# Patient Record
Sex: FEMALE | Race: WHITE | Employment: UNEMPLOYED | URBAN - METROPOLITAN AREA
[De-identification: names, ages, dates, MRNs, and addresses within clinical notes are randomized per-mention and may not be internally consistent; named-entity substitution may affect disease eponyms.]

---

## 2017-08-15 ENCOUNTER — OFFICE VISIT (OUTPATIENT)
Dept: INTERNAL MEDICINE CLINIC | Age: 52
End: 2017-08-15

## 2017-08-15 VITALS
TEMPERATURE: 98.2 F | WEIGHT: 201 LBS | HEIGHT: 64 IN | OXYGEN SATURATION: 98 % | HEART RATE: 68 BPM | BODY MASS INDEX: 34.31 KG/M2 | RESPIRATION RATE: 16 BRPM | DIASTOLIC BLOOD PRESSURE: 88 MMHG | SYSTOLIC BLOOD PRESSURE: 146 MMHG

## 2017-08-15 DIAGNOSIS — E11.9 CONTROLLED TYPE 2 DIABETES MELLITUS WITHOUT COMPLICATION, WITH LONG-TERM CURRENT USE OF INSULIN (HCC): ICD-10-CM

## 2017-08-15 DIAGNOSIS — H43.11 VITREOUS HEMORRHAGE OF RIGHT EYE (HCC): Chronic | ICD-10-CM

## 2017-08-15 DIAGNOSIS — E78.2 MIXED HYPERLIPIDEMIA: Chronic | ICD-10-CM

## 2017-08-15 DIAGNOSIS — Z79.4 CONTROLLED TYPE 2 DIABETES MELLITUS WITHOUT COMPLICATION, WITH LONG-TERM CURRENT USE OF INSULIN (HCC): ICD-10-CM

## 2017-08-15 DIAGNOSIS — Z01.818 PRE-OP EVALUATION: Primary | ICD-10-CM

## 2017-08-15 LAB — HBA1C MFR BLD: 7.9 %

## 2017-08-15 PROCEDURE — 83036 HEMOGLOBIN GLYCOSYLATED A1C: CPT | Performed by: INTERNAL MEDICINE

## 2017-08-15 PROCEDURE — 99243 OFF/OP CNSLTJ NEW/EST LOW 30: CPT | Performed by: INTERNAL MEDICINE

## 2017-08-15 RX ORDER — LISINOPRIL 10 MG/1
TABLET ORAL
Qty: 30 TABLET | Refills: 5 | Status: SHIPPED | OUTPATIENT
Start: 2017-08-15 | End: 2018-11-08

## 2017-11-16 ENCOUNTER — OFFICE VISIT (OUTPATIENT)
Dept: INTERNAL MEDICINE CLINIC | Age: 52
End: 2017-11-16

## 2017-11-16 VITALS
DIASTOLIC BLOOD PRESSURE: 88 MMHG | SYSTOLIC BLOOD PRESSURE: 132 MMHG | WEIGHT: 209 LBS | HEIGHT: 64 IN | BODY MASS INDEX: 35.68 KG/M2 | RESPIRATION RATE: 16 BRPM

## 2017-11-16 DIAGNOSIS — E78.2 MIXED HYPERLIPIDEMIA: Chronic | ICD-10-CM

## 2017-11-16 DIAGNOSIS — E11.9 CONTROLLED TYPE 2 DIABETES MELLITUS WITHOUT COMPLICATION, WITH LONG-TERM CURRENT USE OF INSULIN (HCC): Primary | ICD-10-CM

## 2017-11-16 DIAGNOSIS — Z12.39 SCREENING FOR BREAST CANCER: ICD-10-CM

## 2017-11-16 DIAGNOSIS — Z12.11 SCREENING FOR COLON CANCER: ICD-10-CM

## 2017-11-16 DIAGNOSIS — E11.3499 SEVERE NONPROLIFERATIVE DIABETIC RETINOPATHY ASSOCIATED WITH TYPE 2 DIABETES MELLITUS, MACULAR EDEMA PRESENCE UNSPECIFIED, UNSPECIFIED LATERALITY (HCC): Chronic | ICD-10-CM

## 2017-11-16 DIAGNOSIS — Z79.4 CONTROLLED TYPE 2 DIABETES MELLITUS WITHOUT COMPLICATION, WITH LONG-TERM CURRENT USE OF INSULIN (HCC): Primary | ICD-10-CM

## 2017-11-16 LAB
CREATININE URINE: 126.1 MG/DL (ref 28–259)
HBA1C MFR BLD: 7.3 %
MICROALBUMIN UR-MCNC: 200.4 MG/DL
MICROALBUMIN/CREAT UR-RTO: 1589.2 MG/G (ref 0–30)

## 2017-11-16 PROCEDURE — 99214 OFFICE O/P EST MOD 30 MIN: CPT | Performed by: INTERNAL MEDICINE

## 2017-11-16 PROCEDURE — 83036 HEMOGLOBIN GLYCOSYLATED A1C: CPT | Performed by: INTERNAL MEDICINE

## 2017-11-16 ASSESSMENT — PATIENT HEALTH QUESTIONNAIRE - PHQ9
1. LITTLE INTEREST OR PLEASURE IN DOING THINGS: 0
SUM OF ALL RESPONSES TO PHQ9 QUESTIONS 1 & 2: 0
2. FEELING DOWN, DEPRESSED OR HOPELESS: 0
SUM OF ALL RESPONSES TO PHQ QUESTIONS 1-9: 0

## 2017-11-16 NOTE — PROGRESS NOTES
Subjective:      Patient ID: Kelli Artis is a 49 y.o. female. HPI  Established patient here for a visit to manage chronic medical conditions as detailed below. Diabetes mellitus type 2, controlled, without complications ([address]  This problem was reviewed in detail. It is under control and stable. Will check blood work. Blood sugars have been ok , will monitor closely and cover with sliding scale insulin coverage and continue current medications. BP Readings from Last 2 Encounters:   11/16/17 132/88   08/15/17 (!) 146/88     Hemoglobin A1C (%)   Date Value   08/15/2017 7.9     Microscopic Examination (no units)   Date Value   09/02/2014 Yes     MICROALBUMIN, RANDOM URINE (mg/dL)   Date Value   09/15/2014 154.00 (H)     LDL Calculated (mg/dL)   Date Value   07/27/2015 138 (H)              Tobacco use:  Patient  reports that she has never smoked. She has never used smokeless tobacco.    If Smoker - Cessation materials given? NA    Is Daily aspirin on medication list?:  Yes    Diabetic retinal exam done? Yes   If yes, document in Health Maintenance. Monofilament placed on counter? Yes    Shoes and socks removed? Yes    BP taken with correct size cuff? Yes   Repeated if > 130/80 Yes     Microalbumin performed if applicable? Yes      Mixed hyperlipidemia  This has been a chronic problem, takes meds regularly. Monitors diet and tries to follow a low fat diet. Not been very compliant w exercise. Lipids have been stable, The problem is controlled. Recent lipid tests were reviewed and are normal. Pertinent negatives include no chest pain, focal sensory loss, focal weakness, leg pain, myalgias or shortness of breath. Advised patient to continue the current instructions or medications. Diabetic retinopathy ([address]  This problem is stable, reviewed in detail, and advised patient to continue the current instructions or medications. Will continue to closely monitor the situation.       Review of Systems  ROS: No unusual headaches or allergy symptoms or blurred vision. No prolonged cough. No flushing or facial pain or chest pain,dizziness, dyspnea, palpitations, or chest pain on exertion. No syncope. No nausea or vommitting or diarrhea. No jaundice or abdominal pain, change in bowel habits, black or bloody stools. No dysuria or hematuria or frequency of urination. No myalgias or muscle pain. No numbness, weakness, or tingling. No falls, or loss of consciousness. No weight loss or back pain. No falls. No paresthesias. No joint swelling or redness. No joint pain. No recent weight loss. No focal weakness or sensory deficits or paresthesias, No confusion or altered sensorium. No hematemesis. No hearing loss. No siezures. All other systems were reviewed, and review was negative. Objective:   Physical Exam  /88 (Site: Left Arm, Position: Sitting, Cuff Size: Medium Adult)   Resp 16   Ht 5' 3.5\" (1.613 m)   Wt 209 lb (94.8 kg)   BMI 36.44 kg/m²    The physical exam reveals a patient who appears well, alert and oriented x 3, pleasant, cooperative. Vitals are as noted. Head is atraumatic and normocephalic. Eyes reveal normal conjunctiva, cornea normal, pupils are equal and rective to light. Nasal mucosa is normal. Throat is normal without exudates. Ears reveal normal tympanic membranes. Neck is supple and free of adenopathy, or masses. No thyromegaly. No jugular venous distension. Lungs are clear to auscultation, no rales or rhonchi noted. Heart sounds are regular , no murmurs, clicks, gallops or rubs. Abdomen is soft, no tenderness, masses or organomegaly. Bowel sounds are normally heard. Pelvis: normal. Extremities are normal. Peripheral pulses are normal. Screening neurological exam is normal without focal findings. Cranial nerves are intact, reflexes are symmetrical and muscle strength eaqual. Skin is normal without suspicious lesions noted.      Assessment:      Diabetes mellitus type 2, controlled, without complications ([address]  This problem was reviewed in detail. It is under control and stable. Will check blood work. Blood sugars have been ok , will monitor closely and cover with sliding scale insulin coverage and continue current medications. BP Readings from Last 2 Encounters:   11/16/17 132/88   08/15/17 (!) 146/88     Hemoglobin A1C (%)   Date Value   08/15/2017 7.9     Microscopic Examination (no units)   Date Value   09/02/2014 Yes     MICROALBUMIN, RANDOM URINE (mg/dL)   Date Value   09/15/2014 154.00 (H)     LDL Calculated (mg/dL)   Date Value   07/27/2015 138 (H)              Tobacco use:  Patient  reports that she has never smoked. She has never used smokeless tobacco.    If Smoker - Cessation materials given? NA    Is Daily aspirin on medication list?:  Yes    Diabetic retinal exam done? Yes   If yes, document in Health Maintenance. Monofilament placed on counter? Yes    Shoes and socks removed? Yes    BP taken with correct size cuff? Yes   Repeated if > 130/80 Yes     Microalbumin performed if applicable? Yes      Mixed hyperlipidemia  This has been a chronic problem, takes meds regularly. Monitors diet and tries to follow a low fat diet. Not been very compliant w exercise. Lipids have been stable, The problem is controlled. Recent lipid tests were reviewed and are normal. Pertinent negatives include no chest pain, focal sensory loss, focal weakness, leg pain, myalgias or shortness of breath. Advised patient to continue the current instructions or medications. Diabetic retinopathy ([address]  This problem is stable, reviewed in detail, and advised patient to continue the current instructions or medications. Will continue to closely monitor the situation. Plan: This problem was reviewed in detail. It is under control and stable. Will check blood work.

## 2017-11-16 NOTE — ASSESSMENT & PLAN NOTE
This problem was reviewed in detail. It is under control and stable. Will check blood work. Blood sugars have been ok , will monitor closely and cover with sliding scale insulin coverage and continue current medications. BP Readings from Last 2 Encounters:   11/16/17 132/88   08/15/17 (!) 146/88     Hemoglobin A1C (%)   Date Value   08/15/2017 7.9     Microscopic Examination (no units)   Date Value   09/02/2014 Yes     MICROALBUMIN, RANDOM URINE (mg/dL)   Date Value   09/15/2014 154.00 (H)     LDL Calculated (mg/dL)   Date Value   07/27/2015 138 (H)              Tobacco use:  Patient  reports that she has never smoked. She has never used smokeless tobacco.    If Smoker - Cessation materials given? NA    Is Daily aspirin on medication list?:  Yes    Diabetic retinal exam done? Yes   If yes, document in Health Maintenance. Monofilament placed on counter? Yes    Shoes and socks removed? Yes    BP taken with correct size cuff? Yes   Repeated if > 130/80 Yes     Microalbumin performed if applicable?   Yes

## 2018-01-04 ENCOUNTER — TELEPHONE (OUTPATIENT)
Dept: INTERNAL MEDICINE CLINIC | Age: 53
End: 2018-01-04

## 2018-11-08 ENCOUNTER — OFFICE VISIT (OUTPATIENT)
Dept: INTERNAL MEDICINE CLINIC | Age: 53
End: 2018-11-08
Payer: COMMERCIAL

## 2018-11-08 VITALS
HEART RATE: 90 BPM | DIASTOLIC BLOOD PRESSURE: 92 MMHG | BODY MASS INDEX: 35.74 KG/M2 | SYSTOLIC BLOOD PRESSURE: 162 MMHG | TEMPERATURE: 98 F | WEIGHT: 205 LBS | OXYGEN SATURATION: 96 %

## 2018-11-08 DIAGNOSIS — Z12.39 SCREENING FOR BREAST CANCER: ICD-10-CM

## 2018-11-08 DIAGNOSIS — E78.2 MIXED HYPERLIPIDEMIA: Chronic | ICD-10-CM

## 2018-11-08 DIAGNOSIS — E11.3499 SEVERE NONPROLIFERATIVE DIABETIC RETINOPATHY ASSOCIATED WITH TYPE 2 DIABETES MELLITUS, MACULAR EDEMA PRESENCE UNSPECIFIED, UNSPECIFIED LATERALITY (HCC): Chronic | ICD-10-CM

## 2018-11-08 DIAGNOSIS — Z12.11 SCREENING FOR COLON CANCER: ICD-10-CM

## 2018-11-08 DIAGNOSIS — I10 ESSENTIAL HYPERTENSION: ICD-10-CM

## 2018-11-08 DIAGNOSIS — E11.9 CONTROLLED TYPE 2 DIABETES MELLITUS WITHOUT COMPLICATION, WITH LONG-TERM CURRENT USE OF INSULIN (HCC): Primary | ICD-10-CM

## 2018-11-08 DIAGNOSIS — Z79.4 CONTROLLED TYPE 2 DIABETES MELLITUS WITHOUT COMPLICATION, WITH LONG-TERM CURRENT USE OF INSULIN (HCC): Primary | ICD-10-CM

## 2018-11-08 PROCEDURE — 83036 HEMOGLOBIN GLYCOSYLATED A1C: CPT | Performed by: INTERNAL MEDICINE

## 2018-11-08 PROCEDURE — 99214 OFFICE O/P EST MOD 30 MIN: CPT | Performed by: INTERNAL MEDICINE

## 2018-11-08 RX ORDER — AMLODIPINE BESYLATE AND BENAZEPRIL HYDROCHLORIDE 10; 20 MG/1; MG/1
1 CAPSULE ORAL DAILY
Qty: 30 CAPSULE | Refills: 3 | Status: SHIPPED | OUTPATIENT
Start: 2018-11-08 | End: 2018-12-17 | Stop reason: SDUPTHER

## 2018-11-08 ASSESSMENT — PATIENT HEALTH QUESTIONNAIRE - PHQ9
SUM OF ALL RESPONSES TO PHQ QUESTIONS 1-9: 0
2. FEELING DOWN, DEPRESSED OR HOPELESS: 0
SUM OF ALL RESPONSES TO PHQ QUESTIONS 1-9: 0
1. LITTLE INTEREST OR PLEASURE IN DOING THINGS: 0
SUM OF ALL RESPONSES TO PHQ9 QUESTIONS 1 & 2: 0

## 2018-11-09 LAB — HBA1C MFR BLD: 6.1 %

## 2018-12-05 ENCOUNTER — TELEPHONE (OUTPATIENT)
Dept: PRIMARY CARE CLINIC | Age: 53
End: 2018-12-05

## 2018-12-10 ENCOUNTER — HOSPITAL ENCOUNTER (OUTPATIENT)
Dept: MAMMOGRAPHY | Age: 53
Discharge: HOME | End: 2018-12-10
Payer: COMMERCIAL

## 2018-12-10 DIAGNOSIS — Z12.31 VISIT FOR SCREENING MAMMOGRAM: ICD-10-CM

## 2018-12-10 PROCEDURE — 77067 SCR MAMMO BI INCL CAD: CPT

## 2018-12-14 ENCOUNTER — TELEPHONE (OUTPATIENT)
Dept: INTERNAL MEDICINE CLINIC | Age: 53
End: 2018-12-14

## 2018-12-17 ENCOUNTER — OFFICE VISIT (OUTPATIENT)
Dept: INTERNAL MEDICINE CLINIC | Age: 53
End: 2018-12-17
Payer: COMMERCIAL

## 2018-12-17 VITALS
SYSTOLIC BLOOD PRESSURE: 150 MMHG | DIASTOLIC BLOOD PRESSURE: 82 MMHG | OXYGEN SATURATION: 100 % | WEIGHT: 205 LBS | BODY MASS INDEX: 35.74 KG/M2 | HEART RATE: 83 BPM

## 2018-12-17 DIAGNOSIS — Z12.11 SCREENING FOR COLON CANCER: ICD-10-CM

## 2018-12-17 DIAGNOSIS — E11.9 CONTROLLED TYPE 2 DIABETES MELLITUS WITHOUT COMPLICATION, WITH LONG-TERM CURRENT USE OF INSULIN (HCC): ICD-10-CM

## 2018-12-17 DIAGNOSIS — I10 ESSENTIAL HYPERTENSION: ICD-10-CM

## 2018-12-17 DIAGNOSIS — Z00.00 PHYSICAL EXAM, ANNUAL: Primary | ICD-10-CM

## 2018-12-17 DIAGNOSIS — E11.3499 SEVERE NONPROLIFERATIVE DIABETIC RETINOPATHY ASSOCIATED WITH TYPE 2 DIABETES MELLITUS, MACULAR EDEMA PRESENCE UNSPECIFIED, UNSPECIFIED LATERALITY (HCC): Chronic | ICD-10-CM

## 2018-12-17 DIAGNOSIS — Z79.4 CONTROLLED TYPE 2 DIABETES MELLITUS WITHOUT COMPLICATION, WITH LONG-TERM CURRENT USE OF INSULIN (HCC): ICD-10-CM

## 2018-12-17 DIAGNOSIS — E78.2 MIXED HYPERLIPIDEMIA: Chronic | ICD-10-CM

## 2018-12-17 LAB
A/G RATIO: 1 (ref 1.1–2.2)
ALBUMIN SERPL-MCNC: 3.5 G/DL (ref 3.4–5)
ALP BLD-CCNC: 61 U/L (ref 40–129)
ALT SERPL-CCNC: 8 U/L (ref 10–40)
ANION GAP SERPL CALCULATED.3IONS-SCNC: 16 MMOL/L (ref 3–16)
AST SERPL-CCNC: 9 U/L (ref 15–37)
BASOPHILS ABSOLUTE: 0.1 K/UL (ref 0–0.2)
BASOPHILS RELATIVE PERCENT: 0.8 %
BILIRUB SERPL-MCNC: <0.2 MG/DL (ref 0–1)
BUN BLDV-MCNC: 68 MG/DL (ref 7–20)
CALCIUM SERPL-MCNC: 9 MG/DL (ref 8.3–10.6)
CHLORIDE BLD-SCNC: 103 MMOL/L (ref 99–110)
CHOLESTEROL, TOTAL: 193 MG/DL (ref 0–199)
CO2: 18 MMOL/L (ref 21–32)
CREAT SERPL-MCNC: 5.4 MG/DL (ref 0.6–1.1)
CREATININE URINE: 60 MG/DL (ref 28–259)
EOSINOPHILS ABSOLUTE: 0.4 K/UL (ref 0–0.6)
EOSINOPHILS RELATIVE PERCENT: 4.6 %
GFR AFRICAN AMERICAN: 10
GFR NON-AFRICAN AMERICAN: 8
GLOBULIN: 3.4 G/DL
GLUCOSE BLD-MCNC: 86 MG/DL (ref 70–99)
HCT VFR BLD CALC: 25.9 % (ref 36–48)
HDLC SERPL-MCNC: 51 MG/DL (ref 40–60)
HEMOGLOBIN: 8.2 G/DL (ref 12–16)
LDL CHOLESTEROL CALCULATED: 121 MG/DL
LYMPHOCYTES ABSOLUTE: 1.9 K/UL (ref 1–5.1)
LYMPHOCYTES RELATIVE PERCENT: 23.3 %
MCH RBC QN AUTO: 25.1 PG (ref 26–34)
MCHC RBC AUTO-ENTMCNC: 31.8 G/DL (ref 31–36)
MCV RBC AUTO: 79.1 FL (ref 80–100)
MICROALBUMIN UR-MCNC: 132.5 MG/DL
MICROALBUMIN/CREAT UR-RTO: 2208.3 MG/G (ref 0–30)
MONOCYTES ABSOLUTE: 0.5 K/UL (ref 0–1.3)
MONOCYTES RELATIVE PERCENT: 6.1 %
NEUTROPHILS ABSOLUTE: 5.4 K/UL (ref 1.7–7.7)
NEUTROPHILS RELATIVE PERCENT: 65.2 %
PDW BLD-RTO: 18.2 % (ref 12.4–15.4)
PLATELET # BLD: 226 K/UL (ref 135–450)
PMV BLD AUTO: 10.6 FL (ref 5–10.5)
POTASSIUM SERPL-SCNC: 5.4 MMOL/L (ref 3.5–5.1)
RBC # BLD: 3.28 M/UL (ref 4–5.2)
SODIUM BLD-SCNC: 137 MMOL/L (ref 136–145)
TOTAL PROTEIN: 6.9 G/DL (ref 6.4–8.2)
TRIGL SERPL-MCNC: 104 MG/DL (ref 0–150)
TSH SERPL DL<=0.05 MIU/L-ACNC: 2.63 UIU/ML (ref 0.27–4.2)
VLDLC SERPL CALC-MCNC: 21 MG/DL
WBC # BLD: 8.2 K/UL (ref 4–11)

## 2018-12-17 PROCEDURE — 99396 PREV VISIT EST AGE 40-64: CPT | Performed by: INTERNAL MEDICINE

## 2018-12-17 PROCEDURE — 36415 COLL VENOUS BLD VENIPUNCTURE: CPT | Performed by: INTERNAL MEDICINE

## 2018-12-17 RX ORDER — AMLODIPINE BESYLATE AND BENAZEPRIL HYDROCHLORIDE 10; 20 MG/1; MG/1
1 CAPSULE ORAL DAILY
Qty: 30 CAPSULE | Refills: 5 | Status: SHIPPED | OUTPATIENT
Start: 2018-12-17 | End: 2018-12-18 | Stop reason: SINTOL

## 2018-12-17 NOTE — PROGRESS NOTES
hypertension  This is a chronic problem. The problem is well controlled. Patient monitors readings regularly. Pertinent negatives include no chest pain, focal sensory loss, focal weakness, leg pain, myalgias or shortness of breath. No headaches or chest pain. Takes medications regularly. Blood pressure has been stable, blood work was reviewed, and advised patient to continue the current instructions or medications. Past Medical History:   Diagnosis Date    Anemia 9/15/2014    [facility]     Diabetes mellitus type 1 ([facility]     Diabetic retinopathy [facility]     Diabetic retinopathy associated with type 2 diabetes mellitus, without macular edema     Hyperlipidemia 7/27/2015    Kidney stone     Mixed hyperlipidemia 7/27/2015    Orthostatic hypotension     Vitreous hemorrhage of right eye ([facility]) 8/15/2017     Current Outpatient Prescriptions   Medication Sig Dispense Refill    amLODIPine-benazepril (LOTREL) 10-20 MG per capsule Take 1 capsule by mouth daily 30 capsule 5    insulin glargine (LANTUS SOLOSTAR) 100 UNIT/ML injection pen INJECT 20 UNITS SUBCUTANEOUSLY NIGHTLY 15 pen 5    insulin lispro (HUMALOG KWIKPEN) 100 UNIT/ML pen INJECT UP  UNITS UNDER THE SKIN TWO TIMES A DAY 15 pen 5    Insulin Pen Needle (B-D ULTRAFINE III SHORT PEN) 31G X 8 MM MISC INJECT TWO TIMES A  each 1    Lancets MISC 1 each by Does not apply route 3 times daily       No current facility-administered medications for this visit. Allergies : Bactrim [sulfamethoxazole-trimethoprim]; Geocillin [carbenicillin];  Tramadol; and Ibuprofen  Past Surgical History:   Procedure Laterality Date    APPENDECTOMY      KIDNEY STONE SURGERY      TONSILLECTOMY       Family History   Problem Relation Age of Onset    Heart Disease Mother         heart attack 2007    High Blood Pressure Mother     Diabetes Father     Heart Disease Father     Emphysema Father     Cancer Maternal Grandmother         cervical focal sensory loss, focal weakness, leg pain, myalgias or shortness of breath. No headaches or chest pain. Takes medications regularly. Blood pressure has been stable, blood work was reviewed, and advised patient to continue the current instructions or medications. Plan :      Will get fasting blood Olga Hubbard MD  12/17/2018 9:59 AM

## 2018-12-18 ENCOUNTER — NURSE ONLY (OUTPATIENT)
Dept: INTERNAL MEDICINE CLINIC | Age: 53
End: 2018-12-18
Payer: COMMERCIAL

## 2018-12-18 DIAGNOSIS — R79.89 ELEVATED SERUM CREATININE: Primary | ICD-10-CM

## 2018-12-18 LAB
ANION GAP SERPL CALCULATED.3IONS-SCNC: 14 MMOL/L (ref 3–16)
BUN BLDV-MCNC: 66 MG/DL (ref 7–20)
CALCIUM SERPL-MCNC: 8.7 MG/DL (ref 8.3–10.6)
CHLORIDE BLD-SCNC: 101 MMOL/L (ref 99–110)
CO2: 20 MMOL/L (ref 21–32)
CREAT SERPL-MCNC: 5.3 MG/DL (ref 0.6–1.1)
GFR AFRICAN AMERICAN: 10
GFR NON-AFRICAN AMERICAN: 8
GLUCOSE BLD-MCNC: 90 MG/DL (ref 70–99)
POTASSIUM SERPL-SCNC: 5.4 MMOL/L (ref 3.5–5.1)
SODIUM BLD-SCNC: 135 MMOL/L (ref 136–145)

## 2018-12-18 PROCEDURE — 36415 COLL VENOUS BLD VENIPUNCTURE: CPT | Performed by: INTERNAL MEDICINE

## 2018-12-18 RX ORDER — AMLODIPINE BESYLATE 10 MG/1
10 TABLET ORAL DAILY
Qty: 30 TABLET | Refills: 3 | Status: SHIPPED | OUTPATIENT
Start: 2018-12-18 | End: 2019-01-03 | Stop reason: ALTCHOICE

## 2018-12-21 ENCOUNTER — NURSE ONLY (OUTPATIENT)
Dept: INTERNAL MEDICINE CLINIC | Age: 53
End: 2018-12-21
Payer: COMMERCIAL

## 2018-12-21 ENCOUNTER — TELEPHONE (OUTPATIENT)
Dept: INTERNAL MEDICINE CLINIC | Age: 53
End: 2018-12-21

## 2018-12-21 DIAGNOSIS — N28.9 ABNORMAL KIDNEY FUNCTION: Primary | ICD-10-CM

## 2018-12-21 DIAGNOSIS — N17.9 AKI (ACUTE KIDNEY INJURY) (HCC): Primary | ICD-10-CM

## 2018-12-21 LAB
ANION GAP SERPL CALCULATED.3IONS-SCNC: 14 MMOL/L (ref 3–16)
BUN BLDV-MCNC: 58 MG/DL (ref 7–20)
CALCIUM SERPL-MCNC: 8.7 MG/DL (ref 8.3–10.6)
CHLORIDE BLD-SCNC: 100 MMOL/L (ref 99–110)
CO2: 19 MMOL/L (ref 21–32)
CREAT SERPL-MCNC: 5.1 MG/DL (ref 0.6–1.1)
GFR AFRICAN AMERICAN: 11
GFR NON-AFRICAN AMERICAN: 9
GLUCOSE BLD-MCNC: 75 MG/DL (ref 70–99)
POTASSIUM SERPL-SCNC: 5.1 MMOL/L (ref 3.5–5.1)
SODIUM BLD-SCNC: 133 MMOL/L (ref 136–145)

## 2018-12-21 PROCEDURE — 36415 COLL VENOUS BLD VENIPUNCTURE: CPT | Performed by: INTERNAL MEDICINE

## 2018-12-27 ENCOUNTER — NURSE ONLY (OUTPATIENT)
Dept: INTERNAL MEDICINE CLINIC | Age: 53
End: 2018-12-27
Payer: COMMERCIAL

## 2018-12-27 DIAGNOSIS — I10 ESSENTIAL HYPERTENSION: Primary | ICD-10-CM

## 2018-12-27 LAB
ANION GAP SERPL CALCULATED.3IONS-SCNC: 13 MMOL/L (ref 3–16)
BUN BLDV-MCNC: 58 MG/DL (ref 7–20)
CALCIUM SERPL-MCNC: 8.8 MG/DL (ref 8.3–10.6)
CHLORIDE BLD-SCNC: 101 MMOL/L (ref 99–110)
CO2: 20 MMOL/L (ref 21–32)
CREAT SERPL-MCNC: 5.3 MG/DL (ref 0.6–1.1)
GFR AFRICAN AMERICAN: 10
GFR NON-AFRICAN AMERICAN: 8
GLUCOSE BLD-MCNC: 68 MG/DL (ref 70–99)
POTASSIUM SERPL-SCNC: 5.2 MMOL/L (ref 3.5–5.1)
SODIUM BLD-SCNC: 134 MMOL/L (ref 136–145)

## 2018-12-27 PROCEDURE — 36415 COLL VENOUS BLD VENIPUNCTURE: CPT | Performed by: INTERNAL MEDICINE

## 2018-12-28 ENCOUNTER — TELEPHONE (OUTPATIENT)
Dept: INTERNAL MEDICINE CLINIC | Age: 53
End: 2018-12-28

## 2018-12-28 NOTE — TELEPHONE ENCOUNTER
She need to see  A nephrologist and this was offered to her before but apparently she did not go  Call DR. Craig's office and see if they can see her ASAP -dx Renal Failure  If they say they can not see her soon,let me know

## 2019-01-08 ENCOUNTER — HOSPITAL ENCOUNTER (OUTPATIENT)
Dept: ULTRASOUND IMAGING | Age: 54
Discharge: HOME | End: 2019-01-08
Payer: COMMERCIAL

## 2019-01-08 DIAGNOSIS — N18.5 CKD (CHRONIC KIDNEY DISEASE) STAGE 5, GFR LESS THAN 15 ML/MIN (HCC): ICD-10-CM

## 2019-01-08 LAB
ALBUMIN SERPL-MCNC: 3.8 G/DL (ref 3.4–5)
ANION GAP SERPL CALCULATED.3IONS-SCNC: 14 MMOL/L (ref 3–16)
BACTERIA: ABNORMAL /HPF
BASOPHILS ABSOLUTE: 0 K/UL (ref 0–0.2)
BASOPHILS RELATIVE PERCENT: 0.6 %
BILIRUBIN URINE: NEGATIVE
BLOOD, URINE: ABNORMAL
BUN BLDV-MCNC: 47 MG/DL (ref 7–20)
C3 COMPLEMENT: 107.3 MG/DL (ref 90–180)
C4 COMPLEMENT: 18.9 MG/DL (ref 10–40)
CALCIUM SERPL-MCNC: 8.8 MG/DL (ref 8.3–10.6)
CHLORIDE BLD-SCNC: 96 MMOL/L (ref 99–110)
CLARITY: CLEAR
CO2: 21 MMOL/L (ref 21–32)
COLOR: YELLOW
CREAT SERPL-MCNC: 5.3 MG/DL (ref 0.6–1.1)
EOSINOPHILS ABSOLUTE: 0.1 K/UL (ref 0–0.6)
EOSINOPHILS RELATIVE PERCENT: 1.8 %
EPITHELIAL CELLS, UA: 0 /HPF (ref 0–5)
GFR AFRICAN AMERICAN: 10
GFR NON-AFRICAN AMERICAN: 8
GLUCOSE BLD-MCNC: 86 MG/DL (ref 70–99)
GLUCOSE URINE: NEGATIVE MG/DL
HAV IGM SER IA-ACNC: NORMAL
HCT VFR BLD CALC: 23 % (ref 36–48)
HEMOGLOBIN: 7.4 G/DL (ref 12–16)
HEPATITIS B CORE IGM ANTIBODY: NORMAL
HEPATITIS B SURFACE ANTIGEN INTERPRETATION: NORMAL
HEPATITIS C ANTIBODY INTERPRETATION: NORMAL
HYALINE CASTS: 0 /LPF (ref 0–8)
IRON SATURATION: 8 % (ref 15–50)
IRON: 33 UG/DL (ref 37–145)
KETONES, URINE: NEGATIVE MG/DL
LEUKOCYTE ESTERASE, URINE: NEGATIVE
LYMPHOCYTES ABSOLUTE: 1.6 K/UL (ref 1–5.1)
LYMPHOCYTES RELATIVE PERCENT: 23.4 %
MCH RBC QN AUTO: 25.8 PG (ref 26–34)
MCHC RBC AUTO-ENTMCNC: 32 G/DL (ref 31–36)
MCV RBC AUTO: 80.5 FL (ref 80–100)
MICROSCOPIC EXAMINATION: YES
MONOCYTES ABSOLUTE: 0.4 K/UL (ref 0–1.3)
MONOCYTES RELATIVE PERCENT: 6 %
NEUTROPHILS ABSOLUTE: 4.7 K/UL (ref 1.7–7.7)
NEUTROPHILS RELATIVE PERCENT: 68.2 %
NITRITE, URINE: NEGATIVE
PDW BLD-RTO: 18.1 % (ref 12.4–15.4)
PH UA: 6.5
PHOSPHORUS: 4 MG/DL (ref 2.5–4.9)
PLATELET # BLD: 212 K/UL (ref 135–450)
PMV BLD AUTO: 10.8 FL (ref 5–10.5)
POTASSIUM SERPL-SCNC: 4.5 MMOL/L (ref 3.5–5.1)
PROTEIN UA: >=300 MG/DL
RBC # BLD: 2.86 M/UL (ref 4–5.2)
RBC UA: 4 /HPF (ref 0–4)
SODIUM BLD-SCNC: 131 MMOL/L (ref 136–145)
SPECIFIC GRAVITY UA: <1.005
TOTAL IRON BINDING CAPACITY: 394 UG/DL (ref 260–445)
URINE TYPE: ABNORMAL
UROBILINOGEN, URINE: 0.2 E.U./DL
WBC # BLD: 6.9 K/UL (ref 4–11)
WBC UA: 27 /HPF (ref 0–5)

## 2019-01-08 PROCEDURE — 76770 US EXAM ABDO BACK WALL COMP: CPT

## 2019-01-09 LAB
ANA INTERPRETATION: ABNORMAL
ANA TITER: ABNORMAL
ANTI-NUCLEAR ANTIBODY (ANA): POSITIVE

## 2019-01-11 LAB
BETA GLOBULIN: 1 AU/ML (ref 0–19)
KAPPA, FREE LIGHT CHAINS, SERUM: 147.9 MG/L (ref 3.3–19.4)
KAPPA/LAMBDA RATIO: 1.27 (ref 0.26–1.65)
KAPPA/LAMBDA TEST COMMENT: ABNORMAL
LAMBDA, FREE LIGHT CHAINS, SERUM: 116.38 MG/L (ref 5.71–26.3)
MYELOPEROXIDASE AB: 0 AU/ML (ref 0–19)
SERINE PROTEASE 3 AB: 0 AU/ML (ref 0–19)

## 2019-01-14 ENCOUNTER — HOSPITAL ENCOUNTER (INPATIENT)
Age: 54
LOS: 3 days | Discharge: HOME | DRG: 683 | End: 2019-01-17
Attending: INTERNAL MEDICINE | Admitting: INTERNAL MEDICINE
Payer: COMMERCIAL

## 2019-01-14 PROBLEM — N17.9 ACUTE KIDNEY INJURY (HCC): Status: ACTIVE | Noted: 2019-01-14

## 2019-01-14 LAB
ALBUMIN SERPL-MCNC: 3.4 G/DL (ref 3.4–5)
ANION GAP SERPL CALCULATED.3IONS-SCNC: 13 MMOL/L (ref 3–16)
BASOPHILS ABSOLUTE: 0.1 K/UL (ref 0–0.2)
BASOPHILS RELATIVE PERCENT: 0.8 %
BUN BLDV-MCNC: 46 MG/DL (ref 7–20)
CALCIUM SERPL-MCNC: 8.9 MG/DL (ref 8.3–10.6)
CHLORIDE BLD-SCNC: 103 MMOL/L (ref 99–110)
CO2: 20 MMOL/L (ref 21–32)
CREAT SERPL-MCNC: 5.9 MG/DL (ref 0.6–1.1)
EOSINOPHILS ABSOLUTE: 0.3 K/UL (ref 0–0.6)
EOSINOPHILS RELATIVE PERCENT: 4 %
GFR AFRICAN AMERICAN: 9
GFR NON-AFRICAN AMERICAN: 8
GLUCOSE BLD-MCNC: 129 MG/DL (ref 70–99)
GLUCOSE BLD-MCNC: 151 MG/DL (ref 70–99)
HCT VFR BLD CALC: 22.9 % (ref 36–48)
HEMOGLOBIN: 7.2 G/DL (ref 12–16)
INR BLD: 1.02 (ref 0.86–1.14)
LYMPHOCYTES ABSOLUTE: 1.5 K/UL (ref 1–5.1)
LYMPHOCYTES RELATIVE PERCENT: 20.4 %
MCH RBC QN AUTO: 25.9 PG (ref 26–34)
MCHC RBC AUTO-ENTMCNC: 31.5 G/DL (ref 31–36)
MCV RBC AUTO: 82 FL (ref 80–100)
MONOCYTES ABSOLUTE: 0.5 K/UL (ref 0–1.3)
MONOCYTES RELATIVE PERCENT: 7 %
NEUTROPHILS ABSOLUTE: 5.1 K/UL (ref 1.7–7.7)
NEUTROPHILS RELATIVE PERCENT: 67.8 %
PDW BLD-RTO: 18.1 % (ref 12.4–15.4)
PERFORMED ON: ABNORMAL
PHOSPHORUS: 4.8 MG/DL (ref 2.5–4.9)
PLATELET # BLD: 245 K/UL (ref 135–450)
PMV BLD AUTO: 9.8 FL (ref 5–10.5)
POTASSIUM SERPL-SCNC: 4.2 MMOL/L (ref 3.5–5.1)
PROTHROMBIN TIME: 11.6 SEC (ref 9.8–13)
RBC # BLD: 2.79 M/UL (ref 4–5.2)
SODIUM BLD-SCNC: 136 MMOL/L (ref 136–145)
WBC # BLD: 7.5 K/UL (ref 4–11)

## 2019-01-14 PROCEDURE — 1200000000 HC SEMI PRIVATE

## 2019-01-14 PROCEDURE — 85025 COMPLETE CBC W/AUTO DIFF WBC: CPT

## 2019-01-14 PROCEDURE — 80069 RENAL FUNCTION PANEL: CPT

## 2019-01-14 PROCEDURE — 36415 COLL VENOUS BLD VENIPUNCTURE: CPT

## 2019-01-14 PROCEDURE — 6370000000 HC RX 637 (ALT 250 FOR IP): Performed by: INTERNAL MEDICINE

## 2019-01-14 PROCEDURE — 85610 PROTHROMBIN TIME: CPT

## 2019-01-14 RX ORDER — DOCUSATE SODIUM 100 MG/1
100 CAPSULE, LIQUID FILLED ORAL 2 TIMES DAILY
Status: DISCONTINUED | OUTPATIENT
Start: 2019-01-14 | End: 2019-01-17 | Stop reason: HOSPADM

## 2019-01-14 RX ORDER — SODIUM CHLORIDE 0.9 % (FLUSH) 0.9 %
10 SYRINGE (ML) INJECTION EVERY 12 HOURS SCHEDULED
Status: DISCONTINUED | OUTPATIENT
Start: 2019-01-14 | End: 2019-01-17 | Stop reason: HOSPADM

## 2019-01-14 RX ORDER — SODIUM CHLORIDE 0.9 % (FLUSH) 0.9 %
10 SYRINGE (ML) INJECTION PRN
Status: DISCONTINUED | OUTPATIENT
Start: 2019-01-14 | End: 2019-01-17 | Stop reason: HOSPADM

## 2019-01-14 RX ORDER — DEXTROSE MONOHYDRATE 50 MG/ML
100 INJECTION, SOLUTION INTRAVENOUS PRN
Status: DISCONTINUED | OUTPATIENT
Start: 2019-01-14 | End: 2019-01-17 | Stop reason: HOSPADM

## 2019-01-14 RX ORDER — ONDANSETRON 2 MG/ML
4 INJECTION INTRAMUSCULAR; INTRAVENOUS EVERY 6 HOURS PRN
Status: DISCONTINUED | OUTPATIENT
Start: 2019-01-14 | End: 2019-01-17 | Stop reason: HOSPADM

## 2019-01-14 RX ORDER — DEXTROSE MONOHYDRATE 25 G/50ML
12.5 INJECTION, SOLUTION INTRAVENOUS PRN
Status: DISCONTINUED | OUTPATIENT
Start: 2019-01-14 | End: 2019-01-17 | Stop reason: HOSPADM

## 2019-01-14 RX ORDER — HYDRALAZINE HYDROCHLORIDE 50 MG/1
50 TABLET, FILM COATED ORAL EVERY 8 HOURS SCHEDULED
Status: DISCONTINUED | OUTPATIENT
Start: 2019-01-14 | End: 2019-01-15

## 2019-01-14 RX ORDER — NICOTINE POLACRILEX 4 MG
15 LOZENGE BUCCAL PRN
Status: DISCONTINUED | OUTPATIENT
Start: 2019-01-14 | End: 2019-01-17 | Stop reason: HOSPADM

## 2019-01-14 RX ADMIN — HYDRALAZINE HYDROCHLORIDE 50 MG: 50 TABLET, FILM COATED ORAL at 21:30

## 2019-01-14 RX ADMIN — INSULIN GLARGINE 15 UNITS: 100 INJECTION, SOLUTION SUBCUTANEOUS at 21:30

## 2019-01-14 ASSESSMENT — PAIN SCALES - GENERAL
PAINLEVEL_OUTOF10: 0
PAINLEVEL_OUTOF10: 0

## 2019-01-15 ENCOUNTER — APPOINTMENT (OUTPATIENT)
Dept: CT IMAGING | Age: 54
DRG: 683 | End: 2019-01-15
Attending: INTERNAL MEDICINE
Payer: COMMERCIAL

## 2019-01-15 LAB
BACTERIA: ABNORMAL /HPF
BILIRUBIN URINE: NEGATIVE
BLOOD, URINE: ABNORMAL
CLARITY: CLEAR
COLOR: YELLOW
CREATININE URINE: 75.2 MG/DL (ref 28–259)
EPITHELIAL CELLS, UA: ABNORMAL /HPF
GLUCOSE BLD-MCNC: 135 MG/DL (ref 70–99)
GLUCOSE BLD-MCNC: 136 MG/DL (ref 70–99)
GLUCOSE BLD-MCNC: 84 MG/DL (ref 70–99)
GLUCOSE BLD-MCNC: 96 MG/DL (ref 70–99)
GLUCOSE URINE: 100 MG/DL
KETONES, URINE: NEGATIVE MG/DL
LEUKOCYTE ESTERASE, URINE: NEGATIVE
MICROSCOPIC EXAMINATION: YES
NITRITE, URINE: NEGATIVE
PERFORMED ON: ABNORMAL
PERFORMED ON: ABNORMAL
PERFORMED ON: NORMAL
PERFORMED ON: NORMAL
PH UA: 6
PROTEIN PROTEIN: 304 MG/DL
PROTEIN UA: 100 MG/DL
RBC UA: ABNORMAL /HPF (ref 0–2)
SPECIFIC GRAVITY UA: 1.02
URINE TYPE: ABNORMAL
UROBILINOGEN, URINE: 0.2 E.U./DL
WBC UA: ABNORMAL /HPF (ref 0–5)

## 2019-01-15 PROCEDURE — 88305 TISSUE EXAM BY PATHOLOGIST: CPT

## 2019-01-15 PROCEDURE — 6360000002 HC RX W HCPCS: Performed by: RADIOLOGY

## 2019-01-15 PROCEDURE — 6370000000 HC RX 637 (ALT 250 FOR IP): Performed by: INTERNAL MEDICINE

## 2019-01-15 PROCEDURE — 81001 URINALYSIS AUTO W/SCOPE: CPT

## 2019-01-15 PROCEDURE — 1200000000 HC SEMI PRIVATE

## 2019-01-15 PROCEDURE — 2580000003 HC RX 258: Performed by: INTERNAL MEDICINE

## 2019-01-15 PROCEDURE — 2709999900 CT BIOPSY RENAL

## 2019-01-15 PROCEDURE — 82570 ASSAY OF URINE CREATININE: CPT

## 2019-01-15 PROCEDURE — 2500000003 HC RX 250 WO HCPCS: Performed by: RADIOLOGY

## 2019-01-15 PROCEDURE — 6360000002 HC RX W HCPCS: Performed by: INTERNAL MEDICINE

## 2019-01-15 PROCEDURE — 84156 ASSAY OF PROTEIN URINE: CPT

## 2019-01-15 PROCEDURE — 0TB13ZX EXCISION OF LEFT KIDNEY, PERCUTANEOUS APPROACH, DIAGNOSTIC: ICD-10-PCS | Performed by: RADIOLOGY

## 2019-01-15 RX ORDER — UBIDECARENONE 75 MG
50 CAPSULE ORAL DAILY
COMMUNITY
End: 2019-08-04

## 2019-01-15 RX ORDER — LIDOCAINE HYDROCHLORIDE 20 MG/ML
INJECTION, SOLUTION EPIDURAL; INFILTRATION; INTRACAUDAL; PERINEURAL
Status: COMPLETED | OUTPATIENT
Start: 2019-01-15 | End: 2019-01-15

## 2019-01-15 RX ORDER — AMLODIPINE BESYLATE 10 MG/1
10 TABLET ORAL DAILY
COMMUNITY
End: 2019-06-04 | Stop reason: ALTCHOICE

## 2019-01-15 RX ORDER — AMLODIPINE BESYLATE 10 MG/1
10 TABLET ORAL DAILY
Status: DISCONTINUED | OUTPATIENT
Start: 2019-01-16 | End: 2019-01-17 | Stop reason: HOSPADM

## 2019-01-15 RX ORDER — FENTANYL CITRATE 50 UG/ML
INJECTION, SOLUTION INTRAMUSCULAR; INTRAVENOUS
Status: COMPLETED | OUTPATIENT
Start: 2019-01-15 | End: 2019-01-15

## 2019-01-15 RX ORDER — DOXYCYCLINE HYCLATE 50 MG/1
324 CAPSULE, GELATIN COATED ORAL
COMMUNITY
End: 2019-08-04

## 2019-01-15 RX ORDER — MIDAZOLAM HYDROCHLORIDE 1 MG/ML
INJECTION INTRAMUSCULAR; INTRAVENOUS
Status: COMPLETED | OUTPATIENT
Start: 2019-01-15 | End: 2019-01-15

## 2019-01-15 RX ORDER — ISOSORBIDE MONONITRATE 30 MG/1
30 TABLET, EXTENDED RELEASE ORAL DAILY
Status: DISCONTINUED | OUTPATIENT
Start: 2019-01-15 | End: 2019-01-16

## 2019-01-15 RX ORDER — ACETAMINOPHEN 325 MG/1
650 TABLET ORAL EVERY 6 HOURS PRN
Status: DISCONTINUED | OUTPATIENT
Start: 2019-01-15 | End: 2019-01-17 | Stop reason: HOSPADM

## 2019-01-15 RX ORDER — ACETAMINOPHEN 325 MG/1
650 TABLET ORAL EVERY 6 HOURS PRN
COMMUNITY
End: 2019-08-04

## 2019-01-15 RX ADMIN — IRON SUCROSE 200 MG: 20 INJECTION, SOLUTION INTRAVENOUS at 09:47

## 2019-01-15 RX ADMIN — MIDAZOLAM HYDROCHLORIDE 1 MG: 1 INJECTION INTRAMUSCULAR; INTRAVENOUS at 11:41

## 2019-01-15 RX ADMIN — HYDRALAZINE HYDROCHLORIDE 50 MG: 50 TABLET, FILM COATED ORAL at 05:52

## 2019-01-15 RX ADMIN — Medication 10 ML: at 21:06

## 2019-01-15 RX ADMIN — DARBEPOETIN ALFA 100 MCG: 100 INJECTION, SOLUTION INTRAVENOUS; SUBCUTANEOUS at 12:46

## 2019-01-15 RX ADMIN — HYDRALAZINE HYDROCHLORIDE 50 MG: 50 TABLET, FILM COATED ORAL at 15:57

## 2019-01-15 RX ADMIN — LIDOCAINE HYDROCHLORIDE 5 ML: 20 INJECTION, SOLUTION EPIDURAL; INFILTRATION; INTRACAUDAL; PERINEURAL at 11:41

## 2019-01-15 RX ADMIN — Medication 10 ML: at 08:18

## 2019-01-15 RX ADMIN — HYDRALAZINE HYDROCHLORIDE 50 MG: 50 TABLET, FILM COATED ORAL at 20:56

## 2019-01-15 RX ADMIN — FENTANYL CITRATE 25 MCG: 50 INJECTION INTRAMUSCULAR; INTRAVENOUS at 11:41

## 2019-01-15 RX ADMIN — ISOSORBIDE MONONITRATE 30 MG: 30 TABLET, EXTENDED RELEASE ORAL at 12:58

## 2019-01-15 RX ADMIN — INSULIN GLARGINE 15 UNITS: 100 INJECTION, SOLUTION SUBCUTANEOUS at 21:06

## 2019-01-15 ASSESSMENT — PAIN SCALES - GENERAL
PAINLEVEL_OUTOF10: 0

## 2019-01-16 LAB
GLUCOSE BLD-MCNC: 113 MG/DL (ref 70–99)
GLUCOSE BLD-MCNC: 120 MG/DL (ref 70–99)
GLUCOSE BLD-MCNC: 211 MG/DL (ref 70–99)
GLUCOSE BLD-MCNC: 92 MG/DL (ref 70–99)
IRON SATURATION: 64 % (ref 15–50)
IRON: 205 UG/DL (ref 37–145)
PERFORMED ON: ABNORMAL
PERFORMED ON: NORMAL
TOTAL IRON BINDING CAPACITY: 319 UG/DL (ref 260–445)

## 2019-01-16 PROCEDURE — 2580000003 HC RX 258: Performed by: INTERNAL MEDICINE

## 2019-01-16 PROCEDURE — 1200000000 HC SEMI PRIVATE

## 2019-01-16 PROCEDURE — 36415 COLL VENOUS BLD VENIPUNCTURE: CPT

## 2019-01-16 PROCEDURE — 83550 IRON BINDING TEST: CPT

## 2019-01-16 PROCEDURE — 6370000000 HC RX 637 (ALT 250 FOR IP): Performed by: INTERNAL MEDICINE

## 2019-01-16 PROCEDURE — 83540 ASSAY OF IRON: CPT

## 2019-01-16 RX ORDER — ISOSORBIDE MONONITRATE 30 MG/1
30 TABLET, EXTENDED RELEASE ORAL 2 TIMES DAILY
Status: DISCONTINUED | OUTPATIENT
Start: 2019-01-16 | End: 2019-01-17 | Stop reason: HOSPADM

## 2019-01-16 RX ORDER — ISOSORBIDE MONONITRATE 30 MG/1
30 TABLET, EXTENDED RELEASE ORAL ONCE
Status: COMPLETED | OUTPATIENT
Start: 2019-01-16 | End: 2019-01-16

## 2019-01-16 RX ADMIN — Medication 10 ML: at 20:37

## 2019-01-16 RX ADMIN — Medication 10 ML: at 09:13

## 2019-01-16 RX ADMIN — AMLODIPINE BESYLATE 10 MG: 10 TABLET ORAL at 09:13

## 2019-01-16 RX ADMIN — ACETAMINOPHEN 650 MG: 325 TABLET, FILM COATED ORAL at 04:00

## 2019-01-16 RX ADMIN — ISOSORBIDE MONONITRATE 30 MG: 30 TABLET, EXTENDED RELEASE ORAL at 20:37

## 2019-01-16 RX ADMIN — INSULIN GLARGINE 15 UNITS: 100 INJECTION, SOLUTION SUBCUTANEOUS at 20:32

## 2019-01-16 RX ADMIN — ISOSORBIDE MONONITRATE 30 MG: 30 TABLET, EXTENDED RELEASE ORAL at 13:10

## 2019-01-16 RX ADMIN — ISOSORBIDE MONONITRATE 30 MG: 30 TABLET, EXTENDED RELEASE ORAL at 09:13

## 2019-01-16 RX ADMIN — ACETAMINOPHEN 650 MG: 325 TABLET, FILM COATED ORAL at 19:43

## 2019-01-16 RX ADMIN — INSULIN LISPRO 2 UNITS: 100 INJECTION, SOLUTION INTRAVENOUS; SUBCUTANEOUS at 20:33

## 2019-01-16 ASSESSMENT — PAIN DESCRIPTION - PAIN TYPE
TYPE: ACUTE PAIN
TYPE: ACUTE PAIN

## 2019-01-16 ASSESSMENT — PAIN DESCRIPTION - PROGRESSION
CLINICAL_PROGRESSION: NOT CHANGED
CLINICAL_PROGRESSION: GRADUALLY IMPROVING

## 2019-01-16 ASSESSMENT — PAIN - FUNCTIONAL ASSESSMENT
PAIN_FUNCTIONAL_ASSESSMENT: ACTIVITIES ARE NOT PREVENTED
PAIN_FUNCTIONAL_ASSESSMENT: ACTIVITIES ARE NOT PREVENTED

## 2019-01-16 ASSESSMENT — PAIN SCALES - GENERAL
PAINLEVEL_OUTOF10: 8
PAINLEVEL_OUTOF10: 6
PAINLEVEL_OUTOF10: 0

## 2019-01-16 ASSESSMENT — PAIN DESCRIPTION - ONSET
ONSET: ON-GOING
ONSET: ON-GOING

## 2019-01-16 ASSESSMENT — PAIN DESCRIPTION - LOCATION
LOCATION: HEAD
LOCATION: HEAD

## 2019-01-16 ASSESSMENT — PAIN DESCRIPTION - FREQUENCY
FREQUENCY: INTERMITTENT
FREQUENCY: INTERMITTENT

## 2019-01-16 ASSESSMENT — PAIN DESCRIPTION - DESCRIPTORS
DESCRIPTORS: ACHING
DESCRIPTORS: ACHING

## 2019-01-16 ASSESSMENT — PAIN DESCRIPTION - ORIENTATION: ORIENTATION: ANTERIOR

## 2019-01-17 VITALS
WEIGHT: 207.3 LBS | BODY MASS INDEX: 36.73 KG/M2 | HEIGHT: 63 IN | TEMPERATURE: 97 F | OXYGEN SATURATION: 95 % | DIASTOLIC BLOOD PRESSURE: 85 MMHG | SYSTOLIC BLOOD PRESSURE: 160 MMHG | RESPIRATION RATE: 16 BRPM | HEART RATE: 94 BPM

## 2019-01-17 LAB
ABO/RH: NORMAL
ALBUMIN SERPL-MCNC: 3.3 G/DL (ref 3.4–5)
ANION GAP SERPL CALCULATED.3IONS-SCNC: 14 MMOL/L (ref 3–16)
ANTIBODY SCREEN: NORMAL
BASOPHILS ABSOLUTE: 0.1 K/UL (ref 0–0.2)
BASOPHILS RELATIVE PERCENT: 1 %
BLOOD BANK DISPENSE STATUS: NORMAL
BLOOD BANK PRODUCT CODE: NORMAL
BPU ID: NORMAL
BUN BLDV-MCNC: 54 MG/DL (ref 7–20)
CALCIUM SERPL-MCNC: 8.6 MG/DL (ref 8.3–10.6)
CHLORIDE BLD-SCNC: 104 MMOL/L (ref 99–110)
CO2: 21 MMOL/L (ref 21–32)
CREAT SERPL-MCNC: 5.8 MG/DL (ref 0.6–1.1)
DESCRIPTION BLOOD BANK: NORMAL
EOSINOPHILS ABSOLUTE: 0.3 K/UL (ref 0–0.6)
EOSINOPHILS RELATIVE PERCENT: 4 %
GFR AFRICAN AMERICAN: 9
GFR NON-AFRICAN AMERICAN: 8
GLUCOSE BLD-MCNC: 115 MG/DL (ref 70–99)
GLUCOSE BLD-MCNC: 160 MG/DL (ref 70–99)
GLUCOSE BLD-MCNC: 174 MG/DL (ref 70–99)
GLUCOSE BLD-MCNC: 88 MG/DL (ref 70–99)
HCT VFR BLD CALC: 20.5 % (ref 36–48)
HEMOGLOBIN: 6.6 G/DL (ref 12–16)
LYMPHOCYTES ABSOLUTE: 1.2 K/UL (ref 1–5.1)
LYMPHOCYTES RELATIVE PERCENT: 18.9 %
MCH RBC QN AUTO: 26.2 PG (ref 26–34)
MCHC RBC AUTO-ENTMCNC: 32.3 G/DL (ref 31–36)
MCV RBC AUTO: 81.2 FL (ref 80–100)
MONOCYTES ABSOLUTE: 0.5 K/UL (ref 0–1.3)
MONOCYTES RELATIVE PERCENT: 7.2 %
NEUTROPHILS ABSOLUTE: 4.5 K/UL (ref 1.7–7.7)
NEUTROPHILS RELATIVE PERCENT: 68.9 %
PDW BLD-RTO: 18.4 % (ref 12.4–15.4)
PERFORMED ON: ABNORMAL
PERFORMED ON: ABNORMAL
PERFORMED ON: NORMAL
PHOSPHORUS: 4.8 MG/DL (ref 2.5–4.9)
PLATELET # BLD: 219 K/UL (ref 135–450)
PMV BLD AUTO: 10.1 FL (ref 5–10.5)
POTASSIUM SERPL-SCNC: 4.3 MMOL/L (ref 3.5–5.1)
RBC # BLD: 2.53 M/UL (ref 4–5.2)
SODIUM BLD-SCNC: 139 MMOL/L (ref 136–145)
WBC # BLD: 6.5 K/UL (ref 4–11)

## 2019-01-17 PROCEDURE — 86923 COMPATIBILITY TEST ELECTRIC: CPT

## 2019-01-17 PROCEDURE — 36415 COLL VENOUS BLD VENIPUNCTURE: CPT

## 2019-01-17 PROCEDURE — 2580000003 HC RX 258: Performed by: INTERNAL MEDICINE

## 2019-01-17 PROCEDURE — 6370000000 HC RX 637 (ALT 250 FOR IP): Performed by: INTERNAL MEDICINE

## 2019-01-17 PROCEDURE — 85025 COMPLETE CBC W/AUTO DIFF WBC: CPT

## 2019-01-17 PROCEDURE — 86901 BLOOD TYPING SEROLOGIC RH(D): CPT

## 2019-01-17 PROCEDURE — 86850 RBC ANTIBODY SCREEN: CPT

## 2019-01-17 PROCEDURE — 80069 RENAL FUNCTION PANEL: CPT

## 2019-01-17 PROCEDURE — P9016 RBC LEUKOCYTES REDUCED: HCPCS

## 2019-01-17 PROCEDURE — 86900 BLOOD TYPING SEROLOGIC ABO: CPT

## 2019-01-17 PROCEDURE — 36430 TRANSFUSION BLD/BLD COMPNT: CPT

## 2019-01-17 RX ORDER — DOXAZOSIN 2 MG/1
2 TABLET ORAL NIGHTLY
Qty: 30 TABLET | Refills: 5 | Status: SHIPPED | OUTPATIENT
Start: 2019-01-17 | End: 2019-05-15 | Stop reason: ALTCHOICE

## 2019-01-17 RX ORDER — 0.9 % SODIUM CHLORIDE 0.9 %
250 INTRAVENOUS SOLUTION INTRAVENOUS ONCE
Status: DISCONTINUED | OUTPATIENT
Start: 2019-01-17 | End: 2019-01-17 | Stop reason: HOSPADM

## 2019-01-17 RX ADMIN — AMLODIPINE BESYLATE 10 MG: 10 TABLET ORAL at 08:35

## 2019-01-17 RX ADMIN — Medication 10 ML: at 08:36

## 2019-01-17 RX ADMIN — ACETAMINOPHEN 650 MG: 325 TABLET, FILM COATED ORAL at 09:33

## 2019-01-17 RX ADMIN — INSULIN LISPRO 2 UNITS: 100 INJECTION, SOLUTION INTRAVENOUS; SUBCUTANEOUS at 12:19

## 2019-01-17 RX ADMIN — SODIUM CHLORIDE 250 ML: 9 INJECTION, SOLUTION INTRAVENOUS at 18:11

## 2019-01-17 RX ADMIN — ACETAMINOPHEN 650 MG: 325 TABLET, FILM COATED ORAL at 03:06

## 2019-01-17 ASSESSMENT — PAIN DESCRIPTION - LOCATION
LOCATION: HEAD
LOCATION: HEAD

## 2019-01-17 ASSESSMENT — PAIN SCALES - GENERAL
PAINLEVEL_OUTOF10: 0
PAINLEVEL_OUTOF10: 6
PAINLEVEL_OUTOF10: 0
PAINLEVEL_OUTOF10: 6
PAINLEVEL_OUTOF10: 3

## 2019-01-17 ASSESSMENT — PAIN DESCRIPTION - PAIN TYPE
TYPE: ACUTE PAIN
TYPE: ACUTE PAIN

## 2019-01-17 ASSESSMENT — PAIN DESCRIPTION - PROGRESSION
CLINICAL_PROGRESSION: NOT CHANGED

## 2019-01-17 ASSESSMENT — PAIN DESCRIPTION - FREQUENCY
FREQUENCY: INTERMITTENT
FREQUENCY: INTERMITTENT

## 2019-01-17 ASSESSMENT — PAIN DESCRIPTION - DESCRIPTORS
DESCRIPTORS: ACHING
DESCRIPTORS: ACHING

## 2019-01-17 ASSESSMENT — PAIN DESCRIPTION - ONSET
ONSET: ON-GOING
ONSET: ON-GOING

## 2019-01-17 ASSESSMENT — PAIN DESCRIPTION - ORIENTATION
ORIENTATION: ANTERIOR
ORIENTATION: ANTERIOR

## 2019-01-21 ENCOUNTER — TELEPHONE (OUTPATIENT)
Dept: FAMILY MEDICINE CLINIC | Age: 54
End: 2019-01-21

## 2019-04-17 ENCOUNTER — CLINICAL DOCUMENTATION (OUTPATIENT)
Dept: INTERNAL MEDICINE CLINIC | Age: 54
End: 2019-04-17

## 2019-05-15 DIAGNOSIS — N18.5 CKD (CHRONIC KIDNEY DISEASE) STAGE 5, GFR LESS THAN 15 ML/MIN (HCC): Primary | ICD-10-CM

## 2019-05-15 RX ORDER — CARVEDILOL 12.5 MG/1
12.5 TABLET ORAL 2 TIMES DAILY
Qty: 60 TABLET | Refills: 3 | Status: SHIPPED | OUTPATIENT
Start: 2019-05-15 | End: 2019-07-16 | Stop reason: SDUPTHER

## 2019-05-15 RX ORDER — FUROSEMIDE 40 MG/1
40 TABLET ORAL DAILY
Qty: 60 TABLET | Refills: 3 | Status: SHIPPED
Start: 2019-05-15 | End: 2019-07-16 | Stop reason: ALTCHOICE

## 2019-08-04 ENCOUNTER — APPOINTMENT (OUTPATIENT)
Dept: GENERAL RADIOLOGY | Age: 54
DRG: 674 | End: 2019-08-04
Payer: COMMERCIAL

## 2019-08-04 ENCOUNTER — HOSPITAL ENCOUNTER (INPATIENT)
Age: 54
LOS: 9 days | Discharge: HOME | DRG: 674 | End: 2019-08-13
Attending: EMERGENCY MEDICINE | Admitting: HOSPITALIST
Payer: COMMERCIAL

## 2019-08-04 DIAGNOSIS — N17.9 ACUTE RENAL FAILURE SUPERIMPOSED ON CHRONIC KIDNEY DISEASE, UNSPECIFIED CKD STAGE, UNSPECIFIED ACUTE RENAL FAILURE TYPE (HCC): Primary | ICD-10-CM

## 2019-08-04 DIAGNOSIS — N18.9 ACUTE RENAL FAILURE SUPERIMPOSED ON CHRONIC KIDNEY DISEASE, UNSPECIFIED CKD STAGE, UNSPECIFIED ACUTE RENAL FAILURE TYPE (HCC): Primary | ICD-10-CM

## 2019-08-04 PROBLEM — N18.6 ESRD (END STAGE RENAL DISEASE) (HCC): Status: ACTIVE | Noted: 2019-08-04

## 2019-08-04 LAB
A/G RATIO: 1.1 (ref 1.1–2.2)
ALBUMIN SERPL-MCNC: 3.3 G/DL (ref 3.4–5)
ALP BLD-CCNC: 109 U/L (ref 40–129)
ALT SERPL-CCNC: 217 U/L (ref 10–40)
ANION GAP SERPL CALCULATED.3IONS-SCNC: 26 MMOL/L (ref 3–16)
AST SERPL-CCNC: 106 U/L (ref 15–37)
BASOPHILS ABSOLUTE: 0.1 K/UL (ref 0–0.2)
BASOPHILS RELATIVE PERCENT: 1.4 %
BILIRUB SERPL-MCNC: 0.8 MG/DL (ref 0–1)
BILIRUBIN URINE: NEGATIVE
BLOOD, URINE: ABNORMAL
BUN BLDV-MCNC: 100 MG/DL (ref 7–20)
CALCIUM SERPL-MCNC: 8.4 MG/DL (ref 8.3–10.6)
CHLORIDE BLD-SCNC: 95 MMOL/L (ref 99–110)
CLARITY: ABNORMAL
CO2: 13 MMOL/L (ref 21–32)
COLOR: YELLOW
COMMENT UA: ABNORMAL
CREAT SERPL-MCNC: 18.1 MG/DL (ref 0.6–1.1)
EOSINOPHILS ABSOLUTE: 0.1 K/UL (ref 0–0.6)
EOSINOPHILS RELATIVE PERCENT: 0.8 %
GFR AFRICAN AMERICAN: 2
GFR NON-AFRICAN AMERICAN: 2
GLOBULIN: 3 G/DL
GLUCOSE BLD-MCNC: 127 MG/DL (ref 70–99)
GLUCOSE BLD-MCNC: 172 MG/DL (ref 70–99)
GLUCOSE URINE: 250 MG/DL
HCT VFR BLD CALC: 23.2 % (ref 36–48)
HEMOGLOBIN: 7.3 G/DL (ref 12–16)
KETONES, URINE: NEGATIVE MG/DL
LEUKOCYTE ESTERASE, URINE: ABNORMAL
LYMPHOCYTES ABSOLUTE: 1.2 K/UL (ref 1–5.1)
LYMPHOCYTES RELATIVE PERCENT: 18.6 %
MCH RBC QN AUTO: 27.8 PG (ref 26–34)
MCHC RBC AUTO-ENTMCNC: 31.4 G/DL (ref 31–36)
MCV RBC AUTO: 88.6 FL (ref 80–100)
MICROSCOPIC EXAMINATION: YES
MONOCYTES ABSOLUTE: 0.6 K/UL (ref 0–1.3)
MONOCYTES RELATIVE PERCENT: 9.1 %
NEUTROPHILS ABSOLUTE: 4.5 K/UL (ref 1.7–7.7)
NEUTROPHILS RELATIVE PERCENT: 70.1 %
NITRITE, URINE: NEGATIVE
PDW BLD-RTO: 16.2 % (ref 12.4–15.4)
PERFORMED ON: ABNORMAL
PH UA: 6 (ref 5–8)
PLATELET # BLD: 195 K/UL (ref 135–450)
PMV BLD AUTO: 11.4 FL (ref 5–10.5)
POTASSIUM REFLEX MAGNESIUM: 4.5 MMOL/L (ref 3.5–5.1)
PROTEIN UA: >=300 MG/DL
RBC # BLD: 2.61 M/UL (ref 4–5.2)
RBC UA: ABNORMAL /HPF (ref 0–2)
SODIUM BLD-SCNC: 134 MMOL/L (ref 136–145)
SPECIFIC GRAVITY UA: 1.02 (ref 1–1.03)
TOTAL PROTEIN: 6.3 G/DL (ref 6.4–8.2)
URINE REFLEX TO CULTURE: YES
URINE TYPE: ABNORMAL
UROBILINOGEN, URINE: 1 E.U./DL
WBC # BLD: 6.4 K/UL (ref 4–11)
WBC UA: ABNORMAL /HPF (ref 0–5)
YEAST: PRESENT /HPF

## 2019-08-04 PROCEDURE — 99285 EMERGENCY DEPT VISIT HI MDM: CPT

## 2019-08-04 PROCEDURE — 2060000000 HC ICU INTERMEDIATE R&B

## 2019-08-04 PROCEDURE — 80053 COMPREHEN METABOLIC PANEL: CPT

## 2019-08-04 PROCEDURE — 71045 X-RAY EXAM CHEST 1 VIEW: CPT

## 2019-08-04 PROCEDURE — 85025 COMPLETE CBC W/AUTO DIFF WBC: CPT

## 2019-08-04 PROCEDURE — 96375 TX/PRO/DX INJ NEW DRUG ADDON: CPT

## 2019-08-04 PROCEDURE — 81001 URINALYSIS AUTO W/SCOPE: CPT

## 2019-08-04 PROCEDURE — 6360000002 HC RX W HCPCS: Performed by: NURSE PRACTITIONER

## 2019-08-04 PROCEDURE — 96368 THER/DIAG CONCURRENT INF: CPT

## 2019-08-04 PROCEDURE — 2580000003 HC RX 258: Performed by: NURSE PRACTITIONER

## 2019-08-04 PROCEDURE — 96365 THER/PROPH/DIAG IV INF INIT: CPT

## 2019-08-04 PROCEDURE — 6370000000 HC RX 637 (ALT 250 FOR IP): Performed by: HOSPITALIST

## 2019-08-04 PROCEDURE — 6360000002 HC RX W HCPCS: Performed by: HOSPITALIST

## 2019-08-04 PROCEDURE — 2500000003 HC RX 250 WO HCPCS: Performed by: NURSE PRACTITIONER

## 2019-08-04 PROCEDURE — 87086 URINE CULTURE/COLONY COUNT: CPT

## 2019-08-04 RX ORDER — HEPARIN SODIUM 5000 [USP'U]/ML
5000 INJECTION, SOLUTION INTRAVENOUS; SUBCUTANEOUS EVERY 8 HOURS SCHEDULED
Status: DISCONTINUED | OUTPATIENT
Start: 2019-08-04 | End: 2019-08-13 | Stop reason: HOSPADM

## 2019-08-04 RX ORDER — FUROSEMIDE 10 MG/ML
100 INJECTION INTRAMUSCULAR; INTRAVENOUS ONCE
Status: COMPLETED | OUTPATIENT
Start: 2019-08-04 | End: 2019-08-04

## 2019-08-04 RX ORDER — UBIDECARENONE 75 MG
50 CAPSULE ORAL DAILY
Status: DISCONTINUED | OUTPATIENT
Start: 2019-08-05 | End: 2019-08-13 | Stop reason: HOSPADM

## 2019-08-04 RX ORDER — DOXAZOSIN MESYLATE 4 MG/1
4 TABLET ORAL NIGHTLY
Status: DISCONTINUED | OUTPATIENT
Start: 2019-08-04 | End: 2019-08-09

## 2019-08-04 RX ORDER — DOXYCYCLINE HYCLATE 50 MG/1
324 CAPSULE, GELATIN COATED ORAL
Status: DISCONTINUED | OUTPATIENT
Start: 2019-08-05 | End: 2019-08-06

## 2019-08-04 RX ORDER — INSULIN GLARGINE 100 [IU]/ML
20 INJECTION, SOLUTION SUBCUTANEOUS NIGHTLY
Status: DISCONTINUED | OUTPATIENT
Start: 2019-08-04 | End: 2019-08-08

## 2019-08-04 RX ORDER — SODIUM CHLORIDE 0.9 % (FLUSH) 0.9 %
10 SYRINGE (ML) INJECTION EVERY 12 HOURS SCHEDULED
Status: DISCONTINUED | OUTPATIENT
Start: 2019-08-04 | End: 2019-08-13 | Stop reason: HOSPADM

## 2019-08-04 RX ORDER — DEXTROSE MONOHYDRATE 50 MG/ML
100 INJECTION, SOLUTION INTRAVENOUS PRN
Status: DISCONTINUED | OUTPATIENT
Start: 2019-08-04 | End: 2019-08-13 | Stop reason: HOSPADM

## 2019-08-04 RX ORDER — CIPROFLOXACIN 2 MG/ML
200 INJECTION, SOLUTION INTRAVENOUS EVERY 12 HOURS
Status: CANCELLED | OUTPATIENT
Start: 2019-08-04

## 2019-08-04 RX ORDER — NICOTINE POLACRILEX 4 MG
15 LOZENGE BUCCAL PRN
Status: DISCONTINUED | OUTPATIENT
Start: 2019-08-04 | End: 2019-08-13 | Stop reason: HOSPADM

## 2019-08-04 RX ORDER — SODIUM CHLORIDE 0.9 % (FLUSH) 0.9 %
10 SYRINGE (ML) INJECTION PRN
Status: DISCONTINUED | OUTPATIENT
Start: 2019-08-04 | End: 2019-08-13 | Stop reason: HOSPADM

## 2019-08-04 RX ORDER — ACETAMINOPHEN 325 MG/1
650 TABLET ORAL EVERY 4 HOURS PRN
Status: DISCONTINUED | OUTPATIENT
Start: 2019-08-04 | End: 2019-08-13 | Stop reason: HOSPADM

## 2019-08-04 RX ORDER — DEXTROSE MONOHYDRATE 25 G/50ML
12.5 INJECTION, SOLUTION INTRAVENOUS PRN
Status: DISCONTINUED | OUTPATIENT
Start: 2019-08-04 | End: 2019-08-13 | Stop reason: HOSPADM

## 2019-08-04 RX ORDER — CARVEDILOL 12.5 MG/1
12.5 TABLET ORAL 2 TIMES DAILY
Status: DISCONTINUED | OUTPATIENT
Start: 2019-08-04 | End: 2019-08-07

## 2019-08-04 RX ORDER — ONDANSETRON 2 MG/ML
4 INJECTION INTRAMUSCULAR; INTRAVENOUS EVERY 6 HOURS PRN
Status: DISCONTINUED | OUTPATIENT
Start: 2019-08-04 | End: 2019-08-13 | Stop reason: HOSPADM

## 2019-08-04 RX ADMIN — DOXAZOSIN 4 MG: 4 TABLET ORAL at 22:40

## 2019-08-04 RX ADMIN — FUROSEMIDE 100 MG: 10 INJECTION, SOLUTION INTRAMUSCULAR; INTRAVENOUS at 21:03

## 2019-08-04 RX ADMIN — Medication 50 MEQ: at 20:57

## 2019-08-04 RX ADMIN — CHLOROTHIAZIDE SODIUM 500 MG: 500 INJECTION, POWDER, LYOPHILIZED, FOR SOLUTION INTRAVENOUS at 20:45

## 2019-08-04 RX ADMIN — FUROSEMIDE 20 MG/HR: 10 INJECTION, SOLUTION INTRAMUSCULAR; INTRAVENOUS at 20:45

## 2019-08-04 RX ADMIN — INSULIN GLARGINE 20 UNITS: 100 INJECTION, SOLUTION SUBCUTANEOUS at 23:36

## 2019-08-04 RX ADMIN — HEPARIN SODIUM 5000 UNITS: 5000 INJECTION INTRAVENOUS; SUBCUTANEOUS at 22:40

## 2019-08-04 RX ADMIN — CEFTRIAXONE 1 G: 1 INJECTION, POWDER, FOR SOLUTION INTRAMUSCULAR; INTRAVENOUS at 21:05

## 2019-08-04 ASSESSMENT — PAIN DESCRIPTION - LOCATION
LOCATION: LEG
LOCATION: LEG

## 2019-08-04 ASSESSMENT — PAIN DESCRIPTION - ONSET
ONSET: GRADUAL
ONSET: AWAKENED FROM SLEEP

## 2019-08-04 ASSESSMENT — PAIN DESCRIPTION - PAIN TYPE
TYPE: ACUTE PAIN
TYPE: ACUTE PAIN

## 2019-08-04 ASSESSMENT — ENCOUNTER SYMPTOMS
ABDOMINAL DISTENTION: 0
VOMITING: 1
NAUSEA: 1
SHORTNESS OF BREATH: 1
ABDOMINAL PAIN: 0
BACK PAIN: 0
DIARRHEA: 1
COLOR CHANGE: 0
COUGH: 0

## 2019-08-04 ASSESSMENT — PAIN SCALES - GENERAL
PAINLEVEL_OUTOF10: 0
PAINLEVEL_OUTOF10: 1
PAINLEVEL_OUTOF10: 0
PAINLEVEL_OUTOF10: 4
PAINLEVEL_OUTOF10: 2
PAINLEVEL_OUTOF10: 0

## 2019-08-04 ASSESSMENT — PAIN DESCRIPTION - PROGRESSION
CLINICAL_PROGRESSION: GRADUALLY WORSENING
CLINICAL_PROGRESSION: NOT CHANGED
CLINICAL_PROGRESSION: RESOLVED

## 2019-08-04 ASSESSMENT — PAIN DESCRIPTION - FREQUENCY
FREQUENCY: CONTINUOUS
FREQUENCY: INTERMITTENT

## 2019-08-04 ASSESSMENT — PAIN DESCRIPTION - DESCRIPTORS
DESCRIPTORS: PRESSURE
DESCRIPTORS: DISCOMFORT

## 2019-08-04 ASSESSMENT — PAIN DESCRIPTION - ORIENTATION
ORIENTATION: LEFT;RIGHT
ORIENTATION: LEFT

## 2019-08-04 ASSESSMENT — PAIN - FUNCTIONAL ASSESSMENT
PAIN_FUNCTIONAL_ASSESSMENT: ACTIVITIES ARE NOT PREVENTED
PAIN_FUNCTIONAL_ASSESSMENT: PREVENTS OR INTERFERES SOME ACTIVE ACTIVITIES AND ADLS

## 2019-08-04 NOTE — ED NOTES
Pt presents to ED via walk-in from home per Dr. Gerry Chavarria. Pt complains of nausea, vomiting, ans bilateral leg swelling xfew weeks. Pt states kidney MD sent to ED for dialysis. Pt states she has not had dialysis yet. Pt denies any any access for dialysis. Pt states she had lab work drawn on Tuesday with a creatine level of 16.9 per pt. Pt VSS. Will continue to monitor.       [employer], RN  08/04/19 [id number]

## 2019-08-04 NOTE — ED PROVIDER NOTES
silhouette is enlarged. The remaining mediastinal contours are unremarkable. No pneumothorax, vascular congestion, consolidation, or pleural effusion is identified. No acute osseous abdomen     1. No acute process in the chest. 2. Enlarged cardiac silhouette. PROCEDURES   Unless otherwise noted below, none     Procedures    CRITICAL CARE TIME   CRITICAL CARE NOTE:  There was a high probability of clinically significant life-threatening deterioration of the patient's condition requiring my urgent intervention. Total critical care time was at least 35 minutes. This includes vital sign monitoring, pulse oximetry monitoring, telemetry monitoring, clinical response to the IV medications, reviewing the nursing notes, consultation time, dictation/documentation time, and interpretation of the labwork. This excludes any separately billable procedures performed.       CONSULTS:  IP CONSULT TO NEPHROLOGY  IP CONSULT TO HOSPITALIST  IP CONSULT TO NEPHROLOGY      EMERGENCY DEPARTMENT COURSE and DIFFERENTIAL DIAGNOSIS/MDM:   Vitals:    Vitals:    08/04/19 2014 08/04/19 2021 08/04/19 2023 08/04/19 2026   BP:       Pulse: 86 96 92 87   Resp: 19 20 18 16   Temp:       TempSrc:       SpO2:       Weight:       Height:           Patient was given thefollowing medications:  Medications   chlorothiazide (DIURIL) 500 mg in sodium chloride 0.9 % 50 mL IVPB (has no administration in time range)   furosemide (LASIX) injection 100 mg (has no administration in time range)   furosemide (LASIX) 100 mg in dextrose 5 % 100 mL infusion (has no administration in time range)   sodium bicarbonate 8.4 % injection 50 mEq (has no administration in time range)   cefTRIAXone (ROCEPHIN) 1 g IVPB in 50 mL D5W minibag (has no administration in time range)   glucose (GLUTOSE) 40 % oral gel 15 g (has no administration in time range)   dextrose 50 % IV solution (has no administration in time range)   glucagon (rDNA) injection 1 mg (has no

## 2019-08-05 ENCOUNTER — APPOINTMENT (OUTPATIENT)
Dept: INTERVENTIONAL RADIOLOGY/VASCULAR | Age: 54
DRG: 674 | End: 2019-08-05
Payer: COMMERCIAL

## 2019-08-05 LAB
A/G RATIO: 1 (ref 1.1–2.2)
ALBUMIN SERPL-MCNC: 2.9 G/DL (ref 3.4–5)
ALP BLD-CCNC: 91 U/L (ref 40–129)
ALT SERPL-CCNC: 167 U/L (ref 10–40)
ANION GAP SERPL CALCULATED.3IONS-SCNC: 22 MMOL/L (ref 3–16)
AST SERPL-CCNC: 47 U/L (ref 15–37)
BASOPHILS ABSOLUTE: 0.2 K/UL (ref 0–0.2)
BASOPHILS RELATIVE PERCENT: 3 %
BILIRUB SERPL-MCNC: 0.5 MG/DL (ref 0–1)
BUN BLDV-MCNC: 98 MG/DL (ref 7–20)
CALCIUM SERPL-MCNC: 7.9 MG/DL (ref 8.3–10.6)
CHLORIDE BLD-SCNC: 101 MMOL/L (ref 99–110)
CO2: 16 MMOL/L (ref 21–32)
CREAT SERPL-MCNC: 18.1 MG/DL (ref 0.6–1.1)
EOSINOPHILS ABSOLUTE: 0.2 K/UL (ref 0–0.6)
EOSINOPHILS RELATIVE PERCENT: 3.2 %
ESTIMATED AVERAGE GLUCOSE: 125.5 MG/DL
GFR AFRICAN AMERICAN: 2
GFR NON-AFRICAN AMERICAN: 2
GLOBULIN: 2.8 G/DL
GLUCOSE BLD-MCNC: 100 MG/DL (ref 70–99)
GLUCOSE BLD-MCNC: 102 MG/DL (ref 70–99)
GLUCOSE BLD-MCNC: 109 MG/DL (ref 70–99)
GLUCOSE BLD-MCNC: 86 MG/DL (ref 70–99)
GLUCOSE BLD-MCNC: 98 MG/DL (ref 70–99)
GLUCOSE BLD-MCNC: 98 MG/DL (ref 70–99)
HBA1C MFR BLD: 6 %
HBV SURFACE AB TITR SER: <3.5 MIU/ML
HCT VFR BLD CALC: 21.4 % (ref 36–48)
HEMOGLOBIN: 6.9 G/DL (ref 12–16)
HEPATITIS C ANTIBODY INTERPRETATION: NORMAL
INR BLD: 1.29 (ref 0.86–1.14)
LYMPHOCYTES ABSOLUTE: 0.9 K/UL (ref 1–5.1)
LYMPHOCYTES RELATIVE PERCENT: 17.8 %
MCH RBC QN AUTO: 28.3 PG (ref 26–34)
MCHC RBC AUTO-ENTMCNC: 32.1 G/DL (ref 31–36)
MCV RBC AUTO: 88.3 FL (ref 80–100)
MONOCYTES ABSOLUTE: 0.5 K/UL (ref 0–1.3)
MONOCYTES RELATIVE PERCENT: 8.9 %
NEUTROPHILS ABSOLUTE: 3.4 K/UL (ref 1.7–7.7)
NEUTROPHILS RELATIVE PERCENT: 67.1 %
PDW BLD-RTO: 16.4 % (ref 12.4–15.4)
PERFORMED ON: ABNORMAL
PERFORMED ON: ABNORMAL
PERFORMED ON: NORMAL
PLATELET # BLD: 148 K/UL (ref 135–450)
PMV BLD AUTO: 11 FL (ref 5–10.5)
POTASSIUM REFLEX MAGNESIUM: 3.9 MMOL/L (ref 3.5–5.1)
PROTHROMBIN TIME: 14.7 SEC (ref 9.8–13)
RBC # BLD: 2.43 M/UL (ref 4–5.2)
SODIUM BLD-SCNC: 139 MMOL/L (ref 136–145)
TOTAL PROTEIN: 5.7 G/DL (ref 6.4–8.2)
WBC # BLD: 5.1 K/UL (ref 4–11)

## 2019-08-05 PROCEDURE — 6370000000 HC RX 637 (ALT 250 FOR IP): Performed by: HOSPITALIST

## 2019-08-05 PROCEDURE — 6360000002 HC RX W HCPCS: Performed by: HOSPITALIST

## 2019-08-05 PROCEDURE — 6360000002 HC RX W HCPCS

## 2019-08-05 PROCEDURE — 05HM33Z INSERTION OF INFUSION DEVICE INTO RIGHT INTERNAL JUGULAR VEIN, PERCUTANEOUS APPROACH: ICD-10-PCS | Performed by: RADIOLOGY

## 2019-08-05 PROCEDURE — 80053 COMPREHEN METABOLIC PANEL: CPT

## 2019-08-05 PROCEDURE — 36415 COLL VENOUS BLD VENIPUNCTURE: CPT

## 2019-08-05 PROCEDURE — 99253 IP/OBS CNSLTJ NEW/EST LOW 45: CPT | Performed by: SURGERY

## 2019-08-05 PROCEDURE — 86706 HEP B SURFACE ANTIBODY: CPT

## 2019-08-05 PROCEDURE — 87341 HEP B SURFACE AG NEUTRLZJ IA: CPT

## 2019-08-05 PROCEDURE — 76937 US GUIDE VASCULAR ACCESS: CPT

## 2019-08-05 PROCEDURE — 94760 N-INVAS EAR/PLS OXIMETRY 1: CPT

## 2019-08-05 PROCEDURE — 85025 COMPLETE CBC W/AUTO DIFF WBC: CPT

## 2019-08-05 PROCEDURE — 86704 HEP B CORE ANTIBODY TOTAL: CPT

## 2019-08-05 PROCEDURE — 2060000000 HC ICU INTERMEDIATE R&B

## 2019-08-05 PROCEDURE — 2580000003 HC RX 258

## 2019-08-05 PROCEDURE — 2580000003 HC RX 258: Performed by: HOSPITALIST

## 2019-08-05 PROCEDURE — 77001 FLUOROGUIDE FOR VEIN DEVICE: CPT

## 2019-08-05 PROCEDURE — 2709999900 IR NON TUNNELED CATH WO PORT REPLACEMENT

## 2019-08-05 PROCEDURE — 5A1D90Z PERFORMANCE OF URINARY FILTRATION, CONTINUOUS, GREATER THAN 18 HOURS PER DAY: ICD-10-PCS | Performed by: INTERNAL MEDICINE

## 2019-08-05 PROCEDURE — 6360000002 HC RX W HCPCS: Performed by: INTERNAL MEDICINE

## 2019-08-05 PROCEDURE — 83036 HEMOGLOBIN GLYCOSYLATED A1C: CPT

## 2019-08-05 PROCEDURE — 02H633Z INSERTION OF INFUSION DEVICE INTO RIGHT ATRIUM, PERCUTANEOUS APPROACH: ICD-10-PCS | Performed by: INTERNAL MEDICINE

## 2019-08-05 PROCEDURE — 90935 HEMODIALYSIS ONE EVALUATION: CPT

## 2019-08-05 PROCEDURE — 86803 HEPATITIS C AB TEST: CPT

## 2019-08-05 PROCEDURE — 36556 INSERT NON-TUNNEL CV CATH: CPT

## 2019-08-05 PROCEDURE — B2141ZZ FLUOROSCOPY OF RIGHT HEART USING LOW OSMOLAR CONTRAST: ICD-10-PCS | Performed by: INTERNAL MEDICINE

## 2019-08-05 PROCEDURE — APPNB45 APP NON BILLABLE 31-45 MINUTES: Performed by: NURSE PRACTITIONER

## 2019-08-05 PROCEDURE — 2500000003 HC RX 250 WO HCPCS: Performed by: INTERNAL MEDICINE

## 2019-08-05 PROCEDURE — 85610 PROTHROMBIN TIME: CPT

## 2019-08-05 RX ORDER — HEPARIN SODIUM 1000 [USP'U]/ML
2600 INJECTION, SOLUTION INTRAVENOUS; SUBCUTANEOUS PRN
Status: DISCONTINUED | OUTPATIENT
Start: 2019-08-05 | End: 2019-08-13 | Stop reason: HOSPADM

## 2019-08-05 RX ADMIN — VITAMIN B12 0.1 MG ORAL TABLET 50 MCG: 0.1 TABLET ORAL at 08:30

## 2019-08-05 RX ADMIN — FUROSEMIDE 20 MG/HR: 10 INJECTION, SOLUTION INTRAMUSCULAR; INTRAVENOUS at 02:13

## 2019-08-05 RX ADMIN — CEFTRIAXONE 1 G: 1 INJECTION, POWDER, FOR SOLUTION INTRAMUSCULAR; INTRAVENOUS at 21:42

## 2019-08-05 RX ADMIN — HEPARIN SODIUM 5000 UNITS: 5000 INJECTION INTRAVENOUS; SUBCUTANEOUS at 05:41

## 2019-08-05 RX ADMIN — DOXAZOSIN 4 MG: 4 TABLET ORAL at 21:41

## 2019-08-05 RX ADMIN — FERROUS GLUCONATE 324 MG: 324 TABLET ORAL at 08:30

## 2019-08-05 RX ADMIN — HEPARIN SODIUM 5000 UNITS: 5000 INJECTION INTRAVENOUS; SUBCUTANEOUS at 21:42

## 2019-08-05 RX ADMIN — HEPARIN SODIUM 2600 UNITS: 1000 INJECTION INTRAVENOUS; SUBCUTANEOUS at 17:05

## 2019-08-05 RX ADMIN — CARVEDILOL 12.5 MG: 12.5 TABLET, FILM COATED ORAL at 21:41

## 2019-08-05 RX ADMIN — ONDANSETRON 4 MG: 2 INJECTION INTRAMUSCULAR; INTRAVENOUS at 15:00

## 2019-08-05 RX ADMIN — FUROSEMIDE 20 MG/HR: 10 INJECTION, SOLUTION INTRAMUSCULAR; INTRAVENOUS at 19:15

## 2019-08-05 RX ADMIN — FUROSEMIDE 20 MG/HR: 10 INJECTION, SOLUTION INTRAMUSCULAR; INTRAVENOUS at 13:03

## 2019-08-05 RX ADMIN — SODIUM BICARBONATE 50 MEQ: 84 INJECTION, SOLUTION INTRAVENOUS at 00:30

## 2019-08-05 RX ADMIN — DARBEPOETIN ALFA 60 MCG: 60 SOLUTION INTRAVENOUS; SUBCUTANEOUS at 16:55

## 2019-08-05 RX ADMIN — SODIUM CHLORIDE, PRESERVATIVE FREE 10 ML: 5 INJECTION INTRAVENOUS at 08:33

## 2019-08-05 RX ADMIN — CARVEDILOL 12.5 MG: 12.5 TABLET, FILM COATED ORAL at 08:30

## 2019-08-05 ASSESSMENT — PAIN SCALES - GENERAL
PAINLEVEL_OUTOF10: 0

## 2019-08-05 NOTE — H&P
Hospital Medicine History & Physical      PCP: Allison Talbert    Date of Admission: 8/4/2019    Date of Service: Pt seen/examined on 8/4/2019 and Admitted to Inpatient with expected LOS greater than two midnights due to medical therapy. Chief Complaint:  Abn labs      History Of Present Illness:       47 y.o. female with progressive renal disease since onset in November attributed initially to bactrim presents with increased creatinine from blood draw last Monday. Patient was advised hospital admission for which reason she presents today. She denies palpitations, lightheadedness, dizziness, sob at rest. She has had increasing swelling bilat lower extremities, does, increased abdominal girth. She has had daily vomiting for the past two weeks preceded by abdominal fullness, no pain. Denies black/bloody emesis, has had several bouts of diarrhea lately she attributes to watermelon ingested 2 d ago. She denies fevers, chills, (+) dry cough. Past Medical History:          Diagnosis Date    Anemia 9/15/2014    Diabetes mellitus ([address]     Diabetes mellitus type 1 ([address]     Diabetic retinopathy ([address]     Diabetic retinopathy associated with type 2 diabetes mellitus, without macular edema     Hyperlipidemia 7/27/2015    Kidney stone     Mixed hyperlipidemia 7/27/2015    Orthostatic hypotension     Vitreous hemorrhage of right eye ([address] 8/15/2017       Past Surgical History:          Procedure Laterality Date    APPENDECTOMY      KIDNEY STONE SURGERY      TONSILLECTOMY         Medications Prior to Admission:      Prior to Admission medications    Medication Sig Start Date End Date Taking?  Authorizing Provider   doxazosin (CARDURA) 4 MG tablet Take 1 tablet by mouth nightly 7/16/19  Yes Maria Elena Chand MD   insulin glargine (LANTUS SOLOSTAR) 100 UNIT/ML injection pen INJECT 20 UNITS SUBCUTANEOUSLY NIGHTLY 12/17/18  Yes Nikki Ying MD   insulin lispro (HUMALOG KWIKPEN) 100 UNIT/ML pen concerns please feel free to contact me at [phone].

## 2019-08-05 NOTE — CONSULTS
dextrose  Allergies  is allergic to bactrim [sulfamethoxazole-trimethoprim]; geocillin [carbenicillin]; lisinopril; tramadol; bumetanide; and ibuprofen. Family History  Reviewedfamily history includes Cancer in her maternal grandmother; Diabetes in her father and paternal grandmother; Emphysema in her father; Heart Disease in her father, mother, and paternal grandfather; High Blood Pressure in her mother; Dennice Mend in her maternal grandfather. Social History   reports that she has never smoked. She has never used smokeless tobacco. She reports that she does not drink alcohol or use drugs. EDUCATION  Patient educated about their illness/diagnosis, stated above, and all questions answered. We discussed the importance of nutrition, medications they are taking, and healthy lifestyle. Review of Systems:  General positive for shortness of breath with climbing steps  Denies any fever or chills  HEENT Denies any diplopia, tinnitus or vertigo  Resp positive for  dyspnea  Denies any shortness of breath, cough or wheezing  Cardiac Denies any chest pain, palpitations, claudication or edema  GI Denies any melena, hematochezia, hematemesis or pyrosis   Denies any frequency, urgency, hesitancy or incontinence  Heme Denies bruising or bleeding easily  Neuro Denies any focal motor or sensory deficits  OBJECTIVE:   VITALS:  height is 5' 3\" (1.6 m) and weight is 218 lb 7.6 oz (99.1 kg). Her oral temperature is 97.5 °F (36.4 °C). Her blood pressure is 176/90 (abnormal) and her pulse is 80. Her respiration is 16 and oxygen saturation is 99%. CONSTITUTIONAL: Alert and oriented times 3, no acute distress and cooperative to examination with proper mood and affect. SKIN: Skin color, texture, turgor normal. No rashes or lesions. LYMPH: no cervical nodes, no inguinal nodes  HEENT: Head is normocephalic, atraumatic. EOMI, PERRLA. NECK: jugular venous distention present 5 cm above sternal notch.   CHEST/LUNGS: chest symmetric with normal A/P diameter, normal respiratory rate and rhythm, lungs clear to auscultation without wheezes, rales or rhonchi. No accessory muscle use. Scars None   CARDIOVASCULAR: Heart sounds are normal.  Regular rate and rhythm without murmur, gallop or rub. Normal S1 and S2. . Carotid and femoral pulses 2+/4 and equal bilaterally. DP +2 PT 0 bilat, but marked pitting edema  ABDOMEN: obese and marked orange peel edema of panniculus, right lower quadrant, low transverse  scars, No and Laparoscopic scar(s) present. Normal bowel sounds. No bruits. . soft, nontender, nondistended, no masses or organomegaly. no evidence of hernia. Percussion: Normal without hepatosplenomegally. Tenderness: absent. RECTAL: deferred, not clinically indicated  NEUROLOGIC: There are no focalizing motor or sensory deficits. CN II-XII are grossly intact. Larraine Piles EXTREMITIES: no cyanosis, no clubbing and marked +3 or 4 pitting edema.   LABS:     Recent Labs     19  1840 19  0800   WBC 6.4 5.1   HGB 7.3* 6.9*   HCT 23.2* 21.4*    148   * 139   K 4.5 3.9   CL 95* 101   CO2 13* 16*   * 98*   CREATININE 18.1* 18.1*   CALCIUM 8.4 7.9*   INR  --  1.29*   * 47*   * 167*   BILITOT 0.8 0.5   NITRU Negative  --    COLORU YELLOW  --      Recent Labs     19  0800   ALKPHOS 91   *   AST 47*   BILITOT 0.5   LABALBU 2.9*     CBC:   Lab Results   Component Value Date    WBC 5.1 2019    RBC 2.43 2019    HGB 6.9 2019    HCT 21.4 2019    MCV 88.3 2019    MCH 28.3 2019    MCHC 32.1 2019    RDW 16.4 2019     2019    MPV 11.0 2019     BMP:    Lab Results   Component Value Date     2019    K 3.9 2019     2019    CO2 16 2019    BUN 98 2019    LABALBU 2.9 2019    CREATININE 18.1 2019    CALCIUM 7.9 2019    GFRAA 2 2019    LABGLOM 2 2019    GLUCOSE 100 2019     PT/INR: Lab Results   Component Value Date    PROTIME 14.7 08/05/2019    INR 1.29 08/05/2019     Urine Culture:  No components found for: RAE  RADIOLOGY:     CT scan abd/pelvis: See radiology report, renal biopsy January 2019    Thank you for the interesting evaluation. Further recommendations to follow.       Electronically signed by Arnav Gonzalez MD on 8/5/2019 at 10:04 AM

## 2019-08-05 NOTE — PROGRESS NOTES
4 Eyes Skin Assessment     The patient is being assess for  Admission    I agree that 2 RN's have performed a thorough Head to Toe Skin Assessment on the patient. ALL assessment sites listed below have been assessed. Areas assessed by both nurses:   [x]   Head, Face, and Ears   [x]   Shoulders, Back, and Chest  [x]   Arms, Elbows, and Hands   [x]   Coccyx, Sacrum, and IschIum  [x]   Legs, Feet, and Heels        Does the Patient have Skin Breakdown?   No         Enoch Prevention initiated:  NA   Wound Care Orders initiated:  NA      [facility] nurse consulted for Pressure Injury (Stage 3,4, Unstageable, DTI, NWPT, and Complex wounds), New and Established Ostomies:  NA      Nurse 1 eSignature: Electronically signed by Richie Ramirez RN on 8/4/19 at 11:14 PM    **SHARE this note so that the co-signing nurse is able to place an eSignature**    Nurse 2 eSignature: Electronically signed by Chris Daugherty RN on 8/5/19 at 9:13 AM

## 2019-08-05 NOTE — OP NOTE
Brief Postoperative Note    Janna Mitchell  YOB: 1965  [id number]    Pre-operative Diagnosis: renal failure. Need for dialysis    Post-operative Diagnosis: Same    Procedure: RIJ temporary HD catheter placement    Anesthesia: Local    Surgeons/Assistants: Dr. Yvan Mathew    Estimated Blood Loss: less than 5ml     Complications: None    Specimens: Was Not Obtained    Findings: RIJ temporary HD catheter with tip in the upper right atrium. Aspirated, flushed and central lumen locked with heparinized saline. HD lumens locked with heparin. Okay to use.     Electronically signed by Laura Wyatt MD on 8/5/2019 at 2:40 PM

## 2019-08-05 NOTE — CONSULTS
black lung    Diabetes Paternal Grandmother     Heart Disease Paternal Grandfather         reports that she has never smoked. She has never used smokeless tobacco. She reports that she does not drink alcohol or use drugs. Allergies:  Bactrim [sulfamethoxazole-trimethoprim]; Geocillin [carbenicillin];  Lisinopril; Tramadol; Bumetanide; and Ibuprofen    Current Medications:      furosemide (LASIX) 100 mg in dextrose 5 % 100 mL infusion Continuous   carvedilol (COREG) tablet 12.5 mg BID   doxazosin (CARDURA) tablet 4 mg Nightly   ferrous gluconate (FERGON) tablet 324 mg Daily with breakfast   insulin glargine (LANTUS) injection vial 20 Units Nightly   vitamin B-12 (CYANOCOBALAMIN) tablet 50 mcg Daily   sodium chloride flush 0.9 % injection 10 mL 2 times per day   sodium chloride flush 0.9 % injection 10 mL PRN   ondansetron (ZOFRAN) injection 4 mg Q6H PRN   heparin (porcine) injection 5,000 Units 3 times per day   acetaminophen (TYLENOL) tablet 650 mg Q4H PRN   glucose (GLUTOSE) 40 % oral gel 15 g PRN   dextrose 50 % IV solution PRN   glucagon (rDNA) injection 1 mg PRN   dextrose 5 % solution PRN   insulin lispro (HUMALOG) injection vial 0-6 Units TID WC   insulin lispro (HUMALOG) injection vial 0-3 Units Nightly   cefTRIAXone (ROCEPHIN) 1 g IVPB in 50 mL D5W minibag Q24H       Review of Systems:   14 point ROS obtained but were negative except mentioned in HPI      Physical exam:     Vitals:  BP (!) 173/90   Pulse 82   Temp 97.5 °F (36.4 °C)   Resp 18   Ht 5' 3\" (1.6 m)   Wt 218 lb 7.6 oz (99.1 kg)   SpO2 97%   BMI 38.70 kg/m²   Constitutional:  OAA X3 NAD  Skin: no rash, turgor wnl  Heent:  eomi, mmm  Neck: no bruits or jvd noted  Cardiovascular:  S1, S2 without m/r/g  Respiratory: CTA B without w/r/r  Abdomen:  +bs, soft, nt, nd  Ext:  B/L  3 + lower extremity edema  Psychiatric: mood and affect appropriate  Musculoskeletal:  Rom, muscular strength intact    Labs:  CBC:   Recent Labs     08/04/19  1840

## 2019-08-06 LAB
ABO/RH: NORMAL
ALBUMIN SERPL-MCNC: 2.6 G/DL (ref 3.4–5)
ANION GAP SERPL CALCULATED.3IONS-SCNC: 19 MMOL/L (ref 3–16)
ANTIBODY SCREEN: NORMAL
BLOOD BANK DISPENSE STATUS: NORMAL
BLOOD BANK DISPENSE STATUS: NORMAL
BLOOD BANK PRODUCT CODE: NORMAL
BLOOD BANK PRODUCT CODE: NORMAL
BPU ID: NORMAL
BPU ID: NORMAL
BUN BLDV-MCNC: 71 MG/DL (ref 7–20)
CALCIUM SERPL-MCNC: 7.3 MG/DL (ref 8.3–10.6)
CHLORIDE BLD-SCNC: 96 MMOL/L (ref 99–110)
CO2: 22 MMOL/L (ref 21–32)
CREAT SERPL-MCNC: 12.9 MG/DL (ref 0.6–1.1)
DESCRIPTION BLOOD BANK: NORMAL
DESCRIPTION BLOOD BANK: NORMAL
FERRITIN: 28.3 NG/ML (ref 15–150)
GFR AFRICAN AMERICAN: 4
GFR NON-AFRICAN AMERICAN: 3
GLUCOSE BLD-MCNC: 125 MG/DL (ref 70–99)
GLUCOSE BLD-MCNC: 136 MG/DL (ref 70–99)
GLUCOSE BLD-MCNC: 157 MG/DL (ref 70–99)
GLUCOSE BLD-MCNC: 171 MG/DL (ref 70–99)
HCT VFR BLD CALC: 20.1 % (ref 36–48)
HEMOGLOBIN: 6.4 G/DL (ref 12–16)
HEPATITIS B SURFACE ANTIGEN INTERPRETATION: NORMAL
IRON SATURATION: 7 % (ref 15–50)
IRON: 19 UG/DL (ref 37–145)
MCH RBC QN AUTO: 28 PG (ref 26–34)
MCHC RBC AUTO-ENTMCNC: 32 G/DL (ref 31–36)
MCV RBC AUTO: 87.5 FL (ref 80–100)
PDW BLD-RTO: 16.4 % (ref 12.4–15.4)
PERFORMED ON: ABNORMAL
PHOSPHORUS: 7.9 MG/DL (ref 2.5–4.9)
PLATELET # BLD: 149 K/UL (ref 135–450)
PMV BLD AUTO: 10.9 FL (ref 5–10.5)
POTASSIUM REFLEX MAGNESIUM: 3.9 MMOL/L (ref 3.5–5.1)
POTASSIUM SERPL-SCNC: 3.6 MMOL/L (ref 3.5–5.1)
RBC # BLD: 2.3 M/UL (ref 4–5.2)
SODIUM BLD-SCNC: 137 MMOL/L (ref 136–145)
TOTAL IRON BINDING CAPACITY: 268 UG/DL (ref 260–445)
URINE CULTURE, ROUTINE: NORMAL
WBC # BLD: 4.7 K/UL (ref 4–11)

## 2019-08-06 PROCEDURE — 86900 BLOOD TYPING SEROLOGIC ABO: CPT

## 2019-08-06 PROCEDURE — 86901 BLOOD TYPING SEROLOGIC RH(D): CPT

## 2019-08-06 PROCEDURE — 86850 RBC ANTIBODY SCREEN: CPT

## 2019-08-06 PROCEDURE — 6360000002 HC RX W HCPCS: Performed by: INTERNAL MEDICINE

## 2019-08-06 PROCEDURE — 87340 HEPATITIS B SURFACE AG IA: CPT

## 2019-08-06 PROCEDURE — 6370000000 HC RX 637 (ALT 250 FOR IP): Performed by: HOSPITALIST

## 2019-08-06 PROCEDURE — 90935 HEMODIALYSIS ONE EVALUATION: CPT

## 2019-08-06 PROCEDURE — 83540 ASSAY OF IRON: CPT

## 2019-08-06 PROCEDURE — 2060000000 HC ICU INTERMEDIATE R&B

## 2019-08-06 PROCEDURE — 6360000002 HC RX W HCPCS: Performed by: HOSPITALIST

## 2019-08-06 PROCEDURE — 85027 COMPLETE CBC AUTOMATED: CPT

## 2019-08-06 PROCEDURE — 86923 COMPATIBILITY TEST ELECTRIC: CPT

## 2019-08-06 PROCEDURE — 2580000003 HC RX 258: Performed by: INTERNAL MEDICINE

## 2019-08-06 PROCEDURE — 82728 ASSAY OF FERRITIN: CPT

## 2019-08-06 PROCEDURE — 2580000003 HC RX 258: Performed by: HOSPITALIST

## 2019-08-06 PROCEDURE — 36415 COLL VENOUS BLD VENIPUNCTURE: CPT

## 2019-08-06 PROCEDURE — 84132 ASSAY OF SERUM POTASSIUM: CPT

## 2019-08-06 PROCEDURE — 80069 RENAL FUNCTION PANEL: CPT

## 2019-08-06 PROCEDURE — 83550 IRON BINDING TEST: CPT

## 2019-08-06 PROCEDURE — P9016 RBC LEUKOCYTES REDUCED: HCPCS

## 2019-08-06 PROCEDURE — 36430 TRANSFUSION BLD/BLD COMPNT: CPT

## 2019-08-06 RX ORDER — 0.9 % SODIUM CHLORIDE 0.9 %
250 INTRAVENOUS SOLUTION INTRAVENOUS ONCE
Status: COMPLETED | OUTPATIENT
Start: 2019-08-06 | End: 2019-08-06

## 2019-08-06 RX ADMIN — SODIUM CHLORIDE, PRESERVATIVE FREE 10 ML: 5 INJECTION INTRAVENOUS at 14:26

## 2019-08-06 RX ADMIN — HEPARIN SODIUM 2600 UNITS: 1000 INJECTION INTRAVENOUS; SUBCUTANEOUS at 10:35

## 2019-08-06 RX ADMIN — INSULIN GLARGINE 20 UNITS: 100 INJECTION, SOLUTION SUBCUTANEOUS at 21:59

## 2019-08-06 RX ADMIN — VITAMIN B12 0.1 MG ORAL TABLET 50 MCG: 0.1 TABLET ORAL at 11:10

## 2019-08-06 RX ADMIN — FUROSEMIDE 20 MG/HR: 10 INJECTION, SOLUTION INTRAMUSCULAR; INTRAVENOUS at 00:28

## 2019-08-06 RX ADMIN — CARVEDILOL 12.5 MG: 12.5 TABLET, FILM COATED ORAL at 11:10

## 2019-08-06 RX ADMIN — HEPARIN SODIUM 5000 UNITS: 5000 INJECTION INTRAVENOUS; SUBCUTANEOUS at 06:08

## 2019-08-06 RX ADMIN — CEFTRIAXONE 1 G: 1 INJECTION, POWDER, FOR SOLUTION INTRAMUSCULAR; INTRAVENOUS at 22:10

## 2019-08-06 RX ADMIN — SODIUM CHLORIDE, PRESERVATIVE FREE 10 ML: 5 INJECTION INTRAVENOUS at 22:09

## 2019-08-06 RX ADMIN — SODIUM CHLORIDE 250 ML: 9 INJECTION, SOLUTION INTRAVENOUS at 10:18

## 2019-08-06 RX ADMIN — IRON SUCROSE 100 MG: 20 INJECTION, SOLUTION INTRAVENOUS at 14:25

## 2019-08-06 RX ADMIN — DOXAZOSIN 4 MG: 4 TABLET ORAL at 21:59

## 2019-08-06 RX ADMIN — CARVEDILOL 12.5 MG: 12.5 TABLET, FILM COATED ORAL at 22:07

## 2019-08-06 RX ADMIN — INSULIN LISPRO 1 UNITS: 100 INJECTION, SOLUTION INTRAVENOUS; SUBCUTANEOUS at 16:58

## 2019-08-06 RX ADMIN — INSULIN LISPRO 1 UNITS: 100 INJECTION, SOLUTION INTRAVENOUS; SUBCUTANEOUS at 21:58

## 2019-08-06 ASSESSMENT — PAIN SCALES - GENERAL
PAINLEVEL_OUTOF10: 0

## 2019-08-06 ASSESSMENT — PAIN SCALES - WONG BAKER: WONGBAKER_NUMERICALRESPONSE: 0

## 2019-08-06 NOTE — FLOWSHEET NOTE
Treatment time: 3 hours  Net UF: 2000 ml     Pre weight: 97.5  kg (standing scale)  Post weight: 95.6 kg (standing scale)  EDW: TBD kg    Access used: [facility]  Access function: Well with  ml/min     Medications or blood products given: Heparin for HD cath dwells, 1 unit RBC     Regular outpatient schedule: TBD     Summary of response to treatment:   Tolerated and completed second HD tx, BP elevated prior and throughout tx, asymptomatic. 1 unit RBC given with no transfusion reaction, second unit to be give on the floor with primary RN. Dr. Pearl Lan ordered 2 units RBC. Report called to [facility].   Copy of dialysis treatment record placed in chart, to be scanned into EMR.         08/06/19 0433 08/06/19 0730 08/06/19 1045   Vital Signs   BP  --  (!) 153/86 (!) 179/89   Temp  --  97.5 °F (36.4 °C) 97.7 °F (36.5 °C)   Pulse  --  68 71   Weight 214 lb 15.2 oz (97.5 kg)  --   --    Weight Method Standing scale  --   --

## 2019-08-06 NOTE — PROGRESS NOTES
No accessory muscle use. No crackles. No wheezes. No rhonchi. No dullness on percussion. CV: Regular rate. Regular rhythm. No murmur or rub. 2 +  edema. GI: Non-tender. Non-distended. No hernia. Skin: Warm, dry, normal texture and turgor. No nodule on exposed extremities. Lymph: No cervical LAD. No supraclavicular LAD. M/S: No cyanosis. No clubbing. No joint deformity. Neuro: Moves all four extremities. CN 2-12 tested, no defect noted. Psych: Oriented x 3. No anxiety. Awake. Alert. Intact judgement and insight. Data    LABS  CBC:   Recent Labs     08/04/19 1840 08/05/19  0800 08/06/19  0600   WBC 6.4 5.1 4.7   HGB 7.3* 6.9* 6.4*   HCT 23.2* 21.4* 20.1*   MCV 88.6 88.3 87.5    148 149     BMP:   Recent Labs     08/04/19  1840 08/05/19  0800 08/06/19  0600   * 139 137   K 4.5 3.9 3.6   CL 95* 101 96*   CO2 13* 16* 22   PHOS  --   --  7.9*   * 98* 71*   CREATININE 18.1* 18.1* 12.9*     POC GLUCOSE:    Recent Labs     08/05/19  0724 08/05/19  1142 08/05/19  1319 08/05/19  1815 08/05/19  2105   POCGLU 109* 98 102* 86 98     LIVER PROFILE:   Recent Labs     08/04/19  1840 08/05/19  0800 08/06/19  0600   * 47*  --    * 167*  --    LABALBU 3.3* 2.9* 2.6*   BILITOT 0.8 0.5  --    ALKPHOS 109 91  --      PT/INR:   Recent Labs     08/05/19  0800   PROTIME 14.7*   INR 1.29*     APTT: No results for input(s): APTT in the last 72 hours. UA:  Recent Labs     08/04/19 1840   COLORU YELLOW   PHUR 6.0   WBCUA 10-20*   RBCUA 3-5*   YEAST Present*   CLARITYU CLOUDY*   SPECGRAV 1.022   LEUKOCYTESUR TRACE*   UROBILINOGEN 1.0   BILIRUBINUR Negative   BLOODU MODERATE*   GLUCOSEU 250*   KETUA Negative     Microbiology:  Wound Culture: No results for input(s): WNDABS, ORG in the last 72 hours. Invalid input(s):  LABGRAM  Nasal Culture: No results for input(s): ORG, MRSAPCR in the last 72 hours. Blood Culture: No results for input(s): BC, BLOODCULT2 in the last 72 hours.   Fungal Culture:   No results for input(s): FUNGSM in the last 72 hours. No results for input(s): FUNCXBLD in the last 72 hours. CSF Culture:  No results for input(s): COLORCSF, APPEARCSF, CFTUBE, CLOTCSF, WBCCSF, RBCCSF, NEUTCSF, NUMCELLSCSF, LYMPHSCSF, MONOCSF, GLUCCSF, VOLCSF in the last 72 hours. Respiratory Culture:  No results for input(s): Kristy Mocha in the last 72 hours. AFB:No results for input(s): AFBSMEAR in the last 72 hours. Urine Culture  Recent Labs     08/04/19  1840   LABURIN <50,000 CFU/ml mixed skin/urogenital erika. No further workup       RADIOLOGY:    X-ray non tunneled cath without port replacement   Final Result   Successful ultrasound and fluoroscopy guided non-tunneled right internal   jugular 16 cm hemodialysis catheter placement. XR CHEST PORTABLE   Final Result   1. No acute process in the chest.   2. Enlarged cardiac silhouette. IR FLUORO GUIDED CVA DEVICE PLMT/REPLACE/REMOVAL    (Results Pending)       CONSULTS:    IP CONSULT TO NEPHROLOGY  IP CONSULT TO HOSPITALIST  IP CONSULT TO VASCULAR SURGERY    ASSESSMENT AND PLAN:      Active Problems:    ESRD (end stage renal disease) ([facility]    Acute renal failure superimposed on chronic kidney disease ([facility] [facility]  Resolved Problems:    * No resolved hospital problems. *      ESRD with volume overload- getting dialysis   -nephrology consulted   - s/p  vasc cath today  - will need dialysis spot     DM2   - ct home meds   -SSI    HTN- uncontrolled  - monitor with dialysis    Anemia- for 2 PRBC transfusion   - monitor  - will need aranesp    UTI  - ruled out       DVT Prophylaxis: heparin  Diet: DIET CARB CONTROL;  Code Status: Full Code    Discharge plan - 1-2 days    The patient and / or the family were informed of the results of any tests, a time was given to answer questions, a plan was proposed and they agreed with plan. Discussed with consulting physicians, nursing and social work     The note was completed using EMR.  Every effort was made to ensure accuracy; however, inadvertent computerized transcription errors may be present.        Alexandria Cannon MD

## 2019-08-06 NOTE — PROGRESS NOTES
MD notified related to Herparin SQ dose due to pt H&H dropping and receiving 2 units of blood. Electronically signed by Avinash Rojas RN on 8/6/2019 at 1:36 PM    MD said to hold Heparin for today.  Electronically signed by Avinash Rojas RN on 8/6/2019 at 2:24 PM

## 2019-08-07 LAB
ALBUMIN SERPL-MCNC: 2.8 G/DL (ref 3.4–5)
ANION GAP SERPL CALCULATED.3IONS-SCNC: 17 MMOL/L (ref 3–16)
BUN BLDV-MCNC: 45 MG/DL (ref 7–20)
CALCIUM SERPL-MCNC: 7 MG/DL (ref 8.3–10.6)
CHLORIDE BLD-SCNC: 99 MMOL/L (ref 99–110)
CO2: 22 MMOL/L (ref 21–32)
CREAT SERPL-MCNC: 9.7 MG/DL (ref 0.6–1.1)
GFR AFRICAN AMERICAN: 5
GFR NON-AFRICAN AMERICAN: 4
GLUCOSE BLD-MCNC: 108 MG/DL (ref 70–99)
GLUCOSE BLD-MCNC: 142 MG/DL (ref 70–99)
GLUCOSE BLD-MCNC: 154 MG/DL (ref 70–99)
GLUCOSE BLD-MCNC: 57 MG/DL (ref 70–99)
GLUCOSE BLD-MCNC: 65 MG/DL (ref 70–99)
GLUCOSE BLD-MCNC: 71 MG/DL (ref 70–99)
GLUCOSE BLD-MCNC: 82 MG/DL (ref 70–99)
GLUCOSE BLD-MCNC: 87 MG/DL (ref 70–99)
GLUCOSE BLD-MCNC: 94 MG/DL (ref 70–99)
HCT VFR BLD CALC: 27.9 % (ref 36–48)
HEMOGLOBIN: 9.1 G/DL (ref 12–16)
HEPATITIS B CORE TOTAL ANTIBODY: NEGATIVE
HEPATITIS B SURFACE ANTIGEN CONFIRMATION: NORMAL
MCH RBC QN AUTO: 29.1 PG (ref 26–34)
MCHC RBC AUTO-ENTMCNC: 32.8 G/DL (ref 31–36)
MCV RBC AUTO: 88.7 FL (ref 80–100)
PDW BLD-RTO: 15.4 % (ref 12.4–15.4)
PERFORMED ON: ABNORMAL
PERFORMED ON: NORMAL
PHOSPHORUS: 5.9 MG/DL (ref 2.5–4.9)
PLATELET # BLD: 135 K/UL (ref 135–450)
PMV BLD AUTO: 10.5 FL (ref 5–10.5)
POTASSIUM REFLEX MAGNESIUM: 3.7 MMOL/L (ref 3.5–5.1)
POTASSIUM REFLEX MAGNESIUM: 4 MMOL/L (ref 3.5–5.1)
POTASSIUM REFLEX MAGNESIUM: 4.1 MMOL/L (ref 3.5–5.1)
POTASSIUM SERPL-SCNC: 3.9 MMOL/L (ref 3.5–5.1)
RBC # BLD: 3.14 M/UL (ref 4–5.2)
SODIUM BLD-SCNC: 138 MMOL/L (ref 136–145)
WBC # BLD: 5.7 K/UL (ref 4–11)

## 2019-08-07 PROCEDURE — 6370000000 HC RX 637 (ALT 250 FOR IP): Performed by: INTERNAL MEDICINE

## 2019-08-07 PROCEDURE — 90935 HEMODIALYSIS ONE EVALUATION: CPT

## 2019-08-07 PROCEDURE — 84132 ASSAY OF SERUM POTASSIUM: CPT

## 2019-08-07 PROCEDURE — 6360000002 HC RX W HCPCS: Performed by: INTERNAL MEDICINE

## 2019-08-07 PROCEDURE — 36415 COLL VENOUS BLD VENIPUNCTURE: CPT

## 2019-08-07 PROCEDURE — 94760 N-INVAS EAR/PLS OXIMETRY 1: CPT

## 2019-08-07 PROCEDURE — 80069 RENAL FUNCTION PANEL: CPT

## 2019-08-07 PROCEDURE — 2060000000 HC ICU INTERMEDIATE R&B

## 2019-08-07 PROCEDURE — APPNB30 APP NON BILLABLE TIME 0-30 MINS: Performed by: NURSE PRACTITIONER

## 2019-08-07 PROCEDURE — 6370000000 HC RX 637 (ALT 250 FOR IP): Performed by: HOSPITALIST

## 2019-08-07 PROCEDURE — APPSS30 APP SPLIT SHARED TIME 16-30 MINUTES: Performed by: NURSE PRACTITIONER

## 2019-08-07 PROCEDURE — 2580000003 HC RX 258: Performed by: HOSPITALIST

## 2019-08-07 PROCEDURE — 85027 COMPLETE CBC AUTOMATED: CPT

## 2019-08-07 RX ORDER — AMLODIPINE BESYLATE 5 MG/1
5 TABLET ORAL DAILY
Status: DISCONTINUED | OUTPATIENT
Start: 2019-08-07 | End: 2019-08-08

## 2019-08-07 RX ADMIN — INSULIN LISPRO 1 UNITS: 100 INJECTION, SOLUTION INTRAVENOUS; SUBCUTANEOUS at 22:05

## 2019-08-07 RX ADMIN — IRON SUCROSE 100 MG: 20 INJECTION, SOLUTION INTRAVENOUS at 09:21

## 2019-08-07 RX ADMIN — METOPROLOL TARTRATE 25 MG: 25 TABLET ORAL at 22:07

## 2019-08-07 RX ADMIN — VITAMIN B12 0.1 MG ORAL TABLET 50 MCG: 0.1 TABLET ORAL at 09:19

## 2019-08-07 RX ADMIN — SODIUM CHLORIDE, PRESERVATIVE FREE 10 ML: 5 INJECTION INTRAVENOUS at 22:09

## 2019-08-07 RX ADMIN — CALCIUM ACETATE 667 MG: 667 CAPSULE ORAL at 16:46

## 2019-08-07 RX ADMIN — SODIUM CHLORIDE, PRESERVATIVE FREE 10 ML: 5 INJECTION INTRAVENOUS at 09:21

## 2019-08-07 ASSESSMENT — PAIN SCALES - GENERAL
PAINLEVEL_OUTOF10: 0

## 2019-08-07 NOTE — PROGRESS NOTES
[facility]                                                                                                                       Office : [phone]     Fax :[phone]       Nephrology Note          History of Present iIlness:    Leilani Joyner is a 47 y.o. female with h/o progressive worsening of renal disease. Has DM 2, Diabetic retinopathy , Anemia of chronic ds. Has been feeling very weak. Denies any SOB at this time but has significant anasarca   No fever , no chills    Interval hx     3rd session today   Hb better now . S/p blood transfusion   Edema 3 +           Past Medical History:   Diagnosis Date    Anemia 9/15/2014    Diabetes mellitus ([address]     Diabetes mellitus type 1 ([address]     Diabetic retinopathy ([address]     Diabetic retinopathy associated with type 2 diabetes mellitus, without macular edema     Hyperlipidemia 7/27/2015    Kidney stone     Mixed hyperlipidemia 7/27/2015    Orthostatic hypotension     Vitreous hemorrhage of right eye ([address] 8/15/2017       Past Surgical History:   Procedure Laterality Date    APPENDECTOMY      KIDNEY STONE SURGERY      TONSILLECTOMY         Family History   Problem Relation Age of Onset    Heart Disease Mother         heart attack 2007    High Blood Pressure Mother     Diabetes Father     Heart Disease Father     Emphysema Father     Cancer Maternal Grandmother         cervical cancer    Lung Cancer Maternal Grandfather         black lung    Diabetes Paternal Grandmother     Heart Disease Paternal Grandfather         reports that she has never smoked. She has never used smokeless tobacco. She reports that she does not drink alcohol or use drugs. Allergies:  Bactrim [sulfamethoxazole-trimethoprim]; Geocillin [carbenicillin];  Lisinopril;

## 2019-08-07 NOTE — PROGRESS NOTES
rate. Regular rhythm. No murmur or rub. 2 +  edema. GI: Non-tender. Non-distended. No hernia. Skin: Warm, dry, normal texture and turgor. No nodule on exposed extremities. Lymph: No cervical LAD. No supraclavicular LAD. M/S: No cyanosis. No clubbing. No joint deformity. Neuro: Moves all four extremities. CN 2-12 tested, no defect noted. Psych: Oriented x 3. No anxiety. Awake. Alert. Intact judgement and insight. Data    LABS  CBC:   Recent Labs     08/05/19 0800 08/06/19 0600 08/07/19  0602   WBC 5.1 4.7 5.7   HGB 6.9* 6.4* 9.1*   HCT 21.4* 20.1* 27.9*   MCV 88.3 87.5 88.7    149 135     BMP:   Recent Labs     08/05/19  0800 08/06/19  0600 08/07/19  0148 08/07/19  0602 08/07/19  0737    137  --   --  138  --    K 3.9 3.6   < > 4.1 3.9 3.7    96*  --   --  99  --    CO2 16* 22  --   --  22  --    PHOS  --  7.9*  --   --  5.9*  --    BUN 98* 71*  --   --  45*  --    CREATININE 18.1* 12.9*  --   --  9.7*  --     < > = values in this interval not displayed. POC GLUCOSE:    Recent Labs     08/06/19  2055 08/07/19  0236 08/07/19  0723 08/07/19  0748 08/07/19  0829   POCGLU 157* 94 57* 65* 82     LIVER PROFILE:   Recent Labs     08/04/19  1840 08/05/19  0800 08/06/19  0600 08/07/19  0602   * 47*  --   --    * 167*  --   --    LABALBU 3.3* 2.9* 2.6* 2.8*   BILITOT 0.8 0.5  --   --    ALKPHOS 109 91  --   --      PT/INR:   Recent Labs     08/05/19  0800   PROTIME 14.7*   INR 1.29*     APTT: No results for input(s): APTT in the last 72 hours. UA:  Recent Labs     08/04/19  1840   COLORU YELLOW   PHUR 6.0   WBCUA 10-20*   RBCUA 3-5*   YEAST Present*   CLARITYU CLOUDY*   SPECGRAV 1.022   LEUKOCYTESUR TRACE*   UROBILINOGEN 1.0   BILIRUBINUR Negative   BLOODU MODERATE*   GLUCOSEU 250*   KETUA Negative     Microbiology:  Wound Culture: No results for input(s): WNDABS, ORG in the last 72 hours.     Invalid input(s):  LABGRAM  Nasal Culture: No results for input(s): ORG,

## 2019-08-07 NOTE — FLOWSHEET NOTE
08/07/19 1209 08/07/19 1643   Vital Signs   BP (!) 180/89 (!) 192/107   Temp 97.9 °F (36.6 °C) 98.8 °F (37.1 °C)   Pulse 73 69   Resp 16 16   Weight 214 lb 4.6 oz (97.2 kg) 207 lb 0.2 oz (93.9 kg)   Weight Method Standing scale Standing scale   Percent Weight Change -0.1 -3.39     tx tolerated well, removed 3L net, flow sheets printed and put in pt chart for EMR

## 2019-08-08 LAB
ALBUMIN SERPL-MCNC: 2.7 G/DL (ref 3.4–5)
ANION GAP SERPL CALCULATED.3IONS-SCNC: 12 MMOL/L (ref 3–16)
BUN BLDV-MCNC: 23 MG/DL (ref 7–20)
CALCIUM SERPL-MCNC: 6.9 MG/DL (ref 8.3–10.6)
CHLORIDE BLD-SCNC: 98 MMOL/L (ref 99–110)
CO2: 27 MMOL/L (ref 21–32)
CREAT SERPL-MCNC: 6.3 MG/DL (ref 0.6–1.1)
GFR AFRICAN AMERICAN: 8
GFR NON-AFRICAN AMERICAN: 7
GLUCOSE BLD-MCNC: 126 MG/DL (ref 70–99)
GLUCOSE BLD-MCNC: 134 MG/DL (ref 70–99)
GLUCOSE BLD-MCNC: 166 MG/DL (ref 70–99)
GLUCOSE BLD-MCNC: 180 MG/DL (ref 70–99)
GLUCOSE BLD-MCNC: 181 MG/DL (ref 70–99)
GLUCOSE BLD-MCNC: 187 MG/DL (ref 70–99)
HCT VFR BLD CALC: 27.8 % (ref 36–48)
HEMOGLOBIN: 9 G/DL (ref 12–16)
MCH RBC QN AUTO: 29.1 PG (ref 26–34)
MCHC RBC AUTO-ENTMCNC: 32.4 G/DL (ref 31–36)
MCV RBC AUTO: 89.8 FL (ref 80–100)
PDW BLD-RTO: 16.1 % (ref 12.4–15.4)
PERFORMED ON: ABNORMAL
PHOSPHORUS: 3.6 MG/DL (ref 2.5–4.9)
PLATELET # BLD: 120 K/UL (ref 135–450)
PMV BLD AUTO: 10.9 FL (ref 5–10.5)
POTASSIUM REFLEX MAGNESIUM: 4 MMOL/L (ref 3.5–5.1)
POTASSIUM REFLEX MAGNESIUM: 4.3 MMOL/L (ref 3.5–5.1)
POTASSIUM REFLEX MAGNESIUM: 4.4 MMOL/L (ref 3.5–5.1)
POTASSIUM SERPL-SCNC: 4.1 MMOL/L (ref 3.5–5.1)
RBC # BLD: 3.09 M/UL (ref 4–5.2)
SODIUM BLD-SCNC: 137 MMOL/L (ref 136–145)
WBC # BLD: 5.3 K/UL (ref 4–11)

## 2019-08-08 PROCEDURE — 84132 ASSAY OF SERUM POTASSIUM: CPT

## 2019-08-08 PROCEDURE — 6370000000 HC RX 637 (ALT 250 FOR IP): Performed by: INTERNAL MEDICINE

## 2019-08-08 PROCEDURE — 2060000000 HC ICU INTERMEDIATE R&B

## 2019-08-08 PROCEDURE — 80069 RENAL FUNCTION PANEL: CPT

## 2019-08-08 PROCEDURE — 85027 COMPLETE CBC AUTOMATED: CPT

## 2019-08-08 PROCEDURE — 99231 SBSQ HOSP IP/OBS SF/LOW 25: CPT | Performed by: SURGERY

## 2019-08-08 PROCEDURE — 6360000002 HC RX W HCPCS: Performed by: INTERNAL MEDICINE

## 2019-08-08 PROCEDURE — 2580000003 HC RX 258: Performed by: HOSPITALIST

## 2019-08-08 PROCEDURE — 6360000002 HC RX W HCPCS: Performed by: HOSPITALIST

## 2019-08-08 PROCEDURE — 6370000000 HC RX 637 (ALT 250 FOR IP): Performed by: HOSPITALIST

## 2019-08-08 PROCEDURE — 90935 HEMODIALYSIS ONE EVALUATION: CPT

## 2019-08-08 PROCEDURE — 36415 COLL VENOUS BLD VENIPUNCTURE: CPT

## 2019-08-08 PROCEDURE — 94760 N-INVAS EAR/PLS OXIMETRY 1: CPT

## 2019-08-08 RX ORDER — HYDRALAZINE HYDROCHLORIDE 25 MG/1
25 TABLET, FILM COATED ORAL EVERY 8 HOURS SCHEDULED
Status: DISCONTINUED | OUTPATIENT
Start: 2019-08-08 | End: 2019-08-09

## 2019-08-08 RX ORDER — HYDRALAZINE HYDROCHLORIDE 20 MG/ML
10 INJECTION INTRAMUSCULAR; INTRAVENOUS EVERY 6 HOURS PRN
Status: DISCONTINUED | OUTPATIENT
Start: 2019-08-08 | End: 2019-08-13 | Stop reason: HOSPADM

## 2019-08-08 RX ORDER — INSULIN GLARGINE 100 [IU]/ML
10 INJECTION, SOLUTION SUBCUTANEOUS NIGHTLY
Status: DISCONTINUED | OUTPATIENT
Start: 2019-08-08 | End: 2019-08-11

## 2019-08-08 RX ADMIN — INSULIN GLARGINE 10 UNITS: 100 INJECTION, SOLUTION SUBCUTANEOUS at 21:37

## 2019-08-08 RX ADMIN — SODIUM CHLORIDE, PRESERVATIVE FREE 10 ML: 5 INJECTION INTRAVENOUS at 21:47

## 2019-08-08 RX ADMIN — INSULIN LISPRO 1 UNITS: 100 INJECTION, SOLUTION INTRAVENOUS; SUBCUTANEOUS at 08:36

## 2019-08-08 RX ADMIN — METOPROLOL TARTRATE 25 MG: 25 TABLET ORAL at 08:35

## 2019-08-08 RX ADMIN — INSULIN LISPRO 1 UNITS: 100 INJECTION, SOLUTION INTRAVENOUS; SUBCUTANEOUS at 12:21

## 2019-08-08 RX ADMIN — HYDRALAZINE HYDROCHLORIDE 25 MG: 25 TABLET, FILM COATED ORAL at 21:37

## 2019-08-08 RX ADMIN — CALCIUM ACETATE 667 MG: 667 CAPSULE ORAL at 18:24

## 2019-08-08 RX ADMIN — HEPARIN SODIUM 5000 UNITS: 5000 INJECTION INTRAVENOUS; SUBCUTANEOUS at 21:37

## 2019-08-08 RX ADMIN — IRON SUCROSE 100 MG: 20 INJECTION, SOLUTION INTRAVENOUS at 09:46

## 2019-08-08 RX ADMIN — VITAMIN B12 0.1 MG ORAL TABLET 50 MCG: 0.1 TABLET ORAL at 08:35

## 2019-08-08 RX ADMIN — HYDRALAZINE HYDROCHLORIDE 10 MG: 20 INJECTION INTRAMUSCULAR; INTRAVENOUS at 18:29

## 2019-08-08 RX ADMIN — SODIUM CHLORIDE, PRESERVATIVE FREE 10 ML: 5 INJECTION INTRAVENOUS at 09:31

## 2019-08-08 RX ADMIN — METOPROLOL TARTRATE 25 MG: 25 TABLET ORAL at 21:37

## 2019-08-08 RX ADMIN — HEPARIN SODIUM 5000 UNITS: 5000 INJECTION INTRAVENOUS; SUBCUTANEOUS at 13:48

## 2019-08-08 RX ADMIN — CALCIUM ACETATE 667 MG: 667 CAPSULE ORAL at 08:36

## 2019-08-08 RX ADMIN — CALCIUM ACETATE 667 MG: 667 CAPSULE ORAL at 12:21

## 2019-08-08 ASSESSMENT — PAIN SCALES - GENERAL
PAINLEVEL_OUTOF10: 0

## 2019-08-08 ASSESSMENT — PAIN SCALES - WONG BAKER: WONGBAKER_NUMERICALRESPONSE: 0

## 2019-08-08 NOTE — PROGRESS NOTES
phoslo.                Thank you for allowing us to participate in care of [facility]         Electronically signed by: Ruthann Gonzalez MD, 8/8/2019, 5:30 PM      [facility] of [address] [place] [address]  Office : [phone]  Fax :[phone]

## 2019-08-08 NOTE — PROGRESS NOTES
Progress Note  Admit Date: 2019      PCP: Santana Rey     CC: F/U for leg swelling    SUBJECTIVE / Interval History:   Pt feels better  Leg swelling + improved          Allergies  Bactrim [sulfamethoxazole-trimethoprim]; Geocillin [carbenicillin]; Lisinopril; Tramadol; Bumetanide; and Ibuprofen    Medications    Scheduled Meds:   insulin glargine  10 Units Subcutaneous Nightly    calcium acetate  667 mg Oral TID WC    amLODIPine  5 mg Oral Daily    metoprolol tartrate  25 mg Oral BID    iron sucrose  100 mg Intravenous Q24H    doxazosin  4 mg Oral Nightly    vitamin B-12  50 mcg Oral Daily    sodium chloride flush  10 mL Intravenous 2 times per day    heparin (porcine)  5,000 Units Subcutaneous 3 times per day    insulin lispro  0-6 Units Subcutaneous TID WC    insulin lispro  0-3 Units Subcutaneous Nightly     Continuous Infusions:   dextrose         PRN Meds:  heparin (porcine), sodium chloride flush, ondansetron, acetaminophen, glucose, dextrose, glucagon (rDNA), dextrose    Vitals    TEMPERATURE:  Current - Temp: 98.1 °F (36.7 °C); Max - Temp  Av.2 °F (36.8 °C)  Min: 97.5 °F (36.4 °C)  Max: 98.8 °F (37.1 °C)  RESPIRATIONS RANGE: Resp  Avg: 15.7  Min: 14  Max: 16  PULSE RANGE: Pulse  Av  Min: 69  Max: 86  BLOOD PRESSURE RANGE:  Systolic (47UES), MSJ:331 , Min:156 , NYX:629   ; Diastolic (10RJH), PFU:20, Min:73, Max:107    PULSE OXIMETRY RANGE: SpO2  Av %  Min: 94 %  Max: 97 %  24HR INTAKE/OUTPUT:      Intake/Output Summary (Last 24 hours) at 2019 1005  Last data filed at 2019 0914  Gross per 24 hour   Intake 1040 ml   Output 4320 ml   Net -3280 ml       Exam:    Gen: No distress. Eyes: PERRL. No sclera icterus. No conjunctival injection. ENT: No discharge. Pharynx clear. External appearance of ears and nose normal.  Neck: Trachea midline. No obvious mass. Resp: No accessory muscle use. No crackles. No wheezes. No rhonchi. No dullness on percussion.   CV:

## 2019-08-08 NOTE — PROGRESS NOTES
Nutrition Assessment (Low Risk)    Type and Reason for Visit: Initial(los)    Nutrition Recommendations:   Encouraged pt to contact Dietitian should any questions arise regarding diet     Nutrition Assessment:  Patient assessed for nutritional risk. Deemed to be at low risk at this time. Will continue to monitor for changes in status.       Malnutrition Assessment:  · Malnutrition Status: No malnutrition    Nutrition Risk Level   Risk Level: Low    Nutrition Diagnosis:   · Problem: Altered nutrition-related lab values  · Etiology: Renal dysfunction, Endocrine dysfunction    Signs and symptoms: Lab values    Nutrition Intervention:  Food and/or Delivery: Continue current diet(potassium wnl & phosphorus managed well with binder)  Nutrition Education/Counseling/Coordination of Care:  Continued Inpatient Monitoring, Education declined      Electronically signed by Demarco Hastings RD, LD on 8/8/19 at 2:33 PM    Contact Number: [phone]

## 2019-08-08 NOTE — FLOWSHEET NOTE
08/08/19 1443 08/08/19 1743   Treatment   Time On 1443  --    Time Off  --  1743   Treatment Goal 3500  --    Observations & Evaluations   Level of Consciousness 0 0   Oriented X 3 3   Vital Signs   BP (!) 203/110 (!) 202/122   Temp 98.2 °F (36.8 °C) 98 °F (36.7 °C)   Weight 211 lb 10.3 oz (96 kg) 204 lb 5.9 oz (92.7 kg)   Weight Method Standing scale Standing scale   Percent Weight Change 4.92 -3.44   Pain Assessment   Pain Assessment 0-10 0-10   Pain Level 0 0   Technical Checks   RO Machine Log Sheet Completed Yes  --    Machine Alarm Self Test Completed  --    Machine Autotest Completed  --    Air Foam Detector Tested  --    Extracorporeal Circuit Tested for Integrity Yes  --    Treatment Initiation   Dialyze Hours 3  --    Treatment  Initiation Universal Precautions maintained;Lines secured to patient; Connections secured;Prime given;Venous Parameters set; Arterial Parameters set; Air foam detector engaged; Hemosafe Device; Saline line double clamped; Hemo-Safe Applied;Dialyzer;F160  --    During Hemodialysis Assessment   Blood Flow Rate (ml/min) 350 ml/min  --    Ultrafiltration Rate (ml/hr) 1220 ml/hr  --    Arterial Pressure (mmHg) -90 mmHg  --    Venous Pressure (mmHg) 90  --    TMP 30  --    DFR 0  --    Access Visible Yes  --    Dialysis Bath   K+ (Potassium)   (UF only today)  --    Post-Hemodialysis Assessment   Post-Treatment Procedures  --  Blood returned;Catheter capped, clamped and heparinized x 2 ports   Machine Disinfection Process  --  Acid/Vinegar Clean;Bleach; Exterior Machine Disinfection   Rinseback Volume (ml)  --  400 ml   Total Liters Processed (l/min)  --  0 l/min   Dialyzer Clearance  --  Lightly streaked   Duration of Treatment (minutes)  --  180 minutes   Hemodialysis Intake (ml)  --  400 ml   Hemodialysis Output (ml)  --  3500 ml   NET Removed (ml)  --  3100 ml   Tolerated Treatment  --  Good   Vascath Right Neck   Placement Date/Time: 08/05/19 1432   Pre-existing: No  Timeout:

## 2019-08-08 NOTE — CARE COORDINATION
Vomiting       MEDICATIONS     insulin glargine  10 Units Subcutaneous Nightly    calcium acetate  667 mg Oral TID WC    amLODIPine  5 mg Oral Daily    metoprolol tartrate  25 mg Oral BID    iron sucrose  100 mg Intravenous Q24H    doxazosin  4 mg Oral Nightly    vitamin B-12  50 mcg Oral Daily    sodium chloride flush  10 mL Intravenous 2 times per day    heparin (porcine)  5,000 Units Subcutaneous 3 times per day    insulin lispro  0-6 Units Subcutaneous TID WC    insulin lispro  0-3 Units Subcutaneous Nightly       Objective        Patient Active Problem List   Diagnosis Code    Diabetic retinopathy ([facility] [address] E11.319    Mixed hyperlipidemia E78.2    Diabetes mellitus type 2, controlled, without complications ([facility] [facility] K28.9    Vitreous hemorrhage of right eye ([facility] H43.11    Essential hypertension I10    Acute kidney injury ([facility] [facility] N17.9    ESRD (end stage renal disease) ([facility]) N18.6    Acute renal failure superimposed on chronic kidney disease (HCC) N17.9, N18.9        BP (!) 165/98   Pulse 86   Temp 98.1 °F (36.7 °C) (Oral)   Resp 16   Ht 5' 3\" (1.6 m)   Wt 201 lb 11.5 oz (91.5 kg)   SpO2 97%   BMI 35.73 kg/m²     HgBA1c:    Lab Results   Component Value Date    LABA1C 6.0 08/05/2019       Recent Labs     08/07/19  2035 08/08/19  0200 08/08/19  0734 08/08/19  1212   POCGLU 154* 134* 166* 187*       BUN/Creatinine:    Lab Results   Component Value Date    BUN 23 08/08/2019    CREATININE 6.3 08/08/2019       Assessment        Diabetes Management and Education    Does the patient have a Primary Care Physician? Yes, Zulma Bravo       Does the patient require new medication instruction? TBD - possible insulin adjustments. Tami omits Lantus if BG is less than 150. Reviewed current plan for lantus 10 units.       Level of patient/caregiver understanding able to:       [x] Verbalized Understanding   [] Demonstrated Understanding       [] Teach Back       [] Needs Reinforcement     [x]  Other: Willing to try lower dose of lantus. Does the patient/caregiver monitor Blood Glucoses? Yes. Prefers to keep  - 150. Does the patient/caregiver understand S/S of Hypoglycemia? Yes  Reviewed symptoms, prevention and treatment. Level of patient/caregiver understanding able to:       [x] Verbalized Understanding   [] Demonstrated Understanding       [] Teach Back       [] Needs Reinforcement     []  Other:                    Plan        Denies needs for insulin or glucose monitoring at home. Ongoing diabetes education and support.        Teaching Time Diabetes Education:  10 minutes     Electronically signed by Song Willingham on 8/8/2019 at 12:33 PM

## 2019-08-09 ENCOUNTER — ANESTHESIA EVENT (OUTPATIENT)
Dept: OPERATING ROOM | Age: 54
DRG: 674 | End: 2019-08-09
Payer: COMMERCIAL

## 2019-08-09 LAB
ALBUMIN SERPL-MCNC: 3.2 G/DL (ref 3.4–5)
ANION GAP SERPL CALCULATED.3IONS-SCNC: 16 MMOL/L (ref 3–16)
BUN BLDV-MCNC: 33 MG/DL (ref 7–20)
CALCIUM SERPL-MCNC: 7.5 MG/DL (ref 8.3–10.6)
CHLORIDE BLD-SCNC: 98 MMOL/L (ref 99–110)
CO2: 22 MMOL/L (ref 21–32)
CREAT SERPL-MCNC: 7.4 MG/DL (ref 0.6–1.1)
GFR AFRICAN AMERICAN: 7
GFR NON-AFRICAN AMERICAN: 6
GLUCOSE BLD-MCNC: 116 MG/DL (ref 70–99)
GLUCOSE BLD-MCNC: 120 MG/DL (ref 70–99)
GLUCOSE BLD-MCNC: 142 MG/DL (ref 70–99)
GLUCOSE BLD-MCNC: 146 MG/DL (ref 70–99)
GLUCOSE BLD-MCNC: 191 MG/DL (ref 70–99)
HCT VFR BLD CALC: 30.8 % (ref 36–48)
HEMOGLOBIN: 9.8 G/DL (ref 12–16)
LACTIC ACID, SEPSIS: 0.9 MMOL/L (ref 0.4–1.9)
LACTIC ACID, SEPSIS: 1 MMOL/L (ref 0.4–1.9)
MCH RBC QN AUTO: 28.7 PG (ref 26–34)
MCHC RBC AUTO-ENTMCNC: 31.8 G/DL (ref 31–36)
MCV RBC AUTO: 90.3 FL (ref 80–100)
OCCULT BLOOD SCREENING: NORMAL
PDW BLD-RTO: 15.6 % (ref 12.4–15.4)
PERFORMED ON: ABNORMAL
PHOSPHORUS: 4 MG/DL (ref 2.5–4.9)
PLATELET # BLD: 122 K/UL (ref 135–450)
PMV BLD AUTO: 10.9 FL (ref 5–10.5)
POTASSIUM REFLEX MAGNESIUM: 4.3 MMOL/L (ref 3.5–5.1)
POTASSIUM REFLEX MAGNESIUM: 4.3 MMOL/L (ref 3.5–5.1)
POTASSIUM REFLEX MAGNESIUM: 4.4 MMOL/L (ref 3.5–5.1)
POTASSIUM SERPL-SCNC: 4.3 MMOL/L (ref 3.5–5.1)
RBC # BLD: 3.41 M/UL (ref 4–5.2)
SODIUM BLD-SCNC: 136 MMOL/L (ref 136–145)
WBC # BLD: 6.1 K/UL (ref 4–11)

## 2019-08-09 PROCEDURE — 6370000000 HC RX 637 (ALT 250 FOR IP): Performed by: INTERNAL MEDICINE

## 2019-08-09 PROCEDURE — 80069 RENAL FUNCTION PANEL: CPT

## 2019-08-09 PROCEDURE — 6360000002 HC RX W HCPCS: Performed by: HOSPITALIST

## 2019-08-09 PROCEDURE — 94760 N-INVAS EAR/PLS OXIMETRY 1: CPT

## 2019-08-09 PROCEDURE — 2580000003 HC RX 258: Performed by: HOSPITALIST

## 2019-08-09 PROCEDURE — 6370000000 HC RX 637 (ALT 250 FOR IP): Performed by: HOSPITALIST

## 2019-08-09 PROCEDURE — 2060000000 HC ICU INTERMEDIATE R&B

## 2019-08-09 PROCEDURE — 36415 COLL VENOUS BLD VENIPUNCTURE: CPT

## 2019-08-09 PROCEDURE — 99232 SBSQ HOSP IP/OBS MODERATE 35: CPT | Performed by: SURGERY

## 2019-08-09 PROCEDURE — 83605 ASSAY OF LACTIC ACID: CPT

## 2019-08-09 PROCEDURE — 2580000003 HC RX 258: Performed by: SURGERY

## 2019-08-09 PROCEDURE — G0328 FECAL BLOOD SCRN IMMUNOASSAY: HCPCS

## 2019-08-09 PROCEDURE — 6360000002 HC RX W HCPCS: Performed by: INTERNAL MEDICINE

## 2019-08-09 PROCEDURE — 84132 ASSAY OF SERUM POTASSIUM: CPT

## 2019-08-09 PROCEDURE — 85027 COMPLETE CBC AUTOMATED: CPT

## 2019-08-09 PROCEDURE — 87040 BLOOD CULTURE FOR BACTERIA: CPT

## 2019-08-09 RX ORDER — SODIUM CHLORIDE 0.9 % (FLUSH) 0.9 %
10 SYRINGE (ML) INJECTION EVERY 12 HOURS SCHEDULED
Status: DISCONTINUED | OUTPATIENT
Start: 2019-08-09 | End: 2019-08-13 | Stop reason: HOSPADM

## 2019-08-09 RX ORDER — SODIUM CHLORIDE 0.9 % (FLUSH) 0.9 %
10 SYRINGE (ML) INJECTION PRN
Status: DISCONTINUED | OUTPATIENT
Start: 2019-08-09 | End: 2019-08-13 | Stop reason: HOSPADM

## 2019-08-09 RX ORDER — HYDRALAZINE HYDROCHLORIDE 50 MG/1
50 TABLET, FILM COATED ORAL EVERY 8 HOURS SCHEDULED
Status: DISCONTINUED | OUTPATIENT
Start: 2019-08-09 | End: 2019-08-13 | Stop reason: HOSPADM

## 2019-08-09 RX ADMIN — INSULIN LISPRO 1 UNITS: 100 INJECTION, SOLUTION INTRAVENOUS; SUBCUTANEOUS at 20:47

## 2019-08-09 RX ADMIN — HEPARIN SODIUM 5000 UNITS: 5000 INJECTION INTRAVENOUS; SUBCUTANEOUS at 06:39

## 2019-08-09 RX ADMIN — SODIUM CHLORIDE, PRESERVATIVE FREE 10 ML: 5 INJECTION INTRAVENOUS at 07:39

## 2019-08-09 RX ADMIN — METOPROLOL TARTRATE 25 MG: 25 TABLET ORAL at 07:38

## 2019-08-09 RX ADMIN — HYDRALAZINE HYDROCHLORIDE 50 MG: 50 TABLET, FILM COATED ORAL at 20:44

## 2019-08-09 RX ADMIN — HEPARIN SODIUM 5000 UNITS: 5000 INJECTION INTRAVENOUS; SUBCUTANEOUS at 20:44

## 2019-08-09 RX ADMIN — CALCIUM ACETATE 667 MG: 667 CAPSULE ORAL at 18:51

## 2019-08-09 RX ADMIN — CALCIUM ACETATE 667 MG: 667 CAPSULE ORAL at 07:39

## 2019-08-09 RX ADMIN — HYDRALAZINE HYDROCHLORIDE 10 MG: 20 INJECTION INTRAMUSCULAR; INTRAVENOUS at 00:29

## 2019-08-09 RX ADMIN — METOPROLOL TARTRATE 25 MG: 25 TABLET ORAL at 20:44

## 2019-08-09 RX ADMIN — SODIUM CHLORIDE, PRESERVATIVE FREE 10 ML: 5 INJECTION INTRAVENOUS at 23:14

## 2019-08-09 RX ADMIN — SODIUM CHLORIDE, PRESERVATIVE FREE 10 ML: 5 INJECTION INTRAVENOUS at 23:19

## 2019-08-09 RX ADMIN — VITAMIN B12 0.1 MG ORAL TABLET 50 MCG: 0.1 TABLET ORAL at 07:38

## 2019-08-09 RX ADMIN — INSULIN LISPRO 1 UNITS: 100 INJECTION, SOLUTION INTRAVENOUS; SUBCUTANEOUS at 18:52

## 2019-08-09 RX ADMIN — CALCIUM ACETATE 667 MG: 667 CAPSULE ORAL at 11:46

## 2019-08-09 RX ADMIN — INSULIN GLARGINE 10 UNITS: 100 INJECTION, SOLUTION SUBCUTANEOUS at 22:07

## 2019-08-09 RX ADMIN — INSULIN LISPRO 1 UNITS: 100 INJECTION, SOLUTION INTRAVENOUS; SUBCUTANEOUS at 12:41

## 2019-08-09 RX ADMIN — IRON SUCROSE 100 MG: 20 INJECTION, SOLUTION INTRAVENOUS at 10:13

## 2019-08-09 RX ADMIN — HYDRALAZINE HYDROCHLORIDE 25 MG: 25 TABLET, FILM COATED ORAL at 06:39

## 2019-08-09 RX ADMIN — HYDRALAZINE HYDROCHLORIDE 50 MG: 50 TABLET, FILM COATED ORAL at 14:09

## 2019-08-09 ASSESSMENT — PAIN SCALES - GENERAL
PAINLEVEL_OUTOF10: 0

## 2019-08-09 ASSESSMENT — PAIN SCALES - WONG BAKER
WONGBAKER_NUMERICALRESPONSE: 0

## 2019-08-09 NOTE — PROGRESS NOTES
dullness on percussion. CV: Regular rate. Regular rhythm. No murmur or rub. 2 +  edema. GI: Non-tender. Non-distended. No hernia. Skin: Warm, dry, normal texture and turgor. No nodule on exposed extremities. Lymph: No cervical LAD. No supraclavicular LAD. M/S: No cyanosis. No clubbing. No joint deformity. Neuro: Moves all four extremities. CN 2-12 tested, no defect noted. Psych: Oriented x 3. No anxiety. Awake. Alert. Intact judgement and insight. Data    LABS  CBC:   Recent Labs     08/07/19 0602 08/08/19  0625 08/09/19 0818   WBC 5.7 5.3 6.1   HGB 9.1* 9.0* 9.8*   HCT 27.9* 27.8* 30.8*   MCV 88.7 89.8 90.3    120* 122*     BMP:   Recent Labs     08/07/19  0602 08/08/19  0625 08/08/19  2353 08/09/19 0818     --  137  --   --  136   K 3.9   < > 4.1  4.3   < > 4.3 4.3  4.3   CL 99  --  98*  --   --  98*   CO2 22  --  27  --   --  22   PHOS 5.9*  --  3.6  --   --  4.0   BUN 45*  --  23*  --   --  33*   CREATININE 9.7*  --  6.3*  --   --  7.4*    < > = values in this interval not displayed. POC GLUCOSE:    Recent Labs     08/08/19  0734 08/08/19  1212 08/08/19  1815 08/08/19  2130 08/09/19  0737   POCGLU 166* 187* 126* 180* 120*     LIVER PROFILE:   Recent Labs     08/07/19  0602 08/08/19  0625 08/09/19 0818   LABALBU 2.8* 2.7* 3.2*     PT/INR:   No results for input(s): PROTIME, INR in the last 72 hours. APTT: No results for input(s): APTT in the last 72 hours. UA:  No results for input(s): NITRITE, COLORU, PHUR, LABCAST, WBCUA, RBCUA, MUCUS, TRICHOMONAS, YEAST, BACTERIA, CLARITYU, SPECGRAV, LEUKOCYTESUR, UROBILINOGEN, BILIRUBINUR, BLOODU, GLUCOSEU, KETUA, AMORPHOUS in the last 72 hours. Microbiology:  Wound Culture: No results for input(s): WNDABS, ORG in the last 72 hours. Invalid input(s):  LABGRAM  Nasal Culture: No results for input(s): ORG, MRSAPCR in the last 72 hours. Blood Culture: No results for input(s): BC, BLOODCULT2 in the last 72 hours.   Fungal Culture: No results for input(s): FUNGSM in the last 72 hours. No results for input(s): FUNCXBLD in the last 72 hours. CSF Culture:  No results for input(s): COLORCSF, APPEARCSF, CFTUBE, CLOTCSF, WBCCSF, RBCCSF, NEUTCSF, NUMCELLSCSF, LYMPHSCSF, MONOCSF, GLUCCSF, VOLCSF in the last 72 hours. Respiratory Culture:  No results for input(s): Henrine Sprawls in the last 72 hours. AFB:No results for input(s): AFBSMEAR in the last 72 hours. Urine Culture  No results for input(s): LABURIN in the last 72 hours. RADIOLOGY:    X-ray non tunneled cath without port replacement   Final Result   Successful ultrasound and fluoroscopy guided non-tunneled right internal   jugular 16 cm hemodialysis catheter placement. XR CHEST PORTABLE   Final Result   1. No acute process in the chest.   2. Enlarged cardiac silhouette. CONSULTS:    IP CONSULT TO NEPHROLOGY  IP CONSULT TO HOSPITALIST  IP CONSULT TO VASCULAR SURGERY    ASSESSMENT AND PLAN:      Active Problems:    ESRD (end stage renal disease) ([facility]    Acute renal failure superimposed on chronic kidney disease ([facility]  Resolved Problems:    * No resolved hospital problems. *      ESRD with volume overload- dialysis tomorrow. no tunneled line. To switch directly to PD  -nephrology consulted   - s/p  vasc cath   -  PD  Cath om monday    DM2   - ct home meds   -SSI    HTN- uncontrolled, cannot tolerate coreg, will stop . Add BB. Possibly can add diuretic   - monitor with dialysis    Anemia- for 2 PRBC transfusion .  - monitor  - will need aranesp    UTI  - ruled out       DVT Prophylaxis: heparin  Diet: DIET CARB CONTROL; Diet NPO, After Midnight Exceptions are: Sips with Meds  Code Status: Full Code    Discharge plan - possibly tomorrow    The patient and / or the family were informed of the results of any tests, a time was given to answer questions, a plan was proposed and they agreed with plan.     Discussed with consulting physicians, nursing and social work     The note was completed using EMR. Every effort was made to ensure accuracy; however, inadvertent computerized transcription errors may be present.        Kee Hart MD

## 2019-08-09 NOTE — PROGRESS NOTES
Lisinopril;  Other; Tramadol; Bumetanide; and Ibuprofen    Current Medications:      insulin glargine (LANTUS) injection vial 10 Units Nightly   hydrALAZINE (APRESOLINE) tablet 25 mg 3 times per day   hydrALAZINE (APRESOLINE) injection 10 mg Q6H PRN   calcium acetate (PHOSLO) capsule 667 mg TID WC   metoprolol tartrate (LOPRESSOR) tablet 25 mg BID   iron sucrose (VENOFER) injection 100 mg Q24H   heparin (porcine) injection 2,600 Units PRN   doxazosin (CARDURA) tablet 4 mg Nightly   vitamin B-12 (CYANOCOBALAMIN) tablet 50 mcg Daily   sodium chloride flush 0.9 % injection 10 mL 2 times per day   sodium chloride flush 0.9 % injection 10 mL PRN   ondansetron (ZOFRAN) injection 4 mg Q6H PRN   heparin (porcine) injection 5,000 Units 3 times per day   acetaminophen (TYLENOL) tablet 650 mg Q4H PRN   glucose (GLUTOSE) 40 % oral gel 15 g PRN   dextrose 50 % IV solution PRN   glucagon (rDNA) injection 1 mg PRN   dextrose 5 % solution PRN   insulin lispro (HUMALOG) injection vial 0-6 Units TID WC   insulin lispro (HUMALOG) injection vial 0-3 Units Nightly           Physical exam:     Vitals:  BP (!) 148/92   Pulse 85   Temp 98.1 °F (36.7 °C) (Oral)   Resp 14   Ht 5' 3\" (1.6 m)   Wt 205 lb 7.5 oz (93.2 kg)   SpO2 95%   BMI 36.40 kg/m²   Constitutional:  OAA X3 NAD  Skin: no rash, turgor wnl  Heent:  eomi, mmm  Neck: no bruits or jvd noted  Cardiovascular:  S1, S2 without m/r/g  Respiratory: CTA B without w/r/r  Abdomen:  +bs, soft, nt, nd  Ext:  B/L  3 + lower extremity edema  Psychiatric: mood and affect appropriate  Musculoskeletal:  Rom, muscular strength intact    Labs:  CBC:   Recent Labs     08/07/19  0602 08/08/19  0625 08/09/19  0818   WBC 5.7 5.3 6.1   HGB 9.1* 9.0* 9.8*    120* 122*     BMP:    Recent Labs     08/07/19  0602  08/08/19  0625  08/08/19  2353 08/09/19  0818     --  137  --   --  136   K 3.9   < > 4.1  4.3   < > 4.3 4.3  4.3   CL 99  --  98*  --   --  98*   CO2 22  --  27  --   --  22

## 2019-08-10 LAB
ALBUMIN SERPL-MCNC: 3.1 G/DL (ref 3.4–5)
ANION GAP SERPL CALCULATED.3IONS-SCNC: 14 MMOL/L (ref 3–16)
BUN BLDV-MCNC: 48 MG/DL (ref 7–20)
CALCIUM SERPL-MCNC: 7.7 MG/DL (ref 8.3–10.6)
CHLORIDE BLD-SCNC: 96 MMOL/L (ref 99–110)
CO2: 24 MMOL/L (ref 21–32)
CREAT SERPL-MCNC: 8.3 MG/DL (ref 0.6–1.1)
GFR AFRICAN AMERICAN: 6
GFR NON-AFRICAN AMERICAN: 5
GLUCOSE BLD-MCNC: 114 MG/DL (ref 70–99)
GLUCOSE BLD-MCNC: 116 MG/DL (ref 70–99)
GLUCOSE BLD-MCNC: 183 MG/DL (ref 70–99)
HCT VFR BLD CALC: 28.5 % (ref 36–48)
HEMOGLOBIN: 9.2 G/DL (ref 12–16)
MCH RBC QN AUTO: 29.4 PG (ref 26–34)
MCHC RBC AUTO-ENTMCNC: 32.4 G/DL (ref 31–36)
MCV RBC AUTO: 90.8 FL (ref 80–100)
PDW BLD-RTO: 16.5 % (ref 12.4–15.4)
PERFORMED ON: ABNORMAL
PERFORMED ON: ABNORMAL
PHOSPHORUS: 4.5 MG/DL (ref 2.5–4.9)
PLATELET # BLD: 126 K/UL (ref 135–450)
PMV BLD AUTO: 10.9 FL (ref 5–10.5)
POTASSIUM REFLEX MAGNESIUM: 3.9 MMOL/L (ref 3.5–5.1)
POTASSIUM REFLEX MAGNESIUM: 4.3 MMOL/L (ref 3.5–5.1)
POTASSIUM SERPL-SCNC: 4.3 MMOL/L (ref 3.5–5.1)
RBC # BLD: 3.14 M/UL (ref 4–5.2)
SODIUM BLD-SCNC: 134 MMOL/L (ref 136–145)
WBC # BLD: 5.6 K/UL (ref 4–11)

## 2019-08-10 PROCEDURE — 6360000002 HC RX W HCPCS: Performed by: HOSPITALIST

## 2019-08-10 PROCEDURE — 80069 RENAL FUNCTION PANEL: CPT

## 2019-08-10 PROCEDURE — 6370000000 HC RX 637 (ALT 250 FOR IP): Performed by: HOSPITALIST

## 2019-08-10 PROCEDURE — 6360000002 HC RX W HCPCS: Performed by: INTERNAL MEDICINE

## 2019-08-10 PROCEDURE — 6370000000 HC RX 637 (ALT 250 FOR IP): Performed by: INTERNAL MEDICINE

## 2019-08-10 PROCEDURE — 2060000000 HC ICU INTERMEDIATE R&B

## 2019-08-10 PROCEDURE — 36415 COLL VENOUS BLD VENIPUNCTURE: CPT

## 2019-08-10 PROCEDURE — 2580000003 HC RX 258: Performed by: SURGERY

## 2019-08-10 PROCEDURE — 84132 ASSAY OF SERUM POTASSIUM: CPT

## 2019-08-10 PROCEDURE — 85027 COMPLETE CBC AUTOMATED: CPT

## 2019-08-10 PROCEDURE — 94760 N-INVAS EAR/PLS OXIMETRY 1: CPT

## 2019-08-10 PROCEDURE — 90935 HEMODIALYSIS ONE EVALUATION: CPT

## 2019-08-10 RX ADMIN — CALCIUM ACETATE 667 MG: 667 CAPSULE ORAL at 18:36

## 2019-08-10 RX ADMIN — INSULIN LISPRO 1 UNITS: 100 INJECTION, SOLUTION INTRAVENOUS; SUBCUTANEOUS at 20:40

## 2019-08-10 RX ADMIN — HEPARIN SODIUM 5000 UNITS: 5000 INJECTION INTRAVENOUS; SUBCUTANEOUS at 21:45

## 2019-08-10 RX ADMIN — IRON SUCROSE 100 MG: 20 INJECTION, SOLUTION INTRAVENOUS at 14:18

## 2019-08-10 RX ADMIN — HYDRALAZINE HYDROCHLORIDE 50 MG: 50 TABLET, FILM COATED ORAL at 05:12

## 2019-08-10 RX ADMIN — HYDRALAZINE HYDROCHLORIDE 50 MG: 50 TABLET, FILM COATED ORAL at 21:45

## 2019-08-10 RX ADMIN — INSULIN GLARGINE 10 UNITS: 100 INJECTION, SOLUTION SUBCUTANEOUS at 20:40

## 2019-08-10 RX ADMIN — HEPARIN SODIUM 5000 UNITS: 5000 INJECTION INTRAVENOUS; SUBCUTANEOUS at 05:10

## 2019-08-10 RX ADMIN — HYDRALAZINE HYDROCHLORIDE 10 MG: 20 INJECTION INTRAMUSCULAR; INTRAVENOUS at 11:00

## 2019-08-10 RX ADMIN — METOPROLOL TARTRATE 25 MG: 25 TABLET ORAL at 20:36

## 2019-08-10 RX ADMIN — VITAMIN B12 0.1 MG ORAL TABLET 50 MCG: 0.1 TABLET ORAL at 14:18

## 2019-08-10 RX ADMIN — METOPROLOL TARTRATE 25 MG: 25 TABLET ORAL at 09:30

## 2019-08-10 RX ADMIN — INSULIN LISPRO 1 UNITS: 100 INJECTION, SOLUTION INTRAVENOUS; SUBCUTANEOUS at 18:36

## 2019-08-10 RX ADMIN — HYDRALAZINE HYDROCHLORIDE 10 MG: 20 INJECTION INTRAMUSCULAR; INTRAVENOUS at 23:36

## 2019-08-10 RX ADMIN — HYDRALAZINE HYDROCHLORIDE 50 MG: 50 TABLET, FILM COATED ORAL at 14:18

## 2019-08-10 RX ADMIN — HEPARIN SODIUM 5000 UNITS: 5000 INJECTION INTRAVENOUS; SUBCUTANEOUS at 14:18

## 2019-08-10 RX ADMIN — SODIUM CHLORIDE, PRESERVATIVE FREE 10 ML: 5 INJECTION INTRAVENOUS at 20:44

## 2019-08-10 ASSESSMENT — PAIN SCALES - GENERAL
PAINLEVEL_OUTOF10: 0

## 2019-08-10 ASSESSMENT — PAIN SCALES - WONG BAKER
WONGBAKER_NUMERICALRESPONSE: 0
WONGBAKER_NUMERICALRESPONSE: 0

## 2019-08-10 NOTE — PLAN OF CARE
Problem:  Activity:  Goal: Fatigue will decrease  Description  Fatigue will decrease  Outcome: Ongoing  Goal: Risk for activity intolerance will decrease  Description  Risk for activity intolerance will decrease  Outcome: Ongoing     Problem: Coping:  Goal: Ability to cope will improve  Description  Ability to cope will improve  Outcome: Ongoing     Problem: Fluid Volume:  Goal: Will show no signs or symptoms of fluid imbalance  Description  Will show no signs or symptoms of fluid imbalance  Outcome: Ongoing     Problem: Health Behavior:  Goal: Ability to manage health-related needs will improve  Description  Ability to manage health-related needs will improve  Outcome: Ongoing  Goal: Identification of resources available to assist in meeting health care needs will improve  Description  Identification of resources available to assist in meeting health care needs will improve  Outcome: Ongoing     Problem: Nutritional:  Goal: Ability to identify appropriate dietary choices will improve  Description  Ability to identify appropriate dietary choices will improve  Outcome: Ongoing     Problem: Physical Regulation:  Goal: Ability to maintain clinical measurements within normal limits will improve  Description  Ability to maintain clinical measurements within normal limits will improve  Outcome: Ongoing  Goal: Complications related to the disease process, condition or treatment will be avoided or minimized  Description  Complications related to the disease process, condition or treatment will be avoided or minimized  Outcome: Ongoing     Problem: Sensory:  Goal: General experience of comfort will improve  Description  General experience of comfort will improve  Outcome: Ongoing     Problem: Skin Integrity:  Goal: Status of oral mucous membranes will improve  Description  Status of oral mucous membranes will improve  Outcome: Ongoing  Goal: Skin integrity will be maintained  Description  Skin integrity will be
Problem: Pain:  Description  Pain management should include both nonpharmacologic and pharmacologic interventions. Goal: Pain level will decrease  Description  Pain level will decrease  8/5/2019 0908 by Rosalyn Sandifer, RN  Outcome: Ongoing  No complaints of pain      Problem: Activity:  Goal: Fatigue will decrease  Description  Fatigue will decrease  8/5/2019 0908 by Rosalyn Sandifer, RN  Outcome: Ongoing  Goal: Risk for activity intolerance will decrease  Description  Risk for activity intolerance will decrease  8/5/2019 0908 by Rosalyn Sandifer, RN  Outcome: Ongoing  No impairment      Problem: Fluid Volume:  Goal: Will show no signs or symptoms of fluid imbalance  Description  Will show no signs or symptoms of fluid imbalance  8/5/2019 0908 by Rosalyn Sandifer, RN  Outcome: Ongoing  Lasix drip. Dialysis line to be placed today     Problem: Nutritional:  Goal: Ability to identify appropriate dietary choices will improve  Description  Ability to identify appropriate dietary choices will improve  8/5/2019 0908 by Rosalyn Sandifer, RN  Outcome: Ongoing  Fair appetite, Is and Os monitored.      Problem: Physical Regulation:  Goal: Ability to maintain clinical measurements within normal limits will improve  Description  Ability to maintain clinical measurements within normal limits will improve  8/5/2019 0908 by Rosalyn Sandifer, RN  Outcome: Ongoing  No physical impairment   Goal: Complications related to the disease process, condition or treatment will be avoided or minimized  Description  Complications related to the disease process, condition or treatment will be avoided or minimized  8/5/2019 0908 by Rosalyn Sandifer, RN  Outcome: Ongoing  Dialysis line to be placed today      Problem: Sensory:  Goal: General experience of comfort will improve  Description  General experience of comfort will improve  8/5/2019 0908 by Rosalyn Sandifer, RN  Outcome: Ongoing  2+ pitting edema, pt denies sensory impairment      Problem: Skin
will improve  Outcome: Ongoing     Problem: Skin Integrity:  Goal: Status of oral mucous membranes will improve  Description  Status of oral mucous membranes will improve  Outcome: Ongoing  Goal: Skin integrity will be maintained  Description  Skin integrity will be maintained  Outcome: Ongoing

## 2019-08-10 NOTE — FLOWSHEET NOTE
Treatment time: 4 hours   Net UF: 3 Liters     Pre weight: 95.1 kg standing scale   Post weight: 92.1 kg standing scale   EDW: tbd     Access used: R IJ   Access function: good. Dressing changed    Medications or blood products given: Heparin dwells     Regular outpatient schedule: tbd     Summary of response to treatment: 3 Liters removed with 4 hour Dialysis. Pt tolerated tx well. BP meds given by primary nurse. Post VSS  Report called to primary nurse    Copy of dialysis treatment record placed in chart, to be scanned into EMR.     08/10/19 0750 08/10/19 1150   Vital Signs   BP (!) 191/115 (!) 181/91   Temp 98 °F (36.7 °C) 98 °F (36.7 °C)   Pulse 80 78   Weight 209 lb 10.5 oz (95.1 kg) 203 lb 0.7 oz (92.1 kg)   Weight Method Actual;Standing scale Actual;Standing scale   Treatment Initiation   Dialyze Hours 4  --    Treatment  Initiation Universal Precautions maintained;Lines secured to patient; Connections secured;Prime given;Venous Parameters set; Arterial Parameters set; Air foam detector engaged; Hemosafe Device; Dialysate;Saline line double clamped; Hemo-Safe Applied;Dialyzer;F160  --    Post-Hemodialysis Assessment   Post-Treatment Procedures  --  Blood returned;Catheter capped, clamped and heparinized x 2 ports   Hemodialysis Intake (ml)  --  400 ml   Hemodialysis Output (ml)  --  3400 ml   NET Removed (ml)  --  3000 ml   Tolerated Treatment  --  Good

## 2019-08-10 NOTE — PROGRESS NOTES
Alex Berumen MD                                     [facility]                                                                                                                       Office : [phone]     Fax :[phone]       Nephrology Note          History of Present iIlness:    Tres Madrigal is a 47 y.o. female with h/o progressive worsening of renal disease. Has DM 2, Diabetic retinopathy , Anemia of chronic ds. Has been feeling very weak. Denies any SOB at this time but has significant anasarca   No fever , no chills    Interval hx       significant edema   Hb better now . S/p blood transfusion   Edema 3 +     Seen on HD today   Tolerating well.          Past Medical History:   Diagnosis Date    Anemia 9/15/2014    Diabetes mellitus ([address]     Diabetes mellitus type 1 ([address]     Diabetic retinopathy ([address]     Diabetic retinopathy associated with type 2 diabetes mellitus, without macular edema     Hyperlipidemia 7/27/2015    Kidney stone     Mixed hyperlipidemia 7/27/2015    Orthostatic hypotension     Vitreous hemorrhage of right eye ([address] 8/15/2017       Past Surgical History:   Procedure Laterality Date    APPENDECTOMY      KIDNEY STONE SURGERY      TONSILLECTOMY         Family History   Problem Relation Age of Onset    Heart Disease Mother         heart attack 2007    High Blood Pressure Mother     Diabetes Father     Heart Disease Father     Emphysema Father     Cancer Maternal Grandmother         cervical cancer    Lung Cancer Maternal Grandfather         black lung    Diabetes Paternal Grandmother     Heart Disease Paternal Grandfather          Current Medications:      hydrALAZINE (APRESOLINE) tablet 50 mg 3 times per day   sodium chloride flush 0.9 % injection 10 mL 2 times per day   sodium chloride flush 0.9 % injection 10

## 2019-08-11 LAB
ALBUMIN SERPL-MCNC: 3 G/DL (ref 3.4–5)
ANION GAP SERPL CALCULATED.3IONS-SCNC: 15 MMOL/L (ref 3–16)
BUN BLDV-MCNC: 27 MG/DL (ref 7–20)
CALCIUM SERPL-MCNC: 8 MG/DL (ref 8.3–10.6)
CHLORIDE BLD-SCNC: 99 MMOL/L (ref 99–110)
CO2: 24 MMOL/L (ref 21–32)
CREAT SERPL-MCNC: 6.1 MG/DL (ref 0.6–1.1)
GFR AFRICAN AMERICAN: 9
GFR NON-AFRICAN AMERICAN: 7
GLUCOSE BLD-MCNC: 104 MG/DL (ref 70–99)
GLUCOSE BLD-MCNC: 121 MG/DL (ref 70–99)
GLUCOSE BLD-MCNC: 135 MG/DL (ref 70–99)
GLUCOSE BLD-MCNC: 153 MG/DL (ref 70–99)
GLUCOSE BLD-MCNC: 176 MG/DL (ref 70–99)
GLUCOSE BLD-MCNC: 224 MG/DL (ref 70–99)
HCG(URINE) PREGNANCY TEST: NEGATIVE
HCT VFR BLD CALC: 28.8 % (ref 36–48)
HEMOGLOBIN: 9.2 G/DL (ref 12–16)
MCH RBC QN AUTO: 29.4 PG (ref 26–34)
MCHC RBC AUTO-ENTMCNC: 32.1 G/DL (ref 31–36)
MCV RBC AUTO: 91.5 FL (ref 80–100)
PDW BLD-RTO: 16.7 % (ref 12.4–15.4)
PERFORMED ON: ABNORMAL
PHOSPHORUS: 3.9 MG/DL (ref 2.5–4.9)
PLATELET # BLD: 105 K/UL (ref 135–450)
PMV BLD AUTO: 11.2 FL (ref 5–10.5)
POTASSIUM REFLEX MAGNESIUM: 4.2 MMOL/L (ref 3.5–5.1)
POTASSIUM SERPL-SCNC: 4.3 MMOL/L (ref 3.5–5.1)
RBC # BLD: 3.15 M/UL (ref 4–5.2)
SODIUM BLD-SCNC: 138 MMOL/L (ref 136–145)
WBC # BLD: 5 K/UL (ref 4–11)

## 2019-08-11 PROCEDURE — 6370000000 HC RX 637 (ALT 250 FOR IP): Performed by: INTERNAL MEDICINE

## 2019-08-11 PROCEDURE — 6370000000 HC RX 637 (ALT 250 FOR IP): Performed by: HOSPITALIST

## 2019-08-11 PROCEDURE — 85027 COMPLETE CBC AUTOMATED: CPT

## 2019-08-11 PROCEDURE — 84132 ASSAY OF SERUM POTASSIUM: CPT

## 2019-08-11 PROCEDURE — 6360000002 HC RX W HCPCS: Performed by: HOSPITALIST

## 2019-08-11 PROCEDURE — 2580000003 HC RX 258: Performed by: SURGERY

## 2019-08-11 PROCEDURE — 6360000002 HC RX W HCPCS: Performed by: INTERNAL MEDICINE

## 2019-08-11 PROCEDURE — 94760 N-INVAS EAR/PLS OXIMETRY 1: CPT

## 2019-08-11 PROCEDURE — 36415 COLL VENOUS BLD VENIPUNCTURE: CPT

## 2019-08-11 PROCEDURE — 80069 RENAL FUNCTION PANEL: CPT

## 2019-08-11 PROCEDURE — 2060000000 HC ICU INTERMEDIATE R&B

## 2019-08-11 PROCEDURE — 84703 CHORIONIC GONADOTROPIN ASSAY: CPT

## 2019-08-11 RX ORDER — INSULIN GLARGINE 100 [IU]/ML
8 INJECTION, SOLUTION SUBCUTANEOUS NIGHTLY
Status: DISCONTINUED | OUTPATIENT
Start: 2019-08-11 | End: 2019-08-13 | Stop reason: HOSPADM

## 2019-08-11 RX ADMIN — METOPROLOL TARTRATE 25 MG: 25 TABLET ORAL at 20:04

## 2019-08-11 RX ADMIN — HEPARIN SODIUM 5000 UNITS: 5000 INJECTION INTRAVENOUS; SUBCUTANEOUS at 21:45

## 2019-08-11 RX ADMIN — HEPARIN SODIUM 5000 UNITS: 5000 INJECTION INTRAVENOUS; SUBCUTANEOUS at 05:21

## 2019-08-11 RX ADMIN — HYDRALAZINE HYDROCHLORIDE 50 MG: 50 TABLET, FILM COATED ORAL at 12:39

## 2019-08-11 RX ADMIN — INSULIN LISPRO 1 UNITS: 100 INJECTION, SOLUTION INTRAVENOUS; SUBCUTANEOUS at 12:38

## 2019-08-11 RX ADMIN — CALCIUM ACETATE 667 MG: 667 CAPSULE ORAL at 12:39

## 2019-08-11 RX ADMIN — METOPROLOL TARTRATE 25 MG: 25 TABLET ORAL at 12:38

## 2019-08-11 RX ADMIN — ACETAMINOPHEN 650 MG: 325 TABLET ORAL at 05:26

## 2019-08-11 RX ADMIN — SODIUM CHLORIDE, PRESERVATIVE FREE 10 ML: 5 INJECTION INTRAVENOUS at 21:46

## 2019-08-11 RX ADMIN — IRON SUCROSE 100 MG: 20 INJECTION, SOLUTION INTRAVENOUS at 12:38

## 2019-08-11 RX ADMIN — HYDRALAZINE HYDROCHLORIDE 50 MG: 50 TABLET, FILM COATED ORAL at 21:45

## 2019-08-11 RX ADMIN — VITAMIN B12 0.1 MG ORAL TABLET 50 MCG: 0.1 TABLET ORAL at 12:38

## 2019-08-11 RX ADMIN — HYDRALAZINE HYDROCHLORIDE 50 MG: 50 TABLET, FILM COATED ORAL at 05:21

## 2019-08-11 RX ADMIN — CALCIUM ACETATE 667 MG: 667 CAPSULE ORAL at 18:50

## 2019-08-11 RX ADMIN — INSULIN LISPRO 1 UNITS: 100 INJECTION, SOLUTION INTRAVENOUS; SUBCUTANEOUS at 21:45

## 2019-08-11 RX ADMIN — HEPARIN SODIUM 5000 UNITS: 5000 INJECTION INTRAVENOUS; SUBCUTANEOUS at 12:39

## 2019-08-11 RX ADMIN — INSULIN GLARGINE 8 UNITS: 100 INJECTION, SOLUTION SUBCUTANEOUS at 21:52

## 2019-08-11 ASSESSMENT — PAIN DESCRIPTION - ORIENTATION: ORIENTATION: OTHER (COMMENT)

## 2019-08-11 ASSESSMENT — PAIN SCALES - GENERAL
PAINLEVEL_OUTOF10: 0
PAINLEVEL_OUTOF10: 0
PAINLEVEL_OUTOF10: 5
PAINLEVEL_OUTOF10: 7

## 2019-08-11 ASSESSMENT — PAIN DESCRIPTION - FREQUENCY: FREQUENCY: INTERMITTENT

## 2019-08-11 ASSESSMENT — PAIN DESCRIPTION - LOCATION: LOCATION: HEAD

## 2019-08-11 ASSESSMENT — PAIN DESCRIPTION - DESCRIPTORS: DESCRIPTORS: DULL

## 2019-08-11 ASSESSMENT — PAIN DESCRIPTION - PAIN TYPE: TYPE: ACUTE PAIN

## 2019-08-11 ASSESSMENT — PAIN DESCRIPTION - PROGRESSION: CLINICAL_PROGRESSION: GRADUALLY IMPROVING

## 2019-08-11 NOTE — PROGRESS NOTES
Geocillin [carbenicillin]; Lisinopril;  Other; Tramadol; Bumetanide; and Ibuprofen    Current Medications:      insulin glargine (LANTUS) injection vial 8 Units Nightly   hydrALAZINE (APRESOLINE) tablet 50 mg 3 times per day   sodium chloride flush 0.9 % injection 10 mL 2 times per day   sodium chloride flush 0.9 % injection 10 mL PRN   hydrALAZINE (APRESOLINE) injection 10 mg Q6H PRN   calcium acetate (PHOSLO) capsule 667 mg TID WC   metoprolol tartrate (LOPRESSOR) tablet 25 mg BID   iron sucrose (VENOFER) injection 100 mg Q24H   heparin (porcine) injection 2,600 Units PRN   vitamin B-12 (CYANOCOBALAMIN) tablet 50 mcg Daily   sodium chloride flush 0.9 % injection 10 mL 2 times per day   sodium chloride flush 0.9 % injection 10 mL PRN   ondansetron (ZOFRAN) injection 4 mg Q6H PRN   heparin (porcine) injection 5,000 Units 3 times per day   acetaminophen (TYLENOL) tablet 650 mg Q4H PRN   glucose (GLUTOSE) 40 % oral gel 15 g PRN   dextrose 50 % IV solution PRN   glucagon (rDNA) injection 1 mg PRN   dextrose 5 % solution PRN   insulin lispro (HUMALOG) injection vial 0-6 Units TID WC   insulin lispro (HUMALOG) injection vial 0-3 Units Nightly           Physical exam:     Vitals:  BP (!) 166/91   Pulse 79   Temp 98.4 °F (36.9 °C) (Oral)   Resp 18   Ht 5' 3\" (1.6 m)   Wt 206 lb 9.1 oz (93.7 kg)   SpO2 98%   BMI 36.59 kg/m²   Constitutional:  OAA X3 NAD  Skin: no rash, turgor wnl  Heent:  eomi, mmm  Neck: no bruits or jvd noted  Cardiovascular:  S1, S2 without m/r/g  Respiratory: CTA B without w/r/r  Abdomen:  +bs, soft, nt, nd  Ext:  B/L  3 + lower extremity edema  Psychiatric: mood and affect appropriate  Musculoskeletal:  Rom, muscular strength intact    Labs:  CBC:   Recent Labs     08/09/19  0818 08/10/19  0543 08/11/19  0640   WBC 6.1 5.6 5.0   HGB 9.8* 9.2* 9.2*   * 126* 105*     BMP:    Recent Labs     08/09/19  0818  08/10/19  0544 08/10/19  1531 08/11/19  0055 08/11/19  0640     --  134*  --

## 2019-08-11 NOTE — PROGRESS NOTES
Progress Note  Admit Date: 2019      PCP: Lurdes Mathis     CC: F/U for leg swelling    SUBJECTIVE / Interval History:   Pt feels better. Bp still high . Leg swelling + improved       neg 8  L      Allergies  Bactrim [sulfamethoxazole-trimethoprim]; Geocillin [carbenicillin]; Lisinopril; Other; Tramadol; Bumetanide; and Ibuprofen    Medications    Scheduled Meds:   hydrALAZINE  50 mg Oral 3 times per day    sodium chloride flush  10 mL Intravenous 2 times per day    insulin glargine  10 Units Subcutaneous Nightly    calcium acetate  667 mg Oral TID WC    metoprolol tartrate  25 mg Oral BID    iron sucrose  100 mg Intravenous Q24H    vitamin B-12  50 mcg Oral Daily    sodium chloride flush  10 mL Intravenous 2 times per day    heparin (porcine)  5,000 Units Subcutaneous 3 times per day    insulin lispro  0-6 Units Subcutaneous TID WC    insulin lispro  0-3 Units Subcutaneous Nightly     Continuous Infusions:   dextrose         PRN Meds:  sodium chloride flush, hydrALAZINE, heparin (porcine), sodium chloride flush, ondansetron, acetaminophen, glucose, dextrose, glucagon (rDNA), dextrose    Vitals    TEMPERATURE:  Current - Temp: 98.4 °F (36.9 °C); Max - Temp  Av °F (36.7 °C)  Min: 97.3 °F (36.3 °C)  Max: 98.4 °F (36.9 °C)  RESPIRATIONS RANGE: Resp  Av.7  Min: 16  Max: 18  PULSE RANGE: Pulse  Av.4  Min: 66  Max: 91  BLOOD PRESSURE RANGE:  Systolic (03MSH), NEHA:027 , Min:166 , QZL:463   ; Diastolic (08MPG), TFU:60, Min:76, Max:110    PULSE OXIMETRY RANGE: SpO2  Av %  Min: 93 %  Max: 97 %  24HR INTAKE/OUTPUT:      Intake/Output Summary (Last 24 hours) at 2019 0803  Last data filed at 2019 0600  Gross per 24 hour   Intake 1120 ml   Output 3600 ml   Net -2480 ml       Exam:    Gen: No distress. Eyes: PERRL. No sclera icterus. No conjunctival injection. ENT: No discharge. Pharynx clear. External appearance of ears and nose normal.  Neck: Trachea midline.  No obvious

## 2019-08-12 ENCOUNTER — ANESTHESIA (OUTPATIENT)
Dept: OPERATING ROOM | Age: 54
DRG: 674 | End: 2019-08-12
Payer: COMMERCIAL

## 2019-08-12 VITALS
DIASTOLIC BLOOD PRESSURE: 55 MMHG | TEMPERATURE: 96.8 F | RESPIRATION RATE: 9 BRPM | OXYGEN SATURATION: 100 % | SYSTOLIC BLOOD PRESSURE: 89 MMHG

## 2019-08-12 LAB
ALBUMIN SERPL-MCNC: 3.3 G/DL (ref 3.4–5)
ANION GAP SERPL CALCULATED.3IONS-SCNC: 14 MMOL/L (ref 3–16)
BUN BLDV-MCNC: 33 MG/DL (ref 7–20)
CALCIUM SERPL-MCNC: 8.4 MG/DL (ref 8.3–10.6)
CHLORIDE BLD-SCNC: 98 MMOL/L (ref 99–110)
CO2: 26 MMOL/L (ref 21–32)
CREAT SERPL-MCNC: 6.8 MG/DL (ref 0.6–1.1)
GFR AFRICAN AMERICAN: 8
GFR NON-AFRICAN AMERICAN: 6
GLUCOSE BLD-MCNC: 114 MG/DL (ref 70–99)
GLUCOSE BLD-MCNC: 191 MG/DL (ref 70–99)
GLUCOSE BLD-MCNC: 299 MG/DL (ref 70–99)
GLUCOSE BLD-MCNC: 87 MG/DL (ref 70–99)
GLUCOSE BLD-MCNC: 88 MG/DL (ref 70–99)
HCT VFR BLD CALC: 30 % (ref 36–48)
HEMOGLOBIN: 9.5 G/DL (ref 12–16)
MCH RBC QN AUTO: 29 PG (ref 26–34)
MCHC RBC AUTO-ENTMCNC: 31.7 G/DL (ref 31–36)
MCV RBC AUTO: 91.5 FL (ref 80–100)
PDW BLD-RTO: 17.4 % (ref 12.4–15.4)
PERFORMED ON: ABNORMAL
PERFORMED ON: NORMAL
PHOSPHORUS: 4.3 MG/DL (ref 2.5–4.9)
PLATELET # BLD: 117 K/UL (ref 135–450)
PMV BLD AUTO: 10.5 FL (ref 5–10.5)
POTASSIUM SERPL-SCNC: 4.4 MMOL/L (ref 3.5–5.1)
RBC # BLD: 3.28 M/UL (ref 4–5.2)
SODIUM BLD-SCNC: 138 MMOL/L (ref 136–145)
WBC # BLD: 5.5 K/UL (ref 4–11)

## 2019-08-12 PROCEDURE — 2580000003 HC RX 258: Performed by: NURSE ANESTHETIST, CERTIFIED REGISTERED

## 2019-08-12 PROCEDURE — C1750 CATH, HEMODIALYSIS,LONG-TERM: HCPCS | Performed by: SURGERY

## 2019-08-12 PROCEDURE — 6370000000 HC RX 637 (ALT 250 FOR IP): Performed by: SURGERY

## 2019-08-12 PROCEDURE — 2580000003 HC RX 258: Performed by: SURGERY

## 2019-08-12 PROCEDURE — 6360000002 HC RX W HCPCS: Performed by: SURGERY

## 2019-08-12 PROCEDURE — 6370000000 HC RX 637 (ALT 250 FOR IP): Performed by: INTERNAL MEDICINE

## 2019-08-12 PROCEDURE — 90935 HEMODIALYSIS ONE EVALUATION: CPT

## 2019-08-12 PROCEDURE — 3600000014 HC SURGERY LEVEL 4 ADDTL 15MIN: Performed by: SURGERY

## 2019-08-12 PROCEDURE — 49324 LAP INSERT TUNNEL IP CATH: CPT | Performed by: SURGERY

## 2019-08-12 PROCEDURE — 3700000000 HC ANESTHESIA ATTENDED CARE: Performed by: SURGERY

## 2019-08-12 PROCEDURE — 80069 RENAL FUNCTION PANEL: CPT

## 2019-08-12 PROCEDURE — 3600000004 HC SURGERY LEVEL 4 BASE: Performed by: SURGERY

## 2019-08-12 PROCEDURE — 85027 COMPLETE CBC AUTOMATED: CPT

## 2019-08-12 PROCEDURE — 2500000003 HC RX 250 WO HCPCS: Performed by: NURSE ANESTHETIST, CERTIFIED REGISTERED

## 2019-08-12 PROCEDURE — 6360000002 HC RX W HCPCS: Performed by: NURSE ANESTHETIST, CERTIFIED REGISTERED

## 2019-08-12 PROCEDURE — 7100000000 HC PACU RECOVERY - FIRST 15 MIN: Performed by: SURGERY

## 2019-08-12 PROCEDURE — 7100000001 HC PACU RECOVERY - ADDTL 15 MIN: Performed by: SURGERY

## 2019-08-12 PROCEDURE — 36415 COLL VENOUS BLD VENIPUNCTURE: CPT

## 2019-08-12 PROCEDURE — 2709999900 HC NON-CHARGEABLE SUPPLY: Performed by: SURGERY

## 2019-08-12 PROCEDURE — 94760 N-INVAS EAR/PLS OXIMETRY 1: CPT

## 2019-08-12 PROCEDURE — 0WHG43Z INSERTION OF INFUSION DEVICE INTO PERITONEAL CAVITY, PERCUTANEOUS ENDOSCOPIC APPROACH: ICD-10-PCS | Performed by: SURGERY

## 2019-08-12 PROCEDURE — 2500000003 HC RX 250 WO HCPCS: Performed by: SURGERY

## 2019-08-12 PROCEDURE — 3700000001 HC ADD 15 MINUTES (ANESTHESIA): Performed by: SURGERY

## 2019-08-12 PROCEDURE — 1200000000 HC SEMI PRIVATE

## 2019-08-12 RX ORDER — MIDAZOLAM HYDROCHLORIDE 1 MG/ML
INJECTION INTRAMUSCULAR; INTRAVENOUS PRN
Status: DISCONTINUED | OUTPATIENT
Start: 2019-08-12 | End: 2019-08-12 | Stop reason: SDUPTHER

## 2019-08-12 RX ORDER — FENTANYL CITRATE 50 UG/ML
25 INJECTION, SOLUTION INTRAMUSCULAR; INTRAVENOUS EVERY 5 MIN PRN
Status: DISCONTINUED | OUTPATIENT
Start: 2019-08-12 | End: 2019-08-12

## 2019-08-12 RX ORDER — LIDOCAINE HYDROCHLORIDE 20 MG/ML
INJECTION, SOLUTION EPIDURAL; INFILTRATION; INTRACAUDAL; PERINEURAL PRN
Status: DISCONTINUED | OUTPATIENT
Start: 2019-08-12 | End: 2019-08-12 | Stop reason: SDUPTHER

## 2019-08-12 RX ORDER — SODIUM CHLORIDE 9 MG/ML
INJECTION, SOLUTION INTRAVENOUS CONTINUOUS PRN
Status: DISCONTINUED | OUTPATIENT
Start: 2019-08-12 | End: 2019-08-12 | Stop reason: SDUPTHER

## 2019-08-12 RX ORDER — MAGNESIUM HYDROXIDE 1200 MG/15ML
LIQUID ORAL CONTINUOUS PRN
Status: COMPLETED | OUTPATIENT
Start: 2019-08-12 | End: 2019-08-12

## 2019-08-12 RX ORDER — DEXAMETHASONE SODIUM PHOSPHATE 4 MG/ML
INJECTION, SOLUTION INTRA-ARTICULAR; INTRALESIONAL; INTRAMUSCULAR; INTRAVENOUS; SOFT TISSUE PRN
Status: DISCONTINUED | OUTPATIENT
Start: 2019-08-12 | End: 2019-08-12 | Stop reason: SDUPTHER

## 2019-08-12 RX ORDER — ONDANSETRON 2 MG/ML
4 INJECTION INTRAMUSCULAR; INTRAVENOUS
Status: DISCONTINUED | OUTPATIENT
Start: 2019-08-12 | End: 2019-08-12

## 2019-08-12 RX ORDER — SODIUM CHLORIDE 0.9 % (FLUSH) 0.9 %
10 SYRINGE (ML) INJECTION EVERY 12 HOURS SCHEDULED
Status: DISCONTINUED | OUTPATIENT
Start: 2019-08-12 | End: 2019-08-12

## 2019-08-12 RX ORDER — FENTANYL CITRATE 50 UG/ML
INJECTION, SOLUTION INTRAMUSCULAR; INTRAVENOUS PRN
Status: DISCONTINUED | OUTPATIENT
Start: 2019-08-12 | End: 2019-08-12 | Stop reason: SDUPTHER

## 2019-08-12 RX ORDER — BUPIVACAINE HYDROCHLORIDE 5 MG/ML
INJECTION, SOLUTION EPIDURAL; INTRACAUDAL
Status: COMPLETED | OUTPATIENT
Start: 2019-08-12 | End: 2019-08-12

## 2019-08-12 RX ORDER — SODIUM CHLORIDE 9 MG/ML
INJECTION, SOLUTION INTRAVENOUS CONTINUOUS
Status: DISCONTINUED | OUTPATIENT
Start: 2019-08-12 | End: 2019-08-12

## 2019-08-12 RX ORDER — PROPOFOL 10 MG/ML
INJECTION, EMULSION INTRAVENOUS PRN
Status: DISCONTINUED | OUTPATIENT
Start: 2019-08-12 | End: 2019-08-12 | Stop reason: SDUPTHER

## 2019-08-12 RX ORDER — ONDANSETRON 2 MG/ML
INJECTION INTRAMUSCULAR; INTRAVENOUS PRN
Status: DISCONTINUED | OUTPATIENT
Start: 2019-08-12 | End: 2019-08-12 | Stop reason: SDUPTHER

## 2019-08-12 RX ORDER — SODIUM CHLORIDE 0.9 % (FLUSH) 0.9 %
10 SYRINGE (ML) INJECTION PRN
Status: DISCONTINUED | OUTPATIENT
Start: 2019-08-12 | End: 2019-08-12

## 2019-08-12 RX ORDER — ROCURONIUM BROMIDE 10 MG/ML
INJECTION, SOLUTION INTRAVENOUS PRN
Status: DISCONTINUED | OUTPATIENT
Start: 2019-08-12 | End: 2019-08-12 | Stop reason: SDUPTHER

## 2019-08-12 RX ORDER — TORSEMIDE 100 MG/1
100 TABLET ORAL 2 TIMES DAILY
Status: DISCONTINUED | OUTPATIENT
Start: 2019-08-12 | End: 2019-08-13 | Stop reason: HOSPADM

## 2019-08-12 RX ORDER — CLINDAMYCIN PHOSPHATE 900 MG/50ML
900 INJECTION INTRAVENOUS ONCE
Status: COMPLETED | OUTPATIENT
Start: 2019-08-12 | End: 2019-08-12

## 2019-08-12 RX ORDER — OXYCODONE HYDROCHLORIDE AND ACETAMINOPHEN 5; 325 MG/1; MG/1
2 TABLET ORAL EVERY 4 HOURS PRN
Status: DISCONTINUED | OUTPATIENT
Start: 2019-08-12 | End: 2019-08-13 | Stop reason: HOSPADM

## 2019-08-12 RX ADMIN — TORSEMIDE 100 MG: 100 TABLET ORAL at 15:04

## 2019-08-12 RX ADMIN — INSULIN LISPRO 2 UNITS: 100 INJECTION, SOLUTION INTRAVENOUS; SUBCUTANEOUS at 21:18

## 2019-08-12 RX ADMIN — INSULIN GLARGINE 8 UNITS: 100 INJECTION, SOLUTION SUBCUTANEOUS at 21:18

## 2019-08-12 RX ADMIN — LIDOCAINE HYDROCHLORIDE 100 MG: 20 INJECTION, SOLUTION EPIDURAL; INFILTRATION; INTRACAUDAL; PERINEURAL at 07:37

## 2019-08-12 RX ADMIN — DEXAMETHASONE SODIUM PHOSPHATE 10 MG: 4 INJECTION, SOLUTION INTRAMUSCULAR; INTRAVENOUS at 07:45

## 2019-08-12 RX ADMIN — CALCIUM ACETATE 667 MG: 667 CAPSULE ORAL at 15:04

## 2019-08-12 RX ADMIN — PROPOFOL 150 MG: 10 INJECTION, EMULSION INTRAVENOUS at 07:38

## 2019-08-12 RX ADMIN — FENTANYL CITRATE 50 MCG: 50 INJECTION INTRAMUSCULAR; INTRAVENOUS at 08:16

## 2019-08-12 RX ADMIN — SODIUM CHLORIDE, PRESERVATIVE FREE 10 ML: 5 INJECTION INTRAVENOUS at 21:18

## 2019-08-12 RX ADMIN — SODIUM CHLORIDE: 9 INJECTION, SOLUTION INTRAVENOUS at 07:25

## 2019-08-12 RX ADMIN — FENTANYL CITRATE 50 MCG: 50 INJECTION INTRAMUSCULAR; INTRAVENOUS at 07:38

## 2019-08-12 RX ADMIN — MIDAZOLAM 1 MG: 1 INJECTION INTRAMUSCULAR; INTRAVENOUS at 07:25

## 2019-08-12 RX ADMIN — HYDRALAZINE HYDROCHLORIDE 50 MG: 50 TABLET, FILM COATED ORAL at 14:50

## 2019-08-12 RX ADMIN — CLINDAMYCIN PHOSPHATE 900 MG: 18 INJECTION, SOLUTION INTRAMUSCULAR; INTRAVENOUS at 07:28

## 2019-08-12 RX ADMIN — MIDAZOLAM 1 MG: 1 INJECTION INTRAMUSCULAR; INTRAVENOUS at 07:33

## 2019-08-12 RX ADMIN — SUGAMMADEX 200 MG: 100 INJECTION, SOLUTION INTRAVENOUS at 08:13

## 2019-08-12 RX ADMIN — HYDRALAZINE HYDROCHLORIDE 50 MG: 50 TABLET, FILM COATED ORAL at 21:17

## 2019-08-12 RX ADMIN — ACETAMINOPHEN 650 MG: 325 TABLET ORAL at 14:34

## 2019-08-12 RX ADMIN — SODIUM CHLORIDE, PRESERVATIVE FREE 10 ML: 5 INJECTION INTRAVENOUS at 21:19

## 2019-08-12 RX ADMIN — CALCIUM ACETATE 667 MG: 667 CAPSULE ORAL at 18:37

## 2019-08-12 RX ADMIN — METOPROLOL TARTRATE 25 MG: 25 TABLET ORAL at 21:17

## 2019-08-12 RX ADMIN — HYDRALAZINE HYDROCHLORIDE 50 MG: 50 TABLET, FILM COATED ORAL at 04:44

## 2019-08-12 RX ADMIN — HEPARIN SODIUM 5000 UNITS: 5000 INJECTION INTRAVENOUS; SUBCUTANEOUS at 21:17

## 2019-08-12 RX ADMIN — ROCURONIUM BROMIDE 40 MG: 10 INJECTION INTRAVENOUS at 07:39

## 2019-08-12 RX ADMIN — IRON SUCROSE 100 MG: 20 INJECTION, SOLUTION INTRAVENOUS at 21:17

## 2019-08-12 RX ADMIN — ONDANSETRON 4 MG: 2 INJECTION INTRAMUSCULAR; INTRAVENOUS at 08:07

## 2019-08-12 ASSESSMENT — PULMONARY FUNCTION TESTS
PIF_VALUE: 0
PIF_VALUE: 27
PIF_VALUE: 26
PIF_VALUE: 2
PIF_VALUE: 25
PIF_VALUE: 3
PIF_VALUE: 24
PIF_VALUE: 16
PIF_VALUE: 26
PIF_VALUE: 35
PIF_VALUE: 2
PIF_VALUE: 3
PIF_VALUE: 11
PIF_VALUE: 26
PIF_VALUE: 2
PIF_VALUE: 10
PIF_VALUE: 25
PIF_VALUE: 24
PIF_VALUE: 35
PIF_VALUE: 3
PIF_VALUE: 36
PIF_VALUE: 36
PIF_VALUE: 1
PIF_VALUE: 31
PIF_VALUE: 24
PIF_VALUE: 35
PIF_VALUE: 35
PIF_VALUE: 27
PIF_VALUE: 25
PIF_VALUE: 36
PIF_VALUE: 29
PIF_VALUE: 19
PIF_VALUE: 9
PIF_VALUE: 0
PIF_VALUE: 31
PIF_VALUE: 29
PIF_VALUE: 0
PIF_VALUE: 25
PIF_VALUE: 26
PIF_VALUE: 0
PIF_VALUE: 35
PIF_VALUE: 3
PIF_VALUE: 9
PIF_VALUE: 37
PIF_VALUE: 0
PIF_VALUE: 11
PIF_VALUE: 35
PIF_VALUE: 31
PIF_VALUE: 36
PIF_VALUE: 3
PIF_VALUE: 1
PIF_VALUE: 25
PIF_VALUE: 35
PIF_VALUE: 35
PIF_VALUE: 23
PIF_VALUE: 25
PIF_VALUE: 34
PIF_VALUE: 2
PIF_VALUE: 26
PIF_VALUE: 31
PIF_VALUE: 34
PIF_VALUE: 37
PIF_VALUE: 27
PIF_VALUE: 26
PIF_VALUE: 4
PIF_VALUE: 0
PIF_VALUE: 23
PIF_VALUE: 25

## 2019-08-12 ASSESSMENT — PAIN SCALES - GENERAL
PAINLEVEL_OUTOF10: 0

## 2019-08-12 ASSESSMENT — PAIN - FUNCTIONAL ASSESSMENT: PAIN_FUNCTIONAL_ASSESSMENT: 0-10

## 2019-08-12 ASSESSMENT — ENCOUNTER SYMPTOMS: SHORTNESS OF BREATH: 0

## 2019-08-12 NOTE — PROGRESS NOTES
Pt drowsy, but easy to arouse. VSS. Abdomen soft and round. Dry dressing and surgical glue to abdomen clean dry and intact. Pt states no pain. Pt states ready to go to dialysis.

## 2019-08-12 NOTE — PROGRESS NOTES
Hospitalist Progress Note      PCP: Zulma rBavo    Date of Admission: 8/4/2019     Subjective: getting tunneled line placement    Medications:  Reviewed    Infusion Medications    dextrose       Scheduled Medications    torsemide  100 mg Oral BID    insulin glargine  8 Units Subcutaneous Nightly    hydrALAZINE  50 mg Oral 3 times per day    sodium chloride flush  10 mL Intravenous 2 times per day    calcium acetate  667 mg Oral TID WC    metoprolol tartrate  25 mg Oral BID    iron sucrose  100 mg Intravenous Q24H    vitamin B-12  50 mcg Oral Daily    sodium chloride flush  10 mL Intravenous 2 times per day    heparin (porcine)  5,000 Units Subcutaneous 3 times per day    insulin lispro  0-6 Units Subcutaneous TID WC    insulin lispro  0-3 Units Subcutaneous Nightly     PRN Meds: oxyCODONE-acetaminophen, sodium chloride flush, hydrALAZINE, heparin (porcine), sodium chloride flush, ondansetron, acetaminophen, glucose, dextrose, glucagon (rDNA), dextrose      Intake/Output Summary (Last 24 hours) at 8/12/2019 1758  Last data filed at 8/12/2019 1345  Gross per 24 hour   Intake 1260 ml   Output 3685 ml   Net -2425 ml       Physical Exam Performed:    BP (!) 158/74   Pulse 96   Temp 98.1 °F (36.7 °C) (Oral)   Resp 16   Ht 5' 3\" (1.6 m)   Wt 205 lb 0.4 oz (93 kg)   SpO2 95%   BMI 36.32 kg/m²     Gen: No distress. Eyes: PERRL. No sclera icterus. No conjunctival injection. ENT: No discharge. Pharynx clear. External appearance of ears and nose normal.  Neck: Trachea midline. No obvious mass. Resp: No accessory muscle use. No crackles. No wheezes. No rhonchi. No dullness on percussion. CV: Regular rate. Regular rhythm. No murmur or rub. 2 +  edema. GI: Non-tender. Non-distended. No hernia. Skin: Warm, dry, normal texture and turgor. No nodule on exposed extremities. Lymph: No cervical LAD. No supraclavicular LAD. M/S: No cyanosis. No clubbing. No joint deformity. Neuro:  Moves improvement and was switched to a Vas-Cath and underwent dialysis. Nephrology managing the plan. Patient is to have a PD catheter on Monday. Unclear if patient is just going to start dialysis with PD or if her Vas-Cath is going to switch to a tunneled cath and she will go for home hemodialysis.   Nephrology will finalize the plan on Monday.     ESRD with volume overload- dialysis per nephrology   -nephrology consulted   - s/p  tunneled cath today  - await renal recs for d/c     DM2 - blood sugar running low, cut back lantus  - ct home meds   -SSI     HTN- uncontrolled, patient refuses quite a few blood pressure medicines as she states she cannot tolerate it.  -Resident on beta-blocker and hydralazine  - monitor with dialysis     Anemia- for 2 PRBC transfusion .  - monitor     UTI  - ruled out         DVT Prophylaxis: heparin  Diet: DIET RENAL;  Code Status: Full Code    PT/OT Eval Status: ordered    Dispo - cont care, d/c when ok with nephrology    Marcelo Walton MD

## 2019-08-12 NOTE — PROGRESS NOTES
hemodialysis catheter placement. XR CHEST PORTABLE   Final Result   1. No acute process in the chest.   2. Enlarged cardiac silhouette. I/O last 3 completed shifts: In: 360 [P.O.:360]  Out: 275 [Urine:275]          Assessment/Plan :      1. ESRD 2/2 DM 2   New start HD   She wants PD in near future   Hd today  Torsemide 100 mg po bid   Still makes urine     2. HTN. 2/2 volume overload   Should improve with UF  Started on hydralazine     3. Anemia. Has  iron deficiency    stool for occult blood negative   Transfused  2 units of PRBC  Started aranesp   Iv venofer    4. Metabolic acidosis 2/2 ESRD. Will correct with HD     5. Electrolytes. Monitor     6. MBD. High phos . On phoslo now     Planned for PD catheter on Monday     Will get hemodialysis today for 4 hours. Plan is to remove the non tunneled  catheter after that. Will get outpatient peritoneal dialysis. Arrangements already made.             Thank you for allowing us to participate in care of [address] [place] [address]         Electronically signed by: Daniel Doe MD, 8/12/2019, 8:39 AM      [facility] [id number]  Office : [phone]  Fax :[phone]

## 2019-08-13 VITALS
WEIGHT: 209.44 LBS | BODY MASS INDEX: 37.11 KG/M2 | SYSTOLIC BLOOD PRESSURE: 151 MMHG | OXYGEN SATURATION: 96 % | DIASTOLIC BLOOD PRESSURE: 83 MMHG | HEART RATE: 93 BPM | TEMPERATURE: 98.5 F | RESPIRATION RATE: 17 BRPM | HEIGHT: 63 IN

## 2019-08-13 LAB
ALBUMIN SERPL-MCNC: 3.2 G/DL (ref 3.4–5)
ANION GAP SERPL CALCULATED.3IONS-SCNC: 14 MMOL/L (ref 3–16)
BUN BLDV-MCNC: 32 MG/DL (ref 7–20)
CALCIUM SERPL-MCNC: 8.3 MG/DL (ref 8.3–10.6)
CHLORIDE BLD-SCNC: 96 MMOL/L (ref 99–110)
CO2: 25 MMOL/L (ref 21–32)
CREAT SERPL-MCNC: 4.8 MG/DL (ref 0.6–1.1)
GFR AFRICAN AMERICAN: 11
GFR NON-AFRICAN AMERICAN: 10
GLUCOSE BLD-MCNC: 200 MG/DL (ref 70–99)
GLUCOSE BLD-MCNC: 207 MG/DL (ref 70–99)
HCT VFR BLD CALC: 28.4 % (ref 36–48)
HEMOGLOBIN: 9.1 G/DL (ref 12–16)
MCH RBC QN AUTO: 29.6 PG (ref 26–34)
MCHC RBC AUTO-ENTMCNC: 32.1 G/DL (ref 31–36)
MCV RBC AUTO: 92.1 FL (ref 80–100)
PDW BLD-RTO: 17.8 % (ref 12.4–15.4)
PERFORMED ON: ABNORMAL
PHOSPHORUS: 3.4 MG/DL (ref 2.5–4.9)
PLATELET # BLD: 118 K/UL (ref 135–450)
PMV BLD AUTO: 11.5 FL (ref 5–10.5)
POTASSIUM SERPL-SCNC: 5 MMOL/L (ref 3.5–5.1)
RBC # BLD: 3.09 M/UL (ref 4–5.2)
SODIUM BLD-SCNC: 135 MMOL/L (ref 136–145)
WBC # BLD: 6.4 K/UL (ref 4–11)

## 2019-08-13 PROCEDURE — 94760 N-INVAS EAR/PLS OXIMETRY 1: CPT

## 2019-08-13 PROCEDURE — 6370000000 HC RX 637 (ALT 250 FOR IP): Performed by: SURGERY

## 2019-08-13 PROCEDURE — 80069 RENAL FUNCTION PANEL: CPT

## 2019-08-13 PROCEDURE — 85027 COMPLETE CBC AUTOMATED: CPT

## 2019-08-13 PROCEDURE — 36415 COLL VENOUS BLD VENIPUNCTURE: CPT

## 2019-08-13 PROCEDURE — 6360000002 HC RX W HCPCS: Performed by: SURGERY

## 2019-08-13 PROCEDURE — 6370000000 HC RX 637 (ALT 250 FOR IP): Performed by: INTERNAL MEDICINE

## 2019-08-13 PROCEDURE — 99024 POSTOP FOLLOW-UP VISIT: CPT | Performed by: SURGERY

## 2019-08-13 RX ORDER — TORSEMIDE 100 MG/1
100 TABLET ORAL 2 TIMES DAILY
Qty: 60 TABLET | Refills: 0 | Status: ON HOLD | OUTPATIENT
Start: 2019-08-13 | End: 2019-08-22 | Stop reason: CLARIF

## 2019-08-13 RX ORDER — LANOLIN ALCOHOL/MO/W.PET/CERES
325 CREAM (GRAM) TOPICAL 2 TIMES DAILY
Qty: 90 TABLET | Refills: 3 | Status: SHIPPED | OUTPATIENT
Start: 2019-08-13 | End: 2019-08-22 | Stop reason: ALTCHOICE

## 2019-08-13 RX ORDER — HYDRALAZINE HYDROCHLORIDE 50 MG/1
50 TABLET, FILM COATED ORAL EVERY 8 HOURS SCHEDULED
Qty: 90 TABLET | Refills: 3 | Status: SHIPPED | OUTPATIENT
Start: 2019-08-13 | End: 2019-08-22 | Stop reason: ALTCHOICE

## 2019-08-13 RX ADMIN — TORSEMIDE 100 MG: 100 TABLET ORAL at 08:21

## 2019-08-13 RX ADMIN — INSULIN LISPRO 2 UNITS: 100 INJECTION, SOLUTION INTRAVENOUS; SUBCUTANEOUS at 09:45

## 2019-08-13 RX ADMIN — HEPARIN SODIUM 5000 UNITS: 5000 INJECTION INTRAVENOUS; SUBCUTANEOUS at 05:56

## 2019-08-13 RX ADMIN — HYDRALAZINE HYDROCHLORIDE 50 MG: 50 TABLET, FILM COATED ORAL at 05:56

## 2019-08-13 RX ADMIN — VITAMIN B12 0.1 MG ORAL TABLET 50 MCG: 0.1 TABLET ORAL at 08:21

## 2019-08-13 RX ADMIN — CALCIUM ACETATE 667 MG: 667 CAPSULE ORAL at 08:21

## 2019-08-13 RX ADMIN — METOPROLOL TARTRATE 25 MG: 25 TABLET ORAL at 08:21

## 2019-08-13 ASSESSMENT — PAIN SCALES - GENERAL: PAINLEVEL_OUTOF10: 0

## 2019-08-13 NOTE — PROGRESS NOTES
Colette Elise MD                                     [facility]                                                                                                                       Office : [phone]     Fax :[phone]       Nephrology Note          History of Present iIlness:    Janna Mitchell is a 47 y.o. female with h/o progressive worsening of renal disease. Has DM 2, Diabetic retinopathy , Anemia of chronic ds. Has been feeling very weak. Denies any SOB at this time but has significant anasarca   No fever , no chills    Interval hx       Decreased edema  Hb better now .  S/p blood transfusion   S/p PD catheter  Feeling better        Past Medical History:   Diagnosis Date    Anemia 9/15/2014    Diabetes mellitus ([address]     Diabetes mellitus type 1 ([address]     Diabetic retinopathy ([address]     Diabetic retinopathy associated with type 2 diabetes mellitus, without macular edema     Hyperlipidemia 7/27/2015    Kidney stone     Mixed hyperlipidemia 7/27/2015    Orthostatic hypotension     Vitreous hemorrhage of right eye ([address] 8/15/2017       Past Surgical History:   Procedure Laterality Date    APPENDECTOMY      KIDNEY STONE SURGERY      LAPAROSCOPY INSERTION PERITONEAL CATHETER N/A 8/12/2019    LAPAROSCOPIC PERITONEAL DIALYSIS CATHETER PLACEMENT WITH AXEL TROCHAR performed by Maryuri Rogers MD at [address]         Family History   Problem Relation Age of Onset    Heart Disease Mother         heart attack 2007    High Blood Pressure Mother     Diabetes Father     Heart Disease Father     Emphysema Father     Cancer Maternal Grandmother         cervical cancer    Lung Cancer Maternal Grandfather         black lung    Diabetes Paternal Grandmother     Heart Disease Paternal Grandfather        Current Medications:

## 2019-08-13 NOTE — PROGRESS NOTES
MCV 92.1 08/13/2019    MCH 29.6 08/13/2019    MCHC 32.1 08/13/2019    RDW 17.8 08/13/2019     08/13/2019    MPV 11.5 08/13/2019     BMP:    Lab Results   Component Value Date     08/13/2019    K 5.0 08/13/2019    K 4.2 08/11/2019    CL 96 08/13/2019    CO2 25 08/13/2019    BUN 32 08/13/2019    LABALBU 3.2 08/13/2019    CREATININE 4.8 08/13/2019    CALCIUM 8.3 08/13/2019    GFRAA 11 08/13/2019    LABGLOM 10 08/13/2019    GLUCOSE 200 08/13/2019         Maylin Reading  Electronically signed 8/13/2019 at 7:41 AM

## 2019-08-14 LAB
BLOOD CULTURE, ROUTINE: NORMAL
CULTURE, BLOOD 2: NORMAL

## 2019-08-14 NOTE — DISCHARGE SUMMARY
Hospital Medicine Discharge Summary    Patient ID: Toni Latham      Patient's PCP: Enrique Grimaldo    Admit Date: 8/4/2019     Discharge Date: 8/13/2019      Admitting Physician: Roberto Holcomb MD     Discharge Physician: Wendie Finn MD     Discharge Diagnoses: Active Hospital Problems    Diagnosis    Acute renal failure superimposed on chronic kidney disease ([facility] [N17.9, N18.9]    ESRD (end stage renal disease) ([facility] [N18.6]       The patient was seen and examined on day of discharge and this discharge summary is in conjunction with any daily progress note from day of discharge. Hospital Course:   Patient is a 61-year-old female with a history of JAE secondary to Bactrim resulting in chronic kidney disease who presented with worsening creatinine after a routine blood draw.  She was found to be volume overloaded too.  Patient was admitted with ESRD and started on a Lasix drip with not much improvement and was switched to a Vas-Cath and underwent dialysis.  Nephrology managing the plan.  Patient is to have a PD catheter on Monday. Linnea Stevens if patient is just going to start dialysis with PD or if her Vas-Cath is going to switch to a tunneled cath. .  Ne=     ESRD with volume overload- dialysis per nephrology   -nephrology consulted   - s/p  tunneled cath placed  - ok to d/c per renal on PO torsemide and outpt f/u for dialysis    DM2 - fairly controlled     HTN- uncontrolled, meds adjusted per nephrology     Anemia- s/p 2 PRBC transfusion, stable     UTI  - ruled out         Physical Exam Performed:     BP (!) 151/83   Pulse 93   Temp 98.5 °F (36.9 °C) (Oral)   Resp 17   Ht 5' 3\" (1.6 m)   Wt 209 lb 7 oz (95 kg)   SpO2 96%   BMI 37.10 kg/m²     Gen: No distress. Eyes: PERRL. No sclera icterus. No conjunctival injection. ENT: No discharge. Pharynx clear. External appearance of ears and nose normal.  Neck: Trachea midline. No obvious mass.    Resp: No accessory muscle use.  No

## 2019-08-22 ENCOUNTER — HOSPITAL ENCOUNTER (INPATIENT)
Age: 54
LOS: 4 days | Discharge: HOME | DRG: 919 | End: 2019-08-26
Attending: INTERNAL MEDICINE | Admitting: INTERNAL MEDICINE
Payer: COMMERCIAL

## 2019-08-22 PROBLEM — E87.70 VOLUME OVERLOAD: Status: ACTIVE | Noted: 2019-08-22

## 2019-08-22 LAB
ALBUMIN SERPL-MCNC: 3.4 G/DL (ref 3.4–5)
ANION GAP SERPL CALCULATED.3IONS-SCNC: 18 MMOL/L (ref 3–16)
BASOPHILS ABSOLUTE: 0 K/UL (ref 0–0.2)
BASOPHILS RELATIVE PERCENT: 1 %
BUN BLDV-MCNC: 86 MG/DL (ref 7–20)
CALCIUM SERPL-MCNC: 8.6 MG/DL (ref 8.3–10.6)
CHLORIDE BLD-SCNC: 97 MMOL/L (ref 99–110)
CO2: 20 MMOL/L (ref 21–32)
CREAT SERPL-MCNC: 13 MG/DL (ref 0.6–1.1)
EOSINOPHILS ABSOLUTE: 0.2 K/UL (ref 0–0.6)
EOSINOPHILS RELATIVE PERCENT: 5 %
GFR AFRICAN AMERICAN: 4
GFR NON-AFRICAN AMERICAN: 3
GLUCOSE BLD-MCNC: 102 MG/DL (ref 70–99)
GLUCOSE BLD-MCNC: 125 MG/DL (ref 70–99)
HCT VFR BLD CALC: 30.3 % (ref 36–48)
HEMOGLOBIN: 9.8 G/DL (ref 12–16)
LYMPHOCYTES ABSOLUTE: 0.8 K/UL (ref 1–5.1)
LYMPHOCYTES RELATIVE PERCENT: 18.1 %
MCH RBC QN AUTO: 29.7 PG (ref 26–34)
MCHC RBC AUTO-ENTMCNC: 32.4 G/DL (ref 31–36)
MCV RBC AUTO: 91.7 FL (ref 80–100)
MONOCYTES ABSOLUTE: 0.3 K/UL (ref 0–1.3)
MONOCYTES RELATIVE PERCENT: 7.1 %
NEUTROPHILS ABSOLUTE: 3.2 K/UL (ref 1.7–7.7)
NEUTROPHILS RELATIVE PERCENT: 68.8 %
PDW BLD-RTO: 18.3 % (ref 12.4–15.4)
PERFORMED ON: ABNORMAL
PHOSPHORUS: 8.2 MG/DL (ref 2.5–4.9)
PLATELET # BLD: 178 K/UL (ref 135–450)
PMV BLD AUTO: 11.2 FL (ref 5–10.5)
POTASSIUM SERPL-SCNC: 5.5 MMOL/L (ref 3.5–5.1)
RBC # BLD: 3.3 M/UL (ref 4–5.2)
SODIUM BLD-SCNC: 135 MMOL/L (ref 136–145)
WBC # BLD: 4.6 K/UL (ref 4–11)

## 2019-08-22 PROCEDURE — 85025 COMPLETE CBC W/AUTO DIFF WBC: CPT

## 2019-08-22 PROCEDURE — 36415 COLL VENOUS BLD VENIPUNCTURE: CPT

## 2019-08-22 PROCEDURE — 2500000003 HC RX 250 WO HCPCS: Performed by: INTERNAL MEDICINE

## 2019-08-22 PROCEDURE — 6360000002 HC RX W HCPCS: Performed by: INTERNAL MEDICINE

## 2019-08-22 PROCEDURE — 6370000000 HC RX 637 (ALT 250 FOR IP): Performed by: INTERNAL MEDICINE

## 2019-08-22 PROCEDURE — 80069 RENAL FUNCTION PANEL: CPT

## 2019-08-22 PROCEDURE — 2580000003 HC RX 258: Performed by: INTERNAL MEDICINE

## 2019-08-22 PROCEDURE — 1200000000 HC SEMI PRIVATE

## 2019-08-22 RX ORDER — LABETALOL 20 MG/4 ML (5 MG/ML) INTRAVENOUS SYRINGE
10 EVERY 4 HOURS PRN
Status: DISCONTINUED | OUTPATIENT
Start: 2019-08-22 | End: 2019-08-26 | Stop reason: HOSPADM

## 2019-08-22 RX ORDER — SEVELAMER CARBONATE 800 MG/1
1600 TABLET, FILM COATED ORAL
Status: DISCONTINUED | OUTPATIENT
Start: 2019-08-22 | End: 2019-08-26 | Stop reason: HOSPADM

## 2019-08-22 RX ORDER — LISINOPRIL 20 MG/1
20 TABLET ORAL NIGHTLY
Status: DISCONTINUED | OUTPATIENT
Start: 2019-08-22 | End: 2019-08-24

## 2019-08-22 RX ORDER — DEXTROSE MONOHYDRATE 50 MG/ML
100 INJECTION, SOLUTION INTRAVENOUS PRN
Status: DISCONTINUED | OUTPATIENT
Start: 2019-08-22 | End: 2019-08-26 | Stop reason: HOSPADM

## 2019-08-22 RX ORDER — LOSARTAN POTASSIUM 100 MG/1
100 TABLET ORAL DAILY
Status: DISCONTINUED | OUTPATIENT
Start: 2019-08-22 | End: 2019-08-22

## 2019-08-22 RX ORDER — DEXTROSE MONOHYDRATE 25 G/50ML
12.5 INJECTION, SOLUTION INTRAVENOUS PRN
Status: DISCONTINUED | OUTPATIENT
Start: 2019-08-22 | End: 2019-08-26 | Stop reason: HOSPADM

## 2019-08-22 RX ORDER — NICOTINE POLACRILEX 4 MG
15 LOZENGE BUCCAL PRN
Status: DISCONTINUED | OUTPATIENT
Start: 2019-08-22 | End: 2019-08-26 | Stop reason: HOSPADM

## 2019-08-22 RX ORDER — DOXAZOSIN MESYLATE 4 MG/1
4 TABLET ORAL NIGHTLY
Status: DISCONTINUED | OUTPATIENT
Start: 2019-08-22 | End: 2019-08-26 | Stop reason: HOSPADM

## 2019-08-22 RX ORDER — FUROSEMIDE 10 MG/ML
80 INJECTION INTRAMUSCULAR; INTRAVENOUS 4 TIMES DAILY
Status: DISCONTINUED | OUTPATIENT
Start: 2019-08-22 | End: 2019-08-26 | Stop reason: HOSPADM

## 2019-08-22 RX ORDER — HYDROXYZINE HYDROCHLORIDE 10 MG/1
10 TABLET, FILM COATED ORAL 3 TIMES DAILY PRN
Status: DISCONTINUED | OUTPATIENT
Start: 2019-08-22 | End: 2019-08-26 | Stop reason: HOSPADM

## 2019-08-22 RX ORDER — LABETALOL 100 MG/1
200 TABLET, FILM COATED ORAL EVERY 12 HOURS SCHEDULED
Status: DISCONTINUED | OUTPATIENT
Start: 2019-08-22 | End: 2019-08-26 | Stop reason: HOSPADM

## 2019-08-22 RX ORDER — HEPARIN SODIUM 5000 [USP'U]/ML
5000 INJECTION, SOLUTION INTRAVENOUS; SUBCUTANEOUS 2 TIMES DAILY
Status: DISCONTINUED | OUTPATIENT
Start: 2019-08-22 | End: 2019-08-26 | Stop reason: HOSPADM

## 2019-08-22 RX ADMIN — LABETALOL HYDROCHLORIDE 200 MG: 100 TABLET, FILM COATED ORAL at 20:51

## 2019-08-22 RX ADMIN — LABETALOL 20 MG/4 ML (5 MG/ML) INTRAVENOUS SYRINGE 10 MG: at 22:48

## 2019-08-22 RX ADMIN — CHLOROTHIAZIDE SODIUM 500 MG: 500 INJECTION, POWDER, LYOPHILIZED, FOR SOLUTION INTRAVENOUS at 18:58

## 2019-08-22 RX ADMIN — SEVELAMER CARBONATE 1600 MG: 800 TABLET, FILM COATED ORAL at 18:58

## 2019-08-22 RX ADMIN — FUROSEMIDE 80 MG: 10 INJECTION, SOLUTION INTRAMUSCULAR; INTRAVENOUS at 18:57

## 2019-08-22 RX ADMIN — HEPARIN SODIUM 5000 UNITS: 5000 INJECTION INTRAVENOUS; SUBCUTANEOUS at 20:51

## 2019-08-22 RX ADMIN — DOXAZOSIN 4 MG: 4 TABLET ORAL at 20:51

## 2019-08-22 RX ADMIN — LISINOPRIL 20 MG: 20 TABLET ORAL at 20:51

## 2019-08-22 ASSESSMENT — PAIN DESCRIPTION - LOCATION: LOCATION: HEAD

## 2019-08-22 ASSESSMENT — PAIN SCALES - GENERAL: PAINLEVEL_OUTOF10: 0

## 2019-08-22 ASSESSMENT — PAIN DESCRIPTION - PAIN TYPE: TYPE: ACUTE PAIN

## 2019-08-23 ENCOUNTER — APPOINTMENT (OUTPATIENT)
Dept: GENERAL RADIOLOGY | Age: 54
DRG: 919 | End: 2019-08-23
Attending: INTERNAL MEDICINE
Payer: COMMERCIAL

## 2019-08-23 LAB
ALBUMIN SERPL-MCNC: 2.7 G/DL (ref 3.4–5)
ANION GAP SERPL CALCULATED.3IONS-SCNC: 18 MMOL/L (ref 3–16)
BASOPHILS ABSOLUTE: 0.1 K/UL (ref 0–0.2)
BASOPHILS RELATIVE PERCENT: 1.5 %
BUN BLDV-MCNC: 89 MG/DL (ref 7–20)
CALCIUM SERPL-MCNC: 8.2 MG/DL (ref 8.3–10.6)
CHLORIDE BLD-SCNC: 98 MMOL/L (ref 99–110)
CO2: 20 MMOL/L (ref 21–32)
CREAT SERPL-MCNC: 13.7 MG/DL (ref 0.6–1.1)
EOSINOPHILS ABSOLUTE: 0.2 K/UL (ref 0–0.6)
EOSINOPHILS RELATIVE PERCENT: 5.1 %
GFR AFRICAN AMERICAN: 3
GFR NON-AFRICAN AMERICAN: 3
GLUCOSE BLD-MCNC: 102 MG/DL (ref 70–99)
GLUCOSE BLD-MCNC: 119 MG/DL (ref 70–99)
GLUCOSE BLD-MCNC: 139 MG/DL (ref 70–99)
GLUCOSE BLD-MCNC: 78 MG/DL (ref 70–99)
HCT VFR BLD CALC: 26.8 % (ref 36–48)
HEMOGLOBIN: 8.4 G/DL (ref 12–16)
INR BLD: 1.02 (ref 0.86–1.14)
LYMPHOCYTES ABSOLUTE: 1.1 K/UL (ref 1–5.1)
LYMPHOCYTES RELATIVE PERCENT: 26 %
MCH RBC QN AUTO: 29.8 PG (ref 26–34)
MCHC RBC AUTO-ENTMCNC: 31.4 G/DL (ref 31–36)
MCV RBC AUTO: 94.7 FL (ref 80–100)
MONOCYTES ABSOLUTE: 0.3 K/UL (ref 0–1.3)
MONOCYTES RELATIVE PERCENT: 7.2 %
NEUTROPHILS ABSOLUTE: 2.4 K/UL (ref 1.7–7.7)
NEUTROPHILS RELATIVE PERCENT: 60.2 %
PDW BLD-RTO: 18.3 % (ref 12.4–15.4)
PERFORMED ON: ABNORMAL
PERFORMED ON: ABNORMAL
PERFORMED ON: NORMAL
PHOSPHORUS: 8.8 MG/DL (ref 2.5–4.9)
PLATELET # BLD: 154 K/UL (ref 135–450)
PMV BLD AUTO: 11.8 FL (ref 5–10.5)
POTASSIUM SERPL-SCNC: 5.2 MMOL/L (ref 3.5–5.1)
PROTHROMBIN TIME: 11.6 SEC (ref 9.8–13)
RBC # BLD: 2.83 M/UL (ref 4–5.2)
SODIUM BLD-SCNC: 136 MMOL/L (ref 136–145)
WBC # BLD: 4.1 K/UL (ref 4–11)

## 2019-08-23 PROCEDURE — 1200000000 HC SEMI PRIVATE

## 2019-08-23 PROCEDURE — 5A1D70Z PERFORMANCE OF URINARY FILTRATION, INTERMITTENT, LESS THAN 6 HOURS PER DAY: ICD-10-PCS | Performed by: INTERNAL MEDICINE

## 2019-08-23 PROCEDURE — 90935 HEMODIALYSIS ONE EVALUATION: CPT

## 2019-08-23 PROCEDURE — 6360000002 HC RX W HCPCS: Performed by: STUDENT IN AN ORGANIZED HEALTH CARE EDUCATION/TRAINING PROGRAM

## 2019-08-23 PROCEDURE — 36415 COLL VENOUS BLD VENIPUNCTURE: CPT

## 2019-08-23 PROCEDURE — 02HV33Z INSERTION OF INFUSION DEVICE INTO SUPERIOR VENA CAVA, PERCUTANEOUS APPROACH: ICD-10-PCS | Performed by: SURGERY

## 2019-08-23 PROCEDURE — 6360000002 HC RX W HCPCS: Performed by: INTERNAL MEDICINE

## 2019-08-23 PROCEDURE — 36556 INSERT NON-TUNNEL CV CATH: CPT | Performed by: SURGERY

## 2019-08-23 PROCEDURE — 85610 PROTHROMBIN TIME: CPT

## 2019-08-23 PROCEDURE — 76937 US GUIDE VASCULAR ACCESS: CPT | Performed by: SURGERY

## 2019-08-23 PROCEDURE — 80069 RENAL FUNCTION PANEL: CPT

## 2019-08-23 PROCEDURE — 71045 X-RAY EXAM CHEST 1 VIEW: CPT

## 2019-08-23 PROCEDURE — 6370000000 HC RX 637 (ALT 250 FOR IP): Performed by: INTERNAL MEDICINE

## 2019-08-23 PROCEDURE — 85025 COMPLETE CBC W/AUTO DIFF WBC: CPT

## 2019-08-23 RX ORDER — HEPARIN SODIUM 1000 [USP'U]/ML
1000 INJECTION, SOLUTION INTRAVENOUS; SUBCUTANEOUS ONCE
Status: COMPLETED | OUTPATIENT
Start: 2019-08-23 | End: 2019-08-23

## 2019-08-23 RX ADMIN — LISINOPRIL 20 MG: 20 TABLET ORAL at 22:32

## 2019-08-23 RX ADMIN — HEPARIN SODIUM 1000 UNITS: 1000 INJECTION, SOLUTION INTRAVENOUS; SUBCUTANEOUS at 19:55

## 2019-08-23 RX ADMIN — LABETALOL HYDROCHLORIDE 200 MG: 100 TABLET, FILM COATED ORAL at 22:32

## 2019-08-23 RX ADMIN — HYDROXYZINE HYDROCHLORIDE 10 MG: 10 TABLET, FILM COATED ORAL at 02:53

## 2019-08-23 RX ADMIN — DOXAZOSIN 4 MG: 4 TABLET ORAL at 22:32

## 2019-08-23 RX ADMIN — HEPARIN SODIUM 5000 UNITS: 5000 INJECTION INTRAVENOUS; SUBCUTANEOUS at 22:31

## 2019-08-23 RX ADMIN — FUROSEMIDE 80 MG: 10 INJECTION, SOLUTION INTRAMUSCULAR; INTRAVENOUS at 08:06

## 2019-08-23 RX ADMIN — HYDROXYZINE HYDROCHLORIDE 10 MG: 10 TABLET, FILM COATED ORAL at 22:32

## 2019-08-23 RX ADMIN — FUROSEMIDE 80 MG: 10 INJECTION, SOLUTION INTRAMUSCULAR; INTRAVENOUS at 15:21

## 2019-08-23 RX ADMIN — LABETALOL HYDROCHLORIDE 200 MG: 100 TABLET, FILM COATED ORAL at 08:05

## 2019-08-23 RX ADMIN — FUROSEMIDE 80 MG: 10 INJECTION, SOLUTION INTRAMUSCULAR; INTRAVENOUS at 22:31

## 2019-08-23 ASSESSMENT — PAIN SCALES - GENERAL
PAINLEVEL_OUTOF10: 3
PAINLEVEL_OUTOF10: 0
PAINLEVEL_OUTOF10: 0

## 2019-08-23 ASSESSMENT — PAIN DESCRIPTION - PAIN TYPE: TYPE: ACUTE PAIN

## 2019-08-23 ASSESSMENT — PAIN SCALES - WONG BAKER: WONGBAKER_NUMERICALRESPONSE: 0

## 2019-08-23 NOTE — PROCEDURES
Raul Brady is a 47 y.o. female patient. No diagnosis found. Past Medical History:   Diagnosis Date    Anemia 9/15/2014    Diabetes mellitus ([address]     Diabetes mellitus type 1 ([address]     Diabetic retinopathy ([facility]     Diabetic retinopathy associated with type 2 diabetes mellitus, without macular edema     Hyperlipidemia 7/27/2015    Kidney stone     Mixed hyperlipidemia 7/27/2015    Orthostatic hypotension     Vitreous hemorrhage of right eye ([address] 8/15/2017     Blood pressure (!) 190/96, pulse 75, temperature 96.7 °F (35.9 °C), temperature source Oral, resp. rate 16, height 5' 3\" (1.6 m), weight 218 lb 14.7 oz (99.3 kg), SpO2 96 %, not currently breastfeeding. Central Line  Date/Time: 8/23/2019 2:40 PM  Performed by: Shayy Lanza DO  Authorized by: Clifton Calzada MD   Consent: Verbal consent obtained. Written consent obtained. Risks and benefits: risks, benefits and alternatives were discussed  Consent given by: patient  Patient understanding: patient states understanding of the procedure being performed  Patient consent: the patient's understanding of the procedure matches consent given  Procedure consent: procedure consent matches procedure scheduled  Relevant documents: relevant documents present and verified  Test results: test results available and properly labeled  Site marked: the operative site was marked  Imaging studies: imaging studies available  Required items: required blood products, implants, devices, and special equipment available  Patient identity confirmed: verbally with patient, arm band, provided demographic data and hospital-assigned identification number  Time out: Immediately prior to procedure a \"time out\" was called to verify the correct patient, procedure, equipment, support staff and site/side marked as required.   Indications: vascular access  Anesthesia: local infiltration    Anesthesia:  Local Anesthetic: lidocaine 1% without epinephrine  Anesthetic total: 5 mL    Sedation:  Patient sedated: no    Preparation: skin prepped with ChloraPrep  Skin prep agent dried: skin prep agent completely dried prior to procedure  Sterile barriers: all five maximum sterile barriers used - cap, mask, sterile gown, sterile gloves, and large sterile sheet  Hand hygiene: hand hygiene performed prior to central venous catheter insertion  Location details: right internal jugular  Patient position: Trendelenburg  Catheter type: triple lumen  Catheter size: 12 Fr  Pre-procedure: landmarks identified  Ultrasound guidance: yes  Sterile ultrasound techniques: sterile gel and sterile probe covers were used  Number of attempts: 1  Successful placement: yes  Post-procedure: line sutured and dressing applied  Assessment: blood return through all ports,  free fluid flow and placement verified by x-ray  Patient tolerance: Patient tolerated the procedure well with no immediate complications  Comments: Awaiting STAT CXR results for placement verification. Ultrasonographic evidence of right internal jugular cannulation shown above.

## 2019-08-23 NOTE — H&P
Nephrology Consult Note                                                                                                                                                                                                                                                                                                                                                               Office : [phone]     Fax :[phone]              Patient's Name: Alfred Starr  5:20 PM  8/23/2019    Requesting Physician:  Rola Valentin      Chief Complaint:  Anasarca and Pd cath leak     History of Present Ilness:    Alfred Starr is a 47 y.o. female with ESRD with decent Residual renal function   Came with inc edema and PD cath leak   Leak is around the cath   Has Anasarca   Pt is admitted for fluid removal   Has Anemia - s/p PRBC   Pt was not taking her BP meds as she thought that was giving her itching  Not taking binders with meals       Past Medical History:   Diagnosis Date    Anemia 9/15/2014    Diabetes mellitus ([address]     Diabetes mellitus type 1 ([address]     Diabetic retinopathy ([address]     Diabetic retinopathy associated with type 2 diabetes mellitus, without macular edema     Hyperlipidemia 7/27/2015    Kidney stone     Mixed hyperlipidemia 7/27/2015    Orthostatic hypotension     Vitreous hemorrhage of right eye ([address] 8/15/2017       Past Surgical History:   Procedure Laterality Date    APPENDECTOMY      KIDNEY STONE SURGERY      LAPAROSCOPY INSERTION PERITONEAL CATHETER N/A 8/12/2019    LAPAROSCOPIC PERITONEAL DIALYSIS CATHETER PLACEMENT WITH AXEL TROCHAR performed by Karina Blank MD at [facility]         Family History   Problem Relation Age of Onset    Heart Disease Mother         heart attack 2007    High Blood Pressure Mother     Diabetes Father     Heart Disease Father     Emphysema Father     Cancer Maternal Grandmother         cervical cancer    Lung Cancer Maternal Grandfather black lung    Diabetes Paternal Grandmother     Heart Disease Paternal Grandfather         reports that she has never smoked. She has never used smokeless tobacco. She reports that she does not drink alcohol or use drugs. Allergies:  Bactrim [sulfamethoxazole-trimethoprim]; Geocillin [carbenicillin]; Lisinopril;  Other; Tramadol; Bumetanide; and Ibuprofen    Current Medications:      heparin (porcine) injection 1,000 Units Once   furosemide (LASIX) injection 80 mg 4x Daily   doxazosin (CARDURA) tablet 4 mg Nightly   glucose (GLUTOSE) 40 % oral gel 15 g PRN   dextrose 50 % IV solution PRN   glucagon (rDNA) injection 1 mg PRN   dextrose 5 % solution PRN   insulin lispro (HUMALOG) injection pen 5 Units TID WC   insulin lispro (HUMALOG) injection pen 0-6 Units TID WC   insulin lispro (HUMALOG) injection pen 0-3 Units Nightly   hydrOXYzine (ATARAX) tablet 10 mg TID PRN   heparin (porcine) injection 5,000 Units BID   sevelamer (RENVELA) tablet 1,600 mg TID WC   lisinopril (PRINIVIL;ZESTRIL) tablet 20 mg Nightly   labetalol (NORMODYNE;TRANDATE) injection syringe 10 mg Q4H PRN   labetalol (NORMODYNE) tablet 200 mg 2 times per day       Review of Systems:   14 point ROS obtained but were negative except mentioned in HPI      Physical exam:     Vitals:  BP (!) 191/110   Pulse 73   Temp 98.7 °F (37.1 °C)   Resp 16   Ht 5' 3\" (1.6 m)   Wt 214 lb 15.2 oz (97.5 kg)   SpO2 96%   BMI 38.08 kg/m²   Constitutional:  OAA X3 NAD  Skin: no rash, turgor wnl  Heent:  eomi, mmm  Neck: no bruits or jvd noted  Cardiovascular:  S1, S2 without m/r/g  Respiratory: CTA B without w/r/r  Abdomen:  +bs, soft, nt, nd  Ext: +++ lower extremity edema  Psychiatric: mood and affect appropriate  Musculoskeletal:  Rom, muscular strength intact    Data:   Labs:  CBC:   Recent Labs     08/22/19  1853 08/23/19  0442   WBC 4.6 4.1   HGB 9.8* 8.4*    154     BMP:  Recent Labs     08/22/19  1853 08/23/19  0442   * 136   K 5.5*

## 2019-08-23 NOTE — CARE COORDINATION
Case Management Assessment           Initial Evaluation                Date / Time of Evaluation: 8/23/2019 5:56 PM                 Assessment Completed by: Freddy Fuller    Patient Name: Abebe Goff     YOB: 1965  Diagnosis: Volume overload [E87.70]     Date / Time: 8/22/2019  5:19 PM    Patient Admission Status: Inpatient    If patient is discharged prior to next notation, then this note serves as note for discharge by case management. Current PCP: Cristiane FORMAN Patient: No    Chart Reviewed: Yes  Patient/ Family Interviewed: Yes    Initial assessment completed at bedside with: Patient     Hospitalization in the last 30 days: No    Emergency Contacts:  Extended Emergency Contact Information  Primary Emergency Contact: Mervat Farooq  Address: mother-in- law   [address] Phone: [phone]  Relation: Other  Secondary Emergency Contact: Baldomero Azevedo  Address: [address]  Home Phone: [phone]  Mobile Phone: [phone]  Relation: Spouse    Advance Directives:   Code Status: Prior    [address]: No    Financial:  Payor: Elen Proper / Plan: Carol Long PPO / Product Type: *No Product type* /     Pre-cert required for SNF: Yes    Pharmacy:    [facility]ace[facility] [address] [place] [address] [phone]  [address] [place] [address]  Phone: [phone] Fax: [phone]      Potential assistance Purchasing Medications: Potential Assistance Purchasing Medications: No  Does Patient want to participate in local refill/ meds to beds program?: No    Meds To Beds General Rules:  1. Can ONLY be done Monday- Friday between 8:30am-5pm  2. Prescription(s) must be in pharmacy by 3pm to be filled same day  3. Copy of patient's insurance/ prescription drug card and patient face sheet must be sent along with the prescription(s)  4. Cost of Rx cannot be added to hospital bill. If financial assistance is needed, please contact unit  or ;  or  CANNOT provide pharmacy voucher for patients co-pays  5. Patients can then  the prescription on their way out of the hospital at discharge, or pharmacy can deliver to the bedside if staff is available. (payment due at time of pick-up or delivery - cash, check, or card accepted)     Able to afford home medications/ co-pay costs: Yes    ADLS:  Support Systems: Spouse/Significant Other, Family Members    PT AM-PAC:     OT AM-PAC:       Housing:  Home Environment: Home with spouse   Steps: yes     Plans to RETURN to current housing: Yes  Barrier(s) to RETURNING to current housin [facility]:  Currently ACTIVE with  [address]: No  Home Care Agency: Not Applicable  \  Durable Medical Equipment:  DME Provider: None   Equipment: No DME at home     Home Oxygen and Respiratory Equipment:  Has HOME OXYGEN prior to admission: No    Dialysis:  Active with HD/PD prior to admission: yes  Nephrologist: Dr. Yue Doyle:  Not Applicable    DISCHARGE PLAN:  Disposition: Home    Transportation PLAN for discharge: family     Factors facilitating achievement of predicted outcomes: Family support, Motivated, Cooperative, Pleasant, Sense of humor and Good insight into deficits    Barriers to discharge: HD, medical clearance     Additional Case Management Notes: SW met with patient at bedside. Patient is active with Dr. Buelah Bence. Patient had fluid overload here for lasix. Plan for return home on  per patient. Spouse to transport home. No other needs noted. SW provided contact information to patient/family and placed information on white board in room should needs arise. No other needs noted at this time.        Tami and her family were provided with choice of provider; she and her family are in agreement with the discharge plan.     Care Transition patient: María Fuller, MSW  [facility]  Case Management Department  Ph: [phone]   Fax: [phone]

## 2019-08-24 LAB
ALBUMIN SERPL-MCNC: 3 G/DL (ref 3.4–5)
ANION GAP SERPL CALCULATED.3IONS-SCNC: 15 MMOL/L (ref 3–16)
BASOPHILS ABSOLUTE: 0.1 K/UL (ref 0–0.2)
BASOPHILS RELATIVE PERCENT: 2.1 %
BUN BLDV-MCNC: 49 MG/DL (ref 7–20)
CALCIUM SERPL-MCNC: 8 MG/DL (ref 8.3–10.6)
CHLORIDE BLD-SCNC: 97 MMOL/L (ref 99–110)
CO2: 25 MMOL/L (ref 21–32)
CREAT SERPL-MCNC: 9.1 MG/DL (ref 0.6–1.1)
EOSINOPHILS ABSOLUTE: 0.2 K/UL (ref 0–0.6)
EOSINOPHILS RELATIVE PERCENT: 5.3 %
GFR AFRICAN AMERICAN: 5
GFR NON-AFRICAN AMERICAN: 5
GLUCOSE BLD-MCNC: 122 MG/DL (ref 70–99)
GLUCOSE BLD-MCNC: 128 MG/DL (ref 70–99)
GLUCOSE BLD-MCNC: 133 MG/DL (ref 70–99)
GLUCOSE BLD-MCNC: 167 MG/DL (ref 70–99)
HCT VFR BLD CALC: 28.4 % (ref 36–48)
HEMOGLOBIN: 9.3 G/DL (ref 12–16)
LYMPHOCYTES ABSOLUTE: 0.8 K/UL (ref 1–5.1)
LYMPHOCYTES RELATIVE PERCENT: 20.6 %
MCH RBC QN AUTO: 29.7 PG (ref 26–34)
MCHC RBC AUTO-ENTMCNC: 32.7 G/DL (ref 31–36)
MCV RBC AUTO: 91 FL (ref 80–100)
MONOCYTES ABSOLUTE: 0.3 K/UL (ref 0–1.3)
MONOCYTES RELATIVE PERCENT: 7.2 %
NEUTROPHILS ABSOLUTE: 2.4 K/UL (ref 1.7–7.7)
NEUTROPHILS RELATIVE PERCENT: 64.8 %
PDW BLD-RTO: 17.9 % (ref 12.4–15.4)
PERFORMED ON: ABNORMAL
PHOSPHORUS: 6.7 MG/DL (ref 2.5–4.9)
PLATELET # BLD: 172 K/UL (ref 135–450)
PMV BLD AUTO: 11.2 FL (ref 5–10.5)
POTASSIUM SERPL-SCNC: 4.7 MMOL/L (ref 3.5–5.1)
RBC # BLD: 3.12 M/UL (ref 4–5.2)
SODIUM BLD-SCNC: 137 MMOL/L (ref 136–145)
WBC # BLD: 3.7 K/UL (ref 4–11)

## 2019-08-24 PROCEDURE — 90935 HEMODIALYSIS ONE EVALUATION: CPT

## 2019-08-24 PROCEDURE — 85025 COMPLETE CBC W/AUTO DIFF WBC: CPT

## 2019-08-24 PROCEDURE — 6370000000 HC RX 637 (ALT 250 FOR IP): Performed by: INTERNAL MEDICINE

## 2019-08-24 PROCEDURE — 6360000002 HC RX W HCPCS: Performed by: INTERNAL MEDICINE

## 2019-08-24 PROCEDURE — 1200000000 HC SEMI PRIVATE

## 2019-08-24 PROCEDURE — 80069 RENAL FUNCTION PANEL: CPT

## 2019-08-24 RX ORDER — LISINOPRIL 40 MG/1
40 TABLET ORAL NIGHTLY
Status: DISCONTINUED | OUTPATIENT
Start: 2019-08-24 | End: 2019-08-26 | Stop reason: HOSPADM

## 2019-08-24 RX ADMIN — HEPARIN SODIUM 5000 UNITS: 5000 INJECTION INTRAVENOUS; SUBCUTANEOUS at 21:02

## 2019-08-24 RX ADMIN — INSULIN LISPRO 2 UNITS: 100 INJECTION, SOLUTION INTRAVENOUS; SUBCUTANEOUS at 18:45

## 2019-08-24 RX ADMIN — LABETALOL HYDROCHLORIDE 200 MG: 100 TABLET, FILM COATED ORAL at 12:55

## 2019-08-24 RX ADMIN — LISINOPRIL 40 MG: 40 TABLET ORAL at 21:02

## 2019-08-24 RX ADMIN — FUROSEMIDE 80 MG: 10 INJECTION, SOLUTION INTRAMUSCULAR; INTRAVENOUS at 12:55

## 2019-08-24 RX ADMIN — FUROSEMIDE 80 MG: 10 INJECTION, SOLUTION INTRAMUSCULAR; INTRAVENOUS at 18:48

## 2019-08-24 RX ADMIN — SEVELAMER CARBONATE 1600 MG: 800 TABLET, FILM COATED ORAL at 18:53

## 2019-08-24 RX ADMIN — SEVELAMER CARBONATE 1600 MG: 800 TABLET, FILM COATED ORAL at 12:55

## 2019-08-24 RX ADMIN — LABETALOL HYDROCHLORIDE 200 MG: 100 TABLET, FILM COATED ORAL at 21:02

## 2019-08-24 RX ADMIN — FUROSEMIDE 80 MG: 10 INJECTION, SOLUTION INTRAMUSCULAR; INTRAVENOUS at 21:03

## 2019-08-24 RX ADMIN — DOXAZOSIN 4 MG: 4 TABLET ORAL at 21:02

## 2019-08-24 ASSESSMENT — PAIN SCALES - GENERAL
PAINLEVEL_OUTOF10: 0

## 2019-08-24 ASSESSMENT — PAIN DESCRIPTION - PAIN TYPE: TYPE: ACUTE PAIN

## 2019-08-24 NOTE — FLOWSHEET NOTE
08/23/19 1552 08/23/19 1955   Treatment   Time On 1552  --    Time Off  --  1955   Treatment Goal 5Liters Crcl  5 liters   Vital Signs   BP (!) 191/110 (!) 175/101   Pulse 73 75   Weight 214 lb 15.2 oz (97.5 kg) 203 lb 7.8 oz (92.3 kg)   Weight Method Standing scale Standing scale   Treatment Initiation   Dialyze Hours 4  --    Dialysis Bath   K+ (Potassium) 2  --    Ca+ (Calcium) 2.5  --    Na+ (Sodium) 138  --    HCO3 (Bicarb) 32  --    Post-Hemodialysis Assessment   Total Liters Processed (l/min)  --  56.2 l/min   Duration of Treatment (minutes)  --  240 minutes   NET Removed (ml)  --  5000 ml   Tolerated Treatment  --  Good   Patient Response to Treatment  --  no issues noted. Treatment time: 4 hrs    Net UF: 5 liters     Pre weight: 97.5 kg (standing scale)  Post weight: 92.3 kg (standing scale)  EDW: unknown     Access used: RIJ Temp Cath  Access function: No issues noted.      Medications or blood products given: Heparin dwells     Regular outpatient schedule: PD pt, not started, ESRD     Summary of response to treatment: Pt tolerated tx with fluid removal. No issues noted.      Copy of dialysis treatment record placed in chart, to be scanned into EMR.

## 2019-08-24 NOTE — FLOWSHEET NOTE
Treatment time: 3.75hrs  Net UF: 4500 ml     Pre weight: 92.1 kg   Post weight:87.8 kg  EDW: TBD     Access used: [facility]    Access function:positional with  ml/min     Medications or blood products given: none     Regular outpatient schedule: ACUTE     Summary of response to treatment: tx ended 15min early due to system clotting, Dr. Yumiko Grimm notified, VSS throughout tx, heparin dwelled       Copy of dialysis treatment record placed in chart, to be scanned into EMR.       08/24/19 0755 08/24/19 1145   Vital Signs   BP (!) 182/98 (!) 171/89   Temp 98.1 °F (36.7 °C) 98.2 °F (36.8 °C)   Pulse 80 78   Resp 16 16   Weight 203 lb 0.7 oz (92.1 kg) 193 lb 9 oz (87.8 kg)   Weight Method Actual;Standing scale Actual;Standing scale

## 2019-08-25 LAB
ALBUMIN SERPL-MCNC: 3 G/DL (ref 3.4–5)
ANION GAP SERPL CALCULATED.3IONS-SCNC: 11 MMOL/L (ref 3–16)
BASOPHILS ABSOLUTE: 0 K/UL (ref 0–0.2)
BASOPHILS RELATIVE PERCENT: 0.9 %
BUN BLDV-MCNC: 34 MG/DL (ref 7–20)
CALCIUM SERPL-MCNC: 8 MG/DL (ref 8.3–10.6)
CHLORIDE BLD-SCNC: 98 MMOL/L (ref 99–110)
CO2: 26 MMOL/L (ref 21–32)
CREAT SERPL-MCNC: 6.9 MG/DL (ref 0.6–1.1)
EOSINOPHILS ABSOLUTE: 0.2 K/UL (ref 0–0.6)
EOSINOPHILS RELATIVE PERCENT: 4.5 %
GFR AFRICAN AMERICAN: 8
GFR NON-AFRICAN AMERICAN: 6
GLUCOSE BLD-MCNC: 113 MG/DL (ref 70–99)
GLUCOSE BLD-MCNC: 131 MG/DL (ref 70–99)
GLUCOSE BLD-MCNC: 138 MG/DL (ref 70–99)
GLUCOSE BLD-MCNC: 147 MG/DL (ref 70–99)
HCT VFR BLD CALC: 27.6 % (ref 36–48)
HEMOGLOBIN: 8.9 G/DL (ref 12–16)
LYMPHOCYTES ABSOLUTE: 0.9 K/UL (ref 1–5.1)
LYMPHOCYTES RELATIVE PERCENT: 22.6 %
MCH RBC QN AUTO: 29.7 PG (ref 26–34)
MCHC RBC AUTO-ENTMCNC: 32.2 G/DL (ref 31–36)
MCV RBC AUTO: 92.2 FL (ref 80–100)
MONOCYTES ABSOLUTE: 0.3 K/UL (ref 0–1.3)
MONOCYTES RELATIVE PERCENT: 8.7 %
NEUTROPHILS ABSOLUTE: 2.5 K/UL (ref 1.7–7.7)
NEUTROPHILS RELATIVE PERCENT: 63.3 %
PDW BLD-RTO: 17.7 % (ref 12.4–15.4)
PERFORMED ON: ABNORMAL
PHOSPHORUS: 5.3 MG/DL (ref 2.5–4.9)
PLATELET # BLD: 175 K/UL (ref 135–450)
PMV BLD AUTO: 11.3 FL (ref 5–10.5)
POTASSIUM SERPL-SCNC: 4.3 MMOL/L (ref 3.5–5.1)
RBC # BLD: 2.99 M/UL (ref 4–5.2)
SODIUM BLD-SCNC: 135 MMOL/L (ref 136–145)
WBC # BLD: 3.9 K/UL (ref 4–11)

## 2019-08-25 PROCEDURE — 1200000000 HC SEMI PRIVATE

## 2019-08-25 PROCEDURE — 6370000000 HC RX 637 (ALT 250 FOR IP): Performed by: INTERNAL MEDICINE

## 2019-08-25 PROCEDURE — 85025 COMPLETE CBC W/AUTO DIFF WBC: CPT

## 2019-08-25 PROCEDURE — 80069 RENAL FUNCTION PANEL: CPT

## 2019-08-25 PROCEDURE — 6360000002 HC RX W HCPCS: Performed by: INTERNAL MEDICINE

## 2019-08-25 PROCEDURE — 90935 HEMODIALYSIS ONE EVALUATION: CPT

## 2019-08-25 RX ADMIN — FUROSEMIDE 80 MG: 10 INJECTION, SOLUTION INTRAMUSCULAR; INTRAVENOUS at 12:40

## 2019-08-25 RX ADMIN — SEVELAMER CARBONATE 1600 MG: 800 TABLET, FILM COATED ORAL at 12:40

## 2019-08-25 RX ADMIN — LISINOPRIL 40 MG: 40 TABLET ORAL at 21:40

## 2019-08-25 RX ADMIN — HYDROXYZINE HYDROCHLORIDE 10 MG: 10 TABLET, FILM COATED ORAL at 04:33

## 2019-08-25 RX ADMIN — HEPARIN SODIUM 5000 UNITS: 5000 INJECTION INTRAVENOUS; SUBCUTANEOUS at 21:40

## 2019-08-25 RX ADMIN — SEVELAMER CARBONATE 1600 MG: 800 TABLET, FILM COATED ORAL at 17:37

## 2019-08-25 RX ADMIN — LABETALOL HYDROCHLORIDE 200 MG: 100 TABLET, FILM COATED ORAL at 21:40

## 2019-08-25 RX ADMIN — FUROSEMIDE 80 MG: 10 INJECTION, SOLUTION INTRAMUSCULAR; INTRAVENOUS at 17:37

## 2019-08-25 RX ADMIN — FUROSEMIDE 80 MG: 10 INJECTION, SOLUTION INTRAMUSCULAR; INTRAVENOUS at 21:40

## 2019-08-25 RX ADMIN — DOXAZOSIN 4 MG: 4 TABLET ORAL at 21:40

## 2019-08-25 ASSESSMENT — PAIN SCALES - GENERAL
PAINLEVEL_OUTOF10: 0

## 2019-08-25 NOTE — PLAN OF CARE
Problem: Respiratory:  Goal: Respiratory status will improve  Description  Respiratory status will improve  Outcome: Met This Shift   Pts RR within normal limits. Lungs are clear. Pt is absence of SOB. Will continue to monitor. Problem: Falls - Risk of:  Goal: Will remain free from falls  Description  Will remain free from falls  Outcome: Ongoing   Pt has been absent of falls. Fall precautions in place. Bed is low and locked. 2/4 side rails in place. Nonskid socks on. PT is up ab rowena. Call light and bedside table within reach. Will continue to monitor. Problem: Fluid Volume:  Description  [TRUNCATED] Hyponatremia indicates a relatively greater amount of water to sodium in plasma. In hypovolemia, a deficit of both total body water and sodium exists but relatively less water deficit. Euvolemia represents near normal total body sodium co .. Canda Nay [TRUNCATED] Hyponatremia indicates a relatively greater amount of water to sodium in plasma. In hypovolemia, a deficit of both total body water and sodium exists but relatively less water deficit. Euvolemia represents near normal total body sodium co ... Goal: Hemodynamic stability will improve  Description  Hemodynamic stability will improve  Outcome: Ongoing   Pt continues IV lasixs. Pt has +2 BLE edema and +2 edema through abdomen. Pt has dialysis x3 a week. BUN, NA and K within normal limits. Cr elevated. Will continue to monitor.

## 2019-08-26 VITALS
RESPIRATION RATE: 16 BRPM | TEMPERATURE: 97.9 F | HEART RATE: 77 BPM | SYSTOLIC BLOOD PRESSURE: 168 MMHG | DIASTOLIC BLOOD PRESSURE: 100 MMHG | OXYGEN SATURATION: 97 % | HEIGHT: 63 IN | BODY MASS INDEX: 30.74 KG/M2 | WEIGHT: 173.5 LBS

## 2019-08-26 LAB
ALBUMIN SERPL-MCNC: 3.3 G/DL (ref 3.4–5)
ANION GAP SERPL CALCULATED.3IONS-SCNC: 11 MMOL/L (ref 3–16)
BASOPHILS ABSOLUTE: 0.1 K/UL (ref 0–0.2)
BASOPHILS RELATIVE PERCENT: 2.1 %
BUN BLDV-MCNC: 20 MG/DL (ref 7–20)
CALCIUM SERPL-MCNC: 8.2 MG/DL (ref 8.3–10.6)
CHLORIDE BLD-SCNC: 102 MMOL/L (ref 99–110)
CO2: 26 MMOL/L (ref 21–32)
CREAT SERPL-MCNC: 5.5 MG/DL (ref 0.6–1.1)
EOSINOPHILS ABSOLUTE: 0.3 K/UL (ref 0–0.6)
EOSINOPHILS RELATIVE PERCENT: 6.8 %
GFR AFRICAN AMERICAN: 10
GFR NON-AFRICAN AMERICAN: 8
GLUCOSE BLD-MCNC: 128 MG/DL (ref 70–99)
HCT VFR BLD CALC: 29.2 % (ref 36–48)
HEMOGLOBIN: 9.3 G/DL (ref 12–16)
LYMPHOCYTES ABSOLUTE: 1.1 K/UL (ref 1–5.1)
LYMPHOCYTES RELATIVE PERCENT: 26.4 %
MCH RBC QN AUTO: 29.6 PG (ref 26–34)
MCHC RBC AUTO-ENTMCNC: 32 G/DL (ref 31–36)
MCV RBC AUTO: 92.4 FL (ref 80–100)
MONOCYTES ABSOLUTE: 0.4 K/UL (ref 0–1.3)
MONOCYTES RELATIVE PERCENT: 11.2 %
NEUTROPHILS ABSOLUTE: 2.1 K/UL (ref 1.7–7.7)
NEUTROPHILS RELATIVE PERCENT: 53.5 %
PDW BLD-RTO: 17.7 % (ref 12.4–15.4)
PHOSPHORUS: 4.5 MG/DL (ref 2.5–4.9)
PLATELET # BLD: 182 K/UL (ref 135–450)
PMV BLD AUTO: 11.4 FL (ref 5–10.5)
POTASSIUM SERPL-SCNC: 4.4 MMOL/L (ref 3.5–5.1)
RBC # BLD: 3.16 M/UL (ref 4–5.2)
SODIUM BLD-SCNC: 139 MMOL/L (ref 136–145)
WBC # BLD: 4 K/UL (ref 4–11)

## 2019-08-26 PROCEDURE — 80069 RENAL FUNCTION PANEL: CPT

## 2019-08-26 PROCEDURE — 6360000002 HC RX W HCPCS: Performed by: INTERNAL MEDICINE

## 2019-08-26 PROCEDURE — 90935 HEMODIALYSIS ONE EVALUATION: CPT

## 2019-08-26 PROCEDURE — 85025 COMPLETE CBC W/AUTO DIFF WBC: CPT

## 2019-08-26 PROCEDURE — 2500000003 HC RX 250 WO HCPCS: Performed by: INTERNAL MEDICINE

## 2019-08-26 PROCEDURE — 6370000000 HC RX 637 (ALT 250 FOR IP): Performed by: INTERNAL MEDICINE

## 2019-08-26 RX ORDER — HYDROXYZINE HYDROCHLORIDE 10 MG/1
10 TABLET, FILM COATED ORAL 3 TIMES DAILY PRN
Qty: 60 TABLET | Refills: 3 | Status: SHIPPED | OUTPATIENT
Start: 2019-08-26 | End: 2019-09-05

## 2019-08-26 RX ORDER — SEVELAMER CARBONATE 800 MG/1
1600 TABLET, FILM COATED ORAL
Qty: 90 TABLET | Refills: 3 | Status: SHIPPED | OUTPATIENT
Start: 2019-08-26 | End: 2019-12-14 | Stop reason: SDUPTHER

## 2019-08-26 RX ORDER — DOXAZOSIN MESYLATE 4 MG/1
4 TABLET ORAL NIGHTLY
Qty: 30 TABLET | Refills: 3 | Status: SHIPPED | OUTPATIENT
Start: 2019-08-26 | End: 2019-12-30

## 2019-08-26 RX ORDER — TORSEMIDE 100 MG/1
200 TABLET ORAL DAILY
Qty: 60 TABLET | Refills: 3 | Status: SHIPPED | OUTPATIENT
Start: 2019-08-26 | End: 2020-11-25 | Stop reason: SDUPTHER

## 2019-08-26 RX ORDER — LABETALOL 200 MG/1
200 TABLET, FILM COATED ORAL EVERY 12 HOURS SCHEDULED
Qty: 60 TABLET | Refills: 3 | Status: SHIPPED | OUTPATIENT
Start: 2019-08-26 | End: 2021-03-12 | Stop reason: SDUPTHER

## 2019-08-26 RX ORDER — HEPARIN SODIUM 1000 [USP'U]/ML
2200 INJECTION, SOLUTION INTRAVENOUS; SUBCUTANEOUS PRN
Status: DISCONTINUED | OUTPATIENT
Start: 2019-08-26 | End: 2019-08-26 | Stop reason: HOSPADM

## 2019-08-26 RX ORDER — LISINOPRIL 40 MG/1
40 TABLET ORAL NIGHTLY
Qty: 30 TABLET | Refills: 3 | Status: ON HOLD | OUTPATIENT
Start: 2019-08-26 | End: 2020-05-26

## 2019-08-26 RX ADMIN — LABETALOL HYDROCHLORIDE 200 MG: 100 TABLET, FILM COATED ORAL at 12:25

## 2019-08-26 RX ADMIN — HYDROXYZINE HYDROCHLORIDE 10 MG: 10 TABLET, FILM COATED ORAL at 09:34

## 2019-08-26 RX ADMIN — DARBEPOETIN ALFA 100 MCG: 100 INJECTION, SOLUTION INTRAVENOUS; SUBCUTANEOUS at 12:33

## 2019-08-26 RX ADMIN — FUROSEMIDE 80 MG: 10 INJECTION, SOLUTION INTRAMUSCULAR; INTRAVENOUS at 12:25

## 2019-08-26 RX ADMIN — LABETALOL 20 MG/4 ML (5 MG/ML) INTRAVENOUS SYRINGE 10 MG: at 00:43

## 2019-08-26 ASSESSMENT — PAIN SCALES - GENERAL
PAINLEVEL_OUTOF10: 0

## 2019-08-26 NOTE — PROGRESS NOTES
List of Home Medications the patient is currently taking is complete. Source of medications in list is interview with the patient. Meds removed:    Metoprolol 25 mg BID  Torsemide 100 mg BID  Zaroxolyn 5 mg QD  Calcium acetate 667 mg TID with meals    Pt states that she was told to stop taking all of her home medications and that the doctor would optimize them while hospitalized.     Kemar Rangel RN
Patient a direct admit from Dr. Marcell Adorno office. No report was given. Pt HTN upon arrival.  Night shift nurse BP was passed along to Night shift.
Patient left floor for dialysis.
Patient seen in dialysis today. She is tolerating dialysis well. Her edema is much better now she will get a dose of Aranesp dialysis today. Blood pressures are better controlled. We will start her PD training Wednesday or Thursday. Her blood pressure medicines has been adjusted. She will be discharged home after dialysis today.   Dietary changes has been discussed with her in length
Pt is alert and oriented x4. VSS with exception to BP. BPs have been elevated. Pt is up ab rowena. Pt calls out appropriately for assistance. Bed is low and locked. Call light within reach. Will continue to monitor.
mg Nightly   furosemide (LASIX) injection 80 mg 4x Daily   doxazosin (CARDURA) tablet 4 mg Nightly   glucose (GLUTOSE) 40 % oral gel 15 g PRN   dextrose 50 % IV solution PRN   glucagon (rDNA) injection 1 mg PRN   dextrose 5 % solution PRN   insulin lispro (HUMALOG) injection pen 5 Units TID WC   insulin lispro (HUMALOG) injection pen 0-6 Units TID WC   insulin lispro (HUMALOG) injection pen 0-3 Units Nightly   hydrOXYzine (ATARAX) tablet 10 mg TID PRN   heparin (porcine) injection 5,000 Units BID   sevelamer (RENVELA) tablet 1,600 mg TID WC   labetalol (NORMODYNE;TRANDATE) injection syringe 10 mg Q4H PRN   labetalol (NORMODYNE) tablet 200 mg 2 times per day       Review of Systems:   14 point ROS obtained but were negative except mentioned in HPI      Physical exam:     Vitals:  BP (!) 133/55   Pulse 79   Temp 98.1 °F (36.7 °C)   Resp 16   Ht 5' 3\" (1.6 m)   Wt 182 lb 8.7 oz (82.8 kg)   SpO2 93%   BMI 32.34 kg/m²   Constitutional:  OAA X3 NAD  Skin: no rash, turgor wnl  Heent:  eomi, mmm  Neck: no bruits or jvd noted  Cardiovascular:  S1, S2 without m/r/g  Respiratory: CTA B without w/r/r  Abdomen:  +bs, soft, nt, nd  Ext: +llower extremity edema  Psychiatric: mood and affect appropriate  Musculoskeletal:  Rom, muscular strength intact    Data:   Labs:  CBC:   Recent Labs     08/23/19 0442 08/24/19  0400 08/25/19  0552   WBC 4.1 3.7* 3.9*   HGB 8.4* 9.3* 8.9*    172 175     BMP:    Recent Labs     08/23/19 0442 08/24/19  0400 08/25/19  0552    137 135*   K 5.2* 4.7 4.3   CL 98* 97* 98*   CO2 20* 25 26   BUN 89* 49* 34*   CREATININE 13.7* 9.1* 6.9*   GLUCOSE 139* 133* 147*     Ca/Mg/Phos:   Recent Labs     08/23/19 0442 08/24/19  0400 08/25/19  0552   CALCIUM 8.2* 8.0* 8.0*   PHOS 8.8* 6.7* 5.3*     Hepatic: No results for input(s): AST, ALT, ALB, BILITOT, ALKPHOS in the last 72 hours. Troponin: No results for input(s): TROPONINI in the last 72 hours.   BNP: No results for input(s): BNP in the

## 2019-08-26 NOTE — PLAN OF CARE
Problem: Respiratory:  Goal: Respiratory status will improve  Description  Respiratory status will improve  Outcome: Met This Shift   Pts lungs clear. No SOB noted. Pt is tolerating activity. No coughing indicated. Will continue to monitor. Problem: Falls - Risk of:  Goal: Will remain free from falls  Description  Will remain free from falls  Outcome: Ongoing   Pt has been absent of falls. Fall precautions in place. Bed is low and locked. 2/4 side rails in place. Nonskid socks on. Pt is up ab rowena. Call light and bedside table within reach. Will continue to monitor. Problem: Fluid Volume:  Description  [TRUNCATED] Hyponatremia indicates a relatively greater amount of water to sodium in plasma. In hypovolemia, a deficit of both total body water and sodium exists but relatively less water deficit. Euvolemia represents near normal total body sodium co .. Tristan Verma [TRUNCATED] Hyponatremia indicates a relatively greater amount of water to sodium in plasma. In hypovolemia, a deficit of both total body water and sodium exists but relatively less water deficit. Euvolemia represents near normal total body sodium co ... Goal: Hemodynamic stability will improve  Description  Hemodynamic stability will improve  Outcome: Ongoing   Pt is continuing to take IV lasix and daily dialysis treatment. Edema noted through BLE. Will continue to monitor.

## 2019-08-27 NOTE — DISCHARGE SUMMARY
glargine (LANTUS SOLOSTAR) 100 UNIT/ML injection pen Inject 8 Units into the skin nightly If blood sugar >150Historical Med      insulin lispro (HUMALOG KWIKPEN) 100 UNIT/ML pen Inject into the skin 3 times daily (before meals) Sliding scaleHistorical Med             Time Spent on discharge is more than 45 minutes in the examination, evaluation, counseling and review of medications and discharge plan. Signed:    Riley Melendez MD   8/26/2019      Thank you Rola Valentin for the opportunity to be involved in this patient's care. If you have any questions or concerns please feel free to contact me at [phone].

## 2019-12-30 RX ORDER — DOXAZOSIN MESYLATE 4 MG/1
TABLET ORAL
Qty: 30 TABLET | Refills: 2 | Status: ON HOLD | OUTPATIENT
Start: 2019-12-30 | End: 2020-05-26

## 2020-05-24 ENCOUNTER — HOSPITAL ENCOUNTER (INPATIENT)
Age: 55
LOS: 1 days | Discharge: HOME | DRG: 867 | End: 2020-05-26
Attending: EMERGENCY MEDICINE | Admitting: INTERNAL MEDICINE
Payer: COMMERCIAL

## 2020-05-24 LAB
BASOPHILS ABSOLUTE: 0 K/UL (ref 0–0.2)
BASOPHILS RELATIVE PERCENT: 0.3 %
EOSINOPHILS ABSOLUTE: 0.1 K/UL (ref 0–0.6)
EOSINOPHILS RELATIVE PERCENT: 2.1 %
HCG QUALITATIVE: NEGATIVE
HCT VFR BLD CALC: 32.4 % (ref 36–48)
HEMOGLOBIN: 11.1 G/DL (ref 12–16)
LYMPHOCYTES ABSOLUTE: 1 K/UL (ref 1–5.1)
LYMPHOCYTES RELATIVE PERCENT: 17.6 %
MCH RBC QN AUTO: 34.3 PG (ref 26–34)
MCHC RBC AUTO-ENTMCNC: 34.3 G/DL (ref 31–36)
MCV RBC AUTO: 100 FL (ref 80–100)
MONOCYTES ABSOLUTE: 0.4 K/UL (ref 0–1.3)
MONOCYTES RELATIVE PERCENT: 7 %
NEUTROPHILS ABSOLUTE: 4.2 K/UL (ref 1.7–7.7)
NEUTROPHILS RELATIVE PERCENT: 73 %
PDW BLD-RTO: 16.2 % (ref 12.4–15.4)
PLATELET # BLD: 179 K/UL (ref 135–450)
PMV BLD AUTO: 10 FL (ref 5–10.5)
RBC # BLD: 3.24 M/UL (ref 4–5.2)
WBC # BLD: 5.8 K/UL (ref 4–11)

## 2020-05-24 PROCEDURE — 84703 CHORIONIC GONADOTROPIN ASSAY: CPT

## 2020-05-24 PROCEDURE — 83605 ASSAY OF LACTIC ACID: CPT

## 2020-05-24 PROCEDURE — 99284 EMERGENCY DEPT VISIT MOD MDM: CPT

## 2020-05-24 PROCEDURE — 87040 BLOOD CULTURE FOR BACTERIA: CPT

## 2020-05-24 PROCEDURE — 80053 COMPREHEN METABOLIC PANEL: CPT

## 2020-05-24 PROCEDURE — 85025 COMPLETE CBC W/AUTO DIFF WBC: CPT

## 2020-05-24 PROCEDURE — 89051 BODY FLUID CELL COUNT: CPT

## 2020-05-24 PROCEDURE — 83690 ASSAY OF LIPASE: CPT

## 2020-05-24 ASSESSMENT — PAIN DESCRIPTION - LOCATION: LOCATION: ABDOMEN

## 2020-05-24 ASSESSMENT — ENCOUNTER SYMPTOMS
SHORTNESS OF BREATH: 0
DIARRHEA: 1
NAUSEA: 1
RESPIRATORY NEGATIVE: 1
CONSTIPATION: 0
COUGH: 0
VOMITING: 1
EYES NEGATIVE: 1
ABDOMINAL PAIN: 1

## 2020-05-24 ASSESSMENT — PAIN DESCRIPTION - PAIN TYPE: TYPE: ACUTE PAIN

## 2020-05-24 ASSESSMENT — PAIN DESCRIPTION - ORIENTATION: ORIENTATION: OTHER (COMMENT)

## 2020-05-24 ASSESSMENT — PAIN DESCRIPTION - DESCRIPTORS: DESCRIPTORS: ACHING

## 2020-05-24 ASSESSMENT — PAIN SCALES - GENERAL: PAINLEVEL_OUTOF10: 4

## 2020-05-24 ASSESSMENT — PAIN DESCRIPTION - PROGRESSION: CLINICAL_PROGRESSION: NOT CHANGED

## 2020-05-24 ASSESSMENT — PAIN - FUNCTIONAL ASSESSMENT: PAIN_FUNCTIONAL_ASSESSMENT: PREVENTS OR INTERFERES SOME ACTIVE ACTIVITIES AND ADLS

## 2020-05-24 ASSESSMENT — PAIN DESCRIPTION - FREQUENCY: FREQUENCY: CONTINUOUS

## 2020-05-25 PROBLEM — R19.7 DIARRHEA: Status: ACTIVE | Noted: 2020-05-25

## 2020-05-25 PROBLEM — E66.01 MORBID OBESITY DUE TO EXCESS CALORIES (HCC): Status: ACTIVE | Noted: 2020-05-25

## 2020-05-25 PROBLEM — R10.84 GENERALIZED ABDOMINAL PAIN: Status: ACTIVE | Noted: 2020-05-25

## 2020-05-25 PROBLEM — K65.9 PERITONITIS (HCC): Status: ACTIVE | Noted: 2020-05-25

## 2020-05-25 PROBLEM — R11.2 NAUSEA AND VOMITING: Status: ACTIVE | Noted: 2020-05-25

## 2020-05-25 PROBLEM — E87.1 HYPONATREMIA: Status: ACTIVE | Noted: 2020-05-25

## 2020-05-25 PROBLEM — Z99.2 PERITONEAL DIALYSIS STATUS (HCC): Status: ACTIVE | Noted: 2020-05-25

## 2020-05-25 LAB
A/G RATIO: 0.8 (ref 1.1–2.2)
ALBUMIN SERPL-MCNC: 3.1 G/DL (ref 3.4–5)
ALP BLD-CCNC: 70 U/L (ref 40–129)
ALT SERPL-CCNC: 9 U/L (ref 10–40)
ANION GAP SERPL CALCULATED.3IONS-SCNC: 15 MMOL/L (ref 3–16)
ANION GAP SERPL CALCULATED.3IONS-SCNC: 15 MMOL/L (ref 3–16)
APPEARANCE FLUID: NORMAL
AST SERPL-CCNC: 10 U/L (ref 15–37)
BASO FLUID: 2 %
BASOPHILS ABSOLUTE: 0 K/UL (ref 0–0.2)
BASOPHILS RELATIVE PERCENT: 0.4 %
BILIRUB SERPL-MCNC: 0.5 MG/DL (ref 0–1)
BILIRUBIN URINE: NEGATIVE
BLOOD, URINE: ABNORMAL
BUN BLDV-MCNC: 54 MG/DL (ref 7–20)
BUN BLDV-MCNC: 55 MG/DL (ref 7–20)
CALCIUM SERPL-MCNC: 8.4 MG/DL (ref 8.3–10.6)
CALCIUM SERPL-MCNC: 8.5 MG/DL (ref 8.3–10.6)
CELL COUNT FLUID TYPE: NORMAL
CHLORIDE BLD-SCNC: 81 MMOL/L (ref 99–110)
CHLORIDE BLD-SCNC: 81 MMOL/L (ref 99–110)
CLARITY: CLEAR
CLOT EVALUATION: NORMAL
CO2: 25 MMOL/L (ref 21–32)
CO2: 27 MMOL/L (ref 21–32)
COLOR FLUID: NORMAL
COLOR: YELLOW
CREAT SERPL-MCNC: 13 MG/DL (ref 0.6–1.1)
CREAT SERPL-MCNC: 13.5 MG/DL (ref 0.6–1.1)
EOSINOPHIL FLUID: 6 %
EOSINOPHILS ABSOLUTE: 0.2 K/UL (ref 0–0.6)
EOSINOPHILS RELATIVE PERCENT: 3.2 %
EPITHELIAL CELLS, UA: 2 /HPF (ref 0–5)
FLUID PATH CONSULT: YES
GFR AFRICAN AMERICAN: 3
GFR AFRICAN AMERICAN: 4
GFR NON-AFRICAN AMERICAN: 3
GFR NON-AFRICAN AMERICAN: 3
GLOBULIN: 3.7 G/DL
GLUCOSE BLD-MCNC: 146 MG/DL (ref 70–99)
GLUCOSE BLD-MCNC: 160 MG/DL (ref 70–99)
GLUCOSE BLD-MCNC: 164 MG/DL (ref 70–99)
GLUCOSE BLD-MCNC: 184 MG/DL (ref 70–99)
GLUCOSE BLD-MCNC: 189 MG/DL (ref 70–99)
GLUCOSE BLD-MCNC: 190 MG/DL (ref 70–99)
GLUCOSE URINE: 100 MG/DL
HCT VFR BLD CALC: 29.8 % (ref 36–48)
HEMOGLOBIN: 10.2 G/DL (ref 12–16)
HYALINE CASTS: 0 /LPF (ref 0–8)
KETONES, URINE: NEGATIVE MG/DL
LACTIC ACID: 1.1 MMOL/L (ref 0.4–2)
LEUKOCYTE ESTERASE, URINE: NEGATIVE
LIPASE: 73 U/L (ref 13–60)
LYMPHOCYTES ABSOLUTE: 1.4 K/UL (ref 1–5.1)
LYMPHOCYTES RELATIVE PERCENT: 28.1 %
LYMPHOCYTES, BODY FLUID: 6 %
MACROPHAGE FLUID: 25 %
MCH RBC QN AUTO: 34.2 PG (ref 26–34)
MCHC RBC AUTO-ENTMCNC: 34.1 G/DL (ref 31–36)
MCV RBC AUTO: 100.3 FL (ref 80–100)
MESOTHELIAL FLUID: 2 %
MICROSCOPIC EXAMINATION: YES
MONOCYTES ABSOLUTE: 0.4 K/UL (ref 0–1.3)
MONOCYTES RELATIVE PERCENT: 7.4 %
NEUTROPHIL, FLUID: 59 %
NEUTROPHILS ABSOLUTE: 3.1 K/UL (ref 1.7–7.7)
NEUTROPHILS RELATIVE PERCENT: 60.9 %
NITRITE, URINE: NEGATIVE
NUCLEATED CELLS FLUID: 6550 /CUMM
NUMBER OF CELLS COUNTED FLUID: 100
PDW BLD-RTO: 16.8 % (ref 12.4–15.4)
PERFORMED ON: ABNORMAL
PH UA: 7.5 (ref 5–8)
PLATELET # BLD: 167 K/UL (ref 135–450)
PMV BLD AUTO: 9.8 FL (ref 5–10.5)
POTASSIUM REFLEX MAGNESIUM: 3.7 MMOL/L (ref 3.5–5.1)
POTASSIUM REFLEX MAGNESIUM: 3.8 MMOL/L (ref 3.5–5.1)
PROTEIN UA: 30 MG/DL
RBC # BLD: 2.97 M/UL (ref 4–5.2)
RBC FLUID: 200 /CUMM
RBC UA: 1 /HPF (ref 0–4)
SODIUM BLD-SCNC: 121 MMOL/L (ref 136–145)
SODIUM BLD-SCNC: 123 MMOL/L (ref 136–145)
SPECIFIC GRAVITY UA: 1.01 (ref 1–1.03)
TOTAL PROTEIN: 6.8 G/DL (ref 6.4–8.2)
URINE TYPE: ABNORMAL
UROBILINOGEN, URINE: 0.2 E.U./DL
WBC # BLD: 5.1 K/UL (ref 4–11)
WBC UA: 20 /HPF (ref 0–5)

## 2020-05-25 PROCEDURE — 6360000002 HC RX W HCPCS: Performed by: INTERNAL MEDICINE

## 2020-05-25 PROCEDURE — 81001 URINALYSIS AUTO W/SCOPE: CPT

## 2020-05-25 PROCEDURE — 85025 COMPLETE CBC W/AUTO DIFF WBC: CPT

## 2020-05-25 PROCEDURE — 96365 THER/PROPH/DIAG IV INF INIT: CPT

## 2020-05-25 PROCEDURE — 87040 BLOOD CULTURE FOR BACTERIA: CPT

## 2020-05-25 PROCEDURE — 80048 BASIC METABOLIC PNL TOTAL CA: CPT

## 2020-05-25 PROCEDURE — 87070 CULTURE OTHR SPECIMN AEROBIC: CPT

## 2020-05-25 PROCEDURE — 6370000000 HC RX 637 (ALT 250 FOR IP): Performed by: INTERNAL MEDICINE

## 2020-05-25 PROCEDURE — 1200000000 HC SEMI PRIVATE

## 2020-05-25 PROCEDURE — 2580000003 HC RX 258: Performed by: INTERNAL MEDICINE

## 2020-05-25 PROCEDURE — 96366 THER/PROPH/DIAG IV INF ADDON: CPT

## 2020-05-25 PROCEDURE — 6360000002 HC RX W HCPCS: Performed by: EMERGENCY MEDICINE

## 2020-05-25 PROCEDURE — 2580000003 HC RX 258: Performed by: HOSPITALIST

## 2020-05-25 PROCEDURE — 96367 TX/PROPH/DG ADDL SEQ IV INF: CPT

## 2020-05-25 PROCEDURE — 2580000003 HC RX 258: Performed by: EMERGENCY MEDICINE

## 2020-05-25 PROCEDURE — 87205 SMEAR GRAM STAIN: CPT

## 2020-05-25 RX ORDER — INSULIN GLARGINE 100 [IU]/ML
8 INJECTION, SOLUTION SUBCUTANEOUS NIGHTLY
Status: DISCONTINUED | OUTPATIENT
Start: 2020-05-25 | End: 2020-05-26 | Stop reason: HOSPADM

## 2020-05-25 RX ORDER — POLYETHYLENE GLYCOL 3350 17 G/17G
17 POWDER, FOR SOLUTION ORAL DAILY PRN
Status: DISCONTINUED | OUTPATIENT
Start: 2020-05-25 | End: 2020-05-26 | Stop reason: HOSPADM

## 2020-05-25 RX ORDER — MORPHINE SULFATE 2 MG/ML
2 INJECTION, SOLUTION INTRAMUSCULAR; INTRAVENOUS
Status: DISCONTINUED | OUTPATIENT
Start: 2020-05-25 | End: 2020-05-26 | Stop reason: HOSPADM

## 2020-05-25 RX ORDER — SODIUM CHLORIDE 0.9 % (FLUSH) 0.9 %
10 SYRINGE (ML) INJECTION EVERY 12 HOURS SCHEDULED
Status: DISCONTINUED | OUTPATIENT
Start: 2020-05-25 | End: 2020-05-26 | Stop reason: HOSPADM

## 2020-05-25 RX ORDER — SODIUM CHLORIDE, SODIUM LACTATE, CALCIUM CHLORIDE, MAGNESIUM CHLORIDE AND DEXTROSE 2.5; 538; 448; 18.3; 5.08 G/100ML; MG/100ML; MG/100ML; MG/100ML; MG/100ML
6000 INJECTION, SOLUTION INTRAPERITONEAL NIGHTLY
Status: DISCONTINUED | OUTPATIENT
Start: 2020-05-25 | End: 2020-05-26 | Stop reason: HOSPADM

## 2020-05-25 RX ORDER — ACETAMINOPHEN 650 MG/1
650 SUPPOSITORY RECTAL EVERY 4 HOURS PRN
Status: DISCONTINUED | OUTPATIENT
Start: 2020-05-25 | End: 2020-05-26 | Stop reason: HOSPADM

## 2020-05-25 RX ORDER — DEXTROSE MONOHYDRATE 25 G/50ML
12.5 INJECTION, SOLUTION INTRAVENOUS PRN
Status: DISCONTINUED | OUTPATIENT
Start: 2020-05-25 | End: 2020-05-26 | Stop reason: HOSPADM

## 2020-05-25 RX ORDER — NICOTINE POLACRILEX 4 MG
15 LOZENGE BUCCAL PRN
Status: DISCONTINUED | OUTPATIENT
Start: 2020-05-25 | End: 2020-05-26 | Stop reason: HOSPADM

## 2020-05-25 RX ORDER — TORSEMIDE 100 MG/1
200 TABLET ORAL DAILY
Status: DISCONTINUED | OUTPATIENT
Start: 2020-05-25 | End: 2020-05-26 | Stop reason: HOSPADM

## 2020-05-25 RX ORDER — DOXAZOSIN MESYLATE 4 MG/1
4 TABLET ORAL NIGHTLY
Status: DISCONTINUED | OUTPATIENT
Start: 2020-05-25 | End: 2020-05-26 | Stop reason: HOSPADM

## 2020-05-25 RX ORDER — SEVELAMER CARBONATE 800 MG/1
1600 TABLET, FILM COATED ORAL
Status: DISCONTINUED | OUTPATIENT
Start: 2020-05-25 | End: 2020-05-26 | Stop reason: HOSPADM

## 2020-05-25 RX ORDER — ONDANSETRON 2 MG/ML
4 INJECTION INTRAMUSCULAR; INTRAVENOUS EVERY 4 HOURS PRN
Status: DISCONTINUED | OUTPATIENT
Start: 2020-05-25 | End: 2020-05-26 | Stop reason: HOSPADM

## 2020-05-25 RX ORDER — LABETALOL 200 MG/1
200 TABLET, FILM COATED ORAL EVERY 12 HOURS SCHEDULED
Status: DISCONTINUED | OUTPATIENT
Start: 2020-05-25 | End: 2020-05-26 | Stop reason: HOSPADM

## 2020-05-25 RX ORDER — DEXTROSE MONOHYDRATE 50 MG/ML
100 INJECTION, SOLUTION INTRAVENOUS PRN
Status: DISCONTINUED | OUTPATIENT
Start: 2020-05-25 | End: 2020-05-26 | Stop reason: HOSPADM

## 2020-05-25 RX ORDER — LISINOPRIL 40 MG/1
40 TABLET ORAL NIGHTLY
Status: DISCONTINUED | OUTPATIENT
Start: 2020-05-25 | End: 2020-05-26 | Stop reason: HOSPADM

## 2020-05-25 RX ORDER — SODIUM CHLORIDE 0.9 % (FLUSH) 0.9 %
10 SYRINGE (ML) INJECTION PRN
Status: DISCONTINUED | OUTPATIENT
Start: 2020-05-25 | End: 2020-05-26 | Stop reason: HOSPADM

## 2020-05-25 RX ORDER — ACETAMINOPHEN 325 MG/1
650 TABLET ORAL EVERY 4 HOURS PRN
Status: DISCONTINUED | OUTPATIENT
Start: 2020-05-25 | End: 2020-05-26 | Stop reason: HOSPADM

## 2020-05-25 RX ORDER — HEPARIN SODIUM 5000 [USP'U]/ML
5000 INJECTION, SOLUTION INTRAVENOUS; SUBCUTANEOUS EVERY 8 HOURS SCHEDULED
Status: DISCONTINUED | OUTPATIENT
Start: 2020-05-25 | End: 2020-05-26 | Stop reason: HOSPADM

## 2020-05-25 RX ADMIN — SODIUM CHLORIDE, SODIUM LACTATE, CALCIUM CHLORIDE, MAGNESIUM CHLORIDE AND DEXTROSE 6000 ML: 2.5; 538; 448; 18.3; 5.08 INJECTION, SOLUTION INTRAPERITONEAL at 21:54

## 2020-05-25 RX ADMIN — INSULIN GLARGINE 8 UNITS: 100 INJECTION, SOLUTION SUBCUTANEOUS at 22:01

## 2020-05-25 RX ADMIN — LISINOPRIL 40 MG: 40 TABLET ORAL at 21:54

## 2020-05-25 RX ADMIN — LABETALOL HYDROCHLORIDE 200 MG: 200 TABLET, FILM COATED ORAL at 08:23

## 2020-05-25 RX ADMIN — INSULIN LISPRO 2 UNITS: 100 INJECTION, SOLUTION INTRAVENOUS; SUBCUTANEOUS at 13:06

## 2020-05-25 RX ADMIN — SEVELAMER CARBONATE 1600 MG: 800 TABLET, FILM COATED ORAL at 17:51

## 2020-05-25 RX ADMIN — SODIUM CHLORIDE, PRESERVATIVE FREE 10 ML: 5 INJECTION INTRAVENOUS at 21:54

## 2020-05-25 RX ADMIN — TORSEMIDE 200 MG: 100 TABLET ORAL at 21:54

## 2020-05-25 RX ADMIN — SODIUM CHLORIDE, PRESERVATIVE FREE 10 ML: 5 INJECTION INTRAVENOUS at 08:24

## 2020-05-25 RX ADMIN — HEPARIN SODIUM 5000 UNITS: 5000 INJECTION INTRAVENOUS; SUBCUTANEOUS at 22:02

## 2020-05-25 RX ADMIN — DOXAZOSIN 4 MG: 4 TABLET ORAL at 21:54

## 2020-05-25 RX ADMIN — SEVELAMER CARBONATE 1600 MG: 800 TABLET, FILM COATED ORAL at 12:56

## 2020-05-25 RX ADMIN — CEFEPIME HYDROCHLORIDE 500 MG: 1 INJECTION, POWDER, FOR SOLUTION INTRAMUSCULAR; INTRAVENOUS at 00:07

## 2020-05-25 RX ADMIN — INSULIN LISPRO 1 UNITS: 100 INJECTION, SOLUTION INTRAVENOUS; SUBCUTANEOUS at 22:02

## 2020-05-25 RX ADMIN — HEPARIN SODIUM 5000 UNITS: 5000 INJECTION INTRAVENOUS; SUBCUTANEOUS at 13:15

## 2020-05-25 RX ADMIN — LABETALOL HYDROCHLORIDE 200 MG: 200 TABLET, FILM COATED ORAL at 21:54

## 2020-05-25 RX ADMIN — CEFEPIME HYDROCHLORIDE 1 G: 1 INJECTION, POWDER, FOR SOLUTION INTRAMUSCULAR; INTRAVENOUS at 12:56

## 2020-05-25 RX ADMIN — Medication 1250 MG: at 00:38

## 2020-05-25 RX ADMIN — SEVELAMER CARBONATE 1600 MG: 800 TABLET, FILM COATED ORAL at 08:23

## 2020-05-25 RX ADMIN — HEPARIN SODIUM 5000 UNITS: 5000 INJECTION INTRAVENOUS; SUBCUTANEOUS at 05:34

## 2020-05-25 RX ADMIN — INSULIN LISPRO 2 UNITS: 100 INJECTION, SOLUTION INTRAVENOUS; SUBCUTANEOUS at 17:51

## 2020-05-25 ASSESSMENT — PAIN SCALES - GENERAL
PAINLEVEL_OUTOF10: 0

## 2020-05-25 NOTE — PLAN OF CARE
Problem: Falls - Risk of:  Goal: Will remain free from falls  Description: Will remain free from falls  5/25/2020 0943 by Bari Mistry RN  Outcome: Ongoing   Fall risk assessment completed per unit protocol. Patient's bed is in the lowest position, call light is within reach and the patient's room is free of clutter. The patient has been instructed to call for assistance before getting out of bed or the chair. Problem: Pain:  Goal: Pain level will decrease  Description: Pain level will decrease  5/25/2020 0943 by Bari Mistry RN  Outcome: Ongoing   Pain/discomfort being managed with PRN analgesics per MD orders. Pt able to express presence and absence of pain and rate pain appropriately using numerical scale.

## 2020-05-25 NOTE — FLOWSHEET NOTE
4 Eyes Skin Assessment     The patient is being assess for  Admission    I agree that 2 RN's have performed a thorough Head to Toe Skin Assessment on the patient. ALL assessment sites listed below have been assessed. Areas assessed by both nurses:   [x]   Head, Face, and Ears   [x]   Shoulders, Back, and Chest  [x]   Arms, Elbows, and Hands   [x]   Coccyx, Sacrum, and IschIum  [x]   Legs, Feet, and Heels        Does the Patient have Skin Breakdown?   No         Enoch Prevention initiated:  No   Wound Care Orders initiated:  No      [facility] nurse consulted for Pressure Injury (Stage 3,4, Unstageable, DTI, NWPT, and Complex wounds), New and Established Ostomies:  No      Nurse 1 eSignature: Electronically signed by Fiordaliza Rios RN on 5/25/20 at 7:16 AM EDT    **SHARE this note so that the co-signing nurse is able to place an eSignature**    Nurse 2 eSignature: Electronically signed by Philipp Macedo RN on 5/25/20 at 7:49 AM EDT

## 2020-05-25 NOTE — ED NOTES
Report called to Evin Mckenna. Handoff of pts pain score and plan of care.       Jossy Zafar RN  05/25/20 [id number]

## 2020-05-25 NOTE — PROGRESS NOTES
Manual drain completed at 13:05. 2900 ml of cloudy yellow fluid drained. Patient tolerated it well. No complaints of pain, patient resting quietly in bed with eyes closed.

## 2020-05-25 NOTE — ED NOTES
RN and MD Molina Montemayor at bedside updating pt and family on plan of care.       Fei Verma RN  05/25/20 [id number]

## 2020-05-25 NOTE — ED PROVIDER NOTES
[id number]        Pt Name: Vincnezo Cotton  MRN: [id number]  [place] 1965  Date of evaluation: 5/24/2020  Provider: Denia Parker MD  PCP: Edward Dumont       Chief Complaint   Patient presents with    Abdominal Pain     was out of town and noticed + cloudy peritonial fluid. + pain, nausea/vomiting last night. called PMD.  referred to ED for eval.         HISTORY OFPRESENT ILLNESS   (Location/Symptom, Timing/Onset, Context/Setting, Quality, Duration, Modifying Factors,Severity)  Note limiting factors. Vincenzo Cotton is a 49 y.o. female with history of insulin-dependent diabetes, end-stage renal disease on peritoneal dialysis, presenting from home due to diffuse abdominal pain with no focal areas of pain, associated with nausea and vomiting. Pain 10 out of 10 last night, somewhat relieved currently, down to a 4 out of 10. Not relieved by trying Pepto-Bismol at home. She has had episodes of peritonitis in the past and states that this feels similar. She reports cloudy peritoneal dialysate noted for the last 1 to 2 days. No urinary symptoms. Some associated diarrhea, several episodes, nonbloody. Pain not clearly associated with bowel movements or relieved by bowel movements. Blood glucose 150s at home earlier today. Nursing Notes were all reviewed and agreed with or any disagreements were addressed  in the HPI. REVIEW OF SYSTEMS    (2-9 systems for level 4, 10 or more for level 5)     Review of Systems   Constitutional: Negative for activity change, appetite change, chills and fever. HENT: Negative. Eyes: Negative. Respiratory: Negative. Negative for cough and shortness of breath. Cardiovascular: Negative. Gastrointestinal: Positive for abdominal pain, diarrhea, nausea and vomiting. Negative for constipation. Genitourinary: Negative. Negative for dysuria and flank pain. Musculoskeletal: Negative. Skin: Negative. Neurological: Negative. Negative for weakness, light-headedness and headaches. Hematological: Negative. Psychiatric/Behavioral: Negative. PAST MEDICAL HISTORY     Past Medical History:   Diagnosis Date    Anemia 9/15/2014    [facility]     Diabetes mellitus type 1 ([facility]     Diabetic retinopathy ([facility]     Diabetic retinopathy associated with type 2 diabetes mellitus, without macular edema     Hyperlipidemia 7/27/2015    Kidney stone     Mixed hyperlipidemia 7/27/2015    Orthostatic hypotension     Vitreous hemorrhage of right eye ([facility] [facility] 8/15/2017         SURGICAL HISTORY     Past Surgical History:   Procedure Laterality Date    APPENDECTOMY      KIDNEY STONE SURGERY      LAPAROSCOPY INSERTION PERITONEAL CATHETER N/A 8/12/2019    LAPAROSCOPIC PERITONEAL DIALYSIS CATHETER PLACEMENT WITH AXEL TROCHAR performed by Juan M Gutierrez MD at [address] [address]       Current Discharge Medication List      CONTINUE these medications which have NOT CHANGED    Details   doxazosin (CARDURA) 4 MG tablet TAKE ONE TABLET BY MOUTH ONCE NIGHTLY  Qty: 30 tablet, Refills: 2      sevelamer (RENVELA) 800 MG tablet TAKE TWO TABLETS BY MOUTH THREE TIMES A DAY WITH MEALS  Qty: 90 tablet, Refills: 2      lisinopril (PRINIVIL;ZESTRIL) 40 MG tablet Take 1 tablet by mouth nightly  Qty: 30 tablet, Refills: 3      labetalol (NORMODYNE) 200 MG tablet Take 1 tablet by mouth every 12 hours  Qty: 60 tablet, Refills: 3      torsemide (DEMADEX) 100 MG tablet Take 2 tablets by mouth daily  Qty: 60 tablet, Refills: 3      insulin glargine (LANTUS SOLOSTAR) 100 UNIT/ML injection pen Inject 8 Units into the skin nightly If blood sugar >150      insulin lispro (HUMALOG KWIKPEN) 100 UNIT/ML pen Inject into the skin 3 times daily (before meals) Sliding scale             ALLERGIES     Bactrim [sulfamethoxazole-trimethoprim]; Geocillin [carbenicillin];  Lisinopril; Resp SpO2 Height Weight   (!) 157/92 98.5 °F (36.9 °C) Oral 88 16 97 % 5' 3\" (1.6 m) 206 lb 12.7 oz (93.8 kg)      height is 5' 3\" (1.6 m) and weight is 205 lb 7.5 oz (93.2 kg). Her oral temperature is 97.7 °F (36.5 °C). Her blood pressure is 164/63 (abnormal) and her pulse is 83. Her respiration is 16 and oxygen saturation is 95%. Physical Exam  Constitutional: Appears well-developed and well-nourished. No distress. HENT:   Head: Normocephalic and atraumatic. Eyes: Conjunctivae and EOM are normal.   Neck: Neck supple. No JVD present. Cardiovascular: Normal rate and intact distal pulses. Pulmonary/Chest: Effort normal. No respiratory distress. Abdominal: Nontender abdomen. Soft. Exhibits no distension. Dialysis catheter in left lower quadrant of abdomen. No surrounding erythema or drainage. Neurological: Alert. Answering questions appropriately. Ambulatory without ataxia. Musculoskeletal: No lower extremity edema. Skin: Skin is warm and dry. Psychiatric: Normal mood and affect. Behavior is normal.   Nursing note and vitals reviewed.       DIAGNOSTIC RESULTS   LABS:    Results for orders placed or performed during the hospital encounter of 05/24/20   Culture, Body Fluid   Result Value Ref Range    Gram Stain Result No organisms seen  4+ WBC's (Polymorphonuclear)      CBC Auto Differential   Result Value Ref Range    WBC 5.8 4.0 - 11.0 K/uL    RBC 3.24 (L) 4.00 - 5.20 M/uL    Hemoglobin 11.1 (L) 12.0 - 16.0 g/dL    Hematocrit 32.4 (L) 36.0 - 48.0 %    .0 80.0 - 100.0 fL    MCH 34.3 (H) 26.0 - 34.0 pg    MCHC 34.3 31.0 - 36.0 g/dL    RDW 16.2 (H) 12.4 - 15.4 %    Platelets 883 484 - 582 K/uL    MPV 10.0 5.0 - 10.5 fL    Neutrophils % 73.0 %    Lymphocytes % 17.6 %    Monocytes % 7.0 %    Eosinophils % 2.1 %    Basophils % 0.3 %    Neutrophils Absolute 4.2 1.7 - 7.7 K/uL    Lymphocytes Absolute 1.0 1.0 - 5.1 K/uL    Monocytes Absolute 0.4 0.0 - 1.3 K/uL    Eosinophils Absolute 0.1 0.0 - Value Ref Range    WBC 5.1 4.0 - 11.0 K/uL    RBC 2.97 (L) 4.00 - 5.20 M/uL    Hemoglobin 10.2 (L) 12.0 - 16.0 g/dL    Hematocrit 29.8 (L) 36.0 - 48.0 %    .3 (H) 80.0 - 100.0 fL    MCH 34.2 (H) 26.0 - 34.0 pg    MCHC 34.1 31.0 - 36.0 g/dL    RDW 16.8 (H) 12.4 - 15.4 %    Platelets 669 230 - 208 K/uL    MPV 9.8 5.0 - 10.5 fL    Neutrophils % 60.9 %    Lymphocytes % 28.1 %    Monocytes % 7.4 %    Eosinophils % 3.2 %    Basophils % 0.4 %    Neutrophils Absolute 3.1 1.7 - 7.7 K/uL    Lymphocytes Absolute 1.4 1.0 - 5.1 K/uL    Monocytes Absolute 0.4 0.0 - 1.3 K/uL    Eosinophils Absolute 0.2 0.0 - 0.6 K/uL    Basophils Absolute 0.0 0.0 - 0.2 K/uL   POCT Glucose   Result Value Ref Range    POC Glucose 160 (H) 70 - 99 mg/dl    Performed on ACCU-CHEK        All other labs were within normal range or not returned as of this dictation. EKG: All EKG's are interpreted by the Emergency Department Physician who either signs or co-signs this chart in the absence of a cardiologist.      RADIOLOGY:   plain film images such as CT, Ultrasound and MRI are read by the radiologist. Plain radiographic images are visualized and preliminarily interpreted by the ED Provider with the below findings:    No results found.           PROCEDURES   Unless otherwise noted below, none     Procedures    CRITICAL CARE TIME   N/A    CONSULTS:  IP CONSULT TO NEPHROLOGY  PHARMACY TO DOSE VANCOMYCIN  IP CONSULT TO HOSPITALIST  PHARMACY TO DOSE VANCOMYCIN  IP CONSULT TO NEPHROLOGY    EMERGENCY DEPARTMENT COURSE and DIFFERENTIAL DIAGNOSIS/MDM:   Vitals:    Vitals:    05/25/20 0330 05/25/20 0434 05/25/20 0443 05/25/20 0739   BP: 131/63 (!) 152/73  (!) 164/63   Pulse: 78 78  83   Resp: 16 20  16   Temp: 98 °F (36.7 °C) 97.8 °F (36.6 °C)  97.7 °F (36.5 °C)   TempSrc: Oral Oral  Oral   SpO2: 98%   95%   Weight:  207 lb 3.7 oz (94 kg) 205 lb 7.5 oz (93.2 kg)    Height:  5' 3\" (1.6 m)         Patient was given the following medications:  Medications torsemide (DEMADEX) tablet 200 mg (has no administration in time range)   sevelamer (RENVELA) tablet 1,600 mg (has no administration in time range)   lisinopril (PRINIVIL;ZESTRIL) tablet 40 mg (has no administration in time range)   labetalol (NORMODYNE) tablet 200 mg (has no administration in time range)   insulin glargine (LANTUS) injection vial 8 Units (has no administration in time range)   doxazosin (CARDURA) tablet 4 mg (has no administration in time range)   sodium chloride flush 0.9 % injection 10 mL (has no administration in time range)   sodium chloride flush 0.9 % injection 10 mL (has no administration in time range)   acetaminophen (TYLENOL) tablet 650 mg (has no administration in time range)     Or   acetaminophen (TYLENOL) suppository 650 mg (has no administration in time range)   polyethylene glycol (GLYCOLAX) packet 17 g (has no administration in time range)   ondansetron (ZOFRAN) injection 4 mg (has no administration in time range)   heparin (porcine) injection 5,000 Units (5,000 Units Subcutaneous Given 5/25/20 0503)   insulin lispro (HUMALOG) injection vial 0-12 Units (has no administration in time range)   insulin lispro (HUMALOG) injection vial 0-6 Units (has no administration in time range)   glucose (GLUTOSE) 40 % oral gel 15 g (has no administration in time range)   dextrose 50 % IV solution (has no administration in time range)   glucagon (rDNA) injection 1 mg (has no administration in time range)   dextrose 5 % solution (has no administration in time range)   cefepime (MAXIPIME) 1 g in sterile water 10 mL IV syringe (has no administration in time range)   vancomycin (VANCOCIN) intermittent dosing (placeholder) ( Other Canceled Entry 5/25/20 1194)   morphine (PF) injection 2 mg (has no administration in time range)   cefepime (MAXIPIME) 500 mg in dextrose 5 % 50 mL IVPB (0 mg Intravenous Stopped 5/25/20 0037)   vancomycin (VANCOCIN) 1250 mg in dextrose 5 % 250 mL IVPB (0 mg Intravenous

## 2020-05-26 VITALS
DIASTOLIC BLOOD PRESSURE: 77 MMHG | OXYGEN SATURATION: 95 % | TEMPERATURE: 97 F | WEIGHT: 202.38 LBS | RESPIRATION RATE: 18 BRPM | HEART RATE: 75 BPM | SYSTOLIC BLOOD PRESSURE: 148 MMHG | HEIGHT: 63 IN | BODY MASS INDEX: 35.86 KG/M2

## 2020-05-26 LAB
ANION GAP SERPL CALCULATED.3IONS-SCNC: 13 MMOL/L (ref 3–16)
BASOPHILS ABSOLUTE: 0 K/UL (ref 0–0.2)
BASOPHILS RELATIVE PERCENT: 0.4 %
BUN BLDV-MCNC: 60 MG/DL (ref 7–20)
CALCIUM SERPL-MCNC: 7.7 MG/DL (ref 8.3–10.6)
CHLORIDE BLD-SCNC: 85 MMOL/L (ref 99–110)
CO2: 26 MMOL/L (ref 21–32)
CREAT SERPL-MCNC: 12.9 MG/DL (ref 0.6–1.1)
EOSINOPHILS ABSOLUTE: 0.2 K/UL (ref 0–0.6)
EOSINOPHILS RELATIVE PERCENT: 3.3 %
GFR AFRICAN AMERICAN: 4
GFR NON-AFRICAN AMERICAN: 3
GLUCOSE BLD-MCNC: 176 MG/DL (ref 70–99)
GLUCOSE BLD-MCNC: 180 MG/DL (ref 70–99)
GLUCOSE BLD-MCNC: 213 MG/DL (ref 70–99)
GLUCOSE BLD-MCNC: 248 MG/DL (ref 70–99)
HCT VFR BLD CALC: 27.7 % (ref 36–48)
HEMOGLOBIN: 9.4 G/DL (ref 12–16)
LYMPHOCYTES ABSOLUTE: 1 K/UL (ref 1–5.1)
LYMPHOCYTES RELATIVE PERCENT: 18.1 %
MCH RBC QN AUTO: 34.3 PG (ref 26–34)
MCHC RBC AUTO-ENTMCNC: 34.2 G/DL (ref 31–36)
MCV RBC AUTO: 100.3 FL (ref 80–100)
MONOCYTES ABSOLUTE: 0.4 K/UL (ref 0–1.3)
MONOCYTES RELATIVE PERCENT: 7.5 %
NEUTROPHILS ABSOLUTE: 4.1 K/UL (ref 1.7–7.7)
NEUTROPHILS RELATIVE PERCENT: 70.7 %
PDW BLD-RTO: 15.9 % (ref 12.4–15.4)
PERFORMED ON: ABNORMAL
PLATELET # BLD: 172 K/UL (ref 135–450)
PMV BLD AUTO: 9.7 FL (ref 5–10.5)
POTASSIUM REFLEX MAGNESIUM: 4.1 MMOL/L (ref 3.5–5.1)
RBC # BLD: 2.76 M/UL (ref 4–5.2)
SODIUM BLD-SCNC: 124 MMOL/L (ref 136–145)
VANCOMYCIN RANDOM: 18.2 UG/ML
WBC # BLD: 5.8 K/UL (ref 4–11)

## 2020-05-26 PROCEDURE — 6360000002 HC RX W HCPCS: Performed by: HOSPITALIST

## 2020-05-26 PROCEDURE — 6360000002 HC RX W HCPCS: Performed by: INTERNAL MEDICINE

## 2020-05-26 PROCEDURE — 85025 COMPLETE CBC W/AUTO DIFF WBC: CPT

## 2020-05-26 PROCEDURE — 2580000003 HC RX 258: Performed by: HOSPITALIST

## 2020-05-26 PROCEDURE — 80202 ASSAY OF VANCOMYCIN: CPT

## 2020-05-26 PROCEDURE — 90945 DIALYSIS ONE EVALUATION: CPT

## 2020-05-26 PROCEDURE — 80048 BASIC METABOLIC PNL TOTAL CA: CPT

## 2020-05-26 PROCEDURE — 3E1M39Z IRRIGATION OF PERITONEAL CAVITY USING DIALYSATE, PERCUTANEOUS APPROACH: ICD-10-PCS | Performed by: HOSPITALIST

## 2020-05-26 PROCEDURE — 6370000000 HC RX 637 (ALT 250 FOR IP): Performed by: INTERNAL MEDICINE

## 2020-05-26 PROCEDURE — 36415 COLL VENOUS BLD VENIPUNCTURE: CPT

## 2020-05-26 PROCEDURE — 2580000003 HC RX 258: Performed by: INTERNAL MEDICINE

## 2020-05-26 RX ORDER — LOSARTAN POTASSIUM 100 MG/1
200 TABLET ORAL NIGHTLY
COMMUNITY
End: 2020-09-23 | Stop reason: SDUPTHER

## 2020-05-26 RX ORDER — POLYETHYLENE GLYCOL 3350 17 G/17G
17 POWDER, FOR SOLUTION ORAL DAILY PRN
Qty: 527 G | Refills: 1 | Status: SHIPPED | OUTPATIENT
Start: 2020-05-26 | End: 2020-06-25

## 2020-05-26 RX ORDER — CIPROFLOXACIN 500 MG/1
500 TABLET, FILM COATED ORAL DAILY
Qty: 21 TABLET | Refills: 0 | Status: SHIPPED | OUTPATIENT
Start: 2020-05-26 | End: 2020-06-16

## 2020-05-26 RX ORDER — METOLAZONE 2.5 MG/1
5 TABLET ORAL NIGHTLY
Status: ON HOLD | COMMUNITY
End: 2020-05-26 | Stop reason: HOSPADM

## 2020-05-26 RX ADMIN — DARBEPOETIN ALFA 100 MCG: 100 SOLUTION INTRAVENOUS; SUBCUTANEOUS at 15:15

## 2020-05-26 RX ADMIN — CEFEPIME HYDROCHLORIDE 1 G: 1 INJECTION, POWDER, FOR SOLUTION INTRAMUSCULAR; INTRAVENOUS at 12:49

## 2020-05-26 RX ADMIN — SODIUM CHLORIDE, PRESERVATIVE FREE 10 ML: 5 INJECTION INTRAVENOUS at 12:50

## 2020-05-26 RX ADMIN — HEPARIN SODIUM 5000 UNITS: 5000 INJECTION INTRAVENOUS; SUBCUTANEOUS at 07:24

## 2020-05-26 RX ADMIN — VANCOMYCIN HYDROCHLORIDE 1000 MG: 1 INJECTION, POWDER, LYOPHILIZED, FOR SOLUTION INTRAVENOUS at 12:59

## 2020-05-26 RX ADMIN — SEVELAMER CARBONATE 1600 MG: 800 TABLET, FILM COATED ORAL at 09:06

## 2020-05-26 RX ADMIN — SEVELAMER CARBONATE 1600 MG: 800 TABLET, FILM COATED ORAL at 12:45

## 2020-05-26 RX ADMIN — LABETALOL HYDROCHLORIDE 200 MG: 200 TABLET, FILM COATED ORAL at 09:06

## 2020-05-26 NOTE — PROGRESS NOTES
Office: [phone]       Fax: [phone]      Nephrology Progress Note        Patient's Name: Davidson Mason  Date of Visit: 5/26/2020    Reason for Consult:  ESRD management    Subjective:      Davidson Mason is a 49 y.o. female with PMHx of hypertension, diabetes mellitus, ESRD who was admitted on 5/24/2020 with complaints of cloudy PD fluid  Patient reported abd discomfort. Had nausea, diarrhea. No fever. Denies break in sterile techniques. history of PD peritonitis in past.  Does 2 exchanges with 2.5% day time and Icodextrin overnight    INTERVAL HISTORY    Feels better  Shortness of breath: No   No abdominal pain   No fever  Tolerated PD     Medications: Allergies:  Bactrim [sulfamethoxazole-trimethoprim]; Geocillin [carbenicillin]; Lisinopril;  Other; Tramadol; Bumetanide; and Ibuprofen  Scheduled Meds:   torsemide  200 mg Oral Daily    sevelamer  1,600 mg Oral TID WC    lisinopril  40 mg Oral Nightly    labetalol  200 mg Oral 2 times per day    insulin glargine  8 Units Subcutaneous Nightly    doxazosin  4 mg Oral Nightly    sodium chloride flush  10 mL Intravenous 2 times per day    heparin (porcine)  5,000 Units Subcutaneous 3 times per day    insulin lispro  0-12 Units Subcutaneous TID WC    insulin lispro  0-6 Units Subcutaneous Nightly    cefepime  1 g Intravenous Q24H    vancomycin (VANCOCIN) intermittent dosing (placeholder)   Other RX Placeholder    dianeal lo-queenie 2.5%  6,000 mL Intraperitoneal Nightly    And    dianeal lo-queenie 2.5%  6,000 mL Intraperitoneal Nightly     Continuous Infusions:   dextrose         Objective:       Vitals:  BP (!) 159/88   Pulse 78   Temp 97.8 °F (36.6 °C) (Oral)   Resp 16   Ht 5' 3\" (1.6 m)   Wt 205 lb 7.5 oz (93.2 kg)   LMP 05/12/2020   SpO2 97%   BMI 36.40 kg/m²   Constitutional:  awake, NAD  Skin: no rash, turgor wnl  HEENT:  MMM, No icterus  Neck: no bruits, No JVD  Cardiovascular:  S1, S2 reg  Respiratory: CTA, no crackles  Abdomen:  +BS, soft, NT, ND  Ext: no lower extremity edema  Psychiatric: mood and affect appropriate  Access: abd PD catheter in place,   CNS: alert, no agitation    Data:     Labs:  Hepatic:   Recent Labs     05/24/20  2329   AST 10*   ALT 9*   BILITOT 0.5   ALKPHOS 70     BNP: No results for input(s): BNP in the last 72 hours. ABGs: No results for input(s): PHART, PO2ART, CEN7FHE in the last 72 hours. IMAGING:  No orders to display       Assessment :       1. ESRD   Etiology: suspected DN  Access: Abd PD cath  Volume: Euvolemic    Last UF about 500 ml    2. Electrolytes/Acid base  Hyponatremia and No Dyskalemia    Recent Labs     05/26/20  0715   *   K 4.1   CO2 26       3. BMD  -Hyperphosphatemia, SHPT  -maintained on Renvela    Lab Results   Component Value Date    CALCIUM 7.7 (L) 05/26/2020    PHOS 4.5 08/26/2019        4. HTN  -Blood pressure high    BP Readings from Last 1 Encounters:   05/26/20 (!) 159/88       5. Anemia  -anemia of CKD  -assess for CHRIS    Recent Labs     05/26/20  0715   HGB 9.4*     6. PD peritonitis  -On empiric systemic antibiotics  -Gram stain negative. PD fluid culture  NGTD    PLAN :     -Vanco 1 mg today  -will arrange IP antibiotics. Pt to come to office on 5/28  -continue Torsemide  -maintenance PD   -MBD management  -Anemia management  -Dose meds to GFR<10    Spoke to Thalia Ruff MD     Thank you for allowing us to participate in care of [address] . We will continue to follow. Feel free to contact me with any questions.       Twyla Park  5/26/2020    [facility] [address]  Office : [phone]  Fax :[phone]

## 2020-05-26 NOTE — CARE COORDINATION
INITIAL CASE MANAGEMENT ASSESSMENT    Reviewed chart, met with patient to assess possible discharge needs. Explained Case Management role/services. Discharge planned for today , brief discussion with patient. Living Situation:  Lives with . ADLs:  Independent in all adl's. DME:  N/A     PT/OT Recs: Not ordered. Active Services:  N/A      Transportation:  Self per care or  will take to appointments. Medications:  No issues getting , taking or affording medication. PCP:  Dr. Caleb Starr       HD/PD: PD:  At home but she will need antibiotics with her PD and she will go to her nephrologist office on Thursday for instructions. PLAN/COMMENTS:  Return home with no anticipated needs. SW/CM provided contact information for patient or family to call with any questions. SW/CM will follow and assist as needed.     Electronically signed by Mona Rowan on 5/26/2020 at 3:33 PM

## 2020-05-27 NOTE — DISCHARGE SUMMARY
Hospital Medicine Discharge Summary      Patient ID: Tracie Camarillo [id number]     Patient's PCP: July Jefferson    Admit Date: 5/24/2020     Discharge Date: 5/26/2020      Admitting Physician: Daisha Cui MD    Discharge Physician: Karyle Gemma, MD     Discharge Diagnoses: Active Hospital Problems    Diagnosis Date Noted    Peritonitis ([address] [K65.9] 05/25/2020    Generalized abdominal pain [R10.84] 05/25/2020    Peritoneal dialysis status ([address] [Z99.2] 05/25/2020    Morbid obesity due to excess calories ([address] [E66.01] 05/25/2020    Nausea and vomiting [R11.2] 05/25/2020    Hyponatremia [E87.1] 05/25/2020    Diarrhea [R19.7] 05/25/2020    ESRD (end stage renal disease) ([address] [N18.6] 08/04/2019    Essential hypertension [I10] 11/08/2018    DMII (diabetes mellitus, type 2) ([address] [E11.9] 10/14/2015    Mixed hyperlipidemia [E78.2] 07/27/2015         The patient was seen and examined on the day of discharge and this discharge summary is in conjunction with any daily progress note from day of discharge.     HOSPITAL COURSE    Patient demographics:  The patient  Vincenzo Cotton is a 49 y.o. female      Significant past medical history:       Patient Active Problem List   Diagnosis    Diabetic retinopathy ([address]    Mixed hyperlipidemia    DMII (diabetes mellitus, type 2) ([address]    Vitreous hemorrhage of right eye ([address]    Essential hypertension    Acute kidney injury ([address]    ESRD (end stage renal disease) ([address]    Acute renal failure superimposed on chronic kidney disease ([address]    Volume overload    Peritonitis ([address]    Generalized abdominal pain    Peritoneal dialysis status ([address]    Morbid obesity due to excess calories ([address]    Nausea and vomiting    Hyponatremia    Diarrhea            Presenting symptoms:  generalized abdominal pain     Diagnostic workup:        CONSULTS DURING ADMISSION :   IP CONSULT TO NEPHROLOGY  PHARMACY TO DOSE VANCOMYCIN  IP CONSULT TO HOSPITALIST  PHARMACY TO DOSE VANCOMYCIN  IP CONSULT TO NEPHROLOGY        Patient was diagnosed with:  Peritonitis (HCC)   ESRD   Generalized abdominal pain  Non-Intractable vomiting with nausea  Diarrhea   Hyponatremia            Treatment while inpatient:  Patient presented with 1 day history of abdominal pain accompanied by nausea or vomiting. Patient was diagnosed with peritonitis due to peritoneal dialysis. Patient was started on IV antibiotics and patient will continue intraperitoneal antibiotics as outpatient. Nephrology followed the patient in the hospital and patient is to follow-up with them as an outpatient.                           Discharge Condition:  stable     Discharged to:  Home     Activity:   as tolerated: Follow Up: Follow-up with PCP in 1-2 weeks                  Labs: For convenience and continuity at follow-up the following most recent labs are provided:      CBC:   Lab Results   Component Value Date    WBC 5.8 05/26/2020    HGB 9.4 05/26/2020    HCT 27.7 05/26/2020     05/26/2020       RENAL:   Lab Results   Component Value Date     05/26/2020    K 4.1 05/26/2020    CL 85 05/26/2020    CO2 26 05/26/2020    BUN 60 05/26/2020    CREATININE 12.9 05/26/2020           Discharge Medications:     Radha Rueda   Home Medication Instructions [id number]    Printed on:05/27/20 0030   Medication Information                      ciprofloxacin (CIPRO) 500 MG tablet  Take 1 tablet by mouth daily for 21 days             insulin lispro (HUMALOG KWIKPEN) 100 UNIT/ML pen  Inject 2-4 Units into the skin 3 times daily (before meals) Sliding scale              labetalol (NORMODYNE) 200 MG tablet  Take 1 tablet by mouth every 12 hours             losartan (COZAAR) 100 MG tablet  Take 200 mg by mouth nightly             polyethylene glycol (GLYCOLAX) 17 g packet  Take 17 g by mouth daily as needed for Constipation             sevelamer (RENVELA) 800 MG tablet  TAKE TWO TABLETS BY MOUTH

## 2020-05-28 LAB
BODY FLUID CULTURE, STERILE: NORMAL
GRAM STAIN RESULT: NORMAL

## 2020-05-29 LAB
BLOOD CULTURE, ROUTINE: NORMAL
CULTURE, BLOOD 2: NORMAL

## 2021-01-01 ENCOUNTER — APPOINTMENT (OUTPATIENT)
Dept: GENERAL RADIOLOGY | Age: 56
DRG: 199 | End: 2021-01-01
Payer: MEDICAID

## 2021-01-01 ENCOUNTER — HOSPITAL ENCOUNTER (EMERGENCY)
Age: 56
Discharge: HOME | End: 2021-08-08
Attending: STUDENT IN AN ORGANIZED HEALTH CARE EDUCATION/TRAINING PROGRAM
Payer: MEDICAID

## 2021-01-01 ENCOUNTER — APPOINTMENT (OUTPATIENT)
Dept: CT IMAGING | Age: 56
DRG: 199 | End: 2021-01-01
Payer: MEDICAID

## 2021-01-01 ENCOUNTER — APPOINTMENT (OUTPATIENT)
Dept: CT IMAGING | Age: 56
DRG: 470 | End: 2021-01-01
Payer: MEDICAID

## 2021-01-01 ENCOUNTER — APPOINTMENT (OUTPATIENT)
Dept: GENERAL RADIOLOGY | Age: 56
DRG: 470 | End: 2021-01-01
Payer: MEDICAID

## 2021-01-01 ENCOUNTER — HOSPITAL ENCOUNTER (INPATIENT)
Age: 56
LOS: 2 days | Discharge: HOME | DRG: 199 | End: 2021-08-12
Attending: EMERGENCY MEDICINE | Admitting: INTERNAL MEDICINE
Payer: MEDICAID

## 2021-01-01 ENCOUNTER — APPOINTMENT (OUTPATIENT)
Dept: MRI IMAGING | Age: 56
DRG: 199 | End: 2021-01-01
Payer: MEDICAID

## 2021-01-01 ENCOUNTER — HOSPITAL ENCOUNTER (INPATIENT)
Age: 56
LOS: 2 days | Discharge: HOME | DRG: 470 | End: 2021-05-28
Attending: EMERGENCY MEDICINE | Admitting: HOSPITALIST
Payer: MEDICAID

## 2021-01-01 ENCOUNTER — HOSPITAL ENCOUNTER (INPATIENT)
Age: 56
LOS: 4 days | Discharge: HOME | DRG: 199 | End: 2021-08-06
Attending: EMERGENCY MEDICINE | Admitting: INTERNAL MEDICINE
Payer: MEDICAID

## 2021-01-01 VITALS
RESPIRATION RATE: 16 BRPM | HEIGHT: 63 IN | TEMPERATURE: 98.3 F | SYSTOLIC BLOOD PRESSURE: 173 MMHG | WEIGHT: 217.59 LBS | OXYGEN SATURATION: 94 % | HEART RATE: 80 BPM | DIASTOLIC BLOOD PRESSURE: 72 MMHG | BODY MASS INDEX: 38.55 KG/M2

## 2021-01-01 VITALS
WEIGHT: 215 LBS | SYSTOLIC BLOOD PRESSURE: 149 MMHG | DIASTOLIC BLOOD PRESSURE: 78 MMHG | HEART RATE: 83 BPM | RESPIRATION RATE: 15 BRPM | TEMPERATURE: 97.7 F | OXYGEN SATURATION: 99 % | BODY MASS INDEX: 38.09 KG/M2

## 2021-01-01 VITALS
TEMPERATURE: 98 F | SYSTOLIC BLOOD PRESSURE: 140 MMHG | HEIGHT: 63 IN | WEIGHT: 205.47 LBS | HEART RATE: 78 BPM | DIASTOLIC BLOOD PRESSURE: 70 MMHG | RESPIRATION RATE: 22 BRPM | OXYGEN SATURATION: 93 % | BODY MASS INDEX: 36.41 KG/M2

## 2021-01-01 VITALS
DIASTOLIC BLOOD PRESSURE: 78 MMHG | HEIGHT: 63 IN | HEART RATE: 79 BPM | BODY MASS INDEX: 37.77 KG/M2 | OXYGEN SATURATION: 95 % | TEMPERATURE: 97.9 F | RESPIRATION RATE: 15 BRPM | WEIGHT: 213.19 LBS | SYSTOLIC BLOOD PRESSURE: 161 MMHG

## 2021-01-01 DIAGNOSIS — E87.6 HYPOKALEMIA: ICD-10-CM

## 2021-01-01 DIAGNOSIS — E83.42 HYPOMAGNESEMIA: ICD-10-CM

## 2021-01-01 DIAGNOSIS — I10 HYPERTENSION, UNSPECIFIED TYPE: Primary | ICD-10-CM

## 2021-01-01 DIAGNOSIS — N18.6 ESRD ON PERITONEAL DIALYSIS (HCC): ICD-10-CM

## 2021-01-01 DIAGNOSIS — R42 DIZZINESS: Primary | ICD-10-CM

## 2021-01-01 DIAGNOSIS — I16.0 HYPERTENSIVE URGENCY: ICD-10-CM

## 2021-01-01 DIAGNOSIS — R77.8 ELEVATED TROPONIN: ICD-10-CM

## 2021-01-01 DIAGNOSIS — Z99.2 ESRD ON PERITONEAL DIALYSIS (HCC): ICD-10-CM

## 2021-01-01 DIAGNOSIS — I63.9 CEREBROVASCULAR ACCIDENT (CVA), UNSPECIFIED MECHANISM (HCC): Primary | ICD-10-CM

## 2021-01-01 DIAGNOSIS — I16.1 HYPERTENSIVE EMERGENCY: ICD-10-CM

## 2021-01-01 DIAGNOSIS — Z98.890 HISTORY OF PERITONEAL DIALYSIS: Primary | ICD-10-CM

## 2021-01-01 LAB
A/G RATIO: 0.8 (ref 1.1–2.2)
ALBUMIN SERPL-MCNC: 2.6 G/DL (ref 3.4–5)
ALBUMIN SERPL-MCNC: 2.7 G/DL (ref 3.4–5)
ALBUMIN SERPL-MCNC: 2.9 G/DL (ref 3.4–5)
ALBUMIN SERPL-MCNC: 3 G/DL (ref 3.4–5)
ALBUMIN SERPL-MCNC: 3.1 G/DL (ref 3.4–5)
ALBUMIN SERPL-MCNC: 3.2 G/DL (ref 3.4–5)
ALBUMIN SERPL-MCNC: 3.3 G/DL (ref 3.4–5)
ALDOSTERONE: 4.1 NG/DL
ALP BLD-CCNC: 102 U/L (ref 40–129)
ALP BLD-CCNC: 103 U/L (ref 40–129)
ALP BLD-CCNC: 109 U/L (ref 40–129)
ALP BLD-CCNC: 99 U/L (ref 40–129)
ALT SERPL-CCNC: 6 U/L (ref 10–40)
ALT SERPL-CCNC: 6 U/L (ref 10–40)
ALT SERPL-CCNC: 7 U/L (ref 10–40)
ALT SERPL-CCNC: <5 U/L (ref 10–40)
ANION GAP SERPL CALCULATED.3IONS-SCNC: 12 MMOL/L (ref 3–16)
ANION GAP SERPL CALCULATED.3IONS-SCNC: 13 MMOL/L (ref 3–16)
ANION GAP SERPL CALCULATED.3IONS-SCNC: 15 MMOL/L (ref 3–16)
ANION GAP SERPL CALCULATED.3IONS-SCNC: 16 MMOL/L (ref 3–16)
ANION GAP SERPL CALCULATED.3IONS-SCNC: 17 MMOL/L (ref 3–16)
ANION GAP SERPL CALCULATED.3IONS-SCNC: 18 MMOL/L (ref 3–16)
APPEARANCE FLUID: CLEAR
AST SERPL-CCNC: 10 U/L (ref 15–37)
AST SERPL-CCNC: 7 U/L (ref 15–37)
AST SERPL-CCNC: 7 U/L (ref 15–37)
AST SERPL-CCNC: <5 U/L (ref 15–37)
BACTERIA: ABNORMAL /HPF
BASE EXCESS VENOUS: 5.6 MMOL/L
BASOPHILS ABSOLUTE: 0 K/UL (ref 0–0.2)
BASOPHILS ABSOLUTE: 0.1 K/UL (ref 0–0.2)
BASOPHILS ABSOLUTE: 0.1 K/UL (ref 0–0.2)
BASOPHILS RELATIVE PERCENT: 0.6 %
BASOPHILS RELATIVE PERCENT: 0.7 %
BASOPHILS RELATIVE PERCENT: 0.7 %
BASOPHILS RELATIVE PERCENT: 1 %
BETA-HYDROXYBUTYRATE: 0.12 MMOL/L (ref 0–0.27)
BILIRUB SERPL-MCNC: 0.3 MG/DL (ref 0–1)
BILIRUB SERPL-MCNC: 0.3 MG/DL (ref 0–1)
BILIRUB SERPL-MCNC: 0.4 MG/DL (ref 0–1)
BILIRUB SERPL-MCNC: 0.5 MG/DL (ref 0–1)
BILIRUBIN URINE: NEGATIVE
BLOOD, URINE: ABNORMAL
BUN BLDV-MCNC: 26 MG/DL (ref 7–20)
BUN BLDV-MCNC: 26 MG/DL (ref 7–20)
BUN BLDV-MCNC: 31 MG/DL (ref 7–20)
BUN BLDV-MCNC: 37 MG/DL (ref 7–20)
BUN BLDV-MCNC: 38 MG/DL (ref 7–20)
BUN BLDV-MCNC: 38 MG/DL (ref 7–20)
BUN BLDV-MCNC: 39 MG/DL (ref 7–20)
BUN BLDV-MCNC: 40 MG/DL (ref 7–20)
CALCIUM SERPL-MCNC: 7.8 MG/DL (ref 8.3–10.6)
CALCIUM SERPL-MCNC: 8 MG/DL (ref 8.3–10.6)
CALCIUM SERPL-MCNC: 8.1 MG/DL (ref 8.3–10.6)
CALCIUM SERPL-MCNC: 8.3 MG/DL (ref 8.3–10.6)
CALCIUM SERPL-MCNC: 8.4 MG/DL (ref 8.3–10.6)
CALCIUM SERPL-MCNC: 8.4 MG/DL (ref 8.3–10.6)
CALCIUM SERPL-MCNC: 8.5 MG/DL (ref 8.3–10.6)
CALCIUM SERPL-MCNC: 8.6 MG/DL (ref 8.3–10.6)
CALCIUM SERPL-MCNC: 8.6 MG/DL (ref 8.3–10.6)
CALCIUM SERPL-MCNC: 9.2 MG/DL (ref 8.3–10.6)
CARBOXYHEMOGLOBIN: 1.6 %
CELL COUNT FLUID TYPE: NORMAL
CHLORIDE BLD-SCNC: 85 MMOL/L (ref 99–110)
CHLORIDE BLD-SCNC: 86 MMOL/L (ref 99–110)
CHLORIDE BLD-SCNC: 87 MMOL/L (ref 99–110)
CHLORIDE BLD-SCNC: 88 MMOL/L (ref 99–110)
CHLORIDE BLD-SCNC: 89 MMOL/L (ref 99–110)
CHLORIDE BLD-SCNC: 90 MMOL/L (ref 99–110)
CHLORIDE BLD-SCNC: 90 MMOL/L (ref 99–110)
CHLORIDE BLD-SCNC: 91 MMOL/L (ref 99–110)
CHOLESTEROL, TOTAL: 173 MG/DL (ref 0–199)
CLARITY: CLEAR
CLOT EVALUATION: NORMAL
CO2: 24 MMOL/L (ref 21–32)
CO2: 25 MMOL/L (ref 21–32)
CO2: 26 MMOL/L (ref 21–32)
CO2: 26 MMOL/L (ref 21–32)
CO2: 27 MMOL/L (ref 21–32)
CO2: 27 MMOL/L (ref 21–32)
CO2: 28 MMOL/L (ref 21–32)
COLOR FLUID: COLORLESS
COLOR: YELLOW
COMMENT UA: ABNORMAL
CREAT SERPL-MCNC: 10.4 MG/DL (ref 0.6–1.1)
CREAT SERPL-MCNC: 10.5 MG/DL (ref 0.6–1.1)
CREAT SERPL-MCNC: 10.9 MG/DL (ref 0.6–1.1)
CREAT SERPL-MCNC: 11.3 MG/DL (ref 0.6–1.1)
CREAT SERPL-MCNC: 11.6 MG/DL (ref 0.6–1.1)
CREAT SERPL-MCNC: 11.6 MG/DL (ref 0.6–1.1)
CREAT SERPL-MCNC: 11.7 MG/DL (ref 0.6–1.1)
CREAT SERPL-MCNC: 11.9 MG/DL (ref 0.6–1.1)
CREAT SERPL-MCNC: 12.1 MG/DL (ref 0.6–1.1)
CREAT SERPL-MCNC: 12.1 MG/DL (ref 0.6–1.1)
CREAT SERPL-MCNC: 12.2 MG/DL (ref 0.6–1.1)
CREAT SERPL-MCNC: 9.8 MG/DL (ref 0.6–1.1)
EKG ATRIAL RATE: 73 BPM
EKG ATRIAL RATE: 82 BPM
EKG ATRIAL RATE: 84 BPM
EKG ATRIAL RATE: 89 BPM
EKG DIAGNOSIS: NORMAL
EKG P AXIS: -23 DEGREES
EKG P AXIS: 18 DEGREES
EKG P AXIS: 32 DEGREES
EKG P AXIS: 70 DEGREES
EKG P-R INTERVAL: 144 MS
EKG P-R INTERVAL: 160 MS
EKG P-R INTERVAL: 164 MS
EKG P-R INTERVAL: 164 MS
EKG Q-T INTERVAL: 398 MS
EKG Q-T INTERVAL: 414 MS
EKG Q-T INTERVAL: 428 MS
EKG Q-T INTERVAL: 448 MS
EKG QRS DURATION: 78 MS
EKG QRS DURATION: 80 MS
EKG QRS DURATION: 86 MS
EKG QRS DURATION: 86 MS
EKG QTC CALCULATION (BAZETT): 483 MS
EKG QTC CALCULATION (BAZETT): 484 MS
EKG QTC CALCULATION (BAZETT): 493 MS
EKG QTC CALCULATION (BAZETT): 505 MS
EKG R AXIS: 13 DEGREES
EKG R AXIS: 27 DEGREES
EKG R AXIS: 35 DEGREES
EKG R AXIS: 40 DEGREES
EKG T AXIS: 34 DEGREES
EKG T AXIS: 71 DEGREES
EKG T AXIS: 76 DEGREES
EKG T AXIS: 96 DEGREES
EKG VENTRICULAR RATE: 73 BPM
EKG VENTRICULAR RATE: 82 BPM
EKG VENTRICULAR RATE: 84 BPM
EKG VENTRICULAR RATE: 89 BPM
EOSINOPHILS ABSOLUTE: 0.1 K/UL (ref 0–0.6)
EOSINOPHILS ABSOLUTE: 0.1 K/UL (ref 0–0.6)
EOSINOPHILS ABSOLUTE: 0.2 K/UL (ref 0–0.6)
EOSINOPHILS ABSOLUTE: 0.3 K/UL (ref 0–0.6)
EOSINOPHILS RELATIVE PERCENT: 1.7 %
EOSINOPHILS RELATIVE PERCENT: 2 %
EOSINOPHILS RELATIVE PERCENT: 2.8 %
EOSINOPHILS RELATIVE PERCENT: 3.3 %
EOSINOPHILS RELATIVE PERCENT: 4.2 %
EOSINOPHILS RELATIVE PERCENT: 4.4 %
EPITHELIAL CELLS, UA: 4 /HPF (ref 0–5)
ESTIMATED AVERAGE GLUCOSE: 168.6 MG/DL
ESTIMATED AVERAGE GLUCOSE: 197.3 MG/DL
FLUID DIFF COMMENT: NORMAL
GFR AFRICAN AMERICAN: 4
GFR AFRICAN AMERICAN: 5
GFR NON-AFRICAN AMERICAN: 3
GFR NON-AFRICAN AMERICAN: 4
GLOBULIN: 3.6 G/DL
GLOBULIN: 3.7 G/DL
GLOBULIN: 3.8 G/DL
GLOBULIN: 3.9 G/DL
GLUCOSE BLD-MCNC: 141 MG/DL (ref 70–99)
GLUCOSE BLD-MCNC: 149 MG/DL (ref 70–99)
GLUCOSE BLD-MCNC: 152 MG/DL (ref 70–99)
GLUCOSE BLD-MCNC: 160 MG/DL (ref 70–99)
GLUCOSE BLD-MCNC: 164 MG/DL (ref 70–99)
GLUCOSE BLD-MCNC: 166 MG/DL (ref 70–99)
GLUCOSE BLD-MCNC: 167 MG/DL (ref 70–99)
GLUCOSE BLD-MCNC: 169 MG/DL (ref 70–99)
GLUCOSE BLD-MCNC: 173 MG/DL (ref 70–99)
GLUCOSE BLD-MCNC: 174 MG/DL (ref 70–99)
GLUCOSE BLD-MCNC: 183 MG/DL (ref 70–99)
GLUCOSE BLD-MCNC: 188 MG/DL (ref 70–99)
GLUCOSE BLD-MCNC: 194 MG/DL (ref 70–99)
GLUCOSE BLD-MCNC: 194 MG/DL (ref 70–99)
GLUCOSE BLD-MCNC: 196 MG/DL (ref 70–99)
GLUCOSE BLD-MCNC: 196 MG/DL (ref 70–99)
GLUCOSE BLD-MCNC: 198 MG/DL (ref 70–99)
GLUCOSE BLD-MCNC: 198 MG/DL (ref 70–99)
GLUCOSE BLD-MCNC: 200 MG/DL (ref 70–99)
GLUCOSE BLD-MCNC: 207 MG/DL (ref 70–99)
GLUCOSE BLD-MCNC: 213 MG/DL (ref 70–99)
GLUCOSE BLD-MCNC: 215 MG/DL (ref 70–99)
GLUCOSE BLD-MCNC: 217 MG/DL (ref 70–99)
GLUCOSE BLD-MCNC: 221 MG/DL (ref 70–99)
GLUCOSE BLD-MCNC: 223 MG/DL (ref 70–99)
GLUCOSE BLD-MCNC: 225 MG/DL (ref 70–99)
GLUCOSE BLD-MCNC: 226 MG/DL (ref 70–99)
GLUCOSE BLD-MCNC: 228 MG/DL (ref 70–99)
GLUCOSE BLD-MCNC: 229 MG/DL (ref 70–99)
GLUCOSE BLD-MCNC: 240 MG/DL (ref 70–99)
GLUCOSE BLD-MCNC: 240 MG/DL (ref 70–99)
GLUCOSE BLD-MCNC: 241 MG/DL (ref 70–99)
GLUCOSE BLD-MCNC: 244 MG/DL (ref 70–99)
GLUCOSE BLD-MCNC: 247 MG/DL (ref 70–99)
GLUCOSE BLD-MCNC: 259 MG/DL (ref 70–99)
GLUCOSE BLD-MCNC: 260 MG/DL (ref 70–99)
GLUCOSE BLD-MCNC: 261 MG/DL (ref 70–99)
GLUCOSE BLD-MCNC: 273 MG/DL (ref 70–99)
GLUCOSE BLD-MCNC: 319 MG/DL (ref 70–99)
GLUCOSE BLD-MCNC: 322 MG/DL (ref 70–99)
GLUCOSE BLD-MCNC: 361 MG/DL (ref 70–99)
GLUCOSE URINE: 250 MG/DL
HBA1C MFR BLD: 7.5 %
HBA1C MFR BLD: 8.5 %
HCO3 VENOUS: 31 MMOL/L (ref 23–29)
HCT VFR BLD CALC: 29 % (ref 36–48)
HCT VFR BLD CALC: 30.2 % (ref 36–48)
HCT VFR BLD CALC: 31.1 % (ref 36–48)
HCT VFR BLD CALC: 32.4 % (ref 36–48)
HCT VFR BLD CALC: 33 % (ref 36–48)
HCT VFR BLD CALC: 35.5 % (ref 36–48)
HCT VFR BLD CALC: 36.4 % (ref 36–48)
HDLC SERPL-MCNC: 30 MG/DL (ref 40–60)
HEMOGLOBIN: 10.3 G/DL (ref 12–16)
HEMOGLOBIN: 10.7 G/DL (ref 12–16)
HEMOGLOBIN: 11 G/DL (ref 12–16)
HEMOGLOBIN: 11 G/DL (ref 12–16)
HEMOGLOBIN: 12 G/DL (ref 12–16)
HEMOGLOBIN: 12.6 G/DL (ref 12–16)
HEMOGLOBIN: 9.9 G/DL (ref 12–16)
HYALINE CASTS: 0 /LPF (ref 0–8)
INR BLD: 0.97 (ref 0.88–1.12)
KETONES, URINE: NEGATIVE MG/DL
LDL CHOLESTEROL CALCULATED: 117 MG/DL
LEUKOCYTE ESTERASE, URINE: NEGATIVE
LIPASE: 48 U/L (ref 13–60)
LYMPHOCYTES ABSOLUTE: 0.7 K/UL (ref 1–5.1)
LYMPHOCYTES ABSOLUTE: 0.9 K/UL (ref 1–5.1)
LYMPHOCYTES ABSOLUTE: 0.9 K/UL (ref 1–5.1)
LYMPHOCYTES ABSOLUTE: 1 K/UL (ref 1–5.1)
LYMPHOCYTES ABSOLUTE: 1 K/UL (ref 1–5.1)
LYMPHOCYTES ABSOLUTE: 1.1 K/UL (ref 1–5.1)
LYMPHOCYTES RELATIVE PERCENT: 10.8 %
LYMPHOCYTES RELATIVE PERCENT: 12.9 %
LYMPHOCYTES RELATIVE PERCENT: 13.6 %
LYMPHOCYTES RELATIVE PERCENT: 14.9 %
LYMPHOCYTES RELATIVE PERCENT: 19.1 %
LYMPHOCYTES RELATIVE PERCENT: 9.7 %
MAGNESIUM: 1.4 MG/DL (ref 1.8–2.4)
MAGNESIUM: 1.5 MG/DL (ref 1.8–2.4)
MAGNESIUM: 1.6 MG/DL (ref 1.8–2.4)
MAGNESIUM: 1.8 MG/DL (ref 1.8–2.4)
MCH RBC QN AUTO: 30.3 PG (ref 26–34)
MCH RBC QN AUTO: 30.7 PG (ref 26–34)
MCH RBC QN AUTO: 31.1 PG (ref 26–34)
MCH RBC QN AUTO: 32.3 PG (ref 26–34)
MCH RBC QN AUTO: 32.5 PG (ref 26–34)
MCHC RBC AUTO-ENTMCNC: 33.5 G/DL (ref 31–36)
MCHC RBC AUTO-ENTMCNC: 33.7 G/DL (ref 31–36)
MCHC RBC AUTO-ENTMCNC: 33.9 G/DL (ref 31–36)
MCHC RBC AUTO-ENTMCNC: 34.2 G/DL (ref 31–36)
MCHC RBC AUTO-ENTMCNC: 34.3 G/DL (ref 31–36)
MCHC RBC AUTO-ENTMCNC: 34.4 G/DL (ref 31–36)
MCHC RBC AUTO-ENTMCNC: 34.6 G/DL (ref 31–36)
MCV RBC AUTO: 88.9 FL (ref 80–100)
MCV RBC AUTO: 90.4 FL (ref 80–100)
MCV RBC AUTO: 90.9 FL (ref 80–100)
MCV RBC AUTO: 91.7 FL (ref 80–100)
MCV RBC AUTO: 92.3 FL (ref 80–100)
MCV RBC AUTO: 94.6 FL (ref 80–100)
MCV RBC AUTO: 94.7 FL (ref 80–100)
METHEMOGLOBIN VENOUS: 0.8 %
MICROSCOPIC EXAMINATION: YES
MONOCYTES ABSOLUTE: 0.4 K/UL (ref 0–1.3)
MONOCYTES RELATIVE PERCENT: 4.5 %
MONOCYTES RELATIVE PERCENT: 4.9 %
MONOCYTES RELATIVE PERCENT: 5.3 %
MONOCYTES RELATIVE PERCENT: 6.2 %
MONOCYTES RELATIVE PERCENT: 6.3 %
MONOCYTES RELATIVE PERCENT: 6.4 %
NEUTROPHILS ABSOLUTE: 4.2 K/UL (ref 1.7–7.7)
NEUTROPHILS ABSOLUTE: 5.1 K/UL (ref 1.7–7.7)
NEUTROPHILS ABSOLUTE: 5.2 K/UL (ref 1.7–7.7)
NEUTROPHILS ABSOLUTE: 5.9 K/UL (ref 1.7–7.7)
NEUTROPHILS ABSOLUTE: 5.9 K/UL (ref 1.7–7.7)
NEUTROPHILS ABSOLUTE: 6.6 K/UL (ref 1.7–7.7)
NEUTROPHILS RELATIVE PERCENT: 71.1 %
NEUTROPHILS RELATIVE PERCENT: 74 %
NEUTROPHILS RELATIVE PERCENT: 76.8 %
NEUTROPHILS RELATIVE PERCENT: 77.6 %
NEUTROPHILS RELATIVE PERCENT: 80.7 %
NEUTROPHILS RELATIVE PERCENT: 82.6 %
NITRITE, URINE: NEGATIVE
NUCLEATED CELLS FLUID: 2 /CUMM
O2 SAT, VEN: 47 %
O2 THERAPY: ABNORMAL
PCO2, VEN: 48.3 MMHG (ref 40–50)
PDW BLD-RTO: 14.9 % (ref 12.4–15.4)
PDW BLD-RTO: 15.1 % (ref 12.4–15.4)
PDW BLD-RTO: 15.1 % (ref 12.4–15.4)
PDW BLD-RTO: 15.2 % (ref 12.4–15.4)
PDW BLD-RTO: 15.6 % (ref 12.4–15.4)
PDW BLD-RTO: 15.7 % (ref 12.4–15.4)
PDW BLD-RTO: 15.9 % (ref 12.4–15.4)
PERFORMED ON: ABNORMAL
PH UA: 7 (ref 5–8)
PH VENOUS: 7.42 (ref 7.35–7.45)
PHOSPHORUS: 4.4 MG/DL (ref 2.5–4.9)
PHOSPHORUS: 4.5 MG/DL (ref 2.5–4.9)
PHOSPHORUS: 4.7 MG/DL (ref 2.5–4.9)
PHOSPHORUS: 5 MG/DL (ref 2.5–4.9)
PHOSPHORUS: 5.7 MG/DL (ref 2.5–4.9)
PLATELET # BLD: 144 K/UL (ref 135–450)
PLATELET # BLD: 152 K/UL (ref 135–450)
PLATELET # BLD: 158 K/UL (ref 135–450)
PLATELET # BLD: 161 K/UL (ref 135–450)
PLATELET # BLD: 168 K/UL (ref 135–450)
PLATELET # BLD: 175 K/UL (ref 135–450)
PLATELET # BLD: 178 K/UL (ref 135–450)
PMV BLD AUTO: 10.1 FL (ref 5–10.5)
PMV BLD AUTO: 10.5 FL (ref 5–10.5)
PMV BLD AUTO: 10.5 FL (ref 5–10.5)
PMV BLD AUTO: 10.6 FL (ref 5–10.5)
PMV BLD AUTO: 10.9 FL (ref 5–10.5)
PMV BLD AUTO: 11 FL (ref 5–10.5)
PMV BLD AUTO: 11.4 FL (ref 5–10.5)
PO2, VEN: <30 MMHG
POTASSIUM REFLEX MAGNESIUM: 2.8 MMOL/L (ref 3.5–5.1)
POTASSIUM REFLEX MAGNESIUM: 2.9 MMOL/L (ref 3.5–5.1)
POTASSIUM REFLEX MAGNESIUM: 3 MMOL/L (ref 3.5–5.1)
POTASSIUM REFLEX MAGNESIUM: 3.3 MMOL/L (ref 3.5–5.1)
POTASSIUM REFLEX MAGNESIUM: 3.7 MMOL/L (ref 3.5–5.1)
POTASSIUM REFLEX MAGNESIUM: 3.9 MMOL/L (ref 3.5–5.1)
POTASSIUM REFLEX MAGNESIUM: 3.9 MMOL/L (ref 3.5–5.1)
POTASSIUM SERPL-SCNC: 3.2 MMOL/L (ref 3.5–5.1)
POTASSIUM SERPL-SCNC: 3.3 MMOL/L (ref 3.5–5.1)
POTASSIUM SERPL-SCNC: 3.7 MMOL/L (ref 3.5–5.1)
POTASSIUM SERPL-SCNC: 3.7 MMOL/L (ref 3.5–5.1)
POTASSIUM SERPL-SCNC: 3.8 MMOL/L (ref 3.5–5.1)
PRO-BNP: ABNORMAL PG/ML (ref 0–124)
PRO-BNP: ABNORMAL PG/ML (ref 0–124)
PROTEIN UA: 100 MG/DL
PROTHROMBIN TIME: 11 SEC (ref 9.9–12.7)
RBC # BLD: 3.06 M/UL (ref 4–5.2)
RBC # BLD: 3.28 M/UL (ref 4–5.2)
RBC # BLD: 3.32 M/UL (ref 4–5.2)
RBC # BLD: 3.54 M/UL (ref 4–5.2)
RBC # BLD: 3.65 M/UL (ref 4–5.2)
RBC # BLD: 3.85 M/UL (ref 4–5.2)
RBC # BLD: 4.09 M/UL (ref 4–5.2)
RBC FLUID: 5 /CUMM
RBC UA: 4 /HPF (ref 0–4)
RENIN ACTIVITY: 5.9 NG/ML/HR
SODIUM BLD-SCNC: 126 MMOL/L (ref 136–145)
SODIUM BLD-SCNC: 127 MMOL/L (ref 136–145)
SODIUM BLD-SCNC: 128 MMOL/L (ref 136–145)
SODIUM BLD-SCNC: 129 MMOL/L (ref 136–145)
SODIUM BLD-SCNC: 130 MMOL/L (ref 136–145)
SODIUM BLD-SCNC: 130 MMOL/L (ref 136–145)
SODIUM BLD-SCNC: 131 MMOL/L (ref 136–145)
SPECIFIC GRAVITY UA: 1.02 (ref 1–1.03)
TCO2 CALC VENOUS: 32 MMOL/L
TOTAL PROTEIN: 6.6 G/DL (ref 6.4–8.2)
TOTAL PROTEIN: 6.8 G/DL (ref 6.4–8.2)
TOTAL PROTEIN: 7 G/DL (ref 6.4–8.2)
TOTAL PROTEIN: 7.2 G/DL (ref 6.4–8.2)
TRIGL SERPL-MCNC: 131 MG/DL (ref 0–150)
TROPONIN: 0.16 NG/ML
TROPONIN: 0.17 NG/ML
TROPONIN: 0.2 NG/ML
TROPONIN: 0.21 NG/ML
URINE TYPE: ABNORMAL
UROBILINOGEN, URINE: 0.2 E.U./DL
VLDLC SERPL CALC-MCNC: 26 MG/DL
WBC # BLD: 5.9 K/UL (ref 4–11)
WBC # BLD: 6.4 K/UL (ref 4–11)
WBC # BLD: 6.7 K/UL (ref 4–11)
WBC # BLD: 6.8 K/UL (ref 4–11)
WBC # BLD: 7.2 K/UL (ref 4–11)
WBC # BLD: 7.6 K/UL (ref 4–11)
WBC # BLD: 8.2 K/UL (ref 4–11)
WBC UA: 3 /HPF (ref 0–5)
YEAST: PRESENT /HPF

## 2021-01-01 PROCEDURE — 2060000000 HC ICU INTERMEDIATE R&B

## 2021-01-01 PROCEDURE — 83036 HEMOGLOBIN GLYCOSYLATED A1C: CPT

## 2021-01-01 PROCEDURE — 6370000000 HC RX 637 (ALT 250 FOR IP): Performed by: HOSPITALIST

## 2021-01-01 PROCEDURE — 99233 SBSQ HOSP IP/OBS HIGH 50: CPT | Performed by: INTERNAL MEDICINE

## 2021-01-01 PROCEDURE — 6360000002 HC RX W HCPCS: Performed by: INTERNAL MEDICINE

## 2021-01-01 PROCEDURE — 2580000003 HC RX 258: Performed by: HOSPITALIST

## 2021-01-01 PROCEDURE — 71045 X-RAY EXAM CHEST 1 VIEW: CPT

## 2021-01-01 PROCEDURE — 80053 COMPREHEN METABOLIC PANEL: CPT

## 2021-01-01 PROCEDURE — 6370000000 HC RX 637 (ALT 250 FOR IP): Performed by: INTERNAL MEDICINE

## 2021-01-01 PROCEDURE — 85027 COMPLETE CBC AUTOMATED: CPT

## 2021-01-01 PROCEDURE — 36415 COLL VENOUS BLD VENIPUNCTURE: CPT

## 2021-01-01 PROCEDURE — 6360000002 HC RX W HCPCS: Performed by: HOSPITALIST

## 2021-01-01 PROCEDURE — 93880 EXTRACRANIAL BILAT STUDY: CPT

## 2021-01-01 PROCEDURE — 2580000003 HC RX 258: Performed by: INTERNAL MEDICINE

## 2021-01-01 PROCEDURE — 84484 ASSAY OF TROPONIN QUANT: CPT

## 2021-01-01 PROCEDURE — 81001 URINALYSIS AUTO W/SCOPE: CPT

## 2021-01-01 PROCEDURE — 83735 ASSAY OF MAGNESIUM: CPT

## 2021-01-01 PROCEDURE — 90945 DIALYSIS ONE EVALUATION: CPT

## 2021-01-01 PROCEDURE — 93010 ELECTROCARDIOGRAM REPORT: CPT | Performed by: INTERNAL MEDICINE

## 2021-01-01 PROCEDURE — 70498 CT ANGIOGRAPHY NECK: CPT

## 2021-01-01 PROCEDURE — 83880 ASSAY OF NATRIURETIC PEPTIDE: CPT

## 2021-01-01 PROCEDURE — 99285 EMERGENCY DEPT VISIT HI MDM: CPT

## 2021-01-01 PROCEDURE — 90945 DIALYSIS ONE EVALUATION: CPT | Performed by: HOSPITALIST

## 2021-01-01 PROCEDURE — 93975 VASCULAR STUDY: CPT

## 2021-01-01 PROCEDURE — 96366 THER/PROPH/DIAG IV INF ADDON: CPT

## 2021-01-01 PROCEDURE — 85025 COMPLETE CBC W/AUTO DIFF WBC: CPT

## 2021-01-01 PROCEDURE — 6370000000 HC RX 637 (ALT 250 FOR IP): Performed by: EMERGENCY MEDICINE

## 2021-01-01 PROCEDURE — 97165 OT EVAL LOW COMPLEX 30 MIN: CPT

## 2021-01-01 PROCEDURE — 90945 DIALYSIS ONE EVALUATION: CPT | Performed by: INTERNAL MEDICINE

## 2021-01-01 PROCEDURE — 89050 BODY FLUID CELL COUNT: CPT

## 2021-01-01 PROCEDURE — 80069 RENAL FUNCTION PANEL: CPT

## 2021-01-01 PROCEDURE — 97116 GAIT TRAINING THERAPY: CPT | Performed by: PHYSICAL THERAPIST

## 2021-01-01 PROCEDURE — 93005 ELECTROCARDIOGRAM TRACING: CPT | Performed by: EMERGENCY MEDICINE

## 2021-01-01 PROCEDURE — 94760 N-INVAS EAR/PLS OXIMETRY 1: CPT

## 2021-01-01 PROCEDURE — 70450 CT HEAD/BRAIN W/O DYE: CPT

## 2021-01-01 PROCEDURE — 82803 BLOOD GASES ANY COMBINATION: CPT

## 2021-01-01 PROCEDURE — 80048 BASIC METABOLIC PNL TOTAL CA: CPT

## 2021-01-01 PROCEDURE — 3E1M39Z IRRIGATION OF PERITONEAL CAVITY USING DIALYSATE, PERCUTANEOUS APPROACH: ICD-10-PCS | Performed by: HOSPITALIST

## 2021-01-01 PROCEDURE — 97535 SELF CARE MNGMENT TRAINING: CPT

## 2021-01-01 PROCEDURE — 3E1M39Z IRRIGATION OF PERITONEAL CAVITY USING DIALYSATE, PERCUTANEOUS APPROACH: ICD-10-PCS | Performed by: INTERNAL MEDICINE

## 2021-01-01 PROCEDURE — 99233 SBSQ HOSP IP/OBS HIGH 50: CPT | Performed by: HOSPITALIST

## 2021-01-01 PROCEDURE — 94761 N-INVAS EAR/PLS OXIMETRY MLT: CPT

## 2021-01-01 PROCEDURE — 99223 1ST HOSP IP/OBS HIGH 75: CPT | Performed by: HOSPITALIST

## 2021-01-01 PROCEDURE — 96367 TX/PROPH/DG ADDL SEQ IV INF: CPT

## 2021-01-01 PROCEDURE — 82088 ASSAY OF ALDOSTERONE: CPT

## 2021-01-01 PROCEDURE — 6360000004 HC RX CONTRAST MEDICATION: Performed by: EMERGENCY MEDICINE

## 2021-01-01 PROCEDURE — 80061 LIPID PANEL: CPT

## 2021-01-01 PROCEDURE — 74176 CT ABD & PELVIS W/O CONTRAST: CPT

## 2021-01-01 PROCEDURE — 99284 EMERGENCY DEPT VISIT MOD MDM: CPT

## 2021-01-01 PROCEDURE — 2580000003 HC RX 258: Performed by: NURSE PRACTITIONER

## 2021-01-01 PROCEDURE — 96365 THER/PROPH/DIAG IV INF INIT: CPT

## 2021-01-01 PROCEDURE — 70551 MRI BRAIN STEM W/O DYE: CPT

## 2021-01-01 PROCEDURE — 6370000000 HC RX 637 (ALT 250 FOR IP): Performed by: NURSE PRACTITIONER

## 2021-01-01 PROCEDURE — 99223 1ST HOSP IP/OBS HIGH 75: CPT | Performed by: INTERNAL MEDICINE

## 2021-01-01 PROCEDURE — 93005 ELECTROCARDIOGRAM TRACING: CPT | Performed by: NURSE PRACTITIONER

## 2021-01-01 PROCEDURE — 2500000003 HC RX 250 WO HCPCS: Performed by: INTERNAL MEDICINE

## 2021-01-01 PROCEDURE — 83690 ASSAY OF LIPASE: CPT

## 2021-01-01 PROCEDURE — 85610 PROTHROMBIN TIME: CPT

## 2021-01-01 PROCEDURE — 70496 CT ANGIOGRAPHY HEAD: CPT

## 2021-01-01 PROCEDURE — 6360000002 HC RX W HCPCS: Performed by: EMERGENCY MEDICINE

## 2021-01-01 PROCEDURE — 2500000003 HC RX 250 WO HCPCS: Performed by: NURSE PRACTITIONER

## 2021-01-01 PROCEDURE — 2580000003 HC RX 258

## 2021-01-01 PROCEDURE — 1200000000 HC SEMI PRIVATE

## 2021-01-01 PROCEDURE — 97161 PT EVAL LOW COMPLEX 20 MIN: CPT | Performed by: PHYSICAL THERAPIST

## 2021-01-01 PROCEDURE — 6360000002 HC RX W HCPCS: Performed by: NURSE PRACTITIONER

## 2021-01-01 PROCEDURE — 71046 X-RAY EXAM CHEST 2 VIEWS: CPT

## 2021-01-01 PROCEDURE — 82010 KETONE BODYS QUAN: CPT

## 2021-01-01 PROCEDURE — 99283 EMERGENCY DEPT VISIT LOW MDM: CPT

## 2021-01-01 PROCEDURE — 84244 ASSAY OF RENIN: CPT

## 2021-01-01 RX ORDER — TORSEMIDE 100 MG/1
400 TABLET ORAL NIGHTLY
Status: DISCONTINUED | OUTPATIENT
Start: 2021-01-01 | End: 2021-01-01 | Stop reason: HOSPADM

## 2021-01-01 RX ORDER — HYDRALAZINE HYDROCHLORIDE 25 MG/1
25 TABLET, FILM COATED ORAL ONCE
Status: COMPLETED | OUTPATIENT
Start: 2021-01-01 | End: 2021-01-01

## 2021-01-01 RX ORDER — ACETAMINOPHEN 650 MG/1
650 SUPPOSITORY RECTAL EVERY 6 HOURS PRN
Status: DISCONTINUED | OUTPATIENT
Start: 2021-01-01 | End: 2021-01-01 | Stop reason: HOSPADM

## 2021-01-01 RX ORDER — PANTOPRAZOLE SODIUM 40 MG/1
40 TABLET, DELAYED RELEASE ORAL
Status: DISCONTINUED | OUTPATIENT
Start: 2021-01-01 | End: 2021-01-01 | Stop reason: HOSPADM

## 2021-01-01 RX ORDER — SODIUM CHLORIDE 0.9 % (FLUSH) 0.9 %
5-40 SYRINGE (ML) INJECTION EVERY 12 HOURS SCHEDULED
Status: DISCONTINUED | OUTPATIENT
Start: 2021-01-01 | End: 2021-01-01 | Stop reason: HOSPADM

## 2021-01-01 RX ORDER — POTASSIUM CHLORIDE 20 MEQ/1
40 TABLET, EXTENDED RELEASE ORAL ONCE
Status: COMPLETED | OUTPATIENT
Start: 2021-01-01 | End: 2021-01-01

## 2021-01-01 RX ORDER — DEXTROSE MONOHYDRATE 50 MG/ML
100 INJECTION, SOLUTION INTRAVENOUS PRN
Status: DISCONTINUED | OUTPATIENT
Start: 2021-01-01 | End: 2021-01-01 | Stop reason: HOSPADM

## 2021-01-01 RX ORDER — 0.9 % SODIUM CHLORIDE 0.9 %
500 INTRAVENOUS SOLUTION INTRAVENOUS ONCE
Status: COMPLETED | OUTPATIENT
Start: 2021-01-01 | End: 2021-01-01

## 2021-01-01 RX ORDER — LABETALOL 100 MG/1
100 TABLET, FILM COATED ORAL ONCE
Status: COMPLETED | OUTPATIENT
Start: 2021-01-01 | End: 2021-01-01

## 2021-01-01 RX ORDER — ASPIRIN 81 MG/1
324 TABLET, CHEWABLE ORAL ONCE
Status: COMPLETED | OUTPATIENT
Start: 2021-01-01 | End: 2021-01-01

## 2021-01-01 RX ORDER — SPIRONOLACTONE 100 MG/1
100 TABLET, FILM COATED ORAL DAILY
Status: DISCONTINUED | OUTPATIENT
Start: 2021-01-01 | End: 2021-01-01

## 2021-01-01 RX ORDER — HYDRALAZINE HYDROCHLORIDE 50 MG/1
50 TABLET, FILM COATED ORAL 3 TIMES DAILY
Status: DISCONTINUED | OUTPATIENT
Start: 2021-01-01 | End: 2021-01-01 | Stop reason: HOSPADM

## 2021-01-01 RX ORDER — LOSARTAN POTASSIUM 100 MG/1
100 TABLET ORAL NIGHTLY
Status: DISCONTINUED | OUTPATIENT
Start: 2021-01-01 | End: 2021-01-01 | Stop reason: HOSPADM

## 2021-01-01 RX ORDER — POLYETHYLENE GLYCOL 3350 17 G/17G
17 POWDER, FOR SOLUTION ORAL DAILY PRN
Qty: 527 G | Refills: 1 | Status: SHIPPED | OUTPATIENT
Start: 2021-01-01 | End: 2021-01-01

## 2021-01-01 RX ORDER — SODIUM CHLORIDE, SODIUM LACTATE, CALCIUM CHLORIDE, MAGNESIUM CHLORIDE AND DEXTROSE 4.25; 538; 448; 18.3; 5.08 G/100ML; MG/100ML; MG/100ML; MG/100ML; MG/100ML
6000 INJECTION, SOLUTION INTRAPERITONEAL NIGHTLY
Status: DISCONTINUED | OUTPATIENT
Start: 2021-01-01 | End: 2021-01-01 | Stop reason: SDUPTHER

## 2021-01-01 RX ORDER — ERGOCALCIFEROL (VITAMIN D2) 1250 MCG
50000 CAPSULE ORAL WEEKLY
COMMUNITY
End: 2022-01-01

## 2021-01-01 RX ORDER — ACETAMINOPHEN 325 MG/1
650 TABLET ORAL EVERY 6 HOURS PRN
Status: DISCONTINUED | OUTPATIENT
Start: 2021-01-01 | End: 2021-01-01 | Stop reason: HOSPADM

## 2021-01-01 RX ORDER — SODIUM CHLORIDE, SODIUM LACTATE, CALCIUM CHLORIDE, MAGNESIUM CHLORIDE AND DEXTROSE 2.5; 538; 448; 18.3; 5.08 G/100ML; MG/100ML; MG/100ML; MG/100ML; MG/100ML
6000 INJECTION, SOLUTION INTRAPERITONEAL NIGHTLY
Status: DISCONTINUED | OUTPATIENT
Start: 2021-01-01 | End: 2021-01-01 | Stop reason: HOSPADM

## 2021-01-01 RX ORDER — HEPARIN SODIUM 5000 [USP'U]/ML
5000 INJECTION, SOLUTION INTRAVENOUS; SUBCUTANEOUS EVERY 8 HOURS SCHEDULED
Status: DISCONTINUED | OUTPATIENT
Start: 2021-01-01 | End: 2021-01-01 | Stop reason: HOSPADM

## 2021-01-01 RX ORDER — INSULIN LISPRO 100 [IU]/ML
0-6 INJECTION, SOLUTION INTRAVENOUS; SUBCUTANEOUS
Status: DISCONTINUED | OUTPATIENT
Start: 2021-01-01 | End: 2021-01-01 | Stop reason: HOSPADM

## 2021-01-01 RX ORDER — NIFEDIPINE 60 MG/1
60 TABLET, FILM COATED, EXTENDED RELEASE ORAL DAILY
Qty: 30 TABLET | Refills: 0 | Status: SHIPPED | OUTPATIENT
Start: 2021-01-01 | End: 2021-01-01

## 2021-01-01 RX ORDER — LABETALOL 200 MG/1
200 TABLET, FILM COATED ORAL EVERY 12 HOURS SCHEDULED
Status: DISCONTINUED | OUTPATIENT
Start: 2021-01-01 | End: 2021-01-01 | Stop reason: HOSPADM

## 2021-01-01 RX ORDER — POLYETHYLENE GLYCOL 3350 17 G/17G
17 POWDER, FOR SOLUTION ORAL DAILY PRN
Status: DISCONTINUED | OUTPATIENT
Start: 2021-01-01 | End: 2021-01-01 | Stop reason: HOSPADM

## 2021-01-01 RX ORDER — NICOTINE POLACRILEX 4 MG
15 LOZENGE BUCCAL PRN
Status: DISCONTINUED | OUTPATIENT
Start: 2021-01-01 | End: 2021-01-01 | Stop reason: HOSPADM

## 2021-01-01 RX ORDER — POTASSIUM CHLORIDE 7.45 MG/ML
10 INJECTION INTRAVENOUS ONCE
Status: COMPLETED | OUTPATIENT
Start: 2021-01-01 | End: 2021-01-01

## 2021-01-01 RX ORDER — ONDANSETRON 4 MG/1
4 TABLET, ORALLY DISINTEGRATING ORAL EVERY 8 HOURS PRN
Status: DISCONTINUED | OUTPATIENT
Start: 2021-01-01 | End: 2021-01-01 | Stop reason: HOSPADM

## 2021-01-01 RX ORDER — PANTOPRAZOLE SODIUM 40 MG/1
40 TABLET, DELAYED RELEASE ORAL DAILY
Status: DISCONTINUED | OUTPATIENT
Start: 2021-01-01 | End: 2021-01-01 | Stop reason: HOSPADM

## 2021-01-01 RX ORDER — HYDRALAZINE HYDROCHLORIDE 100 MG/1
100 TABLET, FILM COATED ORAL 3 TIMES DAILY
Qty: 60 TABLET | Refills: 3 | Status: SHIPPED | OUTPATIENT
Start: 2021-01-01 | End: 2021-01-01

## 2021-01-01 RX ORDER — SPIRONOLACTONE 25 MG/1
50 TABLET ORAL 2 TIMES DAILY
Qty: 30 TABLET | Refills: 3 | Status: SHIPPED | OUTPATIENT
Start: 2021-01-01 | End: 2021-01-01

## 2021-01-01 RX ORDER — LABETALOL HYDROCHLORIDE 5 MG/ML
10 INJECTION, SOLUTION INTRAVENOUS ONCE
Status: COMPLETED | OUTPATIENT
Start: 2021-01-01 | End: 2021-01-01

## 2021-01-01 RX ORDER — ASPIRIN 300 MG/1
300 SUPPOSITORY RECTAL DAILY
Status: DISCONTINUED | OUTPATIENT
Start: 2021-01-01 | End: 2021-01-01 | Stop reason: HOSPADM

## 2021-01-01 RX ORDER — DEXTROSE MONOHYDRATE 25 G/50ML
12.5 INJECTION, SOLUTION INTRAVENOUS PRN
Status: DISCONTINUED | OUTPATIENT
Start: 2021-01-01 | End: 2021-01-01 | Stop reason: HOSPADM

## 2021-01-01 RX ORDER — AMLODIPINE BESYLATE 10 MG/1
10 TABLET ORAL DAILY
Qty: 30 TABLET | Refills: 3 | Status: SHIPPED | OUTPATIENT
Start: 2021-01-01 | End: 2022-01-01

## 2021-01-01 RX ORDER — SODIUM CHLORIDE, SODIUM LACTATE, CALCIUM CHLORIDE, MAGNESIUM CHLORIDE AND DEXTROSE 2.5; 538; 448; 18.3; 5.08 G/100ML; MG/100ML; MG/100ML; MG/100ML; MG/100ML
3000 INJECTION, SOLUTION INTRAPERITONEAL NIGHTLY
Status: DISCONTINUED | OUTPATIENT
Start: 2021-01-01 | End: 2021-01-01

## 2021-01-01 RX ORDER — LOSARTAN POTASSIUM 100 MG/1
200 TABLET ORAL NIGHTLY
Status: DISCONTINUED | OUTPATIENT
Start: 2021-01-01 | End: 2021-01-01 | Stop reason: HOSPADM

## 2021-01-01 RX ORDER — HYDRALAZINE HYDROCHLORIDE 100 MG/1
100 TABLET, FILM COATED ORAL 3 TIMES DAILY
Status: DISCONTINUED | OUTPATIENT
Start: 2021-01-01 | End: 2021-01-01

## 2021-01-01 RX ORDER — TORSEMIDE 100 MG/1
200 TABLET ORAL 2 TIMES DAILY
Status: DISCONTINUED | OUTPATIENT
Start: 2021-01-01 | End: 2021-01-01

## 2021-01-01 RX ORDER — HYDRALAZINE HYDROCHLORIDE 20 MG/ML
10 INJECTION INTRAMUSCULAR; INTRAVENOUS EVERY 6 HOURS PRN
Status: DISCONTINUED | OUTPATIENT
Start: 2021-01-01 | End: 2021-01-01 | Stop reason: HOSPADM

## 2021-01-01 RX ORDER — HYDRALAZINE HYDROCHLORIDE 20 MG/ML
5 INJECTION INTRAMUSCULAR; INTRAVENOUS EVERY 4 HOURS PRN
Status: DISCONTINUED | OUTPATIENT
Start: 2021-01-01 | End: 2021-01-01 | Stop reason: HOSPADM

## 2021-01-01 RX ORDER — SODIUM CHLORIDE, SODIUM LACTATE, CALCIUM CHLORIDE, MAGNESIUM CHLORIDE AND DEXTROSE 4.25; 538; 448; 18.3; 5.08 G/100ML; MG/100ML; MG/100ML; MG/100ML; MG/100ML
5000 INJECTION, SOLUTION INTRAPERITONEAL NIGHTLY
Status: DISCONTINUED | OUTPATIENT
Start: 2021-01-01 | End: 2021-01-01 | Stop reason: HOSPADM

## 2021-01-01 RX ORDER — SPIRONOLACTONE 50 MG/1
50 TABLET, FILM COATED ORAL 2 TIMES DAILY
Status: DISCONTINUED | OUTPATIENT
Start: 2021-01-01 | End: 2021-01-01

## 2021-01-01 RX ORDER — SPIRONOLACTONE 25 MG/1
50 TABLET ORAL DAILY
Status: ON HOLD | COMMUNITY
End: 2021-01-01 | Stop reason: SDUPTHER

## 2021-01-01 RX ORDER — SODIUM CHLORIDE 9 MG/ML
25 INJECTION, SOLUTION INTRAVENOUS PRN
Status: DISCONTINUED | OUTPATIENT
Start: 2021-01-01 | End: 2021-01-01 | Stop reason: HOSPADM

## 2021-01-01 RX ORDER — SODIUM CHLORIDE 0.9 % (FLUSH) 0.9 %
5-40 SYRINGE (ML) INJECTION PRN
Status: DISCONTINUED | OUTPATIENT
Start: 2021-01-01 | End: 2021-01-01 | Stop reason: HOSPADM

## 2021-01-01 RX ORDER — LABETALOL 100 MG/1
100 TABLET, FILM COATED ORAL EVERY 12 HOURS SCHEDULED
Status: DISCONTINUED | OUTPATIENT
Start: 2021-01-01 | End: 2021-01-01

## 2021-01-01 RX ORDER — LOSARTAN POTASSIUM 100 MG/1
100 TABLET ORAL NIGHTLY
COMMUNITY
End: 2022-01-01

## 2021-01-01 RX ORDER — SODIUM CHLORIDE, SODIUM LACTATE, CALCIUM CHLORIDE, MAGNESIUM CHLORIDE AND DEXTROSE 4.25; 538; 448; 18.3; 5.08 G/100ML; MG/100ML; MG/100ML; MG/100ML; MG/100ML
1000 INJECTION, SOLUTION INTRAPERITONEAL NIGHTLY
Status: DISCONTINUED | OUTPATIENT
Start: 2021-01-01 | End: 2021-01-01

## 2021-01-01 RX ORDER — MAGNESIUM SULFATE IN WATER 40 MG/ML
2000 INJECTION, SOLUTION INTRAVENOUS ONCE
Status: COMPLETED | OUTPATIENT
Start: 2021-01-01 | End: 2021-01-01

## 2021-01-01 RX ORDER — NIFEDIPINE 60 MG/1
60 TABLET, EXTENDED RELEASE ORAL DAILY
Status: DISCONTINUED | OUTPATIENT
Start: 2021-01-01 | End: 2021-01-01 | Stop reason: HOSPADM

## 2021-01-01 RX ORDER — SODIUM CHLORIDE 9 MG/ML
INJECTION, SOLUTION INTRAVENOUS CONTINUOUS
Status: ACTIVE | OUTPATIENT
Start: 2021-01-01 | End: 2021-01-01

## 2021-01-01 RX ORDER — TORSEMIDE 100 MG/1
400 TABLET ORAL NIGHTLY
Status: DISCONTINUED | OUTPATIENT
Start: 2021-01-01 | End: 2021-01-01

## 2021-01-01 RX ORDER — LANOLIN ALCOHOL/MO/W.PET/CERES
400 CREAM (GRAM) TOPICAL 2 TIMES DAILY WITH MEALS
Status: DISCONTINUED | OUTPATIENT
Start: 2021-01-01 | End: 2021-01-01

## 2021-01-01 RX ORDER — HYDRALAZINE HYDROCHLORIDE 25 MG/1
25 TABLET, FILM COATED ORAL EVERY 8 HOURS SCHEDULED
Status: DISCONTINUED | OUTPATIENT
Start: 2021-01-01 | End: 2021-01-01

## 2021-01-01 RX ORDER — SPIRONOLACTONE 25 MG/1
25 TABLET ORAL DAILY
Status: DISCONTINUED | OUTPATIENT
Start: 2021-01-01 | End: 2021-01-01 | Stop reason: HOSPADM

## 2021-01-01 RX ORDER — LANOLIN ALCOHOL/MO/W.PET/CERES
400 CREAM (GRAM) TOPICAL 3 TIMES DAILY
Status: DISCONTINUED | OUTPATIENT
Start: 2021-01-01 | End: 2021-01-01 | Stop reason: HOSPADM

## 2021-01-01 RX ORDER — SODIUM CHLORIDE, SODIUM LACTATE, CALCIUM CHLORIDE, MAGNESIUM CHLORIDE AND DEXTROSE 2.5; 538; 448; 18.3; 5.08 G/100ML; MG/100ML; MG/100ML; MG/100ML; MG/100ML
6000 INJECTION, SOLUTION INTRAPERITONEAL NIGHTLY
Status: DISCONTINUED | OUTPATIENT
Start: 2021-01-01 | End: 2021-01-01

## 2021-01-01 RX ORDER — POTASSIUM CHLORIDE 1.5 G/1.77G
20 POWDER, FOR SOLUTION ORAL ONCE
Status: DISCONTINUED | OUTPATIENT
Start: 2021-01-01 | End: 2021-01-01 | Stop reason: CLARIF

## 2021-01-01 RX ORDER — ONDANSETRON 2 MG/ML
4 INJECTION INTRAMUSCULAR; INTRAVENOUS EVERY 6 HOURS PRN
Status: DISCONTINUED | OUTPATIENT
Start: 2021-01-01 | End: 2021-01-01 | Stop reason: HOSPADM

## 2021-01-01 RX ORDER — AMLODIPINE BESYLATE 10 MG/1
10 TABLET ORAL DAILY
Status: DISCONTINUED | OUTPATIENT
Start: 2021-01-01 | End: 2021-01-01 | Stop reason: HOSPADM

## 2021-01-01 RX ORDER — LABETALOL 200 MG/1
200 TABLET, FILM COATED ORAL EVERY 12 HOURS SCHEDULED
Qty: 60 TABLET | Refills: 3 | Status: ON HOLD | OUTPATIENT
Start: 2021-01-01 | End: 2022-01-01 | Stop reason: ALTCHOICE

## 2021-01-01 RX ORDER — HYDRALAZINE HYDROCHLORIDE 50 MG/1
75 TABLET, FILM COATED ORAL 3 TIMES DAILY
Qty: 135 TABLET | Refills: 2 | Status: ON HOLD
Start: 2021-01-01 | End: 2021-01-01 | Stop reason: HOSPADM

## 2021-01-01 RX ORDER — SODIUM CHLORIDE, SODIUM LACTATE, CALCIUM CHLORIDE, MAGNESIUM CHLORIDE AND DEXTROSE 4.25; 538; 448; 18.3; 5.08 G/100ML; MG/100ML; MG/100ML; MG/100ML; MG/100ML
6000 INJECTION, SOLUTION INTRAPERITONEAL NIGHTLY
Status: DISCONTINUED | OUTPATIENT
Start: 2021-01-01 | End: 2021-01-01

## 2021-01-01 RX ORDER — HYDRALAZINE HYDROCHLORIDE 100 MG/1
50 TABLET, FILM COATED ORAL 3 TIMES DAILY
Qty: 90 TABLET | Refills: 3 | Status: ON HOLD | OUTPATIENT
Start: 2021-01-01 | End: 2022-01-01 | Stop reason: ALTCHOICE

## 2021-01-01 RX ORDER — GENTAMICIN SULFATE 1 MG/G
CREAM TOPICAL DAILY
Status: DISCONTINUED | OUTPATIENT
Start: 2021-01-01 | End: 2021-01-01 | Stop reason: HOSPADM

## 2021-01-01 RX ORDER — INSULIN LISPRO 100 [IU]/ML
0-3 INJECTION, SOLUTION INTRAVENOUS; SUBCUTANEOUS NIGHTLY
Status: DISCONTINUED | OUTPATIENT
Start: 2021-01-01 | End: 2021-01-01 | Stop reason: HOSPADM

## 2021-01-01 RX ORDER — SODIUM CHLORIDE, SODIUM LACTATE, CALCIUM CHLORIDE, MAGNESIUM CHLORIDE AND DEXTROSE 2.5; 538; 448; 18.3; 5.08 G/100ML; MG/100ML; MG/100ML; MG/100ML; MG/100ML
5000 INJECTION, SOLUTION INTRAPERITONEAL NIGHTLY
Status: DISCONTINUED | OUTPATIENT
Start: 2021-01-01 | End: 2021-01-01 | Stop reason: HOSPADM

## 2021-01-01 RX ORDER — INSULIN GLARGINE 100 [IU]/ML
10 INJECTION, SOLUTION SUBCUTANEOUS NIGHTLY
Status: DISCONTINUED | OUTPATIENT
Start: 2021-01-01 | End: 2021-01-01 | Stop reason: HOSPADM

## 2021-01-01 RX ORDER — SODIUM CHLORIDE, SODIUM LACTATE, CALCIUM CHLORIDE, MAGNESIUM CHLORIDE AND DEXTROSE 2.5; 538; 448; 18.3; 5.08 G/100ML; MG/100ML; MG/100ML; MG/100ML; MG/100ML
2000 INJECTION, SOLUTION INTRAPERITONEAL EVERY 4 HOURS
Status: DISCONTINUED | OUTPATIENT
Start: 2021-01-01 | End: 2021-01-01

## 2021-01-01 RX ORDER — HYDRALAZINE HYDROCHLORIDE 50 MG/1
50 TABLET, FILM COATED ORAL ONCE
Status: COMPLETED | OUTPATIENT
Start: 2021-01-01 | End: 2021-01-01

## 2021-01-01 RX ORDER — SODIUM CHLORIDE 9 MG/ML
25 INJECTION, SOLUTION INTRAVENOUS PRN
Status: DISCONTINUED | OUTPATIENT
Start: 2021-01-01 | End: 2021-01-01

## 2021-01-01 RX ORDER — TORSEMIDE 100 MG/1
400 TABLET ORAL DAILY
Status: DISCONTINUED | OUTPATIENT
Start: 2021-01-01 | End: 2021-01-01 | Stop reason: HOSPADM

## 2021-01-01 RX ORDER — ATORVASTATIN CALCIUM 80 MG/1
80 TABLET, FILM COATED ORAL NIGHTLY
Status: DISCONTINUED | OUTPATIENT
Start: 2021-01-01 | End: 2021-01-01 | Stop reason: HOSPADM

## 2021-01-01 RX ORDER — SPIRONOLACTONE 25 MG/1
50 TABLET ORAL 2 TIMES DAILY
Status: DISCONTINUED | OUTPATIENT
Start: 2021-01-01 | End: 2021-01-01 | Stop reason: HOSPADM

## 2021-01-01 RX ORDER — ASPIRIN 81 MG/1
81 TABLET ORAL DAILY
Status: DISCONTINUED | OUTPATIENT
Start: 2021-01-01 | End: 2021-01-01 | Stop reason: HOSPADM

## 2021-01-01 RX ORDER — ACETAMINOPHEN 325 MG/1
650 TABLET ORAL ONCE
Status: COMPLETED | OUTPATIENT
Start: 2021-01-01 | End: 2021-01-01

## 2021-01-01 RX ORDER — ASPIRIN 325 MG
325 TABLET ORAL ONCE
Status: COMPLETED | OUTPATIENT
Start: 2021-01-01 | End: 2021-01-01

## 2021-01-01 RX ORDER — PROMETHAZINE HYDROCHLORIDE 25 MG/1
12.5 TABLET ORAL EVERY 6 HOURS PRN
Status: DISCONTINUED | OUTPATIENT
Start: 2021-01-01 | End: 2021-01-01 | Stop reason: HOSPADM

## 2021-01-01 RX ORDER — HYDRALAZINE HYDROCHLORIDE 50 MG/1
50 TABLET, FILM COATED ORAL 3 TIMES DAILY
Status: DISCONTINUED | OUTPATIENT
Start: 2021-01-01 | End: 2021-01-01

## 2021-01-01 RX ADMIN — PANTOPRAZOLE SODIUM 40 MG: 40 TABLET, DELAYED RELEASE ORAL at 09:26

## 2021-01-01 RX ADMIN — PANTOPRAZOLE SODIUM 40 MG: 40 TABLET, DELAYED RELEASE ORAL at 06:24

## 2021-01-01 RX ADMIN — HYDRALAZINE HYDROCHLORIDE 50 MG: 50 TABLET, FILM COATED ORAL at 15:21

## 2021-01-01 RX ADMIN — INSULIN LISPRO 3 UNITS: 100 INJECTION, SOLUTION INTRAVENOUS; SUBCUTANEOUS at 08:46

## 2021-01-01 RX ADMIN — SODIUM CHLORIDE, SODIUM LACTATE, CALCIUM CHLORIDE, MAGNESIUM CHLORIDE AND DEXTROSE 6000 ML: 2.5; 538; 448; 18.3; 5.08 INJECTION, SOLUTION INTRAPERITONEAL at 21:57

## 2021-01-01 RX ADMIN — INSULIN LISPRO 3 UNITS: 100 INJECTION, SOLUTION INTRAVENOUS; SUBCUTANEOUS at 18:19

## 2021-01-01 RX ADMIN — ASPIRIN 81 MG: 81 TABLET, COATED ORAL at 08:07

## 2021-01-01 RX ADMIN — HEPARIN SODIUM 5000 UNITS: 5000 INJECTION INTRAVENOUS; SUBCUTANEOUS at 21:33

## 2021-01-01 RX ADMIN — LABETALOL HYDROCHLORIDE 200 MG: 200 TABLET, FILM COATED ORAL at 21:12

## 2021-01-01 RX ADMIN — HEPARIN SODIUM 5000 UNITS: 5000 INJECTION INTRAVENOUS; SUBCUTANEOUS at 14:00

## 2021-01-01 RX ADMIN — HEPARIN SODIUM 5000 UNITS: 5000 INJECTION INTRAVENOUS; SUBCUTANEOUS at 21:57

## 2021-01-01 RX ADMIN — LABETALOL HYDROCHLORIDE 200 MG: 200 TABLET, FILM COATED ORAL at 09:03

## 2021-01-01 RX ADMIN — TORSEMIDE 400 MG: 100 TABLET ORAL at 21:57

## 2021-01-01 RX ADMIN — PANTOPRAZOLE SODIUM 40 MG: 40 TABLET, DELAYED RELEASE ORAL at 08:33

## 2021-01-01 RX ADMIN — AMLODIPINE BESYLATE 10 MG: 10 TABLET ORAL at 09:03

## 2021-01-01 RX ADMIN — INSULIN LISPRO 2 UNITS: 100 INJECTION, SOLUTION INTRAVENOUS; SUBCUTANEOUS at 11:41

## 2021-01-01 RX ADMIN — EPOETIN ALFA-EPBX 10000 UNITS: 10000 INJECTION, SOLUTION INTRAVENOUS; SUBCUTANEOUS at 11:10

## 2021-01-01 RX ADMIN — HEPARIN SODIUM 5000 UNITS: 5000 INJECTION INTRAVENOUS; SUBCUTANEOUS at 15:12

## 2021-01-01 RX ADMIN — ACETAMINOPHEN 650 MG: 325 TABLET ORAL at 05:42

## 2021-01-01 RX ADMIN — INSULIN LISPRO 2 UNITS: 100 INJECTION, SOLUTION INTRAVENOUS; SUBCUTANEOUS at 12:58

## 2021-01-01 RX ADMIN — LOSARTAN POTASSIUM 100 MG: 100 TABLET, FILM COATED ORAL at 20:59

## 2021-01-01 RX ADMIN — HEPARIN SODIUM 5000 UNITS: 5000 INJECTION INTRAVENOUS; SUBCUTANEOUS at 21:17

## 2021-01-01 RX ADMIN — LABETALOL HYDROCHLORIDE 200 MG: 200 TABLET, FILM COATED ORAL at 09:54

## 2021-01-01 RX ADMIN — LOSARTAN POTASSIUM 200 MG: 100 TABLET, FILM COATED ORAL at 21:12

## 2021-01-01 RX ADMIN — PANTOPRAZOLE SODIUM 40 MG: 40 TABLET, DELAYED RELEASE ORAL at 08:07

## 2021-01-01 RX ADMIN — SODIUM CHLORIDE: 9 INJECTION, SOLUTION INTRAVENOUS at 12:55

## 2021-01-01 RX ADMIN — SODIUM CHLORIDE, SODIUM LACTATE, CALCIUM CHLORIDE, MAGNESIUM CHLORIDE AND DEXTROSE 6000 ML: 4.25; 538; 448; 18.3; 5.08 INJECTION, SOLUTION INTRAPERITONEAL at 01:54

## 2021-01-01 RX ADMIN — INSULIN LISPRO 1 UNITS: 100 INJECTION, SOLUTION INTRAVENOUS; SUBCUTANEOUS at 09:30

## 2021-01-01 RX ADMIN — TORSEMIDE 400 MG: 100 TABLET ORAL at 20:59

## 2021-01-01 RX ADMIN — INSULIN LISPRO 1 UNITS: 100 INJECTION, SOLUTION INTRAVENOUS; SUBCUTANEOUS at 21:34

## 2021-01-01 RX ADMIN — SODIUM CHLORIDE, PRESERVATIVE FREE 10 ML: 5 INJECTION INTRAVENOUS at 21:58

## 2021-01-01 RX ADMIN — LABETALOL HYDROCHLORIDE 100 MG: 100 TABLET, FILM COATED ORAL at 10:33

## 2021-01-01 RX ADMIN — MAGNESIUM SULFATE HEPTAHYDRATE 2000 MG: 40 INJECTION, SOLUTION INTRAVENOUS at 18:26

## 2021-01-01 RX ADMIN — ASPIRIN 325 MG ORAL TABLET 325 MG: 325 PILL ORAL at 12:52

## 2021-01-01 RX ADMIN — POTASSIUM CHLORIDE 40 MEQ: 1500 TABLET, EXTENDED RELEASE ORAL at 10:01

## 2021-01-01 RX ADMIN — LABETALOL HYDROCHLORIDE 200 MG: 200 TABLET, FILM COATED ORAL at 21:35

## 2021-01-01 RX ADMIN — TORSEMIDE 400 MG: 100 TABLET ORAL at 21:13

## 2021-01-01 RX ADMIN — SODIUM CHLORIDE, SODIUM LACTATE, CALCIUM CHLORIDE, MAGNESIUM CHLORIDE AND DEXTROSE 6000 ML: 2.5; 538; 448; 18.3; 5.08 INJECTION, SOLUTION INTRAPERITONEAL at 22:39

## 2021-01-01 RX ADMIN — HEPARIN SODIUM 5000 UNITS: 5000 INJECTION INTRAVENOUS; SUBCUTANEOUS at 21:12

## 2021-01-01 RX ADMIN — AMLODIPINE BESYLATE 10 MG: 10 TABLET ORAL at 13:54

## 2021-01-01 RX ADMIN — ACETAMINOPHEN 650 MG: 325 TABLET ORAL at 17:51

## 2021-01-01 RX ADMIN — HEPARIN SODIUM 5000 UNITS: 5000 INJECTION INTRAVENOUS; SUBCUTANEOUS at 13:40

## 2021-01-01 RX ADMIN — HYDRALAZINE HYDROCHLORIDE 75 MG: 50 TABLET, FILM COATED ORAL at 18:33

## 2021-01-01 RX ADMIN — INSULIN LISPRO 1 UNITS: 100 INJECTION, SOLUTION INTRAVENOUS; SUBCUTANEOUS at 21:25

## 2021-01-01 RX ADMIN — INSULIN LISPRO 6 UNITS: 100 INJECTION, SOLUTION INTRAVENOUS; SUBCUTANEOUS at 17:53

## 2021-01-01 RX ADMIN — LABETALOL HYDROCHLORIDE 200 MG: 200 TABLET, FILM COATED ORAL at 21:57

## 2021-01-01 RX ADMIN — INSULIN LISPRO 2 UNITS: 100 INJECTION, SOLUTION INTRAVENOUS; SUBCUTANEOUS at 09:04

## 2021-01-01 RX ADMIN — HEPARIN SODIUM 5000 UNITS: 5000 INJECTION INTRAVENOUS; SUBCUTANEOUS at 01:23

## 2021-01-01 RX ADMIN — ASPIRIN 324 MG: 81 TABLET, CHEWABLE ORAL at 15:17

## 2021-01-01 RX ADMIN — MUPIROCIN: 20 OINTMENT TOPICAL at 21:24

## 2021-01-01 RX ADMIN — HYDRALAZINE HYDROCHLORIDE 75 MG: 50 TABLET, FILM COATED ORAL at 21:56

## 2021-01-01 RX ADMIN — SODIUM CHLORIDE, SODIUM LACTATE, CALCIUM CHLORIDE, MAGNESIUM CHLORIDE AND DEXTROSE 5000 ML: 2.5; 538; 448; 18.3; 5.08 INJECTION, SOLUTION INTRAPERITONEAL at 21:57

## 2021-01-01 RX ADMIN — INSULIN LISPRO 2 UNITS: 100 INJECTION, SOLUTION INTRAVENOUS; SUBCUTANEOUS at 12:56

## 2021-01-01 RX ADMIN — HEPARIN SODIUM 5000 UNITS: 5000 INJECTION INTRAVENOUS; SUBCUTANEOUS at 06:22

## 2021-01-01 RX ADMIN — SPIRONOLACTONE 25 MG: 25 TABLET ORAL at 08:33

## 2021-01-01 RX ADMIN — HYDRALAZINE HYDROCHLORIDE 25 MG: 25 TABLET, FILM COATED ORAL at 11:56

## 2021-01-01 RX ADMIN — SODIUM CHLORIDE, PRESERVATIVE FREE 10 ML: 5 INJECTION INTRAVENOUS at 21:56

## 2021-01-01 RX ADMIN — INSULIN LISPRO 2 UNITS: 100 INJECTION, SOLUTION INTRAVENOUS; SUBCUTANEOUS at 21:23

## 2021-01-01 RX ADMIN — POTASSIUM BICARBONATE 20 MEQ: 782 TABLET, EFFERVESCENT ORAL at 08:07

## 2021-01-01 RX ADMIN — MAGNESIUM OXIDE TAB 400 MG (240 MG ELEMENTAL MG) 400 MG: 400 (240 MG) TAB at 14:00

## 2021-01-01 RX ADMIN — HEPARIN SODIUM 5000 UNITS: 5000 INJECTION INTRAVENOUS; SUBCUTANEOUS at 05:33

## 2021-01-01 RX ADMIN — TORSEMIDE 400 MG: 100 TABLET ORAL at 01:25

## 2021-01-01 RX ADMIN — Medication 10 ML: at 09:27

## 2021-01-01 RX ADMIN — TORSEMIDE 400 MG: 100 TABLET ORAL at 21:55

## 2021-01-01 RX ADMIN — LABETALOL HYDROCHLORIDE 10 MG: 5 INJECTION INTRAVENOUS at 11:41

## 2021-01-01 RX ADMIN — Medication 10 ML: at 08:34

## 2021-01-01 RX ADMIN — LOSARTAN POTASSIUM 100 MG: 100 TABLET, FILM COATED ORAL at 21:57

## 2021-01-01 RX ADMIN — HEPARIN SODIUM 5000 UNITS: 5000 INJECTION INTRAVENOUS; SUBCUTANEOUS at 05:42

## 2021-01-01 RX ADMIN — LOSARTAN POTASSIUM 100 MG: 100 TABLET, FILM COATED ORAL at 01:25

## 2021-01-01 RX ADMIN — Medication 400 MG: at 17:40

## 2021-01-01 RX ADMIN — HYDRALAZINE HYDROCHLORIDE 5 MG: 20 INJECTION INTRAMUSCULAR; INTRAVENOUS at 20:59

## 2021-01-01 RX ADMIN — SPIRONOLACTONE 25 MG: 25 TABLET ORAL at 08:07

## 2021-01-01 RX ADMIN — INSULIN LISPRO 1 UNITS: 100 INJECTION, SOLUTION INTRAVENOUS; SUBCUTANEOUS at 18:16

## 2021-01-01 RX ADMIN — SODIUM CHLORIDE, PRESERVATIVE FREE 10 ML: 5 INJECTION INTRAVENOUS at 01:24

## 2021-01-01 RX ADMIN — LOSARTAN POTASSIUM 100 MG: 100 TABLET, FILM COATED ORAL at 21:17

## 2021-01-01 RX ADMIN — POTASSIUM CHLORIDE 40 MEQ: 1500 TABLET, EXTENDED RELEASE ORAL at 12:43

## 2021-01-01 RX ADMIN — SODIUM CHLORIDE, PRESERVATIVE FREE 10 ML: 5 INJECTION INTRAVENOUS at 08:27

## 2021-01-01 RX ADMIN — HEPARIN SODIUM 5000 UNITS: 5000 INJECTION INTRAVENOUS; SUBCUTANEOUS at 14:51

## 2021-01-01 RX ADMIN — SPIRONOLACTONE 25 MG: 25 TABLET ORAL at 17:40

## 2021-01-01 RX ADMIN — INSULIN LISPRO 2 UNITS: 100 INJECTION, SOLUTION INTRAVENOUS; SUBCUTANEOUS at 12:42

## 2021-01-01 RX ADMIN — HEPARIN SODIUM 5000 UNITS: 5000 INJECTION INTRAVENOUS; SUBCUTANEOUS at 06:39

## 2021-01-01 RX ADMIN — INSULIN LISPRO 3 UNITS: 100 INJECTION, SOLUTION INTRAVENOUS; SUBCUTANEOUS at 08:36

## 2021-01-01 RX ADMIN — IOPAMIDOL 80 ML: 755 INJECTION, SOLUTION INTRAVENOUS at 12:59

## 2021-01-01 RX ADMIN — INSULIN LISPRO 1 UNITS: 100 INJECTION, SOLUTION INTRAVENOUS; SUBCUTANEOUS at 12:49

## 2021-01-01 RX ADMIN — Medication 400 MG: at 08:07

## 2021-01-01 RX ADMIN — SODIUM CHLORIDE, SODIUM LACTATE, CALCIUM CHLORIDE, MAGNESIUM CHLORIDE AND DEXTROSE 5000 ML: 4.25; 538; 448; 18.3; 5.08 INJECTION, SOLUTION INTRAPERITONEAL at 21:57

## 2021-01-01 RX ADMIN — HEPARIN SODIUM 5000 UNITS: 5000 INJECTION INTRAVENOUS; SUBCUTANEOUS at 06:24

## 2021-01-01 RX ADMIN — SPIRONOLACTONE 50 MG: 25 TABLET ORAL at 09:26

## 2021-01-01 RX ADMIN — Medication 10 ML: at 08:12

## 2021-01-01 RX ADMIN — MAGNESIUM SULFATE HEPTAHYDRATE 2000 MG: 40 INJECTION, SOLUTION INTRAVENOUS at 09:32

## 2021-01-01 RX ADMIN — PANTOPRAZOLE SODIUM 40 MG: 40 TABLET, DELAYED RELEASE ORAL at 06:39

## 2021-01-01 RX ADMIN — SPIRONOLACTONE 50 MG: 25 TABLET ORAL at 09:54

## 2021-01-01 RX ADMIN — SODIUM CHLORIDE 3 MG/HR: 9 INJECTION, SOLUTION INTRAVENOUS at 17:47

## 2021-01-01 RX ADMIN — LABETALOL HYDROCHLORIDE 200 MG: 200 TABLET, FILM COATED ORAL at 08:33

## 2021-01-01 RX ADMIN — HYDRALAZINE HYDROCHLORIDE 75 MG: 50 TABLET, FILM COATED ORAL at 21:14

## 2021-01-01 RX ADMIN — INSULIN LISPRO 2 UNITS: 100 INJECTION, SOLUTION INTRAVENOUS; SUBCUTANEOUS at 21:45

## 2021-01-01 RX ADMIN — PANTOPRAZOLE SODIUM 40 MG: 40 TABLET, DELAYED RELEASE ORAL at 05:41

## 2021-01-01 RX ADMIN — HYDRALAZINE HYDROCHLORIDE 75 MG: 50 TABLET, FILM COATED ORAL at 13:40

## 2021-01-01 RX ADMIN — INSULIN LISPRO 2 UNITS: 100 INJECTION, SOLUTION INTRAVENOUS; SUBCUTANEOUS at 17:18

## 2021-01-01 RX ADMIN — INSULIN LISPRO 2 UNITS: 100 INJECTION, SOLUTION INTRAVENOUS; SUBCUTANEOUS at 08:07

## 2021-01-01 RX ADMIN — SODIUM CHLORIDE, SODIUM LACTATE, CALCIUM CHLORIDE, MAGNESIUM CHLORIDE AND DEXTROSE 1000 ML: 4.25; 538; 448; 18.3; 5.08 INJECTION, SOLUTION INTRAPERITONEAL at 01:54

## 2021-01-01 RX ADMIN — HEPARIN SODIUM 5000 UNITS: 5000 INJECTION INTRAVENOUS; SUBCUTANEOUS at 15:21

## 2021-01-01 RX ADMIN — INSULIN LISPRO 5 UNITS: 100 INJECTION, SOLUTION INTRAVENOUS; SUBCUTANEOUS at 12:49

## 2021-01-01 RX ADMIN — LOSARTAN POTASSIUM 100 MG: 100 TABLET, FILM COATED ORAL at 21:14

## 2021-01-01 RX ADMIN — HEPARIN SODIUM 5000 UNITS: 5000 INJECTION INTRAVENOUS; SUBCUTANEOUS at 04:13

## 2021-01-01 RX ADMIN — HYDRALAZINE HYDROCHLORIDE 50 MG: 50 TABLET, FILM COATED ORAL at 21:57

## 2021-01-01 RX ADMIN — Medication 10 ML: at 21:56

## 2021-01-01 RX ADMIN — HEPARIN SODIUM 5000 UNITS: 5000 INJECTION INTRAVENOUS; SUBCUTANEOUS at 17:40

## 2021-01-01 RX ADMIN — SPIRONOLACTONE 50 MG: 25 TABLET ORAL at 18:16

## 2021-01-01 RX ADMIN — LOSARTAN POTASSIUM 100 MG: 100 TABLET, FILM COATED ORAL at 21:56

## 2021-01-01 RX ADMIN — TORSEMIDE 400 MG: 100 TABLET ORAL at 21:16

## 2021-01-01 RX ADMIN — Medication 10 ML: at 21:29

## 2021-01-01 RX ADMIN — SODIUM CHLORIDE, PRESERVATIVE FREE 10 ML: 5 INJECTION INTRAVENOUS at 08:46

## 2021-01-01 RX ADMIN — POTASSIUM CHLORIDE 10 MEQ: 7.46 INJECTION, SOLUTION INTRAVENOUS at 14:34

## 2021-01-01 RX ADMIN — LABETALOL HYDROCHLORIDE 200 MG: 200 TABLET, FILM COATED ORAL at 21:56

## 2021-01-01 RX ADMIN — SODIUM CHLORIDE, PRESERVATIVE FREE 10 ML: 5 INJECTION INTRAVENOUS at 21:03

## 2021-01-01 RX ADMIN — MUPIROCIN: 20 OINTMENT TOPICAL at 09:09

## 2021-01-01 RX ADMIN — HEPARIN SODIUM 5000 UNITS: 5000 INJECTION INTRAVENOUS; SUBCUTANEOUS at 09:26

## 2021-01-01 RX ADMIN — HYDRALAZINE HYDROCHLORIDE 50 MG: 50 TABLET, FILM COATED ORAL at 15:12

## 2021-01-01 RX ADMIN — SODIUM CHLORIDE, SODIUM LACTATE, CALCIUM CHLORIDE, MAGNESIUM CHLORIDE AND DEXTROSE 6000 ML: 2.5; 538; 448; 18.3; 5.08 INJECTION, SOLUTION INTRAPERITONEAL at 22:20

## 2021-01-01 RX ADMIN — INSULIN LISPRO 1 UNITS: 100 INJECTION, SOLUTION INTRAVENOUS; SUBCUTANEOUS at 21:11

## 2021-01-01 RX ADMIN — SPIRONOLACTONE 50 MG: 25 TABLET ORAL at 18:07

## 2021-01-01 RX ADMIN — INSULIN LISPRO 4 UNITS: 100 INJECTION, SOLUTION INTRAVENOUS; SUBCUTANEOUS at 09:27

## 2021-01-01 RX ADMIN — Medication 10 ML: at 09:05

## 2021-01-01 RX ADMIN — HYDRALAZINE HYDROCHLORIDE 50 MG: 50 TABLET, FILM COATED ORAL at 10:33

## 2021-01-01 RX ADMIN — TORSEMIDE 400 MG: 100 TABLET ORAL at 21:35

## 2021-01-01 RX ADMIN — INSULIN LISPRO 2 UNITS: 100 INJECTION, SOLUTION INTRAVENOUS; SUBCUTANEOUS at 18:37

## 2021-01-01 RX ADMIN — HEPARIN SODIUM 5000 UNITS: 5000 INJECTION INTRAVENOUS; SUBCUTANEOUS at 06:18

## 2021-01-01 RX ADMIN — SODIUM CHLORIDE, SODIUM LACTATE, CALCIUM CHLORIDE, MAGNESIUM CHLORIDE AND DEXTROSE 3000 ML: 2.5; 538; 448; 18.3; 5.08 INJECTION, SOLUTION INTRAPERITONEAL at 21:57

## 2021-01-01 RX ADMIN — MAGNESIUM OXIDE TAB 400 MG (240 MG ELEMENTAL MG) 400 MG: 400 (240 MG) TAB at 08:33

## 2021-01-01 RX ADMIN — AMLODIPINE BESYLATE 10 MG: 10 TABLET ORAL at 09:26

## 2021-01-01 RX ADMIN — HYDRALAZINE HYDROCHLORIDE 75 MG: 50 TABLET, FILM COATED ORAL at 09:53

## 2021-01-01 RX ADMIN — MAGNESIUM OXIDE TAB 400 MG (240 MG ELEMENTAL MG) 400 MG: 400 (240 MG) TAB at 21:12

## 2021-01-01 RX ADMIN — HYDRALAZINE HYDROCHLORIDE 10 MG: 20 INJECTION INTRAMUSCULAR; INTRAVENOUS at 04:15

## 2021-01-01 RX ADMIN — LABETALOL HYDROCHLORIDE 100 MG: 100 TABLET, FILM COATED ORAL at 08:26

## 2021-01-01 RX ADMIN — POTASSIUM CHLORIDE 40 MEQ: 20 TABLET, EXTENDED RELEASE ORAL at 09:26

## 2021-01-01 RX ADMIN — LABETALOL HYDROCHLORIDE 200 MG: 200 TABLET, FILM COATED ORAL at 08:46

## 2021-01-01 RX ADMIN — HEPARIN SODIUM 5000 UNITS: 5000 INJECTION INTRAVENOUS; SUBCUTANEOUS at 21:14

## 2021-01-01 RX ADMIN — LABETALOL HYDROCHLORIDE 200 MG: 200 TABLET, FILM COATED ORAL at 21:17

## 2021-01-01 RX ADMIN — HYDRALAZINE HYDROCHLORIDE 50 MG: 50 TABLET, FILM COATED ORAL at 08:46

## 2021-01-01 RX ADMIN — Medication 10 ML: at 21:34

## 2021-01-01 RX ADMIN — LABETALOL HYDROCHLORIDE 200 MG: 200 TABLET, FILM COATED ORAL at 08:07

## 2021-01-01 RX ADMIN — SODIUM CHLORIDE 500 ML: 9 INJECTION, SOLUTION INTRAVENOUS at 11:45

## 2021-01-01 RX ADMIN — LABETALOL HYDROCHLORIDE 200 MG: 200 TABLET, FILM COATED ORAL at 21:14

## 2021-01-01 RX ADMIN — INSULIN LISPRO 1 UNITS: 100 INJECTION, SOLUTION INTRAVENOUS; SUBCUTANEOUS at 18:08

## 2021-01-01 RX ADMIN — GENTAMICIN SULFATE: 1 CREAM TOPICAL at 08:33

## 2021-01-01 RX ADMIN — POTASSIUM CHLORIDE 40 MEQ: 20 TABLET, EXTENDED RELEASE ORAL at 11:57

## 2021-01-01 RX ADMIN — INSULIN LISPRO 4 UNITS: 100 INJECTION, SOLUTION INTRAVENOUS; SUBCUTANEOUS at 17:40

## 2021-01-01 RX ADMIN — SPIRONOLACTONE 50 MG: 25 TABLET ORAL at 08:26

## 2021-01-01 RX ADMIN — LOSARTAN POTASSIUM 200 MG: 100 TABLET, FILM COATED ORAL at 21:34

## 2021-01-01 RX ADMIN — HYDRALAZINE HYDROCHLORIDE 100 MG: 100 TABLET, FILM COATED ORAL at 09:26

## 2021-01-01 RX ADMIN — INSULIN LISPRO 4 UNITS: 100 INJECTION, SOLUTION INTRAVENOUS; SUBCUTANEOUS at 10:01

## 2021-01-01 RX ADMIN — PANTOPRAZOLE SODIUM 40 MG: 40 TABLET, DELAYED RELEASE ORAL at 06:22

## 2021-01-01 RX ADMIN — POTASSIUM BICARBONATE 20 MEQ: 782 TABLET, EFFERVESCENT ORAL at 14:43

## 2021-01-01 RX ADMIN — NIFEDIPINE 60 MG: 60 TABLET, EXTENDED RELEASE ORAL at 08:33

## 2021-01-01 RX ADMIN — GENTAMICIN SULFATE: 1 CREAM TOPICAL at 09:04

## 2021-01-01 RX ADMIN — Medication 10 ML: at 21:11

## 2021-01-01 RX ADMIN — LABETALOL HYDROCHLORIDE 200 MG: 200 TABLET, FILM COATED ORAL at 09:26

## 2021-01-01 RX ADMIN — GENTAMICIN SULFATE: 1 CREAM TOPICAL at 14:00

## 2021-01-01 RX ADMIN — PANTOPRAZOLE SODIUM 40 MG: 40 TABLET, DELAYED RELEASE ORAL at 06:18

## 2021-01-01 RX ADMIN — HEPARIN SODIUM 5000 UNITS: 5000 INJECTION INTRAVENOUS; SUBCUTANEOUS at 20:59

## 2021-01-01 RX ADMIN — MUPIROCIN: 20 OINTMENT TOPICAL at 09:28

## 2021-01-01 RX ADMIN — SODIUM CHLORIDE, PRESERVATIVE FREE 10 ML: 5 INJECTION INTRAVENOUS at 09:54

## 2021-01-01 RX ADMIN — LABETALOL HYDROCHLORIDE 100 MG: 100 TABLET, FILM COATED ORAL at 15:10

## 2021-01-01 RX ADMIN — INSULIN GLARGINE 10 UNITS: 100 INJECTION, SOLUTION SUBCUTANEOUS at 21:03

## 2021-01-01 RX ADMIN — INSULIN LISPRO 1 UNITS: 100 INJECTION, SOLUTION INTRAVENOUS; SUBCUTANEOUS at 12:44

## 2021-01-01 RX ADMIN — ASPIRIN 81 MG: 81 TABLET, COATED ORAL at 08:33

## 2021-01-01 RX ADMIN — GENTAMICIN SULFATE: 1 CREAM TOPICAL at 09:29

## 2021-01-01 RX ADMIN — SODIUM CHLORIDE, SODIUM LACTATE, CALCIUM CHLORIDE, MAGNESIUM CHLORIDE AND DEXTROSE 3000 ML: 2.5; 538; 448; 18.3; 5.08 INJECTION, SOLUTION INTRAPERITONEAL at 22:20

## 2021-01-01 RX ADMIN — INSULIN LISPRO 2 UNITS: 100 INJECTION, SOLUTION INTRAVENOUS; SUBCUTANEOUS at 08:33

## 2021-01-01 RX ADMIN — SODIUM CHLORIDE, SODIUM LACTATE, CALCIUM CHLORIDE, MAGNESIUM CHLORIDE AND DEXTROSE 6000 ML: 2.5; 538; 448; 18.3; 5.08 INJECTION, SOLUTION INTRAPERITONEAL at 21:11

## 2021-01-01 RX ADMIN — NIFEDIPINE 60 MG: 60 TABLET, EXTENDED RELEASE ORAL at 09:20

## 2021-01-01 RX ADMIN — HEPARIN SODIUM 5000 UNITS: 5000 INJECTION INTRAVENOUS; SUBCUTANEOUS at 21:58

## 2021-01-01 RX ADMIN — INSULIN LISPRO 2 UNITS: 100 INJECTION, SOLUTION INTRAVENOUS; SUBCUTANEOUS at 21:00

## 2021-01-01 RX ADMIN — HYDRALAZINE HYDROCHLORIDE 5 MG: 20 INJECTION INTRAMUSCULAR; INTRAVENOUS at 01:06

## 2021-01-01 RX ADMIN — SODIUM CHLORIDE, PRESERVATIVE FREE 10 ML: 5 INJECTION INTRAVENOUS at 09:38

## 2021-01-01 RX ADMIN — SPIRONOLACTONE 50 MG: 25 TABLET ORAL at 08:46

## 2021-01-01 RX ADMIN — HYDRALAZINE HYDROCHLORIDE 50 MG: 50 TABLET, FILM COATED ORAL at 09:03

## 2021-01-01 RX ADMIN — ASPIRIN 81 MG: 81 TABLET, COATED ORAL at 17:40

## 2021-01-01 RX ADMIN — HYDRALAZINE HYDROCHLORIDE 5 MG: 20 INJECTION INTRAMUSCULAR; INTRAVENOUS at 06:24

## 2021-01-01 ASSESSMENT — PAIN SCALES - GENERAL
PAINLEVEL_OUTOF10: 0
PAINLEVEL_OUTOF10: 1
PAINLEVEL_OUTOF10: 0
PAINLEVEL_OUTOF10: 5
PAINLEVEL_OUTOF10: 0
PAINLEVEL_OUTOF10: 0
PAINLEVEL_OUTOF10: 3
PAINLEVEL_OUTOF10: 0
PAINLEVEL_OUTOF10: 2
PAINLEVEL_OUTOF10: 0
PAINLEVEL_OUTOF10: 3

## 2021-01-01 ASSESSMENT — PAIN DESCRIPTION - PAIN TYPE
TYPE: ACUTE PAIN
TYPE: ACUTE PAIN

## 2021-01-01 ASSESSMENT — ENCOUNTER SYMPTOMS
ABDOMINAL PAIN: 0
SHORTNESS OF BREATH: 0
VOMITING: 0
BACK PAIN: 0
COLOR CHANGE: 0

## 2021-01-01 ASSESSMENT — PAIN DESCRIPTION - LOCATION
LOCATION: HEAD
LOCATION: HEAD

## 2021-01-01 ASSESSMENT — PAIN DESCRIPTION - FREQUENCY: FREQUENCY: CONTINUOUS

## 2021-01-01 ASSESSMENT — PAIN SCALES - WONG BAKER: WONGBAKER_NUMERICALRESPONSE: 2

## 2021-01-01 ASSESSMENT — PAIN DESCRIPTION - DESCRIPTORS: DESCRIPTORS: CONSTANT

## 2021-01-05 ENCOUNTER — HOSPITAL ENCOUNTER (OUTPATIENT)
Dept: ULTRASOUND IMAGING | Age: 56
Discharge: HOME | End: 2021-01-05
Payer: COMMERCIAL

## 2021-01-05 ENCOUNTER — HOSPITAL ENCOUNTER (OUTPATIENT)
Age: 56
End: 2021-01-05
Payer: COMMERCIAL

## 2021-01-05 DIAGNOSIS — K65.1 PERITONEAL ABSCESS (HCC): ICD-10-CM

## 2021-01-05 PROCEDURE — 76705 ECHO EXAM OF ABDOMEN: CPT

## 2021-05-26 PROBLEM — R42 DIZZINESS: Status: ACTIVE | Noted: 2021-01-01

## 2021-05-26 NOTE — H&P
Hospitalist  History and Physical    Patient:  Harinder Birmingham  MRN: [id number]  PCP: Tara Olea    CHIEF COMPLAINT:  Dizziness and emesis      HISTORY OF PRESENT ILLNESS:   The patient Harinder Birmingham is a 48 y. o.female With medical history significant for diabetes mellitus and end-stage renal disease patient is on peritoneal dialysis. Patient also has diabetic retinopathy. Hypertension hyperlipidemia. Patient presented to the emergency room with nausea vomiting and dizziness. Patient reports that it has been going on for 2-3 days. Patient first noticed the dizziness and then nausea vomiting. Patient denies abdominal pain no history of constipation or diarrhea. Past Medical History:        Diagnosis Date    Anemia 9/15/2014    Diabetes mellitus ([address]     Diabetes mellitus type 1 ([address]     Diabetic retinopathy ([address]     Diabetic retinopathy associated with type 2 diabetes mellitus, without macular edema     Hyperlipidemia 7/27/2015    Kidney stone     Mixed hyperlipidemia 7/27/2015    Orthostatic hypotension     Vitreous hemorrhage of right eye ([address] 8/15/2017       Past Surgical History:        Procedure Laterality Date    APPENDECTOMY      KIDNEY STONE SURGERY      LAPAROSCOPY INSERTION PERITONEAL CATHETER N/A 8/12/2019    LAPAROSCOPIC PERITONEAL DIALYSIS CATHETER PLACEMENT WITH AXEL TROCHAR performed by Mark Hallman MD at [address] [place] [address]         Medications Prior to Admission:    Prior to Admission medications    Medication Sig Start Date End Date Taking?  Authorizing Provider   spironolactone (ALDACTONE) 25 MG tablet Take 25 mg by mouth daily   Yes Historical Provider, MD   pantoprazole (PROTONIX) 40 MG tablet Take 1 tablet by mouth daily 3/16/21  Yes Regla Coleman MD   labetalol (NORMODYNE) 200 MG tablet Take 1 tablet by mouth every 12 hours 3/12/21  Yes Regla Coleman MD   torsemide (DEMADEX) 100 MG tablet Take 4 tablets by mouth nightly 11/27/20  Yes Chandrakant Kilgore MD [facility]   losartan (COZAAR) 100 MG tablet Take 2 tablets by mouth nightly  Patient taking differently: Take 100 mg by mouth daily  9/24/20  Yes Shay Ortiz MD   insulin lispro (HUMALOG KWIKPEN) 100 UNIT/ML pen Inject 2-4 Units into the skin 3 times daily (before meals) Sliding scale    Yes Historical Provider, MD       Allergies:  Bactrim [sulfamethoxazole-trimethoprim], Geocillin [carbenicillin], Lisinopril, Other, Tramadol, Bumetanide, and Ibuprofen      Social History:   TOBACCO:   reports that she has never smoked. She has never used smokeless tobacco.  ETOH:   reports no history of alcohol use. Family History:       Problem Relation Age of Onset    Heart Disease Mother         heart attack 2007    High Blood Pressure Mother     Diabetes Father     Heart Disease Father     Emphysema Father     Cancer Maternal Grandmother         cervical cancer    Lung Cancer Maternal Grandfather         black lung    Diabetes Paternal Grandmother     Heart Disease Paternal Grandfather            REVIEW OF SYSTEMS:     patients reported symptoms are in BOLD all other symptoms are negative. CONSTITUTIONAL:      fatigue, fever, chills or night sweats, recent weight gain, recent wt loss, insomnia,  General weakness, poor appetite, muscle aches and pains    HEAD: headache, dizziness    EYES:      blurriness,  double vision, dryness,  discharge, irritation,diplopia    EARS:      hearing loss, vertigo, ear discharge,  Earache. Ringing in the ears. NOSE:      Rhinorrhea, sneezing, epistaxis. Discharge, sinusitis,     MOUTH/THROAT:         sore throat, mouth ulcers, Hoarseness    RESPIRATORY:        Shortness of breath, wheezing,  cough, sputum, hemoptysis, obstructive sleep apnea,    CARDIOVASCULAR :      chest pain, palpitations, dyspnea on exercise, Lower extrimity edema (swelling),     GASTROINTESTINAL:       Dysphagia, Poor appetite,  Nausea, Vomiting, diarrhea, heartburn, abdominal pain.  Blood in the stools, hematemesis. Pain with swallowing, constipation, emesis    GENITOURINARY:       Urinary frequency, hesitancy,  urgency, Dysuria, hematuria,  Urinary Incontinence. Urinary Retention. GYNECOLOGICAL: vaginal bleeding , vaginal discharge, menopause    MUSCULOSKELETAL:       joint swelling or stiffness, joint pain, muscle pain, balance problems, low back pain. NEUROLOGICAL:      Gait problems. Tremor. Dizziness. Pain and paresthesias, weakness in extremities. Seizures, memory loss    PSYCHLOGICAL:        Anxiety, depression    SKIN :      Rashes ulcers, skin color changes, easy bruisability, lymphadenopathy      Physical Exam:      Vitals: BP (!) 180/89   Pulse 77   Temp 97.1 °F (36.2 °C) (Temporal)   Resp 15   Ht 5' 3\" (1.6 m)   Wt 220 lb 0.3 oz (99.8 kg) Comment: Currently 'full' for PT dialysis  SpO2 100%   BMI 38.97 kg/m²     Gen:          Alert and oriented x 3  Eyes: PERRL. No sclera icterus. No conjunctival injection. ENT: No discharge. Pharynx clear. External appearance of ears and nose normal.  Neck: Trachea midline. No obvious mass. Resp: No accessory muscle use. No crackles. No wheezes. No rhonchi. CV: Regular rate. Regular rhythm. No murmur or rub. No edema. GI: Non-tender. Non-distended. No hernia. Skin: Warm, dry, normal texture and turgor. Lymph: No cervical LAD. No supraclavicular LAD. M/S: / Ext. No cyanosis. No clubbing. No joint deformity. Neuro: Moves all four extremities. CN 2-12 tested, no deficits noted. Peripheral pulses and capillary refill is intact.       CBC:   Recent Labs     05/26/21  1310   WBC 7.2   HGB 10.7*        BMP:    Recent Labs     05/26/21  1310   *   K 2.9*   CL 85*   CO2 27   BUN 26*   CREATININE 10.5*   GLUCOSE 240*     Hepatic:   Recent Labs     05/26/21  1310   AST 10*   ALT <5*   BILITOT 0.3   ALKPHOS 103     Troponin:   Recent Labs     05/26/21  1310   TROPONINI 0.17*     BNP: No results for input(s): BNP in the last 72 hours.  INR: No results for input(s): INR in the last 72 hours. Lab Results   Component Value Date    LABA1C 6.0 08/05/2019           No results for input(s): CKTOTAL in the last 72 hours. -----------------------------------------------------------------    CT HEAD WO CONTRAST  No evidence of acute intracranial abnormality. XR CHEST (2 VW)  Apparent free air under the right diaphragm.  Recommend further evaluation   with a CT examination.  This potential critical result was called and   discussed by Dr. Perry Friday. MD Aquiles to Robertaon Sharif on 5/26/2021 at 13:45.       No acute findings of the chest otherwise. CT CHEST ABDOMEN PELVIS WO CONTRAST  No acute abnormality identified in the chest, abdomen, or pelvis.  Large   amount of peritoneal dialysate present.  Small amount of free intra-air seen   presumably introduced during exchange of peritoneal fluid.  No acute   abnormality of the GI tract suspicious for perforation.  Development of   severe renal atrophy since January 2019.  No other significant abnormality. EKG  Normal sinus rhythm  Cannot rule out Anterior infarct ,   age undeterminedAbnormal      Assessment / Plan     Nausea vomiting and dizziness  Etiology is not clear  Could be related to uncontrolled hypertension  Systolic blood pressure more than 200    Hypertension  Continue with home medication  Continue on labetalol and losartan  Patient is also on torsemide and spironolactone    End-stage renal disease  Consult to nephrology      DVT and GI prophylaxis      Prior      Porfirio Lennon MD M.D    This note was transcribed using [id number] [employer]. Please disregard any translational errors.

## 2021-05-26 NOTE — ED NOTES
Report called to [facility] W RN to assume care, all questions answered. Welch fall/pain discussed. Pt transported to room 5105 via stretcher with this RN. No signs of acute distress noted. NIH 0 at handoff.       Kathy Forrest RN  05/26/21 0104

## 2021-05-26 NOTE — CONSULTS
Consult received  Full note to follow    Elke Arcos MD  5/26/2021    [facility] [address]  Office : [phone]  Fax :[phone]

## 2021-05-26 NOTE — PROGRESS NOTES
4 Eyes Skin Assessment     The patient is being assess for  Admission    I agree that 2 RN's have performed a thorough Head to Toe Skin Assessment on the patient. ALL assessment sites listed below have been assessed. Areas assessed by both nurses:   []   Head, Face, and Ears   []   Shoulders, Back, and Chest  []   Arms, Elbows, and Hands   []   Coccyx, Sacrum, and IschIum  []   Legs, Feet, and Heels        Does the Patient have Skin Breakdown?   No         Enoch Prevention initiated:  No   Wound Care Orders initiated:  No      [facility] nurse consulted for Pressure Injury (Stage 3,4, Unstageable, DTI, NWPT, and Complex wounds), New and Established Ostomies:  No      Nurse 1 eSignature: Electronically signed by Olivier Lucero RN on 5/26/21 at 4:45 PM EDT    **SHARE this note so that the co-signing nurse is able to place an eSignature**    Nurse 2 eSignature: Electronically signed by Geovani Benítez RN on 5/26/21 at 4:47 PM EDT

## 2021-05-26 NOTE — ACP (ADVANCE CARE PLANNING)
Advance Care Planning     Advance Care Planning Activator (Inpatient)  Conversation Note      Date of ACP Conversation: 5/26/2021     Conversation Conducted with: Patient with Decision Making Capacity    ACP Activator: [address] [facility] Decision Maker:     Current Designated Health Care Decision Maker:     Primary Decision Maker: Isabel Meade - Spouse - [phone]    Secondary Decision Maker: Kayden Chaney - [phone]    Today we documented Decision Maker(s) consistent with Legal Next of Kin hierarchy. Care Preferences    Ventilation: \"If you were in your present state of health and suddenly became very ill and were unable to breathe on your own, what would your preference be about the use of a ventilator (breathing machine) if it were available to you? \"      Would the patient desire the use of ventilator (breathing machine)?: no    \"If your health worsens and it becomes clear that your chance of recovery is unlikely, what would your preference be about the use of a ventilator (breathing machine) if it were available to you? \"     Would the patient desire the use of ventilator (breathing machine)?: No      Resuscitation  \"CPR works best to restart the heart when there is a sudden event, like a heart attack, in someone who is otherwise healthy. Unfortunately, CPR does not typically restart the heart for people who have serious health conditions or who are very sick. \"    \"In the event your heart stopped as a result of an underlying serious health condition, would you want attempts to be made to restart your heart (answer \"yes\" for attempt to resuscitate) or would you prefer a natural death (answer \"no\" for do not attempt to resuscitate)? \" yes       [x] Yes   [] No   Educated Patient / Aure Correa regarding differences between Advance Directives and portable DNR orders.     Length of ACP Conversation in minutes:   10    Conversation Outcomes:  [x] ACP discussion completed  [] Existing advance directive reviewed with patient; no changes to patient's previously recorded wishes  [] New Advance Directive completed  [] Portable Do Not Rescitate prepared for Provider review and signature  [] POLST/POST/MOLST/MOST prepared for Provider review and signature      Follow-up plan:    [] Schedule follow-up conversation to continue planning  [] Referred individual to Provider for additional questions/concerns   [] Advised patient/agent/surrogate to review completed ACP document and update if needed with changes in condition, patient preferences or care setting    [x] This note routed to one or more involved healthcare providers

## 2021-05-26 NOTE — PROGRESS NOTES
NAME:  Gini Mckeon  YOB: 1965  MEDICAL RECORD NUMBER:  [id number]  TODAYS DATE:  5/26/2021    Discussed personal risk factors for Stroke /TIA with patient/family, and ways to reduce the risk for a recurrent stroke. Patient's personal risk factors which were identified are:     [] Alcohol Abuse: check with your physician before any alcohol consumption. [] Atrial fibrillation: may cause blood clots. [] Drug Abuse: Seek help, talk with your doctor  [] Clotting Disorder  [x] Diabetes  [x] Family history of stroke or heart disease  [x] High Blood Pressure/Hypertension: work with your physician. [x] High cholesterol: monitor cholesterol levels with your physician. [x] Overweight/Obesity: work with your physician for your ideal body weight. [x] Physical Inactivity: get regular exercise as directed by your physician. [x] Personal history of previous TIA or stroke  [x] Poor Diet; decrease salt (sodium) in your diet, follow diet directed by physician. [] Smoking: Cigarette/Cigar: stop smoking. Advised pt. that you can reduce your risk for stroke/TIA by modifying/controlling the risk factors that you have. Pt.advised to take the medications as prescribed, which will be detailed in the discharge instructions, and to not stop taking them without consulting their physician. In addition, pt. advised to maintain a healthy diet, exercise regularly and to not smoke. [facility]'s Stroke treatment and prevention, Managing your recovery  notebook  provided and/or reviewed  with patient/family. The notebook includes, but not limited to, sections addressing warning signs & symptoms of a stroke, which are: sudden numbness or weakness especially on one side of the body, sudden confusion, difficulty speaking or understanding, sudden changes in vision, sudden dizziness or loss of balance/ coordination, or sudden severe headache.   The need to call EMS (911) immediately if signs & symptoms occur is emphasized . The notebook also provides education on Stroke community resources and stroke advocacy. The need for follow-up after discharge was highlighted with patient/family with them being able to repeat understanding of the importance of this.       Electronically signed by Mindy Dakin, RN on 5/26/2021 at 6:01 PM

## 2021-05-26 NOTE — CARE COORDINATION
INITIAL CASE MANAGEMENT ASSESSMENT    Reviewed chart, met with patient to assess possible discharge needs. Explained Case Management role/services. Living Situation: Lives with he  in a single-family home. [address] the home. ADLs: Independent. DME: She has dialysis equipment at home. She self-administers the dialysis. She has a hook stand where he bag of solution will hang. PT/OT Recs: TBD. Active Services: Receives dialysis solution through [employer]. They deliver the dialysis solution to her home once a month. Provided [facility] list.     Transportation: Not driving at this time as her vision has gotten worse She has been unable to schedule an eye appointment. Her  will transport her home at discharge. Medications: [facility] in [place]. No trouble obtaining medications at his time. PCP: Alecia Montemayor      HD/PD: PD at home. She self-administers her dialysis. She gives the solution to herself three times a day. She has two four hour sessions during he day, and she does a ten hour session over night. PLAN/COMMENTS: Plans to return home.  will transport her home. Advance care planning completed. SW/CM provided contact information for patient or family to call with any questions. SW/CM will follow and assist as needed. The Plan for Transition of Care is related to the following treatment goals: to return home. The Patient was provided with a choice of provider and agrees   with the discharge plan. [x] Yes [] No    Freedom of choice list was provided with basic dialogue that supports the patient's individualized plan of care/goals, treatment preferences and shares the quality data associated with the providers.  [x] Yes [] No

## 2021-05-26 NOTE — ED NOTES
Patient presents to ED for complaints of dizziness x 2 days that worsens while standing. PT denies any visual changes and weakness. NIH 0. Pt also reports nausea and emesis today. No signs of acute distress noted at this time. Pt is hypertensive, MD is aware. Pt is alert and oriented x4, skin warm and dry, respirations even and easy. Call light within reach. Family at bedside.      Madalyn Francis RN  05/26/21 1678

## 2021-05-26 NOTE — ED PROVIDER NOTES
education level: Not on file   Occupational History    Not on file   Tobacco Use    Smoking status: Never Smoker    Smokeless tobacco: Never Used   Vaping Use    Vaping Use: Never used   Substance and Sexual Activity    Alcohol use: No    Drug use: No    Sexual activity: Yes     Partners: Male   Other Topics Concern    Not on file   Social History Narrative    Not on file     Social Determinants of Health     Financial Resource Strain:     Difficulty of Paying Living Expenses:    Food Insecurity:     Worried About Running Out of Food in the Last Year:     [address] in the Last Year:    Transportation Needs:     Lack of Transportation (Medical):      Lack of Transportation (Non-Medical):    Physical Activity:     Days of Exercise per Week:     Minutes of Exercise per Session:    Stress:     Feeling of Stress :    Social Connections:     Frequency of Communication with Friends and Family:     Frequency of Social Gatherings with Friends and Family:     Attends Worship Services:     Active Member of Clubs or Organizations:     Attends Club or Organization Meetings:     Marital Status:    Intimate Partner Violence:     Fear of Current or Ex-Partner:     Emotionally Abused:     Physically Abused:     Sexually Abused:      Current Facility-Administered Medications   Medication Dose Route Frequency Provider Last Rate Last Admin    potassium bicarb-citric acid (EFFER-K) effervescent tablet 20 mEq  20 mEq Oral Daily Mendel Ponto, MD   20 mEq at 05/26/21 1443    aspirin chewable tablet 324 mg  324 mg Oral Once Mendel Ponto, MD         Current Outpatient Medications   Medication Sig Dispense Refill    spironolactone (ALDACTONE) 25 MG tablet Take 25 mg by mouth daily      pantoprazole (PROTONIX) 40 MG tablet Take 1 tablet by mouth daily 90 tablet 1    labetalol (NORMODYNE) 200 MG tablet Take 1 tablet by mouth every 12 hours 60 tablet 3    torsemide (DEMADEX) 100 MG tablet Take 4 tablets by mouth nightly 360 tablet 5    losartan (COZAAR) 100 MG tablet Take 2 tablets by mouth nightly (Patient taking differently: Take 100 mg by mouth daily ) 30 tablet 3    insulin lispro (HUMALOG KWIKPEN) 100 UNIT/ML pen Inject 2-4 Units into the skin 3 times daily (before meals) Sliding scale        Allergies   Allergen Reactions    Bactrim [Sulfamethoxazole-Trimethoprim]     Geocillin [Carbenicillin]     Lisinopril Other (See Comments)     Pt thinks cr levels went up due to med.  Other      Artificial sweetners, nutrasweet    Tramadol     Bumetanide Nausea And Vomiting    Ibuprofen Nausea And Vomiting       [unfilled]    Nursing Notes Reviewed    Physical Exam:  Vitals:    05/26/21 1229   BP: (!) 193/95   Pulse: 75   Resp: 16   Temp: 97.1 °F (36.2 °C)   SpO2: 95%       GENERAL APPEARANCE: Awake and alert. Cooperative. No acute distress. HEAD: Normocephalic. Atraumatic. EYES: EOM's grossly intact. Sclera anicteric. ENT: Mucous membranes are moist. Tolerates saliva. No trismus. NECK: Supple. No meningismus. Trachea midline. HEART: RRR. Radial pulses 2+. LUNGS: Respirations unlabored. CTAB  ABDOMEN: Soft. Non-tender. No guarding or rebound. EXTREMITIES: No acute deformities. SKIN: Warm and dry. NEUROLOGICAL: No gross facial drooping. Moves all 4 extremities spontaneously. PSYCHIATRIC: Normal mood.     I have reviewed and interpreted all of the currently available lab results from this visit (if applicable):  Results for orders placed or performed during the hospital encounter of 05/26/21   CBC Auto Differential   Result Value Ref Range    WBC 7.2 4.0 - 11.0 K/uL    RBC 3.28 (L) 4.00 - 5.20 M/uL    Hemoglobin 10.7 (L) 12.0 - 16.0 g/dL    Hematocrit 31.1 (L) 36.0 - 48.0 %    MCV 94.7 80.0 - 100.0 fL    MCH 32.5 26.0 - 34.0 pg    MCHC 34.4 31.0 - 36.0 g/dL    RDW 15.2 12.4 - 15.4 %    Platelets 529 856 - 512 K/uL    MPV 10.1 5.0 - 10.5 fL    Neutrophils % 82.6 %    Lymphocytes % 9.7 % Monocytes % 5.3 %    Eosinophils % 1.7 %    Basophils % 0.7 %    Neutrophils Absolute 5.9 1.7 - 7.7 K/uL    Lymphocytes Absolute 0.7 (L) 1.0 - 5.1 K/uL    Monocytes Absolute 0.4 0.0 - 1.3 K/uL    Eosinophils Absolute 0.1 0.0 - 0.6 K/uL    Basophils Absolute 0.0 0.0 - 0.2 K/uL   Comprehensive Metabolic Panel w/ Reflex to MG   Result Value Ref Range    Sodium 127 (L) 136 - 145 mmol/L    Potassium reflex Magnesium 2.9 (LL) 3.5 - 5.1 mmol/L    Chloride 85 (L) 99 - 110 mmol/L    CO2 27 21 - 32 mmol/L    Anion Gap 15 3 - 16    Glucose 240 (H) 70 - 99 mg/dL    BUN 26 (H) 7 - 20 mg/dL    CREATININE 10.5 (HH) 0.6 - 1.1 mg/dL    GFR Non-African American 4 (A) >60    GFR  5 (A) >60    Calcium 8.3 8.3 - 10.6 mg/dL    Total Protein 6.8 6.4 - 8.2 g/dL    Albumin 3.1 (L) 3.4 - 5.0 g/dL    Albumin/Globulin Ratio 0.8 (L) 1.1 - 2.2    Total Bilirubin 0.3 0.0 - 1.0 mg/dL    Alkaline Phosphatase 103 40 - 129 U/L    ALT <5 (L) 10 - 40 U/L    AST 10 (L) 15 - 37 U/L    Globulin 3.7 g/dL   EKG 12 Lead   Result Value Ref Range    Ventricular Rate 73 BPM    Atrial Rate 73 BPM    P-R Interval 164 ms    QRS Duration 86 ms    Q-T Interval 448 ms    QTc Calculation (Bazett) 493 ms    P Axis -23 degrees    R Axis 13 degrees    T Axis 34 degrees    Diagnosis       Normal sinus rhythmCannot rule out Anterior infarct , age undeterminedAbnormal ECGWhen compared with ECG of 27-AUG-2014 17:32,No significant change was found        Radiographs (if obtained):  [] The following radiograph was interpreted by myself in the absence of a radiologist:  [x] Radiologist's Report Reviewed:     CT CHEST ABDOMEN PELVIS WO CONTRAST (Final result)  Result time 05/26/21 14:35:26  Final result by Monika Goodman MD (05/26/21 14:35:26)                Impression:    No acute abnormality identified in the chest, abdomen, or pelvis.  Large   amount of peritoneal dialysate present.  Small amount of free intra-air seen   presumably introduced during exchange of peritoneal fluid.  No acute   abnormality of the GI tract suspicious for perforation.  Development of   severe renal atrophy since January 2019.  No other significant abnormality. RECOMMENDATIONS:   Multiple pulmonary nodules. Most severe: 5 mm left solid pulmonary nodule. No   routine follow-up imaging is recommended. These guidelines do not apply to immunocompromised patients and patients with   cancer. Follow up in patients with significant comorbidities as clinically   warranted. For lung cancer screening, adhere to Lung-RADS guidelines. Reference: Radiology. 2017; 284(1):228-43. Narrative:    EXAMINATION:   CT OF THE CHEST, ABDOMEN, AND PELVIS WITHOUT CONTRAST 5/26/2021 1:47 pm     TECHNIQUE:   CT of the chest, abdomen and pelvis was performed without the administration   of intravenous contrast. Multiplanar reformatted images are provided for   review. Dose modulation, iterative reconstruction, and/or weight based   adjustment of the mA/kV was utilized to reduce the radiation dose to as low   as reasonably achievable. COMPARISON:   PA and lateral chest 05/26/2021     HISTORY:   ORDERING SYSTEM PROVIDED HISTORY: ? fee air   TECHNOLOGIST PROVIDED HISTORY:   Reason for exam:->? fee air   Additional Contrast?->None   Decision Support Exception - unselect if not a suspected or confirmed   emergency medical condition->Emergency Medical Condition (MA)   Is the patient pregnant?->No   Reason for Exam: free air,  surgeries: kidney stone and apendecitis   Acuity: Acute   Type of Exam: Initial     FINDINGS:     Chest:     Mediastinum: Slight nodularity of the thyroid appears present.  No mass or   adenopathy.  No pericardial effusion.  Normal size of the heart and thoracic   aorta.  Slight stranding of the fat in the anterior superior mediastinum   probably thymic remnants. Lungs/pleura: No acute airspace disease or abnormal pulmonary vascular   congestion.  No pleural effusion. Labs and imaging reviewed and results discussed with patient. .        Patient was given scripts for the following medications. I counseled patient how to take these medications. New Prescriptions    No medications on file         CRITICAL CARE TIME   Total Critical Care time was 0 minutes, excluding separately reportable procedures. There was a high probability of clinically significant/life threatening deterioration in the patient's condition which required my urgent intervention.       Clinical Impression:  Dizziness, hypokalemia, emesis  (Please note that portions of this note may have been completed with a voice recognition program. Efforts were made to edit the dictations but occasionally words are mis-transcribed.)    MD Jena James MD  05/26/21 51 Iliana Jalloh MD  06/25/21 6519

## 2021-05-27 NOTE — PROGRESS NOTES
Hospitalist Progress Note  5/27/2021 1:26 PM    PCP: Aroldo Carter    [id number]     Date of Admission: 5/26/2021                                                                                                                     HOSPITAL COURSE    Patient demographics:  The patient  Fito Carrillo is a 48 y.o. female     Significant past medical history:   Patient Active Problem List   Diagnosis    Diabetic retinopathy ([address]    Mixed hyperlipidemia    DMII (diabetes mellitus, type 2) ([address]    Vitreous hemorrhage of right eye ([address]    Essential hypertension    Acute kidney injury ([address]    ESRD (end stage renal disease) ([address]    Acute renal failure superimposed on chronic kidney disease (HCC)    Volume overload    Peritonitis ([address]    Generalized abdominal pain    Peritoneal dialysis status ([address]    Morbid obesity due to excess calories (HCC)    Nausea and vomiting    Hyponatremia    Diarrhea    Dizziness         Presenting symptoms:  Dizziness and emesis    Diagnostic workup:      CONSULTS DURING ADMISSION :   IP CONSULT TO NEUROLOGY  IP CONSULT TO NEPHROLOGY      Patient was diagnosed with:        Treatment while inpatient:                                                                                         ----------------------------------------------------------      SUBJECTIVE COMPLAINTS- follow up for Dizziness and emesis    Diet: DIET CARB CONTROL; Carb Control: 4 carb choices (60 gms)/meal      OBJECTIVE:   Patient Active Problem List   Diagnosis    Diabetic retinopathy ([address]    Mixed hyperlipidemia    DMII (diabetes mellitus, type 2) ([address]    Vitreous hemorrhage of right eye ([address]    Essential hypertension    Acute kidney injury ([address]    ESRD (end stage renal disease) ([address]    Acute renal failure superimposed on chronic kidney disease ([address]    Volume overload    Peritonitis ([address]    Generalized abdominal pain    Peritoneal dialysis status ([address]    Morbid obesity due to excess calories (HCC)    Nausea and vomiting    Hyponatremia    Diarrhea    Dizziness       Allergies  Bactrim [sulfamethoxazole-trimethoprim], Geocillin [carbenicillin], Lisinopril, Other, Tramadol, Bumetanide, and Ibuprofen    Medications    Scheduled Meds:   NIFEdipine  60 mg Oral Daily    magnesium oxide  400 mg Oral TID    potassium bicarb-citric acid  20 mEq Oral Daily    labetalol  200 mg Oral 2 times per day    losartan  200 mg Oral Nightly    pantoprazole  40 mg Oral Daily    spironolactone  25 mg Oral Daily    torsemide  400 mg Oral Nightly    sodium chloride flush  5-40 mL Intravenous 2 times per day    heparin (porcine)  5,000 Units Subcutaneous 3 times per day    aspirin  81 mg Oral Daily    Or    aspirin  300 mg Rectal Daily    atorvastatin  80 mg Oral Nightly    insulin lispro  0-12 Units Subcutaneous TID WC    insulin lispro  0-6 Units Subcutaneous Nightly    gentamicin   Topical Daily    dianeal lo-queenie 2.5%  6,000 mL Intraperitoneal Nightly     Continuous Infusions:   dextrose       PRN Meds:  sodium chloride flush, promethazine **OR** ondansetron, polyethylene glycol, glucose, dextrose, glucagon (rDNA), dextrose, hydrALAZINE    Vitals   Vitals /wt   Patient Vitals for the past 8 hrs:   BP Temp Temp src Pulse Resp SpO2 Weight   05/27/21 1114 (!) 156/79 97.7 °F (36.5 °C) Oral 72 18 96 % --   05/27/21 0759 (!) 191/95 98.2 °F (36.8 °C) Oral 78 18 94 % --   05/27/21 0618 -- -- -- -- -- -- 213 lb 13.5 oz (97 kg)        72HR INTAKE/OUTPUT:      Intake/Output Summary (Last 24 hours) at 5/27/2021 1326  Last data filed at 5/27/2021 1059  Gross per 24 hour   Intake 3560 ml   Output 3397 ml   Net 163 ml       Exam:    Gen:   Alert and oriented ×3   Eyes: PERRL. No sclera icterus. No conjunctival injection. ENT: No discharge. Pharynx clear. External appearance of ears and nose normal.  Neck: Trachea midline. No obvious mass. Resp: No accessory muscle use. No crackles. No wheezes.  No rhonchi. CV: Regular rate. Regular rhythm. No murmur or rub. No edema. GI: Non-tender. Non-distended. No hernia. Skin: Warm, dry, normal texture and turgor. Lymph: No cervical LAD. No supraclavicular LAD. M/S: / Ext. No cyanosis. No clubbing. No joint deformity. Neuro: CN 2-12 are intact,  no neurologic deficits noted. PT/INR: No results for input(s): PROTIME, INR in the last 72 hours. APTT: No results for input(s): APTT in the last 72 hours. CBC:   Recent Labs     05/26/21  1310 05/27/21  0419   WBC 7.2 6.4   HGB 10.7* 9.9*   HCT 31.1* 29.0*   MCV 94.7 94.6    168       BMP:   Recent Labs     05/26/21  1310 05/27/21  0419   * 130*   K 2.9* 2.8*   CL 85* 89*   CO2 27 24   BUN 26* 26*   CREATININE 10.5* 10.4*       LIVER PROFILE:   Recent Labs     05/26/21  1310   ALKPHOS 103   AST 10*   ALT <5*   BILITOT 0.3     No results for input(s): AMYLASE in the last 72 hours. Recent Labs     05/26/21  1310   LIPASE 48.0       UA:  Recent Labs     05/27/21  0650   WBCUA 3   RBCUA 4       TROPONIN:   Recent Labs     05/26/21  1310   TROPONINI 0.17*       Lab Results   Component Value Date/Time    URRFLXCULT Yes 08/04/2019 06:40 PM       No results for input(s): TSHREFLEX in the last 72 hours.     No components found for: [id number]  POC GLUCOSE:    Recent Labs     05/26/21  1732 05/26/21  2117 05/27/21  0724 05/27/21  1248   POCGLU 215* 194* 188* 196*     Recent Labs     05/27/21  0419   LABA1C 7.5      Lab Results   Component Value Date    LABA1C 7.5 05/27/2021         ASSESSMENT AND PLAN  Nausea vomiting and dizziness  Could be related to uncontrolled hypertension  Systolic blood pressure more than 200  Blood pressure control is improving  Patient denies dizziness or nausea vomiting  Nephrology is following     Hypertension  Continue with home medication  Continue on labetalol and losartan  Patient is also on torsemide and spironolactone     End-stage renal disease  Consult to nephrology        DVT and GI prophylaxis        Code Status: Full Code        Dispo -cc        The patient and / or the family were informed of the results of any tests, a time was given to answer questions, a plan was proposed and they agreed with plan. Freda Pitt MD    This note was transcribed using [id number] [employer]. Please disregard any translational errors.

## 2021-05-27 NOTE — CONSULTS
Discussed with RN, Neurology consult is no longer needed at this time. Please do not hesitate to call back if you feel like our services are needed and we will happily see the patient. Thanks,  Francois Tovar  Neurology.

## 2021-05-27 NOTE — PROGRESS NOTES
Physical Therapy    Facility/Department: [facility] PROGRESSIVE CARE  Initial Assessment/Discharge Note    NAME: Sahara Ramos  : 1965  MRN: [id number]    Date of Service: 2021    Discharge Recommendations:  Home with assist PRN   PT Equipment Recommendations  Equipment Needed: No  Tami Farooq scored a 23/24 on the AM-PAC short mobility form. At this time, no further PT is recommended upon discharge. Recommend patient returns to prior setting with prior services. Assessment   Assessment: Pt is a 49 yo female who was adm to hosp with dizziness and vomiting; pt at baseline is Ind without an AD. Pt currently is close to her baseline; pt will be safe to return home at discharge; no further acute PT needed; if dizziness deemed vertigo (BPPV) then pt may benefit from OP vestibular therapy  Prognosis: Good  Decision Making: Low Complexity  PT Education: PT Role  Patient Education: dicussed different possibilities for her dizziness; if vertigo (BPPV) then may benefit from OP vestibular therapy  Barriers to Learning: none  REQUIRES PT FOLLOW UP: No  Activity Tolerance  Activity Tolerance: Patient Tolerated treatment well  Activity Tolerance: pt reported feeling a little lightheaded but not significantly       Patient Diagnosis(es): There were no encounter diagnoses. has a past medical history of Anemia, Diabetes mellitus ([address] Diabetes mellitus type 1 ([address] Diabetic retinopathy ([address] Diabetic retinopathy associated with type 2 diabetes mellitus, without macular edema, Hyperlipidemia, Kidney stone, Mixed hyperlipidemia, Orthostatic hypotension, and Vitreous hemorrhage of right eye ([address] has a past surgical history that includes Appendectomy; Kidney stone surgery; Tonsillectomy; and LAPAROSCOPY INSERTION PERITONEAL CATHETER (N/A, 2019).     Restrictions  Position Activity Restriction  Other position/activity restrictions: intermittent lightheadedness, PD - pt reports she is hoping to be on kidney transplant list soon  Vision/Hearing  Vision: Within Functional Limits  Hearing: Within functional limits     Subjective  General  Chart Reviewed: Yes  Additional Pertinent Hx: The patient Justo Finn is a 48 y. o.female With medical history significant for diabetes mellitus and end-stage renal disease patient is on peritoneal dialysis. Patient also has diabetic retinopathy. Hypertension hyperlipidemia. Patient presented to the emergency room with nausea vomiting and dizziness. Patient reports that it has been going on for 2-3 days. Patient first noticed the dizziness and then nausea vomiting. Patient denies abdominal pain no history of constipation or diarrhea.   Response To Previous Treatment: Not applicable  Family / Caregiver Present: No  Follows Commands: Within Functional Limits  Subjective  Subjective: pt very pleasant and agreeable to working with therapy; denies any pain  Pain Screening  Patient Currently in Pain: Denies          Orientation  Orientation  Overall Orientation Status: Within Functional Limits  Social/Functional History  Social/Functional History  Lives With: Spouse  Type of Home: House  Home Layout: Two level, Laundry in basement (bedroom on main level but bathroom on 2nd level, rail on stairs)  Home Access: Stairs to enter without rails  Entrance Stairs - Number of Steps: 5  Bathroom Shower/Tub: Tub/Shower unit  Bathroom Toilet: Standard  Bathroom Equipment: Grab bars in shower  ADL Assistance: Independent  Homemaking Assistance: Independent (shares with her )  Ambulation Assistance: Independent  Transfer Assistance: Independent  Active : Yes  Mode of Transportation: Car  Additional Comments:  is layed off work right now  Cognition   Cognition  Overall Cognitive Status: WFL    Objective          AROM RLE (degrees)  RLE AROM: WFL  AROM LLE (degrees)  LLE AROM : WFL  Strength RLE  Strength RLE: WFL  Strength LLE  Strength LLE: WFL        Bed mobility  Supine to Sit: Independent  Sit to Supine: Independent  Transfers  Sit to Stand: Independent  Stand to sit:  Independent  Ambulation  Ambulation?: Yes  Ambulation 1  Surface: level tile  Device: No Device  Assistance: Independent  Quality of Gait: no LOB or deviatons noted  Distance: 54' in room with multiple turns     Balance  Sitting - Static: Good  Sitting - Dynamic: Good  Standing - Static: Good  Standing - Dynamic: Good        Plan   Safety Devices  Type of devices: Call light within reach, Left in chair, Nurse notified    G-Code       OutComes Score                                                  AM-PAC Score  AM-PAC Inpatient Mobility Raw Score : 23 (05/27/21 0913)  AM-PAC Inpatient T-Scale Score : 56.93 (05/27/21 0913)  Mobility Inpatient CMS 0-100% Score: 11.2 (05/27/21 0913)  Mobility Inpatient CMS G-Code Modifier : CI (05/27/21 0913)          Goals          Therapy Time   Individual Concurrent Group Co-treatment   Time In 0800         Time Out 0830         Minutes 30                 FLAVIA MCINTOSH, PT    Flavia Mcintosh PT, [id number]

## 2021-05-27 NOTE — PLAN OF CARE
Problem: HEMODYNAMIC STATUS  Goal: Patient has stable vital signs and fluid balance  Outcome: Ongoing     Problem: ACTIVITY INTOLERANCE/IMPAIRED MOBILITY  Goal: Mobility/activity is maintained at optimum level for patient  Outcome: Ongoing     Problem: Falls - Risk of:  Goal: Will remain free from falls  Description: Will remain free from falls  Outcome: Ongoing     Problem: Fluid Volume:  Goal: Will show no signs or symptoms of fluid imbalance  Description: Will show no signs or symptoms of fluid imbalance  Outcome: Ongoing

## 2021-05-27 NOTE — PROGRESS NOTES
Call received from lab with critical K of 2.8 this am, message to Dr Rehan Orellana to update.    Electronically signed by Deep Ambrose RN on 5/27/2021 at 8:51 AM

## 2021-05-27 NOTE — PROGRESS NOTES
Occupational Therapy   Occupational Therapy Initial Assessment and Discharge    Date: 2021   Patient Name: Rubens Cornelius  MRN: [id number]     : 1965    Date of Service: 2021    Discharge Recommendations: Rubens Cornelius scored a 24/24 on the AM-[facility] ADL Inpatient form. At this time, no further OT is recommended upon discharge due to pt Ind with mobility and ADLs. Recommend patient returns to prior setting with prior services. Home independently  OT Equipment Recommendations  Equipment Needed: No    Assessment   Assessment: Rubens Cornelius is a 48 y.o. female past medical history including but not limited to diabetes, end-stage renal disease on PD, anemia, hypertension, orthostatic hypotension presents for evaluation of 2-3 days of emesis accompanied by dizziness. No reported abdominal pain denies fever denies chills denies headache. PTA pt from home with  where pt was Ind with mobility and ADLs. Pt currently functioning at/close to baseline completing mobility and transfers Ind. Anticipate pt Ind with ADLs. Pt with no reports of light headedness or dizziness throughout session. Pt able to tolerate ~7 minutes in stance with no difficulty. Pt with no concerns returning home at this time. No ongoing OT needs. D/C from OT. Prognosis: Good  Decision Making: Low Complexity  Exam: see above  OT Education: Plan of Care;OT Role;Transfer Training;ADL Adaptive Strategies  No Skilled OT: Safe to return home  REQUIRES OT FOLLOW UP: No  Activity Tolerance  Activity Tolerance: Patient Tolerated treatment well  Safety Devices  Safety Devices in place: Yes  Type of devices: Left in chair;Call light within reach;Nurse notified           Patient Diagnosis(es): There were no encounter diagnoses.      has a past medical history of Anemia, Diabetes mellitus ([facility] [address] Diabetes mellitus type 1 ([facility] [address] Diabetic retinopathy ([facility] [address] Diabetic retinopathy associated with type 2 diabetes mellitus, without macular edema, Hyperlipidemia, Kidney stone, Mixed hyperlipidemia, Orthostatic hypotension, and Vitreous hemorrhage of right eye ([address] has a past surgical history that includes Appendectomy; Kidney stone surgery; Tonsillectomy; and LAPAROSCOPY INSERTION PERITONEAL CATHETER (N/A, 8/12/2019). Restrictions  Position Activity Restriction  Other position/activity restrictions: intermittent lightheadedness, PD - pt reports she is hoping to be on kidney transplant list soon    Subjective   General  Chart Reviewed: Yes  Patient assessed for rehabilitation services?: Yes  Additional Pertinent Hx: per MD note, Abel Elise is a 48 y.o. female past medical history including but not limited to diabetes, end-stage renal disease on PD, anemia, hypertension, orthostatic hypotension presents for evaluation of 2-3 days of emesis accompanied by dizziness. No reported abdominal pain denies fever denies chills denies headache  Family / Caregiver Present: No  Referring Practitioner: Hillary Ward MD  Subjective  Subjective: Pt agreeable to OT evaluation. Pt reports no dizziness or light headedness throughout session. General Comment  Comments: okay fro therapy per RN.     Social/Functional History  Social/Functional History  Lives With: Spouse  Type of Home: House  Home Layout: Two level, Laundry in basement (bedroom on main level but bathroom on 2nd level, rail on stairs)  Home Access: Stairs to enter without rails  Entrance Stairs - Number of Steps: 5  Bathroom Shower/Tub: Tub/Shower unit  Bathroom Toilet: Standard  Bathroom Equipment: Grab bars in shower  ADL Assistance: [facility] [address]: Independent (shares with her )  Ambulation Assistance: Independent  Transfer Assistance: Independent  Active : Yes  Mode of Transportation: Car  Additional Comments:  is layed off work right now       Objective   Vision: Within Functional Limits  Hearing: Within functional limits    Orientation  Overall Orientation Status: Within Functional Limits     Balance  Sitting Balance: Independent  Standing Balance: Independent  Standing Balance  Time: 7 min  Activity: grooming at sink  Functional Mobility  Functional - Mobility Device: No device  Activity: To/from bathroom; Other (household distances in room)  Assist Level: Independent  Functional Mobility Comments: no LOB  Wheelchair Bed Transfers  Wheelchair/Bed - Technique: Ambulating  Equipment Used: Other (chair)  Level of Asssistance: Independent  ADL  Grooming: Independent (in stance at sink to wash hands, face, and brush teeth)  Additional Comments: Anticipate pt Ind with ADLs including dressing, bathing, and toileting based on ROM, strength, balance, and endurance. Tone RUE  RUE Tone: Normotonic  Tone LUE  LUE Tone: Normotonic  Coordination  Movements Are Fluid And Coordinated: Yes     Bed mobility  Supine to Sit: Independent  Sit to Supine: Independent  Transfers  Sit to stand: Independent  Stand to sit:  Independent     Cognition  Overall Cognitive Status: WFL        Sensation  Overall Sensation Status:  (N/T)        LUE AROM (degrees)  LUE AROM : WFL  RUE AROM (degrees)  RUE AROM : WFL  LUE Strength  Gross LUE Strength: WFL  RUE Strength  Gross RUE Strength: WFL                   Plan   Plan  Times per week: D/C from OT      AM-PAC Score        AM-PAC Inpatient Daily Activity Raw Score: 24 (05/27/21 1002)  AM-PAC Inpatient ADL T-Scale Score : 57.54 (05/27/21 1002)  ADL Inpatient CMS 0-100% Score: 0 (05/27/21 1002)  ADL Inpatient CMS G-Code Modifier : [address] (05/27/21 1002)    Goals  Short term goals  Time Frame for Short term goals: no ongoing OT needs  Short term goal 1: complete functional mobility and ADLs Ind- goal met 5/27  Patient Goals   Patient goals : return home       Therapy Time   Individual Concurrent Group Co-treatment   Time In 0935         Time Out 0959         Minutes 24         Timed Code Treatment Minutes: 9 Minutes (15 minute eval)       Mendel Kettering Arron, OTR/L

## 2021-05-27 NOTE — PROGRESS NOTES
[facility] [place]  Personalized Stroke Treatment Plan  My Stroke Type:   [] Ischemic Stroke (Blockage of blood flow to the brain)     [x] TIA - Transient Ischemic Attack (mini-stroke)    Personal risk factors you can control include:    [] Alcohol Abuse: check with your physician before any alcohol consumption. [] Atrial fibrillation: may cause blood clots. [] Drug Abuse: Seek help, talk with your doctor  [] Clotting Disorder  [x] Diabetes  [] Family history of stroke or heart disease  [x] High Blood Pressure/Hypertension: work with your physician. [x] High cholesterol: monitor cholesterol levels with your physician. [x] Overweight/Obesity: work with your physician for your ideal body weight. [x] Physical Inactivity: get regular exercise as directed by your physician. [] Personal history of previous TIA or stroke  [x] Poor Diet; decrease salt (sodium) in your diet, follow diet directed by physician. [] Smoking: Cigarette/Cigar: stop smoking. Follow up with your physician is important after discharge. TAKE all medications as prescribed. Do not stop taking any medications   without talking to your physician. BE FAST is a simple way to remember the main symptoms of stroke. Recognizing these symptoms helps you know when to call for medical help. BE FAST stands for:                                                 B Balance problems                                                 E Eyes, vision lost        F  Face Drooping      A  Arm Weakness        S  Speech Difficulty      T  Time to Call 9-1-1  DO NOT DELAY THIS! Educated patient and/or family on personal risk factors for stroke/TIA. Included ways to reduce the risk for recurrent stroke. [facility] [employer]'s Stroke treatment and prevention, Managing your recovery  notebook  provided to patient. The notebook includes, but not limited to, sections addressing warning signs & symptoms of a stroke.  The need to call EMS (911) immediately if signs & symptoms occur is emphasized . Medication information will be reviewed at discharge. The notebook also provides education on Stroke community resources and stroke advocacy.     Electronically signed by Electronically signed by Luis Enrique Samuel RN on 5/27/2021 at 3:06 AM

## 2021-05-27 NOTE — CONSULTS
Maternal Grandmother         cervical cancer    Lung Cancer Maternal Grandfather         black lung    Diabetes Paternal Grandmother     Heart Disease Paternal Grandfather         reports that she has never smoked. She has never used smokeless tobacco. She reports that she does not drink alcohol and does not use drugs. Medications: Allergies:  Bactrim [sulfamethoxazole-trimethoprim], Geocillin [carbenicillin], Lisinopril, Other, Tramadol, Bumetanide, and Ibuprofen  Scheduled Meds:   potassium bicarb-citric acid  20 mEq Oral Daily    labetalol  200 mg Oral 2 times per day    losartan  200 mg Oral Nightly    pantoprazole  40 mg Oral Daily    spironolactone  25 mg Oral Daily    torsemide  400 mg Oral Nightly    sodium chloride flush  5-40 mL Intravenous 2 times per day    heparin (porcine)  5,000 Units Subcutaneous 3 times per day    aspirin  81 mg Oral Daily    Or    aspirin  300 mg Rectal Daily    atorvastatin  80 mg Oral Nightly    insulin lispro  0-12 Units Subcutaneous TID WC    insulin lispro  0-6 Units Subcutaneous Nightly    gentamicin   Topical Daily    magnesium oxide  400 mg Oral BID WC    dianeal lo-queenie (ULTRABAG) 2.5%  6,000 mL Intraperitoneal Nightly    dianeal lo-queenie 2.5%  6,000 mL Intraperitoneal Nightly     Continuous Infusions:   dextrose         Review of Systems:     All systems reviewed but were negative except as mentioned in HPI    Objective:       Vitals:  BP (!) 191/95   Pulse 78   Temp 98.2 °F (36.8 °C) (Oral)   Resp 18   Ht 5' 3\" (1.6 m)   Wt 213 lb 13.5 oz (97 kg)   SpO2 94%   BMI 37.88 kg/m²   Constitutional:  awake, NAD  Skin: no rash, turgor wnl  HEENT:  MMM, No icterus  Neck: no bruits, No JVD  Cardiovascular:  S1, S2 reg  Respiratory: CTA, no crackles  Abdomen:  +BS, soft, NT, ND  Ext: no lower extremity edema  Psychiatric: mood and affect appropriate  Access: abd PD catheter in place, no drainage, redness around exit site.  No tunnel tenderness  CNS: alert, no agitation    Data:     Labs:  Hepatic:   Recent Labs     05/26/21  1310   AST 10*   ALT <5*   BILITOT 0.3   ALKPHOS 103     BNP: No results for input(s): BNP in the last 72 hours. ABGs: No results for input(s): PHART, PO2ART, BGF9CDS in the last 72 hours. IMAGING:  CT CHEST ABDOMEN PELVIS WO CONTRAST   Final Result   No acute abnormality identified in the chest, abdomen, or pelvis. Large   amount of peritoneal dialysate present. Small amount of free intra-air seen   presumably introduced during exchange of peritoneal fluid. No acute   abnormality of the GI tract suspicious for perforation. Development of   severe renal atrophy since January 2019. No other significant abnormality. RECOMMENDATIONS:   Multiple pulmonary nodules. Most severe: 5 mm left solid pulmonary nodule. No   routine follow-up imaging is recommended. These guidelines do not apply to immunocompromised patients and patients with   cancer. Follow up in patients with significant comorbidities as clinically   warranted. For lung cancer screening, adhere to Lung-RADS guidelines. Reference: Radiology. 2017; 284(1):228-43. XR CHEST (2 VW)   Final Result   Apparent free air under the right diaphragm. Recommend further evaluation   with a CT examination. This potential critical result was called and   discussed by Dr. Janette Phelps. MD Aquiles to Mitch Rangel on 5/26/2021 at 13:45. No acute findings of the chest otherwise. CT HEAD WO CONTRAST   Preliminary Result   No evidence of acute intracranial abnormality. Vascular carotid duplex bilateral    (Results Pending)       Assessment :       1. ESRD   Etiology: suspected DN  Access: Abd PD cath  Volume: Euvolemic     Seen on PD    2. Electrolytes/Acid base  Hyponatremia, Hypokalemia and Hypomagnesemia    Recent Labs     05/26/21  1310   *   K 2.9*   CO2 27   MG 1.50*       3.  BMD  -Hyperphosphatemia, SHPT  -maintained on Renvela    Lab Results Component Value Date    CALCIUM 8.3 05/26/2021    PHOS 4.5 08/26/2019        4. HTN  -Blood pressure high    BP Readings from Last 1 Encounters:   05/27/21 (!) 191/95       5. Anemia  -anemia of CKD  -assess for CHRIS    Recent Labs     05/27/21  0419   HGB 9.9*         PLAN :     -replace K, Mg,   -maintenance PD tonight (do 3 exchanges with 2.5% Dianeal)  -Optimize BP meds: add nifedipine  -MBD management  -Anemia management  -Dose meds to GFR<10    Thank you for allowing us to participate in care of [address] . We will continue to follow. Feel free to contact me with any questions.       Johana Velazquez MD  5/27/2021    [facility] [address]  Office : [phone]  Fax :[phone]

## 2021-05-28 NOTE — PROGRESS NOTES
Office: [phone]       Fax: [phone]      Nephrology Progress Note        Patient's Name: Sami Hager  Date of Visit: 5/28/2021    Reason for Consult:  ESRD management      Subjective:      Sami Hager is a 48 y.o. female with PMHx of hypertension, diabetes mellitus, ESRD who was admitted on 5/26/2021 with complaints of dizziness  Had nausea, vomiting  Patient reported no abd discomfort. no, diarrhea. No fever. history of PD peritonitis in past.  Does 2 exchanges with 2.5% day time and Icodextrin overnight    INTERVAL HISTORY    Feels better  Shortness of breath: No   Seen on PD    No abdominal pain      Medications:        Allergies:  Bactrim [sulfamethoxazole-trimethoprim], Geocillin [carbenicillin], Lisinopril, Other, Tramadol, Bumetanide, and Ibuprofen  Scheduled Meds:   NIFEdipine  60 mg Oral Daily    magnesium oxide  400 mg Oral TID    potassium bicarb-citric acid  20 mEq Oral Daily    labetalol  200 mg Oral 2 times per day    losartan  200 mg Oral Nightly    pantoprazole  40 mg Oral Daily    spironolactone  25 mg Oral Daily    torsemide  400 mg Oral Nightly    sodium chloride flush  5-40 mL Intravenous 2 times per day    heparin (porcine)  5,000 Units Subcutaneous 3 times per day    aspirin  81 mg Oral Daily    Or    aspirin  300 mg Rectal Daily    atorvastatin  80 mg Oral Nightly    insulin lispro  0-12 Units Subcutaneous TID WC    insulin lispro  0-6 Units Subcutaneous Nightly    gentamicin   Topical Daily    dianeal lo-queenie 2.5%  6,000 mL Intraperitoneal Nightly     Continuous Infusions:   dextrose           Objective:       Vitals:  BP (!) 173/72   Pulse 80   Temp 98.3 °F (36.8 °C) (Oral)   Resp 16   Ht 5' 3\" (1.6 m)   Wt 217 lb 9.5 oz (98.7 kg)   SpO2 94%   BMI 38.55 kg/m²   Constitutional:  awake, NAD  HEENT:  MMM, No Euvolemic     Seen on PD    2. Electrolytes/Acid base  Hyponatremia and Hypomagnesemia    Recent Labs     05/27/21  0419 05/28/21  0912   * 126*   K 2.8* 3.7   CO2 24 27   MG 1.60*  --        3. BMD  -Hyperphosphatemia, SHPT  -maintained on Renvela    Lab Results   Component Value Date    CALCIUM 7.8 (L) 05/27/2021    PHOS 4.5 08/26/2019        4. HTN  -Blood pressure high    BP Readings from Last 1 Encounters:   05/28/21 (!) 173/72       5. Anemia  -anemia of CKD  -assess for CHRIS    Recent Labs     05/27/21  0419   HGB 9.9*         PLAN :     -replace K, Mg,   -maintenance PD tonight (do 3 exchanges with 2.5% Dianeal)  -Optimize BP meds: added nifedipine  -MBD management  -Anemia management  -Dose meds to GFR<10    Thank you for allowing us to participate in care of [address] . We will continue to follow. Feel free to contact me with any questions.       Bryce Pa MD  5/28/2021    [facility]  [address]  Office : [phone]  Fax :[phone]

## 2021-05-28 NOTE — ACP (ADVANCE CARE PLANNING)
Advanced Care Planning Note. Purpose of Encounter: Advanced care planning in light of ESRD on PD  Parties In Attendance: Patient  Decisional Capacity: Yes  Subjective: Patient denies CP, SOB, HA or abdominal pain  Objective: Cr 10.9  Goals of Care Determination: Patient wants full support (CPR, vent, surgery, HD, trach, PEG)  Plan:  Renal  consult  Code Status: Full code   Time spent on Advanced care Plannin minutes  Advanced Care Planning Documents: Completed advanced directives on chart,  is the POA.     Uyen Ingram MD  2021 10:41 AM

## 2021-05-28 NOTE — DISCHARGE SUMMARY
Hospital Medicine Discharge Summary    Patient: Trinity Pressley     Gender: female  : 1965   Age: 48 y.o. MRN: [id number]    Admitting Physician: Jos Dumas MD  Discharge Physician: Fredy Boudreaux MD     Code Status: Full Code     Admit Date: 2021   Discharge Date: 2021      Disposition:  Home    Discharge Diagnoses: Active Hospital Problems    Diagnosis Date Noted    Dizziness [R42] 2021    Peritoneal dialysis status ([facility] [Z99.2] 2020    ESRD (end stage renal disease) ([facility] [N18.6] 2019    Essential hypertension [I10] 2018    DMII (diabetes mellitus, type 2) ([facility] [E11.9] 10/14/2015       Follow-up appointments:  one week    Outpatient to do list: F/U with PCP and Renal    Condition at Discharge:  Stable    Hospital Course:   49 yo F with ESRD on PD, HTN, DM 2 came to ER with dizziness. Admitted as inpatient for further management. Followed by Renal and Neuro. Neuro reviewed case and said they had no further input. Renal continued PD. CT head and carotid US negative. Dizziness suspected to be secondary to uncontrolled HTN. BP meds adjusted per Renal.  Seen by PT/OT and has no needs. Dizziness is resolved. Counseled to monitor for hypoglycemia and consider arrhythmia if it recurs.   F/u with PCP and Renal.    Discharge Medications:   Discharge Medication List as of 2021 12:37 PM      START taking these medications    Details   NIFEdipine (ADALAT CC) 60 MG extended release tablet Take 1 tablet by mouth daily, Disp-30 tablet, R-0Normal           Discharge Medication List as of 2021 12:37 PM        Discharge Medication List as of 2021 12:37 PM      CONTINUE these medications which have NOT CHANGED    Details   insulin NPH (HUMULIN N;NOVOLIN N) 100 UNIT/ML injection vial Inject 5 Units into the skin every morningHistorical Med      spironolactone (ALDACTONE) 25 MG tablet Take 25 mg by mouth dailyHistorical Med      pantoprazole (PROTONIX) 40 MG tablet Take 1 tablet by mouth daily, Disp-90 tablet, R-1Normal      torsemide (DEMADEX) 100 MG tablet Take 4 tablets by mouth nightly, Disp-360 tablet, R-5Normal      losartan (COZAAR) 100 MG tablet Take 2 tablets by mouth nightly, Disp-30 tablet,R-3Normal      insulin lispro (HUMALOG KWIKPEN) 100 UNIT/ML pen Inject 2-4 Units into the skin 3 times daily (before meals) Sliding scale Historical Med           Discharge Medication List as of 5/28/2021 12:37 PM      STOP taking these medications       labetalol (NORMODYNE) 200 MG tablet Comments:   Reason for Stopping:         sevelamer (RENVELA) 800 MG tablet Comments:   Reason for Stopping:                 Discharge Exam:    BP (!) 173/72   Pulse 80   Temp 98.3 °F (36.8 °C) (Oral)   Resp 16   Ht 5' 3\" (1.6 m)   Wt 217 lb 9.5 oz (98.7 kg)   SpO2 94%   BMI 38.55 kg/m²   General appearance:  NAD  HEENT:   Normal cephalic, atraumatic, moist mucous membranes, no oropharyngeal erythema or exudate  Neck: Supple, trachea midline, no anterior cervical or SC LAD  Heart[de-identified] Normal s1/s2, RRR, no murmurs, gallops, or rubs. No leg edema  Lungs:  No use of accessory musclesNormal respiratory effort. Clear to auscultation, bilaterally without Rales/Wheezes/Rhonchi. Abdomen: Soft, non-tender, non-distended, obese, PD catheter, bowel sounds present, no masses  Musculoskeletal:  No clubbing, no cyanosis, no edema  Skin: No lesion or masses  Neurologic:  Neurovascularly intact without any focal sensory/motor deficits. Cranial nerves: II-XII intact, grossly non-focal.  Psychiatric:  A & O x3  Neuro: Grossly intact, moves all four extremities     Labs:  For convenience and continuity at follow-up the following most recent labs are provided:    Lab Results   Component Value Date    WBC 6.4 05/27/2021    HGB 9.9 05/27/2021    HCT 29.0 05/27/2021    MCV 94.6 05/27/2021     05/27/2021     05/28/2021    K 3.7 05/28/2021    K 2.8 05/27/2021    CL 87 05/28/2021 CO2 27 05/28/2021    BUN 31 05/28/2021    CREATININE 10.9 05/28/2021    CALCIUM 8.0 05/28/2021    PHOS 4.5 05/28/2021    ALKPHOS 103 05/26/2021    ALT <5 05/26/2021    AST 10 05/26/2021    BILITOT 0.3 05/26/2021    LABALBU 2.7 05/28/2021    LDLCALC 117 05/27/2021    TRIG 131 05/27/2021     Lab Results   Component Value Date    INR 1.02 08/23/2019    INR 1.29 (H) 08/05/2019    INR 1.02 01/14/2019       Radiology:  XR CHEST (2 VW)    Result Date: 5/26/2021  EXAMINATION: TWO XRAY VIEWS OF THE CHEST 5/26/2021 1:28 pm COMPARISON: Prior chest x-rays most recently August 23, 2019 HISTORY: ORDERING SYSTEM PROVIDED HISTORY: dizzy TECHNOLOGIST PROVIDED HISTORY: Reason for exam:->dizzy Reason for Exam: Dizziness; Emesis Acuity: Acute Type of Exam: Initial FINDINGS: There is a small amount of air under the right diaphragm not definitively within a bowel loop and suspicious for free intraperitoneal air. No evidence of pneumonia or other acute pulmonary process. No pneumothorax or pleural effusion. No acute findings of the cardiac mediastinal structures     Apparent free air under the right diaphragm. Recommend further evaluation with a CT examination. This potential critical result was called and discussed by Dr. Jneni Villalobos. MD Aquiles to [facility] on 5/26/2021 at 13:45. No acute findings of the chest otherwise. CT HEAD WO CONTRAST    Result Date: 5/28/2021  EXAMINATION: CT OF THE HEAD WITHOUT CONTRAST,  5/26/2021 1:23 pm TECHNIQUE: CT of the head was performed without the administration of intravenous contrast. Dose modulation, iterative reconstruction, and/or weight based adjustment of the mA/kV was utilized to reduce the radiation dose to as low as reasonably achievable. COMPARISON: 09/02/2014. HISTORY: ORDERING SYSTEM PROVIDED HISTORY:  Dizziness x 3 days TECHNOLOGIST PROVIDED HISTORY: Reason for exam:  Dizziness x3 days Has a \"code stroke\" or \"stroke alert\" been called?   No Decision Support Exception - unselect if not a suspected or confirmed emergency medical condition->Emergency Medical Condition (MA) Is the patient pregnant? No Reason for Exam:  Dizziness x3 days Acuity: Acute Type of Exam: Initial FINDINGS: BRAIN/VENTRICLES: The ventricular system is normal in appearance. No evidence of mass effect or midline shift. Very subtle foci of low attenuation identified within periventricular/subcortical white matter and centrosylvian regions. Finding nonspecific however likely on the basis of changes of mild ischemic leukoencephalopathy. No other area of abnormal attenuation of brain parenchyma is identified. No abnormal extra-axial fluid collections are identified. Benign-appearing dural base calcifications again identified in the frontal regions bilaterally. There is atherosclerotic calcification of distal internal carotid and vertebral arteries. ORBITS: The visualized portion of the orbits demonstrate no acute abnormality. SINUSES: The visualized paranasal sinuses and mastoid air cells demonstrate no acute abnormality. SOFT TISSUES/SKULL:  No acute abnormality of the visualized skull or soft tissues. No evidence of acute intracranial abnormality.      Vascular carotid duplex bilateral    Result Date: 5/27/2021  Carotid Duplex Study  Demographics   Patient Name       [facility]   Date of Study      05/27/2021         Gender              Female   Patient Number     [id number]         Date of Birth       1965   Visit Number       [id number]          Age                 48 year(s)   Accession Number   [id number]         Room Number         [address] [place] [address]   Corporate ID       [id number]            Daisha Kidd RVT   Ordering Physician Bushra Graham    Interpreting        [facility]                     Antonella Chavez MD           Physician           Forrest Johnson MD  Procedure Type of Study:   Cerebral:Carotid, VL CAROTID DUPLEX BILATERAL. Vascular Sonographer Report  Indications for Study:CVA. Impressions Right Impression The right internal carotid artery appears to have a 0-15% diameter reducing stenosis based on velocity criteria. The right vertebral artery demonstrates normal antegrade flow. Left Impression The left internal carotid artery appears to have a 0-15% diameter reducing stenosis based on velocity criteria. The left vertebral artery demonstrates normal antegrade flow. Conclusions   Summary   No hemodynamically significant carotid stenosis noted on either side. Signature   ------------------------------------------------------------------  Electronically signed by Thalia Finn MD (Interpreting  physician) on 05/27/2021 at 05:09 PM  ------------------------------------------------------------------  Blood Pressure:Right arm 156/74 mmHg. Left arm 147/69 mmHg. Patient Status:Routine. Study [place] [id number] - Vascular Lab. Technical Quality:Adequate visualization. Risk Factors   - The patient's risk factor(s) include: renal failure currently treated with     dialysis, diabetes mellitus and arterial hypertension. Velocities are measured in cm/s ; Diameters are measured in mm Carotid Right Measurements +---------+----+----+-----+----+ ! Location ! PSV ! EDV ! Angle! RI  ! +---------+----+----+-----+----+ ! Prox CCA !87. 4!14. 7!60   !0.83! +---------+----+----+-----+----+ ! Mid CCA  !73.3!12. 9!60   !0.82! +---------+----+----+-----+----+ ! Dist CCA !77. 4!14. 7!60   !0.81! +---------+----+----+-----+----+ ! Prox ICA !58. 5!18. 1! 60   !0.69! +---------+----+----+-----+----+ ! Mid ICA  !61.7!19. 3!60   !0.69! +---------+----+----+-----+----+ ! Dist ICA ! 64  !20. 4!60   !0.68! +---------+----+----+-----+----+ ! Prox ECA !82.1!    !60   !    ! +---------+----+----+-----+----+ ! Gae Goltz. 7! 10  !60   !0.72! +---------+----+----+-----+----+   - There is antegrade vertebral flow noted on the right side.    - Additional Measurements:ICAPSV/CCAPSV 0.87.ICAEDV/CCAEDV 1.39. Carotid Left Measurements +---------+----+----+-----+----+ ! Location ! PSV ! EDV ! Angle! RI  ! +---------+----+----+-----+----+ ! Prox CCA !87. 9!13. 5!60   !0.85! +---------+----+----+-----+----+ ! Mid CCA  !77.4!9.97!60   !0.87! +---------+----+----+-----+----+ ! Dist CCA !61  !12. 9!60   !0.79! +---------+----+----+-----+----+ ! Prox ICA !95.9!22. 8!60   !0.76! +---------+----+----+-----+----+ ! Mid ICA  !49. 9!18. 1! 60   !0.64! +---------+----+----+-----+----+ ! Dist ICA !56. 1!00. 9!60   !0.67! +---------+----+----+-----+----+ ! Prox ECA !420 !    !61   !    ! +---------+----+----+-----+----+ ! Vertebral!25. 2!9.05!60   !0.64! +---------+----+----+-----+----+   - There is antegrade vertebral flow noted on the left side. - Additional Measurements:ICAPSV/CCAPSV 1.24. ICAEDV/CCAEDV 1.69. CT CHEST ABDOMEN PELVIS WO CONTRAST    Result Date: 5/26/2021  EXAMINATION: CT OF THE CHEST, ABDOMEN, AND PELVIS WITHOUT CONTRAST 5/26/2021 1:47 pm TECHNIQUE: CT of the chest, abdomen and pelvis was performed without the administration of intravenous contrast. Multiplanar reformatted images are provided for review. Dose modulation, iterative reconstruction, and/or weight based adjustment of the mA/kV was utilized to reduce the radiation dose to as low as reasonably achievable. COMPARISON: PA and lateral chest 05/26/2021 HISTORY: ORDERING SYSTEM PROVIDED HISTORY: ? fee air TECHNOLOGIST PROVIDED HISTORY: Reason for exam:->? fee air Additional Contrast?->None Decision Support Exception - unselect if not a suspected or confirmed emergency medical condition->Emergency Medical Condition (MA) Is the patient pregnant?->No Reason for Exam: free air,  surgeries: kidney stone and apendecitis Acuity: Acute Type of Exam: Initial FINDINGS: Chest: Mediastinum: Slight nodularity of the thyroid appears present. No mass or adenopathy. No pericardial effusion. Normal size of the heart and thoracic aorta.   Slight stranding of the fat in during exchange of peritoneal fluid. No acute abnormality of the GI tract suspicious for perforation. Development of severe renal atrophy since January 2019. No other significant abnormality. RECOMMENDATIONS: Multiple pulmonary nodules. Most severe: 5 mm left solid pulmonary nodule. No routine follow-up imaging is recommended. These guidelines do not apply to immunocompromised patients and patients with cancer. Follow up in patients with significant comorbidities as clinically warranted. For lung cancer screening, adhere to Lung-RADS guidelines. Reference: Radiology. 2017; 284(1):228-43. The patient was seen and examined on day of discharge and this discharge summary is in conjunction with any daily progress note from day of discharge. Time Spent on discharge is 45 minutes  in the examination, evaluation, counseling and review of medications and discharge plan. Note that more than 30 minutes was spent in preparing discharge papers, discussing discharge with patient, medication review, etc.       Signed:    Yessi Bergeron MD   5/28/2021      Thank you Vasiliy Cuevas for the opportunity to be involved in this patient's care.  If you have any questions or concerns please feel free to contact me at [address] [place] [address] [facility]

## 2021-05-28 NOTE — PROGRESS NOTES
Discharge instructions reviewed with patient; verbalizes understanding of medications and follow up appointments. Denies any further questions/concerns. Transportation notified to wheel patient out of hospital for discharge to home. Spouse present to transport home.

## 2021-08-02 PROBLEM — I16.1 HYPERTENSIVE EMERGENCY: Status: ACTIVE | Noted: 2021-01-01

## 2021-08-02 NOTE — ED NOTES
Per hospitalist pt to remain in ED for 45-1hr with Cardene at 3mg/hr. If -160 at that time, Cardene to be stopped and then pt will go to PCU.       Remedios Jeter RN  08/02/21 [id number]

## 2021-08-02 NOTE — ED PROVIDER NOTES
Attending Supervisory Note/Shared Visit   I have personally performed a face to face diagnostic evaluation on this patient. I have reviewed the mid-levels findings and agree. History and Exam by me shows alert white female no acute distress. She presents after having an episode where she was speaking on the phone around 1 PM and she was having difficulty speaking. She describes difficulty finding her words. It lasted about 30 minutes and resolved. She has a history of hypertension. A couple months ago they had stopped her calcium channel blocker because it was causing edema. They increased her other antihypertensive meds. Her blood pressures been running high since then. HEENT: Pupils equal round reactive. Extraocular movements are intact. No facial asymmetry. Heart: Regular rate and rhythm. No murmurs or gallops noted. Lungs: Breath sounds equal bilaterally and clear. Neuro: Awake, alert, oriented. Symmetrical reactive pupils. Intact extraocular movements. No facial asymmetry. Symmetrical motor function. No drift. No limb ataxia. Speech is clear and understandable. No expressive aphasia. This patient significantly hypertensive with an episode that sounds like a TIA with an expressive aphasia. She is going to be started on nicardipine. Her work-up is been ordered and is in process. She understands that she will require admission for control of her blood pressure. Lab reviewed. H&H is stable. White blood count 6700. Sodium 131 with potassium of 3.3. BUN of 38 with a creatinine of 11.3. Bicarb of 25. Anion gap of 16.  Opponent of 0.21. BNP of greater than 70,000. Magnesium 1.4. Chest x-ray: No acute cardiopulmonary abnormalities. CT head: No acute intracranial abnormality. This patient presents with significantly elevated blood pressures with symptoms that sound like a TIA. Her blood pressure has been poorly controlled recently.   She was taken off calcium channel blocker couple months ago and her medications were adjusted and she said her blood pressure has been slowly trending higher. She has no neurologic findings on exam.  Her symptoms resolved completely at home earlier. This patient was started on nicardipine drip. It was titrated to a systolic blood pressure of 180. The hospitalist was consulted for admission. Test results, diagnosis, and treatment plan were discussed with the patient. She understands the treatment plan and need for admission and is agreeable.     (Please note that portions of this note were completed with a voice recognition program.  Efforts were made to edit the dictations but occasionally words are mis-transcribed.)    Cholo Emery MD  Attending Emergency Physician        Dmitri Barajas MD  08/02/21 1948

## 2021-08-02 NOTE — PROGRESS NOTES
Medication Reconciliation    List of medications patient is currently taking is complete. Source of information: 1. Conversation with patient at bedside                                      2. EPIC records      Allergies  Bactrim [sulfamethoxazole-trimethoprim], Geocillin [carbenicillin], Lisinopril, Other, Tramadol, Bumetanide, and Ibuprofen     Notes regarding home medications:   1. Patient received her morning home medications prior to arrival to the emergency department today. 2. Patient takes Losartan 100 mg and Torsemide 400 mg at bedtime (PD patient).   3. Patient takes Ergocalciferol 50,000 IU po Q7Days on Mondays - did NOT take today    Pasha De Santiago, [address] [place] [address], PharmD, BCPS  8/2/2021 5:59 PM

## 2021-08-02 NOTE — ED NOTES
Pt would like to wait to take Losartan at this time , d/t taking her nightly dose earlier today.      Merlin Posner, RN  08/02/21 1946

## 2021-08-02 NOTE — H&P
Hospital Medicine History & Physical      PCP: Octavio Fernandes    Date of Admission: 8/2/2021    Date of Service: Pt seen/examined on 08/02/21 and Admitted to Inpatient with expected LOS greater than two midnights due to medical therapy. Chief Complaint:  LH/Dizziness/elevated BP at home      History Of Present Illness:     48 y.o. female [email or web address]   - with pmhx of Type 2 DM, HLD, HTN, ESRD on PD  - presents to the ED noted to have around 1 PM with headache dizziness, difficulty with speech symptom loss of a half an hour, noted that her blood pressure at home was 235/125. She tells me that she was on nifedipine stopped on 7/13/2021 due to leg swelling. Her leg swelling has completely resolved since stopping nifedipine but now having high blood pressures. She is also on losartan 1 mg nightly and she did take this dose when she was having symptoms at 1 PM.  She is also on 50 mg spinal lactone at home and 40 mg of torsemide at night. She did not call her nephrologist as she is supposed to see him in the office on Friday. Patient will be admitted to hospital for management of hypertensive emergency. She was started on nicardipine drip, blood pressure systolic 005M to 194O at the time of my evaluation.     Past Medical History:          Diagnosis Date    Anemia 9/15/2014    Diabetes mellitus ([address]     Diabetes mellitus type 1 ([address]     Diabetic retinopathy ([address]     Diabetic retinopathy associated with type 2 diabetes mellitus, without macular edema     Hyperlipidemia 7/27/2015    Hypertension     Kidney stone     Mixed hyperlipidemia 7/27/2015    Orthostatic hypotension     Vitreous hemorrhage of right eye ([address] 8/15/2017       Past Surgical History:          Procedure Laterality Date    APPENDECTOMY      KIDNEY STONE SURGERY      LAPAROSCOPY INSERTION PERITONEAL CATHETER N/A 8/12/2019    LAPAROSCOPIC PERITONEAL DIALYSIS CATHETER PLACEMENT WITH AXEL TROCHAR performed by Saul Lee Rey Velarde MD at [facility]         Medications Prior to Admission:      Prior to Admission medications    Medication Sig Start Date End Date Taking? Authorizing Provider   losartan (COZAAR) 100 MG tablet Take 100 mg by mouth nightly   Yes Historical Provider, MD   ergocalciferol (ERGOCALCIFEROL) 1.25 MG (67998 UT) capsule Take 50,000 Units by mouth once a week   Yes Historical Provider, MD   spironolactone (ALDACTONE) 25 MG tablet Take 50 mg by mouth daily    Yes Historical Provider, MD   pantoprazole (PROTONIX) 40 MG tablet Take 1 tablet by mouth daily 3/16/21  Yes Dewight Severin, MD   torsemide (DEMADEX) 100 MG tablet Take 4 tablets by mouth nightly 11/27/20  Yes Dewight Severin, MD   insulin lispro (HUMALOG KWIKPEN) 100 UNIT/ML pen Inject 2-4 Units into the skin 3 times daily (before meals) Sliding scale    Yes Historical Provider, MD       Allergies:  Bactrim [sulfamethoxazole-trimethoprim], Doxazosin, Geocillin [carbenicillin], Lisinopril, Other, Tramadol, Bumetanide, and Ibuprofen    Social History:      The patient currently lives at home with family    TOBACCO:   reports that she has never smoked. She has never used smokeless tobacco.  ETOH:   reports no history of alcohol use. Family History:    reviewed        Problem Relation Age of Onset    Heart Disease Mother         heart attack 2007    High Blood Pressure Mother     Diabetes Father     Heart Disease Father     Emphysema Father     Cancer Maternal Grandmother         cervical cancer    Lung Cancer Maternal Grandfather         black lung    Diabetes Paternal Grandmother     Heart Disease Paternal Grandfather        REVIEW OF SYSTEMS:   Pertinent positives as noted in the HPI. All other systems reviewed and negative.     PHYSICAL EXAM PERFORMED:    BP (!) 168/101   Pulse 87   Temp 97.5 °F (36.4 °C) (Oral)   Resp 18   Ht 5' 3\" (1.6 m)   Wt 214 lb 4.6 oz (97.2 kg)   LMP 05/12/2020   SpO2 97%   BMI 37.96 kg/m² General appearance:  No apparent distress, appears stated age and cooperative. HEENT:  Normal cephalic, atraumatic without obvious deformity. Pupils equal, round, and reactive to light. Extra ocular muscles intact. Conjunctivae/corneas clear. Neck: Supple, with full range of motion. No jugular venous distention. Trachea midline. Respiratory:  Normal respiratory effort. Clear to auscultation, bilaterally without Rales/Wheezes/Rhonchi. Cardiovascular:  Regular rate and rhythm with normal S1/S2 without murmurs, rubs or gallops. Abdomen: Soft, non-tender, non-distended with normal bowel sounds. Musculoskeletal:  No clubbing, cyanosis or edema bilaterally. Full range of motion without deformity. Skin: Skin color, texture, turgor normal.  No rashes or lesions. Neurologic:  Neurovascularly intact without any focal sensory/motor deficits. Cranial nerves: II-XII intact, grossly non-focal.  Psychiatric:  Alert and oriented, thought content appropriate, normal insight  Capillary Refill: Brisk,< 3 seconds   Peripheral Pulses: +2 palpable, equal bilaterally       Labs:     Recent Labs     08/02/21  1624   WBC 6.7   HGB 12.6   HCT 36.4        Recent Labs     08/02/21  1624   *   K 3.3*   CL 90*   CO2 25   BUN 38*   CREATININE 11.3*   CALCIUM 8.6     Recent Labs     08/02/21  1624   AST 7*   ALT 6*   BILITOT 0.5   ALKPHOS 109     No results for input(s): INR in the last 72 hours.   Recent Labs     08/02/21  1624   TROPONINI 0.21*       Urinalysis:      Lab Results   Component Value Date    NITRU Negative 05/27/2021    WBCUA 3 05/27/2021    BACTERIA RARE 05/27/2021    RBCUA 4 05/27/2021    BLOODU TRACE 05/27/2021    SPECGRAV 1.016 05/27/2021    GLUCOSEU 250 05/27/2021       Radiology:     CXR: I have reviewed the CXR with the following interpretation: no cardiopulmonary abnormalities   EKG:  I have reviewed the EKG with the following interpretation: n/a    XR CHEST PORTABLE   Final Result   No acute cardiopulmonary disease. CT HEAD WO CONTRAST   Final Result   No acute intracranial abnormality. ASSESSMENT:    Active Hospital Problems    Diagnosis Date Noted    Hypertensive emergency [I16.1] 08/02/2021       Hypertensive emergency  Her symptoms have been resolved by the time of my evaluation  She is on very low-dose of nicardipine  I will stop the drip and give her a dose of 100 of losartan 50 of spinal lactone as well as 400 mg of torsemide  If blood pressures between 150 to 160 can admit to progressive care unit  Monitor on telemetry  Nephrology consulted    Transient ischemic attack likely to hypertensive emergency, symptoms are completely resolved  CT head without acute findings, will hold off any further work-up    ESRD on peritoneal dialysis, due to history of NSAID use and use of Bactrim  Nephrology consulted for management of peritoneal dialysis while inpatient  She still makes urine and is on torsemide which we will continue    COVID-19 vaccine status not vaccinated, counseled in detail about benefits of COVID-19 vaccine and underlying COVID-19 pandemic. We discussed about vaccine benefits that patient can still get the infection but usually infections mild to moderate and does not usually lead to intensive care unit admission/mechanical ventilation and severe disease    Type 2 diabetes  She is only on sliding scale at home which we will continue  Carb controlled diet, point-of-care glucose checks      DVT Prophylaxis: Heparin  Diet: ADULT DIET; Regular; 4 carb choices (60 gm/meal); Low Sodium (2 gm)  Code Status: Full Code    PT/OT Eval Status: n/a    Dispo - Admit as inpatient. I anticipate hospitalization spanning more than two midnights for investigation and treatment of the above medically necessary diagnoses. Jeanmarie Henderson MD    Thank you Rey Marinelli for the opportunity to be involved in this patient's care.  If you have any questions or concerns please feel free to contact me at [phone].

## 2021-08-02 NOTE — PLAN OF CARE
Admit to hospital  Give dose of Aldactone and Losartan  Wean off nicardipine gtt up to 9 PM and then admit to PCU  Discussed with RN

## 2021-08-02 NOTE — ED PROVIDER NOTES
[facility] Emergency Department    CHIEF COMPLAINT  Hypertension (s/p medication change )      SHARED SERVICE VISIT  I have seen and evaluated this patient with my supervising physician, Dr. Chase Glass. HISTORY OF PRESENT ILLNESS  Tami Steven is a 48 y.o. non-toxic/well-appearing minimally distressed female with a medical history including, but not limited to, anemia, diabetes mellitus, hypertension, mixed hyperlipidemia, ESRD, acute kidney injury, volume overload, peritonitis, generalized abdominal pain peritoneal dialysis, morbid obesity, nausea, vomiting, hyponatremia, and dizziness who presents to the ED complaining of progressively increasing blood pressure readings status post she was taken off of her nifedipine 60 mg daily for extreme peripheral edema on 7/13/2021. She presents today due to acute [email or web address] 1300 hrs. with \"aching\" 3/10 mid forehead ache, generalized weakness, incoherence, expressive aphasia, lightheadedness, and shortness of breath. Denies chest pain, nausea, vomiting, presyncope, diaphoresis, cough, hemoptysis, fever, chills, changes in smell or taste, urinary symptoms, visual changes, or other concerns. Patient is asymptomatic upon arrival here in the emergency department. No other complaints, modifying factors or associated symptoms. Nursing notes reviewed.    Past Medical History:   Diagnosis Date    Anemia 9/15/2014    Diabetes mellitus ([address]     Diabetes mellitus type 1 ([address]     Diabetic retinopathy ([address]     Diabetic retinopathy associated with type 2 diabetes mellitus, without macular edema     Hyperlipidemia 7/27/2015    Hypertension     Kidney stone     Mixed hyperlipidemia 7/27/2015    Orthostatic hypotension     Vitreous hemorrhage of right eye ([address] 8/15/2017     Past Surgical History:   Procedure Laterality Date    APPENDECTOMY      KIDNEY STONE SURGERY      LAPAROSCOPY INSERTION PERITONEAL CATHETER N/A 8/12/2019 LAPAROSCOPIC PERITONEAL DIALYSIS CATHETER PLACEMENT WITH AXEL GONZALEZ performed by Anthony Lopez MD at [facility]       Family History   Problem Relation Age of Onset    Heart Disease Mother         heart attack 2007    High Blood Pressure Mother     Diabetes Father     Heart Disease Father     Emphysema Father     Cancer Maternal Grandmother         cervical cancer    Lung Cancer Maternal Grandfather         black lung    Diabetes Paternal Grandmother     Heart Disease Paternal Grandfather      Social History     Socioeconomic History    Marital status:      Spouse name: Not on file    Number of children: Not on file    Years of education: Not on file    Highest education level: Not on file   Occupational History    Not on file   Tobacco Use    Smoking status: Never Smoker    Smokeless tobacco: Never Used   Vaping Use    Vaping Use: Never used   Substance and Sexual Activity    Alcohol use: No    Drug use: No    Sexual activity: Yes     Partners: Male   Other Topics Concern    Not on file   Social History Narrative    Not on file     Social Determinants of Health     Financial Resource Strain:     Difficulty of Paying Living Expenses:    Food Insecurity:     Worried About Running Out of Food in the Last Year:     [address] in the Last Year:    Transportation Needs:     Lack of Transportation (Medical):      Lack of Transportation (Non-Medical):    Physical Activity:     Days of Exercise per Week:     Minutes of Exercise per Session:    Stress:     Feeling of Stress :    Social Connections:     Frequency of Communication with Friends and Family:     Frequency of Social Gatherings with Friends and Family:     Attends Anabaptism Services:     Active Member of Clubs or Organizations:     Attends Club or Organization Meetings:     Marital Status:    Intimate Partner Violence:     Fear of Current or Ex-Partner:     Emotionally Abused:     Physically Abused:     Sexually Abused:      No current facility-administered medications for this encounter. Current Outpatient Medications   Medication Sig Dispense Refill    insulin NPH (HUMULIN N;NOVOLIN N) 100 UNIT/ML injection vial Inject 5 Units into the skin every morning      spironolactone (ALDACTONE) 25 MG tablet Take 50 mg by mouth daily       pantoprazole (PROTONIX) 40 MG tablet Take 1 tablet by mouth daily 90 tablet 1    torsemide (DEMADEX) 100 MG tablet Take 4 tablets by mouth nightly 360 tablet 5    losartan (COZAAR) 100 MG tablet Take 2 tablets by mouth nightly (Patient taking differently: Take 100 mg by mouth daily ) 30 tablet 3    insulin lispro (HUMALOG KWIKPEN) 100 UNIT/ML pen Inject 2-4 Units into the skin 3 times daily (before meals) Sliding scale        Allergies   Allergen Reactions    Bactrim [Sulfamethoxazole-Trimethoprim]     Geocillin [Carbenicillin]     Lisinopril Other (See Comments)     Pt thinks cr levels went up due to med.  Other      Artificial sweetners, nutrasweet    Tramadol     Bumetanide Nausea And Vomiting    Ibuprofen Nausea And Vomiting       REVIEW OF SYSTEMS  10 systems reviewed, pertinent positives per HPI otherwise noted to be negative    PHYSICAL EXAM  BP (!) 220/130   Pulse 87   Temp 98.1 °F (36.7 °C) (Oral)   Resp 18   Ht 5' 3\" (1.6 m)   Wt 213 lb 3 oz (96.7 kg)   LMP 05/12/2020   SpO2 94%   BMI 37.76 kg/m²   GENERAL APPEARANCE: Awake and alert. Cooperative. No apparent distress. Non-- toxic in appearance. HEAD: Normocephalic. Atraumatic. EYES: PERRL. EOM's grossly intact. ENT: Mucous membranes are moist.   NECK: Supple. HEART: RRR. No murmurs, rubs, or gallops. + S1-S2  LUNGS: Respirations unlabored. CTAB. Good air exchange. Speaking comfortably in full sentences. ABDOMEN: Soft. Non-distended. Non-tender. No guarding or rebound. + Bowel sounds x 4 quadrants. No masses. No organomegaly. EXTREMITIES: Trace peripheral edema.  Moves all extremities equally. All extremities neurovascularly intact. SKIN: Warm and dry. No acute rashes. NEUROLOGICAL: Alert and oriented. CN's 2-12 intact. No gross facial drooping. Strength 5/5, sensation intact. PSYCHIATRIC: Normal mood and affect. NIH Stroke Scale  NIH Stroke Scale Assessed: Yes  Interval: Baseline  Level of Consciousness (1a. ): Alert  LOC Questions (1b. ): Answers both correctly  LOC Commands (1c. ): Performs both tasks correctly  Best Gaze (2. ): Normal  Visual (3. ): No visual loss  Facial Palsy (4. ): Normal symmetrical movement  Motor Arm, Left (5a. ): No drift  Motor Arm, Right (5b. ): No drift  Motor Leg, Left (6a. ): No drift  Motor Leg, Right (6b. ): No drift  Limb Ataxia (7. ): Absent  Sensory (8. ): Normal  Best Language (9. ): No aphasia  Dysarthria (10. ): Normal  Extinction and Inattention (11): No abnormality  Total: 0     Negative test of skew  No difficulty performing finger-nose  No truncal instability  No limb ataxia  Patient ambulated here in the emergency department. RADIOLOGY  No results found. ED COURSE  Patient received Tylenol for pain, with good relief. Triage vitals stable but extremely [email or web address] mmHg. A cardiac workup was initiated including POCT glucose, CMP, troponin, magnesium, BNP, CBC, CXR, CT head.          Labs Ordered:  I have reviewed and interpreted all of the currently available lab results from this visit:  Results for orders placed or performed during the hospital encounter of 08/02/21   CBC Auto Differential   Result Value Ref Range    WBC 6.7 4.0 - 11.0 K/uL    RBC 4.09 4.00 - 5.20 M/uL    Hemoglobin 12.6 12.0 - 16.0 g/dL    Hematocrit 36.4 36.0 - 48.0 %    MCV 88.9 80.0 - 100.0 fL    MCH 30.7 26.0 - 34.0 pg    MCHC 34.6 31.0 - 36.0 g/dL    RDW 15.1 12.4 - 15.4 %    Platelets 420 797 - 565 K/uL    MPV 10.5 5.0 - 10.5 fL    Neutrophils % 77.6 %    Lymphocytes % 13.6 %    Monocytes % 6.2 %    Eosinophils % 2.0 %    Basophils % 0.6 % Neutrophils Absolute 5.2 1.7 - 7.7 K/uL    Lymphocytes Absolute 0.9 (L) 1.0 - 5.1 K/uL    Monocytes Absolute 0.4 0.0 - 1.3 K/uL    Eosinophils Absolute 0.1 0.0 - 0.6 K/uL    Basophils Absolute 0.0 0.0 - 0.2 K/uL   Comprehensive Metabolic Panel w/ Reflex to MG   Result Value Ref Range    Sodium 131 (L) 136 - 145 mmol/L    Potassium reflex Magnesium 3.3 (L) 3.5 - 5.1 mmol/L    Chloride 90 (L) 99 - 110 mmol/L    CO2 25 21 - 32 mmol/L    Anion Gap 16 3 - 16    Glucose 183 (H) 70 - 99 mg/dL    BUN 38 (H) 7 - 20 mg/dL    CREATININE 11.3 (HH) 0.6 - 1.1 mg/dL    GFR Non-African American 4 (A) >60    GFR  4 (A) >60    Calcium 8.6 8.3 - 10.6 mg/dL    Total Protein 7.2 6.4 - 8.2 g/dL    Albumin 3.3 (L) 3.4 - 5.0 g/dL    Albumin/Globulin Ratio 0.8 (L) 1.1 - 2.2    Total Bilirubin 0.5 0.0 - 1.0 mg/dL    Alkaline Phosphatase 109 40 - 129 U/L    ALT 6 (L) 10 - 40 U/L    AST 7 (L) 15 - 37 U/L    Globulin 3.9 g/dL   Troponin   Result Value Ref Range    Troponin 0.21 (H) <0.01 ng/mL   Brain Natriuretic Peptide   Result Value Ref Range    Pro-BNP >70,000 (H) 0 - 124 pg/mL   Magnesium   Result Value Ref Range    Magnesium 1.40 (L) 1.80 - 2.40 mg/dL           Imaging ordered:  CT HEAD WO CONTRAST    Result Date: 8/2/2021  EXAMINATION: CT OF THE HEAD WITHOUT CONTRAST  8/2/2021 4:01 pm TECHNIQUE: CT of the head was performed without the administration of intravenous contrast. Dose modulation, iterative reconstruction, and/or weight based adjustment of the mA/kV was utilized to reduce the radiation dose to as low as reasonably achievable. COMPARISON: 05/26/2021 HISTORY: ORDERING SYSTEM PROVIDED HISTORY: dizziness/HA/elevated BP TECHNOLOGIST PROVIDED HISTORY: If patient is on cardiac monitor and/or pulse ox, they may be taken off cardiac monitor and pulse ox, left on O2 if currently on. All monitors reattached when patient returns to room. Has a \"code stroke\" or \"stroke alert\" been called? ->No Reason for evaluation and treatment including, but not limited to IV administration of antihypertensive medication for hypertensive urgency. Patient is agreeable with the plan for admission. CRITICAL CARE TIME  0 Minutes of critical care time spent not including separately billable procedures. MDM  Patient presents to the emergency department with dizziness and shortness of breath in the presence of hypertension/hypertensive frequency. Alternate diagnoses are less likely based on history and physical. Considered acute coronary syndrome, pulmonary embolism, COPD/asthma, pneumonia, sepsis, pericardial tamponade, pneumothorax, CHF, thoracic aortic dissection, anxiety, benign positional vertigo, labyrinthitis/otitis, sepsis, dehydration/orthostasis, vasovagal reaction, stroke, TIA, intracranial bleed, migraine, anxiety, dysrhythmia, medication side effects, head trauma, among others but less likely based on history and physical.            Work-up reveals:  (Abnormal labs and imaging noted otherwise  normal)      POCT glucose: 240 mg/dL    CMP: There is expected electrolyte derangement as the patient is on PD with sodium 131, potassium 3.3, chloride 90, glucose 183, BUN 38, creatinine 11.3, GFR 4, albumin decreased at 3.3, albumin/globulin ratio 0.8, ALT 8, AST 7, otherwise unremarkable    Troponin: [email or web address]    Magnesium low at 1.4    BNP: >70,000    CBC: Negative for leukocytosis or anemia with emphasized absolute of 0.9, otherwise unremarkable    CXR: No acute cardiopulmonary disease. CT head without: No acute intracranial abnormality      Interventions: Patient was placed on cardiac monitoring and given Tylenol 650 p.o. and magnesium for repletion after lab results reveal magnesium of 1.4    BMP: >70,000-however the patient is on peritoneal dialysis    CXR: No acute cardiopulmonary disease.     CT head: No acute intracranial abnormality      Therefore:    My attending physician nor I feel that the patient is safe or appropriate for discharge to home as she is experiencing symptomatic hypertensive urgency and is requiring nicardipine drip to maintain her blood pressure. She will also receive repletion for her hypomagnesemia. Clinical Impression:    Hypertensive urgency  Elevated troponin  Hypomagnesemia  History of peritoneal dialysis        Disposition:    Admitted      Patient will be admitted to hospital for further evaluation and treatment. Hospitalist:Arthur       Discussed patients HPI, ED work-up, results, treatment, and response with my attending physician Dr. Warren Hallman and the Hospitalist -Dr. Xi Barnhart who agrees to admit the patient to the hospital.            Kanika Leblanc [facility]    This chart was created using Dragon dictation. Every effort was made by myself to ensure accuracy, however due to limitations of this technology errors may be present.       ABDIAZIZ Torres - JAROCHO  08/03/21 1192

## 2021-08-02 NOTE — ED NOTES
Provider informed of pts 1811 BP, pt to stay at 3mg/hr per provider at this time.      Sandi Otoole RN  08/02/21 [id number]

## 2021-08-03 NOTE — CONSULTS
Office: [phone]       Fax: [phone]      Nephrology Initial Consult Note        Patient's Name: Janett Mendez  Date of Visit: 8/3/2021    Reason for Consult:  ESRD management  Requesting Physician:  Anthony Acosta MD  PCP: Maddi Rivera    Chief Complaint:  High BP    History of Present Illness:      Janett Mendez is a 48 y.o. female with PMHx of hypertension, diabetes mellitus, ESRD who was admitted on 8/2/2021 with complaints of dizziness/high BP  blood pressure at home was 235/125. Few wks ago Nifedipine was discontinued due to edema. Her Losartan and Aldactone were increased. Pt took extra Losartan yesterday  history of PD peritonitis in past.  Does 2 exchanges with 2.5% day time and Icodextrin overnight    Dialysis initiation: sep 2019.  Was briefly on HD  Mode: CAPD  Etiology of ESRD: DN  Dialysis Location: [facility] dialysis  Schedule: daily  Primary Nephrologist: Dr. Dex Ware: +  Access: Abd PD cath       Past Medical History:   Diagnosis Date    Anemia 9/15/2014    Diabetes mellitus ([address]     Diabetes mellitus type 1 ([address]     Diabetic retinopathy ([address]     Diabetic retinopathy associated with type 2 diabetes mellitus, without macular edema     Hyperlipidemia 7/27/2015    Hypertension     Kidney stone     Mixed hyperlipidemia 7/27/2015    Orthostatic hypotension     Vitreous hemorrhage of right eye ([address] 8/15/2017       Past Surgical History:   Procedure Laterality Date    APPENDECTOMY      KIDNEY STONE SURGERY      LAPAROSCOPY INSERTION PERITONEAL CATHETER N/A 8/12/2019    LAPAROSCOPIC PERITONEAL DIALYSIS CATHETER PLACEMENT WITH AXEL TROCHAR performed by Christos Oden MD at [address] [place] [address]         Family History   Problem Relation Age of Onset    Heart Disease Mother         heart attack 2007    High Blood Pressure Mother     Diabetes Father     Heart Disease Father     Emphysema Father     Cancer Maternal Grandmother         cervical cancer    Lung Cancer Maternal Grandfather         black lung    Diabetes Paternal Grandmother     Heart Disease Paternal Grandfather         reports that she has never smoked. She has never used smokeless tobacco. She reports that she does not drink alcohol and does not use drugs. Medications: Allergies:  Bactrim [sulfamethoxazole-trimethoprim], Doxazosin, Geocillin [carbenicillin], Lisinopril, Other, Tramadol, Bumetanide, and Ibuprofen  Scheduled Meds:   dianeal lo-queenie 4.25%  1,000 mL Intraperitoneal Nightly    And    dianeal lo-queenie 4.25%  6,000 mL Intraperitoneal Nightly    magnesium sulfate  2,000 mg Intravenous Once    pantoprazole  40 mg Oral QAM AC    torsemide  400 mg Oral Nightly    sodium chloride flush  5-40 mL Intravenous 2 times per day    heparin (porcine)  5,000 Units Subcutaneous 3 times per day    spironolactone  50 mg Oral BID    losartan  100 mg Oral Nightly    insulin lispro  0-6 Units Subcutaneous TID WC    insulin lispro  0-3 Units Subcutaneous Nightly     Continuous Infusions:   niCARdipine Stopped (08/02/21 2028)    sodium chloride      dextrose         Review of Systems:     All systems reviewed but were negative except as mentioned in HPI    Objective:       Vitals:  BP (!) 198/92 Comment: manual  Pulse 87   Temp 97.6 °F (36.4 °C) (Oral)   Resp 15   Ht 5' 3\" (1.6 m)   Wt 214 lb 4.6 oz (97.2 kg)   LMP 05/12/2020   SpO2 95%   BMI 37.96 kg/m²   Constitutional:  awake, NAD  Skin: no rash, turgor wnl  HEENT:  MMM, No icterus  Neck: no bruits, No JVD  Cardiovascular:  S1, S2 reg  Respiratory: CTA, no crackles  Abdomen:  +BS, soft, NT, ND  Ext: no lower extremity edema  Psychiatric: mood and affect appropriate  Access: abd PD catheter in place, no drainage/redness around exit site.  No tunnel tenderness  CNS: alert, no agitation    Data:     Labs:  Hepatic:   Recent Labs     08/02/21  1624   AST 7*   ALT 6*   BILITOT 0.5   ALKPHOS 109     BNP: No results for input(s): BNP in the last 72 hours. ABGs: No results for input(s): PHART, PO2ART, YGN3FBN in the last 72 hours. IMAGING:  XR CHEST PORTABLE   Final Result   No acute cardiopulmonary disease. CT HEAD WO CONTRAST   Final Result   No acute intracranial abnormality. Assessment :       1. ESRD   Etiology: suspected DN  Access: Abd PD cath  Volume: Euvolemic     Seen on PD    2. Electrolytes/Acid base  Hyponatremia and Hypokalemia    Recent Labs     08/03/21  0543   *   K 3.0*   CO2 24   MG 1.80       3. BMD  -Hyperphosphatemia, SHPT  -maintained on Renvela    Lab Results   Component Value Date    CALCIUM 8.0 (L) 08/03/2021    PHOS 4.5 05/28/2021        4. HTN  -Blood pressure high   -Hypertensive urgency    BP Readings from Last 1 Encounters:   08/03/21 (!) 198/92       5. Anemia  -anemia of CKD  -assess for CHRIS    Recent Labs     08/03/21  0543   HGB 11.0*     6. DM    PLAN :     -replace K, Mg as needed  -maintenance PD tonight (do 3 exchanges with 2.5% Dianeal). Seen on PD  -Optimize BP meds: add Labetalol  -MBD management  -Anemia management  -Dose meds to GFR<10    Thank you for allowing us to participate in care of Danae [facility] [employer] . We will continue to follow. Feel free to contact me with any questions.       Hillary Bill MD  8/3/2021    [facility] [address]  Office : [phone]  Fax :[phone]

## 2021-08-03 NOTE — PROGRESS NOTES
Hospitalist Progress Note      PCP: Cassie Duffy    Date of Admission: 8/2/2021    Chief Complaint: LH/Dizziness/elevated BP at home    Hospital Course:   48 y.o. female Kayode Lea   - with pmhx of Type 2 DM, HLD, HTN, ESRD on PD  - presents to the ED noted to have around 1 PM with headache dizziness, difficulty with speech symptom loss of a half an hour, noted that her blood pressure at home was 235/125. She tells me that she was on nifedipine stopped on 7/13/2021 due to leg swelling. Her leg swelling has completely resolved since stopping nifedipine but now having high blood pressures.     Subjective:   Says doing better, no cp/sob/lh/dizziness    Medications:  Reviewed    Infusion Medications    niCARdipine Stopped (08/02/21 2028)    sodium chloride      dextrose       Scheduled Medications    labetalol  100 mg Oral 2 times per day    dianeal lo-queenie 2.5%  6,000 mL Intraperitoneal Nightly    hydrALAZINE  50 mg Oral Once    pantoprazole  40 mg Oral QAM AC    torsemide  400 mg Oral Nightly    sodium chloride flush  5-40 mL Intravenous 2 times per day    heparin (porcine)  5,000 Units Subcutaneous 3 times per day    spironolactone  50 mg Oral BID    losartan  100 mg Oral Nightly    insulin lispro  0-6 Units Subcutaneous TID WC    insulin lispro  0-3 Units Subcutaneous Nightly     PRN Meds: hydrALAZINE, sodium chloride flush, sodium chloride, ondansetron **OR** ondansetron, polyethylene glycol, acetaminophen **OR** acetaminophen, glucose, dextrose, glucagon (rDNA), dextrose      Intake/Output Summary (Last 24 hours) at 8/3/2021 1303  Last data filed at 8/3/2021 0154  Gross per 24 hour   Intake --   Output 3205 ml   Net -3205 ml       Physical Exam Performed:    BP (!) 199/99 Comment: RN manual check  Pulse 82   Temp 98.5 °F (36.9 °C) (Oral)   Resp 16   Ht 5' 3\" (1.6 m)   Wt 214 lb 4.6 oz (97.2 kg)   LMP 05/12/2020   SpO2 93%   BMI 37.96 kg/m²     General appearance: No apparent distress, appears stated age and cooperative. HEENT: Pupils equal, round, and reactive to light. Conjunctivae/corneas clear. Neck: Supple, with full range of motion. No jugular venous distention. Trachea midline. Respiratory:  Normal respiratory effort. Clear to auscultation, bilaterally without Rales/Wheezes/Rhonchi. Cardiovascular: Regular rate and rhythm with normal S1/S2 without murmurs, rubs or gallops. Abdomen: Soft, non-tender, non-distended with normal bowel sounds. Musculoskeletal: No clubbing, cyanosis or edema bilaterally. Full range of motion without deformity. Skin: Skin color, texture, turgor normal.  No rashes or lesions. Neurologic:  Neurovascularly intact without any focal sensory/motor deficits. Cranial nerves: II-XII intact, grossly non-focal.  Psychiatric: Alert and oriented, thought content appropriate, normal insight  Capillary Refill: Brisk,< 3 seconds   Peripheral Pulses: +2 palpable, equal bilaterally       Labs:   Recent Labs     08/02/21  1624 08/03/21  0543   WBC 6.7 5.9   HGB 12.6 11.0*   HCT 36.4 33.0*    144     Recent Labs     08/02/21  1624 08/03/21  0543   * 130*   K 3.3* 3.0*   CL 90* 91*   CO2 25 24   BUN 38* 40*   CREATININE 11.3* 11.7*   CALCIUM 8.6 8.0*     Recent Labs     08/02/21  1624   AST 7*   ALT 6*   BILITOT 0.5   ALKPHOS 109     No results for input(s): INR in the last 72 hours. Recent Labs     08/02/21  1624   TROPONINI 0.21*       Urinalysis:      Lab Results   Component Value Date    NITRU Negative 05/27/2021    WBCUA 3 05/27/2021    BACTERIA RARE 05/27/2021    RBCUA 4 05/27/2021    BLOODU TRACE 05/27/2021    SPECGRAV 1.016 05/27/2021    GLUCOSEU 250 05/27/2021       Radiology:  XR CHEST PORTABLE   Final Result   No acute cardiopulmonary disease. CT HEAD WO CONTRAST   Final Result   No acute intracranial abnormality.                  Assessment/Plan:    Active Hospital Problems    Diagnosis Date Noted    Hypertensive emergency [I16.1] 08/02/2021     Hypertensive emergency  Her symptoms have been resolved by the time of my evaluation  Off nicardipine gtt  Meds adjusted, starting on labetalol 100 bid, losartan, aldactone, 400 mg of torsemide  Monitor on telemetry  Nephrology consulted, discussed with Dr. Ira Bay     Transient ischemic attack likely to hypertensive emergency, symptoms are completely resolved. CT head without acute findings, will hold off any further work-up     ESRD on peritoneal dialysis, due to history of NSAID use and use of Bactrim  Nephrology consulted for management of peritoneal dialysis while inpatient  She still makes urine and is on torsemide which we will continue     COVID-19 vaccine status not vaccinated, counseled in detail about benefits of COVID-19 vaccine and underlying COVID-19 pandemic. We discussed about vaccine benefits that patient can still get the infection but usually infections mild to moderate and does not usually lead to intensive care unit admission/mechanical ventilation and severe disease     Type 2 diabetes  She is only on sliding scale at home which we will continue  Carb controlled diet, point-of-care glucose checks  Check A1c, she has been quite hyperglycemic, last A1c was in 05/2021       DVT Prophylaxis: Heparin  Diet: ADULT DIET;  Regular; 4 carb choices (60 gm/meal)  Code Status: Full Code    PT/OT Eval Status: n/a    Dispo - continue inpatient care    Felicita Lanes, MD

## 2021-08-03 NOTE — PROGRESS NOTES
4 Eyes Skin Assessment     NAME:  Tami Farooq  YOB: 1965  MEDICAL RECORD NUMBER:  [id number]    The patient is being assess for  Admission    I agree that 2 RN's have performed a thorough Head to Toe Skin Assessment on the patient. ALL assessment sites listed below have been assessed. Areas assessed by both nurses:    Head, Face, Ears, Shoulders, Back, Chest, Arms, Elbows, Hands, Sacrum. Buttock, Coccyx, Ischium and Legs. Feet and Heels        Does the Patient have a Wound?  No noted wound(s)       Enoch Prevention initiated:  No   Wound Care Orders initiated:  No    Pressure Injury (Stage 3,4, Unstageable, DTI, NWPT, and Complex wounds) if present place consult order under [de-identified] No    New and Established Ostomies if present place consult order under : NA      Nurse 1 eSignature: Electronically signed by Tegan Gupta RN on 8/3/21 at 1:56 AM EDT    **SHARE this note so that the co-signing nurse is able to place an eSignature**    Nurse 2 eSignature: Electronically signed by Hermann Salinas RN on 8/3/21 at 2:03 AM EDT

## 2021-08-03 NOTE — ED NOTES
Writer called report to [employer]. RN to call us back d/t peritoneal dialysis needed.       Suzan Sanford RN  08/02/21 0549

## 2021-08-03 NOTE — PROGRESS NOTES
Cleaned PD catheter site per protocol and pt refused dressing. She stated, [facility] doctor told her to leave it open so it can stay dry and I haven't had any infections. \"

## 2021-08-03 NOTE — PROGRESS NOTES
Pt transferred from ED 5271. Monitor applied, MW notified. Oriented pt to room, call light, hosp policy. Pt expressed understanding, denied questions.

## 2021-08-03 NOTE — CONSULTS
Consult received  Full note to follow    Andrew Andrew MD  8/3/2021    [facility] [address]  Office : [phone]  Fax :[phone]

## 2021-08-04 NOTE — PROGRESS NOTES
POCT BS checked at this time per pt request as she was concerned about receiving her scheduled 2 units of humalog insulin in addition to 10 units of lantus insulin earlier this evening. BS at this time is 322. Pt states she feels more tired/lethargic and is contributing this to her elevated BS. Pt requesting ss insulin be increased today for better BS coverage. Will notify day shift team of elevated BS and pt's request.     Pt's BP rechecked at this time at 179/95 . Unable to give another dose of PRN hydralazine until 0506. On call NP notified of ongoing elevated BP's. NP states no new orders at this time as BP should not be taken down too quickly. Will recheck BP again around 0500 and give PRN hydralazine if systolic >122.

## 2021-08-04 NOTE — PROGRESS NOTES
Hospitalist Progress Note      PCP: Dina Bill    Date of Admission: 8/2/2021    Chief Complaint: LH/Dizziness/elevated BP at home    Hospital Course:   48 y.o. female Atul Payer   - with pmhx of Type 2 DM, HLD, HTN, ESRD on PD  - presents to the ED noted to have around 1 PM with headache dizziness, difficulty with speech symptom loss of a half an hour, noted that her blood pressure at home was 235/125. She tells me that she was on nifedipine stopped on 7/13/2021 due to leg swelling. Her leg swelling has completely resolved since stopping nifedipine but now having high blood pressures.     Subjective:   Says doing ok, no LH/CP/SOB    Medications:  Reviewed    Infusion Medications    sodium chloride      dextrose       Scheduled Medications    hydrALAZINE  50 mg Oral TID    labetalol  200 mg Oral 2 times per day    insulin lispro  0.05 Units/kg Subcutaneous TID WC    torsemide  400 mg Oral Daily    dianeal lo-queenie 2.5%  6,000 mL Intraperitoneal Nightly    dianeal lo-queenie 2.5%  3,000 mL Intraperitoneal Nightly    insulin glargine  10 Units Subcutaneous Nightly    pantoprazole  40 mg Oral QAM AC    sodium chloride flush  5-40 mL Intravenous 2 times per day    heparin (porcine)  5,000 Units Subcutaneous 3 times per day    spironolactone  50 mg Oral BID    losartan  100 mg Oral Nightly    insulin lispro  0-6 Units Subcutaneous TID WC    insulin lispro  0-3 Units Subcutaneous Nightly     PRN Meds: hydrALAZINE, sodium chloride flush, sodium chloride, ondansetron **OR** ondansetron, polyethylene glycol, acetaminophen **OR** acetaminophen, glucose, dextrose, glucagon (rDNA), dextrose      Intake/Output Summary (Last 24 hours) at 8/4/2021 1910  Last data filed at 8/4/2021 1039  Gross per 24 hour   Intake 600 ml   Output 184 ml   Net 416 ml       Physical Exam Performed:    BP (!) 170/91   Pulse 87   Temp 98.3 °F (36.8 °C) (Oral)   Resp 18   Ht 5' 3\" (1.6 m)   Wt 211 lb 3.2 oz (95.8 kg) LMP 05/12/2020   SpO2 93%   BMI 37.41 kg/m²     General appearance: No apparent distress, appears stated age and cooperative. HEENT: Pupils equal, round, and reactive to light. Conjunctivae/corneas clear. Neck: Supple, with full range of motion. No jugular venous distention. Trachea midline. Respiratory:  Normal respiratory effort. Clear to auscultation, bilaterally without Rales/Wheezes/Rhonchi. Cardiovascular: Regular rate and rhythm with normal S1/S2 without murmurs, rubs or gallops. Abdomen: Soft, non-tender, non-distended with normal bowel sounds. Musculoskeletal: No clubbing, cyanosis or edema bilaterally. Full range of motion without deformity. Skin: Skin color, texture, turgor normal.  No rashes or lesions. Neurologic:  Neurovascularly intact without any focal sensory/motor deficits. Cranial nerves: II-XII intact, grossly non-focal.  Psychiatric: Alert and oriented, thought content appropriate, normal insight  Capillary Refill: Brisk,< 3 seconds   Peripheral Pulses: +2 palpable, equal bilaterally       Labs:   Recent Labs     08/02/21  1624 08/03/21  0543   WBC 6.7 5.9   HGB 12.6 11.0*   HCT 36.4 33.0*    144     Recent Labs     08/02/21  1624 08/03/21  0543 08/04/21  1021   * 130* 128*   K 3.3* 3.0* 3.2*   CL 90* 91* 89*   CO2 25 24 26   BUN 38* 40* 38*   CREATININE 11.3* 11.7* 9.8*   CALCIUM 8.6 8.0* 8.0*   PHOS  --   --  4.4     Recent Labs     08/02/21  1624   AST 7*   ALT 6*   BILITOT 0.5   ALKPHOS 109     No results for input(s): INR in the last 72 hours. Recent Labs     08/02/21  1624   TROPONINI 0.21*       Urinalysis:      Lab Results   Component Value Date    NITRU Negative 05/27/2021    WBCUA 3 05/27/2021    BACTERIA RARE 05/27/2021    RBCUA 4 05/27/2021    BLOODU TRACE 05/27/2021    SPECGRAV 1.016 05/27/2021    GLUCOSEU 250 05/27/2021       Radiology:  XR CHEST PORTABLE   Final Result   No acute cardiopulmonary disease.          CT HEAD WO CONTRAST   Final Result   No acute intracranial abnormality. VL Renal Arterial Duplex Complete    (Results Pending)           Assessment/Plan:    Active Hospital Problems    Diagnosis Date Noted    Hypertensive emergency [I16.1] 08/02/2021     Hypertensive emergency  Her symptoms have been resolved by the time of my evaluation  Off nicardipine gtt  Meds adjusted, discussed with Dr. Sanchez Hopes     Transient ischemic attack likely to hypertensive emergency, symptoms are completely resolved. CT head without acute findings, will hold off any further work-up     ESRD on peritoneal dialysis, due to history of NSAID use and use of Bactrim  Nephrology consulted for management of peritoneal dialysis while inpatient  She still makes urine and is on torsemide which we will continue     COVID-19 vaccine status not vaccinated, counseled in detail about benefits of COVID-19 vaccine and underlying COVID-19 pandemic. We discussed about vaccine benefits that patient can still get the infection but usually infections mild to moderate and does not usually lead to intensive care unit admission/mechanical ventilation and severe disease     Type 2 diabetes  She is only on sliding scale at home which we will continue  Carb controlled diet, point-of-care glucose checks  A1c worse @ 8.5  Insulin doses adjusted, she had popcorn yesterday that her grand daughter bought       DVT Prophylaxis: Heparin  Diet: ADULT DIET;  Regular; 4 carb choices (60 gm/meal)  Code Status: Full Code    PT/OT Eval Status: n/a    Dispo - continue inpatient care, needs better BG and BP control    Jeanmarie Henderson, [address]

## 2021-08-04 NOTE — PROGRESS NOTES
Patient returning call to schedule colon/egd. [phone].   Pt's BP elevated again this AM at 188/102 . Pt given PRN hydralazine. Will continue to monitor BP closely. DC instructions

## 2021-08-04 NOTE — PROGRESS NOTES
Pt with elevated BP this evening at 201/103 with a MAP of 136. PRN 5mg hydralazine IVP and pt's scheduled 100mg losartan PO given at this time. Will recheck BP and monitor closely.

## 2021-08-04 NOTE — CARE COORDINATION
INITIAL CASE MANAGEMENT ASSESSMENT    Reviewed chart, met with patient to assess possible discharge needs. Explained Case Management role/services. Living Situation: Confirmed address, lives with , 2 level house, few steps to enter    ADLs: Independent     DME: PD supplies provided by [employer]    PT/OT Recs: Not ordered, UAL in room     Active Services: PD supplies delivered by [facility]: Patient not actively driving, family transports PRN     Medications: Uses [employer] in [place] - no issues    PCP: Arabella Gonzalez MD      HD/PD: Complete PD in home, has all necessary supplies    PLAN/COMMENTS: Patient will return home at discharge. Denies needs. Family transport. CM provided contact information for patient or family to call with any questions. CM will follow and assist as needed.   Electronically signed by Pratik Streeter RN Case Management [phone] on 8/4/2021 at 4:40 PM

## 2021-08-04 NOTE — PROGRESS NOTES
Office: [phone]       Fax: [phone]      Nephrology Progress Note        Patient's Name: Lashell Zavala  Date of Visit: 8/4/2021    Reason for Consult:  ESRD management      Subjective:      Lashell Zavala is a 48 y.o. female with PMHx of hypertension, diabetes mellitus, ESRD who was admitted on 8/2/2021 with complaints of dizziness/high BP  blood pressure at home was 235/125. Few wks ago Nifedipine was discontinued due to edema. Her Losartan and Aldactone were increased. Pt took extra Losartan yesterday  history of PD peritonitis in past.  Does 2 exchanges with 2.5% day time and Icodextrin overnight    INTERVAL HISTORY    Feels better  Shortness of breath: No     No headache   BP high this AM but improving          Medications:        Allergies:  Bactrim [sulfamethoxazole-trimethoprim], Doxazosin, Geocillin [carbenicillin], Lisinopril, Other, Tramadol, Bumetanide, and Ibuprofen  Scheduled Meds:   labetalol  100 mg Oral 2 times per day    dianeal lo-queenie 2.5%  6,000 mL Intraperitoneal Nightly    dianeal lo-queenie 2.5%  3,000 mL Intraperitoneal Nightly    insulin glargine  10 Units Subcutaneous Nightly    pantoprazole  40 mg Oral QAM AC    torsemide  400 mg Oral Nightly    sodium chloride flush  5-40 mL Intravenous 2 times per day    heparin (porcine)  5,000 Units Subcutaneous 3 times per day    spironolactone  50 mg Oral BID    losartan  100 mg Oral Nightly    insulin lispro  0-6 Units Subcutaneous TID WC    insulin lispro  0-3 Units Subcutaneous Nightly     Continuous Infusions:   niCARdipine Stopped (08/02/21 2028)    sodium chloride      dextrose         Objective:       Vitals:  BP (!) 218/142   Pulse 85   Temp 97.8 °F (36.6 °C) (Oral)   Resp 18   Ht 5' 3\" (1.6 m)   Wt 211 lb 3.2 oz (95.8 kg)   LMP 05/12/2020   SpO2 98%   BMI 37.41 kg/m²   Constitutional:  awake, NAD  HEENT:  MMM, No icterus  Neck: no bruits, No JVD  Cardiovascular:  S1, S2 reg  Respiratory: CTA, no crackles  Abdomen:  +BS, soft, NT, ND  Ext: no lower extremity edema  Access: abd PD catheter in place, CNS: alert, no agitation    Data:     Labs:  Hepatic:   Recent Labs     08/02/21  1624   AST 7*   ALT 6*   BILITOT 0.5   ALKPHOS 109     BNP: No results for input(s): BNP in the last 72 hours. ABGs: No results for input(s): PHART, PO2ART, YJW2GWQ in the last 72 hours. IMAGING:  XR CHEST PORTABLE   Final Result   No acute cardiopulmonary disease. CT HEAD WO CONTRAST   Final Result   No acute intracranial abnormality. Assessment :       1. ESRD   Etiology: suspected DN  Access: Abd PD cath  Volume: Euvolemic     2. Electrolytes/Acid base  Hyponatremia and Hypokalemia    Recent Labs     08/03/21  0543   *   K 3.0*   CO2 24   MG 1.80       3. BMD  -Hyperphosphatemia, SHPT  -maintained on Renvela    Lab Results   Component Value Date    CALCIUM 8.0 (L) 08/03/2021    PHOS 4.5 05/28/2021        4. HTN  -Blood pressure high   -Hypertensive urgency    BP Readings from Last 1 Encounters:   08/04/21 (!) 218/142       5. Anemia  -anemia of CKD  -assess for CHRIS    Recent Labs     08/03/21  0543   HGB 11.0*     6. DM    PLAN :     - replace K, Mg as needed  - maintenance PD tonight (do 3 exchanges with 2.5% Dianeal). Seen on PD  - Optimize BP meds: inc Labetalol, add hydralazine  - renal doppler (given sudden high BP, hypokalemia)  - MBD management  - Anemia management  - Dose meds to GFR<10    Thank you for allowing us to participate in care of [employer] . We will continue to follow. Feel free to contact me with any questions.       Micah Yoo MD  8/4/2021    [facility]  [address]  Office : [phone]  Fax :[phone]

## 2021-08-05 NOTE — PLAN OF CARE
Problem: Falls - Risk of:  Goal: Will remain free from falls  Description: Will remain free from falls  Outcome: Ongoing     Problem: Pain:  Goal: Pain level will decrease  Description: Pain level will decrease  Outcome: Ongoing     Problem: Cardiac:  Goal: Ability to maintain vital signs within normal range will improve  Description: Ability to maintain vital signs within normal range will improve  Outcome: Ongoing     Problem: Fluid Volume:  Goal: Will show no signs or symptoms of fluid imbalance  Description: Will show no signs or symptoms of fluid imbalance  Outcome: Ongoing     Problem: Skin Integrity:  Goal: Status of oral mucous membranes will improve  Description: Status of oral mucous membranes will improve  Outcome: Ongoing

## 2021-08-05 NOTE — PROGRESS NOTES
Office: [phone]       Fax: [phone]      Nephrology Progress Note        Patient's Name: Cris Qiu  Date of Visit: 8/5/2021    Reason for Consult:  ESRD management      Subjective:      Cris Qiu is a 48 y.o. female with PMHx of hypertension, diabetes mellitus, ESRD who was admitted on 8/2/2021 with complaints of dizziness/high BP  blood pressure at home was 235/125. Few wks ago Nifedipine was discontinued due to edema. Her Losartan and Aldactone were increased. Pt took extra Losartan yesterday  history of PD peritonitis in past.  Does 2 exchanges with 2.5% day time and Icodextrin overnight    INTERVAL HISTORY    Feels better  Shortness of breath: No     No headache   BP high          Medications:        Allergies:  Bactrim [sulfamethoxazole-trimethoprim], Doxazosin, Geocillin [carbenicillin], Lisinopril, Other, Tramadol, Bumetanide, and Ibuprofen  Scheduled Meds:   hydrALAZINE  50 mg Oral TID    labetalol  200 mg Oral 2 times per day    insulin lispro  0.05 Units/kg Subcutaneous TID WC    torsemide  400 mg Oral Daily    dianeal lo-queenie 2.5%  6,000 mL Intraperitoneal Nightly    dianeal lo-queenie 2.5%  3,000 mL Intraperitoneal Nightly    insulin glargine  10 Units Subcutaneous Nightly    pantoprazole  40 mg Oral QAM AC    sodium chloride flush  5-40 mL Intravenous 2 times per day    heparin (porcine)  5,000 Units Subcutaneous 3 times per day    spironolactone  50 mg Oral BID    losartan  100 mg Oral Nightly    insulin lispro  0-6 Units Subcutaneous TID WC    insulin lispro  0-3 Units Subcutaneous Nightly     Continuous Infusions:   sodium chloride      dextrose         Objective:       Vitals:  BP (!) 159/84   Pulse 77   Temp 98.2 °F (36.8 °C) (Oral)   Resp 14   Ht 5' 3\" (1.6 m)   Wt 211 lb 3.2 oz (95.8 kg)   LMP 05/12/2020 SpO2 95%   BMI 37.41 kg/m²   Constitutional:  awake, NAD  HEENT:  MMM, No icterus  Neck: no bruits, No JVD  Cardiovascular:  S1, S2 reg  Respiratory: CTA, no crackles  Abdomen:  +BS, soft, NT, ND  Ext: no lower extremity edema  Access: abd PD catheter in place, CNS: alert, no agitation    Data:     Labs:  Hepatic:   Recent Labs     08/02/21  1624   AST 7*   ALT 6*   BILITOT 0.5   ALKPHOS 109     BNP: No results for input(s): BNP in the last 72 hours. ABGs: No results for input(s): PHART, PO2ART, HPY2TOB in the last 72 hours. IMAGING:  XR CHEST PORTABLE   Final Result   No acute cardiopulmonary disease. CT HEAD WO CONTRAST   Final Result   No acute intracranial abnormality. VL Renal Arterial Duplex Complete    (Results Pending)       Assessment :       1. ESRD   Etiology: suspected DN  Access: Abd PD cath  Volume: Euvolemic     2. Electrolytes/Acid base  Hyponatremia and Hypokalemia    Recent Labs     08/03/21  0543 08/04/21  1021 08/05/21  0511   *   < > 128*   K 3.0*   < > 3.7   CO2 24   < > 26   MG 1.80  --   --     < > = values in this interval not displayed. 3. BMD  -Hyperphosphatemia, SHPT  -maintained on Renvela    Lab Results   Component Value Date    CALCIUM 8.4 08/05/2021    PHOS 5.0 (H) 08/05/2021        4. HTN  -Blood pressure high   -Hypertensive urgency    BP Readings from Last 1 Encounters:   08/05/21 (!) 159/84       5. Anemia  -anemia of CKD  -assess for CHRIS    Recent Labs     08/03/21  0543   HGB 11.0*     6. DM    PLAN :     - replace K, Mg as needed  - maintenance PD tonight (do 4 exchanges with 2.5%, 4.25% Dianeal). Seen on PD  - Optimize BP meds:  Labetalol, added hydralazine, in doxazocin  - renal doppler: no LUCILLE  - MBD management  - Anemia management  - Dose meds to GFR<10    Thank you for allowing us to participate in care of Danae Hotelbar . We will continue to follow. Feel free to contact me with any questions.       Neil Chatterjee, MD  8/5/2021    [facility] [address]  Office : [phone]  Fax :[phone]

## 2021-08-05 NOTE — CARE COORDINATION
[facility]  Diabetes Education   Progress Note       NAME:  Eunice Zuñiga 574 RECORD NUMBER:  [id number]  AGE: 48 y.o. GENDER: female  : 1965  TODAY'S DATE:  2021    Subjective   Reason for Diabetic Education Evaluation and Assessment: insulin strategies     Tami prefers her blood sugars to be above 160 for fear of hypoglycemia. She agrees that her A1c is elevated. She is not certain what Q7N target her PCP has set. Tami uses her sliding scale post meal to prevent hypoglycemia.       Visit Type: evaluation      Zaynab Mauro is a 48 y.o. female referred by:     [] Physician  [x] Nursing  [] Chart Review   [] Other:     PAST MEDICAL HISTORY        Diagnosis Date    Anemia 9/15/2014    Diabetes mellitus ([address]     Diabetes mellitus type 1 ([address]     Diabetic retinopathy ([address]     Diabetic retinopathy associated with type 2 diabetes mellitus, without macular edema     Hyperlipidemia 2015    Hypertension     Kidney stone     Mixed hyperlipidemia 2015    Orthostatic hypotension     Vitreous hemorrhage of right eye ([address] 8/15/2017       PAST SURGICAL HISTORY    Past Surgical History:   Procedure Laterality Date    APPENDECTOMY      KIDNEY STONE SURGERY      LAPAROSCOPY INSERTION PERITONEAL CATHETER N/A 2019    LAPAROSCOPIC PERITONEAL DIALYSIS CATHETER PLACEMENT WITH AXEL TROCHAR performed by Abilio Fong MD at [facility]    Family History   Problem Relation Age of Onset    Heart Disease Mother         heart attack 2007    High Blood Pressure Mother     Diabetes Father     Heart Disease Father     Emphysema Father     Cancer Maternal Grandmother         cervical cancer    Lung Cancer Maternal Grandfather         black lung    Diabetes Paternal Grandmother     Heart Disease Paternal Grandfather        SOCIAL HISTORY    Social History     Tobacco Use    Smoking status: Never Smoker    Smokeless tobacco: Never Used   Vaping Use    Vaping Use: Never used   Substance Use Topics    Alcohol use: No    Drug use: No       ALLERGIES    Allergies   Allergen Reactions    Bactrim [Sulfamethoxazole-Trimethoprim]     Doxazosin Other (See Comments)     Other reaction(s): Other (See Comments)  Bleeding  Punch in chest      Geocillin [Carbenicillin]     Lisinopril Other (See Comments)     Pt thinks cr levels went up due to med.      Other      Artificial sweetners, nutrasweet    Tramadol     Bumetanide Nausea And Vomiting    Ibuprofen Nausea And Vomiting       MEDICATIONS     hydrALAZINE  75 mg Oral TID    dianeal lo-queenie 4.25%  5,000 mL Intraperitoneal Nightly    dianeal lo-queenie 2.5%  5,000 mL Intraperitoneal Nightly    labetalol  200 mg Oral 2 times per day    insulin lispro  0.05 Units/kg Subcutaneous TID WC    torsemide  400 mg Oral Daily    insulin glargine  10 Units Subcutaneous Nightly    pantoprazole  40 mg Oral QAM AC    sodium chloride flush  5-40 mL Intravenous 2 times per day    heparin (porcine)  5,000 Units Subcutaneous 3 times per day    spironolactone  50 mg Oral BID    losartan  100 mg Oral Nightly    insulin lispro  0-6 Units Subcutaneous TID WC    insulin lispro  0-3 Units Subcutaneous Nightly       Objective        Patient Active Problem List   Diagnosis Code    Diabetic retinopathy ([facility]) E11.319    Mixed hyperlipidemia E78.2    DMII (diabetes mellitus, type 2) ([facility]) E11.9    Vitreous hemorrhage of right eye ([facility]) H43.11    Essential hypertension I10    Acute kidney injury ([facility] N17.9    ESRD (end stage renal disease) ([facility]) N18.6    Acute renal failure superimposed on chronic kidney disease ([facility]) N17.9, N18.9    Volume overload E87.70    Peritonitis ([facility]) K65.9    Generalized abdominal pain R10.84    Peritoneal dialysis status ([facility] Z99.2    Morbid obesity due to excess calories ([facility]) E66.01    Nausea and vomiting R11.2    Hyponatremia E87.1    Diarrhea R19.7    Dizziness R42    Hypertensive emergency I16.1        BP (!) 175/81   Pulse 73   Temp 97.7 °F (36.5 °C) (Oral)   Resp 19   Ht 5' 3\" (1.6 m)   Wt 210 lb 8.6 oz (95.5 kg)   LMP 05/12/2020   SpO2 96%   BMI 37.30 kg/m²     HgBA1c:    Lab Results   Component Value Date    LABA1C 8.5 08/03/2021       Recent Labs     08/04/21  1203 08/04/21  1656 08/04/21  2121 08/05/21  0745   POCGLU 213* 149* 160* 225*       BUN/Creatinine:    Lab Results   Component Value Date    BUN 40 08/05/2021    CREATININE 11.6 08/05/2021       Assessment        Diabetes Management and Education    Does the patient have a Primary Care Physician? Yes, Schuyler Cushing       Does the patient require new medication instruction? Yes  Reviewed inpatient basal/bolus/correction insulin concepts. Discussed changes in PD concentration will impact insulin demands. Person responsible for administration of Insulin/Medication:        [x] Self     [] Caregiver       [] Spouse       [] Other Family Member   []  Other      Level of patient/caregiver understanding able to:       [] Verbalized Understanding   [] Demonstrated Understanding       [] Teach Back       [x] Needs Reinforcement     [x]  Other:  Plans on continuing with post prandial injections when home. Does the patient/caregiver monitor Blood Glucoses? Yes    Does the patient/caregiver follow a Meal Plan? Yes - Less meal intake than at home. Tends to follow small meals. Does the patient/caregiver understand S/S of Hypoglycemia? No: Reports haivng symptoms when less than 150. Reviewed symptoms, prevention and treatment. Level of patient/caregiver understanding able to:       [x] Verbalized Understanding   [] Demonstrated Understanding       [] Teach Back       [] Needs Reinforcement     [x]  Other:  Agrees to notify staff if she has symptoms. Plan        Ongoing diabetes education and blood glucose monitoring. Discharge Plan:  Discharge needs: no prescription needs identified  Placement for patient upon discharge: independent living        Teaching Time Diabetes Education:  20 minutes     Electronically signed by Odilon Rangel on 8/5/2021 at 12:49 PM

## 2021-08-06 PROBLEM — E87.6 HYPOKALEMIA: Status: ACTIVE | Noted: 2021-01-01

## 2021-08-06 NOTE — PROGRESS NOTES
Office: [phone]       Fax: [phone]      Nephrology Progress Note        Patient's Name: Cielo Esparza  Date of Visit: 8/6/2021    Reason for Consult:  ESRD management      Subjective:      Cielo Esparza is a 48 y.o. female with PMHx of hypertension, diabetes mellitus, ESRD who was admitted on 8/2/2021 with complaints of dizziness/high BP  blood pressure at home was 235/125. Few wks ago Nifedipine was discontinued due to edema. Her Losartan and Aldactone were increased. Pt took extra Losartan yesterday  history of PD peritonitis in past.  Does 2 exchanges with 2.5% day time and Icodextrin overnight    INTERVAL HISTORY    Feels better  Shortness of breath: No     No headache   BP improving         Medications:        Allergies:  Bactrim [sulfamethoxazole-trimethoprim], Doxazosin, Geocillin [carbenicillin], Lisinopril, Other, Tramadol, Bumetanide, and Ibuprofen  Scheduled Meds:   hydrALAZINE  75 mg Oral TID    dianeal lo-queenie 4.25%  5,000 mL Intraperitoneal Nightly    dianeal lo-queenie 2.5%  5,000 mL Intraperitoneal Nightly    labetalol  200 mg Oral 2 times per day    insulin lispro  0.05 Units/kg Subcutaneous TID WC    torsemide  400 mg Oral Daily    insulin glargine  10 Units Subcutaneous Nightly    pantoprazole  40 mg Oral QAM AC    sodium chloride flush  5-40 mL Intravenous 2 times per day    heparin (porcine)  5,000 Units Subcutaneous 3 times per day    spironolactone  50 mg Oral BID    losartan  100 mg Oral Nightly    insulin lispro  0-6 Units Subcutaneous TID WC    insulin lispro  0-3 Units Subcutaneous Nightly     Continuous Infusions:   sodium chloride      dextrose         Objective:       Vitals:  BP (!) 161/78   Pulse 79   Temp 97.9 °F (36.6 °C) (Oral)   Resp 15   Ht 5' 3\" (1.6 m)   Wt 213 lb 3 oz (96.7 kg) Comment: P.D PATIENT LMP 05/12/2020   SpO2 95%   BMI 37.76 kg/m²   Constitutional:  awake, NAD  HEENT:  MMM, No icterus  Neck: no bruits, No JVD  Cardiovascular:  S1, S2 reg  Respiratory: CTA, no crackles  Abdomen:  +BS, soft, NT, ND  Ext: no lower extremity edema  Access: abd PD catheter in place, CNS: alert, no agitation    Data:     Labs:  Hepatic:   No results for input(s): AST, ALT, ALB, BILITOT, ALKPHOS in the last 72 hours. BNP: No results for input(s): BNP in the last 72 hours. ABGs: No results for input(s): PHART, PO2ART, NCJ6YBO in the last 72 hours. IMAGING:  VL Renal Arterial Duplex Complete   Final Result      XR CHEST PORTABLE   Final Result   No acute cardiopulmonary disease. CT HEAD WO CONTRAST   Final Result   No acute intracranial abnormality. Assessment :       1. ESRD   Etiology: suspected DN  Access: Abd PD cath  Volume: Euvolemic     2. Electrolytes/Acid base  Hyponatremia and Hypokalemia    Recent Labs     08/06/21  0433   *   K 3.3*   CO2 28       3. BMD  -Hyperphosphatemia, SHPT  -maintained on Renvela    Lab Results   Component Value Date    CALCIUM 8.6 08/06/2021    PHOS 4.7 08/06/2021        4. HTN  -Blood pressure high   -Hypertensive urgency    BP Readings from Last 1 Encounters:   08/06/21 (!) 161/78       5. Anemia  -anemia of CKD  -assess for CHRIS    No results for input(s): HGB in the last 72 hours. 6. DM    PLAN :     - replace K, Mg as needed  - maintenance PD. Seen on PD  - Optimize BP meds:  Labetalol, added hydralazine, inc doxazocin  - renal doppler: no LUCILLE  - MBD management  - Anemia management  - Dose meds to GFR<10    Spoke to Dr. Colten Chawla    Thank you for allowing us to participate in care of EricStevie . We will continue to follow. Feel free to contact me with any questions.       Bob Casas MD  8/6/2021    [facility]  Office : [phone]  Fax :[phone]

## 2021-08-06 NOTE — CARE COORDINATION
[facility]  Hyperglycemia Event      NAME: Eunice Zuñiga 574 RECORD NUMBER:  [id number]  AGE: 48 y.o. GENDER: female  : 1965  EPISODE DATE:  2021     Data     Recent Labs     21  1203 21  1656 21  2121 21  0745 21  1222 21  1621   POCGLU 213* 149* 160* 225* 169* 166*       Medications  Scheduled Medications:   hydrALAZINE  75 mg Oral TID    dianeal lo-queenie 4.25%  5,000 mL Intraperitoneal Nightly    dianeal lo-queenie 2.5%  5,000 mL Intraperitoneal Nightly    labetalol  200 mg Oral 2 times per day    insulin lispro  0.05 Units/kg Subcutaneous TID WC    torsemide  400 mg Oral Daily    insulin glargine  10 Units Subcutaneous Nightly    pantoprazole  40 mg Oral QAM AC    sodium chloride flush  5-40 mL Intravenous 2 times per day    heparin (porcine)  5,000 Units Subcutaneous 3 times per day    spironolactone  50 mg Oral BID    losartan  100 mg Oral Nightly    insulin lispro  0-6 Units Subcutaneous TID WC    insulin lispro  0-3 Units Subcutaneous Nightly       Diet  Current diet/supplement order: ADULT DIET; Regular; 4 carb choices (60 gm/meal)     Recorded PO: PO Meals Eaten (%): 51 - 75% last meal in flowsheets      Action     Declined Lantus last evening.     Physician Notified of event: Yes   Phil Fuentes MD    Electronically signed by Natalya Pineda RN on 2021 at 8:42 AM

## 2021-08-06 NOTE — PROGRESS NOTES
Data- discharge order received, pt verbalized agreement to discharge, disposition to previous residence, no needs for HHC/DME. Action- discharge instructions prepared and given to patient, pt verbalized understanding. Medication information packet given r/t NEW and/or CHANGED prescriptions emphasizing name/purpose/side effects, pt verbalized understanding. Discharge instruction summary: Diet- carb & renal, Activity- as tolerated, Primary Care Physician as follows: Paolaterrell Lomas [phone] f/u appointment to be made with Dr. Farzana Russell, immunizations reviewed and up to date, prescription [facility]. Response- Pt belongings gathered, IV removed. Disposition is home (no HHC/DME needs), left ambulatory, escorted off unit by RN.

## 2021-08-06 NOTE — PLAN OF CARE
Problem: Falls - Risk of:  Goal: Will remain free from falls  Description: Will remain free from falls  Outcome: Ongoing  Patient is wearing nonskid socks. Patient is up ad rowena. Bed is locked, and in lowest position. Room is free of clutter. Call light is within reach and used appropriately. Fall risk assessed per protocol. Will continue to monitor. Problem: Pain:  Description: Pain management should include both nonpharmacologic and pharmacologic interventions. Goal: Pain level will decrease  Description: Pain level will decrease  Outcome: Ongoing  Pain being managed with PRN medications as ordered by MD as needed per pt      Problem: Cardiac:  Goal: Ability to maintain vital signs within normal range will improve  Description: Ability to maintain vital signs within normal range will improve  Outcome: Ongoing  Goal: Cardiovascular alteration will improve  Description: Cardiovascular alteration will improve  Outcome: Ongoing  Heart rate and rhythm continuously monitored. Vitals taken every four hours. Palpable pulses. Daily weights. Problem: Activity:  Goal: Fatigue will decrease  Description: Fatigue will decrease  Outcome: Ongoing  No complaints of feeling fatigue     Problem: Fluid Volume:  Goal: Will show no signs or symptoms of fluid imbalance  Description: Will show no signs or symptoms of fluid imbalance  Outcome: Ongoing  PD pt, Is and Os monitored.       Problem: Nutritional:  Goal: Ability to identify appropriate dietary choices will improve  Description: Ability to identify appropriate dietary choices will improve  Outcome: Ongoing   Good appetite     Problem: Sensory:  Goal: General experience of comfort will improve  Description: General experience of comfort will improve  Outcome: Ongoing  No complaints of discomfort      Problem: Skin Integrity:  Goal: Status of oral mucous membranes will improve  Description: Status of oral mucous membranes will improve  Outcome: Ongoing  Skin assessment completed every shift per protocol. Pt assessed for incontinence, appropriate barrier cream applied as needed. Pt encouraged to turn/rotate every 2 hours. Assistance provided if pt unable to do so themselves. Will continue to monitor.

## 2021-08-06 NOTE — PROGRESS NOTES
Hospitalist Progress Note      PCP: Rey Marinelli    Date of Admission: 8/2/2021    Chief Complaint: LH/Dizziness/elevated BP at home    Hospital Course:   48 y.o. female Angel Campos   - with pmhx of Type 2 DM, HLD, HTN, ESRD on PD  - presents to the ED noted to have around 1 PM with headache dizziness, difficulty with speech symptom loss of a half an hour, noted that her blood pressure at home was 235/125. She tells me that she was on nifedipine stopped on 7/13/2021 due to leg swelling. Her leg swelling has completely resolved since stopping nifedipine but now having high blood pressures.     Subjective:   Feels ok, no sob, cp  Says she didn't take Lantus last night as her sugars are high at night due to PD and was worried about hypoglycemia    Medications:  Reviewed    Infusion Medications    sodium chloride      dextrose       Scheduled Medications    hydrALAZINE  75 mg Oral TID    dianeal lo-queenie 4.25%  5,000 mL Intraperitoneal Nightly    dianeal lo-queenie 2.5%  5,000 mL Intraperitoneal Nightly    labetalol  200 mg Oral 2 times per day    insulin lispro  0.05 Units/kg Subcutaneous TID WC    torsemide  400 mg Oral Daily    insulin glargine  10 Units Subcutaneous Nightly    pantoprazole  40 mg Oral QAM AC    sodium chloride flush  5-40 mL Intravenous 2 times per day    heparin (porcine)  5,000 Units Subcutaneous 3 times per day    spironolactone  50 mg Oral BID    losartan  100 mg Oral Nightly    insulin lispro  0-6 Units Subcutaneous TID WC    insulin lispro  0-3 Units Subcutaneous Nightly     PRN Meds: hydrALAZINE, sodium chloride flush, sodium chloride, ondansetron **OR** ondansetron, polyethylene glycol, acetaminophen **OR** acetaminophen, glucose, dextrose, glucagon (rDNA), dextrose      Intake/Output Summary (Last 24 hours) at 8/5/2021 2246  Last data filed at 8/5/2021 2156  Gross per 24 hour   Intake 430 ml   Output 0 ml   Net 430 ml       Physical Exam Performed:    BP (!) 155/79 Pulse 79   Temp 98.2 °F (36.8 °C) (Oral)   Resp 18   Ht 5' 3\" (1.6 m)   Wt 210 lb 8.6 oz (95.5 kg)   LMP 05/12/2020   SpO2 94%   BMI 37.30 kg/m²     General appearance: No apparent distress, appears stated age and cooperative. HEENT: Pupils equal, round, and reactive to light. Conjunctivae/corneas clear. Neck: Supple, with full range of motion. No jugular venous distention. Trachea midline. Respiratory:  Normal respiratory effort. Clear to auscultation, bilaterally without Rales/Wheezes/Rhonchi. Cardiovascular: Regular rate and rhythm with normal S1/S2 without murmurs, rubs or gallops. Abdomen: Soft, non-tender, non-distended with normal bowel sounds. Musculoskeletal: No clubbing, cyanosis or edema bilaterally. Full range of motion without deformity. Skin: Skin color, texture, turgor normal.  No rashes or lesions. Neurologic:  Neurovascularly intact without any focal sensory/motor deficits. Cranial nerves: II-XII intact, grossly non-focal.  Psychiatric: Alert and oriented, thought content appropriate, normal insight  Capillary Refill: Brisk,< 3 seconds   Peripheral Pulses: +2 palpable, equal bilaterally       Labs:   Recent Labs     08/03/21  0543   WBC 5.9   HGB 11.0*   HCT 33.0*        Recent Labs     08/03/21  0543 08/04/21  1021 08/05/21  0511   * 128* 128*   K 3.0* 3.2* 3.7   CL 91* 89* 89*   CO2 24 26 26   BUN 40* 38* 40*   CREATININE 11.7* 9.8* 11.6*   CALCIUM 8.0* 8.0* 8.4   PHOS  --  4.4 5.0*     No results for input(s): AST, ALT, BILIDIR, BILITOT, ALKPHOS in the last 72 hours. No results for input(s): INR in the last 72 hours. No results for input(s): Jenifer Renee in the last 72 hours.     Urinalysis:      Lab Results   Component Value Date    NITRU Negative 05/27/2021    WBCUA 3 05/27/2021    BACTERIA RARE 05/27/2021    RBCUA 4 05/27/2021    BLOODU TRACE 05/27/2021    SPECGRAV 1.016 05/27/2021    GLUCOSEU 250 05/27/2021       Radiology:  VL Renal Arterial Duplex Complete   Final Result      XR CHEST PORTABLE   Final Result   No acute cardiopulmonary disease. CT HEAD WO CONTRAST   Final Result   No acute intracranial abnormality. Assessment/Plan:    Active Hospital Problems    Diagnosis Date Noted    Hypertensive emergency [I16.1] 08/02/2021     Hypertensive emergency  Her symptoms have been resolved by the time of my evaluation  Off nicardipine gtt  Meds adjusted, discussed with Dr. Christianson Forglew  BP much better now     Transient ischemic attack likely to hypertensive emergency, symptoms are completely resolved. CT head without acute findings, will hold off any further work-up     ESRD on peritoneal dialysis, due to history of NSAID use and use of Bactrim  Nephrology consulted for management of peritoneal dialysis while inpatient  She still makes urine and is on torsemide which we will continue     COVID-19 vaccine status not vaccinated, counseled in detail about benefits of COVID-19 vaccine and underlying COVID-19 pandemic. We discussed about vaccine benefits that patient can still get the infection but usually infections mild to moderate and does not usually lead to intensive care unit admission/mechanical ventilation and severe disease     Type 2 diabetes  She is only on sliding scale at home which we will continue  Carb controlled diet, point-of-care glucose checks  A1c worse @ 8.5  Insulin doses adjusted, she is hyperglycemic as refused insulin last night       DVT Prophylaxis: Heparin  Diet: ADULT DIET;  Regular; 4 carb choices (60 gm/meal)  Code Status: Full Code    PT/OT Eval Status: n/a    Dispo - continue inpatient care, likely [facility] [place] [address],[address]

## 2021-08-06 NOTE — DISCHARGE SUMMARY
Hospital Medicine Discharge Summary    Patient ID: Genette Susie      Patient's PCP: Nalini Tran    Admit Date: 8/2/2021     Discharge Date:   08/06/2021    Admitting Physician: Crow Moreno MD     Discharge Physician: Crow Moreno MD     Discharge Diagnoses: Active Hospital Problems    Diagnosis Date Noted    Hypokalemia [E87.6] 08/06/2021    Hypertensive emergency [I16.1] 08/02/2021   - \"Uncontrolled type 2 DM    The patient was seen and examined on day of discharge and this discharge summary is in conjunction with any daily progress note from day of discharge. Hospital Course:   48 y. o. female Tami Farooq   - with pmhx of Type 2 DM, HLD, HTN, ESRD on PD  - presents to the ED noted to have around 1 PM with headache dizziness, difficulty with speech symptom loss of a half an hour, noted that her blood pressure at home was 235/125. She tells me that she was on nifedipine stopped on 7/13/2021 due to leg swelling.  Her leg swelling has completely resolved since stopping nifedipine but now having high blood pressures.       Problems Addressed  Hypertensive emergency; Her symptoms have been resolved by the time of my evaluation  She was on nicardipine drip in the emergency room but quickly weaned off, started on oral medications, continue with significantly elevated blood pressure  Despite being on losartan spironolactone and underlying ESRD she continues to have hypokalemia, renal ultrasound was done with duplex but did not show evidence of renal artery stenosis. She will be discharged on hydralazine 75 mg 3 times daily, labetalol 200 mg twice daily, spironolactone 50 mg twice daily, losartan 100 mg nightly. He was asked to monitor blood pressure at home  She was asked to follow-up with her primary nephrologist Dr. Trevon Quan in 1 to 2 weeks to for further titration of blood pressure medications.   She is also on torsemide 40 mg nightly which she will continue     Transient ischemic attack likely to hypertensive emergency, symptoms are completely resolved. CT head without acute findings, will hold off any further work-up     ESRD on peritoneal dialysis, per patient due to history of NSAID use and use of Bactrim, likely also uncontrolled diabetes and uncontrolled blood pressure  Nephrology followed patient for management of peritoneal dialysis while inpatient. She still makes urine and is on torsemide which we will continue     COVID-19 vaccine status not vaccinated, counseled in detail about benefits of COVID-19 vaccine and underlying COVID-19 pandemic.  We discussed about vaccine benefits that patient can still get the infection but usually infections mild to moderate and does not usually lead to intensive care unit admission/mechanical ventilation and severe disease     Type 2 diabetes  She is only on sliding scale at home which we will continue  Carb controlled diet, point-of-care glucose checks  A1c worse @ 8.5  Patient is very concerned about lower blood glucose levels, she does not want to give herself insulin if blood glucose less than 160, she was started on long-acting insulin but she continued to refuse during hospitalization as was concerned about hypoglycemic episodes. I counseled her extensively I told her to start at least low-dose NPH, I have asked her to follow-up primary care physician for further diabetes management        Physical Exam Performed:     BP (!) 161/78   Pulse 79   Temp 97.9 °F (36.6 °C) (Oral)   Resp 15   Ht 5' 3\" (1.6 m)   Wt 213 lb 3 oz (96.7 kg) Comment: P.D PATIENT  LMP 05/12/2020   SpO2 95%   BMI 37.76 kg/m²     General appearance: No apparent distress, appears stated age and cooperative. HEENT: Pupils equal, round, and reactive to light. Conjunctivae/corneas clear. Neck: Supple, with full range of motion. No jugular venous distention. Trachea midline. Respiratory:  Normal respiratory effort.  Clear to auscultation, bilaterally without Rales/Wheezes/Rhonchi. Cardiovascular: Regular rate and rhythm with normal S1/S2 without murmurs, rubs or gallops. Abdomen: Soft, non-tender, non-distended with normal bowel sounds. Musculoskeletal: No clubbing, cyanosis or edema bilaterally. Full range of motion without deformity. Skin: Skin color, texture, turgor normal.  No rashes or lesions. Neurologic:  Neurovascularly intact without any focal sensory/motor deficits. Cranial nerves: II-XII intact, grossly non-focal.  Psychiatric: Alert and oriented, thought content appropriate, normal insight  Capillary Refill: Brisk,< 3 seconds   Peripheral Pulses: +2 palpable, equal bilaterally          Labs: For convenience and continuity at follow-up the following most recent labs are provided:      CBC:    Lab Results   Component Value Date    WBC 5.9 08/03/2021    HGB 11.0 08/03/2021    HCT 33.0 08/03/2021     08/03/2021       Renal:    Lab Results   Component Value Date     08/06/2021    K 3.3 08/06/2021    K 3.0 08/03/2021    CL 87 08/06/2021    CO2 28 08/06/2021    BUN 39 08/06/2021    CREATININE 11.6 08/06/2021    CALCIUM 8.6 08/06/2021    PHOS 4.7 08/06/2021         Significant Diagnostic Studies    Radiology:   VL Renal Arterial Duplex Complete   Final Result      XR CHEST PORTABLE   Final Result   No acute cardiopulmonary disease. CT HEAD WO CONTRAST   Final Result   No acute intracranial abnormality.                 Consults:     IP CONSULT TO HOSPITALIST  IP CONSULT TO NEPHROLOGY  IP CONSULT TO DIABETES EDUCATOR    Disposition: Home    Condition at Discharge: Stable    Discharge Instructions/Follow-up:    Follow-up with primary care physician, follow-up with nephrology  Carb controlled diet  Monitor blood pressure trend, discussed with nephrologist for further titration of blood pressure medications    Code Status:  Full Code    Activity: activity as tolerated    Diet: diabetic diet and renal diet      Discharge Medications:     Current Discharge Medication List           Details   hydrALAZINE (APRESOLINE) 50 MG tablet Take 1.5 tablets by mouth 3 times daily  Qty: 135 tablet, Refills: 2      labetalol (NORMODYNE) 200 MG tablet Take 1 tablet by mouth every 12 hours  Qty: 60 tablet, Refills: 3              Details   insulin NPH (HUMULIN N;NOVOLIN N) 100 UNIT/ML injection vial Inject 5 Units into the skin 2 times daily (before meals)  Qty: 10 vial, Refills: 2      spironolactone (ALDACTONE) 25 MG tablet Take 2 tablets by mouth 2 times daily  Qty: 30 tablet, Refills: 3              Details   losartan (COZAAR) 100 MG tablet Take 100 mg by mouth nightly      ergocalciferol (ERGOCALCIFEROL) 1.25 MG (73589 UT) capsule Take 50,000 Units by mouth once a week      pantoprazole (PROTONIX) 40 MG tablet Take 1 tablet by mouth daily  Qty: 90 tablet, Refills: 1      torsemide (DEMADEX) 100 MG tablet Take 4 tablets by mouth nightly  Qty: 360 tablet, Refills: 5      insulin lispro (HUMALOG KWIKPEN) 100 UNIT/ML pen Inject 2-4 Units into the skin 3 times daily (before meals) Sliding scale              Time Spent on discharge is more than 30 minutes in the examination, evaluation, counseling and review of medications and discharge plan. Signed:    Christ Solomon MD   8/6/2021      Thank you [place] [facility] for the opportunity to be involved in this patient's care. If you have any questions or concerns please feel free to contact me at [phone].

## 2021-08-09 NOTE — ED NOTES
Provider order placed for patient's discharge. Provider reviewed decision to discharge with the patient. Discharge paperwork and any prescriptions were reviewed with the patient. Patient verbalized understanding of discharge education and any prescriptions and has no further questions or further needs at this time. Patient left with all personal belongings and was stable upon departure. Patient thanked for choosing [facility] and informed to return should any need arise.        Farhan Barker RN  08/08/21 1074

## 2021-08-09 NOTE — ED NOTES
Pt able to ambulate 50' w/o difficulty. Edu ASCENCIO witness ambulation.       Jimenez Ochoa RN  08/08/21 0627

## 2021-08-09 NOTE — ED PROVIDER NOTES
[facility]      Pt Name: Dayana Reyes  MRN: [id number]  [place] 1965  Date of evaluation: 8/8/2021  Provider: SONU Maurice    This patient was seen and evaluated by the attending physician Dr. Edson Curry. CHIEF COMPLAINT       Chief Complaint   Patient presents with    Hypertension     [place] D/T HTN STATES HAD A MEDICATION CHANGE BUT TODAY HER BP /103 SHE IS DIZZY       CRITICAL CARE TIME   I performed a total Critical Care time of 15 minutes, excluding separately reportable procedures. There was a high probability of clinically significant/life threatening deterioration in the patient's condition which required my urgent intervention. Not limited to multiple reexaminations, discussions with attending physician and consultants. HISTORY OF PRESENT ILLNESS  (Location/Symptom, Timing/Onset, Context/Setting, Quality, Duration, Modifying Factors, Severity.)   Dayana Reyes is a 48 y.o. female who presents to the emergency department accompanied by her daughter. She states that earlier today she checked her blood pressure at home and it was 473 systolic and she felt lightheaded or dizzy like the room was spinning. She had similar symptoms in the past with hypertension including an admission in May for the same. She was admitted a couple days ago and discharged on Friday with hypertensive urgency and started on hydralazine. She is taken 1 dose today of that this morning. She is peritoneal dialysis with her last filling last night. She states that she feels better now and denies symptoms including chest pain shortness of breath headache or dizziness. Nursing Notes were reviewed and I agree. REVIEW OF SYSTEMS    (2-9 systems for level 4, 10 or more for level 5)     Review of Systems   Constitutional: Negative for fever. Respiratory: Negative for shortness of breath. Cardiovascular: Negative for chest pain. Gastrointestinal: Negative for abdominal pain and vomiting. Musculoskeletal: Negative for back pain, neck pain and neck stiffness. Skin: Negative for color change, rash and wound. Neurological: Positive for dizziness (resolved). Negative for weakness and headaches. Psychiatric/Behavioral: Negative for agitation, behavioral problems and confusion. Except as noted above the remainder of the review of systems was reviewed and negative.        PAST MEDICAL HISTORY         Diagnosis Date    Anemia 9/15/2014    Chronic kidney disease     Diabetes mellitus ([address]     Diabetes mellitus type 1 ([address]     Diabetic retinopathy ([address]     Diabetic retinopathy associated with type 2 diabetes mellitus, without macular edema     Hyperlipidemia 7/27/2015    Hypertension     Kidney stone     Mixed hyperlipidemia 7/27/2015    Orthostatic hypotension     Vitreous hemorrhage of right eye ([address] 8/15/2017       SURGICAL HISTORY           Procedure Laterality Date    APPENDECTOMY      KIDNEY STONE SURGERY      LAPAROSCOPY INSERTION PERITONEAL CATHETER N/A 8/12/2019    LAPAROSCOPIC PERITONEAL DIALYSIS CATHETER PLACEMENT WITH AXEL TROCHAR performed by Chele Jain MD at [address] [place] [address]       Discharge Medication List as of 8/8/2021  9:58 PM      CONTINUE these medications which have NOT CHANGED    Details   insulin NPH (HUMULIN N;NOVOLIN N) 100 UNIT/ML injection vial Inject 5 Units into the skin 2 times daily (before meals), Disp-10 vial, R-2Normal      spironolactone (ALDACTONE) 25 MG tablet Take 2 tablets by mouth 2 times daily, Disp-30 tablet, R-3Normal      hydrALAZINE (APRESOLINE) 50 MG tablet Take 1.5 tablets by mouth 3 times daily, Disp-135 tablet, R-2Normal      labetalol (NORMODYNE) 200 MG tablet Take 1 tablet by mouth every 12 hours, Disp-60 tablet, R-3Normal      losartan (COZAAR) 100 MG tablet Take 100 mg by mouth nightlyHistorical Med      ergocalciferol (ERGOCALCIFEROL) 1.25 MG (00613 UT) capsule Take 50,000 Units by mouth once a weekHistorical Med      pantoprazole (PROTONIX) 40 MG tablet Take 1 tablet by mouth daily, Disp-90 tablet, R-1Normal      torsemide (DEMADEX) 100 MG tablet Take 4 tablets by mouth nightly, Disp-360 tablet, R-5Normal      insulin lispro (HUMALOG KWIKPEN) 100 UNIT/ML pen Inject 2-4 Units into the skin 3 times daily (before meals) Sliding scale Historical Med             ALLERGIES     Bactrim [sulfamethoxazole-trimethoprim], Doxazosin, Geocillin [carbenicillin], Lisinopril, Other, Tramadol, Bumetanide, and Ibuprofen    FAMILY HISTORY           Problem Relation Age of Onset    Heart Disease Mother         heart attack 2007    High Blood Pressure Mother     Diabetes Father     Heart Disease Father     Emphysema Father     Cancer Maternal Grandmother         cervical cancer    Lung Cancer Maternal Grandfather         black lung    Diabetes Paternal Grandmother     Heart Disease Paternal Grandfather      Family Status   Relation Name Status    Mother  Alive    Father      MGM      MGF      [address]      PGF          SOCIAL HISTORY      reports that she has never smoked. She has never used smokeless tobacco. She reports that she does not drink alcohol and does not use drugs. PHYSICAL EXAM    (up to 7 for level 4, 8 or more for level 5)     ED Triage Vitals [21 1611]   BP Temp Temp src Pulse Resp SpO2 Height Weight   (!) 160/91 97.7 °F (36.5 °C) -- 85 19 95 % -- 215 lb (97.5 kg)       Physical Exam  Vitals and nursing note reviewed. Constitutional:       Appearance: Normal appearance. HENT:      Head: Normocephalic and atraumatic. Mouth/Throat:      Mouth: Mucous membranes are moist.   Eyes:      Pupils: Pupils are equal, round, and reactive to light. Cardiovascular:      Rate and Rhythm: Normal rate. Pulses: Normal pulses. Pulmonary:      Effort: Pulmonary effort is normal. No respiratory distress. Musculoskeletal:         General: No tenderness. Normal range of motion. Skin:     General: Skin is warm. Neurological:      General: No focal deficit present. Mental Status: She is alert and oriented to person, place, and time. Cranial Nerves: No cranial nerve deficit. Motor: No weakness.       Gait: Gait normal.   Psychiatric:         Mood and Affect: Mood normal.         Behavior: Behavior normal.         DIAGNOSTIC RESULTS     EKG: All EKG's are interpreted by SONU Zarate in the absence of a cardiologist.    EKG interpreted by myself - please refer to attending physician's note for complete EKG interpretation:    Rhythm: sinus rhythm     LABS:  Labs Reviewed   CBC WITH AUTO DIFFERENTIAL - Abnormal; Notable for the following components:       Result Value    RBC 3.54 (*)     Hemoglobin 11.0 (*)     Hematocrit 32.4 (*)     RDW 15.6 (*)     MPV 11.4 (*)     Lymphocytes Absolute 0.9 (*)     All other components within normal limits    Narrative:     Performed at:  [facility]  [address] [facility] [place] [address]   Phone [phone]   COMPREHENSIVE METABOLIC PANEL W/ REFLEX TO MG FOR LOW K - Abnormal; Notable for the following components:    Sodium 126 (*)     Chloride 86 (*)     Glucose 196 (*)     BUN 37 (*)     CREATININE 12.2 (*)     GFR Non- 3 (*)     GFR  4 (*)     Albumin 3.0 (*)     Albumin/Globulin Ratio 0.8 (*)     ALT 6 (*)     AST <5 (*)     All other components within normal limits    Narrative:     CALL  [place]  [facility] tel. [phone],  Previous panic on this admission - call not needed per SOP, 08/08/2021 17:07,  by [facility] ([facility])  Performed at:  [facility]  [address] [facility] [place] [address]   Phone [phone]   TROPONIN - Abnormal; Notable for the following components:    Troponin 0.16 (*) All other components within normal limits    Narrative:     Performed at:  [facility]  [address] [place], [place] [address]   Phone [phone]       All other labs were within normal range or not returned as of this dictation. EMERGENCY DEPARTMENT COURSE and DIFFERENTIAL DIAGNOSIS/MDM:   Vitals:    Vitals:    08/08/21 2031 08/08/21 2101 08/08/21 2116 08/08/21 2145   BP: (!) 153/70 (!) 155/75 (!) 162/76 (!) 149/78   Pulse: 80 81 82 83   Resp: 16 15 18 15   Temp:       SpO2: 95% 97% 96% 99%   Weight:         I discussed with Danae Dunn and/or family the exam results, diagnosis, care, prognosis, reasons to return and the importance of follow up. Patient and/or family is in full agreement with plan and all questions have been answered. Specific discharge instructions explained, including reasons to return to the emergency department. Danae Dunn is well appearing, non-toxic, and afebrile at the time of discharge. Patient had blood pressure reading at home that was markedly elevated and she felt lightheaded at that time. History of lightheadedness or dizziness with hypertension in the past and admitted for this in May. She was admitted last week and had added hydralazine to her blood pressure medications. She is peritoneal dialysis. Her blood pressure here was actually the 40s to 160s and she is asymptomatic ambulated without ataxia nonfocal neurologic exam.  Discharge instructions follow-up with primary care. I estimate there is LOW risk for PULMONARY EMBOLISM, ACUTE CORONARY SYNDROME, THORACIC AORTIC DISSECTION, STROKE, TRANSIENT ISCHEMIC ATTACK, HEMORRHAGE, OR CARDIAC ARRHYTHMIA, thus I consider the discharge disposition reasonable. CONSULTS:  None    PROCEDURES:  Procedures      FINAL IMPRESSION      1. Hypertension, unspecified type    2.  ESRD on peritoneal dialysis [facility])          DISPOSITION/PLAN   DISPOSITION Decision To Discharge 08/08/2021 09:57:07 PM      PATIENT REFERRED TO:  Piedad Johnston [id number] [address]  [phone]    Call in 1 day  For follow up      DISCHARGE MEDICATIONS:  Discharge Medication List as of 8/8/2021  9:58 PM          (Please note that portions of this note were completed with a voice recognition program.  Efforts were made to edit the dictations but occasionally words are mis-transcribed.)    SONU [place]  08/08/21 5601

## 2021-08-10 PROBLEM — I16.0 HYPERTENSIVE URGENCY: Status: ACTIVE | Noted: 2021-01-01

## 2021-08-10 NOTE — FLOWSHEET NOTE
CCPD order : Delflex 2.5% 2 liters x 5 exchanges over 10 hours. Apply Mupironcin cream on PD catheter site daily,   No dressing on, keep air dry  PD catheter site: catheter extrusion, cuff visibile,  Pt stated it has been like that for a while, her outpt PD nurse knows. MD Agudelo noticed. Ok to use it. Ordered no dressing on and apply cream daily. Orders verified. CCPD initiated without problem. Initial effluent clear. Supplies taken to pt's room. Report received. Initial drain PD effluent: clear no fibrin noticed.       08/10/21 1720   Vitals   BP (!) 189/102   Temp 97 °F (36.1 °C)   Temp Source Oral   Pulse 82   Resp 16   Weight 211 lb 6.7 oz (95.9 kg)   Cycler   Verification of Prescription CCPD   Total Volume Programmed 22930 mL   Therapy Time (Hours:Minutes) 10:00   Fill Volume 2000 mL   Last Fill Volume 0 mL   Dextrose Setting Same (Nonextraneal)   I Drain Alarm 0 mL   Number of Cycles 5   Bag Volume 5000 mL   Number of Bags Used 2   Dianeal Solution   (Delflex 2.5% 5 liters/bags)   I Drain (mL) 532 mL   Average Dwell Time (Hours:Minutes) 01:23

## 2021-08-10 NOTE — PROGRESS NOTES
Patient transferred to [address] and orient x4  Assist to bed  Bed low with wheels locked  Call light in reach  SR up x2  [place] to room  Review plan of care with patient

## 2021-08-10 NOTE — FLOWSHEET NOTE
08/10/21 1330   Vital Signs   Orthostatic B/P and Pulse?  Yes   Blood Pressure Lying 210/110   Pulse Lying 88 PER MINUTE   Blood Pressure Sitting 204/107   Pulse Sitting 88 PER MINUTE   Blood Pressure Standing 191/99   Pulse Standing 89 PER MINUTE

## 2021-08-10 NOTE — CONSULTS
Clinical Pharmacy Consult Note    48 y.o. female admitted with dizziness and NV. Stroke workup in process. Pharmacy has been asked by Dr. Ambar Parra to adjust all drips to normal saline as appropriate based on compatibility to avoid fluid shifts since D5 is osmotically active. The following intermittent IV drips/infusions have been adjusted to saline:  None at this time      Please be aware that patient may have D5W ordered as part of hypoglycemia orderset. Total IV fluid delivered to patient over last 24h: N/A    h will follow daily to ensure all new IVPBs + drips are in NS. Please call with questions.   Sommer Ferguson PharmD., BCPS   8/10/2021 11:41 AM  Wireless: [phone]

## 2021-08-10 NOTE — ED PROVIDER NOTES
[facility]          ATTENDING PHYSICIAN NOTE       Date of evaluation: 8/10/2021    Chief Complaint     Hypertension (Pt reports elevated blood pressure at home in 200's SBP with dizziness and n/v since Saturday. Pt states she thinks it maybe her new BP meds.), Dizziness, Nausea, and Emesis      History of Present Illness     [facility] - FIFI Rodriguez is a 48 y.o. female who presents to the emergency department today complaining of headache, nausea vomiting and dizziness. She states that this is been bothering her since the weekend she states it is to the point where she is so dizzy she cannot walk without holding onto the wall. Patient reports that when she tries to move or stand she gets very nauseous and throws up. She denies any chest pain or difficulty breathing. She reports that she has been compliant with her blood pressure medications though she did not take them today because she was worried they were perhaps causing her symptoms. Review of Systems     Review of Systems  All systems were reviewed with the patient/family and negative except as otherwise documented in the HPI. Past Medical, Surgical, Family, and Social History     She has a past medical history of Anemia, Chronic kidney disease, Diabetes mellitus ([address] Diabetes mellitus type 1 ([address] Diabetic retinopathy ([address] Diabetic retinopathy associated with type 2 diabetes mellitus, without macular edema, Hyperlipidemia, Hypertension, Kidney stone, Mixed hyperlipidemia, Orthostatic hypotension, and Vitreous hemorrhage of right eye ([address] She has a past surgical history that includes Appendectomy; Kidney stone surgery; Tonsillectomy; and LAPAROSCOPY INSERTION PERITONEAL CATHETER (N/A, 8/12/2019).   Her family history includes Cancer in her maternal grandmother; Diabetes in her father and paternal grandmother; Emphysema in her father; Heart Disease in her father, mother, and paternal grandfather; High Blood Pressure in her mother; Praveena Javier in her maternal grandfather. She reports that she has never smoked. She has never used smokeless tobacco. She reports that she does not drink alcohol and does not use drugs. Medications     Previous Medications    ERGOCALCIFEROL (ERGOCALCIFEROL) 1.25 MG (78835 UT) CAPSULE    Take 50,000 Units by mouth once a week    HYDRALAZINE (APRESOLINE) 50 MG TABLET    Take 1.5 tablets by mouth 3 times daily    INSULIN LISPRO (HUMALOG KWIKPEN) 100 UNIT/ML PEN    Inject 2-4 Units into the skin 3 times daily (before meals) Sliding scale     INSULIN NPH (HUMULIN N;NOVOLIN N) 100 UNIT/ML INJECTION VIAL    Inject 5 Units into the skin 2 times daily (before meals)    LABETALOL (NORMODYNE) 200 MG TABLET    Take 1 tablet by mouth every 12 hours    LOSARTAN (COZAAR) 100 MG TABLET    Take 100 mg by mouth nightly    PANTOPRAZOLE (PROTONIX) 40 MG TABLET    Take 1 tablet by mouth daily    TORSEMIDE (DEMADEX) 100 MG TABLET    Take 4 tablets by mouth nightly       Allergies     She is allergic to bactrim [sulfamethoxazole-trimethoprim], doxazosin, geocillin [carbenicillin], lisinopril, other, spironolactone, tramadol, bumetanide, and ibuprofen. Physical Exam     INITIAL VITALS: BP: (!) 185/105, Temp: 98.4 °F (36.9 °C), Pulse: 83, Resp: 18, SpO2: 98 %   Physical Exam  Constitutional: Well-developed, well-nourished appearing female sitting up in the hospital stretcher comfortable in no acute distress  Eyes:  Sclera anicteric. PERRLA, EOMI without appreciable nystagmus  HEENT:  Normocephalic, TMs unremarkable in appearance bilaterally, oropharynx moist.   Neck: normal range of motion, supple. Respiratory:  Even and non-labored, no distress   Cardiovascular:  Extremities warm, well perfused, no murmurs rubs or gallops equal pulses in all extremities  GI:  Soft, nondistended   Musculoskeletal:  No edema, no deformities.    Skin:  No rash, no lesions, no pallor   Neurologic: Alert and oriented x4, speech is clear and coherent without appreciable dysarthria or aphasia. Follows commands briskly with all extremities. Has some difficulties with past-pointing on finger-nose-finger testing bilaterally. Gait not able to be assessed due to the dizziness  Psychiatric:  Normal mood. Behavior appropriate. Diagnostic Results     EKG   Normal sinus rhythm, normal axis, normal intervals, no acute ischemic appearing changes      RADIOLOGY:  XR CHEST PORTABLE   Final Result      No acute radiographic abnormality of the chest.      CT HEAD WO CONTRAST   Final Result      No acute intracranial hemorrhage or mass effect. Mild nonspecific white matter disease, likely represents chronic small vessel ischemic change. Unchanged age indeterminate, favored remote lacunar infarcts in the right caudate head and right cerebellum.             CTA NECK W CONTRAST    (Results Pending)   CTA HEAD W CONTRAST    (Results Pending)       LABS:   Results for orders placed or performed during the hospital encounter of 08/10/21   CBC Auto Differential   Result Value Ref Range    WBC 7.6 4.0 - 11.0 K/uL    RBC 3.85 (L) 4.00 - 5.20 M/uL    Hemoglobin 12.0 12.0 - 16.0 g/dL    Hematocrit 35.5 (L) 36.0 - 48.0 %    MCV 92.3 80.0 - 100.0 fL    MCH 31.1 26.0 - 34.0 pg    MCHC 33.7 31.0 - 36.0 g/dL    RDW 15.9 (H) 12.4 - 15.4 %    Platelets 400 199 - 062 K/uL    MPV 10.6 (H) 5.0 - 10.5 fL    Neutrophils % 76.8 %    Lymphocytes % 12.9 %    Monocytes % 4.9 %    Eosinophils % 4.4 %    Basophils % 1.0 %    Neutrophils Absolute 5.9 1.7 - 7.7 K/uL    Lymphocytes Absolute 1.0 1.0 - 5.1 K/uL    Monocytes Absolute 0.4 0.0 - 1.3 K/uL    Eosinophils Absolute 0.3 0.0 - 0.6 K/uL    Basophils Absolute 0.1 0.0 - 0.2 K/uL   Comprehensive Metabolic Panel w/ Reflex to MG   Result Value Ref Range    Sodium 129 (L) 136 - 145 mmol/L    Potassium reflex Magnesium 3.9 3.5 - 5.1 mmol/L    Chloride 87 (L) 99 - 110 mmol/L    CO2 24 21 - 32 mmol/L    Anion Gap 18 (H) 3 - 16    Glucose 152 (H) 70 - 99 mg/dL    BUN 39 (H) 7 - 20 mg/dL    CREATININE 12.1 (HH) 0.6 - 1.1 mg/dL    GFR Non-African American 3 (A) >60    GFR  4 (A) >60    Calcium 9.2 8.3 - 10.6 mg/dL    Total Protein 7.0 6.4 - 8.2 g/dL    Albumin 3.2 (L) 3.4 - 5.0 g/dL    Albumin/Globulin Ratio 0.8 (L) 1.1 - 2.2    Total Bilirubin 0.4 0.0 - 1.0 mg/dL    Alkaline Phosphatase 102 40 - 129 U/L    ALT 7 (L) 10 - 40 U/L    AST 7 (L) 15 - 37 U/L    Globulin 3.8 g/dL   Troponin   Result Value Ref Range    Troponin 0.20 (H) <0.01 ng/mL   Protime-INR   Result Value Ref Range    Protime 11.0 9.9 - 12.7 sec    INR 0.97 0.88 - 1.12       RECENT VITALS:  BP: (!) 196/104,Temp: 98.4 °F (36.9 °C), Pulse: 81, Resp: 19, SpO2: 98 %     Procedures         ED Course     Nursing Notes, Past Medical Hx, Past Surgical Hx, Social Hx,Allergies, and Family Hx were reviewed.     patient was given the following medications:  Orders Placed This Encounter   Medications    aspirin tablet 325 mg    hydrALAZINE (APRESOLINE) tablet 75 mg    insulin NPH (HUMULIN N;NOVOLIN N) injection vial 5 Units    insulin lispro (HUMALOG) injection pen 2-4 Units    labetalol (NORMODYNE) tablet 200 mg    losartan (COZAAR) tablet 100 mg    pantoprazole (PROTONIX) tablet 40 mg    spironolactone (ALDACTONE) tablet 50 mg    torsemide (DEMADEX) tablet 400 mg    sodium chloride flush 0.9 % injection 5-40 mL    sodium chloride flush 0.9 % injection 5-40 mL    0.9 % sodium chloride infusion    heparin (porcine) injection 5,000 Units    OR Linked Order Group     ondansetron (ZOFRAN-ODT) disintegrating tablet 4 mg     ondansetron (ZOFRAN) injection 4 mg    polyethylene glycol (GLYCOLAX) packet 17 g    OR Linked Order Group     acetaminophen (TYLENOL) tablet 650 mg     acetaminophen (TYLENOL) suppository 650 mg    spironolactone (ALDACTONE) tablet 100 mg    amLODIPine (NORVASC) tablet 10 mg    gentamicin (GARAMYCIN) 0.1 % cream    iopamidol (ISOVUE-370) 76 % injection 80 mL       CONSULTS:  [facility] BASE FLUIDS  IP CONSULT TO HOSPITALIST  IP CONSULT TO [address] John[address] DURON / Briana Hartley / Francia Paul is a 48 y.o. female who has a past medical history of hypertension, end-stage renal disease on peritoneal dialysis who presents to the emergency room today with several days of dizziness and intractable nausea and vomiting. On arrival she is hypertensive with otherwise normal vital signs and afebrile. Neurologic exam with some evidence of difficulties with cerebellar testing otherwise nonfocal with no appreciable NIH stroke scale. Symptoms are somewhat concerning for posterior circulation stroke or other central cause of her vertiginous symptoms. Patient was given a full dose aspirin screening labs were sent CT head was obtained which shows no signs of acute intracranial hemorrhage. At this time patient will benefit from admission to the hospital for further work-up I discussed the patient's case with the hospitalist and she was admitted in stable condition. Clinical Impression     1. Cerebrovascular accident (CVA), unspecified mechanism ([address]        Disposition     PATIENT REFERRED TO:  No follow-up provider specified.     DISCHARGE MEDICATIONS:  New Prescriptions    No medications on file       DISPOSITION Admitted 08/10/2021 12:36:39 PM       Odalis Lam MD  08/10/21 0571

## 2021-08-10 NOTE — H&P
Hospital Medicine History & Physical      PCP: Gabriele Burnett    Date of Admission: 8/10/2021    Date of Service: Pt seen/examined on 08/10/21 and Admitted to Inpatient with expected LOS greater than two midnights due to medical therapy. Chief Complaint:  Nausea, vertigo, high blood pressures at home      History Of Present Illness:   48 y.o. female [address] [address]  - with pmhx of Type 2 DM, HLD, HTN, ESRD on PD  - Dizziness since Sunday, along with nausea, she is taking her medications but feel the spironolactone is causing there side effects. She says last night she took her medication but vomited them out. She has not taken her medications this am but with persistent symptoms of dizziness and vomiting presented to the ED for further evaluation    Recently she was amditted to [facility] from 08/02 - 08/06 where presented with headache dizziness, difficulty with speech symptom loss of a half an hour, noted that her blood pressure at home was 235/125 which was likely as she stopped her nifedipine due to leg swelling on 07/13. Medications were adjusted, Renal Duplex was done to rule out LUCILLE. She was discharged in stable condition.           Past Medical History:          Diagnosis Date    Anemia 9/15/2014    Chronic kidney disease     Diabetes mellitus ([address]     Diabetes mellitus type 1 ([address]     Diabetic retinopathy ([address]     Diabetic retinopathy associated with type 2 diabetes mellitus, without macular edema     Hyperlipidemia 7/27/2015    Hypertension     Kidney stone     Mixed hyperlipidemia 7/27/2015    Orthostatic hypotension     Vitreous hemorrhage of right eye ([address] 8/15/2017       Past Surgical History:          Procedure Laterality Date    APPENDECTOMY      KIDNEY STONE SURGERY      LAPAROSCOPY INSERTION PERITONEAL CATHETER N/A 8/12/2019    LAPAROSCOPIC PERITONEAL DIALYSIS CATHETER PLACEMENT WITH AXEL TROCHAR performed by Misti Bartlett MD at [facility] Medications Prior to Admission:      Prior to Admission medications    Medication Sig Start Date End Date Taking? Authorizing Provider   insulin NPH (HUMULIN N;NOVOLIN N) 100 UNIT/ML injection vial Inject 5 Units into the skin 2 times daily (before meals) 8/6/21   Bibiana Gr MD   hydrALAZINE (APRESOLINE) 50 MG tablet Take 1.5 tablets by mouth 3 times daily 8/6/21   Bibiana Gr MD   labetalol (NORMODYNE) 200 MG tablet Take 1 tablet by mouth every 12 hours 8/6/21   Bibiana Gr MD   losartan (COZAAR) 100 MG tablet Take 100 mg by mouth nightly    Historical Provider, MD   ergocalciferol (ERGOCALCIFEROL) 1.25 MG (40892 UT) capsule Take 50,000 Units by mouth once a week    Historical Provider, MD   pantoprazole (PROTONIX) 40 MG tablet Take 1 tablet by mouth daily 3/16/21   Peter Schwartz MD   torsemide (DEMADEX) 100 MG tablet Take 4 tablets by mouth nightly 11/27/20   Peter Schwartz MD   insulin lispro (HUMALOG KWIKPEN) 100 UNIT/ML pen Inject 2-4 Units into the skin 3 times daily (before meals) Sliding scale     Historical Provider, MD       Allergies:  Bactrim [sulfamethoxazole-trimethoprim], Doxazosin, Geocillin [carbenicillin], Lisinopril, Other, Spironolactone, Tramadol, Bumetanide, and Ibuprofen    Social History:      The patient currently lives at home    TOBACCO:   reports that she has never smoked. She has never used smokeless tobacco.  ETOH:   reports no history of alcohol use. Family History:      reviewed        Problem Relation Age of Onset    Heart Disease Mother         heart attack 2007    High Blood Pressure Mother     Diabetes Father     Heart Disease Father     Emphysema Father     Cancer Maternal Grandmother         cervical cancer    Lung Cancer Maternal Grandfather         black lung    Diabetes Paternal Grandmother     Heart Disease Paternal Grandfather        REVIEW OF SYSTEMS:   Pertinent positives as noted in the HPI.  All other systems reviewed and negative. PHYSICAL EXAM PERFORMED:    BP (!) 203/98   Pulse 81   Temp 98.4 °F (36.9 °C) (Oral)   Resp 18   Ht 5' 3\" (1.6 m)   Wt 204 lb (92.5 kg)   SpO2 98%   BMI 36.14 kg/m²     General appearance:  No apparent distress, appears stated age and cooperative. HEENT:  Normal cephalic, atraumatic without obvious deformity. Pupils equal, round, and reactive to light. Extra ocular muscles intact. Conjunctivae/corneas clear. Neck: Supple, with full range of motion. No jugular venous distention. Trachea midline. Respiratory:  Normal respiratory effort. Clear to auscultation, bilaterally without Rales/Wheezes/Rhonchi. Cardiovascular:  Regular rate and rhythm with normal S1/S2 without murmurs, rubs or gallops. Abdomen: Soft, non-tender, non-distended with normal bowel sounds. PD catheter present, site without signs of infection  Musculoskeletal:  No clubbing, cyanosis or edema bilaterally. Full range of motion without deformity. Skin: Skin color, texture, turgor normal.  No rashes or lesions. Neurologic:  Neurovascularly intact without any focal sensory/motor deficits.  Cranial nerves: II-XII intact, grossly non-focal.  Psychiatric:  Alert and oriented, thought content appropriate, normal insight  Capillary Refill: Brisk,< 3 seconds   Peripheral Pulses: +2 palpable, equal bilaterally       Labs:     Recent Labs     08/08/21  1621 08/10/21  1151   WBC 8.2 7.6   HGB 11.0* 12.0   HCT 32.4* 35.5*    178     Recent Labs     08/08/21  1621 08/10/21  1151   * 129*   K 3.9 3.9   CL 86* 87*   CO2 25 24   BUN 37* 39*   CREATININE 12.2* 12.1*   CALCIUM 8.5 9.2     Recent Labs     08/08/21  1621 08/10/21  1151   AST <5* 7*   ALT 6* 7*   BILITOT 0.3 0.4   ALKPHOS 99 102     Recent Labs     08/10/21  1151   INR 0.97     Recent Labs     08/08/21  1621 08/10/21  1151   TROPONINI 0.16* 0.20*       Urinalysis:      Lab Results   Component Value Date    NITRU Negative 05/27/2021    WBCUA 3 05/27/2021    BACTERIA RARE 05/27/2021    RBCUA 4 05/27/2021    BLOODU TRACE 05/27/2021    SPECGRAV 1.016 05/27/2021    GLUCOSEU 250 05/27/2021       Radiology:     CXR: I have reviewed the CXR with the following interpretation: n/a  EKG:  I have reviewed the EKG with the following interpretation: no acute ST or T wave changes to indicate ischemia or infarction    MRI BRAIN WO CONTRAST   Final Result      No acute infarct. Small left frontal meningioma again noted. Prominent periventricular white matter disease given the patient's age. CTA NECK W CONTRAST   Final Result      CTA Head: Moderate stenosis of the proximal right supraclinoid internal carotid artery secondary to atherosclerosis. No other arterial steno-occlusive disease or evidence of aneurysm. 1.4 cm extra-axial enhancing lesion along the left frontal convexity, nonspecific but likely meningioma. This can be confirmed by MRI without and with contrast.      CTA Neck:   No significant arterial steno-occlusive disease or evidence of dissection. CTA HEAD W CONTRAST   Final Result      CTA Head: Moderate stenosis of the proximal right supraclinoid internal carotid artery secondary to atherosclerosis. No other arterial steno-occlusive disease or evidence of aneurysm. 1.4 cm extra-axial enhancing lesion along the left frontal convexity, nonspecific but likely meningioma. This can be confirmed by MRI without and with contrast.      CTA Neck:   No significant arterial steno-occlusive disease or evidence of dissection. XR CHEST PORTABLE   Final Result      No acute radiographic abnormality of the chest.      CT HEAD WO CONTRAST   Final Result      No acute intracranial hemorrhage or mass effect. Mild nonspecific white matter disease, likely represents chronic small vessel ischemic change. Unchanged age indeterminate, favored remote lacunar infarcts in the right caudate head and right cerebellum. ASSESSMENT/PLAN:    Active Hospital Problems    Diagnosis Date Noted    Hypertensive urgency [I16.0] 08/10/2021     Hypertensive urgency  Vertigo/Nausea concern for possible posterior CVA  ESRD on Peritoneal dialysis   Type 2 DM insulin dependant  Elevated trop, chronic elevation, due to decrease renal clearance    Plan:  Check CTA Head and Neck with slurred speech last week and now with nausea and vomitting  MRI wo contrast ordered  Needs better BP control, start home meds  Nephrology consulted for evalaution for BP med rec and PD management  Basal insulin, SSI, hypoglycemia protocol  May need to start nicardipine if unable to control BP with oral medicaitons    DVT Prophylaxis: Heparin  Diet: Diet NPO  Code Status: Full Code    PT/OT Eval Status: n/a    Dispo - Admit as inpatient. I anticipate hospitalization spanning more than two midnights for investigation and treatment of the above medically necessary diagnoses. Emily Armstrong MD   Hospitalist    Thank you Eb [employer]akel[employer] for the opportunity to be involved in this patient's care. If you have any questions or concerns please feel free to contact me at [phone].

## 2021-08-10 NOTE — CONSULTS
Nephrology Consult Note                                                                                                                                                                                                                                                                                                                                                               Office : [phone]     Fax :[phone]              Patient's Name: Dylon Ponce  2:11 PM  8/10/2021    Reason for Consult:  Hypertensive Urgency  Requesting Physician:  Edson Riddle      Chief Complaint: Dizziness, nausea, emesis     History of Present Ilness:    Dylon Ponce is a 48 y.o. female with history of ESRD, diabetes mellitus, and hypertension who was admitted for complaints of dizziness and high blood pressure. Patient was recently admitted on 8/2/2021 for similar symptoms and was placed on a nicardipine drip and eventually had her blood pressures optimized and was discharged on 8/6/2021. Today she states that her SBP was in the 200's and had symptoms of dizziness, nausea and vomiting since Saturday. She states she cannot walk short distances without holding the wall to keep her balance and gets naseous and throws up. She states she has been compliant with her blood pressure medications since the adjustment on discharge. She states she did not take her blood pressure medications today as she feels they were causing her symptoms. She denies any chest pain, abdominal pain, fever, chills, shortness of breath, constipation, diarrhea, or changes in her urinary habits.     Past Medical History:   Diagnosis Date    Anemia 9/15/2014    Chronic kidney disease     Diabetes mellitus ([address]     Diabetes mellitus type 1 ([address]     Diabetic retinopathy ([address]     Diabetic retinopathy associated with type 2 diabetes mellitus, without macular edema     Hyperlipidemia 7/27/2015    Hypertension     Kidney stone     Mixed hyperlipidemia 7/27/2015    Orthostatic hypotension     Vitreous hemorrhage of right eye ([address] 8/15/2017       Past Surgical History:   Procedure Laterality Date    APPENDECTOMY      KIDNEY STONE SURGERY      LAPAROSCOPY INSERTION PERITONEAL CATHETER N/A 8/12/2019    LAPAROSCOPIC PERITONEAL DIALYSIS CATHETER PLACEMENT WITH AXEL TROCHAR performed by Misti Bartlett MD at [address] [place] [address]         Family History   Problem Relation Age of Onset    Heart Disease Mother         heart attack 2007    High Blood Pressure Mother     Diabetes Father     Heart Disease Father     Emphysema Father     Cancer Maternal Grandmother         cervical cancer    Lung Cancer Maternal Grandfather         black lung    Diabetes Paternal Grandmother     Heart Disease Paternal Grandfather         reports that she has never smoked. She has never used smokeless tobacco. She reports that she does not drink alcohol and does not use drugs.     Allergies:  Bactrim [sulfamethoxazole-trimethoprim], Doxazosin, Geocillin [carbenicillin], Lisinopril, Other, Spironolactone, Tramadol, Bumetanide, and Ibuprofen    Current Medications:    hydrALAZINE (APRESOLINE) tablet 75 mg, TID  insulin NPH (HUMULIN N;NOVOLIN N) injection vial 5 Units, BID AC  insulin lispro (HUMALOG) injection pen 2-4 Units, TID AC  labetalol (NORMODYNE) tablet 200 mg, 2 times per day  losartan (COZAAR) tablet 100 mg, Nightly  pantoprazole (PROTONIX) tablet 40 mg, Daily  spironolactone (ALDACTONE) tablet 50 mg, BID  torsemide (DEMADEX) tablet 400 mg, Nightly  sodium chloride flush 0.9 % injection 5-40 mL, 2 times per day  sodium chloride flush 0.9 % injection 5-40 mL, PRN  0.9 % sodium chloride infusion, PRN  heparin (porcine) injection 5,000 Units, 3 times per day  ondansetron (ZOFRAN-ODT) disintegrating tablet 4 mg, Q8H PRN   Or  ondansetron (ZOFRAN) injection 4 mg, Q6H PRN  polyethylene glycol (GLYCOLAX) packet 17 g, Daily PRN  acetaminophen (TYLENOL) rapid lowering to prevent MI or stroke  - Consider nicardipine drip if signs for end-organ damage    2. ESRD on PD  - Suspected due to DN; appears euvolemic, abdominal catheter for access  - Dose medications to GFR <10  - Maintenance PD  - Replace K and Mg as needed    3. Type II Diabetes Mellitus  - A1c 8.5  - LDSSI  - Lispro 5 units QAC  - Hypoglycemic protocols    4.  Anemia of CKD  - Stable, continue to monitor        Thank you for allowing us to participate in care of Claudia Street MD    Discussed with attending, Dr. Rica Mariee of   [facility] [address]  Office : [phone]  Fax :[phone]    Pt seen on PD   rosemary well   BP better   Meds adjusted     Rica Mariee MD

## 2021-08-11 NOTE — PLAN OF CARE
Problem: Cardiac Output - Decreased:  Goal: Hemodynamic stability will improve  Description: Hemodynamic stability will improve  8/11/2021 1421 by Yousif Russell RN  Note: Fluid bolus infusing per order due to positive orthostatic BP  continue VS every 4hr and prn, heart rhythm monitoring     Problem: Fluid Volume - Imbalance:  Goal: Absence of imbalanced fluid volume signs and symptoms  Description: Absence of imbalanced fluid volume signs and symptoms  8/11/2021 1421 by Yousif Russell RN  Note: Continue I/O, daily weight

## 2021-08-11 NOTE — PROGRESS NOTES
Lisinopril, Other, Spironolactone, Tramadol, Bumetanide, and Ibuprofen    Current Medications:    insulin NPH (HUMULIN N;NOVOLIN N) injection pen 5 Units, BID AC  labetalol (NORMODYNE) tablet 200 mg, 2 times per day  losartan (COZAAR) tablet 100 mg, Nightly  pantoprazole (PROTONIX) tablet 40 mg, QAM AC  torsemide (DEMADEX) tablet 400 mg, Nightly  sodium chloride flush 0.9 % injection 5-40 mL, 2 times per day  sodium chloride flush 0.9 % injection 5-40 mL, PRN  0.9 % sodium chloride infusion, PRN  heparin (porcine) injection 5,000 Units, 3 times per day  ondansetron (ZOFRAN-ODT) disintegrating tablet 4 mg, Q8H PRN   Or  ondansetron (ZOFRAN) injection 4 mg, Q6H PRN  polyethylene glycol (GLYCOLAX) packet 17 g, Daily PRN  acetaminophen (TYLENOL) tablet 650 mg, Q6H PRN   Or  acetaminophen (TYLENOL) suppository 650 mg, Q6H PRN  amLODIPine (NORVASC) tablet 10 mg, Daily  gentamicin (GARAMYCIN) 0.1 % cream, Daily  glucose (GLUTOSE) 40 % oral gel 15 g, PRN  dextrose 50 % IV solution, PRN  glucagon (rDNA) injection 1 mg, PRN  dextrose 5 % solution, PRN  insulin lispro (1 Unit Dial) 0-6 Units, TID WC  insulin lispro (1 Unit Dial) 0-3 Units, Nightly  mupirocin (BACTROBAN) 2 % ointment, Daily  hydrALAZINE (APRESOLINE) tablet 100 mg, TID        Review of Systems:   14 point ROS obtained but were negative except mentioned in HPI      Physical exam:     Vitals:  BP (!) 143/79   Pulse 78   Temp 97 °F (36.1 °C) (Oral)   Resp 18   Ht 5' 3\" (1.6 m)   Wt 204 lb 12.9 oz (92.9 kg)   SpO2 95%   BMI 36.28 kg/m²   Constitutional:  OAA X3 NAD  Skin: no rash, turgor wnl  Heent:  eomi, mmm  Neck: no bruits or jvd noted  Cardiovascular:  S1, S2 without m/r/g  Respiratory: CTA B without w/r/r  Abdomen:  +bs, soft, nt, nd  Ext: No lower extremity edema  Psychiatric: mood and affect appropriate  Musculoskeletal:  Rom, muscular strength intact    Data:   Labs:  CBC:   Recent Labs     08/08/21  1621 08/10/21  1151 08/11/21  0503   WBC 8.2 7.6 6. 8   HGB 11.0* 12.0 10.3*    178 161     BMP:    Recent Labs     08/08/21  1621 08/10/21  1151 08/11/21  0503   * 129* 126*   K 3.9 3.9 3.7   CL 86* 87* 88*   CO2 25 24 25   BUN 37* 39* 40*   CREATININE 12.2* 12.1* 12.1*   GLUCOSE 196* 152* 198*     Ca/Mg/Phos:   Recent Labs     08/08/21  1621 08/10/21  1151 08/11/21  0503   CALCIUM 8.5 9.2 8.1*     Hepatic:   Recent Labs     08/08/21  1621 08/10/21  1151   AST <5* 7*   ALT 6* 7*   BILITOT 0.3 0.4   ALKPHOS 99 102     Troponin:   Recent Labs     08/08/21  1621 08/10/21  1151   TROPONINI 0.16* 0.20*     BNP: No results for input(s): BNP in the last 72 hours. Lipids: No results for input(s): CHOL, TRIG, HDL, LDLCALC, LABVLDL in the last 72 hours. ABGs: No results for input(s): PHART, PO2ART, YVD4AJD in the last 72 hours. INR:   Recent Labs     08/10/21  1151   INR 0.97     UA:No results for input(s): Mickiel Broach, GLUCOSEU, BILIRUBINUR, Jearl Budds, BLOODU, PHUR, PROTEINU, UROBILINOGEN, NITRU, LEUKOCYTESUR, Erna Snooks in the last 72 hours. Urine Microscopic: No results for input(s): LABCAST, BACTERIA, COMU, HYALCAST, WBCUA, RBCUA, EPIU in the last 72 hours. Urine Culture: No results for input(s): LABURIN in the last 72 hours. Urine Chemistry: No results for input(s): Tiajuana Neas, PROTEINUR, NAUR in the last 72 hours. IMAGING:  MRI BRAIN WO CONTRAST   Final Result      No acute infarct. Small left frontal meningioma again noted. Prominent periventricular white matter disease given the patient's age. CTA NECK W CONTRAST   Final Result      CTA Head: Moderate stenosis of the proximal right supraclinoid internal carotid artery secondary to atherosclerosis. No other arterial steno-occlusive disease or evidence of aneurysm. 1.4 cm extra-axial enhancing lesion along the left frontal convexity, nonspecific but likely meningioma.  This can be confirmed by MRI without and with contrast.      CTA Neck:   No significant arterial steno-occlusive disease or evidence of dissection. CTA HEAD W CONTRAST   Final Result      CTA Head: Moderate stenosis of the proximal right supraclinoid internal carotid artery secondary to atherosclerosis. No other arterial steno-occlusive disease or evidence of aneurysm. 1.4 cm extra-axial enhancing lesion along the left frontal convexity, nonspecific but likely meningioma. This can be confirmed by MRI without and with contrast.      CTA Neck:   No significant arterial steno-occlusive disease or evidence of dissection. XR CHEST PORTABLE   Final Result      No acute radiographic abnormality of the chest.      CT HEAD WO CONTRAST   Final Result      No acute intracranial hemorrhage or mass effect. Mild nonspecific white matter disease, likely represents chronic small vessel ischemic change. Unchanged age indeterminate, favored remote lacunar infarcts in the right caudate head and right cerebellum. Assessment/Plan     1. Hypertensive Urgency  - BP better   - Meds ajusted     2. ESRD on PD  - Suspected due to DN; appears euvolemic, abdominal catheter for access  - Dose medications to GFR <10  - Maintenance PD  - Replace K and Mg as needed    3. Type II Diabetes Mellitus  - A1c 8.5  - LDSSI  - Lispro 5 units QAC  - Hypoglycemic protocols    4.  Anemia of CKD  - Stable, continue to monitor        Thank you for allowing us to participate in care of Leila Sanabria MD

## 2021-08-11 NOTE — FLOWSHEET NOTE
CCPD Order   Exchanges: 5   Exchange Volume: 2000 ml   Total Time: 10 hrs   Dextrose: 2.5%   Last Fill: 0 ml   Total Volume: 95379 ml     Orders verified. Supplies taken to pt's room. Report received. Cycler set up, primed and pre tested. [facility] Pt connected to cycler. CCPD initiated without problem. Initial effluent clear.       08/11/21 1640   Vitals   /67   Temp 97.4 °F (36.3 °C)   Pulse 81   Resp 18   Weight 206 lb 5.6 oz (93.6 kg)   Peritoneal Dialysis Catheter Continuous cycling Mid lower abdomen   Placement Date/Time: 05/26/21 2245   Inserted by: present on admission  Dialysis Type: Continuous cycling  Catheter Location: Mid lower abdomen   Status Accessed   Site Condition No Complications   Dressing Open to Air (None)   Cycler   Verification of Prescription CCPD   Total Volume Programmed 11404 mL   Therapy Time (Hours:Minutes) 10   Fill Volume 2000 mL   Last Fill Volume 0 mL   Dextrose Setting Same (Nonextraneal)   Number of Cycles 5   Bag Volume 5000 mL   Number of Bags Used 2   Dianeal Solution Other (Comment)  (Deflex 2.5% 5000ml x 2)   I Drain (mL) 5 mL

## 2021-08-11 NOTE — PROGRESS NOTES
Hospitalist Progress Note      PCP: Tres Figueroa    Date of Admission: 8/10/2021    Chief Complaint: Nausea, vertigo, high blood pressures at home    Hospital Course:  48 y.o. female Harry Rolls  - with pmhx of Type 2 DM, HLD, HTN, ESRD on PD  - Dizziness since Sunday, along with nausea, she is taking her medications but feel the spironolactone is causing there side effects. She says last night she took her medication but vomited them out. She has not taken her medications this am but with persistent symptoms of dizziness and vomiting presented to the ED for further evaluation     Recently she was amditted to [facility] from 08/02 - 08/06 where presented with headache dizziness, difficulty with speech symptom loss of a half an hour, noted that her blood pressure at home was 235/125 which was likely as she stopped her nifedipine due to leg swelling on 07/13. Medications were adjusted, Renal Duplex was done to rule out LUCILLE. She was discharged in stable condition.     Subjective: feels fine, no specific complains       Medications:  Reviewed    Infusion Medications    sodium chloride      dextrose       Scheduled Medications    hydrALAZINE  50 mg Oral TID    insulin NPH  5 Units Subcutaneous BID AC    labetalol  200 mg Oral 2 times per day    losartan  100 mg Oral Nightly    pantoprazole  40 mg Oral QAM AC    torsemide  400 mg Oral Nightly    sodium chloride flush  5-40 mL Intravenous 2 times per day    heparin (porcine)  5,000 Units Subcutaneous 3 times per day    amLODIPine  10 mg Oral Daily    gentamicin   Topical Daily    insulin lispro  0-6 Units Subcutaneous TID WC    insulin lispro  0-3 Units Subcutaneous Nightly    mupirocin   Topical Daily     PRN Meds: sodium chloride flush, sodium chloride, ondansetron **OR** ondansetron, polyethylene glycol, acetaminophen **OR** acetaminophen, glucose, dextrose, glucagon (rDNA), dextrose      Intake/Output Summary (Last 24 hours) at 8/11/2021 [address] [place] [address] filed at 8/11/2021 1043  Gross per 24 hour   Intake 360 ml   Output 1315 ml   Net -955 ml       Physical Exam Performed:    BP (!) 143/79   Pulse 73   Temp 97 °F (36.1 °C) (Oral)   Resp 18   Ht 5' 3\" (1.6 m)   Wt 204 lb 12.9 oz (92.9 kg)   SpO2 96%   BMI 36.28 kg/m²     General appearance:  No apparent distress, appears stated age and cooperative. HEENT:  Normal cephalic, atraumatic without obvious deformity. Pupils equal, round, and reactive to light. Extra ocular muscles intact. Conjunctivae/corneas clear. Neck: Supple, with full range of motion. No jugular venous distention. Trachea midline. Respiratory:  Normal respiratory effort. Clear to auscultation, bilaterally without Rales/Wheezes/Rhonchi. Cardiovascular:  Regular rate and rhythm with normal S1/S2 without murmurs, rubs or gallops. Abdomen: Soft, non-tender, non-distended with normal bowel sounds. PD catheter present, site without signs of infection  Musculoskeletal:  No clubbing, cyanosis or edema bilaterally. Full range of motion without deformity. Skin: Skin color, texture, turgor normal.  No rashes or lesions. Neurologic:  Neurovascularly intact without any focal sensory/motor deficits.  Cranial nerves: II-XII intact, grossly non-focal.  Psychiatric:  Alert and oriented, thought content appropriate, normal insight  Capillary Refill: Brisk,< 3 seconds   Peripheral Pulses: +2 palpable, equal bilaterally        Labs:   Recent Labs     08/08/21  1621 08/10/21  1151 08/11/21  0503   WBC 8.2 7.6 6.8   HGB 11.0* 12.0 10.3*   HCT 32.4* 35.5* 30.2*    178 161     Recent Labs     08/08/21  1621 08/10/21  1151 08/11/21  0503   * 129* 126*   K 3.9 3.9 3.7   CL 86* 87* 88*   CO2 25 24 25   BUN 37* 39* 40*   CREATININE 12.2* 12.1* 12.1*   CALCIUM 8.5 9.2 8.1*     Recent Labs     08/08/21  1621 08/10/21  1151   AST <5* 7*   ALT 6* 7*   BILITOT 0.3 0.4   ALKPHOS 99 102     Recent Labs     08/10/21  1151   INR 0.97     Recent Labs     08/08/21  1621 08/10/21  1151   TROPONINI 0.16* 0.20*       Urinalysis:      Lab Results   Component Value Date    NITRU Negative 05/27/2021    WBCUA 3 05/27/2021    BACTERIA RARE 05/27/2021    RBCUA 4 05/27/2021    BLOODU TRACE 05/27/2021    SPECGRAV 1.016 05/27/2021    GLUCOSEU 250 05/27/2021       Radiology:  MRI BRAIN WO CONTRAST   Final Result      No acute infarct. Small left frontal meningioma again noted. Prominent periventricular white matter disease given the patient's age. CTA NECK W CONTRAST   Final Result      CTA Head: Moderate stenosis of the proximal right supraclinoid internal carotid artery secondary to atherosclerosis. No other arterial steno-occlusive disease or evidence of aneurysm. 1.4 cm extra-axial enhancing lesion along the left frontal convexity, nonspecific but likely meningioma. This can be confirmed by MRI without and with contrast.      CTA Neck:   No significant arterial steno-occlusive disease or evidence of dissection. CTA HEAD W CONTRAST   Final Result      CTA Head: Moderate stenosis of the proximal right supraclinoid internal carotid artery secondary to atherosclerosis. No other arterial steno-occlusive disease or evidence of aneurysm. 1.4 cm extra-axial enhancing lesion along the left frontal convexity, nonspecific but likely meningioma. This can be confirmed by MRI without and with contrast.      CTA Neck:   No significant arterial steno-occlusive disease or evidence of dissection. XR CHEST PORTABLE   Final Result      No acute radiographic abnormality of the chest.      CT HEAD WO CONTRAST   Final Result      No acute intracranial hemorrhage or mass effect. Mild nonspecific white matter disease, likely represents chronic small vessel ischemic change. Unchanged age indeterminate, favored remote lacunar infarcts in the right caudate head and right cerebellum.                     Assessment/Plan:    JOSE DAVID/Yamini Abdalla 54[address] Problems    Diagnosis Date Noted    Hypertensive urgency [I16.0] 08/10/2021    Cerebrovascular accident (CVA) ([facility] [address] [I63.9]     Electrolyte imbalance [E87.8]      1. Hypertensive urgency  2. Vertigo/Nausea ruled out possible posterior CVA  3. ESRD on Peritoneal dialysis   4. Type 2 DM insulin dependant  5. Elevated trop, chronic elevation, due to decrease renal clearance     Plan:  Blood pressure medications improved, she denied any nausea vomiting when seen in the room. Plan for discharge  Got a call from the nurse that patient systolic blood pressure 369, feeling dizzy, orthostatics checked systolic blood pressure 79  Weight plan given 1 L IV fluids give 500 bolus and then 100/h times next 5 hours  Reduce hydralazine to 50 x3 times daily with hold parameters  Discussed with Dr. Kiley Lucero insulin, SSI, hypoglycemia protocol    DVT Prophylaxis: heparin  Diet: ADULT DIET;  Regular; 4 carb choices (60 gm/meal)  Code Status: Full Code    PT/OT Eval Status: n/a  Dispo -continue inpatient care, needs close monitoring on telemetry, likely start 1 to 2 days    Rosalee Cooney MD

## 2021-08-11 NOTE — FLOWSHEET NOTE
Treatment time: 11 Hours 10 minutes  Net UF: 783 ml  Dwell Time: 13 minutes Gained    Treatment completed without complications or complaints from patient. Effluent clear, yellow and lines taped to patient per protocol. Patient resting comfortably with VSS upon exiting room. Copy of dialysis treatment record placed in chart, to be scanned into EMR and report given to Jc Santoyo RN.     08/11/21 0715   Vitals   /74   Temp 97 °F (36.1 °C)   Pulse 78   Resp 16   Weight 204 lb 12.9 oz (92.9 kg)   Peritoneal Dialysis Catheter Continuous cycling Mid lower abdomen   Placement Date/Time: 05/26/21 2245   Inserted by: present on admission  Dialysis Type: Continuous cycling  Catheter Location: Mid lower abdomen   Status Clamped   Dressing Status Clean;Dry; Intact   Cycler   Ultrafiltration (UF) (mL) 783 mL   Lost Dwell Time (Hours:Minutes) -00:13

## 2021-08-11 NOTE — CARE COORDINATION
CM spoke with patient at bedside. She is independent pta, denies any CM needs at this time. Spouse to transport home.     Jia Narayanan, RN, BSN,   4th Floor Progressive Care Unit  [phone]

## 2021-08-11 NOTE — PROGRESS NOTES
Clinical Pharmacy Consult Note    48 y.o. female admitted with dizziness and NV. Stroke workup in process. Pharmacy has been asked by Dr. Rachel Schwab to adjust all drips to normal saline as appropriate based on compatibility to avoid fluid shifts since D5 is osmotically active. The following intermittent IV drips/infusions have been adjusted to saline:  None at this time      Please be aware that patient has D5W ordered as part of hypoglycemia orderset. Total IV fluid delivered to patient over last 24h: 500 mL    RPh will follow daily to ensure all new IVPBs + drips are in NS. Please call with questions.   Rajani Jimenez PharmD., BCPS   8/11/2021 1:20 PM  Wireless: [phone]

## 2021-08-12 NOTE — PROGRESS NOTES
Nephrology Consult Note                                                                                                                                                                                                                                                                                                                                                               Office : [phone]     Fax :[phone]              Patient's Name: Kayode Lea  9:30 AM  8/12/2021    BP better   Was low yesterday   Nausea better   No Diarrhea     rosemary PD well       Past Medical History:   Diagnosis Date    Anemia 9/15/2014    Chronic kidney disease     Diabetes mellitus ([address]     Diabetes mellitus type 1 ([address]     Diabetic retinopathy ([address]     Diabetic retinopathy associated with type 2 diabetes mellitus, without macular edema     Hyperlipidemia 7/27/2015    Hypertension     Kidney stone     Mixed hyperlipidemia 7/27/2015    Orthostatic hypotension     Vitreous hemorrhage of right eye ([address] 8/15/2017       Past Surgical History:   Procedure Laterality Date    APPENDECTOMY      KIDNEY STONE SURGERY      LAPAROSCOPY INSERTION PERITONEAL CATHETER N/A 8/12/2019    LAPAROSCOPIC PERITONEAL DIALYSIS CATHETER PLACEMENT WITH AXEL TROCHAR performed by Lianet Handy MD at [address]         Family History   Problem Relation Age of Onset    Heart Disease Mother         heart attack 2007    High Blood Pressure Mother     Diabetes Father     Heart Disease Father     Emphysema Father     Cancer Maternal Grandmother         cervical cancer    Lung Cancer Maternal Grandfather         black lung    Diabetes Paternal Grandmother     Heart Disease Paternal Grandfather         reports that she has never smoked. She has never used smokeless tobacco. She reports that she does not drink alcohol and does not use drugs.     Allergies:  Bactrim [sulfamethoxazole-trimethoprim], Doxazosin, Geocillin [carbenicillin], Lisinopril, Other, Spironolactone, Tramadol, Bumetanide, and Ibuprofen    Current Medications:    hydrALAZINE (APRESOLINE) tablet 50 mg, TID  insulin NPH (HUMULIN N;NOVOLIN N) injection pen 5 Units, BID AC  labetalol (NORMODYNE) tablet 200 mg, 2 times per day  losartan (COZAAR) tablet 100 mg, Nightly  pantoprazole (PROTONIX) tablet 40 mg, QAM AC  torsemide (DEMADEX) tablet 400 mg, Nightly  sodium chloride flush 0.9 % injection 5-40 mL, 2 times per day  sodium chloride flush 0.9 % injection 5-40 mL, PRN  0.9 % sodium chloride infusion, PRN  heparin (porcine) injection 5,000 Units, 3 times per day  ondansetron (ZOFRAN-ODT) disintegrating tablet 4 mg, Q8H PRN   Or  ondansetron (ZOFRAN) injection 4 mg, Q6H PRN  polyethylene glycol (GLYCOLAX) packet 17 g, Daily PRN  acetaminophen (TYLENOL) tablet 650 mg, Q6H PRN   Or  acetaminophen (TYLENOL) suppository 650 mg, Q6H PRN  amLODIPine (NORVASC) tablet 10 mg, Daily  gentamicin (GARAMYCIN) 0.1 % cream, Daily  glucose (GLUTOSE) 40 % oral gel 15 g, PRN  dextrose 50 % IV solution, PRN  glucagon (rDNA) injection 1 mg, PRN  dextrose 5 % solution, PRN  insulin lispro (1 Unit Dial) 0-6 Units, TID WC  insulin lispro (1 Unit Dial) 0-3 Units, Nightly  mupirocin (BACTROBAN) 2 % ointment, Daily        Review of Systems:   14 point ROS obtained but were negative except mentioned in HPI      Physical exam:     Vitals:  BP (!) 145/78   Pulse 78   Temp 97.4 °F (36.3 °C) (Oral)   Resp 20   Ht 5' 3\" (1.6 m)   Wt 205 lb 7.5 oz (93.2 kg)   SpO2 92%   BMI 36.40 kg/m²   Constitutional:  OAA X3 NAD  Skin: no rash, turgor wnl  Heent:  eomi, mmm  Neck: no bruits or jvd noted  Cardiovascular:  S1, S2 without m/r/g  Respiratory: CTA B without w/r/r  Abdomen:  +bs, soft, nt, nd  Ext: No lower extremity edema  Psychiatric: mood and affect appropriate  Musculoskeletal:  Rom, muscular strength intact    Data:   Labs:  CBC:   Recent Labs     08/10/21  1151 08/11/21  0503   WBC 7.6 6.8 HGB 12.0 10.3*    161     BMP:    Recent Labs     08/10/21  1151 08/11/21  0503   * 126*   K 3.9 3.7   CL 87* 88*   CO2 24 25   BUN 39* 40*   CREATININE 12.1* 12.1*   GLUCOSE 152* 198*     Ca/Mg/Phos:   Recent Labs     08/10/21  1151 08/11/21  0503   CALCIUM 9.2 8.1*     Hepatic:   Recent Labs     08/10/21  1151   AST 7*   ALT 7*   BILITOT 0.4   ALKPHOS 102     Troponin:   Recent Labs     08/10/21  1151   TROPONINI 0.20*     BNP: No results for input(s): BNP in the last 72 hours. Lipids: No results for input(s): CHOL, TRIG, HDL, LDLCALC, LABVLDL in the last 72 hours. ABGs: No results for input(s): PHART, PO2ART, VCD9KYX in the last 72 hours. INR:   Recent Labs     08/10/21  1151   INR 0.97     UA:No results for input(s): Magnolia Franky, GLUCOSEU, BILIRUBINUR, Sofia Shells, BLOODU, PHUR, PROTEINU, UROBILINOGEN, NITRU, LEUKOCYTESUR, [place] in the last 72 hours. Urine Microscopic: No results for input(s): LABCAST, BACTERIA, COMU, HYALCAST, WBCUA, RBCUA, EPIU in the last 72 hours. Urine Culture: No results for input(s): LABURIN in the last 72 hours. Urine Chemistry: No results for input(s): Gurpreet Hashimoto, PROTEINUR, NAUR in the last 72 hours. IMAGING:  MRI BRAIN WO CONTRAST   Final Result      No acute infarct. Small left frontal meningioma again noted. Prominent periventricular white matter disease given the patient's age. CTA NECK W CONTRAST   Final Result      CTA Head: Moderate stenosis of the proximal right supraclinoid internal carotid artery secondary to atherosclerosis. No other arterial steno-occlusive disease or evidence of aneurysm. 1.4 cm extra-axial enhancing lesion along the left frontal convexity, nonspecific but likely meningioma. This can be confirmed by MRI without and with contrast.      CTA Neck:   No significant arterial steno-occlusive disease or evidence of dissection.          CTA HEAD W CONTRAST   Final Result      CTA Head: Moderate stenosis of the proximal right supraclinoid internal carotid artery secondary to atherosclerosis. No other arterial steno-occlusive disease or evidence of aneurysm. 1.4 cm extra-axial enhancing lesion along the left frontal convexity, nonspecific but likely meningioma. This can be confirmed by MRI without and with contrast.      CTA Neck:   No significant arterial steno-occlusive disease or evidence of dissection. XR CHEST PORTABLE   Final Result      No acute radiographic abnormality of the chest.      CT HEAD WO CONTRAST   Final Result      No acute intracranial hemorrhage or mass effect. Mild nonspecific white matter disease, likely represents chronic small vessel ischemic change. Unchanged age indeterminate, favored remote lacunar infarcts in the right caudate head and right cerebellum. Assessment/Plan     1. Hypertensive Urgency  - BP better   - Meds ajusted     2. ESRD on PD  - Suspected due to DN; appears euvolemic, abdominal catheter for access  - Dose medications to GFR <10  - Maintenance PD  - Replace K and Mg as needed    3. Type II Diabetes Mellitus  - A1c 8.5  - LDSSI  - Lispro 5 units QAC  - Hypoglycemic protocols    4.  Anemia of CKD  - Stable, continue to monitor        Thank you for allowing us to participate in care of Jonnathan Dumas MD

## 2021-08-12 NOTE — FLOWSHEET NOTE
CCPD treatment completed and patient disconnected from cycler per protocol. Effluent: clear yellow. Fibrin (no) Specimen collected and sent to lab (no)  Total time: 10hrs 10min  Total UF:  1013 ml. Total Volume:  10,009 ml. Dwell time gained:  27min. Tolerance of procedure : Tolerated tx well. No PD related complaints or complications.         08/12/21 0825   Vitals   BP (!) 140/70   Temp 98 °F (36.7 °C)   Temp Source Oral   Pulse 78   Resp 18   Weight 205 lb 7.5 oz (93.2 kg)   Peritoneal Dialysis Catheter Continuous cycling Mid lower abdomen   Placement Date/Time: 05/26/21 224   Inserted by: present on admission  Dialysis Type: Continuous cycling  Catheter Location: Mid lower abdomen   Status Deaccessed   Site Condition No Complications   Dressing Status Other (Comment)  (KAMLA)   Dressing Open to Air (None)   Cycler   Ultrafiltration (UF) (mL) 1013 mL   Average Dwell Time (Hours:Minutes) 90   Lost Dwell Time (Hours:Minutes) 0

## 2021-08-12 NOTE — CARE COORDINATION
Case Management Assessment            Discharge Note                    Date / Time of Note: 8/12/2021 10:34 AM                  Discharge Note Completed by: Mary Jane Campoverde RN    Patient Name: Regulo Rodriguez   YOB: 1965  Diagnosis: Hypertensive urgency [I16.0]  Cerebrovascular accident (CVA), unspecified mechanism ([facility] [I63.9]   Date / Time: 8/10/2021 10:38 AM    Current PCP: Cristiane FORMAN patient: No    Hospitalization in the last 30 days: No    Advance Directives:  Code Status: Full Code  [address] DNR form completed and on chart: Not Indicated    Financial:  Payor: MEDICAID [place] / Plan: 40 [facility] DEPT OF JOB / Product Type: *No Product type* /      Pharmacy:    [facility] [address] Bernice Habermann [address] [address]  Phone: [phone] Fax: [address], [place] [address] [place] [address] [phone] Bernice Habermann [phone]  [address] [facility] [address]  Phone: [phone] Fax: [phone]      Assistance purchasing medications?: Potential Assistance Purchasing Medications: No  Assistance provided by Case Management: None at this time    Does patient want to participate in local refill/ meds to beds program?: No    Meds To Beds General Rules:  1. Can ONLY be done Monday- Friday between 8:30am-5pm  2. Prescription(s) must be in pharmacy by 3pm to be filled same day  3. Copy of patient's insurance/ prescription drug card and patient face sheet must be sent along with the prescription(s)  4. Cost of Rx cannot be added to hospital bill. If financial assistance is needed, please contact unit  or ;  or  CANNOT provide pharmacy voucher for patients co-pays  5.  Patients can then  the prescription on their way out of the hospital at discharge, or pharmacy can deliver to the bedside if staff is available. (payment due at time of pick-up or delivery - cash, check, or card accepted)     Able to afford home medications/ co-pay costs: Yes    ADLS:  Current PT AM-PAC Score:   /24  Current OT AM-PAC Score:   /24      DISCHARGE Disposition: Home- No Services Needed    LOC at discharge: Not Applicable  KEYUR Completed: Not Indicated    Notification completed in HENS/PAS?:  Not Applicable    IMM Completed:   Not Indicated    Transportation:  Transportation PLAN for discharge: family   Mode of Transport: [address]:  Home Care ordered at discharge: Not [address]: Not Applicable    Dialysis:  Dialysis patient: Yes    Dialysis Center:  Not Applicable, PD at home        Referrals made at [facility] for outpatient continued care:  Not Applicable    Additional CM Notes:   Patient will discharge home with spouse, independent pta. No CM needs at this time. Spouse to transport home. The Plan for Transition of Care is related to the following treatment goals of Hypertensive urgency [I16.0]  Cerebrovascular accident (CVA), unspecified mechanism ([facility] [I63.9]    The Patient and/or patient representative Tami and her family were provided with a choice of provider and agrees with the discharge plan Yes    Freedom of choice list was provided with basic dialogue that supports the patient's individualized plan of care/goals and shares the quality data associated with the providers.  Yes    Care Transitions patient: No    Torres Brown RN  [facility]  Case Management Department  Ph: [phone]  Fax: [phone]

## 2021-08-12 NOTE — PLAN OF CARE
Problem: Cardiac Output - Decreased:  Goal: Hemodynamic stability will improve  Description: Hemodynamic stability will improve  8/12/2021 0301 by Darcy Griffin RN  Outcome: Ongoing  Patients blood pressure has maintained below 940 systolic throughout shift, so no need for hydralazine. Problem: Fluid Volume - Imbalance:  Goal: Absence of imbalanced fluid volume signs and symptoms  Description: Absence of imbalanced fluid volume signs and symptoms  8/12/2021 0301 by Darcy Griffin RN  Outcome: Ongoing   Patient received bolus today because BP's had dropped. Her blood pressures have maintained stable overnight.

## 2021-08-12 NOTE — PROGRESS NOTES
Clinical Pharmacy Consult Note    48 y.o. female admitted with dizziness and NV. Stroke workup negative. Pharmacy has been asked by Dr. Feliciano Mills to adjust all drips to normal saline as appropriate based on compatibility to avoid fluid shifts since D5 is osmotically active. The following intermittent IV drips/infusions have been adjusted to saline:  None at this time      Please be aware that patient has D5W ordered as part of hypoglycemia orderset. Total IV fluid delivered to patient over last 24h: ~500 mL    As patient is not being treated for pituitary tumor resection or requiring hypertonic saline (defined as >0.9% NaCl), pharmacy will sign off of IV review consult, per protocol. Please call with questions.   Yoan Rendon PharmD., BCPS   8/12/2021 9:45 AM  Wireless: [phone]

## 2021-08-12 NOTE — DISCHARGE SUMMARY
Hospital Medicine Discharge Summary    Patient ID: Garrett Ayers      Patient's PCP: Piedad Candelario    Admit Date: 8/10/2021     Discharge Date: 8/12/2021     Admitting Physician: Krystin West MD     Discharge Physician: Krystin West MD     Discharge Diagnoses: Active Hospital Problems    Diagnosis Date Noted    Hypertensive urgency [I16.0] 08/10/2021    Cerebrovascular accident (CVA) ([facility] [I63.9]     Electrolyte imbalance [E87.8]        The patient was seen and examined on day of discharge and this discharge summary is in conjunction with any daily progress note from day of discharge. Hospital Course:     48 y. o. female Tami Farooq   - with pmhx of Type 2 DM, HLD, HTN, ESRD on PD  - Dizziness since Sunday, along with nausea, she is taking her medications but feel the spironolactone is causing there side effects. She says last night she took her medication but vomited them out. She has not taken her medications this am but with persistent symptoms of dizziness and vomiting presented to the ED for further evaluation     Recently she was amditted to [facility] from 08/02 - 08/06 where presented with headache dizziness, difficulty with speech symptom loss of a half an hour, noted that her blood pressure at home was 235/125 which was likely as she stopped her nifedipine due to leg swelling on 07/13. Medications were adjusted, Renal Duplex was done to rule out LUCILLE. She was discharged in stable condition.     Problems addressed     1. Hypertensive urgency  2. Vertigo/Nausea ruled out possible posterior CVA  3. ESRD on Peritoneal dialysis   4. Type 2 DM insulin dependant  5.  Elevated trop, chronic elevation, due to decrease renal clearance       BP medication adjusted and discharged in stable condition        Physical Exam Performed:     BP (!) 140/70   Pulse 78   Temp 98 °F (36.7 °C) (Oral)   Resp 22   Ht 5' 3\" (1.6 m)   Wt 205 lb 7.5 oz (93.2 kg)   SpO2 93%   BMI 36.40 kg/m² General appearance:  No apparent distress, appears stated age and cooperative. HEENT:  Normal cephalic, atraumatic without obvious deformity. Pupils equal, round, and reactive to light.  Extra ocular muscles intact. Conjunctivae/corneas clear. Neck: Supple, with full range of motion. No jugular venous distention. Trachea midline. Respiratory:  Normal respiratory effort. Clear to auscultation, bilaterally without Rales/Wheezes/Rhonchi. Cardiovascular:  Regular rate and rhythm with normal S1/S2 without murmurs, rubs or gallops. Abdomen: Soft, non-tender, non-distended with normal bowel sounds. PD catheter present, site without signs of infection  Musculoskeletal:  No clubbing, cyanosis or edema bilaterally.  Full range of motion without deformity. Skin: Skin color, texture, turgor normal.  No rashes or lesions. Neurologic:  Neurovascularly intact without any focal sensory/motor deficits. Cranial nerves: II-XII intact, grossly non-focal.  Psychiatric:  Alert and oriented, thought content appropriate, normal insight  Capillary Refill: Brisk,< 3 seconds   Peripheral Pulses: +2 palpable, equal bilaterally          Labs: For convenience and continuity at follow-up the following most recent labs are provided:      CBC:    Lab Results   Component Value Date    WBC 6.8 08/11/2021    HGB 10.3 08/11/2021    HCT 30.2 08/11/2021     08/11/2021       Renal:    Lab Results   Component Value Date     08/12/2021    K 3.8 08/12/2021    K 3.7 08/11/2021    CL 90 08/12/2021    CO2 25 08/12/2021    BUN 39 08/12/2021    CREATININE 11.9 08/12/2021    CALCIUM 8.4 08/12/2021    PHOS 5.7 08/12/2021         Significant Diagnostic Studies    Radiology:   MRI BRAIN WO CONTRAST   Final Result      No acute infarct. Small left frontal meningioma again noted. Prominent periventricular white matter disease given the patient's age. CTA NECK W CONTRAST   Final Result      CTA Head:    Moderate stenosis of the proximal right supraclinoid internal carotid artery secondary to atherosclerosis. No other arterial steno-occlusive disease or evidence of aneurysm. 1.4 cm extra-axial enhancing lesion along the left frontal convexity, nonspecific but likely meningioma. This can be confirmed by MRI without and with contrast.      CTA Neck:   No significant arterial steno-occlusive disease or evidence of dissection. CTA HEAD W CONTRAST   Final Result      CTA Head: Moderate stenosis of the proximal right supraclinoid internal carotid artery secondary to atherosclerosis. No other arterial steno-occlusive disease or evidence of aneurysm. 1.4 cm extra-axial enhancing lesion along the left frontal convexity, nonspecific but likely meningioma. This can be confirmed by MRI without and with contrast.      CTA Neck:   No significant arterial steno-occlusive disease or evidence of dissection. XR CHEST PORTABLE   Final Result      No acute radiographic abnormality of the chest.      CT HEAD WO CONTRAST   Final Result      No acute intracranial hemorrhage or mass effect. Mild nonspecific white matter disease, likely represents chronic small vessel ischemic change. Unchanged age indeterminate, favored remote lacunar infarcts in the right caudate head and right cerebellum. Consults:     IP CONSULT TO HOSPITALIST  IP CONSULT TO NEPHROLOGY    Disposition:  Home     Condition at Discharge: Stable    Discharge Instructions/Follow-up:    Monitor BP at home  Please call nephrology if problems with BP so medications can be adjusted    Code Status:  Full Code    Activity: activity as tolerated    Diet: Low salt diet, carb controlled diet      Discharge Medications:     Discharge Medication List as of 8/12/2021  1:38 PM           Details   amLODIPine (NORVASC) 10 MG tablet Take 1 tablet by mouth daily, Disp-30 tablet, R-3Normal      mupirocin (BACTROBAN) 2 % ointment Apply topically 3 times daily. , Disp-1 Tube, R-5, Normal      polyethylene glycol (GLYCOLAX) 17 g packet Take 17 g by mouth daily as needed for Constipation, Disp-527 g, R-1Normal              Details   hydrALAZINE (APRESOLINE) 100 MG tablet Take 0.5 tablets by mouth 3 times daily, Disp-90 tablet, R-3Normal              Details   labetalol (NORMODYNE) 200 MG tablet Take 1 tablet by mouth every 12 hours, Disp-60 tablet, R-3Normal      losartan (COZAAR) 100 MG tablet Take 100 mg by mouth nightlyHistorical Med      ergocalciferol (ERGOCALCIFEROL) 1.25 MG (28966 UT) capsule Take 50,000 Units by mouth once a weekHistorical Med      pantoprazole (PROTONIX) 40 MG tablet Take 1 tablet by mouth daily, Disp-90 tablet, R-1Normal      torsemide (DEMADEX) 100 MG tablet Take 4 tablets by mouth nightly, Disp-360 tablet, R-5Normal      insulin lispro (HUMALOG KWIKPEN) 100 UNIT/ML pen Inject 2-4 Units into the skin 3 times daily (before meals) Sliding scale Historical Med             Time Spent on discharge is more than 30 minutes in the examination, evaluation, counseling and review of medications and discharge plan. Signed:    Phil Fuentes MD   8/12/2021      Thank you Jared Cowan for the opportunity to be involved in this patient's care. If you have any questions or concerns please feel free to contact me at [phone].

## 2021-08-12 NOTE — PROGRESS NOTES
Pt discharged to home with transport provided by . IV and tele removed.  All belongings d/c with patient

## 2022-01-01 ENCOUNTER — CARE COORDINATION (OUTPATIENT)
Dept: CASE MANAGEMENT | Age: 57
End: 2022-01-01

## 2022-01-01 ENCOUNTER — HOSPITAL ENCOUNTER (INPATIENT)
Age: 57
LOS: 1 days | Discharge: HOME | DRG: 177 | End: 2022-01-08
Attending: EMERGENCY MEDICINE | Admitting: INTERNAL MEDICINE
Payer: COMMERCIAL

## 2022-01-01 ENCOUNTER — APPOINTMENT (OUTPATIENT)
Dept: GENERAL RADIOLOGY | Age: 57
DRG: 177 | End: 2022-01-01
Payer: COMMERCIAL

## 2022-01-01 ENCOUNTER — TELEPHONE (OUTPATIENT)
Dept: SURGERY | Age: 57
End: 2022-01-01

## 2022-01-01 ENCOUNTER — APPOINTMENT (OUTPATIENT)
Dept: GENERAL RADIOLOGY | Age: 57
DRG: 813 | End: 2022-01-01
Payer: COMMERCIAL

## 2022-01-01 ENCOUNTER — HOSPITAL ENCOUNTER (INPATIENT)
Age: 57
LOS: 13 days | Discharge: HOME | DRG: 853 | End: 2022-04-04
Attending: STUDENT IN AN ORGANIZED HEALTH CARE EDUCATION/TRAINING PROGRAM | Admitting: FAMILY MEDICINE
Payer: COMMERCIAL

## 2022-01-01 ENCOUNTER — TELEPHONE (OUTPATIENT)
Dept: VASCULAR SURGERY | Age: 57
End: 2022-01-01

## 2022-01-01 ENCOUNTER — ANESTHESIA EVENT (OUTPATIENT)
Dept: OPERATING ROOM | Age: 57
DRG: 856 | End: 2022-01-01
Payer: COMMERCIAL

## 2022-01-01 ENCOUNTER — APPOINTMENT (OUTPATIENT)
Dept: CT IMAGING | Age: 57
DRG: 853 | End: 2022-01-01
Payer: COMMERCIAL

## 2022-01-01 ENCOUNTER — ANESTHESIA (OUTPATIENT)
Dept: OPERATING ROOM | Age: 57
DRG: 853 | End: 2022-01-01
Payer: COMMERCIAL

## 2022-01-01 ENCOUNTER — APPOINTMENT (OUTPATIENT)
Dept: CT IMAGING | Age: 57
DRG: 813 | End: 2022-01-01
Payer: COMMERCIAL

## 2022-01-01 ENCOUNTER — ANESTHESIA EVENT (OUTPATIENT)
Dept: OPERATING ROOM | Age: 57
DRG: 853 | End: 2022-01-01
Payer: COMMERCIAL

## 2022-01-01 ENCOUNTER — HOSPITAL ENCOUNTER (INPATIENT)
Age: 57
LOS: 6 days | DRG: 813 | End: 2022-04-14
Attending: STUDENT IN AN ORGANIZED HEALTH CARE EDUCATION/TRAINING PROGRAM | Admitting: INTERNAL MEDICINE
Payer: COMMERCIAL

## 2022-01-01 ENCOUNTER — ANESTHESIA (OUTPATIENT)
Dept: OPERATING ROOM | Age: 57
DRG: 856 | End: 2022-01-01
Payer: COMMERCIAL

## 2022-01-01 ENCOUNTER — HOSPITAL ENCOUNTER (INPATIENT)
Age: 57
LOS: 14 days | Discharge: HOME | DRG: 856 | End: 2022-03-01
Attending: EMERGENCY MEDICINE | Admitting: STUDENT IN AN ORGANIZED HEALTH CARE EDUCATION/TRAINING PROGRAM
Payer: COMMERCIAL

## 2022-01-01 ENCOUNTER — APPOINTMENT (OUTPATIENT)
Dept: CT IMAGING | Age: 57
DRG: 856 | End: 2022-01-01
Payer: COMMERCIAL

## 2022-01-01 VITALS
HEIGHT: 63 IN | HEART RATE: 83 BPM | SYSTOLIC BLOOD PRESSURE: 133 MMHG | OXYGEN SATURATION: 98 % | BODY MASS INDEX: 33.4 KG/M2 | DIASTOLIC BLOOD PRESSURE: 74 MMHG | RESPIRATION RATE: 20 BRPM | WEIGHT: 188.49 LBS | TEMPERATURE: 97.3 F

## 2022-01-01 VITALS
TEMPERATURE: 97.5 F | HEIGHT: 63 IN | WEIGHT: 170.42 LBS | BODY MASS INDEX: 30.2 KG/M2 | SYSTOLIC BLOOD PRESSURE: 127 MMHG | OXYGEN SATURATION: 99 % | DIASTOLIC BLOOD PRESSURE: 77 MMHG | RESPIRATION RATE: 24 BRPM | HEART RATE: 90 BPM

## 2022-01-01 VITALS
SYSTOLIC BLOOD PRESSURE: 91 MMHG | OXYGEN SATURATION: 100 % | DIASTOLIC BLOOD PRESSURE: 50 MMHG | RESPIRATION RATE: 22 BRPM

## 2022-01-01 VITALS
HEIGHT: 63 IN | BODY MASS INDEX: 30.62 KG/M2 | RESPIRATION RATE: 6 BRPM | TEMPERATURE: 99.6 F | SYSTOLIC BLOOD PRESSURE: 63 MMHG | DIASTOLIC BLOOD PRESSURE: 32 MMHG | OXYGEN SATURATION: 100 % | WEIGHT: 172.84 LBS

## 2022-01-01 VITALS
SYSTOLIC BLOOD PRESSURE: 119 MMHG | RESPIRATION RATE: 1 BRPM | TEMPERATURE: 97.9 F | DIASTOLIC BLOOD PRESSURE: 68 MMHG | OXYGEN SATURATION: 98 %

## 2022-01-01 VITALS
WEIGHT: 199.3 LBS | RESPIRATION RATE: 18 BRPM | DIASTOLIC BLOOD PRESSURE: 85 MMHG | TEMPERATURE: 98 F | HEART RATE: 98 BPM | HEIGHT: 63 IN | BODY MASS INDEX: 35.31 KG/M2 | SYSTOLIC BLOOD PRESSURE: 149 MMHG | OXYGEN SATURATION: 98 %

## 2022-01-01 DIAGNOSIS — N18.6 ESRD ON PERITONEAL DIALYSIS (HCC): ICD-10-CM

## 2022-01-01 DIAGNOSIS — K76.9 HEPATIC LESION: ICD-10-CM

## 2022-01-01 DIAGNOSIS — N18.6 END STAGE RENAL DISEASE (HCC): ICD-10-CM

## 2022-01-01 DIAGNOSIS — R11.2 NAUSEA AND VOMITING, INTRACTABILITY OF VOMITING NOT SPECIFIED, UNSPECIFIED VOMITING TYPE: ICD-10-CM

## 2022-01-01 DIAGNOSIS — R10.84 GENERALIZED ABDOMINAL PAIN: Primary | ICD-10-CM

## 2022-01-01 DIAGNOSIS — R11.2 NON-INTRACTABLE VOMITING WITH NAUSEA, UNSPECIFIED VOMITING TYPE: ICD-10-CM

## 2022-01-01 DIAGNOSIS — Z99.2 ESRD ON PERITONEAL DIALYSIS (HCC): ICD-10-CM

## 2022-01-01 DIAGNOSIS — R91.8 LUNG NODULES: ICD-10-CM

## 2022-01-01 DIAGNOSIS — K43.9 VENTRAL HERNIA WITHOUT OBSTRUCTION OR GANGRENE: ICD-10-CM

## 2022-01-01 DIAGNOSIS — E87.6 HYPOKALEMIA: ICD-10-CM

## 2022-01-01 DIAGNOSIS — U07.1 ACUTE HYPOXEMIC RESPIRATORY FAILURE DUE TO COVID-19 (HCC): Primary | ICD-10-CM

## 2022-01-01 DIAGNOSIS — A41.9 SEPTIC SHOCK (HCC): Primary | ICD-10-CM

## 2022-01-01 DIAGNOSIS — R79.1 ELEVATED INR: Primary | ICD-10-CM

## 2022-01-01 DIAGNOSIS — R65.21 SEPTIC SHOCK (HCC): Primary | ICD-10-CM

## 2022-01-01 DIAGNOSIS — R09.02 HYPOXIA: ICD-10-CM

## 2022-01-01 DIAGNOSIS — K52.9 COLITIS: ICD-10-CM

## 2022-01-01 DIAGNOSIS — R58 ABDOMINAL HEMORRHAGE: ICD-10-CM

## 2022-01-01 DIAGNOSIS — J96.01 ACUTE HYPOXEMIC RESPIRATORY FAILURE DUE TO COVID-19 (HCC): Primary | ICD-10-CM

## 2022-01-01 DIAGNOSIS — K65.9 PERITONITIS (HCC): ICD-10-CM

## 2022-01-01 DIAGNOSIS — R91.1 PULMONARY NODULE: ICD-10-CM

## 2022-01-01 LAB
A/G RATIO: 0.6 (ref 1.1–2.2)
A/G RATIO: 0.7 (ref 1.1–2.2)
A/G RATIO: 0.8 (ref 1.1–2.2)
A/G RATIO: 0.8 (ref 1.1–2.2)
A/G RATIO: 0.9 (ref 1.1–2.2)
A/G RATIO: 1 (ref 1.1–2.2)
A/G RATIO: 1.1 (ref 1.1–2.2)
A/G RATIO: 1.2 (ref 1.1–2.2)
A/G RATIO: 1.3 (ref 1.1–2.2)
A/G RATIO: 1.4 (ref 1.1–2.2)
ABO/RH: NORMAL
ABO/RH: NORMAL
ALBUMIN SERPL-MCNC: 1.7 G/DL (ref 3.4–5)
ALBUMIN SERPL-MCNC: 1.9 G/DL (ref 3.4–5)
ALBUMIN SERPL-MCNC: 2.1 G/DL (ref 3.4–5)
ALBUMIN SERPL-MCNC: 2.2 G/DL (ref 3.4–5)
ALBUMIN SERPL-MCNC: 2.3 G/DL (ref 3.4–5)
ALBUMIN SERPL-MCNC: 2.4 G/DL (ref 3.4–5)
ALBUMIN SERPL-MCNC: 2.5 G/DL (ref 3.4–5)
ALBUMIN SERPL-MCNC: 2.8 G/DL (ref 3.4–5)
ALP BLD-CCNC: 105 U/L (ref 40–129)
ALP BLD-CCNC: 117 U/L (ref 40–129)
ALP BLD-CCNC: 166 U/L (ref 40–129)
ALP BLD-CCNC: 174 U/L (ref 40–129)
ALP BLD-CCNC: 176 U/L (ref 40–129)
ALP BLD-CCNC: 177 U/L (ref 40–129)
ALP BLD-CCNC: 178 U/L (ref 40–129)
ALP BLD-CCNC: 183 U/L (ref 40–129)
ALP BLD-CCNC: 184 U/L (ref 40–129)
ALP BLD-CCNC: 186 U/L (ref 40–129)
ALP BLD-CCNC: 189 U/L (ref 40–129)
ALP BLD-CCNC: 192 U/L (ref 40–129)
ALP BLD-CCNC: 193 U/L (ref 40–129)
ALP BLD-CCNC: 197 U/L (ref 40–129)
ALP BLD-CCNC: 202 U/L (ref 40–129)
ALP BLD-CCNC: 225 U/L (ref 40–129)
ALT SERPL-CCNC: 10 U/L (ref 10–40)
ALT SERPL-CCNC: 10 U/L (ref 10–40)
ALT SERPL-CCNC: 11 U/L (ref 10–40)
ALT SERPL-CCNC: 12 U/L (ref 10–40)
ALT SERPL-CCNC: 12 U/L (ref 10–40)
ALT SERPL-CCNC: 128 U/L (ref 10–40)
ALT SERPL-CCNC: 14 U/L (ref 10–40)
ALT SERPL-CCNC: 16 U/L (ref 10–40)
ALT SERPL-CCNC: 6 U/L (ref 10–40)
ALT SERPL-CCNC: 6 U/L (ref 10–40)
ALT SERPL-CCNC: 7 U/L (ref 10–40)
ALT SERPL-CCNC: 8 U/L (ref 10–40)
ALT SERPL-CCNC: 8 U/L (ref 10–40)
ANAEROBIC CULTURE: NORMAL
ANION GAP SERPL CALCULATED.3IONS-SCNC: 10 MMOL/L (ref 3–16)
ANION GAP SERPL CALCULATED.3IONS-SCNC: 11 MMOL/L (ref 3–16)
ANION GAP SERPL CALCULATED.3IONS-SCNC: 12 MMOL/L (ref 3–16)
ANION GAP SERPL CALCULATED.3IONS-SCNC: 13 MMOL/L (ref 3–16)
ANION GAP SERPL CALCULATED.3IONS-SCNC: 14 MMOL/L (ref 3–16)
ANION GAP SERPL CALCULATED.3IONS-SCNC: 14 MMOL/L (ref 3–16)
ANION GAP SERPL CALCULATED.3IONS-SCNC: 15 MMOL/L (ref 3–16)
ANION GAP SERPL CALCULATED.3IONS-SCNC: 16 MMOL/L (ref 3–16)
ANION GAP SERPL CALCULATED.3IONS-SCNC: 17 MMOL/L (ref 3–16)
ANION GAP SERPL CALCULATED.3IONS-SCNC: 18 MMOL/L (ref 3–16)
ANION GAP SERPL CALCULATED.3IONS-SCNC: 19 MMOL/L (ref 3–16)
ANION GAP SERPL CALCULATED.3IONS-SCNC: 20 MMOL/L (ref 3–16)
ANION GAP SERPL CALCULATED.3IONS-SCNC: 20 MMOL/L (ref 3–16)
ANION GAP SERPL CALCULATED.3IONS-SCNC: 21 MMOL/L (ref 3–16)
ANION GAP SERPL CALCULATED.3IONS-SCNC: 27 MMOL/L (ref 3–16)
ANION GAP SERPL CALCULATED.3IONS-SCNC: 9 MMOL/L (ref 3–16)
ANISOCYTOSIS: ABNORMAL
ANTIBODY SCREEN: NORMAL
ANTIBODY SCREEN: NORMAL
APPEARANCE FLUID: CLEAR
APPEARANCE FLUID: NORMAL
APTT: 107.2 SEC (ref 26.2–38.6)
APTT: 144.3 SEC (ref 26.2–38.6)
APTT: 157 SEC (ref 26.2–38.6)
APTT: 39.2 SEC (ref 26.2–38.6)
APTT: 42.5 SEC (ref 26.2–38.6)
APTT: 44.9 SEC (ref 26.2–38.6)
APTT: 49.2 SEC (ref 26.2–38.6)
APTT: 51.2 SEC (ref 26.2–38.6)
APTT: 59.1 SEC (ref 26.2–38.6)
APTT: 60.5 SEC (ref 26.2–38.6)
APTT: 73.8 SEC (ref 26.2–38.6)
APTT: 78 SEC (ref 26.2–38.6)
APTT: >248 SEC (ref 26.2–38.6)
AST SERPL-CCNC: 12 U/L (ref 15–37)
AST SERPL-CCNC: 12 U/L (ref 15–37)
AST SERPL-CCNC: 14 U/L (ref 15–37)
AST SERPL-CCNC: 14 U/L (ref 15–37)
AST SERPL-CCNC: 18 U/L (ref 15–37)
AST SERPL-CCNC: 20 U/L (ref 15–37)
AST SERPL-CCNC: 20 U/L (ref 15–37)
AST SERPL-CCNC: 21 U/L (ref 15–37)
AST SERPL-CCNC: 21 U/L (ref 15–37)
AST SERPL-CCNC: 23 U/L (ref 15–37)
AST SERPL-CCNC: 24 U/L (ref 15–37)
AST SERPL-CCNC: 30 U/L (ref 15–37)
AST SERPL-CCNC: 31 U/L (ref 15–37)
AST SERPL-CCNC: 34 U/L (ref 15–37)
AST SERPL-CCNC: 341 U/L (ref 15–37)
AST SERPL-CCNC: 35 U/L (ref 15–37)
ATYPICAL LYMPHOCYTE RELATIVE PERCENT: 1 % (ref 0–6)
ATYPICAL LYMPHOCYTE RELATIVE PERCENT: 3 % (ref 0–6)
BANDED NEUTROPHILS RELATIVE PERCENT: 1 % (ref 0–7)
BANDED NEUTROPHILS RELATIVE PERCENT: 1 % (ref 0–7)
BANDED NEUTROPHILS RELATIVE PERCENT: 2 % (ref 0–7)
BANDED NEUTROPHILS RELATIVE PERCENT: 3 % (ref 0–7)
BANDED NEUTROPHILS RELATIVE PERCENT: 3 % (ref 0–7)
BANDED NEUTROPHILS RELATIVE PERCENT: 7 % (ref 0–7)
BANDED NEUTROPHILS RELATIVE PERCENT: 8 % (ref 0–7)
BASE EXCESS ARTERIAL: -14 (ref -3–3)
BASE EXCESS ARTERIAL: -6.2 MMOL/L (ref -3–3)
BASE EXCESS VENOUS: 2.7 MMOL/L (ref -2–3)
BASO FLUID: 1 %
BASO FLUID: 3 %
BASOPHILS ABSOLUTE: 0 K/UL (ref 0–0.2)
BASOPHILS ABSOLUTE: 0.1 K/UL (ref 0–0.2)
BASOPHILS ABSOLUTE: 0.2 K/UL (ref 0–0.2)
BASOPHILS RELATIVE PERCENT: 0 %
BASOPHILS RELATIVE PERCENT: 0.1 %
BASOPHILS RELATIVE PERCENT: 0.2 %
BASOPHILS RELATIVE PERCENT: 0.3 %
BASOPHILS RELATIVE PERCENT: 0.4 %
BASOPHILS RELATIVE PERCENT: 0.5 %
BASOPHILS RELATIVE PERCENT: 0.5 %
BASOPHILS RELATIVE PERCENT: 0.6 %
BASOPHILS RELATIVE PERCENT: 0.7 %
BASOPHILS RELATIVE PERCENT: 0.9 %
BASOPHILS RELATIVE PERCENT: 0.9 %
BASOPHILS RELATIVE PERCENT: 1 %
BASOPHILS RELATIVE PERCENT: 1.3 %
BASOPHILS RELATIVE PERCENT: 1.4 %
BASOPHILS RELATIVE PERCENT: 2 %
BILIRUB SERPL-MCNC: 0.3 MG/DL (ref 0–1)
BILIRUB SERPL-MCNC: 0.4 MG/DL (ref 0–1)
BILIRUB SERPL-MCNC: 0.5 MG/DL (ref 0–1)
BILIRUB SERPL-MCNC: 0.6 MG/DL (ref 0–1)
BILIRUB SERPL-MCNC: 1.1 MG/DL (ref 0–1)
BILIRUB SERPL-MCNC: <0.2 MG/DL (ref 0–1)
BLOOD BANK DISPENSE STATUS: NORMAL
BLOOD BANK DISPENSE STATUS: NORMAL
BLOOD BANK PRODUCT CODE: NORMAL
BLOOD BANK PRODUCT CODE: NORMAL
BLOOD CULTURE, ROUTINE: NORMAL
BLOOD CULTURE, ROUTINE: NORMAL
BODY FLUID CULTURE, STERILE: ABNORMAL
BODY FLUID CULTURE, STERILE: NORMAL
BPU ID: NORMAL
BPU ID: NORMAL
BUN BLDV-MCNC: 10 MG/DL (ref 7–20)
BUN BLDV-MCNC: 10 MG/DL (ref 7–20)
BUN BLDV-MCNC: 14 MG/DL (ref 7–20)
BUN BLDV-MCNC: 15 MG/DL (ref 7–20)
BUN BLDV-MCNC: 15 MG/DL (ref 7–20)
BUN BLDV-MCNC: 16 MG/DL (ref 7–20)
BUN BLDV-MCNC: 17 MG/DL (ref 7–20)
BUN BLDV-MCNC: 18 MG/DL (ref 7–20)
BUN BLDV-MCNC: 19 MG/DL (ref 7–20)
BUN BLDV-MCNC: 24 MG/DL (ref 7–20)
BUN BLDV-MCNC: 26 MG/DL (ref 7–20)
BUN BLDV-MCNC: 26 MG/DL (ref 7–20)
BUN BLDV-MCNC: 28 MG/DL (ref 7–20)
BUN BLDV-MCNC: 30 MG/DL (ref 7–20)
BUN BLDV-MCNC: 31 MG/DL (ref 7–20)
BUN BLDV-MCNC: 32 MG/DL (ref 7–20)
BUN BLDV-MCNC: 33 MG/DL (ref 7–20)
BUN BLDV-MCNC: 33 MG/DL (ref 7–20)
BUN BLDV-MCNC: 34 MG/DL (ref 7–20)
BUN BLDV-MCNC: 35 MG/DL (ref 7–20)
BUN BLDV-MCNC: 36 MG/DL (ref 7–20)
BUN BLDV-MCNC: 36 MG/DL (ref 7–20)
BUN BLDV-MCNC: 37 MG/DL (ref 7–20)
BUN BLDV-MCNC: 38 MG/DL (ref 7–20)
BUN BLDV-MCNC: 39 MG/DL (ref 7–20)
BUN BLDV-MCNC: 39 MG/DL (ref 7–20)
BUN BLDV-MCNC: 41 MG/DL (ref 7–20)
BUN BLDV-MCNC: 42 MG/DL (ref 7–20)
BUN BLDV-MCNC: 45 MG/DL (ref 7–20)
BUN BLDV-MCNC: 49 MG/DL (ref 7–20)
C DIFF TOXIN/ANTIGEN: NORMAL
C. DIFFICILE TOXIN MOLECULAR: ABNORMAL
CALCIUM IONIZED: 1.06 MMOL/L (ref 1.12–1.32)
CALCIUM SERPL-MCNC: 6 MG/DL (ref 8.3–10.6)
CALCIUM SERPL-MCNC: 6.1 MG/DL (ref 8.3–10.6)
CALCIUM SERPL-MCNC: 6.2 MG/DL (ref 8.3–10.6)
CALCIUM SERPL-MCNC: 6.4 MG/DL (ref 8.3–10.6)
CALCIUM SERPL-MCNC: 6.4 MG/DL (ref 8.3–10.6)
CALCIUM SERPL-MCNC: 6.5 MG/DL (ref 8.3–10.6)
CALCIUM SERPL-MCNC: 6.6 MG/DL (ref 8.3–10.6)
CALCIUM SERPL-MCNC: 6.6 MG/DL (ref 8.3–10.6)
CALCIUM SERPL-MCNC: 6.8 MG/DL (ref 8.3–10.6)
CALCIUM SERPL-MCNC: 6.9 MG/DL (ref 8.3–10.6)
CALCIUM SERPL-MCNC: 7 MG/DL (ref 8.3–10.6)
CALCIUM SERPL-MCNC: 7.1 MG/DL (ref 8.3–10.6)
CALCIUM SERPL-MCNC: 7.2 MG/DL (ref 8.3–10.6)
CALCIUM SERPL-MCNC: 7.2 MG/DL (ref 8.3–10.6)
CALCIUM SERPL-MCNC: 7.3 MG/DL (ref 8.3–10.6)
CALCIUM SERPL-MCNC: 7.4 MG/DL (ref 8.3–10.6)
CALCIUM SERPL-MCNC: 7.5 MG/DL (ref 8.3–10.6)
CALCIUM SERPL-MCNC: 7.6 MG/DL (ref 8.3–10.6)
CALCIUM SERPL-MCNC: 7.7 MG/DL (ref 8.3–10.6)
CALCIUM SERPL-MCNC: 7.8 MG/DL (ref 8.3–10.6)
CALCIUM SERPL-MCNC: 7.9 MG/DL (ref 8.3–10.6)
CARBOXYHEMOGLOBIN ARTERIAL: 1.2 % (ref 0–1.5)
CARBOXYHEMOGLOBIN: 0.9 % (ref 0–1.5)
CELL COUNT FLUID TYPE: NORMAL
CHLORIDE BLD-SCNC: 100 MMOL/L (ref 99–110)
CHLORIDE BLD-SCNC: 100 MMOL/L (ref 99–110)
CHLORIDE BLD-SCNC: 101 MMOL/L (ref 99–110)
CHLORIDE BLD-SCNC: 101 MMOL/L (ref 99–110)
CHLORIDE BLD-SCNC: 103 MMOL/L (ref 99–110)
CHLORIDE BLD-SCNC: 87 MMOL/L (ref 99–110)
CHLORIDE BLD-SCNC: 87 MMOL/L (ref 99–110)
CHLORIDE BLD-SCNC: 88 MMOL/L (ref 99–110)
CHLORIDE BLD-SCNC: 89 MMOL/L (ref 99–110)
CHLORIDE BLD-SCNC: 90 MMOL/L (ref 99–110)
CHLORIDE BLD-SCNC: 91 MMOL/L (ref 99–110)
CHLORIDE BLD-SCNC: 92 MMOL/L (ref 99–110)
CHLORIDE BLD-SCNC: 94 MMOL/L (ref 99–110)
CHLORIDE BLD-SCNC: 95 MMOL/L (ref 99–110)
CHLORIDE BLD-SCNC: 96 MMOL/L (ref 99–110)
CHLORIDE BLD-SCNC: 97 MMOL/L (ref 99–110)
CHLORIDE BLD-SCNC: 98 MMOL/L (ref 99–110)
CHLORIDE BLD-SCNC: 99 MMOL/L (ref 99–110)
CLOT EVALUATION: NORMAL
CO2: 18 MMOL/L (ref 21–32)
CO2: 19 MMOL/L (ref 21–32)
CO2: 19 MMOL/L (ref 21–32)
CO2: 20 MMOL/L (ref 21–32)
CO2: 21 MMOL/L (ref 21–32)
CO2: 22 MMOL/L (ref 21–32)
CO2: 23 MMOL/L (ref 21–32)
CO2: 24 MMOL/L (ref 21–32)
CO2: 25 MMOL/L (ref 21–32)
CO2: 26 MMOL/L (ref 21–32)
CO2: 27 MMOL/L (ref 21–32)
CO2: 28 MMOL/L (ref 21–32)
CO2: 29 MMOL/L (ref 21–32)
COLOR FLUID: COLORLESS
COLOR FLUID: NORMAL
COLOR FLUID: YELLOW
CREAT SERPL-MCNC: 10 MG/DL (ref 0.6–1.1)
CREAT SERPL-MCNC: 10.1 MG/DL (ref 0.6–1.1)
CREAT SERPL-MCNC: 10.2 MG/DL (ref 0.6–1.1)
CREAT SERPL-MCNC: 10.7 MG/DL (ref 0.6–1.1)
CREAT SERPL-MCNC: 10.8 MG/DL (ref 0.6–1.1)
CREAT SERPL-MCNC: 10.9 MG/DL (ref 0.6–1.1)
CREAT SERPL-MCNC: 11.3 MG/DL (ref 0.6–1.1)
CREAT SERPL-MCNC: 13.1 MG/DL (ref 0.6–1.1)
CREAT SERPL-MCNC: 13.3 MG/DL (ref 0.6–1.1)
CREAT SERPL-MCNC: 13.5 MG/DL (ref 0.6–1.1)
CREAT SERPL-MCNC: 14.2 MG/DL (ref 0.6–1.1)
CREAT SERPL-MCNC: 14.3 MG/DL (ref 0.6–1.1)
CREAT SERPL-MCNC: 3.6 MG/DL (ref 0.6–1.1)
CREAT SERPL-MCNC: 3.7 MG/DL (ref 0.6–1.1)
CREAT SERPL-MCNC: 3.8 MG/DL (ref 0.6–1.1)
CREAT SERPL-MCNC: 4.2 MG/DL (ref 0.6–1.1)
CREAT SERPL-MCNC: 4.5 MG/DL (ref 0.6–1.1)
CREAT SERPL-MCNC: 4.6 MG/DL (ref 0.6–1.1)
CREAT SERPL-MCNC: 5.1 MG/DL (ref 0.6–1.1)
CREAT SERPL-MCNC: 5.2 MG/DL (ref 0.6–1.1)
CREAT SERPL-MCNC: 5.3 MG/DL (ref 0.6–1.1)
CREAT SERPL-MCNC: 5.3 MG/DL (ref 0.6–1.1)
CREAT SERPL-MCNC: 5.4 MG/DL (ref 0.6–1.1)
CREAT SERPL-MCNC: 5.5 MG/DL (ref 0.6–1.1)
CREAT SERPL-MCNC: 5.7 MG/DL (ref 0.6–1.1)
CREAT SERPL-MCNC: 5.8 MG/DL (ref 0.6–1.1)
CREAT SERPL-MCNC: 5.9 MG/DL (ref 0.6–1.1)
CREAT SERPL-MCNC: 5.9 MG/DL (ref 0.6–1.1)
CREAT SERPL-MCNC: 6.5 MG/DL (ref 0.6–1.1)
CREAT SERPL-MCNC: 6.6 MG/DL (ref 0.6–1.1)
CREAT SERPL-MCNC: 6.6 MG/DL (ref 0.6–1.1)
CREAT SERPL-MCNC: 9 MG/DL (ref 0.6–1.1)
CREAT SERPL-MCNC: 9.2 MG/DL (ref 0.6–1.1)
CREAT SERPL-MCNC: 9.3 MG/DL (ref 0.6–1.1)
CREAT SERPL-MCNC: 9.4 MG/DL (ref 0.6–1.1)
CREAT SERPL-MCNC: 9.5 MG/DL (ref 0.6–1.1)
CREAT SERPL-MCNC: 9.6 MG/DL (ref 0.6–1.1)
CREAT SERPL-MCNC: 9.8 MG/DL (ref 0.6–1.1)
CULTURE CATHETER TIP: ABNORMAL
CULTURE, BLOOD 2: NORMAL
CULTURE, BLOOD 2: NORMAL
DESCRIPTION BLOOD BANK: NORMAL
DESCRIPTION BLOOD BANK: NORMAL
EKG ATRIAL RATE: 101 BPM
EKG ATRIAL RATE: 159 BPM
EKG ATRIAL RATE: 78 BPM
EKG DIAGNOSIS: NORMAL
EKG P AXIS: 67 DEGREES
EKG P-R INTERVAL: 130 MS
EKG P-R INTERVAL: 136 MS
EKG P-R INTERVAL: 96 MS
EKG Q-T INTERVAL: 314 MS
EKG Q-T INTERVAL: 392 MS
EKG Q-T INTERVAL: 492 MS
EKG QRS DURATION: 74 MS
EKG QRS DURATION: 76 MS
EKG QRS DURATION: 86 MS
EKG QTC CALCULATION (BAZETT): 508 MS
EKG QTC CALCULATION (BAZETT): 510 MS
EKG QTC CALCULATION (BAZETT): 511 MS
EKG R AXIS: 42 DEGREES
EKG R AXIS: 60 DEGREES
EKG R AXIS: 66 DEGREES
EKG T AXIS: 105 DEGREES
EKG T AXIS: 227 DEGREES
EKG T AXIS: 56 DEGREES
EKG VENTRICULAR RATE: 101 BPM
EKG VENTRICULAR RATE: 159 BPM
EKG VENTRICULAR RATE: 65 BPM
EOSINOPHIL FLUID: 1 %
EOSINOPHIL FLUID: 12 %
EOSINOPHIL FLUID: 2 %
EOSINOPHIL FLUID: 3 %
EOSINOPHIL FLUID: 4 %
EOSINOPHILS ABSOLUTE: 0 K/UL (ref 0–0.6)
EOSINOPHILS ABSOLUTE: 0.1 K/UL (ref 0–0.6)
EOSINOPHILS ABSOLUTE: 0.2 K/UL (ref 0–0.6)
EOSINOPHILS ABSOLUTE: 0.3 K/UL (ref 0–0.6)
EOSINOPHILS ABSOLUTE: 0.5 K/UL (ref 0–0.6)
EOSINOPHILS ABSOLUTE: 0.5 K/UL (ref 0–0.6)
EOSINOPHILS RELATIVE PERCENT: 0 %
EOSINOPHILS RELATIVE PERCENT: 0.1 %
EOSINOPHILS RELATIVE PERCENT: 0.8 %
EOSINOPHILS RELATIVE PERCENT: 1 %
EOSINOPHILS RELATIVE PERCENT: 1.1 %
EOSINOPHILS RELATIVE PERCENT: 1.5 %
EOSINOPHILS RELATIVE PERCENT: 1.6 %
EOSINOPHILS RELATIVE PERCENT: 1.6 %
EOSINOPHILS RELATIVE PERCENT: 2 %
EOSINOPHILS RELATIVE PERCENT: 2.1 %
EOSINOPHILS RELATIVE PERCENT: 2.1 %
EOSINOPHILS RELATIVE PERCENT: 2.3 %
EOSINOPHILS RELATIVE PERCENT: 2.4 %
EOSINOPHILS RELATIVE PERCENT: 2.5 %
EOSINOPHILS RELATIVE PERCENT: 2.6 %
EOSINOPHILS RELATIVE PERCENT: 2.7 %
EOSINOPHILS RELATIVE PERCENT: 3 %
EOSINOPHILS RELATIVE PERCENT: 3.2 %
EOSINOPHILS RELATIVE PERCENT: 3.9 %
EOSINOPHILS RELATIVE PERCENT: 3.9 %
EOSINOPHILS RELATIVE PERCENT: 5 %
EOSINOPHILS RELATIVE PERCENT: 5 %
EOSINOPHILS RELATIVE PERCENT: 6 %
ESTIMATED AVERAGE GLUCOSE: 111.2 MG/DL
ESTIMATED AVERAGE GLUCOSE: 185.8 MG/DL
FLUID DIFF COMMENT: NORMAL
FLUID TYPE: NORMAL
GFR AFRICAN AMERICAN: 10
GFR AFRICAN AMERICAN: 11
GFR AFRICAN AMERICAN: 12
GFR AFRICAN AMERICAN: 12
GFR AFRICAN AMERICAN: 13
GFR AFRICAN AMERICAN: 15
GFR AFRICAN AMERICAN: 15
GFR AFRICAN AMERICAN: 16
GFR AFRICAN AMERICAN: 3
GFR AFRICAN AMERICAN: 4
GFR AFRICAN AMERICAN: 5
GFR AFRICAN AMERICAN: 6
GFR AFRICAN AMERICAN: 8
GFR AFRICAN AMERICAN: 9
GFR NON-AFRICAN AMERICAN: 10
GFR NON-AFRICAN AMERICAN: 10
GFR NON-AFRICAN AMERICAN: 11
GFR NON-AFRICAN AMERICAN: 12
GFR NON-AFRICAN AMERICAN: 13
GFR NON-AFRICAN AMERICAN: 13
GFR NON-AFRICAN AMERICAN: 3
GFR NON-AFRICAN AMERICAN: 4
GFR NON-AFRICAN AMERICAN: 5
GFR NON-AFRICAN AMERICAN: 7
GFR NON-AFRICAN AMERICAN: 8
GFR NON-AFRICAN AMERICAN: 9
GFR NON-AFRICAN AMERICAN: 9
GI BACTERIAL PATHOGENS BY PCR: NORMAL
GLUCOSE BLD-MCNC: 102 MG/DL (ref 70–99)
GLUCOSE BLD-MCNC: 104 MG/DL (ref 70–99)
GLUCOSE BLD-MCNC: 105 MG/DL (ref 70–99)
GLUCOSE BLD-MCNC: 109 MG/DL (ref 70–99)
GLUCOSE BLD-MCNC: 109 MG/DL (ref 70–99)
GLUCOSE BLD-MCNC: 110 MG/DL (ref 70–99)
GLUCOSE BLD-MCNC: 110 MG/DL (ref 70–99)
GLUCOSE BLD-MCNC: 111 MG/DL (ref 70–99)
GLUCOSE BLD-MCNC: 112 MG/DL (ref 70–99)
GLUCOSE BLD-MCNC: 113 MG/DL (ref 70–99)
GLUCOSE BLD-MCNC: 115 MG/DL (ref 70–99)
GLUCOSE BLD-MCNC: 117 MG/DL (ref 70–99)
GLUCOSE BLD-MCNC: 117 MG/DL (ref 70–99)
GLUCOSE BLD-MCNC: 119 MG/DL (ref 70–99)
GLUCOSE BLD-MCNC: 120 MG/DL (ref 70–99)
GLUCOSE BLD-MCNC: 121 MG/DL (ref 70–99)
GLUCOSE BLD-MCNC: 122 MG/DL (ref 70–99)
GLUCOSE BLD-MCNC: 122 MG/DL (ref 70–99)
GLUCOSE BLD-MCNC: 123 MG/DL (ref 70–99)
GLUCOSE BLD-MCNC: 124 MG/DL (ref 70–99)
GLUCOSE BLD-MCNC: 124 MG/DL (ref 70–99)
GLUCOSE BLD-MCNC: 125 MG/DL (ref 70–99)
GLUCOSE BLD-MCNC: 125 MG/DL (ref 70–99)
GLUCOSE BLD-MCNC: 126 MG/DL (ref 70–99)
GLUCOSE BLD-MCNC: 127 MG/DL (ref 70–99)
GLUCOSE BLD-MCNC: 128 MG/DL (ref 70–99)
GLUCOSE BLD-MCNC: 128 MG/DL (ref 70–99)
GLUCOSE BLD-MCNC: 130 MG/DL (ref 70–99)
GLUCOSE BLD-MCNC: 130 MG/DL (ref 70–99)
GLUCOSE BLD-MCNC: 134 MG/DL (ref 70–99)
GLUCOSE BLD-MCNC: 135 MG/DL (ref 70–99)
GLUCOSE BLD-MCNC: 136 MG/DL (ref 70–99)
GLUCOSE BLD-MCNC: 136 MG/DL (ref 70–99)
GLUCOSE BLD-MCNC: 137 MG/DL (ref 70–99)
GLUCOSE BLD-MCNC: 138 MG/DL (ref 70–99)
GLUCOSE BLD-MCNC: 139 MG/DL (ref 70–99)
GLUCOSE BLD-MCNC: 140 MG/DL (ref 70–99)
GLUCOSE BLD-MCNC: 141 MG/DL (ref 70–99)
GLUCOSE BLD-MCNC: 141 MG/DL (ref 70–99)
GLUCOSE BLD-MCNC: 142 MG/DL (ref 70–99)
GLUCOSE BLD-MCNC: 145 MG/DL (ref 70–99)
GLUCOSE BLD-MCNC: 145 MG/DL (ref 70–99)
GLUCOSE BLD-MCNC: 146 MG/DL (ref 70–99)
GLUCOSE BLD-MCNC: 146 MG/DL (ref 70–99)
GLUCOSE BLD-MCNC: 148 MG/DL (ref 70–99)
GLUCOSE BLD-MCNC: 149 MG/DL (ref 70–99)
GLUCOSE BLD-MCNC: 150 MG/DL (ref 70–99)
GLUCOSE BLD-MCNC: 150 MG/DL (ref 70–99)
GLUCOSE BLD-MCNC: 151 MG/DL (ref 70–99)
GLUCOSE BLD-MCNC: 152 MG/DL (ref 70–99)
GLUCOSE BLD-MCNC: 153 MG/DL (ref 70–99)
GLUCOSE BLD-MCNC: 153 MG/DL (ref 70–99)
GLUCOSE BLD-MCNC: 154 MG/DL (ref 70–99)
GLUCOSE BLD-MCNC: 155 MG/DL (ref 70–99)
GLUCOSE BLD-MCNC: 156 MG/DL (ref 70–99)
GLUCOSE BLD-MCNC: 157 MG/DL (ref 70–99)
GLUCOSE BLD-MCNC: 158 MG/DL (ref 70–99)
GLUCOSE BLD-MCNC: 160 MG/DL (ref 70–99)
GLUCOSE BLD-MCNC: 162 MG/DL (ref 70–99)
GLUCOSE BLD-MCNC: 164 MG/DL (ref 70–99)
GLUCOSE BLD-MCNC: 165 MG/DL (ref 70–99)
GLUCOSE BLD-MCNC: 167 MG/DL (ref 70–99)
GLUCOSE BLD-MCNC: 167 MG/DL (ref 70–99)
GLUCOSE BLD-MCNC: 170 MG/DL (ref 70–99)
GLUCOSE BLD-MCNC: 170 MG/DL (ref 70–99)
GLUCOSE BLD-MCNC: 172 MG/DL (ref 70–99)
GLUCOSE BLD-MCNC: 173 MG/DL (ref 70–99)
GLUCOSE BLD-MCNC: 177 MG/DL (ref 70–99)
GLUCOSE BLD-MCNC: 180 MG/DL (ref 70–99)
GLUCOSE BLD-MCNC: 180 MG/DL (ref 70–99)
GLUCOSE BLD-MCNC: 181 MG/DL (ref 70–99)
GLUCOSE BLD-MCNC: 185 MG/DL (ref 70–99)
GLUCOSE BLD-MCNC: 185 MG/DL (ref 70–99)
GLUCOSE BLD-MCNC: 189 MG/DL (ref 70–99)
GLUCOSE BLD-MCNC: 190 MG/DL (ref 70–99)
GLUCOSE BLD-MCNC: 190 MG/DL (ref 70–99)
GLUCOSE BLD-MCNC: 192 MG/DL (ref 70–99)
GLUCOSE BLD-MCNC: 194 MG/DL (ref 70–99)
GLUCOSE BLD-MCNC: 194 MG/DL (ref 70–99)
GLUCOSE BLD-MCNC: 196 MG/DL (ref 70–99)
GLUCOSE BLD-MCNC: 197 MG/DL (ref 70–99)
GLUCOSE BLD-MCNC: 199 MG/DL (ref 70–99)
GLUCOSE BLD-MCNC: 200 MG/DL (ref 70–99)
GLUCOSE BLD-MCNC: 201 MG/DL (ref 70–99)
GLUCOSE BLD-MCNC: 202 MG/DL (ref 70–99)
GLUCOSE BLD-MCNC: 206 MG/DL (ref 70–99)
GLUCOSE BLD-MCNC: 209 MG/DL (ref 70–99)
GLUCOSE BLD-MCNC: 209 MG/DL (ref 70–99)
GLUCOSE BLD-MCNC: 210 MG/DL (ref 70–99)
GLUCOSE BLD-MCNC: 211 MG/DL (ref 70–99)
GLUCOSE BLD-MCNC: 211 MG/DL (ref 70–99)
GLUCOSE BLD-MCNC: 216 MG/DL (ref 70–99)
GLUCOSE BLD-MCNC: 217 MG/DL (ref 70–99)
GLUCOSE BLD-MCNC: 218 MG/DL (ref 70–99)
GLUCOSE BLD-MCNC: 219 MG/DL (ref 70–99)
GLUCOSE BLD-MCNC: 220 MG/DL (ref 70–99)
GLUCOSE BLD-MCNC: 220 MG/DL (ref 70–99)
GLUCOSE BLD-MCNC: 224 MG/DL (ref 70–99)
GLUCOSE BLD-MCNC: 236 MG/DL (ref 70–99)
GLUCOSE BLD-MCNC: 238 MG/DL (ref 70–99)
GLUCOSE BLD-MCNC: 239 MG/DL (ref 70–99)
GLUCOSE BLD-MCNC: 239 MG/DL (ref 70–99)
GLUCOSE BLD-MCNC: 241 MG/DL (ref 70–99)
GLUCOSE BLD-MCNC: 246 MG/DL (ref 70–99)
GLUCOSE BLD-MCNC: 248 MG/DL (ref 70–99)
GLUCOSE BLD-MCNC: 248 MG/DL (ref 70–99)
GLUCOSE BLD-MCNC: 249 MG/DL (ref 70–99)
GLUCOSE BLD-MCNC: 251 MG/DL (ref 70–99)
GLUCOSE BLD-MCNC: 260 MG/DL (ref 70–99)
GLUCOSE BLD-MCNC: 266 MG/DL (ref 70–99)
GLUCOSE BLD-MCNC: 268 MG/DL (ref 70–99)
GLUCOSE BLD-MCNC: 268 MG/DL (ref 70–99)
GLUCOSE BLD-MCNC: 269 MG/DL (ref 70–99)
GLUCOSE BLD-MCNC: 270 MG/DL (ref 70–99)
GLUCOSE BLD-MCNC: 274 MG/DL (ref 70–99)
GLUCOSE BLD-MCNC: 277 MG/DL (ref 70–99)
GLUCOSE BLD-MCNC: 281 MG/DL (ref 70–99)
GLUCOSE BLD-MCNC: 283 MG/DL (ref 70–99)
GLUCOSE BLD-MCNC: 284 MG/DL (ref 70–99)
GLUCOSE BLD-MCNC: 285 MG/DL (ref 70–99)
GLUCOSE BLD-MCNC: 286 MG/DL (ref 70–99)
GLUCOSE BLD-MCNC: 293 MG/DL (ref 70–99)
GLUCOSE BLD-MCNC: 295 MG/DL (ref 70–99)
GLUCOSE BLD-MCNC: 300 MG/DL (ref 70–99)
GLUCOSE BLD-MCNC: 301 MG/DL (ref 70–99)
GLUCOSE BLD-MCNC: 307 MG/DL (ref 70–99)
GLUCOSE BLD-MCNC: 309 MG/DL (ref 70–99)
GLUCOSE BLD-MCNC: 311 MG/DL (ref 70–99)
GLUCOSE BLD-MCNC: 313 MG/DL (ref 70–99)
GLUCOSE BLD-MCNC: 333 MG/DL (ref 70–99)
GLUCOSE BLD-MCNC: 334 MG/DL (ref 70–99)
GLUCOSE BLD-MCNC: 341 MG/DL (ref 70–99)
GLUCOSE BLD-MCNC: 345 MG/DL (ref 70–99)
GLUCOSE BLD-MCNC: 351 MG/DL (ref 70–99)
GLUCOSE BLD-MCNC: 352 MG/DL (ref 70–99)
GLUCOSE BLD-MCNC: 358 MG/DL (ref 70–99)
GLUCOSE BLD-MCNC: 359 MG/DL (ref 70–99)
GLUCOSE BLD-MCNC: 359 MG/DL (ref 70–99)
GLUCOSE BLD-MCNC: 378 MG/DL (ref 70–99)
GLUCOSE BLD-MCNC: 379 MG/DL (ref 70–99)
GLUCOSE BLD-MCNC: 380 MG/DL (ref 70–99)
GLUCOSE BLD-MCNC: 424 MG/DL (ref 70–99)
GLUCOSE BLD-MCNC: 465 MG/DL (ref 70–99)
GLUCOSE BLD-MCNC: 487 MG/DL (ref 70–99)
GLUCOSE BLD-MCNC: 519 MG/DL (ref 70–99)
GLUCOSE BLD-MCNC: 564 MG/DL (ref 70–99)
GLUCOSE BLD-MCNC: 84 MG/DL (ref 70–99)
GLUCOSE BLD-MCNC: 93 MG/DL (ref 70–99)
GLUCOSE BLD-MCNC: 96 MG/DL (ref 70–99)
GONADOTROPIN, CHORIONIC (HCG) QUANT: <5 MIU/ML
GRAM STAIN RESULT: ABNORMAL
GRAM STAIN RESULT: NORMAL
HBA1C MFR BLD: 5.5 %
HBA1C MFR BLD: 8.1 %
HBV SURFACE AB TITR SER: 30.08 MIU/ML
HCG QUALITATIVE: NEGATIVE
HCG QUALITATIVE: NEGATIVE
HCO3 ARTERIAL: 17.5 MMOL/L (ref 21–29)
HCO3 ARTERIAL: 23 MMOL/L (ref 21–29)
HCO3 VENOUS: 27.1 MMOL/L (ref 24–28)
HCT VFR BLD CALC: 21 % (ref 36–48)
HCT VFR BLD CALC: 21.8 % (ref 36–48)
HCT VFR BLD CALC: 23.6 % (ref 36–48)
HCT VFR BLD CALC: 24.6 % (ref 36–48)
HCT VFR BLD CALC: 24.7 % (ref 36–48)
HCT VFR BLD CALC: 25.4 % (ref 36–48)
HCT VFR BLD CALC: 25.5 % (ref 36–48)
HCT VFR BLD CALC: 25.5 % (ref 36–48)
HCT VFR BLD CALC: 26 % (ref 36–48)
HCT VFR BLD CALC: 26.5 % (ref 36–48)
HCT VFR BLD CALC: 26.5 % (ref 36–48)
HCT VFR BLD CALC: 26.9 % (ref 36–48)
HCT VFR BLD CALC: 27 % (ref 36–48)
HCT VFR BLD CALC: 27.6 % (ref 36–48)
HCT VFR BLD CALC: 27.9 % (ref 36–48)
HCT VFR BLD CALC: 28.3 % (ref 36–48)
HCT VFR BLD CALC: 28.4 % (ref 36–48)
HCT VFR BLD CALC: 28.5 % (ref 36–48)
HCT VFR BLD CALC: 28.9 % (ref 36–48)
HCT VFR BLD CALC: 29.4 % (ref 36–48)
HCT VFR BLD CALC: 30 % (ref 36–48)
HCT VFR BLD CALC: 30 % (ref 36–48)
HCT VFR BLD CALC: 30.3 % (ref 36–48)
HCT VFR BLD CALC: 30.4 % (ref 36–48)
HCT VFR BLD CALC: 30.9 % (ref 36–48)
HCT VFR BLD CALC: 31.2 % (ref 36–48)
HCT VFR BLD CALC: 31.3 % (ref 36–48)
HCT VFR BLD CALC: 31.7 % (ref 36–48)
HCT VFR BLD CALC: 31.9 % (ref 36–48)
HCT VFR BLD CALC: 32.3 % (ref 36–48)
HCT VFR BLD CALC: 33 % (ref 36–48)
HCT VFR BLD CALC: 33.5 % (ref 36–48)
HCT VFR BLD CALC: 34.6 % (ref 36–48)
HCT VFR BLD CALC: 36 % (ref 36–48)
HCT VFR BLD CALC: 38.3 % (ref 36–48)
HCT VFR BLD CALC: 38.4 % (ref 36–48)
HEMATOLOGY PATH CONSULT: NO
HEMATOLOGY PATH CONSULT: NORMAL
HEMATOLOGY PATH CONSULT: YES
HEMOGLOBIN, ART, EXTENDED: 6.7 G/DL (ref 12–16)
HEMOGLOBIN, VEN, REDUCED: 26.7 %
HEMOGLOBIN: 10.1 G/DL (ref 12–16)
HEMOGLOBIN: 10.1 G/DL (ref 12–16)
HEMOGLOBIN: 10.2 G/DL (ref 12–16)
HEMOGLOBIN: 10.2 G/DL (ref 12–16)
HEMOGLOBIN: 10.3 G/DL (ref 12–16)
HEMOGLOBIN: 10.3 G/DL (ref 12–16)
HEMOGLOBIN: 10.6 G/DL (ref 12–16)
HEMOGLOBIN: 10.6 G/DL (ref 12–16)
HEMOGLOBIN: 11 G/DL (ref 12–16)
HEMOGLOBIN: 11.1 G/DL (ref 12–16)
HEMOGLOBIN: 11.8 G/DL (ref 12–16)
HEMOGLOBIN: 12.2 G/DL (ref 12–16)
HEMOGLOBIN: 12.2 G/DL (ref 12–16)
HEMOGLOBIN: 12.5 G/DL (ref 12–16)
HEMOGLOBIN: 6.6 G/DL (ref 12–16)
HEMOGLOBIN: 7 G/DL (ref 12–16)
HEMOGLOBIN: 7.1 GM/DL (ref 12–16)
HEMOGLOBIN: 7.7 G/DL (ref 12–16)
HEMOGLOBIN: 8.1 G/DL (ref 12–16)
HEMOGLOBIN: 8.4 G/DL (ref 12–16)
HEMOGLOBIN: 8.4 G/DL (ref 12–16)
HEMOGLOBIN: 8.5 G/DL (ref 12–16)
HEMOGLOBIN: 8.6 G/DL (ref 12–16)
HEMOGLOBIN: 8.7 G/DL (ref 12–16)
HEMOGLOBIN: 8.9 G/DL (ref 12–16)
HEMOGLOBIN: 8.9 G/DL (ref 12–16)
HEMOGLOBIN: 9.1 G/DL (ref 12–16)
HEMOGLOBIN: 9.1 G/DL (ref 12–16)
HEMOGLOBIN: 9.2 G/DL (ref 12–16)
HEMOGLOBIN: 9.3 G/DL (ref 12–16)
HEMOGLOBIN: 9.5 G/DL (ref 12–16)
HEMOGLOBIN: 9.6 G/DL (ref 12–16)
HEMOGLOBIN: 9.7 G/DL (ref 12–16)
HEMOGLOBIN: 9.9 G/DL (ref 12–16)
HEPATITIS B SURFACE ANTIGEN INTERPRETATION: NORMAL
HOWELL-JOLLY BODIES: ABNORMAL
HYPOCHROMIA: ABNORMAL
HYPOCHROMIA: ABNORMAL
INR BLD: 1.03 (ref 0.88–1.12)
INR BLD: 1.03 (ref 0.88–1.12)
INR BLD: 1.05 (ref 0.88–1.12)
INR BLD: 1.06 (ref 0.88–1.12)
INR BLD: 1.09 (ref 0.88–1.12)
INR BLD: 1.24 (ref 0.88–1.12)
INR BLD: 1.33 (ref 0.88–1.12)
INR BLD: 1.34 (ref 0.88–1.12)
INR BLD: 1.35 (ref 0.88–1.12)
INR BLD: 1.55 (ref 0.88–1.12)
INR BLD: 2.3 (ref 0.88–1.12)
INR BLD: 2.34 (ref 0.88–1.12)
INR BLD: 2.5 (ref 0.88–1.12)
INR BLD: 2.52 (ref 0.88–1.12)
INR BLD: 2.68 (ref 0.88–1.12)
INR BLD: 2.94 (ref 0.88–1.12)
INR BLD: 3.23 (ref 0.88–1.12)
INR BLD: 3.79 (ref 0.88–1.12)
INR BLD: 5.98 (ref 0.88–1.12)
INR BLD: 6.39 (ref 0.88–1.12)
LACTATE: 15.1 MMOL/L (ref 0.4–2)
LACTIC ACID, SEPSIS: 1.8 MMOL/L (ref 0.4–1.9)
LACTIC ACID, SEPSIS: 2.2 MMOL/L (ref 0.4–1.9)
LACTIC ACID: 2 MMOL/L (ref 0.4–2)
LACTIC ACID: 20.4 MMOL/L (ref 0.4–2)
LACTIC ACID: 4.1 MMOL/L (ref 0.4–2)
LIPASE: 16 U/L (ref 13–60)
LIPASE: 21 U/L (ref 13–60)
LIPASE: 55 U/L (ref 13–60)
LV EF: 60 %
LVEF MODALITY: NORMAL
LYMPHOCYTES ABSOLUTE: 0.5 K/UL (ref 1–5.1)
LYMPHOCYTES ABSOLUTE: 0.6 K/UL (ref 1–5.1)
LYMPHOCYTES ABSOLUTE: 0.7 K/UL (ref 1–5.1)
LYMPHOCYTES ABSOLUTE: 0.8 K/UL (ref 1–5.1)
LYMPHOCYTES ABSOLUTE: 0.9 K/UL (ref 1–5.1)
LYMPHOCYTES ABSOLUTE: 1 K/UL (ref 1–5.1)
LYMPHOCYTES ABSOLUTE: 1.1 K/UL (ref 1–5.1)
LYMPHOCYTES ABSOLUTE: 1.1 K/UL (ref 1–5.1)
LYMPHOCYTES ABSOLUTE: 1.2 K/UL (ref 1–5.1)
LYMPHOCYTES ABSOLUTE: 1.3 K/UL (ref 1–5.1)
LYMPHOCYTES ABSOLUTE: 1.6 K/UL (ref 1–5.1)
LYMPHOCYTES ABSOLUTE: 10.4 K/UL (ref 1–5.1)
LYMPHOCYTES RELATIVE PERCENT: 10.3 %
LYMPHOCYTES RELATIVE PERCENT: 10.7 %
LYMPHOCYTES RELATIVE PERCENT: 10.8 %
LYMPHOCYTES RELATIVE PERCENT: 11.1 %
LYMPHOCYTES RELATIVE PERCENT: 11.3 %
LYMPHOCYTES RELATIVE PERCENT: 11.4 %
LYMPHOCYTES RELATIVE PERCENT: 12 %
LYMPHOCYTES RELATIVE PERCENT: 12.4 %
LYMPHOCYTES RELATIVE PERCENT: 12.6 %
LYMPHOCYTES RELATIVE PERCENT: 12.6 %
LYMPHOCYTES RELATIVE PERCENT: 13.6 %
LYMPHOCYTES RELATIVE PERCENT: 14 %
LYMPHOCYTES RELATIVE PERCENT: 14.7 %
LYMPHOCYTES RELATIVE PERCENT: 15.2 %
LYMPHOCYTES RELATIVE PERCENT: 15.3 %
LYMPHOCYTES RELATIVE PERCENT: 16 %
LYMPHOCYTES RELATIVE PERCENT: 16.5 %
LYMPHOCYTES RELATIVE PERCENT: 17 %
LYMPHOCYTES RELATIVE PERCENT: 17.4 %
LYMPHOCYTES RELATIVE PERCENT: 18 %
LYMPHOCYTES RELATIVE PERCENT: 20.4 %
LYMPHOCYTES RELATIVE PERCENT: 3.7 %
LYMPHOCYTES RELATIVE PERCENT: 49 %
LYMPHOCYTES RELATIVE PERCENT: 5.4 %
LYMPHOCYTES RELATIVE PERCENT: 7 %
LYMPHOCYTES RELATIVE PERCENT: 7.1 %
LYMPHOCYTES RELATIVE PERCENT: 7.1 %
LYMPHOCYTES RELATIVE PERCENT: 8 %
LYMPHOCYTES RELATIVE PERCENT: 8.5 %
LYMPHOCYTES RELATIVE PERCENT: 8.9 %
LYMPHOCYTES RELATIVE PERCENT: 9.9 %
LYMPHOCYTES, BODY FLUID: 16 %
LYMPHOCYTES, BODY FLUID: 25 %
LYMPHOCYTES, BODY FLUID: 32 %
LYMPHOCYTES, BODY FLUID: 4 %
LYMPHOCYTES, BODY FLUID: 42 %
LYMPHOCYTES, BODY FLUID: 48 %
LYMPHOCYTES, BODY FLUID: 5 %
LYMPHOCYTES, BODY FLUID: 55 %
LYMPHOCYTES, BODY FLUID: 61 %
LYMPHOCYTES, BODY FLUID: 7 %
LYMPHOCYTES, BODY FLUID: 71 %
LYMPHOCYTES, BODY FLUID: 8 %
MACROCYTES: ABNORMAL
MACROPHAGE FLUID: 0 %
MACROPHAGE FLUID: 2 %
MACROPHAGE FLUID: 22 %
MACROPHAGE FLUID: 3 %
MACROPHAGE FLUID: 4 %
MACROPHAGE FLUID: 5 %
MACROPHAGE FLUID: 7 %
MACROPHAGE FLUID: 8 %
MACROPHAGE FLUID: 9 %
MAGNESIUM: 1.1 MG/DL (ref 1.8–2.4)
MAGNESIUM: 1.1 MG/DL (ref 1.8–2.4)
MAGNESIUM: 1.2 MG/DL (ref 1.8–2.4)
MAGNESIUM: 1.3 MG/DL (ref 1.8–2.4)
MAGNESIUM: 1.5 MG/DL (ref 1.8–2.4)
MAGNESIUM: 1.5 MG/DL (ref 1.8–2.4)
MAGNESIUM: 1.6 MG/DL (ref 1.8–2.4)
MAGNESIUM: 1.6 MG/DL (ref 1.8–2.4)
MAGNESIUM: 1.7 MG/DL (ref 1.8–2.4)
MAGNESIUM: 1.7 MG/DL (ref 1.8–2.4)
MAGNESIUM: 1.8 MG/DL (ref 1.8–2.4)
MAGNESIUM: 1.9 MG/DL (ref 1.8–2.4)
MAGNESIUM: 1.9 MG/DL (ref 1.8–2.4)
MAGNESIUM: 2 MG/DL (ref 1.8–2.4)
MCH RBC QN AUTO: 30.4 PG (ref 26–34)
MCH RBC QN AUTO: 30.5 PG (ref 26–34)
MCH RBC QN AUTO: 30.6 PG (ref 26–34)
MCH RBC QN AUTO: 30.7 PG (ref 26–34)
MCH RBC QN AUTO: 30.7 PG (ref 26–34)
MCH RBC QN AUTO: 30.8 PG (ref 26–34)
MCH RBC QN AUTO: 30.9 PG (ref 26–34)
MCH RBC QN AUTO: 31 PG (ref 26–34)
MCH RBC QN AUTO: 31 PG (ref 26–34)
MCH RBC QN AUTO: 31.1 PG (ref 26–34)
MCH RBC QN AUTO: 31.2 PG (ref 26–34)
MCH RBC QN AUTO: 31.3 PG (ref 26–34)
MCH RBC QN AUTO: 31.4 PG (ref 26–34)
MCH RBC QN AUTO: 31.5 PG (ref 26–34)
MCH RBC QN AUTO: 31.6 PG (ref 26–34)
MCH RBC QN AUTO: 31.7 PG (ref 26–34)
MCH RBC QN AUTO: 31.8 PG (ref 26–34)
MCH RBC QN AUTO: 31.9 PG (ref 26–34)
MCHC RBC AUTO-ENTMCNC: 30.3 G/DL (ref 31–36)
MCHC RBC AUTO-ENTMCNC: 31.6 G/DL (ref 31–36)
MCHC RBC AUTO-ENTMCNC: 31.9 G/DL (ref 31–36)
MCHC RBC AUTO-ENTMCNC: 32.4 G/DL (ref 31–36)
MCHC RBC AUTO-ENTMCNC: 32.4 G/DL (ref 31–36)
MCHC RBC AUTO-ENTMCNC: 32.6 G/DL (ref 31–36)
MCHC RBC AUTO-ENTMCNC: 32.7 G/DL (ref 31–36)
MCHC RBC AUTO-ENTMCNC: 32.8 G/DL (ref 31–36)
MCHC RBC AUTO-ENTMCNC: 32.9 G/DL (ref 31–36)
MCHC RBC AUTO-ENTMCNC: 33 G/DL (ref 31–36)
MCHC RBC AUTO-ENTMCNC: 33.1 G/DL (ref 31–36)
MCHC RBC AUTO-ENTMCNC: 33.2 G/DL (ref 31–36)
MCHC RBC AUTO-ENTMCNC: 33.4 G/DL (ref 31–36)
MCHC RBC AUTO-ENTMCNC: 33.5 G/DL (ref 31–36)
MCHC RBC AUTO-ENTMCNC: 33.5 G/DL (ref 31–36)
MCHC RBC AUTO-ENTMCNC: 33.6 G/DL (ref 31–36)
MCHC RBC AUTO-ENTMCNC: 33.6 G/DL (ref 31–36)
MCHC RBC AUTO-ENTMCNC: 33.7 G/DL (ref 31–36)
MCHC RBC AUTO-ENTMCNC: 33.7 G/DL (ref 31–36)
MCHC RBC AUTO-ENTMCNC: 33.9 G/DL (ref 31–36)
MCHC RBC AUTO-ENTMCNC: 34 G/DL (ref 31–36)
MCV RBC AUTO: 100.7 FL (ref 80–100)
MCV RBC AUTO: 90.3 FL (ref 80–100)
MCV RBC AUTO: 90.7 FL (ref 80–100)
MCV RBC AUTO: 91.8 FL (ref 80–100)
MCV RBC AUTO: 91.9 FL (ref 80–100)
MCV RBC AUTO: 92 FL (ref 80–100)
MCV RBC AUTO: 92.2 FL (ref 80–100)
MCV RBC AUTO: 92.4 FL (ref 80–100)
MCV RBC AUTO: 92.5 FL (ref 80–100)
MCV RBC AUTO: 92.7 FL (ref 80–100)
MCV RBC AUTO: 92.8 FL (ref 80–100)
MCV RBC AUTO: 92.8 FL (ref 80–100)
MCV RBC AUTO: 92.9 FL (ref 80–100)
MCV RBC AUTO: 93.1 FL (ref 80–100)
MCV RBC AUTO: 93.3 FL (ref 80–100)
MCV RBC AUTO: 93.8 FL (ref 80–100)
MCV RBC AUTO: 94 FL (ref 80–100)
MCV RBC AUTO: 94.1 FL (ref 80–100)
MCV RBC AUTO: 94.1 FL (ref 80–100)
MCV RBC AUTO: 94.2 FL (ref 80–100)
MCV RBC AUTO: 94.3 FL (ref 80–100)
MCV RBC AUTO: 94.6 FL (ref 80–100)
MCV RBC AUTO: 94.6 FL (ref 80–100)
MCV RBC AUTO: 95 FL (ref 80–100)
MCV RBC AUTO: 95.2 FL (ref 80–100)
MCV RBC AUTO: 95.3 FL (ref 80–100)
MCV RBC AUTO: 95.3 FL (ref 80–100)
MCV RBC AUTO: 95.8 FL (ref 80–100)
MCV RBC AUTO: 95.8 FL (ref 80–100)
MCV RBC AUTO: 96.3 FL (ref 80–100)
MCV RBC AUTO: 96.4 FL (ref 80–100)
MCV RBC AUTO: 96.6 FL (ref 80–100)
MCV RBC AUTO: 96.7 FL (ref 80–100)
MCV RBC AUTO: 97.1 FL (ref 80–100)
MESOTHELIAL FLUID: 1 %
MESOTHELIAL FLUID: 16 %
MESOTHELIAL FLUID: 3 %
MESOTHELIAL FLUID: 5 %
MESOTHELIAL FLUID: 5 %
METAMYELOCYTES RELATIVE PERCENT: 1 %
METAMYELOCYTES RELATIVE PERCENT: 2 %
METAMYELOCYTES RELATIVE PERCENT: 3 %
METHEMOGLOBIN ARTERIAL: 0.5 %
METHEMOGLOBIN VENOUS: 0.3 % (ref 0–1.5)
MICROCYTES: ABNORMAL
MONOCYTE, FLUID: 1 %
MONOCYTE, FLUID: 15 %
MONOCYTE, FLUID: 18 %
MONOCYTE, FLUID: 2 %
MONOCYTE, FLUID: 25 %
MONOCYTE, FLUID: 38 %
MONOCYTE, FLUID: 4 %
MONOCYTE, FLUID: 5 %
MONOCYTES ABSOLUTE: 0.2 K/UL (ref 0–1.3)
MONOCYTES ABSOLUTE: 0.2 K/UL (ref 0–1.3)
MONOCYTES ABSOLUTE: 0.3 K/UL (ref 0–1.3)
MONOCYTES ABSOLUTE: 0.4 K/UL (ref 0–1.3)
MONOCYTES ABSOLUTE: 0.5 K/UL (ref 0–1.3)
MONOCYTES ABSOLUTE: 0.6 K/UL (ref 0–1.3)
MONOCYTES ABSOLUTE: 0.7 K/UL (ref 0–1.3)
MONOCYTES ABSOLUTE: 1 K/UL (ref 0–1.3)
MONOCYTES RELATIVE PERCENT: 15 %
MONOCYTES RELATIVE PERCENT: 2 %
MONOCYTES RELATIVE PERCENT: 3 %
MONOCYTES RELATIVE PERCENT: 3 %
MONOCYTES RELATIVE PERCENT: 3.6 %
MONOCYTES RELATIVE PERCENT: 4 %
MONOCYTES RELATIVE PERCENT: 4 %
MONOCYTES RELATIVE PERCENT: 4.3 %
MONOCYTES RELATIVE PERCENT: 4.8 %
MONOCYTES RELATIVE PERCENT: 5 %
MONOCYTES RELATIVE PERCENT: 5.1 %
MONOCYTES RELATIVE PERCENT: 5.2 %
MONOCYTES RELATIVE PERCENT: 5.2 %
MONOCYTES RELATIVE PERCENT: 5.7 %
MONOCYTES RELATIVE PERCENT: 6 %
MONOCYTES RELATIVE PERCENT: 6.3 %
MONOCYTES RELATIVE PERCENT: 6.3 %
MONOCYTES RELATIVE PERCENT: 6.5 %
MONOCYTES RELATIVE PERCENT: 6.6 %
MONOCYTES RELATIVE PERCENT: 6.9 %
MONOCYTES RELATIVE PERCENT: 7.3 %
MONOCYTES RELATIVE PERCENT: 7.3 %
MONOCYTES RELATIVE PERCENT: 7.4 %
MONOCYTES RELATIVE PERCENT: 7.6 %
MONOCYTES RELATIVE PERCENT: 7.7 %
MONOCYTES RELATIVE PERCENT: 7.9 %
MONOCYTES RELATIVE PERCENT: 9.2 %
MONOCYTES RELATIVE PERCENT: 9.2 %
MYELOCYTE PERCENT: 1 %
MYELOCYTE PERCENT: 1 %
MYELOCYTE PERCENT: 2 %
MYELOCYTE PERCENT: 3 %
MYELOCYTE PERCENT: 5 %
NEUTROPHIL, FLUID: 15 %
NEUTROPHIL, FLUID: 22 %
NEUTROPHIL, FLUID: 27 %
NEUTROPHIL, FLUID: 33 %
NEUTROPHIL, FLUID: 46 %
NEUTROPHIL, FLUID: 48 %
NEUTROPHIL, FLUID: 54 %
NEUTROPHIL, FLUID: 6 %
NEUTROPHIL, FLUID: 73 %
NEUTROPHIL, FLUID: 76 %
NEUTROPHIL, FLUID: 85 %
NEUTROPHIL, FLUID: 86 %
NEUTROPHIL, FLUID: 89 %
NEUTROPHILS ABSOLUTE: 10.4 K/UL (ref 1.7–7.7)
NEUTROPHILS ABSOLUTE: 11.4 K/UL (ref 1.7–7.7)
NEUTROPHILS ABSOLUTE: 11.8 K/UL (ref 1.7–7.7)
NEUTROPHILS ABSOLUTE: 12.4 K/UL (ref 1.7–7.7)
NEUTROPHILS ABSOLUTE: 12.6 K/UL (ref 1.7–7.7)
NEUTROPHILS ABSOLUTE: 2.8 K/UL (ref 1.7–7.7)
NEUTROPHILS ABSOLUTE: 3.9 K/UL (ref 1.7–7.7)
NEUTROPHILS ABSOLUTE: 3.9 K/UL (ref 1.7–7.7)
NEUTROPHILS ABSOLUTE: 4.2 K/UL (ref 1.7–7.7)
NEUTROPHILS ABSOLUTE: 4.3 K/UL (ref 1.7–7.7)
NEUTROPHILS ABSOLUTE: 4.4 K/UL (ref 1.7–7.7)
NEUTROPHILS ABSOLUTE: 4.5 K/UL (ref 1.7–7.7)
NEUTROPHILS ABSOLUTE: 4.6 K/UL (ref 1.7–7.7)
NEUTROPHILS ABSOLUTE: 4.8 K/UL (ref 1.7–7.7)
NEUTROPHILS ABSOLUTE: 4.9 K/UL (ref 1.7–7.7)
NEUTROPHILS ABSOLUTE: 5 K/UL (ref 1.7–7.7)
NEUTROPHILS ABSOLUTE: 5.3 K/UL (ref 1.7–7.7)
NEUTROPHILS ABSOLUTE: 5.4 K/UL (ref 1.7–7.7)
NEUTROPHILS ABSOLUTE: 5.4 K/UL (ref 1.7–7.7)
NEUTROPHILS ABSOLUTE: 5.5 K/UL (ref 1.7–7.7)
NEUTROPHILS ABSOLUTE: 5.6 K/UL (ref 1.7–7.7)
NEUTROPHILS ABSOLUTE: 5.8 K/UL (ref 1.7–7.7)
NEUTROPHILS ABSOLUTE: 5.9 K/UL (ref 1.7–7.7)
NEUTROPHILS ABSOLUTE: 6.2 K/UL (ref 1.7–7.7)
NEUTROPHILS ABSOLUTE: 6.5 K/UL (ref 1.7–7.7)
NEUTROPHILS ABSOLUTE: 6.6 K/UL (ref 1.7–7.7)
NEUTROPHILS ABSOLUTE: 6.7 K/UL (ref 1.7–7.7)
NEUTROPHILS ABSOLUTE: 7.1 K/UL (ref 1.7–7.7)
NEUTROPHILS ABSOLUTE: 7.7 K/UL (ref 1.7–7.7)
NEUTROPHILS ABSOLUTE: 7.8 K/UL (ref 1.7–7.7)
NEUTROPHILS ABSOLUTE: 7.9 K/UL (ref 1.7–7.7)
NEUTROPHILS ABSOLUTE: 8.3 K/UL (ref 1.7–7.7)
NEUTROPHILS ABSOLUTE: 9.7 K/UL (ref 1.7–7.7)
NEUTROPHILS RELATIVE PERCENT: 40 %
NEUTROPHILS RELATIVE PERCENT: 58 %
NEUTROPHILS RELATIVE PERCENT: 67.4 %
NEUTROPHILS RELATIVE PERCENT: 68 %
NEUTROPHILS RELATIVE PERCENT: 68 %
NEUTROPHILS RELATIVE PERCENT: 68.5 %
NEUTROPHILS RELATIVE PERCENT: 72 %
NEUTROPHILS RELATIVE PERCENT: 74.2 %
NEUTROPHILS RELATIVE PERCENT: 74.2 %
NEUTROPHILS RELATIVE PERCENT: 75 %
NEUTROPHILS RELATIVE PERCENT: 75.1 %
NEUTROPHILS RELATIVE PERCENT: 75.1 %
NEUTROPHILS RELATIVE PERCENT: 75.5 %
NEUTROPHILS RELATIVE PERCENT: 76 %
NEUTROPHILS RELATIVE PERCENT: 76 %
NEUTROPHILS RELATIVE PERCENT: 76.6 %
NEUTROPHILS RELATIVE PERCENT: 76.7 %
NEUTROPHILS RELATIVE PERCENT: 77.8 %
NEUTROPHILS RELATIVE PERCENT: 78.9 %
NEUTROPHILS RELATIVE PERCENT: 79 %
NEUTROPHILS RELATIVE PERCENT: 80 %
NEUTROPHILS RELATIVE PERCENT: 80.3 %
NEUTROPHILS RELATIVE PERCENT: 80.7 %
NEUTROPHILS RELATIVE PERCENT: 81 %
NEUTROPHILS RELATIVE PERCENT: 81.6 %
NEUTROPHILS RELATIVE PERCENT: 82.8 %
NEUTROPHILS RELATIVE PERCENT: 83.8 %
NEUTROPHILS RELATIVE PERCENT: 86.6 %
NEUTROPHILS RELATIVE PERCENT: 86.6 %
NEUTROPHILS RELATIVE PERCENT: 87.5 %
NEUTROPHILS RELATIVE PERCENT: 89.2 %
NEUTROPHILS RELATIVE PERCENT: 90.8 %
NEUTROPHILS RELATIVE PERCENT: 90.9 %
NUCLEATED CELLS FLUID: 124 /CUMM
NUCLEATED CELLS FLUID: 139 /CUMM
NUCLEATED CELLS FLUID: 153 /CUMM
NUCLEATED CELLS FLUID: 1765 /CUMM
NUCLEATED CELLS FLUID: 18 /CUMM
NUCLEATED CELLS FLUID: 2161 /CUMM
NUCLEATED CELLS FLUID: 32 /CUMM
NUCLEATED CELLS FLUID: 40 /CUMM
NUCLEATED CELLS FLUID: 485 /CUMM
NUCLEATED CELLS FLUID: 694 /CUMM
NUCLEATED CELLS FLUID: 7 /CUMM
NUCLEATED CELLS FLUID: 97 /CUMM
NUCLEATED CELLS FLUID: >50 /CUMM
NUCLEATED CELLS FLUID: NORMAL /CUMM
NUCLEATED RED BLOOD CELLS: 6 /100 WBC
NUMBER OF CELLS COUNTED FLUID: 100
NUMBER OF CELLS COUNTED FLUID: 200
O2 SAT, ARTERIAL: 99 % (ref 93–100)
O2 SAT, ARTERIAL: >100 %
O2 SAT, VEN: 73 %
O2 THERAPY: ABNORMAL
ORGANISM: ABNORMAL
OVALOCYTES: ABNORMAL
PAPPENHEIMER BODIES: ABNORMAL
PCO2 ARTERIAL: 68.8 MM HG (ref 35–45)
PCO2 ARTERIAL: 73.1 MMHG (ref 35–45)
PCO2, VEN: 40 MMHG (ref 41–51)
PDW BLD-RTO: 14.8 % (ref 12.4–15.4)
PDW BLD-RTO: 15.2 % (ref 12.4–15.4)
PDW BLD-RTO: 15.2 % (ref 12.4–15.4)
PDW BLD-RTO: 15.3 % (ref 12.4–15.4)
PDW BLD-RTO: 15.6 % (ref 12.4–15.4)
PDW BLD-RTO: 15.8 % (ref 12.4–15.4)
PDW BLD-RTO: 15.9 % (ref 12.4–15.4)
PDW BLD-RTO: 15.9 % (ref 12.4–15.4)
PDW BLD-RTO: 16.1 % (ref 12.4–15.4)
PDW BLD-RTO: 16.2 % (ref 12.4–15.4)
PDW BLD-RTO: 16.3 % (ref 12.4–15.4)
PDW BLD-RTO: 16.4 % (ref 12.4–15.4)
PDW BLD-RTO: 16.4 % (ref 12.4–15.4)
PDW BLD-RTO: 16.5 % (ref 12.4–15.4)
PDW BLD-RTO: 16.6 % (ref 12.4–15.4)
PDW BLD-RTO: 16.6 % (ref 12.4–15.4)
PDW BLD-RTO: 16.7 % (ref 12.4–15.4)
PDW BLD-RTO: 16.8 % (ref 12.4–15.4)
PDW BLD-RTO: 16.9 % (ref 12.4–15.4)
PDW BLD-RTO: 17 % (ref 12.4–15.4)
PDW BLD-RTO: 17.1 % (ref 12.4–15.4)
PDW BLD-RTO: 17.2 % (ref 12.4–15.4)
PDW BLD-RTO: 17.3 % (ref 12.4–15.4)
PDW BLD-RTO: 17.3 % (ref 12.4–15.4)
PDW BLD-RTO: 17.4 % (ref 12.4–15.4)
PDW BLD-RTO: 18.6 % (ref 12.4–15.4)
PERFORMED ON: ABNORMAL
PERFORMED ON: NORMAL
PERFORMED ON: NORMAL
PH ARTERIAL: 7.01 (ref 7.35–7.45)
PH ARTERIAL: 7.11 (ref 7.35–7.45)
PH VENOUS: 7.44 (ref 7.35–7.45)
PHOSPHORUS: 4.5 MG/DL (ref 2.5–4.9)
PHOSPHORUS: 6 MG/DL (ref 2.5–4.9)
PHOSPHORUS: 6.4 MG/DL (ref 2.5–4.9)
PLATELET # BLD: 103 K/UL (ref 135–450)
PLATELET # BLD: 104 K/UL (ref 135–450)
PLATELET # BLD: 110 K/UL (ref 135–450)
PLATELET # BLD: 112 K/UL (ref 135–450)
PLATELET # BLD: 119 K/UL (ref 135–450)
PLATELET # BLD: 121 K/UL (ref 135–450)
PLATELET # BLD: 121 K/UL (ref 135–450)
PLATELET # BLD: 128 K/UL (ref 135–450)
PLATELET # BLD: 135 K/UL (ref 135–450)
PLATELET # BLD: 139 K/UL (ref 135–450)
PLATELET # BLD: 139 K/UL (ref 135–450)
PLATELET # BLD: 140 K/UL (ref 135–450)
PLATELET # BLD: 141 K/UL (ref 135–450)
PLATELET # BLD: 142 K/UL (ref 135–450)
PLATELET # BLD: 143 K/UL (ref 135–450)
PLATELET # BLD: 144 K/UL (ref 135–450)
PLATELET # BLD: 145 K/UL (ref 135–450)
PLATELET # BLD: 147 K/UL (ref 135–450)
PLATELET # BLD: 149 K/UL (ref 135–450)
PLATELET # BLD: 150 K/UL (ref 135–450)
PLATELET # BLD: 156 K/UL (ref 135–450)
PLATELET # BLD: 163 K/UL (ref 135–450)
PLATELET # BLD: 168 K/UL (ref 135–450)
PLATELET # BLD: 187 K/UL (ref 135–450)
PLATELET # BLD: 196 K/UL (ref 135–450)
PLATELET # BLD: 223 K/UL (ref 135–450)
PLATELET # BLD: 87 K/UL (ref 135–450)
PLATELET # BLD: 88 K/UL (ref 135–450)
PLATELET # BLD: 88 K/UL (ref 135–450)
PLATELET # BLD: 97 K/UL (ref 135–450)
PLATELET # BLD: 98 K/UL (ref 135–450)
PLATELET SLIDE REVIEW: ABNORMAL
PLATELET SLIDE REVIEW: ADEQUATE
PMV BLD AUTO: 10 FL (ref 5–10.5)
PMV BLD AUTO: 10.1 FL (ref 5–10.5)
PMV BLD AUTO: 10.2 FL (ref 5–10.5)
PMV BLD AUTO: 10.3 FL (ref 5–10.5)
PMV BLD AUTO: 10.4 FL (ref 5–10.5)
PMV BLD AUTO: 10.4 FL (ref 5–10.5)
PMV BLD AUTO: 10.5 FL (ref 5–10.5)
PMV BLD AUTO: 10.7 FL (ref 5–10.5)
PMV BLD AUTO: 10.8 FL (ref 5–10.5)
PMV BLD AUTO: 11.3 FL (ref 5–10.5)
PMV BLD AUTO: 12 FL (ref 5–10.5)
PMV BLD AUTO: 12.1 FL (ref 5–10.5)
PMV BLD AUTO: 9.2 FL (ref 5–10.5)
PMV BLD AUTO: 9.3 FL (ref 5–10.5)
PMV BLD AUTO: 9.5 FL (ref 5–10.5)
PMV BLD AUTO: 9.6 FL (ref 5–10.5)
PMV BLD AUTO: 9.6 FL (ref 5–10.5)
PMV BLD AUTO: 9.7 FL (ref 5–10.5)
PMV BLD AUTO: 9.8 FL (ref 5–10.5)
PMV BLD AUTO: 9.9 FL (ref 5–10.5)
PO2 ARTERIAL: 207 MMHG (ref 75–108)
PO2 ARTERIAL: 238.1 MM HG (ref 75–108)
PO2, VEN: 40.2 MMHG (ref 25–40)
POC HEMATOCRIT: 21 % (ref 36–48)
POC POTASSIUM: 7.4 MMOL/L (ref 3.5–5.1)
POC SAMPLE TYPE: ABNORMAL
POC SODIUM: 140 MMOL/L (ref 136–145)
POIKILOCYTES: ABNORMAL
POLYCHROMASIA: ABNORMAL
POTASSIUM REFLEX MAGNESIUM: 2.6 MMOL/L (ref 3.5–5.1)
POTASSIUM REFLEX MAGNESIUM: 2.8 MMOL/L (ref 3.5–5.1)
POTASSIUM REFLEX MAGNESIUM: 3 MMOL/L (ref 3.5–5.1)
POTASSIUM REFLEX MAGNESIUM: 3.7 MMOL/L (ref 3.5–5.1)
POTASSIUM REFLEX MAGNESIUM: 3.8 MMOL/L (ref 3.5–5.1)
POTASSIUM REFLEX MAGNESIUM: 3.9 MMOL/L (ref 3.5–5.1)
POTASSIUM REFLEX MAGNESIUM: 4 MMOL/L (ref 3.5–5.1)
POTASSIUM REFLEX MAGNESIUM: 4.1 MMOL/L (ref 3.5–5.1)
POTASSIUM REFLEX MAGNESIUM: 4.2 MMOL/L (ref 3.5–5.1)
POTASSIUM REFLEX MAGNESIUM: 4.2 MMOL/L (ref 3.5–5.1)
POTASSIUM REFLEX MAGNESIUM: 4.3 MMOL/L (ref 3.5–5.1)
POTASSIUM REFLEX MAGNESIUM: 4.4 MMOL/L (ref 3.5–5.1)
POTASSIUM REFLEX MAGNESIUM: 4.5 MMOL/L (ref 3.5–5.1)
POTASSIUM REFLEX MAGNESIUM: 4.5 MMOL/L (ref 3.5–5.1)
POTASSIUM REFLEX MAGNESIUM: 4.6 MMOL/L (ref 3.5–5.1)
POTASSIUM REFLEX MAGNESIUM: 4.6 MMOL/L (ref 3.5–5.1)
POTASSIUM REFLEX MAGNESIUM: 4.7 MMOL/L (ref 3.5–5.1)
POTASSIUM REFLEX MAGNESIUM: 5.3 MMOL/L (ref 3.5–5.1)
POTASSIUM REFLEX MAGNESIUM: 6.3 MMOL/L (ref 3.5–5.1)
POTASSIUM SERPL-SCNC: 2.5 MMOL/L (ref 3.5–5.1)
POTASSIUM SERPL-SCNC: 2.5 MMOL/L (ref 3.5–5.1)
POTASSIUM SERPL-SCNC: 2.7 MMOL/L (ref 3.5–5.1)
POTASSIUM SERPL-SCNC: 2.7 MMOL/L (ref 3.5–5.1)
POTASSIUM SERPL-SCNC: 2.8 MMOL/L (ref 3.5–5.1)
POTASSIUM SERPL-SCNC: 2.9 MMOL/L (ref 3.5–5.1)
POTASSIUM SERPL-SCNC: 2.9 MMOL/L (ref 3.5–5.1)
POTASSIUM SERPL-SCNC: 3 MMOL/L (ref 3.5–5.1)
POTASSIUM SERPL-SCNC: 3.1 MMOL/L (ref 3.5–5.1)
POTASSIUM SERPL-SCNC: 3.1 MMOL/L (ref 3.5–5.1)
POTASSIUM SERPL-SCNC: 3.2 MMOL/L (ref 3.5–5.1)
POTASSIUM SERPL-SCNC: 3.5 MMOL/L (ref 3.5–5.1)
POTASSIUM SERPL-SCNC: 3.6 MMOL/L (ref 3.5–5.1)
POTASSIUM SERPL-SCNC: 3.9 MMOL/L (ref 3.5–5.1)
POTASSIUM SERPL-SCNC: 4.2 MMOL/L (ref 3.5–5.1)
PROCALCITONIN: 0.27 NG/ML (ref 0–0.15)
PROCALCITONIN: 0.6 NG/ML (ref 0–0.15)
PROCALCITONIN: 1.11 NG/ML (ref 0–0.15)
PROCALCITONIN: 1.28 NG/ML (ref 0–0.15)
PROCALCITONIN: 1.36 NG/ML (ref 0–0.15)
PROMYELOCYTES PERCENT: 3 %
PROTEIN FLUID: 2.4 G/DL
PROTHROMBIN TIME: 11.7 SEC (ref 9.9–12.7)
PROTHROMBIN TIME: 11.7 SEC (ref 9.9–12.7)
PROTHROMBIN TIME: 11.9 SEC (ref 9.9–12.7)
PROTHROMBIN TIME: 12 SEC (ref 9.9–12.7)
PROTHROMBIN TIME: 12.4 SEC (ref 9.9–12.7)
PROTHROMBIN TIME: 14.1 SEC (ref 9.9–12.7)
PROTHROMBIN TIME: 15.2 SEC (ref 9.9–12.7)
PROTHROMBIN TIME: 15.3 SEC (ref 9.9–12.7)
PROTHROMBIN TIME: 15.4 SEC (ref 9.9–12.7)
PROTHROMBIN TIME: 17.8 SEC (ref 9.9–12.7)
PROTHROMBIN TIME: 26.9 SEC (ref 9.9–12.7)
PROTHROMBIN TIME: 27.4 SEC (ref 9.9–12.7)
PROTHROMBIN TIME: 29.3 SEC (ref 9.9–12.7)
PROTHROMBIN TIME: 29.6 SEC (ref 9.9–12.7)
PROTHROMBIN TIME: 31.5 SEC (ref 9.9–12.7)
PROTHROMBIN TIME: 34.7 SEC (ref 9.9–12.7)
PROTHROMBIN TIME: 38.3 SEC (ref 9.9–12.7)
PROTHROMBIN TIME: 45.3 SEC (ref 9.9–12.7)
PROTHROMBIN TIME: 72.8 SEC (ref 9.9–12.7)
PROTHROMBIN TIME: 78 SEC (ref 9.9–12.7)
RBC # BLD: 2.16 M/UL (ref 4–5.2)
RBC # BLD: 2.22 M/UL (ref 4–5.2)
RBC # BLD: 2.48 M/UL (ref 4–5.2)
RBC # BLD: 2.66 M/UL (ref 4–5.2)
RBC # BLD: 2.66 M/UL (ref 4–5.2)
RBC # BLD: 2.68 M/UL (ref 4–5.2)
RBC # BLD: 2.72 M/UL (ref 4–5.2)
RBC # BLD: 2.75 M/UL (ref 4–5.2)
RBC # BLD: 2.78 M/UL (ref 4–5.2)
RBC # BLD: 2.85 M/UL (ref 4–5.2)
RBC # BLD: 2.91 M/UL (ref 4–5.2)
RBC # BLD: 2.92 M/UL (ref 4–5.2)
RBC # BLD: 2.99 M/UL (ref 4–5.2)
RBC # BLD: 3 M/UL (ref 4–5.2)
RBC # BLD: 3 M/UL (ref 4–5.2)
RBC # BLD: 3.03 M/UL (ref 4–5.2)
RBC # BLD: 3.04 M/UL (ref 4–5.2)
RBC # BLD: 3.09 M/UL (ref 4–5.2)
RBC # BLD: 3.13 M/UL (ref 4–5.2)
RBC # BLD: 3.14 M/UL (ref 4–5.2)
RBC # BLD: 3.24 M/UL (ref 4–5.2)
RBC # BLD: 3.26 M/UL (ref 4–5.2)
RBC # BLD: 3.28 M/UL (ref 4–5.2)
RBC # BLD: 3.32 M/UL (ref 4–5.2)
RBC # BLD: 3.33 M/UL (ref 4–5.2)
RBC # BLD: 3.34 M/UL (ref 4–5.2)
RBC # BLD: 3.39 M/UL (ref 4–5.2)
RBC # BLD: 3.46 M/UL (ref 4–5.2)
RBC # BLD: 3.47 M/UL (ref 4–5.2)
RBC # BLD: 3.48 M/UL (ref 4–5.2)
RBC # BLD: 3.82 M/UL (ref 4–5.2)
RBC # BLD: 3.98 M/UL (ref 4–5.2)
RBC # BLD: 3.99 M/UL (ref 4–5.2)
RBC # BLD: 4.02 M/UL (ref 4–5.2)
RBC FLUID: 1000 /CUMM
RBC FLUID: 17 /CUMM
RBC FLUID: 5000 /CUMM
RBC FLUID: <2000 /CUMM
REASON FOR REJECTION: NORMAL
REJECTED TEST: NORMAL
SARS-COV-2, NAAT: NOT DETECTED
SARS-COV-2: DETECTED
SLIDE REVIEW: ABNORMAL
SMUDGE CELLS: PRESENT
SODIUM BLD-SCNC: 127 MMOL/L (ref 136–145)
SODIUM BLD-SCNC: 127 MMOL/L (ref 136–145)
SODIUM BLD-SCNC: 128 MMOL/L (ref 136–145)
SODIUM BLD-SCNC: 129 MMOL/L (ref 136–145)
SODIUM BLD-SCNC: 130 MMOL/L (ref 136–145)
SODIUM BLD-SCNC: 131 MMOL/L (ref 136–145)
SODIUM BLD-SCNC: 132 MMOL/L (ref 136–145)
SODIUM BLD-SCNC: 133 MMOL/L (ref 136–145)
SODIUM BLD-SCNC: 134 MMOL/L (ref 136–145)
SODIUM BLD-SCNC: 135 MMOL/L (ref 136–145)
SODIUM BLD-SCNC: 135 MMOL/L (ref 136–145)
SODIUM BLD-SCNC: 136 MMOL/L (ref 136–145)
SODIUM BLD-SCNC: 137 MMOL/L (ref 136–145)
SODIUM BLD-SCNC: 138 MMOL/L (ref 136–145)
SODIUM BLD-SCNC: 150 MMOL/L (ref 136–145)
STOMATOCYTES: ABNORMAL
STOMATOCYTES: ABNORMAL
TCO2 ARTERIAL: 20 MMOL/L
TCO2 ARTERIAL: 25.3 MMOL/L
TCO2 CALC VENOUS: 28 MMOL/L
TOTAL PROTEIN: 3.9 G/DL (ref 6.4–8.2)
TOTAL PROTEIN: 4.1 G/DL (ref 6.4–8.2)
TOTAL PROTEIN: 4.1 G/DL (ref 6.4–8.2)
TOTAL PROTEIN: 4.3 G/DL (ref 6.4–8.2)
TOTAL PROTEIN: 4.4 G/DL (ref 6.4–8.2)
TOTAL PROTEIN: 4.5 G/DL (ref 6.4–8.2)
TOTAL PROTEIN: 4.6 G/DL (ref 6.4–8.2)
TOTAL PROTEIN: 4.6 G/DL (ref 6.4–8.2)
TOTAL PROTEIN: 4.8 G/DL (ref 6.4–8.2)
TOTAL PROTEIN: 4.8 G/DL (ref 6.4–8.2)
TOTAL PROTEIN: 5.4 G/DL (ref 6.4–8.2)
TOTAL PROTEIN: 5.6 G/DL (ref 6.4–8.2)
TOTAL PROTEIN: 6.3 G/DL (ref 6.4–8.2)
TOXIC GRANULATION: PRESENT
TOXIC GRANULATION: PRESENT
VANCOMYCIN RANDOM: 17.2 UG/ML
VARIANT LYMPH FLUID: 18 %
VARIANT LYMPH FLUID: 2 %
VOLUME: 100 ML
VOLUME: 100 ML
VOLUME: 200 ML
VOLUME: 200 ML
VOLUME: 50 ML
WBC # BLD: 10.8 K/UL (ref 4–11)
WBC # BLD: 12.5 K/UL (ref 4–11)
WBC # BLD: 13.7 K/UL (ref 4–11)
WBC # BLD: 14.4 K/UL (ref 4–11)
WBC # BLD: 14.4 K/UL (ref 4–11)
WBC # BLD: 21.2 K/UL (ref 4–11)
WBC # BLD: 4.5 K/UL (ref 4–11)
WBC # BLD: 4.9 K/UL (ref 4–11)
WBC # BLD: 5.2 K/UL (ref 4–11)
WBC # BLD: 5.5 K/UL (ref 4–11)
WBC # BLD: 5.6 K/UL (ref 4–11)
WBC # BLD: 5.6 K/UL (ref 4–11)
WBC # BLD: 5.8 K/UL (ref 4–11)
WBC # BLD: 5.9 K/UL (ref 4–11)
WBC # BLD: 6 K/UL (ref 4–11)
WBC # BLD: 6.3 K/UL (ref 4–11)
WBC # BLD: 6.4 K/UL (ref 4–11)
WBC # BLD: 6.6 K/UL (ref 4–11)
WBC # BLD: 6.9 K/UL (ref 4–11)
WBC # BLD: 7 K/UL (ref 4–11)
WBC # BLD: 7.2 K/UL (ref 4–11)
WBC # BLD: 7.3 K/UL (ref 4–11)
WBC # BLD: 7.3 K/UL (ref 4–11)
WBC # BLD: 7.4 K/UL (ref 4–11)
WBC # BLD: 7.6 K/UL (ref 4–11)
WBC # BLD: 8 K/UL (ref 4–11)
WBC # BLD: 8.4 K/UL (ref 4–11)
WBC # BLD: 8.7 K/UL (ref 4–11)
WBC # BLD: 8.8 K/UL (ref 4–11)
WBC # BLD: 8.8 K/UL (ref 4–11)
WBC # BLD: 9.3 K/UL (ref 4–11)
WBC # BLD: 9.3 K/UL (ref 4–11)
WBC # BLD: 9.4 K/UL (ref 4–11)
WBC # BLD: 9.6 K/UL (ref 4–11)

## 2022-01-01 PROCEDURE — 2500000003 HC RX 250 WO HCPCS: Performed by: SURGERY

## 2022-01-01 PROCEDURE — A4216 STERILE WATER/SALINE, 10 ML: HCPCS | Performed by: SURGERY

## 2022-01-01 PROCEDURE — 88341 IMHCHEM/IMCYTCHM EA ADD ANTB: CPT

## 2022-01-01 PROCEDURE — 85025 COMPLETE CBC W/AUTO DIFF WBC: CPT

## 2022-01-01 PROCEDURE — 80048 BASIC METABOLIC PNL TOTAL CA: CPT

## 2022-01-01 PROCEDURE — 6360000002 HC RX W HCPCS: Performed by: PHYSICIAN ASSISTANT

## 2022-01-01 PROCEDURE — 94761 N-INVAS EAR/PLS OXIMETRY MLT: CPT

## 2022-01-01 PROCEDURE — 85610 PROTHROMBIN TIME: CPT

## 2022-01-01 PROCEDURE — 2580000003 HC RX 258: Performed by: STUDENT IN AN ORGANIZED HEALTH CARE EDUCATION/TRAINING PROGRAM

## 2022-01-01 PROCEDURE — 2500000003 HC RX 250 WO HCPCS: Performed by: NURSE ANESTHETIST, CERTIFIED REGISTERED

## 2022-01-01 PROCEDURE — C9113 INJ PANTOPRAZOLE SODIUM, VIA: HCPCS | Performed by: PHYSICIAN ASSISTANT

## 2022-01-01 PROCEDURE — 99024 POSTOP FOLLOW-UP VISIT: CPT | Performed by: SURGERY

## 2022-01-01 PROCEDURE — 93005 ELECTROCARDIOGRAM TRACING: CPT

## 2022-01-01 PROCEDURE — 6370000000 HC RX 637 (ALT 250 FOR IP): Performed by: INTERNAL MEDICINE

## 2022-01-01 PROCEDURE — 99233 SBSQ HOSP IP/OBS HIGH 50: CPT | Performed by: HOSPITALIST

## 2022-01-01 PROCEDURE — 80053 COMPREHEN METABOLIC PANEL: CPT

## 2022-01-01 PROCEDURE — 6360000002 HC RX W HCPCS: Performed by: INTERNAL MEDICINE

## 2022-01-01 PROCEDURE — 7100000000 HC PACU RECOVERY - FIRST 15 MIN: Performed by: SURGERY

## 2022-01-01 PROCEDURE — 3700000001 HC ADD 15 MINUTES (ANESTHESIA): Performed by: SURGERY

## 2022-01-01 PROCEDURE — APPNB45 APP NON BILLABLE 31-45 MINUTES: Performed by: PHYSICIAN ASSISTANT

## 2022-01-01 PROCEDURE — 6370000000 HC RX 637 (ALT 250 FOR IP): Performed by: HOSPITALIST

## 2022-01-01 PROCEDURE — 9990000010 HC NO CHARGE VISIT

## 2022-01-01 PROCEDURE — 83690 ASSAY OF LIPASE: CPT

## 2022-01-01 PROCEDURE — 90945 DIALYSIS ONE EVALUATION: CPT | Performed by: HOSPITALIST

## 2022-01-01 PROCEDURE — 2709999900 HC NON-CHARGEABLE SUPPLY: Performed by: SURGERY

## 2022-01-01 PROCEDURE — 89050 BODY FLUID CELL COUNT: CPT

## 2022-01-01 PROCEDURE — 2580000003 HC RX 258: Performed by: SURGERY

## 2022-01-01 PROCEDURE — 87070 CULTURE OTHR SPECIMN AEROBIC: CPT

## 2022-01-01 PROCEDURE — 83735 ASSAY OF MAGNESIUM: CPT

## 2022-01-01 PROCEDURE — 36415 COLL VENOUS BLD VENIPUNCTURE: CPT

## 2022-01-01 PROCEDURE — 74177 CT ABD & PELVIS W/CONTRAST: CPT

## 2022-01-01 PROCEDURE — 2060000000 HC ICU INTERMEDIATE R&B

## 2022-01-01 PROCEDURE — 88302 TISSUE EXAM BY PATHOLOGIST: CPT

## 2022-01-01 PROCEDURE — 6360000002 HC RX W HCPCS: Performed by: FAMILY MEDICINE

## 2022-01-01 PROCEDURE — 94760 N-INVAS EAR/PLS OXIMETRY 1: CPT

## 2022-01-01 PROCEDURE — 89051 BODY FLUID CELL COUNT: CPT

## 2022-01-01 PROCEDURE — 0D1L0Z4 BYPASS TRANSVERSE COLON TO CUTANEOUS, OPEN APPROACH: ICD-10-PCS | Performed by: SURGERY

## 2022-01-01 PROCEDURE — APPSS30 APP SPLIT SHARED TIME 16-30 MINUTES: Performed by: NURSE PRACTITIONER

## 2022-01-01 PROCEDURE — 6370000000 HC RX 637 (ALT 250 FOR IP): Performed by: STUDENT IN AN ORGANIZED HEALTH CARE EDUCATION/TRAINING PROGRAM

## 2022-01-01 PROCEDURE — 2580000003 HC RX 258: Performed by: HOSPITALIST

## 2022-01-01 PROCEDURE — 6360000002 HC RX W HCPCS: Performed by: STUDENT IN AN ORGANIZED HEALTH CARE EDUCATION/TRAINING PROGRAM

## 2022-01-01 PROCEDURE — 88342 IMHCHEM/IMCYTCHM 1ST ANTB: CPT

## 2022-01-01 PROCEDURE — 96365 THER/PROPH/DIAG IV INF INIT: CPT

## 2022-01-01 PROCEDURE — 6370000000 HC RX 637 (ALT 250 FOR IP): Performed by: SURGERY

## 2022-01-01 PROCEDURE — 90935 HEMODIALYSIS ONE EVALUATION: CPT

## 2022-01-01 PROCEDURE — 80202 ASSAY OF VANCOMYCIN: CPT

## 2022-01-01 PROCEDURE — 6370000000 HC RX 637 (ALT 250 FOR IP): Performed by: FAMILY MEDICINE

## 2022-01-01 PROCEDURE — XW033N5 INTRODUCTION OF MEROPENEM-VABORBACTAM ANTI-INFECTIVE INTO PERIPHERAL VEIN, PERCUTANEOUS APPROACH, NEW TECHNOLOGY GROUP 5: ICD-10-PCS | Performed by: SURGERY

## 2022-01-01 PROCEDURE — 87205 SMEAR GRAM STAIN: CPT

## 2022-01-01 PROCEDURE — C9113 INJ PANTOPRAZOLE SODIUM, VIA: HCPCS | Performed by: HOSPITALIST

## 2022-01-01 PROCEDURE — C9113 INJ PANTOPRAZOLE SODIUM, VIA: HCPCS | Performed by: SURGERY

## 2022-01-01 PROCEDURE — 6360000002 HC RX W HCPCS: Performed by: SURGERY

## 2022-01-01 PROCEDURE — 85014 HEMATOCRIT: CPT

## 2022-01-01 PROCEDURE — 99024 POSTOP FOLLOW-UP VISIT: CPT | Performed by: PHYSICIAN ASSISTANT

## 2022-01-01 PROCEDURE — 88305 TISSUE EXAM BY PATHOLOGIST: CPT

## 2022-01-01 PROCEDURE — APPNB45 APP NON BILLABLE 31-45 MINUTES: Performed by: NURSE PRACTITIONER

## 2022-01-01 PROCEDURE — 74176 CT ABD & PELVIS W/O CONTRAST: CPT

## 2022-01-01 PROCEDURE — APPNB30 APP NON BILLABLE TIME 0-30 MINS: Performed by: NURSE PRACTITIONER

## 2022-01-01 PROCEDURE — 3E0333Z INTRODUCTION OF ANTI-INFLAMMATORY INTO PERIPHERAL VEIN, PERCUTANEOUS APPROACH: ICD-10-PCS | Performed by: INTERNAL MEDICINE

## 2022-01-01 PROCEDURE — 6360000002 HC RX W HCPCS: Performed by: HOSPITALIST

## 2022-01-01 PROCEDURE — 2580000003 HC RX 258: Performed by: INTERNAL MEDICINE

## 2022-01-01 PROCEDURE — 90945 DIALYSIS ONE EVALUATION: CPT

## 2022-01-01 PROCEDURE — 84145 PROCALCITONIN (PCT): CPT

## 2022-01-01 PROCEDURE — 97162 PT EVAL MOD COMPLEX 30 MIN: CPT

## 2022-01-01 PROCEDURE — 93010 ELECTROCARDIOGRAM REPORT: CPT | Performed by: INTERNAL MEDICINE

## 2022-01-01 PROCEDURE — 83605 ASSAY OF LACTIC ACID: CPT

## 2022-01-01 PROCEDURE — 7100000010 HC PHASE II RECOVERY - FIRST 15 MIN: Performed by: INTERNAL MEDICINE

## 2022-01-01 PROCEDURE — 80069 RENAL FUNCTION PANEL: CPT

## 2022-01-01 PROCEDURE — 3600000014 HC SURGERY LEVEL 4 ADDTL 15MIN: Performed by: SURGERY

## 2022-01-01 PROCEDURE — 6360000002 HC RX W HCPCS: Performed by: NURSE PRACTITIONER

## 2022-01-01 PROCEDURE — 3600000004 HC SURGERY LEVEL 4 BASE: Performed by: SURGERY

## 2022-01-01 PROCEDURE — 90935 HEMODIALYSIS ONE EVALUATION: CPT | Performed by: HOSPITALIST

## 2022-01-01 PROCEDURE — 82947 ASSAY GLUCOSE BLOOD QUANT: CPT

## 2022-01-01 PROCEDURE — 87077 CULTURE AEROBIC IDENTIFY: CPT

## 2022-01-01 PROCEDURE — 97530 THERAPEUTIC ACTIVITIES: CPT

## 2022-01-01 PROCEDURE — 86850 RBC ANTIBODY SCREEN: CPT

## 2022-01-01 PROCEDURE — 5A1D70Z PERFORMANCE OF URINARY FILTRATION, INTERMITTENT, LESS THAN 6 HOURS PER DAY: ICD-10-PCS | Performed by: HOSPITALIST

## 2022-01-01 PROCEDURE — 7100000001 HC PACU RECOVERY - ADDTL 15 MIN: Performed by: SURGERY

## 2022-01-01 PROCEDURE — 2700000000 HC OXYGEN THERAPY PER DAY

## 2022-01-01 PROCEDURE — 1200000000 HC SEMI PRIVATE

## 2022-01-01 PROCEDURE — 99024 POSTOP FOLLOW-UP VISIT: CPT | Performed by: NURSE PRACTITIONER

## 2022-01-01 PROCEDURE — 6360000002 HC RX W HCPCS: Performed by: EMERGENCY MEDICINE

## 2022-01-01 PROCEDURE — 85018 HEMOGLOBIN: CPT

## 2022-01-01 PROCEDURE — 97116 GAIT TRAINING THERAPY: CPT | Performed by: PHYSICAL THERAPIST

## 2022-01-01 PROCEDURE — 86900 BLOOD TYPING SEROLOGIC ABO: CPT

## 2022-01-01 PROCEDURE — 2500000003 HC RX 250 WO HCPCS: Performed by: FAMILY MEDICINE

## 2022-01-01 PROCEDURE — 99233 SBSQ HOSP IP/OBS HIGH 50: CPT | Performed by: INTERNAL MEDICINE

## 2022-01-01 PROCEDURE — 99232 SBSQ HOSP IP/OBS MODERATE 35: CPT | Performed by: HOSPITALIST

## 2022-01-01 PROCEDURE — 86923 COMPATIBILITY TEST ELECTRIC: CPT

## 2022-01-01 PROCEDURE — 93005 ELECTROCARDIOGRAM TRACING: CPT | Performed by: EMERGENCY MEDICINE

## 2022-01-01 PROCEDURE — 2580000003 HC RX 258: Performed by: FAMILY MEDICINE

## 2022-01-01 PROCEDURE — 99232 SBSQ HOSP IP/OBS MODERATE 35: CPT | Performed by: INTERNAL MEDICINE

## 2022-01-01 PROCEDURE — 2720000010 HC SURG SUPPLY STERILE: Performed by: SURGERY

## 2022-01-01 PROCEDURE — 2500000003 HC RX 250 WO HCPCS: Performed by: STUDENT IN AN ORGANIZED HEALTH CARE EDUCATION/TRAINING PROGRAM

## 2022-01-01 PROCEDURE — 85730 THROMBOPLASTIN TIME PARTIAL: CPT

## 2022-01-01 PROCEDURE — 99223 1ST HOSP IP/OBS HIGH 75: CPT | Performed by: HOSPITALIST

## 2022-01-01 PROCEDURE — APPSS45 APP SPLIT SHARED TIME 31-45 MINUTES: Performed by: NURSE PRACTITIONER

## 2022-01-01 PROCEDURE — 6370000000 HC RX 637 (ALT 250 FOR IP): Performed by: PHYSICIAN ASSISTANT

## 2022-01-01 PROCEDURE — 6370000000 HC RX 637 (ALT 250 FOR IP): Performed by: NURSE PRACTITIONER

## 2022-01-01 PROCEDURE — 3700000000 HC ANESTHESIA ATTENDED CARE: Performed by: SURGERY

## 2022-01-01 PROCEDURE — 2580000003 HC RX 258: Performed by: NURSE PRACTITIONER

## 2022-01-01 PROCEDURE — P9047 ALBUMIN (HUMAN), 25%, 50ML: HCPCS | Performed by: HOSPITALIST

## 2022-01-01 PROCEDURE — 96375 TX/PRO/DX INJ NEW DRUG ADDON: CPT

## 2022-01-01 PROCEDURE — A4217 STERILE WATER/SALINE, 500 ML: HCPCS | Performed by: SURGERY

## 2022-01-01 PROCEDURE — 49422 REMOVE TUNNELED IP CATH: CPT | Performed by: SURGERY

## 2022-01-01 PROCEDURE — 86901 BLOOD TYPING SEROLOGIC RH(D): CPT

## 2022-01-01 PROCEDURE — 87340 HEPATITIS B SURFACE AG IA: CPT

## 2022-01-01 PROCEDURE — 5A1935Z RESPIRATORY VENTILATION, LESS THAN 24 CONSECUTIVE HOURS: ICD-10-PCS | Performed by: INTERNAL MEDICINE

## 2022-01-01 PROCEDURE — 96361 HYDRATE IV INFUSION ADD-ON: CPT

## 2022-01-01 PROCEDURE — 93005 ELECTROCARDIOGRAM TRACING: CPT | Performed by: HOSPITALIST

## 2022-01-01 PROCEDURE — 97166 OT EVAL MOD COMPLEX 45 MIN: CPT

## 2022-01-01 PROCEDURE — 2500000003 HC RX 250 WO HCPCS

## 2022-01-01 PROCEDURE — 0WHG43Z INSERTION OF INFUSION DEVICE INTO PERITONEAL CAVITY, PERCUTANEOUS ENDOSCOPIC APPROACH: ICD-10-PCS | Performed by: SURGERY

## 2022-01-01 PROCEDURE — 49324 LAP INSERT TUNNEL IP CATH: CPT | Performed by: SURGERY

## 2022-01-01 PROCEDURE — 6360000002 HC RX W HCPCS: Performed by: NURSE ANESTHETIST, CERTIFIED REGISTERED

## 2022-01-01 PROCEDURE — C9113 INJ PANTOPRAZOLE SODIUM, VIA: HCPCS | Performed by: INTERNAL MEDICINE

## 2022-01-01 PROCEDURE — 90945 DIALYSIS ONE EVALUATION: CPT | Performed by: INTERNAL MEDICINE

## 2022-01-01 PROCEDURE — 2580000003 HC RX 258: Performed by: PHYSICIAN ASSISTANT

## 2022-01-01 PROCEDURE — 0DDN8ZX EXTRACTION OF SIGMOID COLON, VIA NATURAL OR ARTIFICIAL OPENING ENDOSCOPIC, DIAGNOSTIC: ICD-10-PCS | Performed by: SURGERY

## 2022-01-01 PROCEDURE — 87493 C DIFF AMPLIFIED PROBE: CPT

## 2022-01-01 PROCEDURE — 2500000003 HC RX 250 WO HCPCS: Performed by: INTERNAL MEDICINE

## 2022-01-01 PROCEDURE — P9047 ALBUMIN (HUMAN), 25%, 50ML: HCPCS | Performed by: SURGERY

## 2022-01-01 PROCEDURE — 83036 HEMOGLOBIN GLYCOSYLATED A1C: CPT

## 2022-01-01 PROCEDURE — 84703 CHORIONIC GONADOTROPIN ASSAY: CPT

## 2022-01-01 PROCEDURE — 6360000004 HC RX CONTRAST MEDICATION

## 2022-01-01 PROCEDURE — 02HV33Z INSERTION OF INFUSION DEVICE INTO SUPERIOR VENA CAVA, PERCUTANEOUS APPROACH: ICD-10-PCS | Performed by: HOSPITALIST

## 2022-01-01 PROCEDURE — 99284 EMERGENCY DEPT VISIT MOD MDM: CPT

## 2022-01-01 PROCEDURE — 90935 HEMODIALYSIS ONE EVALUATION: CPT | Performed by: INTERNAL MEDICINE

## 2022-01-01 PROCEDURE — 6360000002 HC RX W HCPCS

## 2022-01-01 PROCEDURE — 2580000003 HC RX 258

## 2022-01-01 PROCEDURE — 86706 HEP B SURFACE ANTIBODY: CPT

## 2022-01-01 PROCEDURE — 85027 COMPLETE CBC AUTOMATED: CPT

## 2022-01-01 PROCEDURE — 99223 1ST HOSP IP/OBS HIGH 75: CPT | Performed by: INTERNAL MEDICINE

## 2022-01-01 PROCEDURE — 96366 THER/PROPH/DIAG IV INF ADDON: CPT

## 2022-01-01 PROCEDURE — 9990000010 HC NO CHARGE VISIT: Performed by: PHYSICAL THERAPIST

## 2022-01-01 PROCEDURE — 87186 SC STD MICRODIL/AGAR DIL: CPT

## 2022-01-01 PROCEDURE — 87040 BLOOD CULTURE FOR BACTERIA: CPT

## 2022-01-01 PROCEDURE — 87075 CULTR BACTERIA EXCEPT BLOOD: CPT

## 2022-01-01 PROCEDURE — 44320 COLOSTOMY: CPT | Performed by: SURGERY

## 2022-01-01 PROCEDURE — 3609008300 HC SIGMOIDOSCOPY W/BIOPSY SINGLE/MULTIPLE: Performed by: INTERNAL MEDICINE

## 2022-01-01 PROCEDURE — 7100000011 HC PHASE II RECOVERY - ADDTL 15 MIN: Performed by: INTERNAL MEDICINE

## 2022-01-01 PROCEDURE — 93306 TTE W/DOPPLER COMPLETE: CPT

## 2022-01-01 PROCEDURE — 84157 ASSAY OF PROTEIN OTHER: CPT

## 2022-01-01 PROCEDURE — 84100 ASSAY OF PHOSPHORUS: CPT

## 2022-01-01 PROCEDURE — P9016 RBC LEUKOCYTES REDUCED: HCPCS

## 2022-01-01 PROCEDURE — 3E1M39Z IRRIGATION OF PERITONEAL CAVITY USING DIALYSATE, PERCUTANEOUS APPROACH: ICD-10-PCS | Performed by: HOSPITALIST

## 2022-01-01 PROCEDURE — 84132 ASSAY OF SERUM POTASSIUM: CPT

## 2022-01-01 PROCEDURE — 96376 TX/PRO/DX INJ SAME DRUG ADON: CPT

## 2022-01-01 PROCEDURE — 84702 CHORIONIC GONADOTROPIN TEST: CPT

## 2022-01-01 PROCEDURE — A4216 STERILE WATER/SALINE, 10 ML: HCPCS | Performed by: INTERNAL MEDICINE

## 2022-01-01 PROCEDURE — 0WQF4ZZ REPAIR ABDOMINAL WALL, PERCUTANEOUS ENDOSCOPIC APPROACH: ICD-10-PCS | Performed by: SURGERY

## 2022-01-01 PROCEDURE — APPSS45 APP SPLIT SHARED TIME 31-45 MINUTES: Performed by: PHYSICIAN ASSISTANT

## 2022-01-01 PROCEDURE — 0BH17EZ INSERTION OF ENDOTRACHEAL AIRWAY INTO TRACHEA, VIA NATURAL OR ARTIFICIAL OPENING: ICD-10-PCS | Performed by: INTERNAL MEDICINE

## 2022-01-01 PROCEDURE — 82803 BLOOD GASES ANY COMBINATION: CPT

## 2022-01-01 PROCEDURE — 97110 THERAPEUTIC EXERCISES: CPT

## 2022-01-01 PROCEDURE — P9045 ALBUMIN (HUMAN), 5%, 250 ML: HCPCS | Performed by: NURSE ANESTHETIST, CERTIFIED REGISTERED

## 2022-01-01 PROCEDURE — 96367 TX/PROPH/DG ADDL SEQ IV INF: CPT

## 2022-01-01 PROCEDURE — 97162 PT EVAL MOD COMPLEX 30 MIN: CPT | Performed by: PHYSICAL THERAPIST

## 2022-01-01 PROCEDURE — 6360000004 HC RX CONTRAST MEDICATION: Performed by: FAMILY MEDICINE

## 2022-01-01 PROCEDURE — 0DJD8ZZ INSPECTION OF LOWER INTESTINAL TRACT, VIA NATURAL OR ARTIFICIAL OPENING ENDOSCOPIC: ICD-10-PCS | Performed by: INTERNAL MEDICINE

## 2022-01-01 PROCEDURE — 88309 TISSUE EXAM BY PATHOLOGIST: CPT

## 2022-01-01 PROCEDURE — 87015 SPECIMEN INFECT AGNT CONCNTJ: CPT

## 2022-01-01 PROCEDURE — 99222 1ST HOSP IP/OBS MODERATE 55: CPT | Performed by: SURGERY

## 2022-01-01 PROCEDURE — 97116 GAIT TRAINING THERAPY: CPT

## 2022-01-01 PROCEDURE — 99285 EMERGENCY DEPT VISIT HI MDM: CPT

## 2022-01-01 PROCEDURE — 3609009100 HC SIGMOIDOSCOPY SUBMUCOSAL INJECTION: Performed by: INTERNAL MEDICINE

## 2022-01-01 PROCEDURE — 97530 THERAPEUTIC ACTIVITIES: CPT | Performed by: PHYSICAL THERAPIST

## 2022-01-01 PROCEDURE — 84295 ASSAY OF SERUM SODIUM: CPT

## 2022-01-01 PROCEDURE — 2580000003 HC RX 258: Performed by: NURSE ANESTHETIST, CERTIFIED REGISTERED

## 2022-01-01 PROCEDURE — 6360000002 HC RX W HCPCS: Performed by: ANESTHESIOLOGY

## 2022-01-01 PROCEDURE — U0005 INFEC AGEN DETEC AMPLI PROBE: HCPCS

## 2022-01-01 PROCEDURE — 82330 ASSAY OF CALCIUM: CPT

## 2022-01-01 PROCEDURE — 87324 CLOSTRIDIUM AG IA: CPT

## 2022-01-01 PROCEDURE — 93970 EXTREMITY STUDY: CPT

## 2022-01-01 PROCEDURE — 6360000004 HC RX CONTRAST MEDICATION: Performed by: SURGERY

## 2022-01-01 PROCEDURE — 36430 TRANSFUSION BLD/BLD COMPNT: CPT

## 2022-01-01 PROCEDURE — 87449 NOS EACH ORGANISM AG IA: CPT

## 2022-01-01 PROCEDURE — U0003 INFECTIOUS AGENT DETECTION BY NUCLEIC ACID (DNA OR RNA); SEVERE ACUTE RESPIRATORY SYNDROME CORONAVIRUS 2 (SARS-COV-2) (CORONAVIRUS DISEASE [COVID-19]), AMPLIFIED PROBE TECHNIQUE, MAKING USE OF HIGH THROUGHPUT TECHNOLOGIES AS DESCRIBED BY CMS-2020-01-R: HCPCS

## 2022-01-01 PROCEDURE — 2580000003 HC RX 258: Performed by: EMERGENCY MEDICINE

## 2022-01-01 PROCEDURE — 94002 VENT MGMT INPAT INIT DAY: CPT

## 2022-01-01 PROCEDURE — 2709999900 HC NON-CHARGEABLE SUPPLY: Performed by: INTERNAL MEDICINE

## 2022-01-01 PROCEDURE — 71045 X-RAY EXAM CHEST 1 VIEW: CPT

## 2022-01-01 PROCEDURE — 99152 MOD SED SAME PHYS/QHP 5/>YRS: CPT | Performed by: INTERNAL MEDICINE

## 2022-01-01 PROCEDURE — C1750 CATH, HEMODIALYSIS,LONG-TERM: HCPCS | Performed by: SURGERY

## 2022-01-01 PROCEDURE — 0WPG43Z REMOVAL OF INFUSION DEVICE FROM PERITONEAL CAVITY, PERCUTANEOUS ENDOSCOPIC APPROACH: ICD-10-PCS | Performed by: SURGERY

## 2022-01-01 PROCEDURE — 71260 CT THORAX DX C+: CPT

## 2022-01-01 PROCEDURE — 2500000003 HC RX 250 WO HCPCS: Performed by: NURSE PRACTITIONER

## 2022-01-01 PROCEDURE — 87635 SARS-COV-2 COVID-19 AMP PRB: CPT

## 2022-01-01 PROCEDURE — 49561 PR REPAIR INCISIONAL HERNIA,STRANG: CPT | Performed by: SURGERY

## 2022-01-01 PROCEDURE — 6370000000 HC RX 637 (ALT 250 FOR IP): Performed by: EMERGENCY MEDICINE

## 2022-01-01 PROCEDURE — 87505 NFCT AGENT DETECTION GI: CPT

## 2022-01-01 PROCEDURE — 2500000003 HC RX 250 WO HCPCS: Performed by: HOSPITALIST

## 2022-01-01 RX ORDER — SODIUM CHLORIDE AND POTASSIUM CHLORIDE .9; .15 G/100ML; G/100ML
1000 SOLUTION INTRAVENOUS CONTINUOUS
Status: DISPENSED | OUTPATIENT
Start: 2022-01-01 | End: 2022-01-01

## 2022-01-01 RX ORDER — DIPHENHYDRAMINE HYDROCHLORIDE 50 MG/ML
25 INJECTION INTRAMUSCULAR; INTRAVENOUS ONCE
Status: COMPLETED | OUTPATIENT
Start: 2022-01-01 | End: 2022-01-01

## 2022-01-01 RX ORDER — POTASSIUM CHLORIDE 1.5 G/1.77G
40 POWDER, FOR SOLUTION ORAL 2 TIMES DAILY
Status: DISCONTINUED | OUTPATIENT
Start: 2022-01-01 | End: 2022-01-01 | Stop reason: HOSPADM

## 2022-01-01 RX ORDER — MAGNESIUM SULFATE IN WATER 40 MG/ML
2000 INJECTION, SOLUTION INTRAVENOUS ONCE
Status: COMPLETED | OUTPATIENT
Start: 2022-01-01 | End: 2022-01-01

## 2022-01-01 RX ORDER — WARFARIN SODIUM 3 MG/1
3 TABLET ORAL
Status: COMPLETED | OUTPATIENT
Start: 2022-01-01 | End: 2022-01-01

## 2022-01-01 RX ORDER — CALCITRIOL 0.25 UG/1
0.5 CAPSULE, LIQUID FILLED ORAL DAILY
Status: DISCONTINUED | OUTPATIENT
Start: 2022-01-01 | End: 2022-01-01 | Stop reason: HOSPADM

## 2022-01-01 RX ORDER — PREGABALIN 50 MG/1
50 CAPSULE ORAL 2 TIMES DAILY
Status: ON HOLD | COMMUNITY
End: 2022-01-01

## 2022-01-01 RX ORDER — FENTANYL CITRATE 50 UG/ML
25 INJECTION, SOLUTION INTRAMUSCULAR; INTRAVENOUS ONCE
Status: COMPLETED | OUTPATIENT
Start: 2022-01-01 | End: 2022-01-01

## 2022-01-01 RX ORDER — POTASSIUM CHLORIDE 7.45 MG/ML
10 INJECTION INTRAVENOUS
Status: DISCONTINUED | OUTPATIENT
Start: 2022-01-01 | End: 2022-01-01

## 2022-01-01 RX ORDER — LOSARTAN POTASSIUM 100 MG/1
100 TABLET ORAL NIGHTLY
Qty: 30 TABLET | Refills: 3 | Status: SHIPPED | OUTPATIENT
Start: 2022-01-01

## 2022-01-01 RX ORDER — POTASSIUM CHLORIDE 7.45 MG/ML
10 INJECTION INTRAVENOUS
Status: COMPLETED | OUTPATIENT
Start: 2022-01-01 | End: 2022-01-01

## 2022-01-01 RX ORDER — PANTOPRAZOLE SODIUM 40 MG/10ML
40 INJECTION, POWDER, LYOPHILIZED, FOR SOLUTION INTRAVENOUS DAILY
Status: DISCONTINUED | OUTPATIENT
Start: 2022-01-01 | End: 2022-01-01

## 2022-01-01 RX ORDER — PROMETHAZINE HYDROCHLORIDE 25 MG/1
12.5 TABLET ORAL EVERY 6 HOURS PRN
Status: DISCONTINUED | OUTPATIENT
Start: 2022-01-01 | End: 2022-01-01 | Stop reason: HOSPADM

## 2022-01-01 RX ORDER — SODIUM CHLORIDE, SODIUM LACTATE, CALCIUM CHLORIDE, MAGNESIUM CHLORIDE AND DEXTROSE 4.25; 538; 448; 18.3; 5.08 G/100ML; MG/100ML; MG/100ML; MG/100ML; MG/100ML
3000 INJECTION, SOLUTION INTRAPERITONEAL NIGHTLY
Status: DISCONTINUED | OUTPATIENT
Start: 2022-01-01 | End: 2022-01-01 | Stop reason: HOSPADM

## 2022-01-01 RX ORDER — TORSEMIDE 100 MG/1
200 TABLET ORAL 2 TIMES DAILY
Status: DISCONTINUED | OUTPATIENT
Start: 2022-01-01 | End: 2022-01-01 | Stop reason: HOSPADM

## 2022-01-01 RX ORDER — NICOTINE POLACRILEX 4 MG
15 LOZENGE BUCCAL PRN
Status: DISCONTINUED | OUTPATIENT
Start: 2022-01-01 | End: 2022-01-01 | Stop reason: HOSPADM

## 2022-01-01 RX ORDER — ERGOCALCIFEROL 1.25 MG/1
50000 CAPSULE ORAL WEEKLY
Status: DISCONTINUED | OUTPATIENT
Start: 2022-01-01 | End: 2022-01-01 | Stop reason: HOSPADM

## 2022-01-01 RX ORDER — SODIUM CHLORIDE, SODIUM LACTATE, CALCIUM CHLORIDE, MAGNESIUM CHLORIDE AND DEXTROSE 2.5; 538; 448; 18.3; 5.08 G/100ML; MG/100ML; MG/100ML; MG/100ML; MG/100ML
6000 INJECTION, SOLUTION INTRAPERITONEAL NIGHTLY
Status: DISCONTINUED | OUTPATIENT
Start: 2022-01-01 | End: 2022-01-01

## 2022-01-01 RX ORDER — 0.9 % SODIUM CHLORIDE 0.9 %
500 INTRAVENOUS SOLUTION INTRAVENOUS ONCE
Status: COMPLETED | OUTPATIENT
Start: 2022-01-01 | End: 2022-01-01

## 2022-01-01 RX ORDER — POTASSIUM CHLORIDE 7.45 MG/ML
10 INJECTION INTRAVENOUS ONCE
Status: COMPLETED | OUTPATIENT
Start: 2022-01-01 | End: 2022-01-01

## 2022-01-01 RX ORDER — WARFARIN SODIUM 2 MG/1
2 TABLET ORAL
Status: COMPLETED | OUTPATIENT
Start: 2022-01-01 | End: 2022-01-01

## 2022-01-01 RX ORDER — LIDOCAINE HYDROCHLORIDE 20 MG/ML
INJECTION, SOLUTION EPIDURAL; INFILTRATION; INTRACAUDAL; PERINEURAL PRN
Status: DISCONTINUED | OUTPATIENT
Start: 2022-01-01 | End: 2022-01-01 | Stop reason: SDUPTHER

## 2022-01-01 RX ORDER — SODIUM CHLORIDE, SODIUM LACTATE, CALCIUM CHLORIDE, MAGNESIUM CHLORIDE AND DEXTROSE 4.25; 538; 448; 18.3; 5.08 G/100ML; MG/100ML; MG/100ML; MG/100ML; MG/100ML
3000 INJECTION, SOLUTION INTRAPERITONEAL NIGHTLY
Status: DISCONTINUED | OUTPATIENT
Start: 2022-01-01 | End: 2022-01-01 | Stop reason: RX

## 2022-01-01 RX ORDER — DEXAMETHASONE SODIUM PHOSPHATE 4 MG/ML
INJECTION, SOLUTION INTRA-ARTICULAR; INTRALESIONAL; INTRAMUSCULAR; INTRAVENOUS; SOFT TISSUE PRN
Status: DISCONTINUED | OUTPATIENT
Start: 2022-01-01 | End: 2022-01-01 | Stop reason: SDUPTHER

## 2022-01-01 RX ORDER — CIPROFLOXACIN 2 MG/ML
200 INJECTION, SOLUTION INTRAVENOUS EVERY 24 HOURS
Status: DISCONTINUED | OUTPATIENT
Start: 2022-01-01 | End: 2022-01-01

## 2022-01-01 RX ORDER — MEPERIDINE HYDROCHLORIDE 25 MG/ML
12.5 INJECTION INTRAMUSCULAR; INTRAVENOUS; SUBCUTANEOUS EVERY 5 MIN PRN
Status: DISCONTINUED | OUTPATIENT
Start: 2022-01-01 | End: 2022-01-01 | Stop reason: HOSPADM

## 2022-01-01 RX ORDER — DIPHENHYDRAMINE HYDROCHLORIDE 50 MG/ML
12.5 INJECTION INTRAMUSCULAR; INTRAVENOUS ONCE
Status: COMPLETED | OUTPATIENT
Start: 2022-01-01 | End: 2022-01-01

## 2022-01-01 RX ORDER — EPINEPHRINE 1 MG/ML
INJECTION, SOLUTION, CONCENTRATE INTRAVENOUS
Status: COMPLETED | OUTPATIENT
Start: 2022-01-01 | End: 2022-01-01

## 2022-01-01 RX ORDER — ACETAMINOPHEN 650 MG/1
650 SUPPOSITORY RECTAL EVERY 6 HOURS PRN
Status: DISCONTINUED | OUTPATIENT
Start: 2022-01-01 | End: 2022-01-01 | Stop reason: HOSPADM

## 2022-01-01 RX ORDER — ONDANSETRON 2 MG/ML
4 INJECTION INTRAMUSCULAR; INTRAVENOUS
Status: DISCONTINUED | OUTPATIENT
Start: 2022-01-01 | End: 2022-01-01 | Stop reason: HOSPADM

## 2022-01-01 RX ORDER — POTASSIUM CHLORIDE 20 MEQ/1
40 TABLET, EXTENDED RELEASE ORAL ONCE
Status: COMPLETED | OUTPATIENT
Start: 2022-01-01 | End: 2022-01-01

## 2022-01-01 RX ORDER — LABETALOL 200 MG/1
200 TABLET, FILM COATED ORAL EVERY 12 HOURS SCHEDULED
Status: DISCONTINUED | OUTPATIENT
Start: 2022-01-01 | End: 2022-01-01 | Stop reason: CLARIF

## 2022-01-01 RX ORDER — POLYETHYLENE GLYCOL 3350 17 G/17G
17 POWDER, FOR SOLUTION ORAL DAILY PRN
Status: DISCONTINUED | OUTPATIENT
Start: 2022-01-01 | End: 2022-01-01 | Stop reason: HOSPADM

## 2022-01-01 RX ORDER — SODIUM CHLORIDE, SODIUM LACTATE, POTASSIUM CHLORIDE, AND CALCIUM CHLORIDE .6; .31; .03; .02 G/100ML; G/100ML; G/100ML; G/100ML
500 INJECTION, SOLUTION INTRAVENOUS ONCE
Status: DISCONTINUED | OUTPATIENT
Start: 2022-01-01 | End: 2022-01-01 | Stop reason: HOSPADM

## 2022-01-01 RX ORDER — MORPHINE SULFATE 4 MG/ML
4 INJECTION, SOLUTION INTRAMUSCULAR; INTRAVENOUS
Status: DISCONTINUED | OUTPATIENT
Start: 2022-01-01 | End: 2022-01-01 | Stop reason: HOSPADM

## 2022-01-01 RX ORDER — SODIUM CHLORIDE 0.9 % (FLUSH) 0.9 %
5-40 SYRINGE (ML) INJECTION PRN
Status: DISCONTINUED | OUTPATIENT
Start: 2022-01-01 | End: 2022-01-01 | Stop reason: HOSPADM

## 2022-01-01 RX ORDER — OXYCODONE HYDROCHLORIDE 10 MG/1
10 TABLET ORAL PRN
Status: DISCONTINUED | OUTPATIENT
Start: 2022-01-01 | End: 2022-01-01 | Stop reason: HOSPADM

## 2022-01-01 RX ORDER — MIDODRINE HYDROCHLORIDE 5 MG/1
5 TABLET ORAL 2 TIMES DAILY WITH MEALS
Status: DISCONTINUED | OUTPATIENT
Start: 2022-01-01 | End: 2022-01-01

## 2022-01-01 RX ORDER — SODIUM CHLORIDE, SODIUM LACTATE, CALCIUM CHLORIDE, MAGNESIUM CHLORIDE AND DEXTROSE 2.5; 538; 448; 18.3; 5.08 G/100ML; MG/100ML; MG/100ML; MG/100ML; MG/100ML
8000 INJECTION, SOLUTION INTRAPERITONEAL NIGHTLY
Status: DISCONTINUED | OUTPATIENT
Start: 2022-01-01 | End: 2022-01-01 | Stop reason: CLARIF

## 2022-01-01 RX ORDER — HYDRALAZINE HYDROCHLORIDE 25 MG/1
25 TABLET, FILM COATED ORAL EVERY 8 HOURS SCHEDULED
Status: DISCONTINUED | OUTPATIENT
Start: 2022-01-01 | End: 2022-01-01

## 2022-01-01 RX ORDER — PANTOPRAZOLE SODIUM 40 MG/1
40 TABLET, DELAYED RELEASE ORAL
Status: DISCONTINUED | OUTPATIENT
Start: 2022-01-01 | End: 2022-01-01

## 2022-01-01 RX ORDER — 0.9 % SODIUM CHLORIDE 0.9 %
250 INTRAVENOUS SOLUTION INTRAVENOUS ONCE
Status: DISCONTINUED | OUTPATIENT
Start: 2022-01-01 | End: 2022-01-01

## 2022-01-01 RX ORDER — HEPARIN SODIUM 1000 [USP'U]/ML
3400 INJECTION, SOLUTION INTRAVENOUS; SUBCUTANEOUS ONCE
Status: COMPLETED | OUTPATIENT
Start: 2022-01-01 | End: 2022-01-01

## 2022-01-01 RX ORDER — HEPARIN SODIUM 1000 [USP'U]/ML
6800 INJECTION, SOLUTION INTRAVENOUS; SUBCUTANEOUS ONCE
Status: COMPLETED | OUTPATIENT
Start: 2022-01-01 | End: 2022-01-01

## 2022-01-01 RX ORDER — SODIUM CHLORIDE 9 MG/ML
25 INJECTION, SOLUTION INTRAVENOUS PRN
Status: DISCONTINUED | OUTPATIENT
Start: 2022-01-01 | End: 2022-01-01 | Stop reason: HOSPADM

## 2022-01-01 RX ORDER — MAGNESIUM HYDROXIDE 1200 MG/15ML
LIQUID ORAL CONTINUOUS PRN
Status: COMPLETED | OUTPATIENT
Start: 2022-01-01 | End: 2022-01-01

## 2022-01-01 RX ORDER — SODIUM CHLORIDE, SODIUM LACTATE, CALCIUM CHLORIDE, MAGNESIUM CHLORIDE AND DEXTROSE 4.25; 538; 448; 18.3; 5.08 G/100ML; MG/100ML; MG/100ML; MG/100ML; MG/100ML
6000 INJECTION, SOLUTION INTRAPERITONEAL NIGHTLY
Status: DISCONTINUED | OUTPATIENT
Start: 2022-01-01 | End: 2022-01-01 | Stop reason: SDUPTHER

## 2022-01-01 RX ORDER — SODIUM CHLORIDE, SODIUM LACTATE, CALCIUM CHLORIDE, MAGNESIUM CHLORIDE AND DEXTROSE 4.25; 538; 448; 18.3; 5.08 G/100ML; MG/100ML; MG/100ML; MG/100ML; MG/100ML
12000 INJECTION, SOLUTION INTRAPERITONEAL NIGHTLY
Status: DISCONTINUED | OUTPATIENT
Start: 2022-01-01 | End: 2022-01-01

## 2022-01-01 RX ORDER — DEXTROSE MONOHYDRATE 50 MG/ML
100 INJECTION, SOLUTION INTRAVENOUS PRN
Status: DISCONTINUED | OUTPATIENT
Start: 2022-01-01 | End: 2022-01-01 | Stop reason: HOSPADM

## 2022-01-01 RX ORDER — CALCIUM GLUCONATE 20 MG/ML
1000 INJECTION, SOLUTION INTRAVENOUS ONCE
Status: DISCONTINUED | OUTPATIENT
Start: 2022-01-01 | End: 2022-01-01 | Stop reason: HOSPADM

## 2022-01-01 RX ORDER — SODIUM CHLORIDE 9 MG/ML
10 INJECTION INTRAVENOUS DAILY
Status: DISCONTINUED | OUTPATIENT
Start: 2022-01-01 | End: 2022-01-01

## 2022-01-01 RX ORDER — LOSARTAN POTASSIUM 100 MG/1
100 TABLET ORAL NIGHTLY
Status: DISCONTINUED | OUTPATIENT
Start: 2022-01-01 | End: 2022-01-01 | Stop reason: HOSPADM

## 2022-01-01 RX ORDER — PANTOPRAZOLE SODIUM 20 MG/1
20 TABLET, DELAYED RELEASE ORAL ONCE
Status: COMPLETED | OUTPATIENT
Start: 2022-01-01 | End: 2022-01-01

## 2022-01-01 RX ORDER — MAGNESIUM SULFATE 1 G/100ML
1000 INJECTION INTRAVENOUS PRN
Status: DISCONTINUED | OUTPATIENT
Start: 2022-01-01 | End: 2022-01-01

## 2022-01-01 RX ORDER — HEPARIN SODIUM 1000 [USP'U]/ML
3000 INJECTION, SOLUTION INTRAVENOUS; SUBCUTANEOUS PRN
Status: DISCONTINUED | OUTPATIENT
Start: 2022-01-01 | End: 2022-01-01 | Stop reason: HOSPADM

## 2022-01-01 RX ORDER — PROPOFOL 10 MG/ML
INJECTION, EMULSION INTRAVENOUS PRN
Status: DISCONTINUED | OUTPATIENT
Start: 2022-01-01 | End: 2022-01-01 | Stop reason: SDUPTHER

## 2022-01-01 RX ORDER — TORSEMIDE 100 MG/1
100 TABLET ORAL DAILY
Status: DISCONTINUED | OUTPATIENT
Start: 2022-01-01 | End: 2022-01-01

## 2022-01-01 RX ORDER — SCOLOPAMINE TRANSDERMAL SYSTEM 1 MG/1
1 PATCH, EXTENDED RELEASE TRANSDERMAL
Status: DISCONTINUED | OUTPATIENT
Start: 2022-01-01 | End: 2022-01-01 | Stop reason: HOSPADM

## 2022-01-01 RX ORDER — MIDODRINE HYDROCHLORIDE 10 MG/1
10 TABLET ORAL PRN
Status: DISCONTINUED | OUTPATIENT
Start: 2022-01-01 | End: 2022-01-01 | Stop reason: HOSPADM

## 2022-01-01 RX ORDER — NIFEDIPINE 30 MG/1
30 TABLET, EXTENDED RELEASE ORAL DAILY
Status: DISCONTINUED | OUTPATIENT
Start: 2022-01-01 | End: 2022-01-01

## 2022-01-01 RX ORDER — POTASSIUM CHLORIDE 7.45 MG/ML
10 INJECTION INTRAVENOUS PRN
Status: DISCONTINUED | OUTPATIENT
Start: 2022-01-01 | End: 2022-01-01

## 2022-01-01 RX ORDER — CIPROFLOXACIN 250 MG/1
250 TABLET, FILM COATED ORAL
Status: DISCONTINUED | OUTPATIENT
Start: 2022-01-01 | End: 2022-01-01

## 2022-01-01 RX ORDER — ROCURONIUM BROMIDE 10 MG/ML
INJECTION, SOLUTION INTRAVENOUS PRN
Status: DISCONTINUED | OUTPATIENT
Start: 2022-01-01 | End: 2022-01-01

## 2022-01-01 RX ORDER — BUPIVACAINE HYDROCHLORIDE 5 MG/ML
INJECTION, SOLUTION EPIDURAL; INTRACAUDAL
Status: COMPLETED | OUTPATIENT
Start: 2022-01-01 | End: 2022-01-01

## 2022-01-01 RX ORDER — INSULIN GLARGINE 100 [IU]/ML
5 INJECTION, SOLUTION SUBCUTANEOUS 2 TIMES DAILY
Status: DISCONTINUED | OUTPATIENT
Start: 2022-01-01 | End: 2022-01-01 | Stop reason: HOSPADM

## 2022-01-01 RX ORDER — VITAMIN B COMPLEX
2000 TABLET ORAL DAILY
Status: DISCONTINUED | OUTPATIENT
Start: 2022-01-01 | End: 2022-01-01

## 2022-01-01 RX ORDER — INSULIN GLARGINE 100 [IU]/ML
5 INJECTION, SOLUTION SUBCUTANEOUS EVERY MORNING
Status: DISCONTINUED | OUTPATIENT
Start: 2022-01-01 | End: 2022-01-01

## 2022-01-01 RX ORDER — ONDANSETRON 2 MG/ML
4 INJECTION INTRAMUSCULAR; INTRAVENOUS EVERY 6 HOURS PRN
Status: DISCONTINUED | OUTPATIENT
Start: 2022-01-01 | End: 2022-01-01 | Stop reason: HOSPADM

## 2022-01-01 RX ORDER — METHYLPREDNISOLONE SODIUM SUCCINATE 40 MG/ML
40 INJECTION, POWDER, LYOPHILIZED, FOR SOLUTION INTRAMUSCULAR; INTRAVENOUS ONCE
Status: COMPLETED | OUTPATIENT
Start: 2022-01-01 | End: 2022-01-01

## 2022-01-01 RX ORDER — SODIUM CHLORIDE, SODIUM LACTATE, POTASSIUM CHLORIDE, CALCIUM CHLORIDE 600; 310; 30; 20 MG/100ML; MG/100ML; MG/100ML; MG/100ML
INJECTION, SOLUTION INTRAVENOUS CONTINUOUS
Status: DISCONTINUED | OUTPATIENT
Start: 2022-01-01 | End: 2022-01-01

## 2022-01-01 RX ORDER — SODIUM CHLORIDE 9 MG/ML
INJECTION, SOLUTION INTRAVENOUS CONTINUOUS
Status: DISCONTINUED | OUTPATIENT
Start: 2022-01-01 | End: 2022-01-01 | Stop reason: HOSPADM

## 2022-01-01 RX ORDER — PANTOPRAZOLE SODIUM 40 MG/1
40 TABLET, DELAYED RELEASE ORAL
Qty: 30 TABLET | Refills: 3 | Status: SHIPPED | OUTPATIENT
Start: 2022-01-01

## 2022-01-01 RX ORDER — SODIUM CHLORIDE 9 MG/ML
INJECTION, SOLUTION INTRAVENOUS CONTINUOUS PRN
Status: DISCONTINUED | OUTPATIENT
Start: 2022-01-01 | End: 2022-01-01 | Stop reason: SDUPTHER

## 2022-01-01 RX ORDER — FENTANYL CITRATE 50 UG/ML
INJECTION, SOLUTION INTRAMUSCULAR; INTRAVENOUS PRN
Status: DISCONTINUED | OUTPATIENT
Start: 2022-01-01 | End: 2022-01-01 | Stop reason: SDUPTHER

## 2022-01-01 RX ORDER — CALCIUM ACETATE 667 MG/1
2 CAPSULE ORAL
Status: DISCONTINUED | OUTPATIENT
Start: 2022-01-01 | End: 2022-01-01

## 2022-01-01 RX ORDER — ONDANSETRON 2 MG/ML
INJECTION INTRAMUSCULAR; INTRAVENOUS PRN
Status: DISCONTINUED | OUTPATIENT
Start: 2022-01-01 | End: 2022-01-01 | Stop reason: SDUPTHER

## 2022-01-01 RX ORDER — ALBUMIN, HUMAN INJ 5% 5 %
SOLUTION INTRAVENOUS PRN
Status: DISCONTINUED | OUTPATIENT
Start: 2022-01-01 | End: 2022-01-01 | Stop reason: SDUPTHER

## 2022-01-01 RX ORDER — HEPARIN SODIUM AND DEXTROSE 10000; 5 [USP'U]/100ML; G/100ML
0-3000 INJECTION INTRAVENOUS CONTINUOUS
Status: DISCONTINUED | OUTPATIENT
Start: 2022-01-01 | End: 2022-01-01

## 2022-01-01 RX ORDER — HEPARIN SODIUM 1000 [USP'U]/ML
3200 INJECTION, SOLUTION INTRAVENOUS; SUBCUTANEOUS PRN
Status: DISCONTINUED | OUTPATIENT
Start: 2022-01-01 | End: 2022-01-01 | Stop reason: HOSPADM

## 2022-01-01 RX ORDER — ONDANSETRON 4 MG/1
4 TABLET, ORALLY DISINTEGRATING ORAL EVERY 8 HOURS PRN
Status: DISCONTINUED | OUTPATIENT
Start: 2022-01-01 | End: 2022-01-01 | Stop reason: HOSPADM

## 2022-01-01 RX ORDER — DEXTROSE MONOHYDRATE 25 G/50ML
12.5 INJECTION, SOLUTION INTRAVENOUS PRN
Status: DISCONTINUED | OUTPATIENT
Start: 2022-01-01 | End: 2022-01-01 | Stop reason: HOSPADM

## 2022-01-01 RX ORDER — MIDODRINE HYDROCHLORIDE 10 MG/1
10 TABLET ORAL
Status: DISCONTINUED | OUTPATIENT
Start: 2022-01-01 | End: 2022-01-01 | Stop reason: HOSPADM

## 2022-01-01 RX ORDER — SODIUM CHLORIDE 0.9 % (FLUSH) 0.9 %
10 SYRINGE (ML) INJECTION PRN
Status: DISCONTINUED | OUTPATIENT
Start: 2022-01-01 | End: 2022-01-01 | Stop reason: HOSPADM

## 2022-01-01 RX ORDER — AMLODIPINE BESYLATE 10 MG/1
10 TABLET ORAL DAILY
Status: DISCONTINUED | OUTPATIENT
Start: 2022-01-01 | End: 2022-01-01 | Stop reason: HOSPADM

## 2022-01-01 RX ORDER — ALBUMIN (HUMAN) 12.5 G/50ML
25 SOLUTION INTRAVENOUS PRN
Status: DISCONTINUED | OUTPATIENT
Start: 2022-01-01 | End: 2022-01-01 | Stop reason: HOSPADM

## 2022-01-01 RX ORDER — SODIUM CHLORIDE, SODIUM LACTATE, CALCIUM CHLORIDE, MAGNESIUM CHLORIDE AND DEXTROSE 2.5; 538; 448; 18.3; 5.08 G/100ML; MG/100ML; MG/100ML; MG/100ML; MG/100ML
2000 INJECTION, SOLUTION INTRAPERITONEAL NIGHTLY
Status: DISCONTINUED | OUTPATIENT
Start: 2022-01-01 | End: 2022-01-01

## 2022-01-01 RX ORDER — SODIUM CHLORIDE 9 MG/ML
INJECTION, SOLUTION INTRAVENOUS PRN
Status: DISCONTINUED | OUTPATIENT
Start: 2022-01-01 | End: 2022-01-01 | Stop reason: HOSPADM

## 2022-01-01 RX ORDER — HEPARIN SODIUM 5000 [USP'U]/ML
5000 INJECTION, SOLUTION INTRAVENOUS; SUBCUTANEOUS EVERY 8 HOURS SCHEDULED
Status: DISCONTINUED | OUTPATIENT
Start: 2022-01-01 | End: 2022-01-01 | Stop reason: HOSPADM

## 2022-01-01 RX ORDER — OXYCODONE HYDROCHLORIDE 5 MG/1
5 TABLET ORAL EVERY 4 HOURS PRN
Status: DISCONTINUED | OUTPATIENT
Start: 2022-01-01 | End: 2022-01-01 | Stop reason: HOSPADM

## 2022-01-01 RX ORDER — WARFARIN SODIUM 5 MG/1
5 TABLET ORAL
Status: COMPLETED | OUTPATIENT
Start: 2022-01-01 | End: 2022-01-01

## 2022-01-01 RX ORDER — LOSARTAN POTASSIUM 50 MG/1
50 TABLET ORAL NIGHTLY
Status: DISCONTINUED | OUTPATIENT
Start: 2022-01-01 | End: 2022-01-01

## 2022-01-01 RX ORDER — PHENYLEPHRINE HCL IN 0.9% NACL 1 MG/10 ML
SYRINGE (ML) INTRAVENOUS PRN
Status: DISCONTINUED | OUTPATIENT
Start: 2022-01-01 | End: 2022-01-01 | Stop reason: SDUPTHER

## 2022-01-01 RX ORDER — POTASSIUM CHLORIDE 7.45 MG/ML
10 INJECTION INTRAVENOUS
Status: DISPENSED | OUTPATIENT
Start: 2022-01-01 | End: 2022-01-01

## 2022-01-01 RX ORDER — EPINEPHRINE 0.1 MG/ML
SYRINGE (ML) INJECTION
Status: COMPLETED | OUTPATIENT
Start: 2022-01-01 | End: 2022-01-01

## 2022-01-01 RX ORDER — SODIUM CHLORIDE 9 MG/ML
10 INJECTION INTRAVENOUS 2 TIMES DAILY
Status: DISCONTINUED | OUTPATIENT
Start: 2022-01-01 | End: 2022-01-01 | Stop reason: HOSPADM

## 2022-01-01 RX ORDER — ACETAMINOPHEN 325 MG/1
650 TABLET ORAL EVERY 6 HOURS PRN
Status: DISCONTINUED | OUTPATIENT
Start: 2022-01-01 | End: 2022-01-01 | Stop reason: HOSPADM

## 2022-01-01 RX ORDER — OXYCODONE HYDROCHLORIDE 5 MG/1
5 TABLET ORAL PRN
Status: DISCONTINUED | OUTPATIENT
Start: 2022-01-01 | End: 2022-01-01 | Stop reason: HOSPADM

## 2022-01-01 RX ORDER — INSULIN LISPRO 100 [IU]/ML
0-12 INJECTION, SOLUTION INTRAVENOUS; SUBCUTANEOUS
Status: DISCONTINUED | OUTPATIENT
Start: 2022-01-01 | End: 2022-01-01 | Stop reason: HOSPADM

## 2022-01-01 RX ORDER — SODIUM CHLORIDE, SODIUM LACTATE, POTASSIUM CHLORIDE, AND CALCIUM CHLORIDE .6; .31; .03; .02 G/100ML; G/100ML; G/100ML; G/100ML
1000 INJECTION, SOLUTION INTRAVENOUS ONCE
Status: COMPLETED | OUTPATIENT
Start: 2022-01-01 | End: 2022-01-01

## 2022-01-01 RX ORDER — CALCIUM ACETATE 667 MG/1
1 CAPSULE ORAL
Status: DISCONTINUED | OUTPATIENT
Start: 2022-01-01 | End: 2022-01-01 | Stop reason: HOSPADM

## 2022-01-01 RX ORDER — MORPHINE SULFATE 2 MG/ML
4 INJECTION, SOLUTION INTRAMUSCULAR; INTRAVENOUS ONCE
Status: COMPLETED | OUTPATIENT
Start: 2022-01-01 | End: 2022-01-01

## 2022-01-01 RX ORDER — VANCOMYCIN HYDROCHLORIDE 250 MG/1
250 CAPSULE ORAL 4 TIMES DAILY
Status: DISPENSED | OUTPATIENT
Start: 2022-01-01 | End: 2022-01-01

## 2022-01-01 RX ORDER — SIMETHICONE 80 MG
80 TABLET,CHEWABLE ORAL ONCE
Status: COMPLETED | OUTPATIENT
Start: 2022-01-01 | End: 2022-01-01

## 2022-01-01 RX ORDER — OXYCODONE HYDROCHLORIDE AND ACETAMINOPHEN 5; 325 MG/1; MG/1
2 TABLET ORAL EVERY 4 HOURS PRN
Status: DISCONTINUED | OUTPATIENT
Start: 2022-01-01 | End: 2022-01-01 | Stop reason: HOSPADM

## 2022-01-01 RX ORDER — PANTOPRAZOLE SODIUM 40 MG/1
40 TABLET, DELAYED RELEASE ORAL DAILY
Status: DISCONTINUED | OUTPATIENT
Start: 2022-01-01 | End: 2022-01-01

## 2022-01-01 RX ORDER — METOPROLOL TARTRATE 5 MG/5ML
5 INJECTION INTRAVENOUS ONCE
Status: COMPLETED | OUTPATIENT
Start: 2022-01-01 | End: 2022-01-01

## 2022-01-01 RX ORDER — ONDANSETRON 2 MG/ML
4 INJECTION INTRAMUSCULAR; INTRAVENOUS ONCE
Status: COMPLETED | OUTPATIENT
Start: 2022-01-01 | End: 2022-01-01

## 2022-01-01 RX ORDER — INSULIN LISPRO 100 [IU]/ML
0-6 INJECTION, SOLUTION INTRAVENOUS; SUBCUTANEOUS NIGHTLY
Status: DISCONTINUED | OUTPATIENT
Start: 2022-01-01 | End: 2022-01-01 | Stop reason: HOSPADM

## 2022-01-01 RX ORDER — HYDRALAZINE HYDROCHLORIDE 50 MG/1
50 TABLET, FILM COATED ORAL 3 TIMES DAILY
Status: DISCONTINUED | OUTPATIENT
Start: 2022-01-01 | End: 2022-01-01 | Stop reason: CLARIF

## 2022-01-01 RX ORDER — WARFARIN SODIUM 5 MG/1
5 TABLET ORAL ONCE
Status: COMPLETED | OUTPATIENT
Start: 2022-01-01 | End: 2022-01-01

## 2022-01-01 RX ORDER — SODIUM CHLORIDE 9 MG/ML
50 INJECTION, SOLUTION INTRAVENOUS ONCE
Status: COMPLETED | OUTPATIENT
Start: 2022-01-01 | End: 2022-01-01

## 2022-01-01 RX ORDER — FENTANYL CITRATE 50 UG/ML
INJECTION, SOLUTION INTRAMUSCULAR; INTRAVENOUS PRN
Status: DISCONTINUED | OUTPATIENT
Start: 2022-01-01 | End: 2022-01-01 | Stop reason: ALTCHOICE

## 2022-01-01 RX ORDER — FENTANYL CITRATE 50 UG/ML
50 INJECTION, SOLUTION INTRAMUSCULAR; INTRAVENOUS ONCE
Status: COMPLETED | OUTPATIENT
Start: 2022-01-01 | End: 2022-01-01

## 2022-01-01 RX ORDER — PANTOPRAZOLE SODIUM 40 MG/10ML
40 INJECTION, POWDER, LYOPHILIZED, FOR SOLUTION INTRAVENOUS 2 TIMES DAILY
Status: DISCONTINUED | OUTPATIENT
Start: 2022-01-01 | End: 2022-01-01 | Stop reason: HOSPADM

## 2022-01-01 RX ORDER — LOSARTAN POTASSIUM 100 MG/1
100 TABLET ORAL NIGHTLY
Status: DISCONTINUED | OUTPATIENT
Start: 2022-01-01 | End: 2022-01-01

## 2022-01-01 RX ORDER — SODIUM CHLORIDE 0.9 % (FLUSH) 0.9 %
5-40 SYRINGE (ML) INJECTION EVERY 12 HOURS SCHEDULED
Status: DISCONTINUED | OUTPATIENT
Start: 2022-01-01 | End: 2022-01-01 | Stop reason: HOSPADM

## 2022-01-01 RX ORDER — SODIUM CHLORIDE, SODIUM LACTATE, CALCIUM CHLORIDE, MAGNESIUM CHLORIDE AND DEXTROSE 2.5; 538; 448; 18.3; 5.08 G/100ML; MG/100ML; MG/100ML; MG/100ML; MG/100ML
500 INJECTION, SOLUTION INTRAPERITONEAL ONCE
Status: COMPLETED | OUTPATIENT
Start: 2022-01-01 | End: 2022-01-01

## 2022-01-01 RX ORDER — SODIUM CHLORIDE, SODIUM LACTATE, CALCIUM CHLORIDE, MAGNESIUM CHLORIDE AND DEXTROSE 4.25; 538; 448; 18.3; 5.08 G/100ML; MG/100ML; MG/100ML; MG/100ML; MG/100ML
6000 INJECTION, SOLUTION INTRAPERITONEAL NIGHTLY
Status: DISCONTINUED | OUTPATIENT
Start: 2022-01-01 | End: 2022-01-01 | Stop reason: RX

## 2022-01-01 RX ORDER — MORPHINE SULFATE 2 MG/ML
2 INJECTION, SOLUTION INTRAMUSCULAR; INTRAVENOUS
Status: DISCONTINUED | OUTPATIENT
Start: 2022-01-01 | End: 2022-01-01 | Stop reason: HOSPADM

## 2022-01-01 RX ORDER — POTASSIUM CHLORIDE 750 MG/1
40 TABLET, FILM COATED, EXTENDED RELEASE ORAL ONCE
Status: COMPLETED | OUTPATIENT
Start: 2022-01-01 | End: 2022-01-01

## 2022-01-01 RX ORDER — FENTANYL CITRATE 50 UG/ML
50 INJECTION, SOLUTION INTRAMUSCULAR; INTRAVENOUS EVERY 5 MIN PRN
Status: DISCONTINUED | OUTPATIENT
Start: 2022-01-01 | End: 2022-01-01 | Stop reason: HOSPADM

## 2022-01-01 RX ORDER — DIPHENHYDRAMINE HCL 25 MG
25 TABLET ORAL EVERY 6 HOURS PRN
Status: DISCONTINUED | OUTPATIENT
Start: 2022-01-01 | End: 2022-01-01 | Stop reason: HOSPADM

## 2022-01-01 RX ORDER — WARFARIN SODIUM 2 MG/1
2 TABLET ORAL DAILY
Qty: 60 TABLET | Refills: 3 | Status: SHIPPED | OUTPATIENT
Start: 2022-01-01

## 2022-01-01 RX ORDER — POTASSIUM CHLORIDE 20 MEQ/1
40 TABLET, EXTENDED RELEASE ORAL ONCE
Status: DISCONTINUED | OUTPATIENT
Start: 2022-01-01 | End: 2022-01-01

## 2022-01-01 RX ORDER — SODIUM CHLORIDE 0.9 % (FLUSH) 0.9 %
10 SYRINGE (ML) INJECTION EVERY 12 HOURS SCHEDULED
Status: DISCONTINUED | OUTPATIENT
Start: 2022-01-01 | End: 2022-01-01 | Stop reason: HOSPADM

## 2022-01-01 RX ORDER — PANTOPRAZOLE SODIUM 40 MG/1
40 TABLET, DELAYED RELEASE ORAL
Status: DISCONTINUED | OUTPATIENT
Start: 2022-01-01 | End: 2022-01-01 | Stop reason: HOSPADM

## 2022-01-01 RX ORDER — DEXAMETHASONE 6 MG/1
6 TABLET ORAL DAILY
Qty: 7 TABLET | Refills: 0 | Status: SHIPPED | OUTPATIENT
Start: 2022-01-01 | End: 2022-01-01

## 2022-01-01 RX ORDER — HEPARIN SODIUM 5000 [USP'U]/ML
5000 INJECTION, SOLUTION INTRAVENOUS; SUBCUTANEOUS EVERY 8 HOURS SCHEDULED
Status: DISCONTINUED | OUTPATIENT
Start: 2022-01-01 | End: 2022-01-01

## 2022-01-01 RX ORDER — 0.9 % SODIUM CHLORIDE 0.9 %
250 INTRAVENOUS SOLUTION INTRAVENOUS ONCE
Status: COMPLETED | OUTPATIENT
Start: 2022-01-01 | End: 2022-01-01

## 2022-01-01 RX ORDER — MIDAZOLAM HYDROCHLORIDE 1 MG/ML
INJECTION INTRAMUSCULAR; INTRAVENOUS PRN
Status: DISCONTINUED | OUTPATIENT
Start: 2022-01-01 | End: 2022-01-01 | Stop reason: ALTCHOICE

## 2022-01-01 RX ORDER — CIPROFLOXACIN 500 MG/1
500 TABLET, FILM COATED ORAL DAILY
Qty: 14 TABLET | Refills: 0 | Status: SHIPPED | OUTPATIENT
Start: 2022-01-01 | End: 2022-01-01

## 2022-01-01 RX ORDER — LOSARTAN POTASSIUM 50 MG/1
50 TABLET ORAL NIGHTLY
Status: DISCONTINUED | OUTPATIENT
Start: 2022-01-01 | End: 2022-01-01 | Stop reason: HOSPADM

## 2022-01-01 RX ORDER — SODIUM CHLORIDE, SODIUM LACTATE, CALCIUM CHLORIDE, MAGNESIUM CHLORIDE AND DEXTROSE 4.25; 538; 448; 18.3; 5.08 G/100ML; MG/100ML; MG/100ML; MG/100ML; MG/100ML
6000 INJECTION, SOLUTION INTRAPERITONEAL NIGHTLY
Status: DISCONTINUED | OUTPATIENT
Start: 2022-01-01 | End: 2022-01-01

## 2022-01-01 RX ORDER — ROCURONIUM BROMIDE 10 MG/ML
INJECTION, SOLUTION INTRAVENOUS PRN
Status: DISCONTINUED | OUTPATIENT
Start: 2022-01-01 | End: 2022-01-01 | Stop reason: SDUPTHER

## 2022-01-01 RX ORDER — ATROPINE SULFATE 1 MG/ML
INJECTION, SOLUTION INTRAMUSCULAR; INTRAVENOUS; SUBCUTANEOUS
Status: COMPLETED | OUTPATIENT
Start: 2022-01-01 | End: 2022-01-01

## 2022-01-01 RX ORDER — LOPERAMIDE HYDROCHLORIDE 2 MG/1
2 CAPSULE ORAL 4 TIMES DAILY PRN
Status: DISCONTINUED | OUTPATIENT
Start: 2022-01-01 | End: 2022-01-01

## 2022-01-01 RX ORDER — AMLODIPINE BESYLATE 10 MG/1
10 TABLET ORAL DAILY
Status: DISCONTINUED | OUTPATIENT
Start: 2022-01-01 | End: 2022-01-01

## 2022-01-01 RX ORDER — MIDODRINE HYDROCHLORIDE 10 MG/1
10 TABLET ORAL
Qty: 12 TABLET | Refills: 0 | Status: SHIPPED | OUTPATIENT
Start: 2022-01-01 | End: 2022-05-04

## 2022-01-01 RX ORDER — MIDAZOLAM HYDROCHLORIDE 1 MG/ML
INJECTION INTRAMUSCULAR; INTRAVENOUS PRN
Status: DISCONTINUED | OUTPATIENT
Start: 2022-01-01 | End: 2022-01-01 | Stop reason: SDUPTHER

## 2022-01-01 RX ORDER — HEPARIN SODIUM 10000 [USP'U]/100ML
0-3000 INJECTION, SOLUTION INTRAVENOUS CONTINUOUS
Status: DISCONTINUED | OUTPATIENT
Start: 2022-01-01 | End: 2022-01-01

## 2022-01-01 RX ORDER — METHOCARBAMOL 500 MG/1
500 TABLET, FILM COATED ORAL 3 TIMES DAILY
Status: ON HOLD | COMMUNITY
End: 2022-01-01

## 2022-01-01 RX ORDER — MAGNESIUM SULFATE 1 G/100ML
1000 INJECTION INTRAVENOUS ONCE
Status: COMPLETED | OUTPATIENT
Start: 2022-01-01 | End: 2022-01-01

## 2022-01-01 RX ORDER — CALCIUM ACETATE 667 MG/1
2 CAPSULE ORAL
Status: DISCONTINUED | OUTPATIENT
Start: 2022-01-01 | End: 2022-01-01 | Stop reason: HOSPADM

## 2022-01-01 RX ORDER — LOSARTAN POTASSIUM 50 MG/1
50 TABLET ORAL DAILY
Status: DISCONTINUED | OUTPATIENT
Start: 2022-01-01 | End: 2022-01-01

## 2022-01-01 RX ORDER — DEXAMETHASONE SODIUM PHOSPHATE 4 MG/ML
6 INJECTION, SOLUTION INTRA-ARTICULAR; INTRALESIONAL; INTRAMUSCULAR; INTRAVENOUS; SOFT TISSUE ONCE
Status: COMPLETED | OUTPATIENT
Start: 2022-01-01 | End: 2022-01-01

## 2022-01-01 RX ORDER — METOCLOPRAMIDE HYDROCHLORIDE 5 MG/ML
5 INJECTION INTRAMUSCULAR; INTRAVENOUS EVERY 6 HOURS
Status: DISCONTINUED | OUTPATIENT
Start: 2022-01-01 | End: 2022-01-01 | Stop reason: HOSPADM

## 2022-01-01 RX ORDER — GLYCOPYRROLATE 0.2 MG/ML
INJECTION INTRAMUSCULAR; INTRAVENOUS PRN
Status: DISCONTINUED | OUTPATIENT
Start: 2022-01-01 | End: 2022-01-01 | Stop reason: SDUPTHER

## 2022-01-01 RX ORDER — DEXAMETHASONE 4 MG/1
6 TABLET ORAL DAILY
Status: DISCONTINUED | OUTPATIENT
Start: 2022-01-01 | End: 2022-01-01 | Stop reason: HOSPADM

## 2022-01-01 RX ORDER — MORPHINE SULFATE 2 MG/ML
2 INJECTION, SOLUTION INTRAMUSCULAR; INTRAVENOUS ONCE
Status: COMPLETED | OUTPATIENT
Start: 2022-01-01 | End: 2022-01-01

## 2022-01-01 RX ORDER — FENTANYL CITRATE 50 UG/ML
25 INJECTION, SOLUTION INTRAMUSCULAR; INTRAVENOUS EVERY 5 MIN PRN
Status: DISCONTINUED | OUTPATIENT
Start: 2022-01-01 | End: 2022-01-01 | Stop reason: HOSPADM

## 2022-01-01 RX ORDER — POTASSIUM CHLORIDE 7.45 MG/ML
30 INJECTION INTRAVENOUS ONCE
Status: DISCONTINUED | OUTPATIENT
Start: 2022-01-01 | End: 2022-01-01

## 2022-01-01 RX ORDER — DIPHENHYDRAMINE HYDROCHLORIDE 50 MG/ML
25 INJECTION INTRAMUSCULAR; INTRAVENOUS EVERY 8 HOURS PRN
Status: DISCONTINUED | OUTPATIENT
Start: 2022-01-01 | End: 2022-01-01 | Stop reason: HOSPADM

## 2022-01-01 RX ORDER — CIPROFLOXACIN 2 MG/ML
400 INJECTION, SOLUTION INTRAVENOUS ONCE
Status: DISCONTINUED | OUTPATIENT
Start: 2022-01-01 | End: 2022-01-01

## 2022-01-01 RX ORDER — DIPHENHYDRAMINE HCL 25 MG
25 TABLET ORAL EVERY 8 HOURS PRN
Status: DISCONTINUED | OUTPATIENT
Start: 2022-01-01 | End: 2022-01-01 | Stop reason: HOSPADM

## 2022-01-01 RX ORDER — SUCCINYLCHOLINE/SOD CL,ISO/PF 200MG/10ML
SYRINGE (ML) INTRAVENOUS PRN
Status: DISCONTINUED | OUTPATIENT
Start: 2022-01-01 | End: 2022-01-01 | Stop reason: SDUPTHER

## 2022-01-01 RX ORDER — METOCLOPRAMIDE HYDROCHLORIDE 5 MG/ML
10 INJECTION INTRAMUSCULAR; INTRAVENOUS ONCE
Status: COMPLETED | OUTPATIENT
Start: 2022-01-01 | End: 2022-01-01

## 2022-01-01 RX ORDER — WARFARIN SODIUM 1 MG/1
1 TABLET ORAL
Status: COMPLETED | OUTPATIENT
Start: 2022-01-01 | End: 2022-01-01

## 2022-01-01 RX ADMIN — FENTANYL CITRATE 100 MCG: 50 INJECTION INTRAMUSCULAR; INTRAVENOUS at 10:45

## 2022-01-01 RX ADMIN — ONDANSETRON 4 MG: 2 INJECTION INTRAMUSCULAR; INTRAVENOUS at 05:07

## 2022-01-01 RX ADMIN — FENTANYL CITRATE 25 MCG: 50 INJECTION INTRAMUSCULAR; INTRAVENOUS at 13:55

## 2022-01-01 RX ADMIN — HEPARIN SODIUM 5000 UNITS: 5000 INJECTION INTRAVENOUS; SUBCUTANEOUS at 12:34

## 2022-01-01 RX ADMIN — CIPROFLOXACIN 200 MG: 2 INJECTION, SOLUTION INTRAVENOUS at 21:20

## 2022-01-01 RX ADMIN — PANTOPRAZOLE SODIUM 40 MG: 40 INJECTION, POWDER, FOR SOLUTION INTRAVENOUS at 10:15

## 2022-01-01 RX ADMIN — ONDANSETRON 4 MG: 2 INJECTION INTRAMUSCULAR; INTRAVENOUS at 05:53

## 2022-01-01 RX ADMIN — INSULIN LISPRO 1 UNITS: 100 INJECTION, SOLUTION INTRAVENOUS; SUBCUTANEOUS at 08:37

## 2022-01-01 RX ADMIN — POTASSIUM CHLORIDE 40 MEQ: 1.5 FOR SOLUTION ORAL at 08:34

## 2022-01-01 RX ADMIN — ONDANSETRON 4 MG: 2 INJECTION INTRAMUSCULAR; INTRAVENOUS at 21:37

## 2022-01-01 RX ADMIN — EPOETIN ALFA-EPBX 10000 UNITS: 10000 INJECTION, SOLUTION INTRAVENOUS; SUBCUTANEOUS at 11:37

## 2022-01-01 RX ADMIN — HEPARIN SODIUM: 1000 INJECTION INTRAVENOUS; SUBCUTANEOUS at 22:47

## 2022-01-01 RX ADMIN — IOPAMIDOL 75 ML: 755 INJECTION, SOLUTION INTRAVENOUS at 15:12

## 2022-01-01 RX ADMIN — POTASSIUM CHLORIDE 10 MEQ: 7.45 INJECTION INTRAVENOUS at 02:31

## 2022-01-01 RX ADMIN — ONDANSETRON 4 MG: 2 INJECTION INTRAMUSCULAR; INTRAVENOUS at 23:51

## 2022-01-01 RX ADMIN — SIMETHICONE 80 MG: 80 TABLET, CHEWABLE ORAL at 15:51

## 2022-01-01 RX ADMIN — LOSARTAN POTASSIUM 50 MG: 50 TABLET, FILM COATED ORAL at 20:06

## 2022-01-01 RX ADMIN — PANTOPRAZOLE SODIUM 40 MG: 40 TABLET, DELAYED RELEASE ORAL at 05:36

## 2022-01-01 RX ADMIN — Medication 200 MCG: at 12:48

## 2022-01-01 RX ADMIN — DIPHENHYDRAMINE HCL 25 MG: 25 TABLET ORAL at 21:27

## 2022-01-01 RX ADMIN — INSULIN LISPRO 12 UNITS: 100 INJECTION, SOLUTION INTRAVENOUS; SUBCUTANEOUS at 12:48

## 2022-01-01 RX ADMIN — MEROPENEM 500 MG: 500 INJECTION, POWDER, FOR SOLUTION INTRAVENOUS at 01:19

## 2022-01-01 RX ADMIN — PANTOPRAZOLE SODIUM 40 MG: 40 TABLET, DELAYED RELEASE ORAL at 05:07

## 2022-01-01 RX ADMIN — METOCLOPRAMIDE HYDROCHLORIDE 5 MG: 5 INJECTION INTRAMUSCULAR; INTRAVENOUS at 06:08

## 2022-01-01 RX ADMIN — INSULIN LISPRO 2 UNITS: 100 INJECTION, SOLUTION INTRAVENOUS; SUBCUTANEOUS at 20:33

## 2022-01-01 RX ADMIN — ACETAMINOPHEN 650 MG: 325 TABLET ORAL at 13:58

## 2022-01-01 RX ADMIN — Medication 100 MG: at 10:45

## 2022-01-01 RX ADMIN — SODIUM CHLORIDE, PRESERVATIVE FREE 10 ML: 5 INJECTION INTRAVENOUS at 08:11

## 2022-01-01 RX ADMIN — HEPARIN SODIUM 5000 UNITS: 5000 INJECTION INTRAVENOUS; SUBCUTANEOUS at 05:36

## 2022-01-01 RX ADMIN — SODIUM CHLORIDE 25 ML: 9 INJECTION, SOLUTION INTRAVENOUS at 21:26

## 2022-01-01 RX ADMIN — NYSTATIN 500000 UNITS: 100000 SUSPENSION ORAL at 20:38

## 2022-01-01 RX ADMIN — MAGNESIUM SULFATE HEPTAHYDRATE 2000 MG: 40 INJECTION, SOLUTION INTRAVENOUS at 13:17

## 2022-01-01 RX ADMIN — VANCOMYCIN HYDROCHLORIDE 250 MG: 250 CAPSULE ORAL at 22:04

## 2022-01-01 RX ADMIN — SODIUM CHLORIDE, SODIUM LACTATE, CALCIUM CHLORIDE, MAGNESIUM CHLORIDE AND DEXTROSE 6000 ML: 2.5; 538; 448; 18.3; 5.08 INJECTION, SOLUTION INTRAPERITONEAL at 21:41

## 2022-01-01 RX ADMIN — PANTOPRAZOLE SODIUM 40 MG: 40 INJECTION, POWDER, FOR SOLUTION INTRAVENOUS at 20:41

## 2022-01-01 RX ADMIN — SODIUM CHLORIDE, PRESERVATIVE FREE 10 ML: 5 INJECTION INTRAVENOUS at 21:34

## 2022-01-01 RX ADMIN — ROCURONIUM BROMIDE 20 MG: 10 INJECTION INTRAVENOUS at 11:05

## 2022-01-01 RX ADMIN — Medication 1540 UNITS/HR: at 16:39

## 2022-01-01 RX ADMIN — DIPHENHYDRAMINE HCL 25 MG: 25 TABLET ORAL at 20:31

## 2022-01-01 RX ADMIN — Medication 100 MCG: at 11:18

## 2022-01-01 RX ADMIN — LIDOCAINE HYDROCHLORIDE 100 MG: 20 INJECTION, SOLUTION EPIDURAL; INFILTRATION; INTRACAUDAL; PERINEURAL at 12:41

## 2022-01-01 RX ADMIN — SODIUM CHLORIDE, PRESERVATIVE FREE 10 ML: 5 INJECTION INTRAVENOUS at 22:32

## 2022-01-01 RX ADMIN — HEPARIN SODIUM 5000 UNITS: 5000 INJECTION INTRAVENOUS; SUBCUTANEOUS at 05:11

## 2022-01-01 RX ADMIN — INSULIN LISPRO 1 UNITS: 100 INJECTION, SOLUTION INTRAVENOUS; SUBCUTANEOUS at 12:34

## 2022-01-01 RX ADMIN — SODIUM CHLORIDE, PRESERVATIVE FREE 10 ML: 5 INJECTION INTRAVENOUS at 08:41

## 2022-01-01 RX ADMIN — DIPHENHYDRAMINE HYDROCHLORIDE 25 MG: 50 INJECTION, SOLUTION INTRAMUSCULAR; INTRAVENOUS at 16:09

## 2022-01-01 RX ADMIN — METOCLOPRAMIDE HYDROCHLORIDE 5 MG: 5 INJECTION INTRAMUSCULAR; INTRAVENOUS at 05:53

## 2022-01-01 RX ADMIN — MORPHINE SULFATE 2 MG: 2 INJECTION, SOLUTION INTRAMUSCULAR; INTRAVENOUS at 00:26

## 2022-01-01 RX ADMIN — ONDANSETRON 4 MG: 2 INJECTION INTRAMUSCULAR; INTRAVENOUS at 11:44

## 2022-01-01 RX ADMIN — SODIUM CHLORIDE, PRESERVATIVE FREE 10 ML: 5 INJECTION INTRAVENOUS at 21:13

## 2022-01-01 RX ADMIN — POTASSIUM CHLORIDE AND SODIUM CHLORIDE 1000 ML: 900; 150 INJECTION, SOLUTION INTRAVENOUS at 22:05

## 2022-01-01 RX ADMIN — MIDODRINE HYDROCHLORIDE 5 MG: 5 TABLET ORAL at 16:59

## 2022-01-01 RX ADMIN — ONDANSETRON 4 MG: 2 INJECTION INTRAMUSCULAR; INTRAVENOUS at 09:47

## 2022-01-01 RX ADMIN — MAGNESIUM SULFATE HEPTAHYDRATE 2000 MG: 40 INJECTION, SOLUTION INTRAVENOUS at 11:07

## 2022-01-01 RX ADMIN — MIDODRINE HYDROCHLORIDE 5 MG: 5 TABLET ORAL at 08:48

## 2022-01-01 RX ADMIN — HEPARIN SODIUM 5000 UNITS: 5000 INJECTION INTRAVENOUS; SUBCUTANEOUS at 12:57

## 2022-01-01 RX ADMIN — METOPROLOL TARTRATE 5 MG: 5 INJECTION INTRAVENOUS at 12:37

## 2022-01-01 RX ADMIN — PANTOPRAZOLE SODIUM 40 MG: 40 TABLET, DELAYED RELEASE ORAL at 06:12

## 2022-01-01 RX ADMIN — INSULIN LISPRO 9 UNITS: 100 INJECTION, SOLUTION INTRAVENOUS; SUBCUTANEOUS at 11:52

## 2022-01-01 RX ADMIN — INSULIN LISPRO 1 UNITS: 100 INJECTION, SOLUTION INTRAVENOUS; SUBCUTANEOUS at 08:26

## 2022-01-01 RX ADMIN — SODIUM CHLORIDE, POTASSIUM CHLORIDE, SODIUM LACTATE AND CALCIUM CHLORIDE: 600; 310; 30; 20 INJECTION, SOLUTION INTRAVENOUS at 17:19

## 2022-01-01 RX ADMIN — INSULIN LISPRO 5 UNITS: 100 INJECTION, SOLUTION INTRAVENOUS; SUBCUTANEOUS at 02:13

## 2022-01-01 RX ADMIN — MEROPENEM 500 MG: 500 INJECTION, POWDER, FOR SOLUTION INTRAVENOUS at 12:47

## 2022-01-01 RX ADMIN — MAGNESIUM SULFATE HEPTAHYDRATE 1000 MG: 1 INJECTION, SOLUTION INTRAVENOUS at 09:40

## 2022-01-01 RX ADMIN — HEPARIN SODIUM 5000 UNITS: 5000 INJECTION INTRAVENOUS; SUBCUTANEOUS at 21:13

## 2022-01-01 RX ADMIN — MIDODRINE HYDROCHLORIDE 10 MG: 10 TABLET ORAL at 11:23

## 2022-01-01 RX ADMIN — POTASSIUM CHLORIDE 40 MEQ: 750 TABLET, EXTENDED RELEASE ORAL at 09:35

## 2022-01-01 RX ADMIN — VANCOMYCIN HYDROCHLORIDE 250 MG: 250 CAPSULE ORAL at 12:47

## 2022-01-01 RX ADMIN — LOSARTAN POTASSIUM 50 MG: 50 TABLET, FILM COATED ORAL at 20:19

## 2022-01-01 RX ADMIN — FENTANYL CITRATE 50 MCG: 50 INJECTION, SOLUTION INTRAMUSCULAR; INTRAVENOUS at 12:14

## 2022-01-01 RX ADMIN — SODIUM CHLORIDE, PRESERVATIVE FREE 10 ML: 5 INJECTION INTRAVENOUS at 08:14

## 2022-01-01 RX ADMIN — METRONIDAZOLE 500 MG: 500 INJECTION, SOLUTION INTRAVENOUS at 08:21

## 2022-01-01 RX ADMIN — INSULIN LISPRO 1 UNITS: 100 INJECTION, SOLUTION INTRAVENOUS; SUBCUTANEOUS at 17:00

## 2022-01-01 RX ADMIN — ONDANSETRON 4 MG: 2 INJECTION INTRAMUSCULAR; INTRAVENOUS at 17:01

## 2022-01-01 RX ADMIN — INSULIN LISPRO 5 UNITS: 100 INJECTION, SOLUTION INTRAVENOUS; SUBCUTANEOUS at 11:47

## 2022-01-01 RX ADMIN — SODIUM CHLORIDE, PRESERVATIVE FREE 10 ML: 5 INJECTION INTRAVENOUS at 20:42

## 2022-01-01 RX ADMIN — SODIUM CHLORIDE, PRESERVATIVE FREE 10 ML: 5 INJECTION INTRAVENOUS at 22:07

## 2022-01-01 RX ADMIN — POTASSIUM CHLORIDE 40 MEQ: 2 INJECTION, SOLUTION, CONCENTRATE INTRAVENOUS at 13:17

## 2022-01-01 RX ADMIN — SODIUM CHLORIDE, PRESERVATIVE FREE 10 ML: 5 INJECTION INTRAVENOUS at 21:44

## 2022-01-01 RX ADMIN — ACETAMINOPHEN 650 MG: 325 TABLET ORAL at 05:21

## 2022-01-01 RX ADMIN — SODIUM CHLORIDE, PRESERVATIVE FREE 10 ML: 5 INJECTION INTRAVENOUS at 09:08

## 2022-01-01 RX ADMIN — SODIUM CHLORIDE, SODIUM LACTATE, CALCIUM CHLORIDE, MAGNESIUM CHLORIDE AND DEXTROSE 6000 ML: 2.5; 538; 448; 18.3; 5.08 INJECTION, SOLUTION INTRAPERITONEAL at 21:22

## 2022-01-01 RX ADMIN — INSULIN LISPRO 4 UNITS: 100 INJECTION, SOLUTION INTRAVENOUS; SUBCUTANEOUS at 14:52

## 2022-01-01 RX ADMIN — PANTOPRAZOLE SODIUM 40 MG: 40 TABLET, DELAYED RELEASE ORAL at 06:27

## 2022-01-01 RX ADMIN — VANCOMYCIN HYDROCHLORIDE 250 MG: 250 CAPSULE ORAL at 08:46

## 2022-01-01 RX ADMIN — SODIUM BICARBONATE 50 MEQ: 84 INJECTION INTRAVENOUS at 00:43

## 2022-01-01 RX ADMIN — INSULIN GLARGINE 5 UNITS: 100 INJECTION, SOLUTION SUBCUTANEOUS at 08:43

## 2022-01-01 RX ADMIN — EPINEPHRINE 0.5 MG: 1 INJECTION, SOLUTION, CONCENTRATE INTRAVENOUS at 01:04

## 2022-01-01 RX ADMIN — SODIUM CHLORIDE, PRESERVATIVE FREE 10 ML: 5 INJECTION INTRAVENOUS at 08:49

## 2022-01-01 RX ADMIN — INSULIN LISPRO 1 UNITS: 100 INJECTION, SOLUTION INTRAVENOUS; SUBCUTANEOUS at 22:05

## 2022-01-01 RX ADMIN — INSULIN LISPRO 1 UNITS: 100 INJECTION, SOLUTION INTRAVENOUS; SUBCUTANEOUS at 21:07

## 2022-01-01 RX ADMIN — HEPARIN SODIUM 5000 UNITS: 5000 INJECTION INTRAVENOUS; SUBCUTANEOUS at 21:41

## 2022-01-01 RX ADMIN — POTASSIUM CHLORIDE 10 MEQ: 7.46 INJECTION, SOLUTION INTRAVENOUS at 05:42

## 2022-01-01 RX ADMIN — MIDODRINE HYDROCHLORIDE 10 MG: 10 TABLET ORAL at 09:44

## 2022-01-01 RX ADMIN — VANCOMYCIN HYDROCHLORIDE 250 MG: 250 CAPSULE ORAL at 20:38

## 2022-01-01 RX ADMIN — INSULIN LISPRO 2 UNITS: 100 INJECTION, SOLUTION INTRAVENOUS; SUBCUTANEOUS at 08:15

## 2022-01-01 RX ADMIN — METOCLOPRAMIDE HYDROCHLORIDE 5 MG: 5 INJECTION INTRAMUSCULAR; INTRAVENOUS at 18:15

## 2022-01-01 RX ADMIN — SODIUM CHLORIDE 25 ML: 9 INJECTION, SOLUTION INTRAVENOUS at 16:54

## 2022-01-01 RX ADMIN — LOSARTAN POTASSIUM 50 MG: 50 TABLET, FILM COATED ORAL at 20:38

## 2022-01-01 RX ADMIN — METOCLOPRAMIDE HYDROCHLORIDE 5 MG: 5 INJECTION INTRAMUSCULAR; INTRAVENOUS at 05:43

## 2022-01-01 RX ADMIN — SODIUM CHLORIDE, PRESERVATIVE FREE 10 ML: 5 INJECTION INTRAVENOUS at 20:45

## 2022-01-01 RX ADMIN — INSULIN LISPRO 3 UNITS: 100 INJECTION, SOLUTION INTRAVENOUS; SUBCUTANEOUS at 11:57

## 2022-01-01 RX ADMIN — INSULIN LISPRO 1 UNITS: 100 INJECTION, SOLUTION INTRAVENOUS; SUBCUTANEOUS at 17:58

## 2022-01-01 RX ADMIN — SUGAMMADEX 200 MG: 100 INJECTION, SOLUTION INTRAVENOUS at 13:41

## 2022-01-01 RX ADMIN — SODIUM CHLORIDE, PRESERVATIVE FREE 10 ML: 5 INJECTION INTRAVENOUS at 21:12

## 2022-01-01 RX ADMIN — SODIUM CHLORIDE, PRESERVATIVE FREE 10 ML: 5 INJECTION INTRAVENOUS at 09:17

## 2022-01-01 RX ADMIN — METHYLPREDNISOLONE SODIUM SUCCINATE 40 MG: 40 INJECTION, POWDER, FOR SOLUTION INTRAMUSCULAR; INTRAVENOUS at 16:55

## 2022-01-01 RX ADMIN — NYSTATIN 500000 UNITS: 100000 SUSPENSION ORAL at 13:58

## 2022-01-01 RX ADMIN — HEPARIN SODIUM 5000 UNITS: 5000 INJECTION INTRAVENOUS; SUBCUTANEOUS at 15:36

## 2022-01-01 RX ADMIN — INSULIN LISPRO 6 UNITS: 100 INJECTION, SOLUTION INTRAVENOUS; SUBCUTANEOUS at 13:03

## 2022-01-01 RX ADMIN — MEROPENEM 500 MG: 500 INJECTION, POWDER, FOR SOLUTION INTRAVENOUS at 15:36

## 2022-01-01 RX ADMIN — VANCOMYCIN HYDROCHLORIDE 250 MG: 250 CAPSULE ORAL at 16:57

## 2022-01-01 RX ADMIN — INSULIN LISPRO 1 UNITS: 100 INJECTION, SOLUTION INTRAVENOUS; SUBCUTANEOUS at 22:19

## 2022-01-01 RX ADMIN — PROMETHAZINE HYDROCHLORIDE 12.5 MG: 25 INJECTION INTRAMUSCULAR; INTRAVENOUS at 00:39

## 2022-01-01 RX ADMIN — Medication 10 MEQ: at 06:17

## 2022-01-01 RX ADMIN — TORSEMIDE 200 MG: 100 TABLET ORAL at 09:08

## 2022-01-01 RX ADMIN — ONDANSETRON 4 MG: 2 INJECTION INTRAMUSCULAR; INTRAVENOUS at 04:08

## 2022-01-01 RX ADMIN — HEPARIN SODIUM 5000 UNITS: 5000 INJECTION INTRAVENOUS; SUBCUTANEOUS at 14:51

## 2022-01-01 RX ADMIN — TORSEMIDE 200 MG: 100 TABLET ORAL at 09:07

## 2022-01-01 RX ADMIN — ERGOCALCIFEROL 50000 UNITS: 1.25 CAPSULE ORAL at 00:33

## 2022-01-01 RX ADMIN — INSULIN LISPRO 2 UNITS: 100 INJECTION, SOLUTION INTRAVENOUS; SUBCUTANEOUS at 22:26

## 2022-01-01 RX ADMIN — DIPHENHYDRAMINE HCL 25 MG: 25 TABLET ORAL at 12:15

## 2022-01-01 RX ADMIN — DIPHENHYDRAMINE HYDROCHLORIDE 12.5 MG: 50 INJECTION, SOLUTION INTRAMUSCULAR; INTRAVENOUS at 14:48

## 2022-01-01 RX ADMIN — SODIUM BICARBONATE 50 MEQ: 84 INJECTION INTRAVENOUS at 00:55

## 2022-01-01 RX ADMIN — MIDAZOLAM 2 MG: 1 INJECTION INTRAMUSCULAR; INTRAVENOUS at 12:34

## 2022-01-01 RX ADMIN — HEPARIN SODIUM 5000 UNITS: 5000 INJECTION INTRAVENOUS; SUBCUTANEOUS at 07:00

## 2022-01-01 RX ADMIN — VANCOMYCIN HYDROCHLORIDE 250 MG: 250 CAPSULE ORAL at 13:42

## 2022-01-01 RX ADMIN — FENTANYL CITRATE 25 MCG: 50 INJECTION, SOLUTION INTRAMUSCULAR; INTRAVENOUS at 10:04

## 2022-01-01 RX ADMIN — ONDANSETRON 4 MG: 2 INJECTION INTRAMUSCULAR; INTRAVENOUS at 12:37

## 2022-01-01 RX ADMIN — SODIUM CHLORIDE, PRESERVATIVE FREE 10 ML: 5 INJECTION INTRAVENOUS at 08:46

## 2022-01-01 RX ADMIN — SODIUM CHLORIDE 40 MG: 9 INJECTION INTRAMUSCULAR; INTRAVENOUS; SUBCUTANEOUS at 09:12

## 2022-01-01 RX ADMIN — LOPERAMIDE HYDROCHLORIDE 2 MG: 2 CAPSULE ORAL at 05:53

## 2022-01-01 RX ADMIN — IOPAMIDOL 75 ML: 755 INJECTION, SOLUTION INTRAVENOUS at 09:04

## 2022-01-01 RX ADMIN — LOPERAMIDE HYDROCHLORIDE 2 MG: 2 CAPSULE ORAL at 21:04

## 2022-01-01 RX ADMIN — SODIUM CHLORIDE: 9 INJECTION, SOLUTION INTRAVENOUS at 10:38

## 2022-01-01 RX ADMIN — INSULIN LISPRO 1 UNITS: 100 INJECTION, SOLUTION INTRAVENOUS; SUBCUTANEOUS at 12:18

## 2022-01-01 RX ADMIN — POTASSIUM CHLORIDE 10 MEQ: 7.45 INJECTION INTRAVENOUS at 05:15

## 2022-01-01 RX ADMIN — HYDROMORPHONE HYDROCHLORIDE 0.5 MG: 1 INJECTION, SOLUTION INTRAMUSCULAR; INTRAVENOUS; SUBCUTANEOUS at 12:27

## 2022-01-01 RX ADMIN — HEPARIN SODIUM 5000 UNITS: 5000 INJECTION INTRAVENOUS; SUBCUTANEOUS at 14:06

## 2022-01-01 RX ADMIN — INSULIN LISPRO 1 UNITS: 100 INJECTION, SOLUTION INTRAVENOUS; SUBCUTANEOUS at 12:00

## 2022-01-01 RX ADMIN — MIDODRINE HYDROCHLORIDE 5 MG: 5 TABLET ORAL at 08:02

## 2022-01-01 RX ADMIN — SODIUM CHLORIDE, PRESERVATIVE FREE 10 ML: 5 INJECTION INTRAVENOUS at 10:01

## 2022-01-01 RX ADMIN — METRONIDAZOLE 500 MG: 500 INJECTION, SOLUTION INTRAVENOUS at 09:52

## 2022-01-01 RX ADMIN — EPINEPHRINE 1 MG: 1 INJECTION, SOLUTION, CONCENTRATE INTRAVENOUS at 00:53

## 2022-01-01 RX ADMIN — HEPARIN SODIUM 3000 UNITS: 1000 INJECTION INTRAVENOUS; SUBCUTANEOUS at 10:42

## 2022-01-01 RX ADMIN — CALCIUM ACETATE 667 MG: 667 CAPSULE ORAL at 11:47

## 2022-01-01 RX ADMIN — METOCLOPRAMIDE HYDROCHLORIDE 5 MG: 5 INJECTION INTRAMUSCULAR; INTRAVENOUS at 00:36

## 2022-01-01 RX ADMIN — HEPARIN SODIUM 5000 UNITS: 5000 INJECTION INTRAVENOUS; SUBCUTANEOUS at 05:53

## 2022-01-01 RX ADMIN — WARFARIN SODIUM 2 MG: 2 TABLET ORAL at 18:16

## 2022-01-01 RX ADMIN — HEPARIN SODIUM: 1000 INJECTION INTRAVENOUS; SUBCUTANEOUS at 21:33

## 2022-01-01 RX ADMIN — Medication 1470 UNITS/HR: at 03:58

## 2022-01-01 RX ADMIN — POTASSIUM CHLORIDE 10 MEQ: 7.46 INJECTION, SOLUTION INTRAVENOUS at 03:29

## 2022-01-01 RX ADMIN — Medication 10 MEQ: at 06:14

## 2022-01-01 RX ADMIN — INSULIN LISPRO 4 UNITS: 100 INJECTION, SOLUTION INTRAVENOUS; SUBCUTANEOUS at 18:59

## 2022-01-01 RX ADMIN — SODIUM CHLORIDE, PRESERVATIVE FREE 10 ML: 5 INJECTION INTRAVENOUS at 22:09

## 2022-01-01 RX ADMIN — INSULIN LISPRO 1 UNITS: 100 INJECTION, SOLUTION INTRAVENOUS; SUBCUTANEOUS at 20:41

## 2022-01-01 RX ADMIN — OXYCODONE 5 MG: 5 TABLET ORAL at 15:19

## 2022-01-01 RX ADMIN — MEROPENEM 500 MG: 500 INJECTION, POWDER, FOR SOLUTION INTRAVENOUS at 17:47

## 2022-01-01 RX ADMIN — METOCLOPRAMIDE HYDROCHLORIDE 5 MG: 5 INJECTION INTRAMUSCULAR; INTRAVENOUS at 06:50

## 2022-01-01 RX ADMIN — INSULIN LISPRO 1 UNITS: 100 INJECTION, SOLUTION INTRAVENOUS; SUBCUTANEOUS at 20:17

## 2022-01-01 RX ADMIN — INSULIN LISPRO 5 UNITS: 100 INJECTION, SOLUTION INTRAVENOUS; SUBCUTANEOUS at 21:01

## 2022-01-01 RX ADMIN — CEFEPIME HYDROCHLORIDE 1000 MG: 1 INJECTION, POWDER, FOR SOLUTION INTRAMUSCULAR; INTRAVENOUS at 20:53

## 2022-01-01 RX ADMIN — METOCLOPRAMIDE HYDROCHLORIDE 5 MG: 5 INJECTION INTRAMUSCULAR; INTRAVENOUS at 23:48

## 2022-01-01 RX ADMIN — VANCOMYCIN HYDROCHLORIDE 250 MG: 250 CAPSULE ORAL at 08:34

## 2022-01-01 RX ADMIN — SODIUM CHLORIDE, PRESERVATIVE FREE 10 ML: 5 INJECTION INTRAVENOUS at 08:22

## 2022-01-01 RX ADMIN — HEPARIN SODIUM 5000 UNITS: 5000 INJECTION INTRAVENOUS; SUBCUTANEOUS at 06:16

## 2022-01-01 RX ADMIN — SODIUM CHLORIDE, PRESERVATIVE FREE 10 ML: 5 INJECTION INTRAVENOUS at 08:23

## 2022-01-01 RX ADMIN — SODIUM CHLORIDE 25 ML: 9 INJECTION, SOLUTION INTRAVENOUS at 20:52

## 2022-01-01 RX ADMIN — INSULIN LISPRO 3 UNITS: 100 INJECTION, SOLUTION INTRAVENOUS; SUBCUTANEOUS at 08:35

## 2022-01-01 RX ADMIN — WARFARIN SODIUM 5 MG: 5 TABLET ORAL at 18:40

## 2022-01-01 RX ADMIN — INSULIN LISPRO 1 UNITS: 100 INJECTION, SOLUTION INTRAVENOUS; SUBCUTANEOUS at 18:00

## 2022-01-01 RX ADMIN — PANTOPRAZOLE SODIUM 40 MG: 40 TABLET, DELAYED RELEASE ORAL at 05:43

## 2022-01-01 RX ADMIN — INSULIN LISPRO 1 UNITS: 100 INJECTION, SOLUTION INTRAVENOUS; SUBCUTANEOUS at 21:09

## 2022-01-01 RX ADMIN — HEPARIN SODIUM 5000 UNITS: 5000 INJECTION INTRAVENOUS; SUBCUTANEOUS at 20:56

## 2022-01-01 RX ADMIN — SODIUM CHLORIDE 25 ML: 9 INJECTION, SOLUTION INTRAVENOUS at 15:03

## 2022-01-01 RX ADMIN — HEPARIN SODIUM 6800 UNITS: 1000 INJECTION INTRAVENOUS; SUBCUTANEOUS at 16:35

## 2022-01-01 RX ADMIN — POTASSIUM CHLORIDE 10 MEQ: 7.46 INJECTION, SOLUTION INTRAVENOUS at 17:16

## 2022-01-01 RX ADMIN — INSULIN LISPRO 2 UNITS: 100 INJECTION, SOLUTION INTRAVENOUS; SUBCUTANEOUS at 16:56

## 2022-01-01 RX ADMIN — FENTANYL CITRATE 25 MCG: 50 INJECTION INTRAMUSCULAR; INTRAVENOUS at 13:48

## 2022-01-01 RX ADMIN — NYSTATIN 500000 UNITS: 100000 SUSPENSION ORAL at 08:45

## 2022-01-01 RX ADMIN — POTASSIUM CHLORIDE 10 MEQ: 7.46 INJECTION, SOLUTION INTRAVENOUS at 04:43

## 2022-01-01 RX ADMIN — INSULIN LISPRO 1 UNITS: 100 INJECTION, SOLUTION INTRAVENOUS; SUBCUTANEOUS at 21:48

## 2022-01-01 RX ADMIN — PANTOPRAZOLE SODIUM 40 MG: 40 INJECTION, POWDER, FOR SOLUTION INTRAVENOUS at 21:12

## 2022-01-01 RX ADMIN — SODIUM CHLORIDE, PRESERVATIVE FREE 10 ML: 5 INJECTION INTRAVENOUS at 08:56

## 2022-01-01 RX ADMIN — Medication 1470 UNITS/HR: at 12:18

## 2022-01-01 RX ADMIN — PANTOPRAZOLE SODIUM 40 MG: 40 TABLET, DELAYED RELEASE ORAL at 06:00

## 2022-01-01 RX ADMIN — ONDANSETRON 4 MG: 2 INJECTION INTRAMUSCULAR; INTRAVENOUS at 19:44

## 2022-01-01 RX ADMIN — Medication 10 MEQ: at 21:45

## 2022-01-01 RX ADMIN — METOCLOPRAMIDE HYDROCHLORIDE 5 MG: 5 INJECTION INTRAMUSCULAR; INTRAVENOUS at 17:29

## 2022-01-01 RX ADMIN — WARFARIN SODIUM 5 MG: 5 TABLET ORAL at 10:40

## 2022-01-01 RX ADMIN — ONDANSETRON 4 MG: 2 INJECTION INTRAMUSCULAR; INTRAVENOUS at 11:10

## 2022-01-01 RX ADMIN — GLYCOPYRROLATE 0.2 MG: 0.2 INJECTION, SOLUTION INTRAMUSCULAR; INTRAVENOUS at 12:34

## 2022-01-01 RX ADMIN — SODIUM CHLORIDE 250 ML: 9 INJECTION, SOLUTION INTRAVENOUS at 18:58

## 2022-01-01 RX ADMIN — SODIUM CHLORIDE, PRESERVATIVE FREE 10 ML: 5 INJECTION INTRAVENOUS at 08:34

## 2022-01-01 RX ADMIN — MORPHINE SULFATE 2 MG: 2 INJECTION, SOLUTION INTRAMUSCULAR; INTRAVENOUS at 18:40

## 2022-01-01 RX ADMIN — METOCLOPRAMIDE HYDROCHLORIDE 5 MG: 5 INJECTION INTRAMUSCULAR; INTRAVENOUS at 18:28

## 2022-01-01 RX ADMIN — SODIUM CHLORIDE, PRESERVATIVE FREE 10 ML: 5 INJECTION INTRAVENOUS at 20:05

## 2022-01-01 RX ADMIN — METRONIDAZOLE 500 MG: 500 INJECTION, SOLUTION INTRAVENOUS at 09:30

## 2022-01-01 RX ADMIN — DIPHENHYDRAMINE HCL 25 MG: 25 TABLET ORAL at 04:46

## 2022-01-01 RX ADMIN — PANTOPRAZOLE SODIUM 40 MG: 40 INJECTION, POWDER, FOR SOLUTION INTRAVENOUS at 08:25

## 2022-01-01 RX ADMIN — METOCLOPRAMIDE HYDROCHLORIDE 5 MG: 5 INJECTION INTRAMUSCULAR; INTRAVENOUS at 00:19

## 2022-01-01 RX ADMIN — PANTOPRAZOLE SODIUM 40 MG: 40 TABLET, DELAYED RELEASE ORAL at 05:34

## 2022-01-01 RX ADMIN — NYSTATIN 500000 UNITS: 100000 SUSPENSION ORAL at 11:56

## 2022-01-01 RX ADMIN — METOCLOPRAMIDE HYDROCHLORIDE 5 MG: 5 INJECTION INTRAMUSCULAR; INTRAVENOUS at 11:49

## 2022-01-01 RX ADMIN — METOCLOPRAMIDE HYDROCHLORIDE 5 MG: 5 INJECTION INTRAMUSCULAR; INTRAVENOUS at 11:26

## 2022-01-01 RX ADMIN — METOCLOPRAMIDE HYDROCHLORIDE 5 MG: 5 INJECTION INTRAMUSCULAR; INTRAVENOUS at 05:44

## 2022-01-01 RX ADMIN — ONDANSETRON 4 MG: 2 INJECTION INTRAMUSCULAR; INTRAVENOUS at 09:24

## 2022-01-01 RX ADMIN — VANCOMYCIN HYDROCHLORIDE 250 MG: 250 CAPSULE ORAL at 12:43

## 2022-01-01 RX ADMIN — ROCURONIUM BROMIDE 50 MG: 10 SOLUTION INTRAVENOUS at 12:41

## 2022-01-01 RX ADMIN — INSULIN LISPRO 1 UNITS: 100 INJECTION, SOLUTION INTRAVENOUS; SUBCUTANEOUS at 20:32

## 2022-01-01 RX ADMIN — MEROPENEM 500 MG: 500 INJECTION, POWDER, FOR SOLUTION INTRAVENOUS at 13:38

## 2022-01-01 RX ADMIN — MORPHINE SULFATE 2 MG: 2 INJECTION, SOLUTION INTRAMUSCULAR; INTRAVENOUS at 04:25

## 2022-01-01 RX ADMIN — MIDODRINE HYDROCHLORIDE 5 MG: 5 TABLET ORAL at 08:46

## 2022-01-01 RX ADMIN — HEPARIN SODIUM: 1000 INJECTION INTRAVENOUS; SUBCUTANEOUS at 20:45

## 2022-01-01 RX ADMIN — METRONIDAZOLE 500 MG: 500 INJECTION, SOLUTION INTRAVENOUS at 11:07

## 2022-01-01 RX ADMIN — METRONIDAZOLE 500 MG: 500 INJECTION, SOLUTION INTRAVENOUS at 23:30

## 2022-01-01 RX ADMIN — SODIUM CHLORIDE, PRESERVATIVE FREE 10 ML: 5 INJECTION INTRAVENOUS at 20:39

## 2022-01-01 RX ADMIN — Medication 100 MCG: at 13:10

## 2022-01-01 RX ADMIN — VANCOMYCIN HYDROCHLORIDE 250 MG: 250 CAPSULE ORAL at 08:42

## 2022-01-01 RX ADMIN — SODIUM CHLORIDE 250 ML: 9 INJECTION, SOLUTION INTRAVENOUS at 22:50

## 2022-01-01 RX ADMIN — INSULIN LISPRO 2 UNITS: 100 INJECTION, SOLUTION INTRAVENOUS; SUBCUTANEOUS at 09:17

## 2022-01-01 RX ADMIN — METRONIDAZOLE 500 MG: 500 INJECTION, SOLUTION INTRAVENOUS at 08:38

## 2022-01-01 RX ADMIN — SODIUM CHLORIDE, PRESERVATIVE FREE 10 ML: 5 INJECTION INTRAVENOUS at 20:06

## 2022-01-01 RX ADMIN — POTASSIUM CHLORIDE 10 MEQ: 7.46 INJECTION, SOLUTION INTRAVENOUS at 11:03

## 2022-01-01 RX ADMIN — POTASSIUM CHLORIDE 10 MEQ: 7.46 INJECTION, SOLUTION INTRAVENOUS at 12:24

## 2022-01-01 RX ADMIN — INSULIN LISPRO 1 UNITS: 100 INJECTION, SOLUTION INTRAVENOUS; SUBCUTANEOUS at 12:04

## 2022-01-01 RX ADMIN — PANTOPRAZOLE SODIUM 40 MG: 40 TABLET, DELAYED RELEASE ORAL at 05:47

## 2022-01-01 RX ADMIN — SODIUM CHLORIDE, PRESERVATIVE FREE 10 ML: 5 INJECTION INTRAVENOUS at 08:50

## 2022-01-01 RX ADMIN — VANCOMYCIN HYDROCHLORIDE 250 MG: 250 CAPSULE ORAL at 08:45

## 2022-01-01 RX ADMIN — ONDANSETRON 4 MG: 2 INJECTION INTRAMUSCULAR; INTRAVENOUS at 21:27

## 2022-01-01 RX ADMIN — MIDODRINE HYDROCHLORIDE 10 MG: 10 TABLET ORAL at 08:38

## 2022-01-01 RX ADMIN — OXYCODONE 5 MG: 5 TABLET ORAL at 13:57

## 2022-01-01 RX ADMIN — HEPARIN SODIUM 5000 UNITS: 5000 INJECTION INTRAVENOUS; SUBCUTANEOUS at 22:06

## 2022-01-01 RX ADMIN — Medication 10 MEQ: at 14:30

## 2022-01-01 RX ADMIN — SODIUM CHLORIDE 40 MG: 9 INJECTION INTRAMUSCULAR; INTRAVENOUS; SUBCUTANEOUS at 08:28

## 2022-01-01 RX ADMIN — INSULIN LISPRO 2 UNITS: 100 INJECTION, SOLUTION INTRAVENOUS; SUBCUTANEOUS at 08:50

## 2022-01-01 RX ADMIN — HEPARIN SODIUM 3400 UNITS: 1000 INJECTION INTRAVENOUS; SUBCUTANEOUS at 18:00

## 2022-01-01 RX ADMIN — VANCOMYCIN HYDROCHLORIDE 250 MG: 250 CAPSULE ORAL at 21:10

## 2022-01-01 RX ADMIN — PROPOFOL 130 MG: 10 INJECTION, EMULSION INTRAVENOUS at 10:45

## 2022-01-01 RX ADMIN — HYDROCORTISONE SODIUM SUCCINATE 50 MG: 100 INJECTION, POWDER, FOR SOLUTION INTRAMUSCULAR; INTRAVENOUS at 21:03

## 2022-01-01 RX ADMIN — INSULIN LISPRO 1 UNITS: 100 INJECTION, SOLUTION INTRAVENOUS; SUBCUTANEOUS at 17:31

## 2022-01-01 RX ADMIN — POTASSIUM BICARBONATE 40 MEQ: 782 TABLET, EFFERVESCENT ORAL at 12:15

## 2022-01-01 RX ADMIN — INSULIN LISPRO 18 UNITS: 100 INJECTION, SOLUTION INTRAVENOUS; SUBCUTANEOUS at 08:43

## 2022-01-01 RX ADMIN — ONDANSETRON 4 MG: 2 INJECTION INTRAMUSCULAR; INTRAVENOUS at 03:20

## 2022-01-01 RX ADMIN — ONDANSETRON 4 MG: 2 INJECTION INTRAMUSCULAR; INTRAVENOUS at 23:08

## 2022-01-01 RX ADMIN — SUGAMMADEX 100 MG: 100 INJECTION, SOLUTION INTRAVENOUS at 12:36

## 2022-01-01 RX ADMIN — HEPARIN SODIUM 1550 UNITS/HR: 10000 INJECTION, SOLUTION INTRAVENOUS at 16:51

## 2022-01-01 RX ADMIN — INSULIN LISPRO 3 UNITS: 100 INJECTION, SOLUTION INTRAVENOUS; SUBCUTANEOUS at 08:24

## 2022-01-01 RX ADMIN — PROPOFOL 120 MG: 10 INJECTION, EMULSION INTRAVENOUS at 12:41

## 2022-01-01 RX ADMIN — ALBUMIN (HUMAN) 250 ML: 12.5 INJECTION, SOLUTION INTRAVENOUS at 13:00

## 2022-01-01 RX ADMIN — SODIUM CHLORIDE, SODIUM LACTATE, CALCIUM CHLORIDE, MAGNESIUM CHLORIDE AND DEXTROSE 2000 ML: 2.5; 538; 448; 18.3; 5.08 INJECTION, SOLUTION INTRAPERITONEAL at 21:42

## 2022-01-01 RX ADMIN — HEPARIN SODIUM 5000 UNITS: 5000 INJECTION INTRAVENOUS; SUBCUTANEOUS at 20:25

## 2022-01-01 RX ADMIN — METOCLOPRAMIDE HYDROCHLORIDE 5 MG: 5 INJECTION INTRAMUSCULAR; INTRAVENOUS at 18:01

## 2022-01-01 RX ADMIN — SODIUM CHLORIDE, PRESERVATIVE FREE 10 ML: 5 INJECTION INTRAVENOUS at 22:06

## 2022-01-01 RX ADMIN — OXYCODONE 5 MG: 5 TABLET ORAL at 00:49

## 2022-01-01 RX ADMIN — ONDANSETRON 4 MG: 2 INJECTION INTRAMUSCULAR; INTRAVENOUS at 17:24

## 2022-01-01 RX ADMIN — INSULIN LISPRO 5 UNITS: 100 INJECTION, SOLUTION INTRAVENOUS; SUBCUTANEOUS at 09:07

## 2022-01-01 RX ADMIN — INSULIN LISPRO 1 UNITS: 100 INJECTION, SOLUTION INTRAVENOUS; SUBCUTANEOUS at 08:46

## 2022-01-01 RX ADMIN — METOCLOPRAMIDE HYDROCHLORIDE 5 MG: 5 INJECTION INTRAMUSCULAR; INTRAVENOUS at 00:23

## 2022-01-01 RX ADMIN — ONDANSETRON 4 MG: 2 INJECTION INTRAMUSCULAR; INTRAVENOUS at 21:03

## 2022-01-01 RX ADMIN — HEPARIN SODIUM 5000 UNITS: 5000 INJECTION INTRAVENOUS; SUBCUTANEOUS at 05:30

## 2022-01-01 RX ADMIN — INSULIN LISPRO 2 UNITS: 100 INJECTION, SOLUTION INTRAVENOUS; SUBCUTANEOUS at 17:03

## 2022-01-01 RX ADMIN — CIPROFLOXACIN 200 MG: 2 INJECTION, SOLUTION INTRAVENOUS at 22:08

## 2022-01-01 RX ADMIN — METRONIDAZOLE 500 MG: 500 INJECTION, SOLUTION INTRAVENOUS at 22:45

## 2022-01-01 RX ADMIN — Medication 10 ML: at 03:20

## 2022-01-01 RX ADMIN — SODIUM CHLORIDE, SODIUM LACTATE, CALCIUM CHLORIDE, MAGNESIUM CHLORIDE AND DEXTROSE 6000 ML: 4.25; 538; 448; 18.3; 5.08 INJECTION, SOLUTION INTRAPERITONEAL at 22:37

## 2022-01-01 RX ADMIN — SODIUM CHLORIDE, PRESERVATIVE FREE 10 ML: 5 INJECTION INTRAVENOUS at 20:12

## 2022-01-01 RX ADMIN — INSULIN LISPRO 3 UNITS: 100 INJECTION, SOLUTION INTRAVENOUS; SUBCUTANEOUS at 13:30

## 2022-01-01 RX ADMIN — INSULIN LISPRO 3 UNITS: 100 INJECTION, SOLUTION INTRAVENOUS; SUBCUTANEOUS at 21:13

## 2022-01-01 RX ADMIN — INSULIN LISPRO 1 UNITS: 100 INJECTION, SOLUTION INTRAVENOUS; SUBCUTANEOUS at 09:01

## 2022-01-01 RX ADMIN — VANCOMYCIN HYDROCHLORIDE 250 MG: 250 CAPSULE ORAL at 13:05

## 2022-01-01 RX ADMIN — SODIUM CHLORIDE, PRESERVATIVE FREE 10 ML: 5 INJECTION INTRAVENOUS at 21:09

## 2022-01-01 RX ADMIN — EPINEPHRINE 1 MG: 1 INJECTION, SOLUTION, CONCENTRATE INTRAVENOUS at 01:22

## 2022-01-01 RX ADMIN — HEPARIN SODIUM 5000 UNITS: 5000 INJECTION INTRAVENOUS; SUBCUTANEOUS at 06:13

## 2022-01-01 RX ADMIN — METOCLOPRAMIDE HYDROCHLORIDE 5 MG: 5 INJECTION INTRAMUSCULAR; INTRAVENOUS at 23:19

## 2022-01-01 RX ADMIN — PANTOPRAZOLE SODIUM 40 MG: 40 TABLET, DELAYED RELEASE ORAL at 05:21

## 2022-01-01 RX ADMIN — METRONIDAZOLE 500 MG: 500 INJECTION, SOLUTION INTRAVENOUS at 16:32

## 2022-01-01 RX ADMIN — SODIUM CHLORIDE 25 ML: 9 INJECTION, SOLUTION INTRAVENOUS at 08:37

## 2022-01-01 RX ADMIN — VANCOMYCIN HYDROCHLORIDE 250 MG: 250 CAPSULE ORAL at 11:57

## 2022-01-01 RX ADMIN — VANCOMYCIN HYDROCHLORIDE 250 MG: 250 CAPSULE ORAL at 16:51

## 2022-01-01 RX ADMIN — VANCOMYCIN HYDROCHLORIDE 250 MG: 250 CAPSULE ORAL at 11:55

## 2022-01-01 RX ADMIN — NYSTATIN 500000 UNITS: 100000 SUSPENSION ORAL at 17:23

## 2022-01-01 RX ADMIN — SODIUM CHLORIDE, SODIUM LACTATE, CALCIUM CHLORIDE, MAGNESIUM CHLORIDE AND DEXTROSE 3000 ML: 4.25; 538; 448; 18.3; 5.08 INJECTION, SOLUTION INTRAPERITONEAL at 22:19

## 2022-01-01 RX ADMIN — ONDANSETRON 4 MG: 2 INJECTION INTRAMUSCULAR; INTRAVENOUS at 15:00

## 2022-01-01 RX ADMIN — HEPARIN SODIUM 5000 UNITS: 5000 INJECTION INTRAVENOUS; SUBCUTANEOUS at 20:41

## 2022-01-01 RX ADMIN — METOCLOPRAMIDE HYDROCHLORIDE 5 MG: 5 INJECTION INTRAMUSCULAR; INTRAVENOUS at 11:48

## 2022-01-01 RX ADMIN — HEPARIN SODIUM 5000 UNITS: 5000 INJECTION INTRAVENOUS; SUBCUTANEOUS at 15:00

## 2022-01-01 RX ADMIN — METRONIDAZOLE 500 MG: 500 INJECTION, SOLUTION INTRAVENOUS at 15:04

## 2022-01-01 RX ADMIN — SODIUM CHLORIDE, PRESERVATIVE FREE 10 ML: 5 INJECTION INTRAVENOUS at 09:39

## 2022-01-01 RX ADMIN — INSULIN LISPRO 1 UNITS: 100 INJECTION, SOLUTION INTRAVENOUS; SUBCUTANEOUS at 18:16

## 2022-01-01 RX ADMIN — PANTOPRAZOLE SODIUM 40 MG: 40 INJECTION, POWDER, FOR SOLUTION INTRAVENOUS at 21:42

## 2022-01-01 RX ADMIN — METOCLOPRAMIDE HYDROCHLORIDE 5 MG: 5 INJECTION INTRAMUSCULAR; INTRAVENOUS at 12:24

## 2022-01-01 RX ADMIN — MEROPENEM 500 MG: 500 INJECTION, POWDER, FOR SOLUTION INTRAVENOUS at 14:57

## 2022-01-01 RX ADMIN — VANCOMYCIN HYDROCHLORIDE 250 MG: 250 CAPSULE ORAL at 20:17

## 2022-01-01 RX ADMIN — VANCOMYCIN HYDROCHLORIDE 250 MG: 250 CAPSULE ORAL at 17:20

## 2022-01-01 RX ADMIN — POTASSIUM CHLORIDE 10 MEQ: 7.46 INJECTION, SOLUTION INTRAVENOUS at 16:15

## 2022-01-01 RX ADMIN — INSULIN LISPRO 1 UNITS: 100 INJECTION, SOLUTION INTRAVENOUS; SUBCUTANEOUS at 21:29

## 2022-01-01 RX ADMIN — MORPHINE SULFATE 2 MG: 2 INJECTION, SOLUTION INTRAMUSCULAR; INTRAVENOUS at 04:31

## 2022-01-01 RX ADMIN — VANCOMYCIN HYDROCHLORIDE 250 MG: 250 CAPSULE ORAL at 14:46

## 2022-01-01 RX ADMIN — TORSEMIDE 100 MG: 100 TABLET ORAL at 10:15

## 2022-01-01 RX ADMIN — Medication 10 MEQ: at 08:19

## 2022-01-01 RX ADMIN — SODIUM CHLORIDE, PRESERVATIVE FREE 10 ML: 5 INJECTION INTRAVENOUS at 21:05

## 2022-01-01 RX ADMIN — DIPHENHYDRAMINE HYDROCHLORIDE 12.5 MG: 50 INJECTION, SOLUTION INTRAMUSCULAR; INTRAVENOUS at 15:38

## 2022-01-01 RX ADMIN — MORPHINE SULFATE 4 MG: 4 INJECTION, SOLUTION INTRAMUSCULAR; INTRAVENOUS at 08:23

## 2022-01-01 RX ADMIN — ONDANSETRON 4 MG: 2 INJECTION INTRAMUSCULAR; INTRAVENOUS at 23:50

## 2022-01-01 RX ADMIN — SODIUM CHLORIDE: 9 INJECTION, SOLUTION INTRAVENOUS at 23:58

## 2022-01-01 RX ADMIN — PANTOPRAZOLE SODIUM 40 MG: 40 INJECTION, POWDER, FOR SOLUTION INTRAVENOUS at 09:08

## 2022-01-01 RX ADMIN — METOCLOPRAMIDE HYDROCHLORIDE 5 MG: 5 INJECTION INTRAMUSCULAR; INTRAVENOUS at 06:40

## 2022-01-01 RX ADMIN — MAGNESIUM SULFATE HEPTAHYDRATE 2000 MG: 40 INJECTION, SOLUTION INTRAVENOUS at 01:57

## 2022-01-01 RX ADMIN — HEPARIN SODIUM: 1000 INJECTION INTRAVENOUS; SUBCUTANEOUS at 22:46

## 2022-01-01 RX ADMIN — MEROPENEM 500 MG: 500 INJECTION, POWDER, FOR SOLUTION INTRAVENOUS at 14:11

## 2022-01-01 RX ADMIN — LOSARTAN POTASSIUM 100 MG: 100 TABLET, FILM COATED ORAL at 20:56

## 2022-01-01 RX ADMIN — HEPARIN SODIUM 5000 UNITS: 5000 INJECTION INTRAVENOUS; SUBCUTANEOUS at 12:46

## 2022-01-01 RX ADMIN — PANTOPRAZOLE SODIUM 40 MG: 40 TABLET, DELAYED RELEASE ORAL at 06:20

## 2022-01-01 RX ADMIN — WARFARIN SODIUM 2 MG: 2 TABLET ORAL at 17:31

## 2022-01-01 RX ADMIN — Medication 1600 UNITS/HR: at 21:22

## 2022-01-01 RX ADMIN — HEPARIN SODIUM 6800 UNITS: 1000 INJECTION INTRAVENOUS; SUBCUTANEOUS at 21:20

## 2022-01-01 RX ADMIN — DEXAMETHASONE 6 MG: 4 TABLET ORAL at 10:00

## 2022-01-01 RX ADMIN — ONDANSETRON 4 MG: 2 INJECTION INTRAMUSCULAR; INTRAVENOUS at 12:31

## 2022-01-01 RX ADMIN — POTASSIUM CHLORIDE 40 MEQ: 1500 TABLET, EXTENDED RELEASE ORAL at 22:00

## 2022-01-01 RX ADMIN — INSULIN LISPRO 1 UNITS: 100 INJECTION, SOLUTION INTRAVENOUS; SUBCUTANEOUS at 20:19

## 2022-01-01 RX ADMIN — METOCLOPRAMIDE HYDROCHLORIDE 5 MG: 5 INJECTION INTRAMUSCULAR; INTRAVENOUS at 00:28

## 2022-01-01 RX ADMIN — HEPARIN SODIUM 3400 UNITS: 1000 INJECTION INTRAVENOUS; SUBCUTANEOUS at 18:04

## 2022-01-01 RX ADMIN — POTASSIUM CHLORIDE 10 MEQ: 7.45 INJECTION INTRAVENOUS at 08:26

## 2022-01-01 RX ADMIN — TORSEMIDE 200 MG: 100 TABLET ORAL at 08:50

## 2022-01-01 RX ADMIN — SODIUM CHLORIDE 50 ML: 9 INJECTION, SOLUTION INTRAVENOUS at 18:29

## 2022-01-01 RX ADMIN — ALBUMIN (HUMAN) 25 G: 0.25 INJECTION, SOLUTION INTRAVENOUS at 10:25

## 2022-01-01 RX ADMIN — WARFARIN SODIUM 1 MG: 1 TABLET ORAL at 17:19

## 2022-01-01 RX ADMIN — ONDANSETRON 4 MG: 2 INJECTION INTRAMUSCULAR; INTRAVENOUS at 23:45

## 2022-01-01 RX ADMIN — TORSEMIDE 200 MG: 100 TABLET ORAL at 08:22

## 2022-01-01 RX ADMIN — TORSEMIDE 100 MG: 100 TABLET ORAL at 16:42

## 2022-01-01 RX ADMIN — Medication 1300 UNITS/HR: at 04:44

## 2022-01-01 RX ADMIN — PANTOPRAZOLE SODIUM 40 MG: 40 INJECTION, POWDER, FOR SOLUTION INTRAVENOUS at 08:11

## 2022-01-01 RX ADMIN — HEPARIN SODIUM 5000 UNITS: 5000 INJECTION INTRAVENOUS; SUBCUTANEOUS at 00:46

## 2022-01-01 RX ADMIN — INSULIN LISPRO 2 UNITS: 100 INJECTION, SOLUTION INTRAVENOUS; SUBCUTANEOUS at 18:15

## 2022-01-01 RX ADMIN — SODIUM CHLORIDE, PRESERVATIVE FREE 10 ML: 5 INJECTION INTRAVENOUS at 10:17

## 2022-01-01 RX ADMIN — SODIUM CHLORIDE, SODIUM LACTATE, CALCIUM CHLORIDE, MAGNESIUM CHLORIDE AND DEXTROSE 2000 ML: 2.5; 538; 448; 18.3; 5.08 INJECTION, SOLUTION INTRAPERITONEAL at 21:22

## 2022-01-01 RX ADMIN — SODIUM CHLORIDE, PRESERVATIVE FREE 10 ML: 5 INJECTION INTRAVENOUS at 09:15

## 2022-01-01 RX ADMIN — EPINEPHRINE 0.5 MG: 0.1 INJECTION INTRACARDIAC; INTRAVENOUS at 01:05

## 2022-01-01 RX ADMIN — NYSTATIN 500000 UNITS: 100000 SUSPENSION ORAL at 13:05

## 2022-01-01 RX ADMIN — SODIUM CHLORIDE, SODIUM LACTATE, CALCIUM CHLORIDE, MAGNESIUM CHLORIDE AND DEXTROSE 500 ML: 2.5; 538; 448; 18.3; 5.08 INJECTION, SOLUTION INTRAPERITONEAL at 22:36

## 2022-01-01 RX ADMIN — VANCOMYCIN HYDROCHLORIDE 250 MG: 250 CAPSULE ORAL at 16:33

## 2022-01-01 RX ADMIN — HEPARIN SODIUM 5000 UNITS: 5000 INJECTION INTRAVENOUS; SUBCUTANEOUS at 17:16

## 2022-01-01 RX ADMIN — HEPARIN SODIUM 5000 UNITS: 5000 INJECTION INTRAVENOUS; SUBCUTANEOUS at 06:50

## 2022-01-01 RX ADMIN — PANTOPRAZOLE SODIUM 40 MG: 40 INJECTION, POWDER, FOR SOLUTION INTRAVENOUS at 22:26

## 2022-01-01 RX ADMIN — INSULIN LISPRO 18 UNITS: 100 INJECTION, SOLUTION INTRAVENOUS; SUBCUTANEOUS at 08:50

## 2022-01-01 RX ADMIN — METOCLOPRAMIDE HYDROCHLORIDE 5 MG: 5 INJECTION INTRAMUSCULAR; INTRAVENOUS at 16:42

## 2022-01-01 RX ADMIN — HEPARIN SODIUM 5000 UNITS: 5000 INJECTION INTRAVENOUS; SUBCUTANEOUS at 05:47

## 2022-01-01 RX ADMIN — HEPARIN SODIUM 5000 UNITS: 5000 INJECTION INTRAVENOUS; SUBCUTANEOUS at 14:11

## 2022-01-01 RX ADMIN — SODIUM CHLORIDE, PRESERVATIVE FREE 10 ML: 5 INJECTION INTRAVENOUS at 22:26

## 2022-01-01 RX ADMIN — HEPARIN SODIUM 5000 UNITS: 5000 INJECTION INTRAVENOUS; SUBCUTANEOUS at 21:54

## 2022-01-01 RX ADMIN — SODIUM CHLORIDE, PRESERVATIVE FREE 10 ML: 5 INJECTION INTRAVENOUS at 21:21

## 2022-01-01 RX ADMIN — INSULIN LISPRO 1 UNITS: 100 INJECTION, SOLUTION INTRAVENOUS; SUBCUTANEOUS at 20:58

## 2022-01-01 RX ADMIN — ONDANSETRON 4 MG: 2 INJECTION INTRAMUSCULAR; INTRAVENOUS at 05:42

## 2022-01-01 RX ADMIN — METOCLOPRAMIDE HYDROCHLORIDE 5 MG: 5 INJECTION INTRAMUSCULAR; INTRAVENOUS at 05:52

## 2022-01-01 RX ADMIN — SODIUM CHLORIDE, PRESERVATIVE FREE 10 ML: 5 INJECTION INTRAVENOUS at 08:42

## 2022-01-01 RX ADMIN — PANTOPRAZOLE SODIUM 40 MG: 40 INJECTION, POWDER, FOR SOLUTION INTRAVENOUS at 21:09

## 2022-01-01 RX ADMIN — SODIUM CHLORIDE 25 ML: 9 INJECTION, SOLUTION INTRAVENOUS at 11:14

## 2022-01-01 RX ADMIN — INSULIN LISPRO 2 UNITS: 100 INJECTION, SOLUTION INTRAVENOUS; SUBCUTANEOUS at 09:28

## 2022-01-01 RX ADMIN — INSULIN LISPRO 1 UNITS: 100 INJECTION, SOLUTION INTRAVENOUS; SUBCUTANEOUS at 11:44

## 2022-01-01 RX ADMIN — EPOETIN ALFA-EPBX 10000 UNITS: 10000 INJECTION, SOLUTION INTRAVENOUS; SUBCUTANEOUS at 10:42

## 2022-01-01 RX ADMIN — HEPARIN SODIUM 5000 UNITS: 5000 INJECTION INTRAVENOUS; SUBCUTANEOUS at 05:43

## 2022-01-01 RX ADMIN — EPINEPHRINE 1 MG: 1 INJECTION, SOLUTION, CONCENTRATE INTRAVENOUS at 00:41

## 2022-01-01 RX ADMIN — INSULIN LISPRO 2 UNITS: 100 INJECTION, SOLUTION INTRAVENOUS; SUBCUTANEOUS at 11:48

## 2022-01-01 RX ADMIN — LOPERAMIDE HYDROCHLORIDE 2 MG: 2 CAPSULE ORAL at 01:20

## 2022-01-01 RX ADMIN — ONDANSETRON 4 MG: 2 INJECTION INTRAMUSCULAR; INTRAVENOUS at 08:10

## 2022-01-01 RX ADMIN — DIPHENHYDRAMINE HYDROCHLORIDE 25 MG: 50 INJECTION, SOLUTION INTRAMUSCULAR; INTRAVENOUS at 22:39

## 2022-01-01 RX ADMIN — INSULIN GLARGINE 5 UNITS: 100 INJECTION, SOLUTION SUBCUTANEOUS at 09:08

## 2022-01-01 RX ADMIN — WARFARIN SODIUM 3 MG: 3 TABLET ORAL at 17:20

## 2022-01-01 RX ADMIN — DEXAMETHASONE SODIUM PHOSPHATE 10 MG: 4 INJECTION, SOLUTION INTRAMUSCULAR; INTRAVENOUS at 10:52

## 2022-01-01 RX ADMIN — PANTOPRAZOLE SODIUM 40 MG: 40 INJECTION, POWDER, FOR SOLUTION INTRAVENOUS at 09:07

## 2022-01-01 RX ADMIN — INSULIN LISPRO 6 UNITS: 100 INJECTION, SOLUTION INTRAVENOUS; SUBCUTANEOUS at 10:20

## 2022-01-01 RX ADMIN — VANCOMYCIN HYDROCHLORIDE 250 MG: 250 CAPSULE ORAL at 13:11

## 2022-01-01 RX ADMIN — WARFARIN SODIUM 5 MG: 5 TABLET ORAL at 18:00

## 2022-01-01 RX ADMIN — WARFARIN SODIUM 2 MG: 2 TABLET ORAL at 17:59

## 2022-01-01 RX ADMIN — VANCOMYCIN HYDROCHLORIDE 250 MG: 250 CAPSULE ORAL at 20:31

## 2022-01-01 RX ADMIN — INSULIN LISPRO 2 UNITS: 100 INJECTION, SOLUTION INTRAVENOUS; SUBCUTANEOUS at 08:56

## 2022-01-01 RX ADMIN — ONDANSETRON 4 MG: 2 INJECTION INTRAMUSCULAR; INTRAVENOUS at 13:15

## 2022-01-01 RX ADMIN — MEROPENEM 500 MG: 500 INJECTION, POWDER, FOR SOLUTION INTRAVENOUS at 14:47

## 2022-01-01 RX ADMIN — SODIUM CHLORIDE 25 ML: 9 INJECTION, SOLUTION INTRAVENOUS at 08:25

## 2022-01-01 RX ADMIN — SODIUM CHLORIDE 25 ML: 9 INJECTION, SOLUTION INTRAVENOUS at 13:57

## 2022-01-01 RX ADMIN — HEPARIN SODIUM: 1000 INJECTION INTRAVENOUS; SUBCUTANEOUS at 21:25

## 2022-01-01 RX ADMIN — LOSARTAN POTASSIUM 100 MG: 100 TABLET, FILM COATED ORAL at 20:05

## 2022-01-01 RX ADMIN — METOCLOPRAMIDE HYDROCHLORIDE 10 MG: 5 INJECTION INTRAMUSCULAR; INTRAVENOUS at 09:34

## 2022-01-01 RX ADMIN — METOCLOPRAMIDE HYDROCHLORIDE 5 MG: 5 INJECTION INTRAMUSCULAR; INTRAVENOUS at 18:57

## 2022-01-01 RX ADMIN — DIPHENHYDRAMINE HYDROCHLORIDE 25 MG: 50 INJECTION, SOLUTION INTRAMUSCULAR; INTRAVENOUS at 03:20

## 2022-01-01 RX ADMIN — SODIUM CHLORIDE, PRESERVATIVE FREE 10 ML: 5 INJECTION INTRAVENOUS at 09:07

## 2022-01-01 RX ADMIN — SODIUM CHLORIDE 500 ML: 9 INJECTION, SOLUTION INTRAVENOUS at 19:45

## 2022-01-01 RX ADMIN — VANCOMYCIN HYDROCHLORIDE 250 MG: 250 CAPSULE ORAL at 20:06

## 2022-01-01 RX ADMIN — HEPARIN SODIUM 5000 UNITS: 5000 INJECTION INTRAVENOUS; SUBCUTANEOUS at 05:44

## 2022-01-01 RX ADMIN — CALCIUM ACETATE 667 MG: 667 CAPSULE ORAL at 08:22

## 2022-01-01 RX ADMIN — VANCOMYCIN HYDROCHLORIDE 250 MG: 250 CAPSULE ORAL at 21:05

## 2022-01-01 RX ADMIN — HEPARIN SODIUM 5000 UNITS: 5000 INJECTION INTRAVENOUS; SUBCUTANEOUS at 14:07

## 2022-01-01 RX ADMIN — VANCOMYCIN HYDROCHLORIDE 1000 MG: 1 INJECTION, POWDER, LYOPHILIZED, FOR SOLUTION INTRAVENOUS at 06:28

## 2022-01-01 RX ADMIN — EPOETIN ALFA-EPBX 10000 UNITS: 10000 INJECTION, SOLUTION INTRAVENOUS; SUBCUTANEOUS at 11:24

## 2022-01-01 RX ADMIN — HEPARIN SODIUM 5000 UNITS: 5000 INJECTION INTRAVENOUS; SUBCUTANEOUS at 22:26

## 2022-01-01 RX ADMIN — CEFEPIME HYDROCHLORIDE 1000 MG: 1 INJECTION, POWDER, FOR SOLUTION INTRAMUSCULAR; INTRAVENOUS at 20:29

## 2022-01-01 RX ADMIN — EPINEPHRINE 1 MG: 1 INJECTION, SOLUTION, CONCENTRATE INTRAVENOUS at 01:08

## 2022-01-01 RX ADMIN — INSULIN LISPRO 1 UNITS: 100 INJECTION, SOLUTION INTRAVENOUS; SUBCUTANEOUS at 17:23

## 2022-01-01 RX ADMIN — INSULIN GLARGINE 5 UNITS: 100 INJECTION, SOLUTION SUBCUTANEOUS at 08:50

## 2022-01-01 RX ADMIN — INSULIN LISPRO 3 UNITS: 100 INJECTION, SOLUTION INTRAVENOUS; SUBCUTANEOUS at 18:01

## 2022-01-01 RX ADMIN — Medication 10 MEQ: at 20:31

## 2022-01-01 RX ADMIN — INSULIN LISPRO 10 UNITS: 100 INJECTION, SOLUTION INTRAVENOUS; SUBCUTANEOUS at 18:26

## 2022-01-01 RX ADMIN — SODIUM CHLORIDE, SODIUM LACTATE, CALCIUM CHLORIDE, MAGNESIUM CHLORIDE AND DEXTROSE 6000 ML: 4.25; 538; 448; 18.3; 5.08 INJECTION, SOLUTION INTRAPERITONEAL at 22:20

## 2022-01-01 RX ADMIN — PROMETHAZINE HYDROCHLORIDE 12.5 MG: 25 INJECTION INTRAMUSCULAR; INTRAVENOUS at 07:04

## 2022-01-01 RX ADMIN — LOSARTAN POTASSIUM 100 MG: 100 TABLET, FILM COATED ORAL at 20:40

## 2022-01-01 RX ADMIN — HEPARIN SODIUM 5000 UNITS: 5000 INJECTION INTRAVENOUS; SUBCUTANEOUS at 22:32

## 2022-01-01 RX ADMIN — SODIUM CHLORIDE, SODIUM LACTATE, POTASSIUM CHLORIDE, AND CALCIUM CHLORIDE 500 ML: .6; .31; .03; .02 INJECTION, SOLUTION INTRAVENOUS at 03:43

## 2022-01-01 RX ADMIN — TORSEMIDE 200 MG: 100 TABLET ORAL at 08:40

## 2022-01-01 RX ADMIN — SODIUM CHLORIDE, PRESERVATIVE FREE 10 ML: 5 INJECTION INTRAVENOUS at 08:27

## 2022-01-01 RX ADMIN — FENTANYL CITRATE 25 MCG: 50 INJECTION INTRAMUSCULAR; INTRAVENOUS at 13:45

## 2022-01-01 RX ADMIN — HEPARIN SODIUM 5000 UNITS: 5000 INJECTION INTRAVENOUS; SUBCUTANEOUS at 21:03

## 2022-01-01 RX ADMIN — HEPARIN SODIUM 5000 UNITS: 5000 INJECTION INTRAVENOUS; SUBCUTANEOUS at 14:46

## 2022-01-01 RX ADMIN — Medication 10 MEQ: at 15:30

## 2022-01-01 RX ADMIN — Medication 200 MCG: at 13:19

## 2022-01-01 RX ADMIN — INSULIN LISPRO 5 UNITS: 100 INJECTION, SOLUTION INTRAVENOUS; SUBCUTANEOUS at 11:52

## 2022-01-01 RX ADMIN — INSULIN GLARGINE 5 UNITS: 100 INJECTION, SOLUTION SUBCUTANEOUS at 09:12

## 2022-01-01 RX ADMIN — HEPARIN SODIUM 5000 UNITS: 5000 INJECTION INTRAVENOUS; SUBCUTANEOUS at 14:44

## 2022-01-01 RX ADMIN — PANTOPRAZOLE SODIUM 40 MG: 40 INJECTION, POWDER, FOR SOLUTION INTRAVENOUS at 08:50

## 2022-01-01 RX ADMIN — PANTOPRAZOLE SODIUM 40 MG: 40 TABLET, DELAYED RELEASE ORAL at 07:00

## 2022-01-01 RX ADMIN — POTASSIUM CHLORIDE 10 MEQ: 7.46 INJECTION, SOLUTION INTRAVENOUS at 01:20

## 2022-01-01 RX ADMIN — HEPARIN SODIUM 5000 UNITS: 5000 INJECTION INTRAVENOUS; SUBCUTANEOUS at 21:17

## 2022-01-01 RX ADMIN — PANTOPRAZOLE SODIUM 20 MG: 20 TABLET, DELAYED RELEASE ORAL at 10:18

## 2022-01-01 RX ADMIN — MORPHINE SULFATE 2 MG: 2 INJECTION, SOLUTION INTRAMUSCULAR; INTRAVENOUS at 02:15

## 2022-01-01 RX ADMIN — DEXAMETHASONE SODIUM PHOSPHATE 6 MG: 4 INJECTION, SOLUTION INTRAMUSCULAR; INTRAVENOUS at 00:24

## 2022-01-01 RX ADMIN — METOCLOPRAMIDE HYDROCHLORIDE 5 MG: 5 INJECTION INTRAMUSCULAR; INTRAVENOUS at 23:50

## 2022-01-01 RX ADMIN — SODIUM CHLORIDE, PRESERVATIVE FREE 10 ML: 5 INJECTION INTRAVENOUS at 10:10

## 2022-01-01 RX ADMIN — HEPARIN SODIUM 5000 UNITS: 5000 INJECTION INTRAVENOUS; SUBCUTANEOUS at 21:20

## 2022-01-01 RX ADMIN — INSULIN LISPRO 2 UNITS: 100 INJECTION, SOLUTION INTRAVENOUS; SUBCUTANEOUS at 13:03

## 2022-01-01 RX ADMIN — VANCOMYCIN HYDROCHLORIDE 1250 MG: 10 INJECTION, POWDER, LYOPHILIZED, FOR SOLUTION INTRAVENOUS at 00:19

## 2022-01-01 RX ADMIN — INSULIN LISPRO 1 UNITS: 100 INJECTION, SOLUTION INTRAVENOUS; SUBCUTANEOUS at 12:32

## 2022-01-01 RX ADMIN — VANCOMYCIN HYDROCHLORIDE 250 MG: 250 CAPSULE ORAL at 17:31

## 2022-01-01 RX ADMIN — VANCOMYCIN HYDROCHLORIDE 250 MG: 250 CAPSULE ORAL at 20:43

## 2022-01-01 RX ADMIN — Medication 10 MEQ: at 16:10

## 2022-01-01 RX ADMIN — MEROPENEM 500 MG: 500 INJECTION, POWDER, FOR SOLUTION INTRAVENOUS at 13:58

## 2022-01-01 RX ADMIN — INSULIN LISPRO 4 UNITS: 100 INJECTION, SOLUTION INTRAVENOUS; SUBCUTANEOUS at 10:17

## 2022-01-01 RX ADMIN — HEPARIN SODIUM 5000 UNITS: 5000 INJECTION INTRAVENOUS; SUBCUTANEOUS at 21:28

## 2022-01-01 RX ADMIN — ALBUMIN (HUMAN) 25 G: 0.25 INJECTION, SOLUTION INTRAVENOUS at 09:50

## 2022-01-01 RX ADMIN — NYSTATIN 500000 UNITS: 100000 SUSPENSION ORAL at 17:19

## 2022-01-01 RX ADMIN — VANCOMYCIN HYDROCHLORIDE 250 MG: 250 CAPSULE ORAL at 13:30

## 2022-01-01 RX ADMIN — SODIUM CHLORIDE 40 MG: 9 INJECTION INTRAMUSCULAR; INTRAVENOUS; SUBCUTANEOUS at 21:44

## 2022-01-01 RX ADMIN — SODIUM CHLORIDE, PRESERVATIVE FREE 10 ML: 5 INJECTION INTRAVENOUS at 11:59

## 2022-01-01 RX ADMIN — SODIUM CHLORIDE, PRESERVATIVE FREE 10 ML: 5 INJECTION INTRAVENOUS at 21:04

## 2022-01-01 RX ADMIN — LOSARTAN POTASSIUM 50 MG: 50 TABLET, FILM COATED ORAL at 20:43

## 2022-01-01 RX ADMIN — METOCLOPRAMIDE HYDROCHLORIDE 5 MG: 5 INJECTION INTRAMUSCULAR; INTRAVENOUS at 06:43

## 2022-01-01 RX ADMIN — MIDODRINE HYDROCHLORIDE 10 MG: 10 TABLET ORAL at 10:25

## 2022-01-01 RX ADMIN — Medication 10 MEQ: at 19:52

## 2022-01-01 RX ADMIN — INSULIN LISPRO 5 UNITS: 100 INJECTION, SOLUTION INTRAVENOUS; SUBCUTANEOUS at 12:49

## 2022-01-01 RX ADMIN — ONDANSETRON 4 MG: 2 INJECTION INTRAMUSCULAR; INTRAVENOUS at 08:55

## 2022-01-01 RX ADMIN — METOCLOPRAMIDE HYDROCHLORIDE 5 MG: 5 INJECTION INTRAMUSCULAR; INTRAVENOUS at 12:46

## 2022-01-01 RX ADMIN — TORSEMIDE 100 MG: 100 TABLET ORAL at 08:22

## 2022-01-01 RX ADMIN — PANTOPRAZOLE SODIUM 40 MG: 40 INJECTION, POWDER, FOR SOLUTION INTRAVENOUS at 09:16

## 2022-01-01 RX ADMIN — VANCOMYCIN HYDROCHLORIDE 250 MG: 250 CAPSULE ORAL at 08:03

## 2022-01-01 RX ADMIN — Medication 1470 UNITS/HR: at 16:32

## 2022-01-01 RX ADMIN — PROTHROMBIN, COAGULATION FACTOR VII HUMAN, COAGULATION FACTOR IX HUMAN, COAGULATION FACTOR X HUMAN, PROTEIN C, PROTEIN S HUMAN, AND WATER 2748 UNITS: KIT at 18:03

## 2022-01-01 RX ADMIN — VANCOMYCIN HYDROCHLORIDE 250 MG: 250 CAPSULE ORAL at 18:39

## 2022-01-01 RX ADMIN — MIDODRINE HYDROCHLORIDE 10 MG: 10 TABLET ORAL at 08:56

## 2022-01-01 RX ADMIN — SODIUM CHLORIDE, PRESERVATIVE FREE 10 ML: 5 INJECTION INTRAVENOUS at 20:24

## 2022-01-01 RX ADMIN — ATROPINE SULFATE 1 MG: 1 INJECTION, SOLUTION INTRAMUSCULAR; INTRAVENOUS; SUBCUTANEOUS at 00:37

## 2022-01-01 RX ADMIN — MORPHINE SULFATE 4 MG: 2 INJECTION, SOLUTION INTRAMUSCULAR; INTRAVENOUS at 19:46

## 2022-01-01 RX ADMIN — INSULIN LISPRO 2 UNITS: 100 INJECTION, SOLUTION INTRAVENOUS; SUBCUTANEOUS at 00:48

## 2022-01-01 RX ADMIN — METOCLOPRAMIDE HYDROCHLORIDE 5 MG: 5 INJECTION INTRAMUSCULAR; INTRAVENOUS at 11:57

## 2022-01-01 RX ADMIN — PROMETHAZINE HYDROCHLORIDE 12.5 MG: 25 INJECTION INTRAMUSCULAR; INTRAVENOUS at 08:57

## 2022-01-01 RX ADMIN — INSULIN LISPRO 6 UNITS: 100 INJECTION, SOLUTION INTRAVENOUS; SUBCUTANEOUS at 13:04

## 2022-01-01 RX ADMIN — CIPROFLOXACIN 400 MG: 2 INJECTION, SOLUTION INTRAVENOUS at 11:18

## 2022-01-01 RX ADMIN — HEPARIN SODIUM: 1000 INJECTION INTRAVENOUS; SUBCUTANEOUS at 20:30

## 2022-01-01 RX ADMIN — POTASSIUM CHLORIDE 10 MEQ: 7.45 INJECTION INTRAVENOUS at 00:00

## 2022-01-01 RX ADMIN — LOSARTAN POTASSIUM 100 MG: 100 TABLET, FILM COATED ORAL at 21:05

## 2022-01-01 RX ADMIN — VANCOMYCIN HYDROCHLORIDE 250 MG: 250 CAPSULE ORAL at 17:23

## 2022-01-01 RX ADMIN — METOCLOPRAMIDE HYDROCHLORIDE 5 MG: 5 INJECTION INTRAMUSCULAR; INTRAVENOUS at 11:30

## 2022-01-01 RX ADMIN — METOCLOPRAMIDE HYDROCHLORIDE 5 MG: 5 INJECTION INTRAMUSCULAR; INTRAVENOUS at 18:13

## 2022-01-01 RX ADMIN — HEPARIN SODIUM 5000 UNITS: 5000 INJECTION INTRAVENOUS; SUBCUTANEOUS at 14:58

## 2022-01-01 RX ADMIN — SODIUM CHLORIDE 25 ML: 9 INJECTION, SOLUTION INTRAVENOUS at 21:31

## 2022-01-01 RX ADMIN — HEPARIN SODIUM 3400 UNITS: 1000 INJECTION INTRAVENOUS; SUBCUTANEOUS at 03:54

## 2022-01-01 RX ADMIN — PANTOPRAZOLE SODIUM 40 MG: 40 TABLET, DELAYED RELEASE ORAL at 05:00

## 2022-01-01 RX ADMIN — SODIUM CHLORIDE 250 ML: 9 INJECTION, SOLUTION INTRAVENOUS at 11:00

## 2022-01-01 RX ADMIN — METRONIDAZOLE 500 MG: 500 INJECTION, SOLUTION INTRAVENOUS at 23:39

## 2022-01-01 RX ADMIN — Medication 1300 UNITS/HR: at 20:43

## 2022-01-01 RX ADMIN — HEPARIN SODIUM 5000 UNITS: 5000 INJECTION INTRAVENOUS; SUBCUTANEOUS at 21:33

## 2022-01-01 RX ADMIN — METRONIDAZOLE 500 MG: 500 INJECTION, SOLUTION INTRAVENOUS at 23:56

## 2022-01-01 RX ADMIN — SODIUM CHLORIDE, PRESERVATIVE FREE 10 ML: 5 INJECTION INTRAVENOUS at 20:41

## 2022-01-01 RX ADMIN — SODIUM CHLORIDE, PRESERVATIVE FREE 10 ML: 5 INJECTION INTRAVENOUS at 08:40

## 2022-01-01 RX ADMIN — VANCOMYCIN HYDROCHLORIDE 250 MG: 250 CAPSULE ORAL at 20:40

## 2022-01-01 RX ADMIN — INSULIN LISPRO 2 UNITS: 100 INJECTION, SOLUTION INTRAVENOUS; SUBCUTANEOUS at 08:10

## 2022-01-01 RX ADMIN — INSULIN LISPRO 5 UNITS: 100 INJECTION, SOLUTION INTRAVENOUS; SUBCUTANEOUS at 08:24

## 2022-01-01 RX ADMIN — CEFTRIAXONE 1000 MG: 1 INJECTION, POWDER, FOR SOLUTION INTRAMUSCULAR; INTRAVENOUS at 10:35

## 2022-01-01 RX ADMIN — HEPARIN SODIUM 5000 UNITS: 5000 INJECTION INTRAVENOUS; SUBCUTANEOUS at 14:03

## 2022-01-01 RX ADMIN — LIDOCAINE HYDROCHLORIDE 60 MG: 20 INJECTION, SOLUTION EPIDURAL; INFILTRATION; INTRACAUDAL; PERINEURAL at 10:45

## 2022-01-01 RX ADMIN — METRONIDAZOLE 500 MG: 500 INJECTION, SOLUTION INTRAVENOUS at 08:36

## 2022-01-01 RX ADMIN — INSULIN LISPRO 1 UNITS: 100 INJECTION, SOLUTION INTRAVENOUS; SUBCUTANEOUS at 12:42

## 2022-01-01 RX ADMIN — HEPARIN SODIUM: 1000 INJECTION INTRAVENOUS; SUBCUTANEOUS at 21:26

## 2022-01-01 RX ADMIN — PANTOPRAZOLE SODIUM 40 MG: 40 INJECTION, POWDER, FOR SOLUTION INTRAVENOUS at 08:21

## 2022-01-01 RX ADMIN — ACETAMINOPHEN 650 MG: 325 TABLET ORAL at 22:06

## 2022-01-01 RX ADMIN — ONDANSETRON 4 MG: 2 INJECTION INTRAMUSCULAR; INTRAVENOUS at 22:00

## 2022-01-01 RX ADMIN — SODIUM CHLORIDE, PRESERVATIVE FREE 10 ML: 5 INJECTION INTRAVENOUS at 21:41

## 2022-01-01 RX ADMIN — HEPARIN SODIUM 5000 UNITS: 5000 INJECTION INTRAVENOUS; SUBCUTANEOUS at 05:07

## 2022-01-01 RX ADMIN — TORSEMIDE 200 MG: 100 TABLET ORAL at 18:01

## 2022-01-01 RX ADMIN — MIDODRINE HYDROCHLORIDE 5 MG: 5 TABLET ORAL at 12:44

## 2022-01-01 RX ADMIN — Medication 10 ML: at 09:17

## 2022-01-01 RX ADMIN — NOREPINEPHRINE BITARTRATE 5.33 MCG/MIN: 1 INJECTION, SOLUTION, CONCENTRATE INTRAVENOUS at 00:45

## 2022-01-01 RX ADMIN — SODIUM CHLORIDE, PRESERVATIVE FREE 10 ML: 5 INJECTION INTRAVENOUS at 21:42

## 2022-01-01 RX ADMIN — POTASSIUM CHLORIDE 40 MEQ: 20 TABLET, EXTENDED RELEASE ORAL at 05:40

## 2022-01-01 RX ADMIN — METOCLOPRAMIDE HYDROCHLORIDE 5 MG: 5 INJECTION INTRAMUSCULAR; INTRAVENOUS at 23:55

## 2022-01-01 RX ADMIN — SODIUM CHLORIDE, PRESERVATIVE FREE 10 ML: 5 INJECTION INTRAVENOUS at 21:51

## 2022-01-01 RX ADMIN — PHYTONADIONE 10 MG: 10 INJECTION, EMULSION INTRAMUSCULAR; INTRAVENOUS; SUBCUTANEOUS at 18:32

## 2022-01-01 RX ADMIN — Medication 10 MEQ: at 17:30

## 2022-01-01 RX ADMIN — ONDANSETRON 4 MG: 4 TABLET, ORALLY DISINTEGRATING ORAL at 15:19

## 2022-01-01 RX ADMIN — DIPHENHYDRAMINE HYDROCHLORIDE 25 MG: 50 INJECTION, SOLUTION INTRAMUSCULAR; INTRAVENOUS at 04:39

## 2022-01-01 RX ADMIN — SODIUM CHLORIDE, PRESERVATIVE FREE 10 ML: 5 INJECTION INTRAVENOUS at 12:48

## 2022-01-01 RX ADMIN — SODIUM CHLORIDE, SODIUM LACTATE, CALCIUM CHLORIDE, MAGNESIUM CHLORIDE AND DEXTROSE 6000 ML: 4.25; 538; 448; 18.3; 5.08 INJECTION, SOLUTION INTRAPERITONEAL at 21:20

## 2022-01-01 RX ADMIN — SODIUM CHLORIDE, PRESERVATIVE FREE 10 ML: 5 INJECTION INTRAVENOUS at 08:04

## 2022-01-01 RX ADMIN — LOSARTAN POTASSIUM 50 MG: 50 TABLET, FILM COATED ORAL at 21:10

## 2022-01-01 RX ADMIN — POTASSIUM CHLORIDE 40 MEQ: 1.5 FOR SOLUTION ORAL at 00:34

## 2022-01-01 RX ADMIN — PANTOPRAZOLE SODIUM 40 MG: 40 INJECTION, POWDER, FOR SOLUTION INTRAVENOUS at 22:06

## 2022-01-01 RX ADMIN — PANTOPRAZOLE SODIUM 40 MG: 40 INJECTION, POWDER, FOR SOLUTION INTRAVENOUS at 20:12

## 2022-01-01 RX ADMIN — MIDODRINE HYDROCHLORIDE 5 MG: 5 TABLET ORAL at 16:33

## 2022-01-01 RX ADMIN — Medication 200 MCG: at 13:33

## 2022-01-01 RX ADMIN — PROMETHAZINE HYDROCHLORIDE 12.5 MG: 25 TABLET ORAL at 07:54

## 2022-01-01 RX ADMIN — SODIUM CHLORIDE, PRESERVATIVE FREE 10 ML: 5 INJECTION INTRAVENOUS at 08:35

## 2022-01-01 RX ADMIN — VANCOMYCIN HYDROCHLORIDE 250 MG: 250 CAPSULE ORAL at 08:22

## 2022-01-01 RX ADMIN — TORSEMIDE 200 MG: 100 TABLET ORAL at 18:56

## 2022-01-01 RX ADMIN — VANCOMYCIN HYDROCHLORIDE 250 MG: 250 CAPSULE ORAL at 08:49

## 2022-01-01 RX ADMIN — VANCOMYCIN HYDROCHLORIDE 250 MG: 250 CAPSULE ORAL at 12:04

## 2022-01-01 RX ADMIN — SODIUM CHLORIDE, PRESERVATIVE FREE 10 ML: 5 INJECTION INTRAVENOUS at 20:15

## 2022-01-01 RX ADMIN — LOSARTAN POTASSIUM 50 MG: 50 TABLET, FILM COATED ORAL at 20:33

## 2022-01-01 RX ADMIN — SODIUM CHLORIDE, SODIUM LACTATE, CALCIUM CHLORIDE, MAGNESIUM CHLORIDE AND DEXTROSE 6000 ML: 4.25; 538; 448; 18.3; 5.08 INJECTION, SOLUTION INTRAPERITONEAL at 22:33

## 2022-01-01 RX ADMIN — IOPAMIDOL 75 ML: 755 INJECTION, SOLUTION INTRAVENOUS at 04:19

## 2022-01-01 RX ADMIN — SODIUM BICARBONATE 50 MEQ: 84 INJECTION INTRAVENOUS at 01:20

## 2022-01-01 RX ADMIN — POTASSIUM CHLORIDE 10 MEQ: 7.46 INJECTION, SOLUTION INTRAVENOUS at 15:34

## 2022-01-01 RX ADMIN — POTASSIUM CHLORIDE 10 MEQ: 7.46 INJECTION, SOLUTION INTRAVENOUS at 02:32

## 2022-01-01 RX ADMIN — PANTOPRAZOLE SODIUM 40 MG: 40 INJECTION, POWDER, FOR SOLUTION INTRAVENOUS at 22:32

## 2022-01-01 RX ADMIN — INSULIN LISPRO 6 UNITS: 100 INJECTION, SOLUTION INTRAVENOUS; SUBCUTANEOUS at 07:56

## 2022-01-01 RX ADMIN — PANTOPRAZOLE SODIUM 40 MG: 40 INJECTION, POWDER, FOR SOLUTION INTRAVENOUS at 08:14

## 2022-01-01 RX ADMIN — INSULIN LISPRO 2 UNITS: 100 INJECTION, SOLUTION INTRAVENOUS; SUBCUTANEOUS at 17:21

## 2022-01-01 RX ADMIN — TORSEMIDE 200 MG: 100 TABLET ORAL at 17:30

## 2022-01-01 RX ADMIN — ALBUMIN (HUMAN) 25 G: 0.25 INJECTION, SOLUTION INTRAVENOUS at 11:24

## 2022-01-01 RX ADMIN — METRONIDAZOLE 500 MG: 500 INJECTION, SOLUTION INTRAVENOUS at 01:11

## 2022-01-01 RX ADMIN — FENTANYL CITRATE 25 MCG: 50 INJECTION INTRAMUSCULAR; INTRAVENOUS at 13:39

## 2022-01-01 RX ADMIN — PANTOPRAZOLE SODIUM 40 MG: 40 INJECTION, POWDER, FOR SOLUTION INTRAVENOUS at 08:40

## 2022-01-01 RX ADMIN — SODIUM CHLORIDE, PRESERVATIVE FREE 10 ML: 5 INJECTION INTRAVENOUS at 20:33

## 2022-01-01 RX ADMIN — HEPARIN SODIUM 5000 UNITS: 5000 INJECTION INTRAVENOUS; SUBCUTANEOUS at 06:40

## 2022-01-01 RX ADMIN — INSULIN LISPRO 5 UNITS: 100 INJECTION, SOLUTION INTRAVENOUS; SUBCUTANEOUS at 17:30

## 2022-01-01 RX ADMIN — HEPARIN SODIUM 5000 UNITS: 5000 INJECTION INTRAVENOUS; SUBCUTANEOUS at 15:28

## 2022-01-01 RX ADMIN — INSULIN LISPRO 1 UNITS: 100 INJECTION, SOLUTION INTRAVENOUS; SUBCUTANEOUS at 12:21

## 2022-01-01 RX ADMIN — INSULIN LISPRO 3 UNITS: 100 INJECTION, SOLUTION INTRAVENOUS; SUBCUTANEOUS at 22:32

## 2022-01-01 RX ADMIN — CEFEPIME HYDROCHLORIDE 1000 MG: 1 INJECTION, POWDER, FOR SOLUTION INTRAMUSCULAR; INTRAVENOUS at 21:08

## 2022-01-01 RX ADMIN — METRONIDAZOLE 500 MG: 500 INJECTION, SOLUTION INTRAVENOUS at 16:54

## 2022-01-01 RX ADMIN — SODIUM CHLORIDE, POTASSIUM CHLORIDE, SODIUM LACTATE AND CALCIUM CHLORIDE 1000 ML: 600; 310; 30; 20 INJECTION, SOLUTION INTRAVENOUS at 09:34

## 2022-01-01 RX ADMIN — VANCOMYCIN HYDROCHLORIDE 250 MG: 250 CAPSULE ORAL at 09:01

## 2022-01-01 RX ADMIN — SODIUM CHLORIDE, PRESERVATIVE FREE 10 ML: 5 INJECTION INTRAVENOUS at 08:21

## 2022-01-01 RX ADMIN — INSULIN LISPRO 12 UNITS: 100 INJECTION, SOLUTION INTRAVENOUS; SUBCUTANEOUS at 09:07

## 2022-01-01 RX ADMIN — HEPARIN SODIUM 1550 UNITS/HR: 10000 INJECTION, SOLUTION INTRAVENOUS at 02:26

## 2022-01-01 RX ADMIN — SODIUM CHLORIDE, PRESERVATIVE FREE 10 ML: 5 INJECTION INTRAVENOUS at 23:12

## 2022-01-01 RX ADMIN — Medication 10 MEQ: at 13:00

## 2022-01-01 RX ADMIN — DEXAMETHASONE SODIUM PHOSPHATE 6 MG: 4 INJECTION, SOLUTION INTRAMUSCULAR; INTRAVENOUS at 13:15

## 2022-01-01 RX ADMIN — DIPHENHYDRAMINE HYDROCHLORIDE 25 MG: 50 INJECTION, SOLUTION INTRAMUSCULAR; INTRAVENOUS at 23:51

## 2022-01-01 RX ADMIN — VANCOMYCIN HYDROCHLORIDE 250 MG: 250 CAPSULE ORAL at 17:19

## 2022-01-01 RX ADMIN — MEROPENEM 500 MG: 500 INJECTION, POWDER, FOR SOLUTION INTRAVENOUS at 14:48

## 2022-01-01 RX ADMIN — POTASSIUM CHLORIDE 10 MEQ: 7.46 INJECTION, SOLUTION INTRAVENOUS at 06:47

## 2022-01-01 RX ADMIN — TORSEMIDE 200 MG: 100 TABLET ORAL at 17:58

## 2022-01-01 RX ADMIN — VANCOMYCIN HYDROCHLORIDE 250 MG: 250 CAPSULE ORAL at 17:03

## 2022-01-01 RX ADMIN — HEPARIN SODIUM: 1000 INJECTION INTRAVENOUS; SUBCUTANEOUS at 21:36

## 2022-01-01 RX ADMIN — SODIUM CHLORIDE, PRESERVATIVE FREE 10 ML: 5 INJECTION INTRAVENOUS at 22:40

## 2022-01-01 RX ADMIN — Medication 2000 UNITS: at 10:00

## 2022-01-01 RX ADMIN — Medication 100 MCG: at 11:07

## 2022-01-01 RX ADMIN — MUPIROCIN: 20 OINTMENT TOPICAL at 00:35

## 2022-01-01 RX ADMIN — DIPHENHYDRAMINE HYDROCHLORIDE 25 MG: 50 INJECTION, SOLUTION INTRAMUSCULAR; INTRAVENOUS at 21:03

## 2022-01-01 RX ADMIN — METOCLOPRAMIDE HYDROCHLORIDE 5 MG: 5 INJECTION INTRAMUSCULAR; INTRAVENOUS at 17:58

## 2022-01-01 RX ADMIN — CALCIUM GLUCONATE 2000 MG: 20 INJECTION, SOLUTION INTRAVENOUS at 23:43

## 2022-01-01 RX ADMIN — INSULIN LISPRO 1 UNITS: 100 INJECTION, SOLUTION INTRAVENOUS; SUBCUTANEOUS at 17:46

## 2022-01-01 RX ADMIN — PANTOPRAZOLE SODIUM 40 MG: 40 TABLET, DELAYED RELEASE ORAL at 06:03

## 2022-01-01 RX ADMIN — SODIUM CHLORIDE, PRESERVATIVE FREE 10 ML: 5 INJECTION INTRAVENOUS at 08:10

## 2022-01-01 RX ADMIN — MEROPENEM 500 MG: 500 INJECTION, POWDER, FOR SOLUTION INTRAVENOUS at 12:08

## 2022-01-01 RX ADMIN — METRONIDAZOLE 500 MG: 500 INJECTION, SOLUTION INTRAVENOUS at 16:04

## 2022-01-01 RX ADMIN — ONDANSETRON 4 MG: 2 INJECTION INTRAMUSCULAR; INTRAVENOUS at 09:55

## 2022-01-01 RX ADMIN — SODIUM CHLORIDE, PRESERVATIVE FREE 10 ML: 5 INJECTION INTRAVENOUS at 20:40

## 2022-01-01 RX ADMIN — TORSEMIDE 200 MG: 100 TABLET ORAL at 18:15

## 2022-01-01 RX ADMIN — PANTOPRAZOLE SODIUM 40 MG: 40 TABLET, DELAYED RELEASE ORAL at 06:11

## 2022-01-01 RX ADMIN — CEFEPIME HYDROCHLORIDE 1000 MG: 1 INJECTION, POWDER, FOR SOLUTION INTRAMUSCULAR; INTRAVENOUS at 21:32

## 2022-01-01 RX ADMIN — INSULIN LISPRO 1 UNITS: 100 INJECTION, SOLUTION INTRAVENOUS; SUBCUTANEOUS at 08:33

## 2022-01-01 RX ADMIN — Medication 10 ML: at 08:14

## 2022-01-01 RX ADMIN — ALBUMIN (HUMAN) 25 G: 0.25 INJECTION, SOLUTION INTRAVENOUS at 08:26

## 2022-01-01 RX ADMIN — CEFEPIME HYDROCHLORIDE 1000 MG: 1 INJECTION, POWDER, FOR SOLUTION INTRAMUSCULAR; INTRAVENOUS at 21:31

## 2022-01-01 RX ADMIN — MAGNESIUM SULFATE HEPTAHYDRATE 2000 MG: 40 INJECTION, SOLUTION INTRAVENOUS at 11:52

## 2022-01-01 RX ADMIN — ACETAMINOPHEN 650 MG: 325 TABLET ORAL at 15:13

## 2022-01-01 RX ADMIN — VANCOMYCIN HYDROCHLORIDE 250 MG: 250 CAPSULE ORAL at 18:16

## 2022-01-01 RX ADMIN — HEPARIN SODIUM 5000 UNITS: 5000 INJECTION INTRAVENOUS; SUBCUTANEOUS at 06:43

## 2022-01-01 ASSESSMENT — PAIN SCALES - GENERAL
PAINLEVEL_OUTOF10: 3
PAINLEVEL_OUTOF10: 0
PAINLEVEL_OUTOF10: 4
PAINLEVEL_OUTOF10: 0
PAINLEVEL_OUTOF10: 5
PAINLEVEL_OUTOF10: 0
PAINLEVEL_OUTOF10: 4
PAINLEVEL_OUTOF10: 5
PAINLEVEL_OUTOF10: 0
PAINLEVEL_OUTOF10: 4
PAINLEVEL_OUTOF10: 2
PAINLEVEL_OUTOF10: 0
PAINLEVEL_OUTOF10: 0
PAINLEVEL_OUTOF10: 3
PAINLEVEL_OUTOF10: 0
PAINLEVEL_OUTOF10: 0
PAINLEVEL_OUTOF10: 5
PAINLEVEL_OUTOF10: 4
PAINLEVEL_OUTOF10: 0
PAINLEVEL_OUTOF10: 4
PAINLEVEL_OUTOF10: 0
PAINLEVEL_OUTOF10: 2
PAINLEVEL_OUTOF10: 0
PAINLEVEL_OUTOF10: 4
PAINLEVEL_OUTOF10: 0
PAINLEVEL_OUTOF10: 0
PAINLEVEL_OUTOF10: 3
PAINLEVEL_OUTOF10: 0
PAINLEVEL_OUTOF10: 5
PAINLEVEL_OUTOF10: 0
PAINLEVEL_OUTOF10: 3
PAINLEVEL_OUTOF10: 0
PAINLEVEL_OUTOF10: 0
PAINLEVEL_OUTOF10: 6
PAINLEVEL_OUTOF10: 0
PAINLEVEL_OUTOF10: 5
PAINLEVEL_OUTOF10: 5
PAINLEVEL_OUTOF10: 0
PAINLEVEL_OUTOF10: 3
PAINLEVEL_OUTOF10: 0
PAINLEVEL_OUTOF10: 3
PAINLEVEL_OUTOF10: 0
PAINLEVEL_OUTOF10: 6
PAINLEVEL_OUTOF10: 5
PAINLEVEL_OUTOF10: 5
PAINLEVEL_OUTOF10: 0
PAINLEVEL_OUTOF10: 7
PAINLEVEL_OUTOF10: 6
PAINLEVEL_OUTOF10: 0
PAINLEVEL_OUTOF10: 6
PAINLEVEL_OUTOF10: 4
PAINLEVEL_OUTOF10: 0
PAINLEVEL_OUTOF10: 5
PAINLEVEL_OUTOF10: 6
PAINLEVEL_OUTOF10: 0
PAINLEVEL_OUTOF10: 0
PAINLEVEL_OUTOF10: 3
PAINLEVEL_OUTOF10: 0
PAINLEVEL_OUTOF10: 7
PAINLEVEL_OUTOF10: 0
PAINLEVEL_OUTOF10: 1
PAINLEVEL_OUTOF10: 0
PAINLEVEL_OUTOF10: 7
PAINLEVEL_OUTOF10: 5
PAINLEVEL_OUTOF10: 0
PAINLEVEL_OUTOF10: 4
PAINLEVEL_OUTOF10: 0
PAINLEVEL_OUTOF10: 0
PAINLEVEL_OUTOF10: 4
PAINLEVEL_OUTOF10: 0
PAINLEVEL_OUTOF10: 4
PAINLEVEL_OUTOF10: 0
PAINLEVEL_OUTOF10: 4
PAINLEVEL_OUTOF10: 0
PAINLEVEL_OUTOF10: 2
PAINLEVEL_OUTOF10: 6
PAINLEVEL_OUTOF10: 4

## 2022-01-01 ASSESSMENT — PAIN DESCRIPTION - PAIN TYPE
TYPE: ACUTE PAIN
TYPE: ACUTE PAIN
TYPE: ACUTE PAIN;SURGICAL PAIN
TYPE: ACUTE PAIN
TYPE: SURGICAL PAIN
TYPE: SURGICAL PAIN
TYPE: CHRONIC PAIN
TYPE: SURGICAL PAIN
TYPE: ACUTE PAIN
TYPE: SURGICAL PAIN
TYPE: ACUTE PAIN
TYPE: OTHER (COMMENT)
TYPE: ACUTE PAIN
TYPE: SURGICAL PAIN
TYPE: SURGICAL PAIN
TYPE: ACUTE PAIN
TYPE: ACUTE PAIN

## 2022-01-01 ASSESSMENT — PAIN DESCRIPTION - ORIENTATION
ORIENTATION: ANTERIOR
ORIENTATION: ANTERIOR
ORIENTATION: LOWER
ORIENTATION: LOWER
ORIENTATION: OTHER (COMMENT)
ORIENTATION: ANTERIOR
ORIENTATION: MID
ORIENTATION: MID;LOWER
ORIENTATION: MID
ORIENTATION: MID
ORIENTATION: LOWER
ORIENTATION: ANTERIOR;MID
ORIENTATION: MID

## 2022-01-01 ASSESSMENT — PAIN - FUNCTIONAL ASSESSMENT
PAIN_FUNCTIONAL_ASSESSMENT: 0-10
PAIN_FUNCTIONAL_ASSESSMENT: ACTIVITIES ARE NOT PREVENTED
PAIN_FUNCTIONAL_ASSESSMENT: PREVENTS OR INTERFERES SOME ACTIVE ACTIVITIES AND ADLS
PAIN_FUNCTIONAL_ASSESSMENT: 0-10
PAIN_FUNCTIONAL_ASSESSMENT: ACTIVITIES ARE NOT PREVENTED
PAIN_FUNCTIONAL_ASSESSMENT: 0-10
PAIN_FUNCTIONAL_ASSESSMENT: PREVENTS OR INTERFERES SOME ACTIVE ACTIVITIES AND ADLS
PAIN_FUNCTIONAL_ASSESSMENT: 0-10
PAIN_FUNCTIONAL_ASSESSMENT: PREVENTS OR INTERFERES SOME ACTIVE ACTIVITIES AND ADLS
PAIN_FUNCTIONAL_ASSESSMENT: ACTIVITIES ARE NOT PREVENTED
PAIN_FUNCTIONAL_ASSESSMENT: PREVENTS OR INTERFERES SOME ACTIVE ACTIVITIES AND ADLS
PAIN_FUNCTIONAL_ASSESSMENT: ACTIVITIES ARE NOT PREVENTED
PAIN_FUNCTIONAL_ASSESSMENT: PREVENTS OR INTERFERES SOME ACTIVE ACTIVITIES AND ADLS
PAIN_FUNCTIONAL_ASSESSMENT: PREVENTS OR INTERFERES SOME ACTIVE ACTIVITIES AND ADLS

## 2022-01-01 ASSESSMENT — PULMONARY FUNCTION TESTS
PIF_VALUE: 22
PIF_VALUE: 3
PIF_VALUE: 19
PIF_VALUE: 22
PIF_VALUE: 18
PIF_VALUE: 21
PIF_VALUE: 14
PIF_VALUE: 15
PIF_VALUE: 21
PIF_VALUE: 4
PIF_VALUE: 3
PIF_VALUE: 22
PIF_VALUE: 28
PIF_VALUE: 14
PIF_VALUE: 3
PIF_VALUE: 19
PIF_VALUE: 29
PIF_VALUE: 29
PIF_VALUE: 17
PIF_VALUE: 20
PIF_VALUE: 21
PIF_VALUE: 15
PIF_VALUE: 14
PIF_VALUE: 2
PIF_VALUE: 10
PIF_VALUE: 21
PIF_VALUE: 19
PIF_VALUE: 19
PIF_VALUE: 21
PIF_VALUE: 23
PIF_VALUE: 18
PIF_VALUE: 22
PIF_VALUE: 18
PIF_VALUE: 15
PIF_VALUE: 21
PIF_VALUE: 19
PIF_VALUE: 22
PIF_VALUE: 19
PIF_VALUE: 1
PIF_VALUE: 15
PIF_VALUE: 19
PIF_VALUE: 22
PIF_VALUE: 18
PIF_VALUE: 21
PIF_VALUE: 15
PIF_VALUE: 19
PIF_VALUE: 22
PIF_VALUE: 22
PIF_VALUE: 20
PIF_VALUE: 22
PIF_VALUE: 21
PIF_VALUE: 21
PIF_VALUE: 19
PIF_VALUE: 21
PIF_VALUE: 17
PIF_VALUE: 14
PIF_VALUE: 19
PIF_VALUE: 18
PIF_VALUE: 15
PIF_VALUE: 21
PIF_VALUE: 22
PIF_VALUE: 14
PIF_VALUE: 15
PIF_VALUE: 15
PIF_VALUE: 21
PIF_VALUE: 0
PIF_VALUE: 3
PIF_VALUE: 15
PIF_VALUE: 1
PIF_VALUE: 14
PIF_VALUE: 21
PIF_VALUE: 21
PIF_VALUE: 0
PIF_VALUE: 15
PIF_VALUE: 19
PIF_VALUE: 22
PIF_VALUE: 19
PIF_VALUE: 0
PIF_VALUE: 15
PIF_VALUE: 0
PIF_VALUE: 15
PIF_VALUE: 22
PIF_VALUE: 25
PIF_VALUE: 22
PIF_VALUE: 20
PIF_VALUE: 28
PIF_VALUE: 1
PIF_VALUE: 1
PIF_VALUE: 19
PIF_VALUE: 2
PIF_VALUE: 3
PIF_VALUE: 19
PIF_VALUE: 19
PIF_VALUE: 18
PIF_VALUE: 19
PIF_VALUE: 21
PIF_VALUE: 19
PIF_VALUE: 21
PIF_VALUE: 20
PIF_VALUE: 15
PIF_VALUE: 3
PIF_VALUE: 19
PIF_VALUE: 12
PIF_VALUE: 14
PIF_VALUE: 14
PIF_VALUE: 28
PIF_VALUE: 15
PIF_VALUE: 20
PIF_VALUE: 29
PIF_VALUE: 15
PIF_VALUE: 15
PIF_VALUE: 17
PIF_VALUE: 17
PIF_VALUE: 15
PIF_VALUE: 22
PIF_VALUE: 26
PIF_VALUE: 0
PIF_VALUE: 15
PIF_VALUE: 18
PIF_VALUE: 15
PIF_VALUE: 17
PIF_VALUE: 21
PIF_VALUE: 12
PIF_VALUE: 15
PIF_VALUE: 22
PIF_VALUE: 18
PIF_VALUE: 22
PIF_VALUE: 15
PIF_VALUE: 1
PIF_VALUE: 1
PIF_VALUE: 12
PIF_VALUE: 15
PIF_VALUE: 21
PIF_VALUE: 22
PIF_VALUE: 15
PIF_VALUE: 15
PIF_VALUE: 21
PIF_VALUE: 5
PIF_VALUE: 22
PIF_VALUE: 15
PIF_VALUE: 22
PIF_VALUE: 18
PIF_VALUE: 21
PIF_VALUE: 18
PIF_VALUE: 11
PIF_VALUE: 20
PIF_VALUE: 18
PIF_VALUE: 21
PIF_VALUE: 0
PIF_VALUE: 2
PIF_VALUE: 15
PIF_VALUE: 1
PIF_VALUE: 2
PIF_VALUE: 19
PIF_VALUE: 22
PIF_VALUE: 22
PIF_VALUE: 1
PIF_VALUE: 18
PIF_VALUE: 19
PIF_VALUE: 18
PIF_VALUE: 21
PIF_VALUE: 20
PIF_VALUE: 14
PIF_VALUE: 22
PIF_VALUE: 1
PIF_VALUE: 18
PIF_VALUE: 15
PIF_VALUE: 14
PIF_VALUE: 0
PIF_VALUE: 18
PIF_VALUE: 22
PIF_VALUE: 15
PIF_VALUE: 21
PIF_VALUE: 20
PIF_VALUE: 21
PIF_VALUE: 15
PIF_VALUE: 19
PIF_VALUE: 20
PIF_VALUE: 15
PIF_VALUE: 22
PIF_VALUE: 17
PIF_VALUE: 21
PIF_VALUE: 19
PIF_VALUE: 15
PIF_VALUE: 15
PIF_VALUE: 0
PIF_VALUE: 21
PIF_VALUE: 25
PIF_VALUE: 22
PIF_VALUE: 1
PIF_VALUE: 19
PIF_VALUE: 19
PIF_VALUE: 22
PIF_VALUE: 21
PIF_VALUE: 21
PIF_VALUE: 14
PIF_VALUE: 0
PIF_VALUE: 19
PIF_VALUE: 14
PIF_VALUE: 22
PIF_VALUE: 14
PIF_VALUE: 20
PIF_VALUE: 19
PIF_VALUE: 19
PIF_VALUE: 17
PIF_VALUE: 15
PIF_VALUE: 20
PIF_VALUE: 18
PIF_VALUE: 19
PIF_VALUE: 14
PIF_VALUE: 4
PIF_VALUE: 22
PIF_VALUE: 6
PIF_VALUE: 0
PIF_VALUE: 15
PIF_VALUE: 15
PIF_VALUE: 9
PIF_VALUE: 19
PIF_VALUE: 18
PIF_VALUE: 22
PIF_VALUE: 30

## 2022-01-01 ASSESSMENT — PAIN SCALES - WONG BAKER
WONGBAKER_NUMERICALRESPONSE: 0
WONGBAKER_NUMERICALRESPONSE: 2
WONGBAKER_NUMERICALRESPONSE: 0

## 2022-01-01 ASSESSMENT — PAIN DESCRIPTION - FREQUENCY
FREQUENCY: CONTINUOUS
FREQUENCY: INTERMITTENT
FREQUENCY: CONTINUOUS
FREQUENCY: INTERMITTENT
FREQUENCY: CONTINUOUS

## 2022-01-01 ASSESSMENT — ENCOUNTER SYMPTOMS
ABDOMINAL PAIN: 0
SORE THROAT: 0
BACK PAIN: 0
NAUSEA: 1
SHORTNESS OF BREATH: 0
SORE THROAT: 0
RHINORRHEA: 0
NAUSEA: 0
COUGH: 0
NAUSEA: 0
ABDOMINAL PAIN: 1
SHORTNESS OF BREATH: 1
NAUSEA: 1
SHORTNESS OF BREATH: 0
RHINORRHEA: 0
EYE PAIN: 0
DIARRHEA: 1
COUGH: 0
NAUSEA: 0
DIARRHEA: 1
DIARRHEA: 0
NAUSEA: 1
VOMITING: 1
NAUSEA: 0
SHORTNESS OF BREATH: 0
SHORTNESS OF BREATH: 0
ABDOMINAL PAIN: 1
ABDOMINAL PAIN: 1
ANAL BLEEDING: 0
EYE PAIN: 0
EYES NEGATIVE: 1
CONSTIPATION: 0
NAUSEA: 0
VOMITING: 1
COUGH: 0
NAUSEA: 0
VOMITING: 1
BACK PAIN: 0
CHEST TIGHTNESS: 0
CONSTIPATION: 0
NAUSEA: 0
SHORTNESS OF BREATH: 0
ABDOMINAL PAIN: 0
VOMITING: 0
SORE THROAT: 0
VOMITING: 0
SHORTNESS OF BREATH: 0
BLOOD IN STOOL: 0

## 2022-01-01 ASSESSMENT — PAIN DESCRIPTION - DESCRIPTORS
DESCRIPTORS: CRAMPING
DESCRIPTORS: CRAMPING
DESCRIPTORS: DISCOMFORT;OTHER (COMMENT)
DESCRIPTORS: SORE
DESCRIPTORS: SORE
DESCRIPTORS: ACHING
DESCRIPTORS: ACHING;DISCOMFORT
DESCRIPTORS: CRAMPING
DESCRIPTORS: CRAMPING
DESCRIPTORS: DULL;ACHING
DESCRIPTORS: CRAMPING
DESCRIPTORS: ACHING;DISCOMFORT
DESCRIPTORS: CRAMPING
DESCRIPTORS: ACHING;DISCOMFORT
DESCRIPTORS: CRAMPING
DESCRIPTORS: SORE
DESCRIPTORS: CRAMPING

## 2022-01-01 ASSESSMENT — PAIN DESCRIPTION - LOCATION
LOCATION: ABDOMEN
LOCATION: INCISION
LOCATION: ABDOMEN

## 2022-01-01 ASSESSMENT — PAIN DESCRIPTION - PROGRESSION
CLINICAL_PROGRESSION: NOT CHANGED
CLINICAL_PROGRESSION: GRADUALLY IMPROVING
CLINICAL_PROGRESSION: NOT CHANGED
CLINICAL_PROGRESSION: GRADUALLY IMPROVING
CLINICAL_PROGRESSION: GRADUALLY WORSENING
CLINICAL_PROGRESSION: NOT CHANGED
CLINICAL_PROGRESSION: GRADUALLY WORSENING
CLINICAL_PROGRESSION: NOT CHANGED
CLINICAL_PROGRESSION: NOT CHANGED
CLINICAL_PROGRESSION: GRADUALLY IMPROVING
CLINICAL_PROGRESSION: GRADUALLY WORSENING
CLINICAL_PROGRESSION: NOT CHANGED
CLINICAL_PROGRESSION: GRADUALLY WORSENING

## 2022-01-01 ASSESSMENT — PAIN DESCRIPTION - ONSET
ONSET: ON-GOING
ONSET: GRADUAL
ONSET: ON-GOING
ONSET: ON-GOING
ONSET: GRADUAL
ONSET: ON-GOING
ONSET: GRADUAL
ONSET: ON-GOING
ONSET: ON-GOING

## 2022-01-07 PROBLEM — J96.01 ACUTE RESPIRATORY FAILURE WITH HYPOXIA (HCC): Status: ACTIVE | Noted: 2022-01-01

## 2022-01-07 NOTE — PROGRESS NOTES
Pt was admitted with an undressed parenetical dialysis site. Pt refused dressing this morning and reported that \"site is kept undressed to prevent water accumulation\".  MD made aware

## 2022-01-07 NOTE — PROGRESS NOTES
Hospitalist Progress Note      Date of Admission: 1/6/2022    Subjective:   Admitted this am by fellow Hospitalist.  No new complaints. Feels well overall. No cp or sob. Medications:  Reviewed    Infusion Medications    dextrose      sodium chloride       Scheduled Medications    amLODIPine  10 mg Oral Daily    losartan  100 mg Oral Nightly    pantoprazole  40 mg Oral QAM AC    sodium chloride flush  5-40 mL IntraVENous 2 times per day    heparin (porcine)  5,000 Units SubCUTAneous 3 times per day    insulin glargine  0.15 Units/kg SubCUTAneous Nightly    insulin lispro  0-12 Units SubCUTAneous TID WC    insulin lispro  0-6 Units SubCUTAneous Nightly    dexamethasone  6 mg Oral Daily    Vitamin D  2,000 Units Oral Daily     PRN Meds: glucose, dextrose, glucagon (rDNA), dextrose, sodium chloride flush, sodium chloride, ondansetron **OR** ondansetron, polyethylene glycol, acetaminophen **OR** acetaminophen      Intake/Output Summary (Last 24 hours) at 1/7/2022 1455  Last data filed at 1/7/2022 1156  Gross per 24 hour   Intake 1300 ml   Output    Net 1300 ml       Physical Exam Performed:    BP (!) 156/82   Pulse 82   Temp 98.3 °F (36.8 °C) (Oral)   Resp 18   Ht 5' 3\" (1.6 m)   Wt 186 lb 15.2 oz (84.8 kg)   SpO2 94%   BMI 33.12 kg/m²     General appearance: No apparent distress  HEENT: Conjunctivae/corneas clear. Neck: Supple, with full range of motion. Respiratory:  Normal respiratory effort. Clear to auscultation, bilaterally without Rales/Wheezes/Rhonchi. Cardiovascular: Regular rate and rhythm   Abdomen: Soft, non-tender, non-distended   Musculoskeletal: No clubbing, cyanosis or edema bilaterally. Skin: No rashes or lesions. Neurologic:  Neurovascularly intact without any focal sensory/motor deficits.  Cranial nerves: II-XII intact, grossly non-focal.  Psychiatric: Alert and oriented, thought content appropriate, normal insight    Labs:   Recent Labs     01/06/22 2029   WBC 4. 5   HGB 11.8*   HCT 34.6*   *     Recent Labs     01/06/22 2029 01/06/22  2149 01/07/22  0027 01/07/22  0620 01/07/22  1148   NA   < >  --  132* 134* 130*   K   < >  --  2.7* 3.0* 2.8*   CL   < >  --  90* 91* 87*   CO2   < >  --  25 23 27   BUN   < >  --  37* 39* 41*   CREATININE   < >  --  14.2* 13.5* 13.1*   CALCIUM   < >  --  6.1* 6.4* 6.2*   PHOS  --  6.0*  --   --   --     < > = values in this interval not displayed. Recent Labs     01/06/22 2029   AST 31   ALT 16   BILITOT 0.3   ALKPHOS 105       Urinalysis:      Lab Results   Component Value Date    NITRU Negative 05/27/2021    WBCUA 3 05/27/2021    BACTERIA RARE 05/27/2021    RBCUA 4 05/27/2021    BLOODU TRACE 05/27/2021    SPECGRAV 1.016 05/27/2021    GLUCOSEU 250 05/27/2021       Assessment/Plan:    Active Hospital Problems    Diagnosis     Acute respiratory failure with hypoxia ([facility] [J96.01]    Covid pcr pending  Cont supportive care    ESRD:  On HD- nephro consulted    DM2:  Cont accu checks/ ssi. HTN:  Cont losartan and amlodipine. DVT Prophylaxis: Heparin  Diet: ADULT DIET; Regular; 3 carb choices (45 gm/meal); Low Fat/Low Chol/High Fiber/2 gm Na; Low Phosphorus (Less than 1000 mg);  Less than 60 gm  Code Status: Full Code    PT/OT Eval Status: Not needed    Dispo - Home in am       Isaias Yo MD

## 2022-01-07 NOTE — ED PROVIDER NOTES
[address]          ATTENDING PHYSICIAN NOTE       Date of evaluation: 1/6/2022    Chief Complaint     Positive For Covid-19 (pt states, \"I did a home test for covid on Friday because my dialysis nurse thought I should because of peritinitis and diarrhea. I called my doctor today because I have been so weak and dizzy from covid that he told me to come here. \")      History of Present Illness     Tammy Adele Norway Donah Aschoff is a 55 y.o. female, with a complex past medical history that includes diabetes, hypertension, and end-stage renal disease on home peritoneal dialysis, who presents to the emergency department, at the behest of her nephrologist, for significant weakness and dizziness, worsening in the last day or 2. Patient states that she has been feeling ill for about 1 week, with symptoms primarily of vomiting and diarrhea, without URI symptoms, cough, fevers or chills. He has had some overall increased fatigue and weakness, and intermittent shortness of breath. She had a COVID-19 test performed several days ago which was positive at home. The patient states that she has had difficulty tolerating p.o. food, although she has been drinking plenty of fluids, in her estimation. Initially she states that she had been drinking Gatorade for hydration, but then noted that it had phosphorus, so she has been drinking water for just the last day or 2. Patient states that she has been on intraperitoneal antibiotics for the last 2 to 3 weeks for presumed peritonitis, as evidenced by increased cloudiness of her peritoneal solution during exchanges. She denies any abdominal pain. It is noted that the patient has a history of episodes of hypokalemia, particularly when she has been feeling unwell in the past.    Review of Systems     Review of Systems   Constitutional: Positive for activity change, appetite change and fatigue. Negative for chills and fever. HENT: Negative.   Negative for congestion, rhinorrhea and sore throat. Eyes: Negative. Negative for visual disturbance. Respiratory: Positive for shortness of breath. Negative for cough. Cardiovascular: Negative. Negative for chest pain. Gastrointestinal: Positive for diarrhea, nausea and vomiting. Negative for abdominal pain. Genitourinary: Negative. Musculoskeletal: Negative. Skin: Negative. Neurological: Positive for weakness and light-headedness. Negative for dizziness, syncope and headaches. Psychiatric/Behavioral: Negative. Past Medical, Surgical, Family, and Social History     She has a past medical history of Anemia, Chronic kidney disease, Diabetes mellitus ([facility] [address] Diabetes mellitus type 1 ([facility] [address] Diabetic retinopathy ([facility] [address] Diabetic retinopathy associated with type 2 diabetes mellitus, without macular edema, Hyperlipidemia, Hypertension, Kidney stone, Mixed hyperlipidemia, Orthostatic hypotension, and Vitreous hemorrhage of right eye ([facility] [address] She has a past surgical history that includes Appendectomy; Kidney stone surgery; Tonsillectomy; and LAPAROSCOPY INSERTION PERITONEAL CATHETER (N/A, 8/12/2019). Her family history includes Cancer in her maternal grandmother; Diabetes in her father and paternal grandmother; Emphysema in her father; Heart Disease in her father, mother, and paternal grandfather; High Blood Pressure in her mother; Carlynn Mor in her maternal grandfather. She reports that she has never smoked. She has never used smokeless tobacco. She reports that she does not drink alcohol and does not use drugs.     Medications     Previous Medications    AMLODIPINE (NORVASC) 10 MG TABLET    TAKE ONE TABLET BY MOUTH DAILY    ERGOCALCIFEROL (ERGOCALCIFEROL) 1.25 MG (23107 UT) CAPSULE    Take 50,000 Units by mouth once a week    HYDRALAZINE (APRESOLINE) 100 MG TABLET    Take 0.5 tablets by mouth 3 times daily    INSULIN LISPRO (HUMALOG KWIKPEN) 100 UNIT/ML PEN    Inject 2-4 Units into the skin 3 times daily (before meals) Sliding scale     LABETALOL (NORMODYNE) 200 MG TABLET    Take 1 tablet by mouth every 12 hours    LOSARTAN (COZAAR) 100 MG TABLET    Take 100 mg by mouth nightly    PANTOPRAZOLE (PROTONIX) 40 MG TABLET    Take 1 tablet by mouth daily    TORSEMIDE (DEMADEX) 100 MG TABLET    TAKE 4 TABLETS BY MOUTH ONCE NIGHTLY       Allergies     She is allergic to bactrim [sulfamethoxazole-trimethoprim], doxazosin, geocillin [carbenicillin], lisinopril, other, spironolactone, tramadol, bumetanide, and ibuprofen. Physical Exam     INITIAL VITALS: BP: 106/70, Temp: 97.7 °F (36.5 °C), Pulse: 57, Resp: 19, SpO2: 98 %     General: Well appearing. Pleasantly conversational, and in NAD. HEENT: Pupils are equal, round, and reactive to light. Extraocular muscles are intact. Conjunctivae are clear and moist. No redness or drainage from the eyes. Neck: Supple, with full range of motion. Cardiovascular: Normal S1-S2 without murmur rub or gallop. 2+ radial pulses bilaterally. Respiratory: Unlabored breathing with equal chest rise and fall. Lungs are clear to auscultation bilaterally. No adventitious lung sounds heard. Abdomen: Soft and nontender, without guarding or rebound tenderness. A peritoneal dialysis catheter is present to the left lower quadrant, without surrounding erythema or drainage. No masses or hepatosplenomegaly. Skin: Warm and dry, without rashes or ecchymoses, lacerations or abrasions. Neuro: Alert and oriented x3. No focal neurologic deficits are noted. Extremities: Warm and well-perfused, without clubbing, cyanosis, or edema. The patient moves all extremities equally. Psych: The patient's mood and affect are generally within normal limits for their presentation. Diagnostic Results     EKG   Normal sinus rhythm with frequent PACs. Normal axis. MT interval 136 ms, QRS duration 86 ms,  ms. Some lateral T wave flattening. No ST segment changes are noted. This morphology is similar to a prior EKG dated August 10, 2021. RADIOLOGY:  XR CHEST PORTABLE   Final Result      No acute cardiopulmonary disease.           LABS:   Results for orders placed or performed during the hospital encounter of 01/06/22   CBC Auto Differential   Result Value Ref Range    WBC 4.5 4.0 - 11.0 K/uL    RBC 3.82 (L) 4.00 - 5.20 M/uL    Hemoglobin 11.8 (L) 12.0 - 16.0 g/dL    Hematocrit 34.6 (L) 36.0 - 48.0 %    MCV 90.7 80.0 - 100.0 fL    MCH 30.9 26.0 - 34.0 pg    MCHC 34.0 31.0 - 36.0 g/dL    RDW 14.8 12.4 - 15.4 %    Platelets 856 (L) 666 - 450 K/uL    MPV 12.1 (H) 5.0 - 10.5 fL    PLATELET SLIDE REVIEW Decreased     SLIDE REVIEW see below     Path Consult No     Neutrophils % 58.0 %    Lymphocytes % 18.0 %    Monocytes % 15.0 %    Eosinophils % 2.0 %    Basophils % 0.0 %    Neutrophils Absolute 2.8 1.7 - 7.7 K/uL    Lymphocytes Absolute 0.9 (L) 1.0 - 5.1 K/uL    Monocytes Absolute 0.7 0.0 - 1.3 K/uL    Eosinophils Absolute 0.1 0.0 - 0.6 K/uL    Basophils Absolute 0.0 0.0 - 0.2 K/uL    Atypical Lymphocytes Relative 3 0 - 6 %    Metamyelocytes Relative 1 (A) %    Promyelocytes Percent 3 (A) %    Macrocytes 1+ (A)     Polychromasia 1+ (A)    Comprehensive Metabolic Panel   Result Value Ref Range    Sodium 130 (L) 136 - 145 mmol/L    Potassium 2.5 (LL) 3.5 - 5.1 mmol/L    Chloride 87 (L) 99 - 110 mmol/L    CO2 27 21 - 32 mmol/L    Anion Gap 16 3 - 16    Glucose 210 (H) 70 - 99 mg/dL    BUN 34 (H) 7 - 20 mg/dL    CREATININE 13.3 (HH) 0.6 - 1.1 mg/dL    GFR Non-African American 3 (A) >60    GFR  4 (A) >60    Calcium 6.6 (L) 8.3 - 10.6 mg/dL    Total Protein 6.3 (L) 6.4 - 8.2 g/dL    Albumin 2.5 (L) 3.4 - 5.0 g/dL    Albumin/Globulin Ratio 0.7 (L) 1.1 - 2.2    Total Bilirubin 0.3 0.0 - 1.0 mg/dL    Alkaline Phosphatase 105 40 - 129 U/L    ALT 16 10 - 40 U/L    AST 31 15 - 37 U/L   HCG Qualitative, Serum   Result Value Ref Range    hCG Qual Negative Detects HCG level >10 MIU/mL Lipase   Result Value Ref Range    Lipase 55.0 13.0 - 60.0 U/L   Phosphorus   Result Value Ref Range    Phosphorus 6.0 (H) 2.5 - 4.9 mg/dL   Blood gas, venous (Lab)   Result Value Ref Range    pH, Danny 7.439 7.350 - 7.450    pCO2, Danny 40.0 (L) 41.0 - 51.0 mmHg    pO2, Danny 40.2 (H) 25.0 - 40.0 mmHg    HCO3, Venous 27.1 24.0 - 28.0 mmol/L    Base Excess, Danny 2.7 -2.0 - 3.0 mmol/L    O2 Sat, Danny 73 Not established %    Carboxyhemoglobin 0.9 0.0 - 1.5 %    MetHgb, Danny 0.3 0.0 - 1.5 %    TC02 (Calc), Danny 28 mmol/L    Hemoglobin, Danny, Reduced 26.70 %   Lactate, plasma   Result Value Ref Range    Lactic Acid 2.0 0.4 - 2.0 mmol/L       RECENT VITALS:  BP: 106/70, Temp: 97.7 °F (36.5 °C), Pulse: 57, Resp: 19, SpO2: 98 %     Procedures       ED Course     Nursing Notes, Past Medical Hx, Past Surgical Hx, Social Hx, Allergies, and Family Hx were reviewed. The patient was given the following medications:  Orders Placed This Encounter   Medications    0.9% NaCl with KCl 20 mEq infusion    potassium chloride (KLOR-CON M) extended release tablet 40 mEq    ondansetron (ZOFRAN) injection 4 mg    dexamethasone (DECADRON) injection 6 mg       CONSULTS:  IP CONSULT TO HOSPITALIST    MEDICAL Apurva Funes / Sayda Navarrete / Nola Elder is a 55 y.o. female with a history as described above, including end-stage renal disease on home peritoneal dialysis, with a weeklong history of primarily nausea, vomiting, diarrhea, as well as fatigue, malaise, and decreased exercise tolerance. She had a positive home COVID test last week, and presents with worsening symptoms today. Initial laboratory evaluation is remarkable for significant hypokalemia, as well as milder hyponatremia, which the patient has experienced in the past.  Her potassium is being repleted both IV and p.o., together with normal saline. Repeat basic metabolic panel is pending after the completion of this infusion.     On initial examination, she had O2

## 2022-01-07 NOTE — CARE COORDINATION
Case Management Assessment           Initial Evaluation                Date / Time of Evaluation: 1/7/2022 3:07 PM                 Assessment Completed by: Aldair Vick RN    Patient Name: Ramiro Sandoval     YOB: 1965  Diagnosis: Hypokalemia [E87.6]  ESRD on peritoneal dialysis ([facility] [address] [N18.6, Z99.2]  Acute respiratory failure with hypoxia ([address] [J96.01]  Acute hypoxemic respiratory failure due to COVID-19 ([facility] [address] [U07.1, J96.01]     Date / Time: 1/6/2022  9:23 PM    Patient Admission Status: Inpatient    If patient is discharged prior to next notation, then this note serves as note for discharge by case management. Current PCP: No primary care provider on file.   Clinic Patient: No    Chart Reviewed: Yes  Patient/ Family Interviewed: Yes    Initial assessment completed at bedside with: patient via phone due to [address] [place] [address]    Hospitalization in the last 30 days: No    Emergency Contacts:  Extended Emergency Contact Information  Primary Emergency Contact: Ruth Garcia  Address: [address]  Home Phone: [phone]  Mobile Phone: [phone]  Relation: Spouse  Secondary Emergency Contact: Mervat Farooq  Address: mother-in- law   [address] Phone: [phone]  Relation: Other    Advance Directives:   Code Status: Full [address]: No  Agent:   Contact Number:     Copy present: No     In paper Chart: No    Scanned into EMR No    Financial  Payor: MEDICAID [place] / Plan: Marco Antonio Bennett DEPT OF JOB / Product Type: *No Product type* /     Pre-cert required for SNF: No    Pharmacy    [facility] [address] [facility] [phone]  [address] [facility] [address]  Phone: [phone] Fax: [phone] [address] [phone] -  [phone]  [place] [address] [address]  Phone: [phone] Fax: [phone]      Potential assistance Purchasing Medications: Potential Assistance Purchasing Medications: No  Does Patient want to participate in local refill/ meds to beds program?:      Meds To Beds General Rules:  1. Can ONLY be done Monday- Friday between 8:30am-5pm  2. Prescription(s) must be in pharmacy by 3pm to be filled same day  3. Copy of patient's insurance/ prescription drug card and patient face sheet must be sent along with the prescription(s)  4. Cost of Rx cannot be added to hospital bill. If financial assistance is needed, please contact unit  or ;  or  CANNOT provide pharmacy voucher for patients co-pays  5.  Patients can then  the prescription on their way out of the hospital at discharge, or pharmacy can deliver to the bedside if staff is available. (payment due at time of pick-up or delivery - cash, check, or card accepted)     Able to afford home medications/ co-pay costs: Yes    ADLS  Support Systems: Spouse/Significant Other,Family Members    PT AM-PAC:   /24  OT AM-PAC:   /24    HOUSING  Home Environment: from home with spouse  Steps:     Plans to RETURN to current housing: Yes  Barriers to RETURNING to current housing: none per patient    Home Care Information  Currently ACTIVE with Home Health Care: No  Home Care Agency: Not Applicable    Currently ACTIVE with [place] on Aging: No  Passport/ Waiver: No  Passport/ Waiver Services: Not Applicable    :  Phone:       [address]  [facility] [place]:   Equipment:     Home Oxygen and [address] prior to admission: No  [address]: Not Applicable  Other Respiratory Equipment:     Informed of need to bring portable home O2 tank on day of DISCHARGE for nursing to connect prior to leaving: No  Verbalized agreement/Understanding: No  Person to bring portable tank at discharge:     Dialysis  Active with HD/PD prior to admission: Yes  Nephrologist: Dr Aurelio Madrigal:  [facility]  Address: [address]  Phone: [phone]    DISCHARGE PLAN:  Disposition: Home- No Services Needed    Transportation PLAN for discharge: family     Factors facilitating achievement of predicted outcomes: Family support, Cooperative, Pleasant and Good insight into deficits    Barriers to discharge: Medication managment and weaning O2    Additional Case Management Notes: CM spoke with patient via phone due to [address], patient does home PD and her nephrologist is Dr Dudley Tsai and she has her home clinic at [facility]. Patient reports she does her own manual PD at home prior to admission. Patient reports her  will transport her to home at [place], she has no DME or HHC prior to admission. If patient unable to wean on home O2, may need home O2 eval and home O2 set up, COVID test remains pending at this time. The Plan for Transition of Care is related to the following treatment goals of Hypokalemia [E87.6]  ESRD on peritoneal dialysis ([address] [N18.6, Z99.2]  Acute respiratory failure with hypoxia ([address] [J96.01]  Acute hypoxemic respiratory failure due to COVID-19 ([address] [U07.1, J96.01]    The Patient and/or patient representative Tami and her family were provided with a choice of provider and agrees with the discharge plan Yes    Freedom of choice list was provided with basic dialogue that supports the patient's individualized plan of care/goals and shares the quality data associated with the providers.  Yes    Care Transition patient: No    Ceci Koroma RN  [facility]  Case Management Department  Ph: [phone]   Fax: [phone]

## 2022-01-07 NOTE — PROGRESS NOTES
4 Eyes Admission Assessment     I agree as the admission nurse that 2 RN's have performed a thorough Head to Toe Skin Assessment on the patient. ALL assessment sites listed below have been assessed on admission. Areas assessed by both nurses:   [x]   Head, Face, and Ears   [x]   Shoulders, Back, and Chest  [x]   Arms, Elbows, and Hands   [x]   Coccyx, Sacrum, and Ischium  [x]   Legs, Feet, and Heels        Does the Patient have Skin Breakdown?   No         Enoch Prevention initiated:  No   Wound Care Orders initiated:  No      [facility] nurse consulted for Pressure Injury (Stage 3,4, Unstageable, DTI, NWPT, and Complex wounds) or Enoch score 18 or lower:  No      Nurse 1 eSignature: Electronically signed by Prema Madrid RN on 1/7/22 at 7:59 AM EST    **SHARE this note so that the co-signing nurse is able to place an eSignature**    Nurse 2 eSignature: Electronically signed by Shandra Zamora RN on 1/7/22 at 11:01 AM EST

## 2022-01-07 NOTE — ED NOTES
Bed: B24-24  Expected date:   Expected time:   Means of arrival:   Comments:  Scar Nagel RN  01/06/22 2123

## 2022-01-07 NOTE — H&P
Hospital Medicine History & Physical      PCP: No primary care provider on file. Date of Admission: 1/6/2022    Date of Service: Pt seen/examined on 1/7/2022 and Admitted to Inpatient with expected LOS greater than two midnights due to medical therapy. Chief Complaint:  Fatigue, Nausea, vomiting, Diarrhea. Positive CoVid a week ago. History Of Present Illness:     55 y.o. female who presents for debilitating weakness, dizziness, fatigue worsening on the last 48 hours. Patient has been feeling unwell for about a week with nausea, vomiting, diarrhea and shortness of breath. Denies any fever, chills or URI symptoms, cough. Patient got Covid home test performed last Friday which was positive. Patient has been having difficulties with oral intake. Patient has history of ESRD and is on home peritoneal dialysis. She has been on intraperitoneal antibiotics for the last 2-3 weeks for presumed peritonitis as evidenced by increased cloudiness of her peritoneal fluid during exchanges. On initial blood work patient has significant hypokalemia and after repletion it has remained low and patient was found to be hypoxic requiring 3 L of supplemental oxygen. Patient has desaturated on room air.     Past Medical History:          Diagnosis Date    Anemia 9/15/2014    Chronic kidney disease     Diabetes mellitus ([address]     Diabetes mellitus type 1 ([address]     Diabetic retinopathy ([address]     Diabetic retinopathy associated with type 2 diabetes mellitus, without macular edema     Hyperlipidemia 7/27/2015    Hypertension     Kidney stone     Mixed hyperlipidemia 7/27/2015    Orthostatic hypotension     Vitreous hemorrhage of right eye ([address] 8/15/2017       Past Surgical History:          Procedure Laterality Date    APPENDECTOMY      KIDNEY STONE SURGERY      LAPAROSCOPY INSERTION PERITONEAL CATHETER N/A 8/12/2019    LAPAROSCOPIC PERITONEAL DIALYSIS CATHETER PLACEMENT WITH AXEL TROCHAR performed by Martin Lee MD at [facility]         Medications Prior to Admission:      Prior to Admission medications    Medication Sig Start Date End Date Taking? Authorizing Provider   torsemide (DEMADEX) 100 MG tablet TAKE 4 TABLETS BY MOUTH ONCE NIGHTLY 1/3/22   Fan Latham MD   amLODIPine (NORVASC) 10 MG tablet TAKE ONE TABLET BY MOUTH DAILY 1/3/22   Fan Latham MD   hydrALAZINE (APRESOLINE) 100 MG tablet Take 0.5 tablets by mouth 3 times daily 8/12/21   Fco Melgar MD   labetalol (NORMODYNE) 200 MG tablet Take 1 tablet by mouth every 12 hours 8/6/21   Fco Melgar MD   losartan (COZAAR) 100 MG tablet Take 100 mg by mouth nightly    Historical Provider, MD   ergocalciferol (ERGOCALCIFEROL) 1.25 MG (59649 UT) capsule Take 50,000 Units by mouth once a week    Historical Provider, MD   pantoprazole (PROTONIX) 40 MG tablet Take 1 tablet by mouth daily 3/16/21   Fan Latham MD   insulin lispro (HUMALOG KWIKPEN) 100 UNIT/ML pen Inject 2-4 Units into the skin 3 times daily (before meals) Sliding scale     Historical Provider, MD       Allergies:  Bactrim [sulfamethoxazole-trimethoprim], Doxazosin, Geocillin [carbenicillin], Lisinopril, Other, Spironolactone, Tramadol, Bumetanide, and Ibuprofen    Social History:    TOBACCO:   reports that she has never smoked. She has never used smokeless tobacco.  ETOH:   reports no history of alcohol use. E-Cigarettes/Vaping Use     Questions Responses    E-Cigarette/Vaping Use Never User    Start Date     Passive Exposure     Quit Date     Counseling Given     Comments         Family History:    Reviewed in detail and negative for DM, CAD, Cancer, CVA.  Positive as follows:        Problem Relation Age of Onset    Heart Disease Mother         heart attack 2007    High Blood Pressure Mother     Diabetes Father     Heart Disease Father     Emphysema Father     Cancer Maternal Grandmother         cervical cancer    Lung Cancer Maternal Grandfather black lung    Diabetes Paternal Grandmother     Heart Disease Paternal Grandfather        REVIEW OF SYSTEMS COMPLETED:   Pertinent positives as noted in the HPI. All other systems reviewed and negative. PHYSICAL EXAM PERFORMED:    BP (!) 147/84   Pulse 75   Temp 97.7 °F (36.5 °C) (Oral)   Resp 22   SpO2 94%     General appearance:  No apparent distress, appears stated age and cooperative. Afebrile, obese, on supplemental oxygen via nasal cannula  HEENT:  Normal cephalic, atraumatic without obvious deformity. Pupils equal, round, and reactive to light. Extra ocular muscles intact. Conjunctivae/corneas clear. Neck: Supple, with full range of motion. No jugular venous distention. Trachea midline. Respiratory:  Normal respiratory effort. Clear to auscultation, bilaterally without Rales/Wheezes/Rhonchi. Cardiovascular:  Regular rate and rhythm with normal S1/S2 without murmurs, rubs or gallops. Abdomen: Soft, non-tender, non-distended with normal bowel sounds. PD catheter in left lower quadrant without any surrounding erythema or induration  Musculoskeletal:  No clubbing, cyanosis or edema bilaterally. Full range of motion without deformity. Skin: Skin color, texture, turgor normal.  No rashes or lesions. Neurologic:  Neurovascularly intact without any focal sensory/motor deficits.  Cranial nerves: II-XII intact, grossly non-focal.  Psychiatric:  Alert and oriented, thought content appropriate, normal insight  Capillary Refill: Brisk,3 seconds, normal  Peripheral Pulses: +2 palpable, equal bilaterally       Labs:     Recent Labs     01/06/22 2029   WBC 4.5   HGB 11.8*   HCT 34.6*   *     Recent Labs     01/06/22 2029 01/06/22 2149 01/07/22  0027   *  --  132*   K 2.5*  --  2.7*   CL 87*  --  90*   CO2 27  --  25   BUN 34*  --  37*   CREATININE 13.3*  --  14.2*   CALCIUM 6.6*  --  6.1*   PHOS  --  6.0*  --      Recent Labs     01/06/22 2029   AST 31   ALT 16   BILITOT 0.3 ALKPHOS 105     No results for input(s): INR in the last 72 hours. No results for input(s): [employer] in the last 72 hours. Urinalysis:      Lab Results   Component Value Date    NITRU Negative 05/27/2021    WBCUA 3 05/27/2021    BACTERIA RARE 05/27/2021    RBCUA 4 05/27/2021    BLOODU TRACE 05/27/2021    SPECGRAV 1.016 05/27/2021    GLUCOSEU 250 05/27/2021       Radiology:   EKG:  I have reviewed the EKG with the following interpretation: Sinus rhythm, normal rate, prolonged QTC, nonspecific ST-T changes    XR CHEST PORTABLE   Final Result      No acute cardiopulmonary disease. ASSESSMENT:    Active Hospital Problems    Diagnosis Date Noted    Acute respiratory failure with hypoxia ([facility] [J96.01] 01/07/2022   #Covid 19 infection  #Viral gastroenteritis likely secondary to Covid infection  #ESRD on PD, on intraperitoneal antibiotics for presumed peritonitis  #DM-2  #Essential hypertension  #Obesity class II with BMI of 36    PLAN:  -Continue on supplemental oxygen, bronchodilator therapy, Decadron  -Droplet + isolation  -Nephrology consult  -Monitor renal function and electrolytes  -Continue on current insulin regimen  -Continue on home doses of amlodipine, hydralazine, labetalol, Cozaar  -Supportive therapy    DVT Prophylaxis: Heparin  Diet: ADULT DIET; Regular; 3 carb choices (45 gm/meal); Low Fat/Low Chol/High Fiber/2 gm Na; Low Phosphorus (Less than 1000 mg); Less than 60 gm  Code Status: Full Code    PT/OT Eval Status: As tolerated    Dispo -PCU with telemetry, droplet + isolation       Jose Dhillon MD    Thank you No primary care provider on file. for the opportunity to be involved in this patient's care. If you have any questions or concerns please feel free to contact me at [phone].

## 2022-01-08 NOTE — PLAN OF CARE
Problem: Safety:  Goal: Free from accidental physical injury  Description: Free from accidental physical injury  1/7/2022 2245 by Larry Mckeon RN  Outcome: Ongoing  1/7/2022 1710 by Corie Cote RN  Outcome: Ongoing  Note: Pt has remained free from physical injury for the duration of the shift so far - will continue to assess

## 2022-01-08 NOTE — PROGRESS NOTES
Patient in bed calm with PD running. 3rd bag of potassium currently running, with minor burning feeling in her are r/t it. Patient blood glucose level at 293 this morning. Lantus in patients room. Patient stated she does not take Lantus at home so she does not want to take it now stating she knows how to control it and it is probably d/t the crackers she ate earlier.

## 2022-01-08 NOTE — DISCHARGE SUMMARY
Hospital Medicine Discharge Summary    Patient ID: Dylan Mesa      Patient's PCP: No primary care provider on file. Admit Date: 1/6/2022     Discharge Date:   1/8/2022    Admitting Provider: Yazmin Mandujano MD     Discharge Provider: Robert Bañuelos MD     Discharge Diagnoses: Active Hospital Problems    Diagnosis     Acute respiratory failure with hypoxia ([facility] [J96.01]        The patient was seen and examined on day of discharge and this discharge summary is in conjunction with any daily progress note from day of discharge. Hospital Course: *  Covid 19 pneumonia and acute hypoxemic resp failure:  Treated w/ decadron and placed on 02. Improved symptomatically and o2 requirement also improved. On dc home o2 eval showed need for 2 liters of o2 therefore pt set up w/ home o2. Cont decadron on dc to complete course. Cont supportive care     ESRD:  On HD- nephro consulted and HD continued in house.     DM2: Followed accu checks and treated w/ ssi while in house. Cont ssi on dc as pta.     HTN:  Cont losartan and amlodipine. Physical Exam Performed:     BP (!) 163/74   Pulse 82   Temp 96.8 °F (36 °C) (Oral)   Resp 18   Ht 5' 3\" (1.6 m)   Wt 188 lb 7.9 oz (85.5 kg)   SpO2 95%   BMI 33.39 kg/m²       General appearance:  No apparent distress, appears stated age and cooperative. Respiratory:  Normal respiratory effort. No distress  Cardiovascular:  Regular rate and rhythm with normal S1/S2   Abdomen: Soft, non-tender  Musculoskeletal:  Full range of motion without deformity. Skin:No rashes or lesions. Neurologic:  Grossly non-focal.  Psychiatric:  Alert and oriented, thought content appropriate, normal insight      Labs:  For convenience and continuity at follow-up the following most recent labs are provided:      CBC:    Lab Results   Component Value Date    WBC 4.5 01/06/2022    HGB 11.8 01/06/2022    HCT 34.6 01/06/2022     01/06/2022       Renal:    Lab Results Component Value Date     01/08/2022    K 3.0 01/08/2022    K 2.8 01/07/2022    CL 90 01/08/2022    CO2 26 01/08/2022    BUN 49 01/08/2022    CREATININE 14.3 01/08/2022    CALCIUM 6.4 01/08/2022    PHOS 6.0 01/06/2022         Significant Diagnostic Studies    Radiology:   XR CHEST PORTABLE   Final Result      No acute cardiopulmonary disease. Consults:     IP CONSULT TO HOSPITALIST  IP CONSULT TO NEPHROLOGY    Disposition:  Home     Condition at Discharge: Stable    Discharge Instructions/Follow-up:  PCP    Code Status:  Full Code     Activity: activity as tolerated    Diet: diabetic diet      Discharge Medications:     Current Discharge Medication List           Details   dexamethasone (DECADRON) 6 MG tablet Take 1 tablet by mouth daily for 7 doses  Qty: 7 tablet, Refills: 0              Details   torsemide (DEMADEX) 100 MG tablet TAKE 4 TABLETS BY MOUTH ONCE NIGHTLY  Qty: 360 tablet, Refills: 5      amLODIPine (NORVASC) 10 MG tablet TAKE ONE TABLET BY MOUTH DAILY  Qty: 30 tablet, Refills: 3      losartan (COZAAR) 100 MG tablet Take 100 mg by mouth nightly      ergocalciferol (ERGOCALCIFEROL) 1.25 MG (37066 UT) capsule Take 50,000 Units by mouth once a week On Mondays      pantoprazole (PROTONIX) 40 MG tablet Take 1 tablet by mouth daily  Qty: 90 tablet, Refills: 1      insulin lispro (HUMALOG KWIKPEN) 100 UNIT/ML pen Inject 2-4 Units into the skin 3 times daily (before meals) Sliding scale            Time Spent on discharge is more than 30 minutes in the examination, evaluation, counseling and review of medications and discharge plan. Signed:    Chacho Decker MD   1/8/2022      Thank you No primary care provider on file. for the opportunity to be involved in this patient's care. If you have any questions or concerns please feel free to contact me at [phone].

## 2022-01-08 NOTE — CARE COORDINATION
Case Management Assessment            Discharge Note                    Date / Time of Note: 1/8/2022 1:51 PM                  Discharge Note Completed by: Carol Houser RN    Patient Name: Wesley Dugan   YOB: 1965  Diagnosis: Hypokalemia [E87.6]  ESRD on peritoneal dialysis ([facility] [N18.6, Z99.2]  Acute respiratory failure with hypoxia ([facility] [J96.01]  Acute hypoxemic respiratory failure due to COVID-19 ([facility] [U07.1, J96.01]   Date / Time: 1/6/2022  9:23 PM    Current PCP: No primary care provider on file. Clinic patient: No    Hospitalization in the last 30 days: No    Advance Directives:  Code Status: Full Code  [place] DNR form completed and on chart: Not Indicated    Financial:  Payor: Maria Fernanda Snowflake / Plan: Scott Ortega DEPT OF JOB / Product Type: *No Product type* /      Pharmacy:    [facility] [address] [place] [address] [phone]  [address] [place] [address]  Phone: [phone] Fax: [phone]    [facility] [address], [address] [facility] [phone] - F [phone]  [address] [facility] [address]  Phone: [phone] Fax: [phone]      Assistance purchasing medications?: Potential Assistance Purchasing Medications: No  Assistance provided by Case Management: None at this time    Does patient want to participate in local refill/ meds to beds program?:      Meds To Beds General Rules:  1. Can ONLY be done Monday- Friday between 8:30am-5pm  2. Prescription(s) must be in pharmacy by 3pm to be filled same day  3. Copy of patient's insurance/ prescription drug card and patient face sheet must be sent along with the prescription(s)  4. Cost of Rx cannot be added to hospital bill. If financial assistance is needed, please contact unit  or ;  or  CANNOT provide pharmacy voucher for patients co-pays  5.  Patients can then  [facility]  Case Management Department  Ph: [phone]

## 2022-01-08 NOTE — FLOWSHEET NOTE
01/07/22 1945   Vitals   BP (!) 146/87   Temp 98.5 °F (36.9 °C)   Temp Source Oral   Pulse 89   Resp 22   Weight 190 lb 7.6 oz (86.4 kg)  (standing scale)   Peritoneal Dialysis Catheter   Placement Date/Time: (c) 01/07/22 (c) 0500   Inserted by: (c)    Status Accessed   Site Condition No Complications   Dressing Open to Air (None)   Cycler   Verification of Prescription CCPD   Total Volume Programmed 8000 mL   Therapy Time (Hours:Minutes) 08:00   Fill Volume 2000 mL   Last Fill Volume 0 mL   Dextrose Setting Same (Nonextraneal)   I Drain Alarm 0 mL   Number of Cycles 4   Bag Volume 2000 mL   Number of Bags Used 2   Dianeal Solution Other (Comment)  (delflex 2.5% in 5000 ml )   I Drain (mL) 2500 mL     CCPD Order   Exchanges: 4   Exchange Volume: 2000 ml   Total Time: 8 hrs   Dextrose: 2.5%   Last Fill: 0 ml   Total Volume: 8000 ml    External cuff exposed MD aware ok to use, exit site open to air with mupirocin ointment, last BM today     Orders verified. Supplies taken to pt's room. Report received. Cycler set up, primed and pre tested. changed  Tenckhoff Cath site cleaned and left open to air. Pt connected to cycler. CCPD initiated without problem. Initial effluent clear.

## 2022-01-08 NOTE — FLOWSHEET NOTE
CCPD order:   Delflex 2.5%              Peritoneal dialysis: Patient Communication    Not Released Not seen     Order Questions    Question Answer   Total Volume (L) 8   Therapy Time Duration (Hours) 8   Exchange Volume (L) 2   Number of Exchanges 4   Final Fill No       UF: 1280 ML  Total Time: 08 hours and 28 minutes  Alarm: 0 times over night  CCPD removed fluid: color Light yellow, clear ; without fibrin noticed  Pt tolerated well over night with CCPD  Plan to put pt on CCPD this evening if pt's still at hospital  Placed CCPD flow sheets in the chart for EMR scan     01/08/22 0634   Vitals   BP (!) 166/91   Temp 97 °F (36.1 °C)   Pulse 93   Resp 18   SpO2 99 %   Weight 188 lb 7.9 oz (85.5 kg)   Peritoneal Dialysis Catheter   Placement Date/Time: (c) 01/07/22 (c) 0500   Inserted by: (c)    Status Deaccessed   Cycler   Verification of Prescription CCPD   Total Volume Programmed 8000 mL   Therapy Time (Hours:Minutes) 08:00   Fill Volume 2000 mL   Last Fill Volume 0 mL   Dextrose Setting Same (Nonextraneal)   I Drain Alarm 0 mL   Number of Cycles 4   Bag Volume 5000 mL   Number of Bags Used 2   Dianeal Solution Other (Comment)   Ultrafiltration (UF) (mL) 1280 mL

## 2022-01-08 NOTE — PROGRESS NOTES
Nephrology progress  Note                                                                                                                                                                                                                                                                                                                                                               Office : [phone]     Fax :[phone]              Patient's Name: Guillermo Joel  9:12 AM  1/8/2022    Reason for Consult:  ESRD   Requesting Physician: Dr Aline Roach     Chief Complaint:  Weakness     History of Present Ilness:    Guillermo Joel is a 55 y.o. female with ESRD   On PD     She came with weakness   Diagnosed with COVID 23   Pt is unvaccinated   K was low   Mild SOB   BP was low   Diarrhea +  Nausea +    Pt seen on PD         Past Medical History:   Diagnosis Date    Anemia 9/15/2014    Chronic kidney disease     Diabetes mellitus ([address]     Diabetes mellitus type 1 ([address]     Diabetic retinopathy ([address]     Diabetic retinopathy associated with type 2 diabetes mellitus, without macular edema     Hyperlipidemia 7/27/2015    Hypertension     Kidney stone     Mixed hyperlipidemia 7/27/2015    Orthostatic hypotension     Vitreous hemorrhage of right eye ([address] 8/15/2017       Past Surgical History:   Procedure Laterality Date    APPENDECTOMY      KIDNEY STONE SURGERY      LAPAROSCOPY INSERTION PERITONEAL CATHETER N/A 8/12/2019    LAPAROSCOPIC PERITONEAL DIALYSIS CATHETER PLACEMENT WITH AXEL TROCHAR performed by Magnus Álvarez MD at [address]         Family History   Problem Relation Age of Onset    Heart Disease Mother         heart attack 2007    High Blood Pressure Mother     Diabetes Father     Heart Disease Father     Emphysema Father     Cancer Maternal Grandmother         cervical cancer    Lung Cancer Maternal Grandfather         black lung    Diabetes Paternal Grandmother     Heart Disease Constitutional:  OAA X3 NAD  Skin: no rash, turgor wnl  Heent:  eomi, mmm  Neck: no bruits or jvd noted  Cardiovascular:  S1, S2 without m/r/g  Respiratory: CTA B without w/r/r  Abdomen:  +bs, soft, nt, nd  Ext: + lower extremity edema  Psychiatric: mood and affect appropriate  Musculoskeletal:  Rom, muscular strength intact    Data:   Labs:  CBC:   Recent Labs     01/06/22 2029   WBC 4.5   HGB 11.8*   *     BMP:    Recent Labs     01/07/22  0027 01/07/22 0620 01/07/22  1148   * 134* 130*   K 2.7* 3.0* 2.8*   CL 90* 91* 87*   CO2 25 23 27   BUN 37* 39* 41*   CREATININE 14.2* 13.5* 13.1*   GLUCOSE 165* 241* 283*     Ca/Mg/Phos:   Recent Labs     01/06/22 2029 01/06/22  2149 01/07/22  0027 01/07/22 0620 01/07/22  1148   CALCIUM   < >  --  6.1* 6.4* 6.2*   MG  --   --   --  1.10* 1.10*   PHOS  --  6.0*  --   --   --     < > = values in this interval not displayed. Hepatic:   Recent Labs     01/06/22 2029   AST 31   ALT 16   BILITOT 0.3   ALKPHOS 105     Troponin: No results for input(s): TROPONINI in the last 72 hours. BNP: No results for input(s): BNP in the last 72 hours. Lipids: No results for input(s): CHOL, TRIG, HDL, LDLCALC, LABVLDL in the last 72 hours. ABGs: No results for input(s): PHART, PO2ART, KVA8WPY in the last 72 hours. INR: No results for input(s): INR in the last 72 hours. UA:No results for input(s): Chandler Cole, GLUCOSEU, BILIRUBINUR, Drucella Lisette, BLOODU, PHUR, PROTEINU, UROBILINOGEN, NITRU, LEUKOCYTESUR, LABMICR, URINETYPE in the last 72 hours. Urine Microscopic: No results for input(s): LABCAST, BACTERIA, COMU, HYALCAST, WBCUA, RBCUA, EPIU in the last 72 hours. Urine Culture: No results for input(s): LABURIN in the last 72 hours. Urine Chemistry: No results for input(s): Suanne Yates, PROTEINUR, NAUR in the last 72 hours. IMAGING:  XR CHEST PORTABLE   Final Result      No acute cardiopulmonary disease. Assessment/Plan   1. ESRD     2. HTN    3. Anemia    4. Acid- base/ Electrolyte imbalance     5. Hypokalemia     6.  COVID 19     Plan   -Continue CCPD tonight   - replace K IV and p.o.  - Low Phos diet   - Phoslo for Phos control   - IV calcium   - ergocalciferol, Calcitriol   - BP better with IVF   - COVID management   - diarrhea control                       Thank you for allowing us to participate in care of Felisa Marie MD  Feel free to contact me   [facility] [address]  Office : [phone]  Fax :[phone]

## 2022-01-08 NOTE — PROGRESS NOTES
[facility]    Respiratory Therapy     Home Oxygen Evaluation        Name: Moises [facility] Record Number: [id number]  Age: 55 y.o. Gender:  female   : 1965  Today's date: 2022  Room: [address] [place] [address]      Assessment        BP (!) 163/74   Pulse 82   Temp 96.8 °F (36 °C) (Oral)   Resp 18   Ht 5' 3\" (1.6 m)   Wt 188 lb 7.9 oz (85.5 kg)   SpO2 95%   BMI 33.39 kg/m²     Patient Active Problem List   Diagnosis    Diabetic retinopathy ([address]    Mixed hyperlipidemia    DMII [facility]diabetes mellitus, type 2) [facility]    Vitreous hemorrhage of right eye ([address]    Essential hypertension    Acute kidney injury ([address]    ESRD (end stage renal disease) ([address]    Acute renal failure superimposed on chronic kidney disease (HCC)    Volume overload    Peritonitis ([address]    Generalized abdominal pain    Peritoneal dialysis status ([address]    Morbid obesity due to excess calories ([facility])    Nausea and vomiting    Hyponatremia    Diarrhea    Dizziness    Hypertensive emergency    Hypokalemia    Hypertensive urgency    Cerebrovascular accident (CVA) ([address]    Electrolyte imbalance    Acute respiratory failure with hypoxia ([facility])       Social History:  Social History     Tobacco Use    Smoking status: Never Smoker    Smokeless tobacco: Never Used   Vaping Use    Vaping Use: Never used   Substance Use Topics    Alcohol use: No    Drug use: No       Patient Room Air saturation at rest 94  %  Patient Room Air saturation upon ambulation 87 %    Oxygen saturations of 88% or less on RA qualifies patient for Home Oxygen    Patient resting on 1  lmp  with an oxygen saturation of  88 %     Patient ambulated on 2 lpm with an oxygen saturation of 90%    Patient ambulated on 3 lpm with an oxygen saturation of 94%      Qualifying patient for home oxygen with ambulation and continuous flow  @ 2 lpm.      In your clinical assessment does the Patient Require Portable Oxygen Tanks?     Yes Patient/caregiver was educated on Home Oxygen process:  Yes      Level of patient/caregiver understanding able to:   [] Verbalize understanding   [] Demonstrate understanding       [] Teach back        [] Needs reinforcement        []  No available caregiver               []  Other:     Response to education:  Excellent     Time Spent with Home O2 Set Up:  15  minutes     Ying Keith RCP on 1/8/2022 at 12:16 PM                 .

## 2022-01-08 NOTE — CONSULTS
Nephrology Consult Note                                                                                                                                                                                                                                                                                                                                                               Office : [phone]     Fax :[phone]              Patient's Name: Matt Vernon  10:23 PM  1/7/2022    Reason for Consult:  ESRD   Requesting Physician: Dr Kayleigh Mcclure     Chief Complaint:  Weakness     History of Present Ilness:    Matt Vernon is a 55 y.o. female with ESRD   On PD     She came with weakness   Diagnosed with COVID 23   Pt is unvaccinated   K was low   Mild SOB   BP was low   Diarrhea +  Nausea +    Pt seen on PD         Past Medical History:   Diagnosis Date    Anemia 9/15/2014    Chronic kidney disease     Diabetes mellitus ([address]     Diabetes mellitus type 1 ([address]     Diabetic retinopathy ([address]     Diabetic retinopathy associated with type 2 diabetes mellitus, without macular edema     Hyperlipidemia 7/27/2015    Hypertension     Kidney stone     Mixed hyperlipidemia 7/27/2015    Orthostatic hypotension     Vitreous hemorrhage of right eye ([address] 8/15/2017       Past Surgical History:   Procedure Laterality Date    APPENDECTOMY      KIDNEY STONE SURGERY      LAPAROSCOPY INSERTION PERITONEAL CATHETER N/A 8/12/2019    LAPAROSCOPIC PERITONEAL DIALYSIS CATHETER PLACEMENT WITH AXEL TROCHAR performed by Henrique Adler MD at [place] [address]         Family History   Problem Relation Age of Onset    Heart Disease Mother         heart attack 2007    High Blood Pressure Mother     Diabetes Father     Heart Disease Father     Emphysema Father     Cancer Maternal Grandmother         cervical cancer    Lung Cancer Maternal Grandfather         black lung    Diabetes Paternal Grandmother     Heart Disease Paternal Grandfather         reports that she has never smoked. She has never used smokeless tobacco. She reports that she does not drink alcohol and does not use drugs.     Allergies:  Bactrim [sulfamethoxazole-trimethoprim], Doxazosin, Geocillin [carbenicillin], Lisinopril, Other, Spironolactone, Tramadol, Bumetanide, and Ibuprofen    Current Medications:    amLODIPine (NORVASC) tablet 10 mg, Daily  losartan (COZAAR) tablet 100 mg, Nightly  pantoprazole (PROTONIX) tablet 40 mg, QAM AC  glucose (GLUTOSE) 40 % oral gel 15 g, PRN  dextrose 50 % IV solution, PRN  glucagon (rDNA) injection 1 mg, PRN  dextrose 5 % solution, PRN  sodium chloride flush 0.9 % injection 5-40 mL, 2 times per day  sodium chloride flush 0.9 % injection 5-40 mL, PRN  0.9 % sodium chloride infusion, PRN  ondansetron (ZOFRAN-ODT) disintegrating tablet 4 mg, Q8H PRN   Or  ondansetron (ZOFRAN) injection 4 mg, Q6H PRN  polyethylene glycol (GLYCOLAX) packet 17 g, Daily PRN  acetaminophen (TYLENOL) tablet 650 mg, Q6H PRN   Or  acetaminophen (TYLENOL) suppository 650 mg, Q6H PRN  heparin (porcine) injection 5,000 Units, 3 times per day  insulin glargine (LANTUS;BASAGLAR) injection pen 14 Units, Nightly  insulin lispro (1 Unit Dial) 0-12 Units, TID WC  insulin lispro (1 Unit Dial) 0-6 Units, Nightly  dexamethasone (DECADRON) tablet 6 mg, Daily  Vitamin D (CHOLECALCIFEROL) tablet 2,000 Units, Daily  mupirocin (BACTROBAN) 2 % ointment, Daily  [START ON 1/8/2022] potassium chloride 10 mEq/100 mL IVPB (Peripheral Line), Q2H  potassium bicarb-citric acid (EFFER-K) effervescent tablet 40 mEq, BID        Review of Systems:   14 point ROS obtained but were negative except mentioned in HPI      Physical exam:     Vitals:  BP (!) 141/74   Pulse 82   Temp 97.7 °F (36.5 °C) (Oral)   Resp 20   Ht 5' 3\" (1.6 m)   Wt 190 lb 7.6 oz (86.4 kg) Comment: standing scale  SpO2 93%   BMI 33.74 kg/m²   Constitutional:  OAA X3 NAD  Skin: no rash, turgor wnl  Heent:  eomi, mmm  Neck: no bruits or jvd noted  Cardiovascular:  S1, S2 without m/r/g  Respiratory: CTA B without w/r/r  Abdomen:  +bs, soft, nt, nd  Ext: + lower extremity edema  Psychiatric: mood and affect appropriate  Musculoskeletal:  Rom, muscular strength intact    Data:   Labs:  CBC:   Recent Labs     01/06/22 2029   WBC 4.5   HGB 11.8*   *     BMP:    Recent Labs     01/07/22  0027 01/07/22  0620 01/07/22  1148   * 134* 130*   K 2.7* 3.0* 2.8*   CL 90* 91* 87*   CO2 25 23 27   BUN 37* 39* 41*   CREATININE 14.2* 13.5* 13.1*   GLUCOSE 165* 241* 283*     Ca/Mg/Phos:   Recent Labs     01/06/22 2029 01/06/22 2149 01/07/22 0027 01/07/22  0620 01/07/22  1148   CALCIUM   < >  --  6.1* 6.4* 6.2*   MG  --   --   --  1.10* 1.10*   PHOS  --  6.0*  --   --   --     < > = values in this interval not displayed. Hepatic:   Recent Labs     01/06/22 2029   AST 31   ALT 16   BILITOT 0.3   ALKPHOS 105     Troponin: No results for input(s): TROPONINI in the last 72 hours. BNP: No results for input(s): BNP in the last 72 hours. Lipids: No results for input(s): CHOL, TRIG, HDL, LDLCALC, LABVLDL in the last 72 hours. ABGs: No results for input(s): PHART, PO2ART, UUK1ZFZ in the last 72 hours. INR: No results for input(s): INR in the last 72 hours. UA:No results for input(s): Layla Profit, GLUCOSEU, BILIRUBINUR, Emre Faes, BLOODU, PHUR, PROTEINU, UROBILINOGEN, NITRU, LEUKOCYTESUR, LABMICR, URINETYPE in the last 72 hours. Urine Microscopic: No results for input(s): LABCAST, BACTERIA, COMU, HYALCAST, WBCUA, RBCUA, EPIU in the last 72 hours. Urine Culture: No results for input(s): LABURIN in the last 72 hours. Urine Chemistry: No results for input(s): Irma Manzanilla, PROTEINUR, NAUR in the last 72 hours. IMAGING:  XR CHEST PORTABLE   Final Result      No acute cardiopulmonary disease. Assessment/Plan   1. ESRD     2. HTN    3. Anemia    4. Acid- base/ Electrolyte imbalance     5.  Hypokalemia 6.  COVID 19     Plan   - CCPD tonight   - replace K   - Low Phos diet   - Phoslo for Phos control   - IV calcium   - ergocalciferol, Calcitriol   - BP better with IVF   - COVID management   - diarrhea control   - labs in am   - d/w pt in length                     Thank you for allowing us to participate in care of Sherree Apley, MD  Feel free to contact me   [facility] [address]  Office : [phone]  Fax :[phone]

## 2022-01-08 NOTE — PROGRESS NOTES
Pt's IV  removed this morning, discharge paperwork reviewed/signed with pt. Pt refused home oxygen. Attempted to explain the importance of oxygen use with pt and the potential risks if pt is not compliant with home oxygen recommendation - pt continued to refuse home oxygen. Charge nurse informed and also attempted to explain to pt the importance of home oxygen use and the risks of noncompliance - pt continued to refuse. Dr. Mildred Acosta made aware and spoke with pt. Pt continued to refuse home oxygen and will be sent home without oxygen - Dr. Mildred Acosta aware. Will take pt via wheelchair to emergency room lobby to meet  for pickup.

## 2022-01-10 NOTE — CARE COORDINATION
[facility] Trans[facility] Initial Follow Up Call    Call within 2 business days of discharge: Yes    Patient:  Jaylen Mensah Patient :  1965  MRN:  [id number]   Reason for Admission:  covid 19  Discharge Date:  22  RARS: 20    CTC attempt to reach Pt regarding recent hospital discharge. CTC left voice recording with call back number requesting a call back. Follow up appointments:    No future appointments. IVANNA Hendrickson, RN  Care Transition Coordinator  Contact Number:  [phone]

## 2022-01-11 NOTE — CARE COORDINATION
[facility] Initial Follow Up Call    Call within 2 business days of discharge: Yes    Patient:  Catracho Moses Patient :  1965  MRN:  [id number]   Reason for Admission:  covid 19  Discharge Date:  22  RARS: 20    CTC attempt to reach Pt regarding recent hospital discharge. CTC left voice recording with call back number requesting a call back. Episode closed. Message sent to [facility] / pt has no PCP. Follow up appointments:    No future appointments. IVANNA El, RN  Care Transition Coordinator  Contact Number:  [phone]

## 2022-02-15 PROBLEM — A41.9 SEPSIS (HCC): Status: ACTIVE | Noted: 2022-01-01

## 2022-02-15 PROBLEM — K57.92 DIVERTICULITIS: Status: ACTIVE | Noted: 2022-01-01

## 2022-02-15 PROBLEM — E87.8 ELECTROLYTE ABNORMALITY: Status: ACTIVE | Noted: 2022-01-01

## 2022-02-15 NOTE — ED PROVIDER NOTES
[address] [facility]      Pt Name: Devra Gowers  MRN: [id number]  [place] 1965  Date of evaluation: 2/15/2022  Provider: SONU Palacios       I have seen and evaluated this patient with my supervising physician Dr. Cochran Frames     Abdominal pain  Vomiting   Concern for peritonitis      HISTORY OF PRESENT ILLNESS  (Location/Symptom, Timing/Onset, Context/Setting, Quality, Duration, Modifying Factors, Severity.)   Devra Gowers is a 55 y.o. female who presents to the emergency department for abdominal pain, vomiting, concern for peritonitis. Patient does peritoneal dialysis and when she removed her fluid today at noon, was cloudy. Yesterday when she remove the fluid, it was normal.  She has had a diffuse abdominal pain since 2 AM this morning and has been nauseous and vomiting. Denies hematemesis or coffee-ground emesis. Has diarrhea but reports he has had this since she had COVID last month. This is an ongoing issue. Denies bright red blood per rectum black tarry stools. She denies fever, chills. Denies chest pain shortness of breath. Her nephrologist is Dr. Saravanan Sesay. Nursing Notes were reviewed and I agree. REVIEW OF SYSTEMS    (2-9 systems for level 4, 10 or more for level 5)     Review of Systems   Constitutional: Negative for chills and fever. Respiratory: Negative for shortness of breath. Cardiovascular: Negative for chest pain. Gastrointestinal: Positive for abdominal pain, diarrhea, nausea and vomiting. Negative for anal bleeding, blood in stool and constipation.            PAST MEDICAL HISTORY         Diagnosis Date    Anemia 9/15/2014    Chronic kidney disease     [facility]     Diabetes mellitus type 1 ([facility]     Diabetic retinopathy ([facility]     Diabetic retinopathy associated with type 2 diabetes mellitus, without macular edema     Hyperlipidemia 7/27/2015    Hypertension level 4, 8 or more for level 5)     ED Triage Vitals [02/15/22 1706]   BP Temp Temp Source Pulse Resp SpO2 Height Weight   103/66 96.3 °F (35.7 °C) Tympanic 91 18 95 % -- --       Physical Exam  Constitutional:       General: She is not in acute distress. Appearance: She is well-developed. She is ill-appearing. She is not toxic-appearing or diaphoretic. HENT:      Head: Normocephalic and atraumatic. Cardiovascular:      Rate and Rhythm: Regular rhythm. Tachycardia present. Heart sounds: Normal heart sounds. Pulmonary:      Effort: Pulmonary effort is normal. No respiratory distress. Breath sounds: Normal breath sounds. Abdominal:      General: There is no distension. Palpations: Abdomen is soft. There is no mass. Tenderness: There is abdominal tenderness (mild diffuse TTP entire abdomen). There is no guarding or rebound. Hernia: No hernia is present. Musculoskeletal:         General: Normal range of motion. Cervical back: Normal range of motion and neck supple. Skin:     General: Skin is warm. Coloration: Skin is jaundiced (faint). Neurological:      Mental Status: She is alert. Psychiatric:         Mood and Affect: Mood normal.         Behavior: Behavior normal.         Thought Content: Thought content normal.         Judgment: Judgment normal.         DIFFERENTIAL DIAGNOSIS   Peritonitis, sepsis, other  Appendicitis, Small Bowel Obstruction, Pancreatitis, Cholesystitis, Hepatitis, Aortic Dissection, DKA, Pylonephritis, Kidney Stone      DIAGNOSTICRESULTS       RADIOLOGY:   Non-plain film images such as CT, Ultrasound and MRI are read by the radiologist. Plain radiographic images are visualized and preliminarily interpreted by SONU Villagomez with the below findings:      Interpretation per the Radiologist below, if available at the time of this note:    CT ABDOMEN PELVIS WO CONTRAST Additional Contrast? None   Final Result   1.   Sigmoid colon diverticulosis. There is confluent inflammatory change   within the mesenteric fat surrounding a short segment of simple sigmoid colon   containing the diverticula. This could represent sequela of diverticulitis. 2.  2 cm ventral abdominal wall hernia containing inflamed fat. 3.  9 mm ground-glass nodule in the left lung base. Recommend follow-up with   a noncontrast chest CT at 6-12 months to confirm persistence then additional   noncontrast chest CTs every 2 years until 5 years.                LABS:  Results for orders placed or performed during the hospital encounter of 02/15/22   CBC Auto Differential   Result Value Ref Range    WBC 12.5 (H) 4.0 - 11.0 K/uL    RBC 3.98 (L) 4.00 - 5.20 M/uL    Hemoglobin 12.2 12.0 - 16.0 g/dL    Hematocrit 36.0 36.0 - 48.0 %    MCV 90.3 80.0 - 100.0 fL    MCH 30.6 26.0 - 34.0 pg    MCHC 33.9 31.0 - 36.0 g/dL    RDW 15.2 12.4 - 15.4 %    Platelets 304 005 - 371 K/uL    MPV 9.6 5.0 - 10.5 fL    Neutrophils % 90.8 %    Lymphocytes % 5.4 %    Monocytes % 3.6 %    Eosinophils % 0.0 %    Basophils % 0.2 %    Neutrophils Absolute 11.4 (H) 1.7 - 7.7 K/uL    Lymphocytes Absolute 0.7 (L) 1.0 - 5.1 K/uL    Monocytes Absolute 0.4 0.0 - 1.3 K/uL    Eosinophils Absolute 0.0 0.0 - 0.6 K/uL    Basophils Absolute 0.0 0.0 - 0.2 K/uL   Comprehensive Metabolic Panel w/ Reflex to MG   Result Value Ref Range    Sodium 136 136 - 145 mmol/L    Potassium reflex Magnesium 2.6 (LL) 3.5 - 5.1 mmol/L    Chloride 90 (L) 99 - 110 mmol/L    CO2 26 21 - 32 mmol/L    Anion Gap 20 (H) 3 - 16    Glucose 295 (H) 70 - 99 mg/dL    BUN 32 (H) 7 - 20 mg/dL    CREATININE 10.9 (HH) 0.6 - 1.1 mg/dL    GFR Non-African American 4 (A) >60    GFR  4 (A) >60    Calcium 6.5 (L) 8.3 - 10.6 mg/dL    Total Protein 5.6 (L) 6.4 - 8.2 g/dL    Albumin 2.2 (L) 3.4 - 5.0 g/dL    Albumin/Globulin Ratio 0.6 (L) 1.1 - 2.2    Total Bilirubin 0.6 0.0 - 1.0 mg/dL    Alkaline Phosphatase 117 40 - 129 U/L    ALT 7 (L) 10 - 40 U/L    AST 14 (L) 15 - 37 U/L   Lipase   Result Value Ref Range    Lipase 16.0 13.0 - 60.0 U/L   Lactic Acid, Plasma   Result Value Ref Range    Lactic Acid 4.1 (HH) 0.4 - 2.0 mmol/L   HCG, Quantitative, Pregnancy   Result Value Ref Range    hCG Quant <5.0 <5.0 mIU/mL   Magnesium   Result Value Ref Range    Magnesium 1.30 (L) 1.80 - 2.40 mg/dL       All other labs were withinnormal range or not returned as of this dictation. EMERGENCY DEPARTMENT COURSE and DIFFERENTIAL DIAGNOSIS/MDM:   Vitals:    Vitals:    02/15/22 1706 02/15/22 1938 02/15/22 2050   BP: 103/66 113/76 121/65   Pulse: 91 94 93   Resp: 18 16 16   Temp: 96.3 °F (35.7 °C) 97.2 °F (36.2 °C) 97.6 °F (36.4 °C)   TempSrc: Tympanic Oral Oral   SpO2: 95% 100% 100%       Patient was nontoxic, ill appearing, afebrile with normal vital signs. She is actively vomiting. She was given a 500 mL bolus, zofran, morphine. Labs reveal leukocytosis 12.5 and lactic acid of 4.1. She meets criteria for septic shock due to her lactic acid elevation. This was identified at 19:20. Blood cultures have been ordered. Suspected source is peritonitis. Cefepime and Vanc are ordered, but we are awaiting inpatient RN to draw peritoneal dialysis fluid for culture ,cell count, Gram stain. Less than 30 mL/kg IVF was ordered due to patient's ESRD and potential for harm. Potassium 2.6. IV replacement started. She has been vomiting and I do not think she will tolerate PO potassium. CT abdomen and pelvis with sigmoid colonic diverticulosis with some inflammatory change that could represent diverticulitis. Clinical picture seems more like peritonitis. Also has ventral hernia and left lung nodule. Discussed nodule with her and recommended outpatient FU with PCP. Inpatient RN tried to draw PD fluid for specimen but there was no fluid in abdomen.   I consulted nephrology and spoke with Dr. Georgi Qureshi who instructed me to have RN put 500 mL 2.5% PD fluid in abdomen and allow to dwell for 1 hr. Then fluid should be remove and sent for cell count, gram stain, and culture. This will be done. Antibiotics started. Dr. Trevon Sorensen in agreement with Cefepime and Vanc. I consulted medicine. Upon multiple reevaluations, patient still appears ill but slightly improved. She is no longer vomiting. She is in agreement with plan for admission and is in stable condition. CRITICAL CARE TIME   Total Critical Care time was 45 minutes, excluding separately reportable procedures. There was a high probability of clinically significant/life threatening deterioration in the patient's condition which required my urgent intervention. Time was spent managing patient's septic shock, peritonitis, and hypokalemia with fluid resuscitation, medications, reevaluations, reassessment, multiple consultations. SEP-1 CORE MEASURE DATA      Sepsis Criteria   Severe Sepsis Criteria   Septic Shock Criteria     Must be confirmed or suspected to move forward with diagnosis of sepsis. Must meet 2:    [] Temperature > 100.9 F (38.3 C)        or < 96.8 F (36 C)  [x] HR > 90  [] RR > 20  [x] WBC > 12 or < 4 or 10% bands    AND:    [x] Infection Confirmed or        Suspected.     OR:    [] Exclude from SEP-1 because:    [] No infection present or suspected  [] Does not have 2+ SIRS criteria but may have an incidental infection that requires treatment  [] May have sepsis, but does not meet criteria for severe sepsis or septic shock  [] Alternative explanation for abnormal labs and/or vitals (see MDM)  [] Viral etiology found or highly suspected (including COVID-19) without concomitant bacterial infection   Must meet 1:    [x] Lactate > 2       or   [] Signs of Organ Dysfunction:    - SBP < 90 or MAP < 65  - Altered mental status  - Creatinine > 2 or increased from      baseline  - Urine Output < 0.5 ml/kg/hr  - Bilirubin > 2  - INR > 1.5 (not anticoagulated)  - Platelets < 676,073  - Acute Respiratory Failure as evidenced by new need for NIPPV     or mechanical ventilation        [] No criteria met for Severe Sepsis. Must meet 1:    [x] Lactate > 4        or   [] SBP < 90 or MAP < 65 for at        least two readings in the first        hour after fluid bolus        administration      [] Vasopressors initiated (if hypotension persists after fluid resuscitation)                [] No criteria met for Septic Shock. Patient Vitals for the past 6 hrs:   BP Temp Pulse Resp SpO2   02/15/22 1706 103/66 96.3 °F (35.7 °C) 91 18 95 %   02/15/22 1938 113/76 97.2 °F (36.2 °C) 94 16 100 %   02/15/22 2050 121/65 97.6 °F (36.4 °C) 93 16 100 %      Recent Labs     02/15/22  1747   WBC 12.5*   LACTA 4.1*   CREATININE 10.9*   BILITOT 0.6            Sepsis Time Identified: 19:20    Fluid Resuscitation Rational: less than 30mL/kg because of a history of ESRD or stage IV/V CKD. Instead, 500 mL was ordered. More fluid initially would be potentially detrimental to the patient    Infection Source: Abdominal    Repeat lactate level: pending    Reassessment Exam:   Vitals update: /66   Pulse 91   Temp 96.3 °F (35.7 °C) (Tympanic)   Resp 18   SpO2 95% , cardiopulmonary exam: Heart with regular rate and rhythm, breath sounds clear bilaterally, capillary refill: brisk 2 seconds, peripheral pulses: weak, skin examination: dry, appropriate for skin tone        PROCEDURES:  None    FINAL IMPRESSION      1. Septic shock ([address]    2. Peritonitis ([address]    3. ESRD on peritoneal dialysis ([address]    4. Hypokalemia    5. Non-intractable vomiting with nausea, unspecified vomiting type    6. Pulmonary nodule    7. Ventral hernia without obstruction or gangrene          DISPOSITION/PLAN   DISPOSITION        PATIENT REFERRED TO:  No follow-up provider specified.     DISCHARGE MEDICATIONS:  New Prescriptions    No medications on file       (Please note that portions of this note werecompleted with a voice recognition program.  Efforts were made to edit the dictations but occasionally words are mis-transcribed.)    Bharat Miller, [facility], [address] [place] [address] Jonny  02/15/22 4950

## 2022-02-16 NOTE — ED PROVIDER NOTES
Attending Supervisory Note/Shared Visit   I have personally performed a face to face diagnostic evaluation on this patient. I have reviewed the mid-levels findings and agree. History and Exam by me shows alert white female no acute distress. This peritoneal dialysis catheter had onset of abdominal pain nausea and vomiting around 2 AM this morning. He has had a previous history of peritonitis. She states her symptoms feel similar. She states her peritoneal dialysis fluid was cloudy today. It was clear yesterday. Heart: Regular rate and rhythm. No murmurs or gallops noted. Lungs: Breath sounds equal bilaterally and clear. Abdomen: Moderately distended, soft, diffusely tender with guarding but no rebound. No masses organomegaly. Bowel sounds are normal.    Lab reviewed. H&H are 12.2 and 36. White blood cell count 12,500 with 91 neutrophils and 5 lymphs. Sodium 136 with potassium of 2.6. BUN of 32 with a creatinine of 10.9. Bicarb of 26 with an anion gap of 20. Lipase of 16. Lactic acid of 4.1. This patient's clinical presentation is consistent with peritonitis. Her white blood cell count is elevated. Her lactic acid is elevated. Likely septic. We will not be giving sepsis fluids due to her end-stage renal disease. We will obtain dialysis fluid for culture, cell count, and Gram stain. Blood cultures will be obtained. IV antibiotics will be administered. The hospitalist will be consulted for admission. Nephrology will be consulted.       (Please note that portions of this note were completed with a voice recognition program.  Efforts were made to edit the dictations but occasionally words are mis-transcribed.)    Bita Lockett MD  Attending Emergency Physician        Chantell Hall MD  02/15/22 [id number]

## 2022-02-16 NOTE — PROGRESS NOTES
Pt feeling nauseous. No medication for nausea ordered. Kim Stewart MD paged. See new orders. Will continue to monitor.     Electronically signed by Nj Rendon RN on 2/16/2022 at 9:44 AM

## 2022-02-16 NOTE — PROGRESS NOTES
Hospitalist Progress Note  2/16/2022 9:52 AM    PCP: No primary care provider on file. [phone]     Date of Admission: 2/15/2022                                                                                                                     HOSPITAL COURSE    Patient demographics:  The patient  Hamida Caban is a 55 y.o. female     Significant past medical history:   Patient Active Problem List   Diagnosis    [facility]opathy [facility]    Mixed hyperlipidemia    DMII (diabetes mellitus, type 2) ([facility]    Vitreous hemorrhage of right eye ([address]    Essential hypertension    Acute kidney injury ([address]    ESRD (end stage renal disease) ([address]    Acute renal failure superimposed on chronic kidney disease ([address]    Volume overload    Peritonitis ([address]    Generalized abdominal pain    Peritoneal dialysis status ([address]    Morbid obesity due to excess calories ([address]    Nausea and vomiting    Hyponatremia    Diarrhea    Dizziness    Hypertensive emergency    Hypokalemia    Hypertensive urgency    Cerebrovascular accident (CVA) ([address]    Electrolyte imbalance    Acute respiratory failure with hypoxia (HCC)    Sepsis ([address]    Electrolyte abnormality    Diverticulitis         Presenting symptoms:  Nausea and vomiting, abdominal pain, fever and chills, generalized weakness    Diagnostic workup:      CONSULTS DURING ADMISSION :   PHARMACY TO DOSE VANCOMYCIN  IP CONSULT TO NEPHROLOGY  IP CONSULT TO NEPHROLOGY  PHARMACY TO DOSE VANCOMYCIN  PHARMACY TO DOSE VANCOMYCIN      Patient was diagnosed with:        Treatment while inpatient:  [facility] concern for new cloudy output from her peritoneal dialysis catheter                                                                                       ----------------------------------------------------------      SUBJECTIVE COMPLAINTS- follow up for Nausea and vomiting, abdominal pain,    Diet: ADULT DIET;  Regular; 4 carb choices (60 gm/meal); Low Sodium (2 gm); Low Potassium (Less than 3000 mg/day);  Low Phosphorus (Less than 1000 mg); 1500 ml      OBJECTIVE:   Patient Active Problem List   Diagnosis    [facility]opathy ([facility]    Mixed hyperlipidemia    DMII [facility]di[facility] mellitus, type 2) [facility]    Vitreous hemorrhage of right eye ([facility]    Essential hypertension    Acute kidney injury ([facility]    ESRD (end stage renal disease) ([facility]    Acute renal failure superimposed on chronic kidney disease (HCC)    Volume overload    [facility]itis ([facility]    Generalized abdominal pain    Peritoneal dialysis status ([facility]    Morbid obesity due to excess calories ([facility])    Nausea and vomiting    Hyponatremia    Diarrhea    Dizziness    Hypertensive emergency    Hypokalemia    Hypertensive urgency    Cerebrovascular accident (CVA) ([facility]    Electrolyte imbalance    Acute respiratory failure with hypoxia ([facility])    Sepsis ([facility])    Electrolyte abnormality    Diverticulitis       Allergies  Bactrim [sulfamethoxazole-trimethoprim], Doxazosin, Geocillin [carbenicillin], Lisinopril, Other, Spironolactone, Tramadol, Bumetanide, and Ibuprofen    Medications    Scheduled Meds:   insulin lispro  0-6 Units SubCUTAneous TID WC    insulin lispro  0-3 Units SubCUTAneous Nightly    sodium chloride flush  5-40 mL IntraVENous 2 times per day    heparin (porcine)  5,000 Units SubCUTAneous 3 times per day    pantoprazole  40 mg Oral Daily    metroNIDAZOLE  500 mg IntraVENous Q8H    cefepime  1,000 mg IntraVENous Q24H    vancomycin (VANCOCIN) intermittent dosing (placeholder)   Other RX Placeholder    potassium bicarb-citric acid  40 mEq Oral BID     Continuous Infusions:   dextrose      sodium chloride       PRN Meds:  glucose, dextrose, glucagon (rDNA), dextrose, sodium chloride flush, sodium chloride, acetaminophen **OR** acetaminophen, ondansetron    Vitals   Vitals /wt   Patient Vitals for the past 8 hrs:   BP Temp Temp src Pulse Resp SpO2 02/16/22 0724 (!) 89/53 98.3 °F (36.8 °C) Oral 94 18 98 %   02/16/22 0355 126/79 98.1 °F (36.7 °C) Oral 93 20 97 %        72HR INTAKE/OUTPUT:      Intake/Output Summary (Last 24 hours) at 2/16/2022 0952  Last data filed at 2/16/2022 0929  Gross per 24 hour   Intake 1180 ml   Output --   Net 1180 ml       Exam:    Gen:   Alert and oriented ×3    Eyes: PERRL. No sclera icterus. No conjunctival injection. ENT: No discharge. Pharynx clear. External appearance of ears and nose normal.  Neck: Trachea midline. No obvious mass. Resp: No accessory muscle use. No crackles. No wheezes. No rhonchi. CV: Regular rate. Regular rhythm. No murmur or rub. No edema. GI: Non-tender. Non-distended. No hernia. Skin: Warm, dry, normal texture and turgor. Lymph: No cervical LAD. No supraclavicular LAD. M/S: / Ext. No cyanosis. No clubbing. No joint deformity. Neuro: CN 2-12 are intact,  no neurologic deficits noted. PT/INR: No results for input(s): PROTIME, INR in the last 72 hours. APTT: No results for input(s): APTT in the last 72 hours. CBC:   Recent Labs     02/15/22  1747 02/16/22  0343   WBC 12.5* 14.4*   HGB 12.2 10.6*   HCT 36.0 31.7*   MCV 90.3 91.8    139       BMP:   Recent Labs     02/15/22  1747 02/16/22  0343    135*   K 2.6* 2.5*   CL 90* 90*   CO2 26 26   BUN 32* 38*   CREATININE 10.9* 11.3*       LIVER PROFILE:   Recent Labs     02/15/22  1747   ALKPHOS 117   AST 14*   ALT 7*   BILITOT 0.6     No results for input(s): AMYLASE in the last 72 hours. Recent Labs     02/15/22  1747   LIPASE 16.0       UA:No results for input(s): NITRITE, LABCAST, WBCUA, RBCUA, MUCUS in the last 72 hours. TROPONIN: No results for input(s): Cheikh Min in the last 72 hours. Lab Results   Component Value Date/Time    URRFLXCULT Yes 08/04/2019 06:40 PM       No results for input(s): TSHREFLEX in the last 72 hours.     No components found for: [id number]  POC GLUCOSE:    Recent Labs 02/16/22  0045 02/16/22  0741   POCGLU 293* 192*     No results for input(s): LABA1C in the last 72 hours. Lab Results   Component Value Date    LABA1C 8.1 01/07/2022         ASSESSMENT AND PLAN    Sepsis  Continue on IV antibiotics, also started on IV fluids  Patient on cefepime vancomycin and Flagyl  Consult to infectious disease    Diverticulitis  IV antibiotics    Peritonitis  Patient on peritoneal dialysis    Electrolyte normality    ESRD  Nephrology is consulted        Code Status: Full Code        Dispo -cc        The patient and / or the family were informed of the results of any tests, a time was given to answer questions, a plan was proposed and they agreed with plan. Deniz Ro MD    This note was transcribed using [id number] [employer]. Please disregard any translational errors.

## 2022-02-16 NOTE — PROGRESS NOTES
Clinical Pharmacy Note  Vancomycin Consult    Pharmacy consult received for one-time dose of vancomycin in the Emergency Department per BRO Hogan. Ht Readings from Last 1 Encounters:   01/07/22 5' 3\" (1.6 m)        Wt Readings from Last 1 Encounters:   01/08/22 188 lb 7.9 oz (85.5 kg)         Assessment/Plan:   Vancomycin 1250 mg x 1 in ED.  If Vancomycin is to continue on admission and pharmacy is to manage dosing, please re-consult with admission orders.

## 2022-02-16 NOTE — ED NOTES
Patient placed on 2L O2 via NC patient oxygen sat 88% on RA.  Provider aware      Mynor Epsino RN  02/15/22 2008

## 2022-02-16 NOTE — PLAN OF CARE
Problem: Falls - Risk of:  Goal: Will remain free from falls  Description: Will remain free from falls  Outcome: Ongoing  Note: Fall risk assessment completed every shift. All precautions in place. Pt has call light within reach at all times. Room clear of clutter. Pt aware to call for assistance when getting up. Problem: SAFETY  Goal: Free from accidental physical injury  Outcome: Ongoing  Note: Patient assessed for fall risk; fall precautions initiated. Patient and family instructed about safety devices. Environment kept free of clutter and adequate lighting provided. Bed locked and in lowest position. Call light within reach. Will continue to monitor.       Problem: Gas Exchange - Impaired:  Goal: Levels of oxygenation will improve  Description: Levels of oxygenation will improve  Outcome: Ongoing     Problem: Infection, Septic Shock:  Goal: Will show no infection signs and symptoms  Description: Will show no infection signs and symptoms  Outcome: Ongoing     Problem: Serum Glucose Level - Abnormal:  Goal: Ability to maintain appropriate glucose levels will improve  Description: Ability to maintain appropriate glucose levels will improve  Outcome: Ongoing

## 2022-02-16 NOTE — CONSULTS
Consult received  Full note to follow    Clive Ganser, MD  2/16/2022    [facility] [address]  Office : [phone]  Fax :[phone]

## 2022-02-16 NOTE — PROGRESS NOTES
Consult received. Has diffuse abdominal pain. ESRD on peritoneal dialysis. Differential diagnosis peritonitis versus diverticulitis. CT abdomen shows diverticulosis  with possible diverticulitis. We will send PD fluid for cell count, Gram stain, cultures. Discussed with ER provider. Give vancomycin and cefepime. No PD tonight .

## 2022-02-16 NOTE — CONSULTS
Office: [phone]       Fax: [phone]      Nephrology Initial Consult Note        Patient's Name: Shahrzad Bunn  Date of Visit: 2/16/2022    Reason for Consult:  ESRD management  Requesting Physician:  Greg Leos MD  PCP: No primary care provider on file. Chief Complaint:  abdominal pain     History of Present Illness:      Shahrzad Bunn is a 55 y.o. female with PMHx of hypertension, diabetes mellitus, ESRD who was admitted on 2/15/2022 with complaints of abdominal pain   PD fluid cloudy  history of PD peritonitis in past.  Does 2 exchanges with 2.5% day time and Icodextrin overnight    Dialysis initiation: sep 2019.  Was briefly on HD  Mode: CAPD  Etiology of ESRD: DN  Dialysis Location: [facility]  Schedule: daily  Primary Nephrologist: Dr. Arnold Mccormack: +  Access: Abd PD cath       Past Medical History:   Diagnosis Date    Anemia 9/15/2014    Chronic kidney disease     Diabetes mellitus ([address]     Diabetes mellitus type 1 ([address]     Diabetic retinopathy ([address]     Diabetic retinopathy associated with type 2 diabetes mellitus, without macular edema     Hyperlipidemia 7/27/2015    Hypertension     Kidney stone     Mixed hyperlipidemia 7/27/2015    Orthostatic hypotension     Vitreous hemorrhage of right eye ([address] 8/15/2017       Past Surgical History:   Procedure Laterality Date    APPENDECTOMY      KIDNEY STONE SURGERY      LAPAROSCOPY INSERTION PERITONEAL CATHETER N/A 8/12/2019    LAPAROSCOPIC PERITONEAL DIALYSIS CATHETER PLACEMENT WITH AXEL TROCHAR performed by Jearlean Boeck, MD at [facility]         Family History   Problem Relation Age of Onset    Heart Disease Mother         heart attack 2007    High Blood Pressure Mother     Diabetes Father     Heart Disease Father     Emphysema Father    [facility] Cancer Maternal Grandmother         cervical cancer    Lung Cancer Maternal Grandfather         black lung    Diabetes Paternal Grandmother     Heart Disease Paternal Grandfather         reports that she has never smoked. She has never used smokeless tobacco. She reports that she does not drink alcohol and does not use drugs. Medications: Allergies:  Bactrim [sulfamethoxazole-trimethoprim], Doxazosin, Geocillin [carbenicillin], Lisinopril, Other, Spironolactone, Tramadol, Bumetanide, and Ibuprofen  Scheduled Meds:   insulin lispro  0-6 Units SubCUTAneous TID WC    insulin lispro  0-3 Units SubCUTAneous Nightly    sodium chloride flush  5-40 mL IntraVENous 2 times per day    heparin (porcine)  5,000 Units SubCUTAneous 3 times per day    pantoprazole  40 mg Oral Daily    metroNIDAZOLE  500 mg IntraVENous Q8H    cefepime  1,000 mg IntraVENous Q24H    vancomycin (VANCOCIN) intermittent dosing (placeholder)   Other RX Placeholder    potassium bicarb-citric acid  40 mEq Oral BID     Continuous Infusions:   dextrose      sodium chloride         Review of Systems:     All systems reviewed but were negative except as mentioned in HPI    Objective:       Vitals:  BP (!) 89/53   Pulse 94   Temp 98.3 °F (36.8 °C) (Oral)   Resp 18   Ht 5' 3\" (1.6 m)   Wt 192 lb 7.4 oz (87.3 kg)   SpO2 98%   BMI 34.09 kg/m²   Constitutional:  awake, NAD  Skin: no rash, turgor wnl  HEENT:  MMM, No icterus  Neck: no bruits, No JVD  Cardiovascular:  S1, S2 reg  Respiratory: CTA, no crackles  Abdomen:  +BS, soft, NT, ND  Ext: no lower extremity edema  Psychiatric: mood and affect appropriate  Access: abd PD catheter in place, no drainage/redness around exit site. No tunnel tenderness  CNS: alert, no agitation    Data:     Labs:  Hepatic:   Recent Labs     02/15/22  1747   AST 14*   ALT 7*   BILITOT 0.6   ALKPHOS 117     BNP: No results for input(s): BNP in the last 72 hours.   ABGs: No results for input(s): PHART, PO2ART, KDF3RTV in the last 72 hours. IMAGING:  CT ABDOMEN PELVIS WO CONTRAST Additional Contrast? None   Final Result   1. Sigmoid colon diverticulosis. There is confluent inflammatory change   within the mesenteric fat surrounding a short segment of simple sigmoid colon   containing the diverticula. This could represent sequela of diverticulitis. 2.  2 cm ventral abdominal wall hernia containing inflamed fat. 3.  9 mm ground-glass nodule in the left lung base. Recommend follow-up with   a noncontrast chest CT at 6-12 months to confirm persistence then additional   noncontrast chest CTs every 2 years until 5 years. Assessment :       1. ESRD   Etiology: suspected DN  Access: Abd PD cath  Volume: Hypervolemic      Seen on PD    2. Electrolytes/Acid base  Hypokalemia    Recent Labs     02/16/22  0343   *   K 2.5*   CO2 26   MG 1.80       3. BMD  -Hyperphosphatemia, SHPT  -maintained on Renvela    Lab Results   Component Value Date    CALCIUM 6.2 (L) 02/16/2022    PHOS 6.0 (H) 01/06/2022        4. HTN  -Blood pressure low       BP Readings from Last 1 Encounters:   02/16/22 (!) 89/53       5. Anemia  -anemia of CKD  -assess for CHRIS    Recent Labs     02/16/22  0343   HGB 10.6*     6. DM    7. PD peritonitis    PLAN :     -Empiric antibiotics  - follow up culture  - replace K, Mg as needed  -maintenance PD tonight (do 3 exchanges with 2.5% Dianeal). Add heparin  -Hold BP meds:   -MBD management  -Anemia management  -Dose meds to GFR<10    Thank you for allowing us to participate in care of Danae Robertson [employer] . We will continue to follow. Feel free to contact me with any questions.       Margarita Borden MD  2/16/2022    [facility] [address] [place] [address]  Office : [phone]  Fax :[phone]

## 2022-02-16 NOTE — PLAN OF CARE
Problem: Falls - Risk of:  Goal: Will remain free from falls  Description: Will remain free from falls  Outcome: Ongoing  Goal: Absence of physical injury  Description: Absence of physical injury  Outcome: Ongoing     Problem: SAFETY  Goal: Free from accidental physical injury  Outcome: Ongoing  Goal: Free from intentional harm  Outcome: Ongoing     Problem: DAILY CARE  Goal: Daily care needs are met  Outcome: Ongoing     Problem: PAIN  Goal: Patient's pain/discomfort is manageable  Outcome: Ongoing     Problem: SKIN INTEGRITY  Goal: Skin integrity is maintained or improved  Outcome: Ongoing     Problem: KNOWLEDGE DEFICIT  Goal: Patient/S.O. demonstrates understanding of disease process, treatment plan, medications, and discharge instructions.   Outcome: Ongoing     Problem: DISCHARGE BARRIERS  Goal: Patient's continuum of care needs are met  Outcome: Ongoing     Problem: Discharge Planning:  Goal: Discharged to appropriate level of care  Description: Discharged to appropriate level of care  Outcome: Ongoing     Problem: Gas Exchange - Impaired:  Goal: Levels of oxygenation will improve  Description: Levels of oxygenation will improve  Outcome: Ongoing     Problem: Infection, Septic Shock:  Goal: Will show no infection signs and symptoms  Description: Will show no infection signs and symptoms  Outcome: Ongoing     Problem: Infection - Ventilator-Associated Pneumonia:  Goal: Absence of pulmonary infection  Description: Absence of pulmonary infection  Outcome: Ongoing     Problem: Serum Glucose Level - Abnormal:  Goal: Ability to maintain appropriate glucose levels will improve  Description: Ability to maintain appropriate glucose levels will improve  Outcome: Ongoing     Problem: Tissue Perfusion, Altered:  Goal: Circulatory function within specified parameters  Description: Circulatory function within specified parameters  Outcome: Ongoing     Problem: Venous Thromboembolism:  Goal: Will show no signs or symptoms of venous thromboembolism  Description: Will show no signs or symptoms of venous thromboembolism  Outcome: Ongoing  Goal: Absence of signs or symptoms of impaired coagulation  Description: Absence of signs or symptoms of impaired coagulation  Outcome: Ongoing

## 2022-02-16 NOTE — CARE COORDINATION
Case Management Assessment           Initial Evaluation                Date / Time of Evaluation: 2/16/2022 12:54 PM                 Assessment Completed by: Yudelka Quiros RN     Met with Ms. Janet Winston in the room to complete initial assessment. She was sitting up in the bed resting with her eyes closed. She is alert and oriented x 4, soft-spoken, easy to engage in conversation. She lives at home with her . The home setting is multiple levels. Her bedroom/bathroom are on the 2nd floor. Six steps to enter. She does not drive. Her spouse assists with transportation and will drive her home. Independent with self care and functional mobility. She is active with [facility] and Dr. Gina Santana. She performs her own peritoneal dialysis. All supplies are available at home. The plan is discharge to home. No CM needs anticipated.       Patient Name: Natalie Nguyen     YOB: 1965  Diagnosis: Ventral hernia without obstruction or gangrene [K43.9]  Hypokalemia [E87.6]  Peritonitis ([address] [K65.9]  Pulmonary nodule [R91.1]  ESRD on peritoneal dialysis ([address] [N18.6, Z99.2]  Septic shock ([address] [A41.9, R65.21]  Sepsis ([address] [A41.9]  Non-intractable vomiting with nausea, unspecified vomiting type [R11.2]     Date / Time: 2/15/2022  5:08 PM    Patient Admission Status: Inpatient    Current PCP: Mostly sees Dr. Gina Santana for needs    Patient/ Family Interviewed: Yes    Emergency Contacts:  Extended Emergency Contact Information  Primary Emergency Contact: Lynsey Delgado  Address: [address] [place] [address] , [address] [place] [address]  Home Phone: [phone]  Work Phone: [phone]  Relation: Spouse  Secondary Emergency Contact: Mervat Farooq  Address: mother-in- law   [address] Phone: [phone]  Relation: Other    Advance Directives:   Code Status: Full Code    Financial  Payor: Maximino Benitezer / Plan: Maximino Daphney [place] LOCAL / Product Type: *No Product type* / Pharmacy    [facility] [address] [facility] [address], [address]  [address] [place] [address]  Phone: [phone] Fax: [phone]    [facility] [address] [place] [address] [phone] Ary Dakins [phone]  [address] [address]  Phone: [phone] Fax: [phone]      Potential assistance Purchasing Medications: Potential Assistance Purchasing Medications: No  Does Patient want to participate in local refill/ meds to beds program?: Yes    Meds To Beds General Rules:  1. Can ONLY be done Monday- Friday between 8:30am-5pm  2. Prescription(s) must be in pharmacy by 3pm to be filled same day  3. Copy of patient's insurance/ prescription drug card and patient face sheet must be sent along with the prescription(s)  4. Cost of Rx cannot be added to hospital bill. If financial assistance is needed, please contact unit  or ;  or  CANNOT provide pharmacy voucher for patients co-pays  5.  Patients can then  the prescription on their way out of the hospital at discharge, or pharmacy can deliver to the bedside if staff is available. (payment due at time of pick-up or delivery - cash, check, or card accepted)     Able to afford home medications/ co-pay costs: Yes    [address] [place] [address] Street: Spouse/Significant Other    [place]: house with   Steps: yes    Plans to RETURN to current housing: Yes    Home Care Information  Currently ACTIVE with [address]: No      Durable Medical Equipment  DME Provider: Dax  Equipment: Home PD Equipment     Dialysis  Active with HD/PD prior to admission: Yes  Nephrologist: Tawny Sanabria:  [facility]   [address]  [phone]    DISCHARGE PLAN:  Disposition: HOME    Transportation PLAN for discharge: family     The Patient and/or patient representative Tami and her family were provided with a choice of provider and agrees with the discharge plan Yes    Freedom of choice list was provided with basic dialogue that supports the patient's individualized plan of care/goals and shares the quality data associated with the providers.  Yes    Mechelle Waggoner RN  Case Management  [phone]

## 2022-02-16 NOTE — H&P
Hospital Medicine History & Physical      PCP: No primary care provider on file. Date of Admission: 2/15/2022    Date of Service: Pt seen/examined on 2/15/2022 and admitted to inpatient    Chief Complaint: Nausea and vomiting, abdominal pain, fever and chills, generalized weakness      History Of Present Illness: The patient is a 55 y.o. female with past medical history of diabetes, hyperlipidemia, hypertension, ESRD on peritoneal dialysis, and previous COVID-19 virus infection back in January of this year who presents to [facility] with concern for new cloudy output from her peritoneal dialysis catheter but also noted that for the last few months although she had been recovering from Covid and was initially doing better the last 2 weeks she was now having worsening intermittent abdominal pain and nausea and decreased oral intake due to these symptoms but only now today prior to coming the ED which she concern for peritonitis since her peritoneal fluid started becoming cloudy but no noted blood or pus. She does note that the abdominal pain that she was suffering for the last 2 weeks seems to be more focal to the lower quadrants but has become more diffuse only in the last day or so. She has continued to be nauseous and has had intermittent nonbloody vomiting but is attempting to keep down food and drink as best she could. She has become more and more weak since the worsening abdominal pain symptoms and nausea started. She did have some slight diarrhea last month with COVID but now has not had much diarrhea at all and feels constipated due to lack of oral intake. She denies any other symptoms of dizziness, syncope, blood in urine/stool/sputum, chest pain.   She is slightly short of breath and does recall possibly having subjective chills for the last few days.    Past Medical History:        Diagnosis Date    Anemia 9/15/2014    Chronic kidney disease     Diabetes mellitus ([address]     Diabetes mellitus type 1 ([address]     Diabetic retinopathy ([address]     Diabetic retinopathy associated with type 2 diabetes mellitus, without macular edema     Hyperlipidemia 7/27/2015    Hypertension     Kidney stone     Mixed hyperlipidemia 7/27/2015    Orthostatic hypotension     Vitreous hemorrhage of right eye ([address] 8/15/2017       Past Surgical History:        Procedure Laterality Date    APPENDECTOMY      KIDNEY STONE SURGERY      LAPAROSCOPY INSERTION PERITONEAL CATHETER N/A 8/12/2019    LAPAROSCOPIC PERITONEAL DIALYSIS CATHETER PLACEMENT WITH AXEL TROCHAR performed by Yuval Askew MD at [address] [place] [address]         Medications Prior to Admission:    Prior to Admission medications    Medication Sig Start Date End Date Taking? Authorizing Provider   torsemide (DEMADEX) 100 MG tablet TAKE 4 TABLETS BY MOUTH ONCE NIGHTLY 1/3/22  Yes Suzy Kern MD   amLODIPine (NORVASC) 10 MG tablet TAKE ONE TABLET BY MOUTH DAILY 1/3/22  Yes Suzy Kern MD   losartan (COZAAR) 100 MG tablet Take 100 mg by mouth nightly   Yes Historical Provider, MD   pantoprazole (PROTONIX) 40 MG tablet Take 1 tablet by mouth daily 3/16/21  Yes Suzy Kern MD   insulin lispro (HUMALOG KWIKPEN) 100 UNIT/ML pen Inject 2-4 Units into the skin 3 times daily (before meals) Sliding scale    Yes Historical Provider, MD   ergocalciferol (ERGOCALCIFEROL) 1.25 MG (54624 UT) capsule Take 50,000 Units by mouth once a week On Mondays    Historical Provider, MD       Allergies:  Bactrim [sulfamethoxazole-trimethoprim], Doxazosin, Geocillin [carbenicillin], Lisinopril, Other, Spironolactone, Tramadol, Bumetanide, and Ibuprofen    Social History:  The patient currently lives home    TOBACCO:   reports that she has never smoked.  She has never used smokeless tobacco.  ETOH:   reports no history of alcohol use. Family History:  Reviewed in detail and negative for DM, Early CAD, Cancer, CVA. Positive as follows:        Problem Relation Age of Onset    Heart Disease Mother         heart attack 2007    High Blood Pressure Mother     Diabetes Father     Heart Disease Father     Emphysema Father     Cancer Maternal Grandmother         cervical cancer    Lung Cancer Maternal Grandfather         black lung    Diabetes Paternal Grandmother     Heart Disease Paternal Grandfather        REVIEW OF SYSTEMS:   as noted in the HPI. All other systems reviewed and negative. PHYSICAL EXAM:    BP (!) 89/53   Pulse 94   Temp 98.3 °F (36.8 °C) (Oral)   Resp 18   Ht 5' 3\" (1.6 m)   Wt 192 lb 7.4 oz (87.3 kg)   SpO2 98%   BMI 34.09 kg/m²     General appearance: Somewhat acutely ill-appearing, alert and oriented x4, no acute respiratory distress but is on 2 L of nasal cannula oxygen  HEENT Normal cephalic, atraumatic without obvious deformity. Pupils equal, round, and reactive to light. Extra ocular muscles intact. Conjunctivae/corneas clear. Dry mucous membranes  Neck: Supple, no JVD  Lungs: Diminished breath sounds but overall clear  Heart: Regular rate and rhythm with Normal S1/S2 without murmurs, rubs or gallops, point of maximum impulse non-displaced  Abdomen: Somewhat distended abdomen, slight fluid wave, tender to palpation to multiple areas in the quadrants but very mild, active bowel sounds  Extremities: Trace bilateral lower extremity nonpitting edema  Skin: Scattered areas of uticarial rash with some excoriations from scratching  Neurologic: Alert and oriented X 3, neurovascularly intact with sensory/motor intact upper extremities/lower extremities, bilaterally. Cranial nerves: II-XII intact, grossly non-focal.  Mental status: Alert, oriented, thought content appropriate.   Capillary Refill: Acceptable  < 3 seconds  Peripheral Pulses: +3 Easily felt, not easily obliterated with pressure    CT abdomen pelvis without contrast:  1.  Sigmoid colon diverticulosis. Rickford Math is confluent inflammatory change   within the mesenteric fat surrounding a short segment of simple sigmoid colon   containing the diverticula.  This could represent sequela of diverticulitis.       2.  2 cm ventral abdominal wall hernia containing inflamed fat.       3.  9 mm ground-glass nodule in the left lung base.  Recommend follow-up with   a noncontrast chest CT at 6-12 months to confirm persistence then additional   noncontrast chest CTs every 2 years until 5 years. CBC   Recent Labs     02/15/22  1747 02/16/22  0343   WBC 12.5* 14.4*   HGB 12.2 10.6*   HCT 36.0 31.7*    139      RENAL  Recent Labs     02/15/22  1747 02/16/22  0343    135*   K 2.6* 2.5*   CL 90* 90*   CO2 26 26   BUN 32* 38*   CREATININE 10.9* 11.3*     LFT'S  Recent Labs     02/15/22  1747   AST 14*   ALT 7*   BILITOT 0.6   ALKPHOS 117     COAG  No results for input(s): INR in the last 72 hours. CARDIAC ENZYMES  No results for input(s): CKTOTAL, CKMB, CKMBINDEX, TROPONINI in the last 72 hours.     U/A:    Lab Results   Component Value Date    COLORU YELLOW 05/27/2021    WBCUA 3 05/27/2021    RBCUA 4 05/27/2021    BACTERIA RARE 05/27/2021    CLARITYU Clear 05/27/2021    SPECGRAV 1.016 05/27/2021    LEUKOCYTESUR Negative 05/27/2021    BLOODU TRACE 05/27/2021    GLUCOSEU 250 05/27/2021       ABG  No results found for: RVX0GFO, BEART, H0ZBEWAA, PHART, THGBART, GFI2SUA, PO2ART, [place]        Active Hospital Problems    Diagnosis Date Noted    Sepsis ([address] [A41.9] 02/15/2022    Electrolyte abnormality [E87.8] 02/15/2022    Diverticulitis [K57.92] 02/15/2022    Peritonitis ([address] [K65.9] 05/25/2020    ESRD (end stage renal disease) ([address] [N18.6] 08/04/2019         PHYSICIANS CERTIFICATION:    I certify that Merle Lynn is expected to be hospitalized for greater than 2 midnights based on the following assessment and plan:      ASSESSMENT/PLAN:  · Sepsis  · Diverticulitis  · Peritonitis  · Electrolyte normality  · ESRD    Plan:  · Attempting to obtain sample of patient's dialysate fluid for analysis for peritonitis in the ED  · Start patient on cefepime/Flagyl/vancomycin for broad-spectrum coverage of both diverticulitis and peritonitis  · Start patient on normal saline for IV fluid hydration x10 hours  · Nephrology consult for the morning  · Repeat labs in the morning      DVT Prophylaxis: Heparin  Diet: ADULT DIET; Regular; 4 carb choices (60 gm/meal); Low Sodium (2 gm); Low Potassium (Less than 3000 mg/day); Low Phosphorus (Less than 1000 mg); 1500 ml  Code Status: Full Code  PT/OT Eval Status: Ambulatory    Dispo -pending clinical course       Demario Pollard DO    Thank you No primary care provider on file. for the opportunity to be involved in this patient's care. If you have any questions or concerns please feel free to contact me at [phone].

## 2022-02-16 NOTE — PROGRESS NOTES
Pharmacy Medication Reconciliation Note     List of medications Sykes Rash is currently taking is complete. Source of information:   1. Conversation with patient at bedside  2. EMR    Notes regarding home medications:   1. Patient reports not taking any oral meds in one-two weeks due to poor oral intake and emesis. Did give herself bolus insulin--2 units yesterday  2.  Patient reports she has been utilizing Pepto and Imodium with little relief and cramping    Patient denies taking any other OTC or herbal medications    Giancarlo Bates, Pharmacy Intern  2/15/2022  9:09 PM

## 2022-02-16 NOTE — PROGRESS NOTES
4 Eyes Skin Assessment     NAME:  Tami Farooq  YOB: 1965  MEDICAL RECORD NUMBER:  [id number]    The patient is being assess for  Admission    I agree that 2 RN's have performed a thorough Head to Toe Skin Assessment on the patient. ALL assessment sites listed below have been assessed. Areas assessed by both nurses:    Head, Face, Ears, Shoulders, Back, Chest, Arms, Elbows, Hands, Sacrum. Buttock, Coccyx, Ischium and Legs. Feet and Heels        Does the Patient have a Wound?  No noted wound(s)       Enoch Prevention initiated:  No   Wound Care Orders initiated:  No    Pressure Injury (Stage 3,4, Unstageable, DTI, NWPT, and Complex wounds) if present place consult order under [de-identified] No    New and Established Ostomies if present place consult order under : NA      Nurse 1 eSignature: Electronically signed by Donavan Bynum RN on 2/16/22 at 1:00 AM EST    **SHARE this note so that the co-signing nurse is able to place an eSignature**    Nurse 2 eSignature: Electronically signed by Sagrario Daniel RN on 2/16/22 at 1:16 AM EST

## 2022-02-16 NOTE — ACP (ADVANCE CARE PLANNING)
Advance Care Planning     Advance Care Planning Activator (Inpatient)  Conversation Note      Date of ACP Conversation: 2/16/2022     Conversation Conducted with: Patient with Decision Making Capacity    ACP Activator: Candace Chowdhury RN    Health Care Decision Maker:     Current Designated Health Care Decision Maker:     Primary Decision Maker: [employer]    Secondary Decision Maker: Fadi Shane - [phone]    Today we discussed advanced directives, living will, HCPOA, and code status. No advanced directives. She verbalized understanding her spouse would be her decision-maker if she was incapacitated. She wishes to be a FULL CODE    Care Preferences    Ventilation: \"If you were in your present state of health and suddenly became very ill and were unable to breathe on your own, what would your preference be about the use of a ventilator (breathing machine) if it were available to you? \"      Would the patient desire the use of ventilator (breathing machine)?: yes    \"If your health worsens and it becomes clear that your chance of recovery is unlikely, what would your preference be about the use of a ventilator (breathing machine) if it were available to you? \"     Would the patient desire the use of ventilator (breathing machine)?: Yes      Resuscitation  \"CPR works best to restart the heart when there is a sudden event, like a heart attack, in someone who is otherwise healthy. Unfortunately, CPR does not typically restart the heart for people who have serious health conditions or who are very sick. \"    \"In the event your heart stopped as a result of an underlying serious health condition, would you want attempts to be made to restart your heart (answer \"yes\" for attempt to resuscitate) or would you prefer a natural death (answer \"no\" for do not attempt to resuscitate)? \" yes       [x] Yes   [] No   Educated Patient / Mireya Draft regarding differences between Advance Directives and portable DNR orders.     Length of ACP Conversation in minutes:   5    Conversation Outcomes:  [x] ACP discussion completed  [] Existing advance directive reviewed with patient; no changes to patient's previously recorded wishes  [] New Advance Directive completed  [] Portable Do Not Rescitate prepared for Provider review and signature  [] POLST/POST/MOLST/MOST prepared for Provider review and signature      Follow-up plan:    [] Schedule follow-up conversation to continue planning  [] Referred individual to Provider for additional questions/concerns   [] Advised patient/agent/surrogate to review completed ACP document and update if needed with changes in condition, patient preferences or care setting    [x] This note routed to one or more involved healthcare providers    Electronically signed by Martha Mcdonnell MSN RN-BC [facility]

## 2022-02-17 NOTE — PROGRESS NOTES
Hospitalist Progress Note  2/17/2022 9:24 AM    PCP: No primary care provider on file. [phone]     Date of Admission: 2/15/2022                                                                                                                     HOSPITAL COURSE    Patient demographics:  The patient  Kristen Handy is a 55 y.o. female     Significant past medical history:   Patient Active Problem List   Diagnosis    [facility]    Mixed hyperlipidemia    DMII (diabetes mellitus, type 2) ([address]    Vitreous hemorrhage of right eye ([address]    Essential hypertension    Acute kidney injury ([address]    ESRD (end stage renal disease) ([address]    Acute renal failure superimposed on chronic kidney disease ([address]    Volume overload    Peritonitis ([address]    Generalized abdominal pain    Peritoneal dialysis status ([address]    Morbid obesity due to excess calories ([address]    Nausea and vomiting    Hyponatremia    Diarrhea    Dizziness    Hypertensive emergency    Hypokalemia    Hypertensive urgency    Cerebrovascular accident (CVA) ([address]    Electrolyte imbalance    Acute respiratory failure with hypoxia (HCC)    Sepsis ([address]    Electrolyte abnormality    Diverticulitis         Presenting symptoms:  Nausea and vomiting, abdominal pain, fever and chills, generalized weakness    Diagnostic workup:      CONSULTS DURING ADMISSION :   PHARMACY TO DOSE VANCOMYCIN  IP CONSULT TO NEPHROLOGY  IP CONSULT TO NEPHROLOGY  PHARMACY TO DOSE VANCOMYCIN  PHARMACY TO DOSE VANCOMYCIN      Patient was diagnosed with:        Treatment while inpatient:  [facility] concern for new cloudy output from her peritoneal dialysis catheter                                                                                       ----------------------------------------------------------      SUBJECTIVE COMPLAINTS- follow up for Nausea and vomiting, abdominal pain,    Diet: ADULT DIET;  Regular; 4 carb choices (60 gm/meal);  Low Phosphorus (Less than 1000 mg); 1800 ml      OBJECTIVE:   Patient Active Problem List   Diagnosis    Diabetic [facility]opathy ([facility]    Mixed hyperlipidemia    DMII (diabetes mellitus, type 2) ([facility]    [facility] eye ([facility]    Essential hypertension    Acute kidney injury ([facility]    ESRD (end stage renal disease) ([facility]    Acute renal failure superimposed on chronic kidney disease (HCC)    Volume overload    [facility]itis ([facility]    Generalized abdominal pain    Peritoneal dialysis status ([facility]    Morbid obesity due to excess calories ([facility])    Nausea and vomiting    Hyponatremia    Diarrhea    Dizziness    Hypertensive emergency    Hypokalemia    Hypertensive urgency    Cerebrovascular accident (CVA) ([facility]    Electrolyte imbalance    Acute respiratory failure with hypoxia ([facility])    Sepsis ([facility])    Electrolyte abnormality    Diverticulitis       Allergies  Bactrim [sulfamethoxazole-trimethoprim], Doxazosin, Geocillin [carbenicillin], Lisinopril, Other, Spironolactone, Tramadol, Bumetanide, and Ibuprofen    Medications    Scheduled Meds:   insulin lispro  0-6 Units SubCUTAneous TID     insulin lispro  0-3 Units SubCUTAneous Nightly    sodium chloride flush  5-40 mL IntraVENous 2 times per day    heparin (porcine)  5,000 Units SubCUTAneous 3 times per day    pantoprazole  40 mg Oral Daily    metroNIDAZOLE  500 mg IntraVENous Q8H    cefepime  1,000 mg IntraVENous Q24H    vancomycin (VANCOCIN) intermittent dosing (placeholder)   Other RX Placeholder    potassium bicarb-citric acid  40 mEq Oral BID    custom dialysis builder   IntraPERitoneal Nightly    And    custom dialysis builder   IntraPERitoneal Nightly    calcium acetate  2 capsule Oral TID      Continuous Infusions:   dextrose      sodium chloride 100 mL/hr at 02/17/22 0814     PRN Meds:  glucose, dextrose, glucagon (rDNA), dextrose, sodium chloride flush, sodium chloride, acetaminophen **OR** acetaminophen, ondansetron, diphenhydrAMINE    Vitals   Vitals /wt   Patient Vitals for the past 8 hrs:   BP Temp Temp src Pulse Resp SpO2 Weight   02/17/22 0915 130/77 98.2 °F (36.8 °C) Oral 82 16 98 % --   02/17/22 0735 -- -- -- -- -- -- 194 lb 7.1 oz (88.2 kg)   02/17/22 0641 -- -- -- 89 -- -- --   02/17/22 0516 108/69 98 °F (36.7 °C) Oral 82 16 99 % --   02/17/22 0400 -- -- -- 90 -- -- --   02/17/22 0200 -- -- -- 82 -- -- --        72HR INTAKE/OUTPUT:      Intake/Output Summary (Last 24 hours) at 2/17/2022 0924  Last data filed at 2/17/2022 0814  Gross per 24 hour   Intake 1492.23 ml   Output 676 ml   Net 816.23 ml       Exam:    Gen:   Alert and oriented ×3    Eyes: PERRL. No sclera icterus. No conjunctival injection. ENT: No discharge. Pharynx clear. External appearance of ears and nose normal.  Neck: Trachea midline. No obvious mass. Resp: No accessory muscle use. No crackles. No wheezes. No rhonchi. CV: Regular rate. Regular rhythm. No murmur or rub. No edema. GI: Non-tender. Non-distended. No hernia. Skin: Warm, dry, normal texture and turgor. Lymph: No cervical LAD. No supraclavicular LAD. M/S: / Ext. No cyanosis. No clubbing. No joint deformity. Neuro: CN 2-12 are intact,  no neurologic deficits noted. PT/INR: No results for input(s): PROTIME, INR in the last 72 hours. APTT: No results for input(s): APTT in the last 72 hours. CBC:   Recent Labs     02/15/22  1747 02/16/22  0343 02/17/22  0810   WBC 12.5* 14.4* 5.5   HGB 12.2 10.6* 9.1*   HCT 36.0 31.7* 27.0*   MCV 90.3 91.8 92.5    139 119*       BMP:   Recent Labs     02/15/22  1747 02/16/22  0343 02/16/22  2259    135* 133*   K 2.6* 2.5* 2.9*   CL 90* 90* 90*   CO2 26 26 29   PHOS  --   --  6.4*   BUN 32* 38* 42*   CREATININE 10.9* 11.3* 10.8*       LIVER PROFILE:   Recent Labs     02/15/22  1747   ALKPHOS 117   AST 14*   ALT 7*   BILITOT 0.6     No results for input(s): AMYLASE in the last 72 hours.     Recent Labs     02/15/22  1747   LIPASE 16.0       UA:No results for input(s): NITRITE, LABCAST, WBCUA, RBCUA, MUCUS in the last 72 hours. TROPONIN: No results for input(s): Ellene Lazier in the last 72 hours. Lab Results   Component Value Date/Time    URRFLXCULT Yes 08/04/2019 06:40 PM       No results for input(s): TSHREFLEX in the last 72 hours. No components found for: PAP1852  POC GLUCOSE:    Recent Labs     02/16/22  1113 02/16/22  1218 02/16/22  1635 02/16/22 2029 02/17/22  0732   POCGLU 172* 200* 218* 135* 238*     No results for input(s): LABA1C in the last 72 hours. Lab Results   Component Value Date    LABA1C 8.1 01/07/2022         ASSESSMENT AND PLAN    Sepsis  Continue on IV antibiotics, also started on IV fluids  Fluid culture is positive for Pseudomonas  Continue patient on cefepime and discontinue vancomycin  Consult to infectious disease    Diverticulitis  IV antibiotics    Peritonitis  Patient on peritoneal dialysis    Electrolyte normality    ESRD  Nephrology is consulted        Code Status: Full Code        Dispo -cc        The patient and / or the family were informed of the results of any tests, a time was given to answer questions, a plan was proposed and they agreed with plan. Helen Huang MD    This note was transcribed using [id number] [employer]. Please disregard any translational errors.

## 2022-02-17 NOTE — PROGRESS NOTES
Office: [phone]       Fax: [phone]      Nephrology Progress Note        Patient's Name: Wesley Dugan  Date of Visit: 2/17/2022    Reason for Consult:  ESRD management      History of Present Illness:      Wesley Dugan is a 55 y.o. female with PMHx of hypertension, diabetes mellitus, ESRD who was admitted on 2/15/2022 with complaints of abdominal pain   PD fluid cloudy  history of PD peritonitis in past.  Does 2 exchanges with 2.5% day time and Icodextrin overnight    INTERVAL HISTORY    Feels better  Shortness of breath: No   Has abdominal pain               reports that she has never smoked. She has never used smokeless tobacco. She reports that she does not drink alcohol and does not use drugs. Medications:        Allergies:  Bactrim [sulfamethoxazole-trimethoprim], Doxazosin, Geocillin [carbenicillin], Lisinopril, Other, Spironolactone, Tramadol, Bumetanide, and Ibuprofen  Scheduled Meds:   insulin lispro  0-6 Units SubCUTAneous TID WC    insulin lispro  0-3 Units SubCUTAneous Nightly    sodium chloride flush  5-40 mL IntraVENous 2 times per day    heparin (porcine)  5,000 Units SubCUTAneous 3 times per day    pantoprazole  40 mg Oral Daily    metroNIDAZOLE  500 mg IntraVENous Q8H    cefepime  1,000 mg IntraVENous Q24H    vancomycin (VANCOCIN) intermittent dosing (placeholder)   Other RX Placeholder    potassium bicarb-citric acid  40 mEq Oral BID    custom dialysis builder   IntraPERitoneal Nightly    And    custom dialysis builder   IntraPERitoneal Nightly    calcium acetate  2 capsule Oral TID WC     Continuous Infusions:   dextrose      sodium chloride 25 mL (02/16/22 2131)         Objective:       Vitals:  /69   Pulse 89   Temp 98 °F (36.7 °C) (Oral)   Resp 16   Ht 5' 3\" (1.6 m)   Wt 194 lb 7.1 oz (88.2 kg)   SpO2 99%   BMI 34.44 kg/m²   Constitutional:  awake, NAD  Skin: no rash, turgor wnl  HEENT:  MMM, No icterus  Neck: no bruits, No JVD  Cardiovascular:  S1, S2 reg  Respiratory: CTA, no crackles  Abdomen:  +BS, soft, NT, ND  Ext: no lower extremity edema  Access: abd PD catheter in place, no drainage/redness around exit site. No tunnel tenderness  CNS: alert, no agitation    Data:     Labs:  Hepatic:   Recent Labs     02/15/22  1747   AST 14*   ALT 7*   BILITOT 0.6   ALKPHOS 117     BNP: No results for input(s): BNP in the last 72 hours. ABGs: No results for input(s): PHART, PO2ART, XVH2KYU in the last 72 hours. IMAGING:  CT ABDOMEN PELVIS WO CONTRAST Additional Contrast? None   Final Result   1. Sigmoid colon diverticulosis. There is confluent inflammatory change   within the mesenteric fat surrounding a short segment of simple sigmoid colon   containing the diverticula. This could represent sequela of diverticulitis. 2.  2 cm ventral abdominal wall hernia containing inflamed fat. 3.  9 mm ground-glass nodule in the left lung base. Recommend follow-up with   a noncontrast chest CT at 6-12 months to confirm persistence then additional   noncontrast chest CTs every 2 years until 5 years. Assessment :       1. ESRD   Etiology: suspected DN  Access: Abd PD cath  Volume: Hypervolemic      Seen on PD    2. Electrolytes/Acid base  Hypokalemia    Recent Labs     02/16/22  0343 02/16/22  0343 02/16/22  2259   *   < > 133*   K 2.5*   < > 2.9*   CO2 26   < > 29   MG 1.80  --   --     < > = values in this interval not displayed. 3. BMD  -Hyperphosphatemia, SHPT  -maintained on Renvela    Lab Results   Component Value Date    CALCIUM 6.0 (L) 02/16/2022    PHOS 6.4 (H) 02/16/2022        4. HTN  -Blood pressure low       BP Readings from Last 1 Encounters:   02/17/22 108/69       5. Anemia  -anemia of CKD  -assess for CHRIS    Recent Labs     02/16/22  0343   HGB 10.6*     6. DM    7.  PD peritonitis    PLAN :     -Empiric antibiotics  - follow up culture  - replace K, Mg as needed  -maintenance PD tonight (do 3 exchanges with 2.5% Dianeal). Added heparin.   -Hold BP meds:   -MBD management  -Anemia management  -Dose meds to GFR<10    Thank you for allowing us to participate in care of [facility] [employer] . We will continue to follow. Feel free to contact me with any questions.       Ondina Oneil MD  2/17/2022    [facility] [address] [place] [address]  Office : [phone]  Fax :[phone]

## 2022-02-17 NOTE — CONSULTS
Clinical Pharmacy Note  Vancomycin Consult    Liliana He is a 55 y.o. female ordered Vancomycin for peritonitis; consult received from Dr. Pam Patel to manage therapy. Also receiving metronidazole and cefepime. Patient Active Problem List   Diagnosis    Diabetic retinopathy ([facility]    Mixed hyperlipidemia    DMII (diabetes mellitus, type 2) ([facility]    Vit[facility]ous [facility] of right eye [facility]    Essential hypertension    Acute kidney injury ([facility]    ESRD (end stage renal disease) ([facility]    Acute renal failure superimposed on chronic kidney disease [facility]    Volume overload    Peritonitis ([facility]    Generalized abdominal pain    Peritoneal dialysis status ([facility]    Morbid obesity due to excess calories ([facility])    Nausea and vomiting    Hyponatremia    Diarrhea    Dizziness    Hypertensive emergency    Hypokalemia    Hypertensive urgency    Cerebrovascular accident (CVA) ([facility]    Electrolyte imbalance    Acute respiratory failure with hypoxia ([facility])    Sepsis ([facility]    Electrolyte abnormality    Diverticulitis       Allergies:  Bactrim [sulfamethoxazole-trimethoprim], Doxazosin, Geocillin [carbenicillin], Lisinopril, Other, Spironolactone, Tramadol, Bumetanide, and Ibuprofen     Temp max:  Temp (24hrs), Av °F (36.7 °C), Min:97.4 °F (36.3 °C), Max:98.5 °F (36.9 °C)      Recent Labs     02/15/22  17422  0343   WBC 12.5* 14.4*       Recent Labs     02/15/22  1747 22  0343 22  2259   BUN 32* 38* 42*   CREATININE 10.9* 11.3* 10.8*         Intake/Output Summary (Last 24 hours) at 2022 0035  Last data filed at 2022 2132  Gross per 24 hour   Intake 400 ml   Output 1 ml   Net 399 ml       Culture Results:  Pending    Ht Readings from Last 1 Encounters:   22 5' 3\" (1.6 m)        Wt Readings from Last 1 Encounters:   22 192 lb 7.4 oz (87.3 kg)         Estimated Creatinine Clearance: 6 mL/min (A) (based on SCr of 10.8 mg/dL ()).     Assessment/Plan:  Random level = 17.2 mg/L    Will dose vancomycin 1000 mg IV in the AM and order a random level with tomorrow AM labs. Thank you for the consult.      Ramiro Galarza, [facility]  2/17/2022 12:37 AM

## 2022-02-17 NOTE — PLAN OF CARE
Problem: Falls - Risk of:  Goal: Will remain free from falls  Description: Will remain free from falls  2/17/2022 1842 by Marimar Goncalves RN  Outcome: Ongoing     Problem: Falls - Risk of:  Goal: Absence of physical injury  Description: Absence of physical injury  2/17/2022 1842 by Marimar Goncalves RN  Outcome: Ongoing     Problem: SAFETY  Goal: Free from accidental physical injury  2/17/2022 1842 by Marimar Goncalves RN  Outcome: Ongoing     Problem: SAFETY  Goal: Free from intentional harm  2/17/2022 1842 by Marimar Goncalves RN  Outcome: Ongoing     Problem: DAILY CARE  Goal: Daily care needs are met  2/17/2022 1842 by Marimar Goncalves RN  Outcome: Ongoing     Problem: PAIN  Goal: Patient's pain/discomfort is manageable  2/17/2022 1842 by Marimar Goncalves RN  Outcome: Ongoing     Problem: SKIN INTEGRITY  Goal: Skin integrity is maintained or improved  2/17/2022 1842 by Marimar Goncalves RN  Outcome: Ongoing     Problem: KNOWLEDGE DEFICIT  Goal: Patient/S.O. demonstrates understanding of disease process, treatment plan, medications, and discharge instructions.   2/17/2022 1842 by Marimar Goncalves RN  Outcome: Ongoing     Problem: DISCHARGE BARRIERS  Goal: Patient's continuum of care needs are met  2/17/2022 1842 by Marimar Goncalves RN  Outcome: Ongoing     Problem: Discharge Planning:  Goal: Discharged to appropriate level of care  Description: Discharged to appropriate level of care  2/17/2022 1842 by Marimar Goncalves RN  Outcome: Ongoing     Problem: Gas Exchange - Impaired:  Goal: Levels of oxygenation will improve  Description: Levels of oxygenation will improve  2/17/2022 1842 by Marimar Goncalves RN  Outcome: Ongoing     Problem: Infection, Septic Shock:  Goal: Will show no infection signs and symptoms  Description: Will show no infection signs and symptoms  2/17/2022 1842 by Marimar Goncalves RN  Outcome: Ongoing     Problem: Infection - Ventilator-Associated Pneumonia:  Goal: Absence of pulmonary infection  Description: Absence of pulmonary infection  2/17/2022 1842 by Keren Owens RN  Outcome: Ongoing     Problem: Serum Glucose Level - Abnormal:  Goal: Ability to maintain appropriate glucose levels will improve  Description: Ability to maintain appropriate glucose levels will improve  2/17/2022 1842 by Keren Owens RN  Outcome: Ongoing     Problem: Tissue Perfusion, Altered:  Goal: Circulatory function within specified parameters  Description: Circulatory function within specified parameters  2/17/2022 1842 by Keren Owens RN  Outcome: Ongoing     Problem: Skin Integrity:  Goal: Will show no infection signs and symptoms  Description: Will show no infection signs and symptoms  Outcome: Ongoing     Problem: Skin Integrity:  Goal: Absence of new skin breakdown  Description: Absence of new skin breakdown  Outcome: Ongoing

## 2022-02-17 NOTE — PROGRESS NOTES
Physician Progress Note      Mindy Vazquez  CSN #:                  [id number]  :                       1965  ADMIT DATE:       2/15/2022 5:08 PM  [address] DATE:  RESPONDING  PROVIDER #:        Quintin Monsalve MD          QUERY TEXT:    Dear Dr. José Arroyo,  Pt admitted with sepsis and peritonitis. Pt noted to have a PD catheter and   PD dialysis treatments. If possible, please document in the progress notes and   discharge summary if you are evaluating and / or treating any of the   following: The medical record reflects the following:  Risk Factors: ESRD on PD, Hx peritonitis, Current peritonitis and Sepsis  Clinical Indicators: 2/15 ED for abd pain, vomiting and concern for   peritonitis (has PD catheter and reported cloudy fluid removed today) + N and   V and has diarrhea, WBC=12.5, Neutrophils=11.4, Lactic acid=4.1,Admit for   Sepsis, Diverticuluitis, Perotonitis, Electrolyte abnormality, ESRD,    PCP notes Peritonitis on peritoneal dialysis, Culture + Pseudomonas   aeruginosa Abnormal  Treatment: cefepime, Flagyl, Vancomycin, Culture PD fluid  Thank you,  Ivette Carrillo RN, CDS  [email or web address]  Options provided:  -- Sepsis and peritonitis as a complication of peritoneal dialysis treatment  -- Sepsis and peritonitis related to PD catheter  -- Sepsis and peritonitis unrelated to PD catheter and peritoneal dialysis   treatment  -- Other - I will add my own diagnosis  -- Disagree - Not applicable / Not valid  -- Disagree - Clinically unable to determine / Unknown  -- Refer to Clinical Documentation Reviewer    PROVIDER RESPONSE TEXT:    This patient has sepsis and peritonitis as a complication of peritoneal   dialysis treatment. Query created by:  [id number] RUTH Canas on 2022 2:34 PM      Electronically signed by:  Quintin Monsalve MD 2022 3:31 PM

## 2022-02-17 NOTE — PLAN OF CARE
Problem: Falls - Risk of:  Goal: Will remain free from falls  Description: Will remain free from falls  2/17/2022 0501 by Az Hinkle RN  Outcome: Ongoing    Goal: Absence of physical injury  Description: Absence of physical injury  2/17/2022 0501 by Az Hinkle RN  Outcome: Ongoing   Fall risk assessment completed every shift. All precautions in place. Pt has call light within reach at all times. Room clear of clutter. Pt aware to call for assistance when getting up. Problem: SAFETY  Goal: Free from accidental physical injury  2/17/2022 0501 by Az Hinkle RN  Outcome: Ongoing    Goal: Free from intentional harm  2/17/2022 0501 by Az Hinkle RN  Outcome: Ongoing       Problem: DAILY CARE  Goal: Daily care needs are met  2/17/2022 0501 by Az Hinkle RN  Outcome: Ongoing       Problem: PAIN  Goal: Patient's pain/discomfort is manageable  2/17/2022 0501 by Az Hinkle RN  Outcome: Ongoing  Pain/discomfort being managed with PRN analgesics per MD orders. Pt able to express presence and absence of pain and rate pain appropriately using numerical scale. Problem: SKIN INTEGRITY  Goal: Skin integrity is maintained or improved  2/17/2022 0501 by Az Hinkle RN  Outcome: Ongoing       Problem: KNOWLEDGE DEFICIT  Goal: Patient/S.O. demonstrates understanding of disease process, treatment plan, medications, and discharge instructions.   2/17/2022 0501 by Az Hinkle RN  Outcome: Ongoing       Problem: DISCHARGE BARRIERS  Goal: Patient's continuum of care needs are met  2/17/2022 0501 by Az Hinkle RN  Outcome: Ongoing    Problem: Discharge Planning:  Goal: Discharged to appropriate level of care  Description: Discharged to appropriate level of care  2/17/2022 0501 by Az Hinkle RN  Outcome: Ongoing       Problem: Infection, Septic Shock:  Goal: Will show no infection signs and symptoms  Description: Will show no infection signs and symptoms  2/17/2022 0501 by Gisell Ordonez Cranford Phalen, RN  Outcome: Ongoing         Problem: Serum Glucose Level - Abnormal:  Goal: Ability to maintain appropriate glucose levels will improve  Description: Ability to maintain appropriate glucose levels will improve  2/17/2022 0501 by Ray Elmore RN  Outcome: Ongoing       Problem: Tissue Perfusion, Altered:  Goal: Circulatory function within specified parameters  Description: Circulatory function within specified parameters  2/17/2022 0501 by Ray Elmore RN  Outcome: Ongoing

## 2022-02-17 NOTE — PROGRESS NOTES
Pt had nausea and emesis in the beginning of the shift after taking her oral benadryl. Was given a one time dose of IV benadryl with some zofran. Was given cream and stated she had relief and was able to get some sleep. Her PD dialysis ran without issue, specimen was sent. Was unable to take her oral potassium last night and received 4 IV runners. Will repeat level this morning. Is currently without need or complaint.

## 2022-02-17 NOTE — CONSULTS
Infectious Diseases Inpatient Consult Note      Reason for Consult:  PD Peritonitis and Pseudomonas on the fluid culture     Requesting Physician:  [facility]     Primary Care Physician:  No primary care provider on file. History Obtained From:  [facility] and patient     CHIEF COMPLAINT:     Chief Complaint   Patient presents with    Abdominal Pain     n/v/d.  pt has PD.  onset 0200 today. .  recent covid. fatigue. pt pail         HISTORY OF PRESENT ILLNESS:  55 y.o. man with a past medical history of end-stage renal disease on peritoneal dialysis for the past 2 years, diabetic retinopathy 5, diabetes neuropathy, kidney stone, hypertension, hyperlipidemia, admitted to the hospital secondary to abdominal pain and diarrhea. She also has a history of COVID-19 infection for which she is recovering okay but unfortunately developed GI symptoms consistent with nausea diarrhea abdominal pain. PD fluid on admission with abnormal PD fluid culture positive for Pseudomonas. Admit labs indicate lactic acid 4.1 glucose at 295, WBC elevated 14.4. Blood cultures from admission negative. CT abdomen pelvis indicate sigmoid colon diverticulosis but there is small sequelae of diverticulitis. PD fluid cell count was grossly abnormal with WBC count at 18,000.   Location : abd pain+      Quality :  Aching      Severity :  10/10   Duration : weeks     Timing :constant   Context : PD     Modifying factors : none   Associated signs and symptoms:diarrhea, nausea         Past Medical History:    Past Medical History:   Diagnosis Date    Anemia 9/15/2014    Chronic kidney disease     Diabetes mellitus ([address]     Diabetes mellitus type 1 ([address]     Diabetic retinopathy ([address]     Diabetic retinopathy associated with type 2 diabetes mellitus, without macular edema     Hyperlipidemia 7/27/2015    Hypertension     Kidney stone     Mixed hyperlipidemia 7/27/2015    Orthostatic hypotension     Vitreous hemorrhage of right eye ([address] 8/15/2017       Past Surgical History:    Past Surgical History:   Procedure Laterality Date    APPENDECTOMY      KIDNEY STONE SURGERY      LAPAROSCOPY INSERTION PERITONEAL CATHETER N/A 8/12/2019    LAPAROSCOPIC PERITONEAL DIALYSIS CATHETER PLACEMENT WITH AXELFABY GONZALEZ performed by Lc Naidu MD at [facility]         Current Medications:    Outpatient Medications Marked as Taking for the 2/15/22 encounter [facility] Encounter)   Medication Sig Dispense Refill    torsemide (DEMADEX) 100 MG tablet TAKE 4 TABLETS BY MOUTH ONCE NIGHTLY 360 tablet 5    amLODIPine (NORVASC) 10 MG tablet TAKE ONE TABLET BY MOUTH DAILY 30 tablet 3    losartan (COZAAR) 100 MG tablet Take 100 mg by mouth nightly      pantoprazole (PROTONIX) 40 MG tablet Take 1 tablet by mouth daily 90 tablet 1    insulin lispro (HUMALOG KWIKPEN) 100 UNIT/ML pen Inject 2-4 Units into the skin 3 times daily (before meals) Sliding scale          Allergies:  Bactrim [sulfamethoxazole-trimethoprim], Doxazosin, Geocillin [carbenicillin], Lisinopril, Other, Spironolactone, Tramadol, Bumetanide, and Ibuprofen    Immunizations : There is no immunization history on file for this patient.       Social History:      Social History     Tobacco Use    Smoking status: Never Smoker    Smokeless tobacco: Never Used   Vaping Use    Vaping Use: Never used   Substance Use Topics    Alcohol use: No    Drug use: No     Social History     Tobacco Use   Smoking Status Never Smoker   Smokeless Tobacco Never Used      Family History   Problem Relation Age of Onset    Heart Disease Mother         heart attack 2007    High Blood Pressure Mother     Diabetes Father     Heart Disease Father     Emphysema Father     Cancer Maternal Grandmother         cervical cancer    Lung Cancer Maternal Grandfather         black lung    Diabetes Paternal Grandmother     Heart Disease Paternal Grandfather            REVIEW OF SYSTEMS:     Constitutional: -distended, normal bowel sounds, no masses or organomegaly  Extremities: no cyanosis, clubbing or edema  Musculoskeletal: normal range of motion, no joint swelling, deformity or tenderness  Integumentary: No rashes, no abnormal skin lesions, no petechiae  Neurologic: reflexes normal and symmetric, no cranial nerve deficit  Psych:  Orientation, sensorium, mood normal   Lines: PD catheter +    DATA:    CBC:   Lab Results   Component Value Date    WBC 5.5 02/17/2022    HGB 9.1 (L) 02/17/2022    HCT 27.0 (L) 02/17/2022    MCV 92.5 02/17/2022     (L) 02/17/2022     RENAL:   Lab Results   Component Value Date    CREATININE 10.7 (HH) 02/17/2022    BUN 39 (H) 02/17/2022     (L) 02/17/2022    K 3.2 (L) 02/17/2022    CL 89 (L) 02/17/2022    CO2 28 02/17/2022     SED RATE: No results found for: SEDRATE  CK: No results found for: CKTOTAL  CRP: No results found for: CRP  Hepatic Function Panel:   Lab Results   Component Value Date    ALKPHOS 117 02/15/2022    ALT 7 02/15/2022    AST 14 02/15/2022    PROT 5.6 02/15/2022    BILITOT 0.6 02/15/2022    LABALBU 1.7 02/17/2022     UA:  Lab Results   Component Value Date    COLORU YELLOW 05/27/2021    CLARITYU Clear 05/27/2021    GLUCOSEU 250 05/27/2021    BILIRUBINUR Negative 05/27/2021    KETUA Negative 05/27/2021    SPECGRAV 1.016 05/27/2021    BLOODU TRACE 05/27/2021    PHUR 7.0 05/27/2021    PROTEINU 100 05/27/2021    UROBILINOGEN 0.2 05/27/2021    NITRU Negative 05/27/2021    LEUKOCYTESUR Negative 05/27/2021    LABMICR YES 05/27/2021    URINETYPE NotGiven 05/27/2021      Urine Microscopic:   Lab Results   Component Value Date    LABCAST 3-5 Hyaline 08/27/2014    BACTERIA RARE 05/27/2021    COMU see below 05/27/2021    HYALCAST 0 05/27/2021    WBCUA 3 05/27/2021    RBCUA 4 05/27/2021    EPIU 4 05/27/2021     Urine Reflex to Culture:   Lab Results   Component Value Date    URRFLXCULT Yes 08/04/2019     lACTIC ACID 4.1     Wbc  14.4     MICRO: cultures reviewed and updated by me            Culture, Body Fluid [[id number]] (Abnormal) Collected: 02/16/22 1725   Order Status: Completed Specimen: Dialysate Updated: 02/17/22 1319    Gram Stain Result 4+ WBC's (Polymorphonuclear)   No Epithelial Cells seen   No organisms seen     Organism Pseudomonas aeruginosa Abnormal     Body Fluid Culture, Sterile --    Light growth   No further workup   Refer to culture [id number] for sensitivity results    Narrative:     ORDER#: [id number]                          ORDERED BY: Shanice Cole   SOURCE: Dialysate                          COLLECTED:  02/16/22 17:25   [facility] :                      RECEIVED :  02/16/22 17:33   Performed at:   [facility]   [address] [facility] [place] [address]   Phone [phone]   Culture, Body Fluid [[id number]] (Abnormal) Collected: 02/16/22 0040   Order Status: Completed Specimen: Body Fluid from Peritoneal Dialysis Fluid Updated: 02/17/22 1315    Gram Stain Result No Epithelial Cells seen   2+ WBC's (Polymorphonuclear)   No organisms seen     Organism Pseudomonas aeruginosa Abnormal     Body Fluid Culture, Sterile --    Light growth   Sensitivity to follow    Narrative:     ORDER#: [id number]                          ORDERED BY: Candelaria Stanford   SOURCE: Peritoneal Dialysis Fluid          COLLECTED:  02/16/22 00:40   [facility] :                      RECEIVED :  02/16/22 01:01   Performed at:   [facility]   [address] [facility] [place] [address]   Phone [phone]   Culture, Body Fluid [[id number]] Collected: 02/17/22 0330   Order Status: Sent Specimen: Dialysate Fluid Updated: 02/17/22 0435   Culture, Blood 2 [[id number]] Collected: 02/15/22 2000   Order Status: Completed Specimen: Blood Updated: 02/16/22 2215    Culture, Blood 2 No Growth to date.  Any change in status will be called.    Narrative:     ORDER#: [id number]                          ORDERED BY: ANGELICA MENDOSA   SOURCE: Blood                              COLLECTED:  02/15/22 20:00   [facility] :                      RECEIVED :  02/15/22 20:10   If child <=2 yrs old please draw pediatric bottle. ~Blood Culture #2   Performed at:   [facility]   [address] [facility] [place] [address]   Phone [phone]   Culture, Blood 1 [[id number]] Collected: 02/15/22 1933   Order Status: Completed Specimen: Blood Updated: 02/16/22 2215    Blood Culture, Routine No Growth to date.  Any change in status will be called. Narrative:     ORDER#: [id number]                          ORDERED BY: Boaz Acevedo   SOURCE: Blood                              COLLECTED:  02/15/22 19:33   [facility] :                      RECEIVED :  02/15/22 19:37   If child <=2 yrs old please draw pediatric bottle. ~Blood Culture 1   Performed at:   [facility]   [address] [place] [address]   Phone [phone]     Results for Edwina Ly (MRN [id number]) as of 2/17/2022 16:24   Ref. Range 2/16/2022 00:40 2/16/2022 17:25 2/17/2022 03:30   Source + CELL CT/DIFF-BF Unknown Peritoneal dial Peritoneal Fl. Peritoneal dial   Appearance, Fluid Unknown Hazy Turbid Cloudy   Color, Fluid Unknown Yellow Yellow Yellow   Eos, Fluid Latest Units: % 1     RBC, Fluid Latest Units: /cumm 1,000 5,000 <2,000   Lymphocytes, Body Fluid Latest Units: %  8 4   Monocyte Count, Fluid Latest Units: % 25  15   Neutrophil Count, Fluid Latest Units: % 54 86 76   Nucl Cell, Fluid Latest Units: /cumm 18,185 >50 2,161   Mesothelial, Fluid Latest Units: % 16 1 1   Clot Eval. Unknown see below see below see below   Number of Cells Counted Fluid Unknown 100 100 100       Blood Culture:   Lab Results   Component Value Date    [facility]  02/15/2022     No Growth to date. Any change in status will be called. BLOODCULT2  02/15/2022     No Growth to date.   Any change in status will be called. Viral Culture:    Lab Results   Component Value Date    COVID19 Detected 01/07/2022     Urine Culture: No results for input(s): Jairo Oneal in the last 72 hours. Scheduled Meds:   potassium chloride  10 mEq IntraVENous Q1H    insulin lispro  0-6 Units SubCUTAneous TID WC    insulin lispro  0-3 Units SubCUTAneous Nightly    sodium chloride flush  5-40 mL IntraVENous 2 times per day    heparin (porcine)  5,000 Units SubCUTAneous 3 times per day    pantoprazole  40 mg Oral Daily    metroNIDAZOLE  500 mg IntraVENous Q8H    cefepime  1,000 mg IntraVENous Q24H    potassium bicarb-citric acid  40 mEq Oral BID    custom dialysis builder   IntraPERitoneal Nightly    And    custom dialysis builder   IntraPERitoneal Nightly    calcium acetate  2 capsule Oral TID WC       Continuous Infusions:   dextrose      sodium chloride 25 mL (02/17/22 1503)       PRN Meds:  diphenhydrAMINE, glucose, dextrose, glucagon (rDNA), dextrose, sodium chloride flush, sodium chloride, acetaminophen **OR** acetaminophen, ondansetron, diphenhydrAMINE    Imaging:   CT ABDOMEN PELVIS WO CONTRAST Additional Contrast? None   Final Result   1. Sigmoid colon diverticulosis. There is confluent inflammatory change   within the mesenteric fat surrounding a short segment of simple sigmoid colon   containing the diverticula. This could represent sequela of diverticulitis. 2.  2 cm ventral abdominal wall hernia containing inflamed fat. 3.  9 mm ground-glass nodule in the left lung base. Recommend follow-up with   a noncontrast chest CT at 6-12 months to confirm persistence then additional   noncontrast chest CTs every 2 years until 5 years. All pertinent images and reports for the current Hospitalization were reviewed by me.     IMPRESSION:    Patient Active Problem List   Diagnosis    Diabetic retinopathy [facility]    Mixed hyperlipidemia    DMII (diabetes mellitus, type 2) ([facility]    Vitreous hemorrhage of right eye ([facility]    Essential hypertension    Acute kidney injury ([facility]    ESRD (end stage renal disease) ([address]    Acute renal failure superimposed on chronic kidney disease (HCC)    Volume overload    Peritonitis (HCC)    Generalized abdominal pain    Peritoneal dialysis status ([address]    Morbid obesity due to excess calories (HCC)    Nausea and vomiting    Hyponatremia    Diarrhea    Dizziness    Hypertensive emergency    Hypokalemia    Hypertensive urgency    Cerebrovascular accident (CVA) ([facility]    Electrolyte imbalance    Acute respiratory failure with hypoxia (HCC)    Sepsis ([facility]    Electrolyte abnormality    Diverticulitis     Sepsis on admit  PD peritonitis  Diverticulitis  Pseudomonas on PD fluid cx  WBC elevation   Lactic acidosis  ESRD on PD  DM with several complications  PD fluid cell counts very abnormal       Will need IV abx for now and at d.c hopefully able to do IV abx through PD exchanges     PD fluid cx in follow up pending will need to see improvement in cell counts before d/c planning     Labs, Microbiology, Radiology and pertinent results from current hospitalization and care every where were reviewed by me as a part of the consultation. PLAN :  1. Cont IV Cefepime x 1 gm q 24 hrs  2. Add Flagyl oral   3. Trend PD fluid cell counts and culture  4. Blood cx in process  5. CT abd/pelvis results noted  6. Hope to save PD catheter will follow cell counts    Discussed with patient/Family and Nursing   Risk of Complications/Morbidity: High      · Illness(es)/ Infection present that pose threat to bodily function. · There is potential for severe exacerbation of infection/side effects of treatment. · Therapy requires intensive monitoring for antimicrobial agent toxicity. Thanks for allowing me to participate in your patient's care please call me with any questions or concerns.     Dr. Nila Moreno MD  [address] [place] [address] Physician  Phone: [phone]   Fax : [phone]

## 2022-02-18 NOTE — PLAN OF CARE
Problem: Falls - Risk of:  Goal: Will remain free from falls  Description: Will remain free from falls  2/18/2022 1344 by Bertha Borges RN  Outcome: Ongoing  Note: Pt has made no attempts to get out of bed on her own. Call light within reach. Problem: Falls - Risk of:  Goal: Absence of physical injury  Description: Absence of physical injury  Outcome: Ongoing  Note: No signs of physical injury. Problem: SAFETY  Goal: Free from accidental physical injury  Outcome: Ongoing  Note: Patient assessed for fall risk; fall precautions initiated. Patient and family instructed about safety devices. Environment kept free of clutter and adequate lighting provided. Bed locked and in lowest position. Call light within reach. Will continue to monitor. Problem: SAFETY  Goal: Free from intentional harm  Outcome: Ongoing  Note: No signs of intentional harm. Problem: DAILY CARE  Goal: Daily care needs are met  2/18/2022 1344 by Bertha Borges RN  Outcome: Ongoing  Note: Patient's ability assessed to perform self care and independent activity encouraged according to that ability. Assisted with ADL's as needed. Risk for skin breakdown assessed. Potential discharge needs assessed. Patient and family included in daily care decisions. Problem: PAIN  Goal: Patient's pain/discomfort is manageable  Outcome: Ongoing  Note: Pain/discomfort being managed with PRN analgesics per MD orders. Pt able to express presence and absence of pain and rate pain appropriately using numerical scale. Problem: SKIN INTEGRITY  Goal: Skin integrity is maintained or improved  Outcome: Ongoing  Note: Pt is able to change position independently. Skin assessment completed every shift. Pt assessed for incontinence, appropriate barrier cream applied prn. Pt encouraged to turn/rotate every 2 hours. Assistance provided if pt unable to do so themselves.         Problem: KNOWLEDGE DEFICIT  Goal: Patient/S.O. demonstrates understanding of disease process, treatment plan, medications, and discharge instructions. Outcome: Ongoing  Note: . Problem: DISCHARGE BARRIERS  Goal: Patient's continuum of care needs are met  Outcome: Ongoing  Note: Prior to admission pt was living at home. Plan is for pt to return home. No discharge plans in place. Pt continues to receive IV antibiotics and she has needed replacement for her potassium and magnesium. Problem: Discharge Planning:  Goal: Discharged to appropriate level of care  Description: Discharged to appropriate level of care  Outcome: Ongoing  Note: . Problem: Gas Exchange - Impaired:  Goal: Levels of oxygenation will improve  Description: Levels of oxygenation will improve  Outcome: Ongoing  Note: Oxygen level being checked every 4 hours and prn. Her oxygen level has been % on 2L of oxygen via nasal canula. Explained to pt that she can come off her oxygen to see how she does without however pt is worried about coming off. Nurse explained that when she gets closer to discharge it will be important for her to be on room air to make sure she can tolerate it. Pt verbalized understanding. Problem: Infection, Septic Shock:  Goal: Will show no infection signs and symptoms  Description: Will show no infection signs and symptoms  Outcome: Ongoing  Note: Pt continues to receive IV antibiotics. Her WBC is WNL. Pt is afebrile. Problem: Serum Glucose Level - Abnormal:  Goal: Ability to maintain appropriate glucose levels will improve  Description: Ability to maintain appropriate glucose levels will improve  Outcome: Ongoing  Note: Blood sugars are being checked ACHS. She does have sliding scale insulin ordered. Problem: Tissue Perfusion, Altered:  Goal: Circulatory function within specified parameters  Description: Circulatory function within specified parameters  2/18/2022 1344 by Jenni Trammell RN  Outcome: Ongoing  Note: Skin is warm and dry. Palpable pulses.       Problem: Venous Thromboembolism:  Goal: Will show no signs or symptoms of venous thromboembolism  Description: Will show no signs or symptoms of venous thromboembolism  Outcome: Ongoing  Note: Pt is receiving Heparin SQ as a prophylaxis. Problem: Venous Thromboembolism:  Goal: Absence of signs or symptoms of impaired coagulation  Description: Absence of signs or symptoms of impaired coagulation  Outcome: Ongoing  Note: . Problem: Skin Integrity:  Goal: Will show no infection signs and symptoms  Description: Will show no infection signs and symptoms  Outcome: Ongoing  Note: Skin assessment completed every shift. Pt assessed for incontinence, appropriate barrier cream applied prn. Pt encouraged to turn/rotate every 2 hours. Assistance provided if pt unable to do so themselves. Problem: Skin Integrity:  Goal: Absence of new skin breakdown  Description: Absence of new skin breakdown  Outcome: Ongoing  Note: No signs of new skin breakdown. Problem: Pain:  Description: Pain management should include both nonpharmacologic and pharmacologic interventions. Goal: Pain level will decrease  Description: Pain level will decrease  Outcome: Ongoing  Note: Pt has had no complaints of pain. Problem: Pain:  Description: Pain management should include both nonpharmacologic and pharmacologic interventions. Goal: Control of acute pain  Description: Control of acute pain  Outcome: Ongoing  Note: Pain/discomfort being managed with PRN analgesics per MD orders. Pt able to express presence and absence of pain and rate pain appropriately using numerical scale. Problem: Pain:  Description: Pain management should include both nonpharmacologic and pharmacologic interventions. Goal: Control of chronic pain  Description: Control of chronic pain  Outcome: Ongoing  Note: .        Problem: Infection - Ventilator-Associated Pneumonia:  Goal: Absence of pulmonary infection  Description: Absence of pulmonary infection  Outcome: Completed

## 2022-02-18 NOTE — CARE COORDINATION
Discharge plan remains the same, home with spouse. Patient is independent with PD and affiliated with [facility]. She states she has supplies for PD at home. Remains on IV antibiotics and received electrolyte replacements today.      John GONCALVES RN  Case Management  [phone]    Electronically signed by John Roche RN on 2/18/2022 at 5:08 PM

## 2022-02-18 NOTE — PLAN OF CARE
Problem: Falls - Risk of:  Goal: Will remain free from falls  Description: Will remain free from falls  2/18/2022 0023 by Parth Mace RN  Outcome: Ongoing  Note: Fall risk assessment completed every shift. All precautions in place. Pt has call light within reach at all times. Room clear of clutter. Pt aware to call for assistance when getting up. Patient assessed for fall risk; fall precautions initiated. Patient and family instructed about safety devices. Environment kept free of clutter and adequate lighting provided. Bed locked and in lowest position. Call light within reach. Will continue to monitor. Problem: DAILY CARE  Goal: Daily care needs are met  2/18/2022 0023 by Parth Mace RN  Outcome: Ongoing  Note: Patient's ability assessed to perform self care and independent activity encouraged according to that ability. Assisted with ADL's as needed. Risk for skin breakdown assessed. Potential discharge needs assessed. Patient and family included in daily care decisions.       Problem: Tissue Perfusion, Altered:  Goal: Circulatory function within specified parameters  Description: Circulatory function within specified parameters  2/18/2022 0023 by Parth Mace RN  Outcome: Ongoing

## 2022-02-18 NOTE — PROGRESS NOTES
Infectious Disease Follow up Notes  Admit Date: 2/15/2022  Hospital Day: 4    Antibiotics :   IV Cefepime  IV  Flagyl      CHIEF COMPLAINT:     PD peritonitis  Pseudomonas infection   Diverticulitis    Subjective interval History :  55 y.o. man with a past medical history of end-stage renal disease on peritoneal dialysis for the past 2 years, diabetic retinopathy 5, diabetes neuropathy, kidney stone, hypertension, hyperlipidemia, admitted to the hospital secondary to abdominal pain and diarrhea. She also has a history of COVID-19 infection for which she is recovering okay but unfortunately developed GI symptoms consistent with nausea diarrhea abdominal pain. PD fluid on admission with abnormal PD fluid culture positive for Pseudomonas. Admit labs indicate lactic acid 4.1 glucose at 295, WBC elevated 14.4. Blood cultures from admission negative. CT abdomen pelvis indicate sigmoid colon diverticulosis but there is small sequelae of diverticulitis. PD fluid cell count was grossly abnormal with WBC count at 18,000. Location : abd pain+      Quality :  Aching      Severity :  10/10   Duration : weeks     Timing :constant   Context : PD     Modifying factors : none   Associated signs and symptoms:diarrhea, nausea        Interval History : Complains of ongoing abdominal pain slowly improving. Tolerating antibiotic therapy okay PD fluid cell count still elevated.   Fluid culture positive for Pseudomonas sensitivities noted    Past Medical History:    Past Medical History:   Diagnosis Date    Anemia 9/15/2014    Chronic kidney disease     Diabetes mellitus ([address]     Diabetes mellitus type 1 ([address]     Diabetic retinopathy ([address]     Diabetic retinopathy associated with type 2 diabetes mellitus, without macular edema     Hyperlipidemia 7/27/2015    Hypertension     Kidney stone     Mixed hyperlipidemia 7/27/2015    Orthostatic hypotension     Vitreous hemorrhage of right eye ([address] 8/15/2017       Past Surgical History:    Past Surgical History:   Procedure Laterality Date    APPENDECTOMY      KIDNEY STONE SURGERY      LAPAROSCOPY INSERTION PERITONEAL CATHETER N/A 8/12/2019    LAPAROSCOPIC PERITONEAL DIALYSIS CATHETER PLACEMENT WITH AXEL TROCHAR performed by Jearlean Boeck, MD at [facility]         Current Medications:    Outpatient Medications Marked as Taking for the 2/15/22 encounter [facility] HOSPITAL Encounter)   Medication Sig Dispense Refill    torsemide (DEMADEX) 100 MG tablet TAKE 4 TABLETS BY MOUTH ONCE NIGHTLY 360 tablet 5    amLODIPine (NORVASC) 10 MG tablet TAKE ONE TABLET BY MOUTH DAILY 30 tablet 3    losartan (COZAAR) 100 MG tablet Take 100 mg by mouth nightly      pantoprazole (PROTONIX) 40 MG tablet Take 1 tablet by mouth daily 90 tablet 1    insulin lispro (HUMALOG KWIKPEN) 100 UNIT/ML pen Inject 2-4 Units into the skin 3 times daily (before meals) Sliding scale          Allergies:  Bactrim [sulfamethoxazole-trimethoprim], Doxazosin, Geocillin [carbenicillin], Lisinopril, Other, Spironolactone, Tramadol, Bumetanide, and Ibuprofen    Immunizations : There is no immunization history on file for this patient.     Social History:     Social History     Tobacco Use    Smoking status: Never Smoker    Smokeless tobacco: Never Used   Vaping Use    Vaping Use: Never used   Substance Use Topics    Alcohol use: No    Drug use: No     Social History     Tobacco Use   Smoking Status Never Smoker   Smokeless Tobacco Never Used      Family History   Problem Relation Age of Onset    Heart Disease Mother         heart attack 2007    High Blood Pressure Mother     Diabetes Father     Heart Disease Father     Emphysema Father     Cancer Maternal Grandmother         cervical cancer    Lung Cancer Maternal Grandfather         black lung    Diabetes Paternal Grandmother     Heart Disease Paternal Grandfather REVIEW OF SYSTEMS:    Constitutional:  negative for fevers, chills, night sweats  Eyes:  negative for blurred vision, eye discharge, visual disturbance   HEENT:  negative for hearing loss, ear drainage,nasal congestion  Respiratory:  negative for cough, shortness of breath or hemoptysis   Cardiovascular:  negative for chest pain, palpitations, syncope  Gastrointestinal:  negative for nausea, vomiting, diarrhea, constipation, abdominal pain++   Genitourinary:  negative for frequency, dysuria, urinary incontinence, hematuria  Hematologic/Lymphatic:  negative for easy bruising, bleeding and lymphadenopathy  Allergic/Immunologic:  negative for recurrent infections, angioedema, anaphylaxis   Endocrine:  negative for weight changes, polyuria, polydipsia and polyphagia  Musculoskeletal:  negative for joint  pain, swelling, decreased range of motion  Integumentary: No rashes, skin lesions  Neurological:  negative for headaches, slurred speech, unilateral weakness  Psychiatric: negative for hallucinations,confusion,agitation.                 PHYSICAL EXAM:      Vitals:    /87   Pulse 92   Temp 98.3 °F (36.8 °C) (Oral)   Resp 18   Ht 5' 3\" (1.6 m)   Wt 194 lb 14.2 oz (88.4 kg)   SpO2 91%   BMI 34.52 kg/m²       General Appearance: alert,in no acute distress, +  pallor, no icterus chronic ill appearing woman +  Skin: warm and dry, no rash or erythema  Head: normocephalic and atraumatic  Eyes: pupils equal, round, and reactive to light, conjunctivae normal  ENT: tympanic membrane, external ear and ear canal normal bilaterally, nose without deformity, nasal mucosa and turbinates normal without polyps  Neck: supple and non-tender without mass, no thyromegaly  no cervical lymphadenopathy  Pulmonary/Chest: clear to auscultation bilaterally- no wheezes, rales or rhonchi, normal air movement, no respiratory distress  Cardiovascular: normal rate, regular rhythm, normal S1 and S2, no murmurs, rubs, clicks, or gallops, no carotid bruits  Abdomen: soft, ++ -tender, + -distended, normal bowel sounds, no masses or organomegaly  Extremities: no cyanosis, clubbing or edema  Musculoskeletal: normal range of motion, no joint swelling, deformity or tenderness  Integumentary: No rashes, no abnormal skin lesions, no petechiae  Neurologic: reflexes normal and symmetric, no cranial nerve deficit  Psych:  Orientation, sensorium, mood normal            Lines: PD catheter +        Data Review:    CBC:   Lab Results   Component Value Date    WBC 4.9 02/18/2022    HGB 9.6 (L) 02/18/2022    HCT 28.9 (L) 02/18/2022    MCV 93.3 02/18/2022     (L) 02/18/2022     RENAL:   Lab Results   Component Value Date    CREATININE 10.0 (HH) 02/18/2022    BUN 38 (H) 02/18/2022     (L) 02/18/2022    K 3.0 (L) 02/18/2022    CL 89 (L) 02/18/2022    CO2 27 02/18/2022     SED RATE: No results found for: SEDRATE  CK: No results found for: CKTOTAL  CRP: No results found for: CRP  Hepatic Function Panel:   Lab Results   Component Value Date    ALKPHOS 117 02/15/2022    ALT 7 02/15/2022    AST 14 02/15/2022    PROT 5.6 02/15/2022    BILITOT 0.6 02/15/2022    LABALBU 1.7 02/17/2022     UA:  Lab Results   Component Value Date    COLORU YELLOW 05/27/2021    CLARITYU Clear 05/27/2021    GLUCOSEU 250 05/27/2021    BILIRUBINUR Negative 05/27/2021    KETUA Negative 05/27/2021    SPECGRAV 1.016 05/27/2021    BLOODU TRACE 05/27/2021    PHUR 7.0 05/27/2021    PROTEINU 100 05/27/2021    UROBILINOGEN 0.2 05/27/2021    NITRU Negative 05/27/2021    LEUKOCYTESUR Negative 05/27/2021    LABMICR YES 05/27/2021    URINETYPE NotGiven 05/27/2021      Urine Microscopic:   Lab Results   Component Value Date    LABCAST 3-5 Hyaline 08/27/2014    BACTERIA RARE 05/27/2021    COMU see below 05/27/2021    HYALCAST 0 05/27/2021    WBCUA 3 05/27/2021    RBCUA 4 05/27/2021    EPIU 4 05/27/2021     Urine Reflex to Culture:   Lab Results   Component Value Date    URRFLXCULT Yes 08/04/2019    Results for Josesito Bird (MRN [id number]) as of 2/17/2022 16:24    Ref. Range 2/16/2022 00:40 2/16/2022 17:25 2/17/2022 03:30   Source + CELL CT/DIFF-BF Unknown Peritoneal dial Peritoneal Fl. Peritoneal dial   Appearance, Fluid Unknown Hazy Turbid Cloudy   Color, Fluid Unknown Yellow Yellow Yellow   Eos, Fluid Latest Units: % 1       RBC, Fluid Latest Units: /cumm 1,000 5,000 <2,000   Lymphocytes, Body Fluid Latest Units: %   8 4   Monocyte Count, Fluid Latest Units: % 25   15   Neutrophil Count, Fluid Latest Units: % 54 86 76   Nucl Cell, Fluid Latest Units: /cumm 18,185 >50 2,161   Mesothelial, Fluid Latest Units: % 16 1 1   Clot Eval. Unknown see below see below see below   Number of Cells Counted Fluid Unknown 100 100 100        MICRO: cultures reviewed and updated by me   Blood Culture:   Lab Results   Component Value Date    [facility]  02/15/2022     No Growth to date. Any change in status will be called. BLOODCULT2  02/15/2022     No Growth to date. Any change in status will be called. Respiratory Culture:  Lab Results   Component Value Date    LABGRAM 2+ WBC's  No organisms seen   02/18/2022     AFB:No results found for: [facility]  Viral Culture:  Lab Results   Component Value Date    COVID19 Detected 01/07/2022     Urine Culture: No results for input(s): Tony Rich in the last 72 hours. IMAGING:    CT ABDOMEN PELVIS WO CONTRAST Additional Contrast? None   Final Result   1. Sigmoid colon diverticulosis. There is confluent inflammatory change   within the mesenteric fat surrounding a short segment of simple sigmoid colon   containing the diverticula. This could represent sequela of diverticulitis. 2.  2 cm ventral abdominal wall hernia containing inflamed fat. 3.  9 mm ground-glass nodule in the left lung base. Recommend follow-up with   a noncontrast chest CT at 6-12 months to confirm persistence then additional   noncontrast chest CTs every 2 years until 5 years. acidosis  ESRD on PD  DM with several complications  PD fluid cell counts very abnormal         Will need IV abx for now and at d.c hopefully able to do IV abx through PD exchanges      PD fluid cx in follow up pending will need to see improvement in cell counts before d/c planning AND Fluid cx have to be negative        Labs, Microbiology, Radiology and all the pertinent results from current hospitalization and  care every where were reviewed  by me as a part of the evaluation   Plan:   1. Cont IV Cefepime x 1 gm q 24 hrs  2. Cont IV  Flagyl  X 500 MG q 8 HRS  3. Trend PD fluid cell counts and culture  4. Blood cx in process ngtd  5. CT abd/pelvis results noted  6. Hope to save PD catheter will follow cell counts  7. THERE is IUD per CT results need to dw pt about removal to prevent infections given post menopausal age      Discussed with patient/Family and Nursing   Risk of Complications/Morbidity: High      · Illness(es)/ Infection present that pose threat to bodily function. · There is potential for severe exacerbation of infection/side effects of treatment. · Therapy requires intensive monitoring for antimicrobial agent toxicity. Discussed with patient/Family and Nursing staff     Thanks for allowing me to participate in your patient's care and please call me with any questions or concerns.     Martín Kirkland MD  Infectious Disease  [facility] ([facility]) Physician  Phone: [phone]   Fax : [phone]

## 2022-02-18 NOTE — PROGRESS NOTES
Office: [phone]       Fax: [phone]      Nephrology Progress Note        Patient's Name: Noble Garner  Date of Visit: 2/18/2022    Reason for Consult:  ESRD management      History of Present Illness:      Noble Garner is a 55 y.o. female with PMHx of hypertension, diabetes mellitus, ESRD who was admitted on 2/15/2022 with complaints of abdominal pain   PD fluid cloudy  history of PD peritonitis in past.  Does 2 exchanges with 2.5% day time and Icodextrin overnight    INTERVAL HISTORY    Feels better  Shortness of breath: No   mild abdominal pain        Medications:        Allergies:  Bactrim [sulfamethoxazole-trimethoprim], Doxazosin, Geocillin [carbenicillin], Lisinopril, Other, Spironolactone, Tramadol, Bumetanide, and Ibuprofen  Scheduled Meds:   insulin lispro  0-6 Units SubCUTAneous TID WC    insulin lispro  0-3 Units SubCUTAneous Nightly    sodium chloride flush  5-40 mL IntraVENous 2 times per day    heparin (porcine)  5,000 Units SubCUTAneous 3 times per day    pantoprazole  40 mg Oral Daily    metroNIDAZOLE  500 mg IntraVENous Q8H    cefepime  1,000 mg IntraVENous Q24H    custom dialysis builder   IntraPERitoneal Nightly    And    custom dialysis builder   IntraPERitoneal Nightly    calcium acetate  2 capsule Oral TID WC     Continuous Infusions:   dextrose      sodium chloride 25 mL (02/18/22 0837)         Objective:       Vitals:  /87   Pulse 92   Temp 98.3 °F (36.8 °C) (Oral)   Resp 18   Ht 5' 3\" (1.6 m)   Wt 194 lb 14.2 oz (88.4 kg)   SpO2 98%   BMI 34.52 kg/m²   Constitutional:  awake, NAD  Skin: no rash, turgor wnl  HEENT:  MMM, No icterus  Cardiovascular:  S1, S2 reg  Respiratory: CTA, no crackles  Abdomen:  +BS, soft, NT, ND  Ext: no lower extremity edema  Access: abd PD catheter in place, no drainage/redness around exit site. No tunnel tenderness  CNS: alert, no agitation    Data:     Labs:  Hepatic:   Recent Labs     02/15/22  1747   AST 14*   ALT 7*   BILITOT 0.6   ALKPHOS 117     BNP: No results for input(s): BNP in the last 72 hours. ABGs: No results for input(s): PHART, PO2ART, [id number] in the last 72 hours. IMAGING:  CT ABDOMEN PELVIS WO CONTRAST Additional Contrast? None   Final Result   1. Sigmoid colon diverticulosis. There is confluent inflammatory change   within the mesenteric fat surrounding a short segment of simple sigmoid colon   containing the diverticula. This could represent sequela of diverticulitis. 2.  2 cm ventral abdominal wall hernia containing inflamed fat. 3.  9 mm ground-glass nodule in the left lung base. Recommend follow-up with   a noncontrast chest CT at 6-12 months to confirm persistence then additional   noncontrast chest CTs every 2 years until 5 years. Assessment :       1. ESRD   Etiology: suspected DN  Access: Abd PD cath  Volume: Hypervolemic      Seen on PD    2. Electrolytes/Acid base  Hypokalemia    Recent Labs     02/18/22  0435   *   K 3.0*   CO2 27   MG 1.60*       3. BMD  -Hyperphosphatemia, SHPT  -maintained on Renvela    Lab Results   Component Value Date    CALCIUM 6.6 (L) 02/18/2022    PHOS 4.5 02/17/2022        4. HTN  -Blood pressure at goal        BP Readings from Last 1 Encounters:   02/18/22 137/87       5. Anemia  -anemia of CKD  -assess for CHRIS    Recent Labs     02/18/22  0435   HGB 9.6*     6. DM    7. PD peritonitis  -Cult: pseudomonas    PLAN :     - Antibiotics directed by culture  - replace K, Mg as needed  -maintenance PD tonight (do 3 exchanges with 2.5% Dianeal). Added heparin.   -Hold BP meds:   -MBD management  -Anemia management  -Dose meds to GFR<10    Thank you for allowing us to participate in care of Danae CarrTangolew Dunn . We will continue to follow. Feel free to contact me with any questions.       Jose Mehta MD  2/18/2022    [facility] [address]  Office : [phone]  Fax :[phone]

## 2022-02-18 NOTE — PROGRESS NOTES
Hospitalist Progress Note  2/18/2022 10:34 AM    PCP: No primary care provider on file. [phone]     Date of Admission: 2/15/2022                                                                                                                     HOSPITAL COURSE    Patient demographics:  The patient  Yinka Snyder is a 55 y.o. female     Significant past medical history:   Patient Active Problem List   Diagnosis    [facility]    Mixed hyperlipidemia    DMII (diabetes mellitus, type 2) ([address]    Vitreous hemorrhage of right eye ([address]    Essential hypertension    Acute kidney injury ([address]    ESRD (end stage renal disease) ([address]    Acute renal failure superimposed on chronic kidney disease ([address]    Volume overload    Peritonitis ([address]    Generalized abdominal pain    Peritoneal dialysis status ([address]    Morbid obesity due to excess calories ([address]    Nausea and vomiting    Hyponatremia    Diarrhea    Dizziness    Hypertensive emergency    Hypokalemia    Hypertensive urgency    Cerebrovascular accident (CVA) ([address]    Electrolyte imbalance    Acute respiratory failure with hypoxia (HCC)    Sepsis ([address]    Electrolyte abnormality    Diverticulitis         Presenting symptoms:  Nausea and vomiting, abdominal pain, fever and chills, generalized weakness    Diagnostic workup:      CONSULTS DURING ADMISSION :   IP CONSULT TO NEPHROLOGY  IP CONSULT TO NEPHROLOGY  IP CONSULT TO INFECTIOUS DISEASES      Patient was diagnosed with:        Treatment while inpatient:  Pt presented to [facility] with concern for new cloudy output from her peritoneal dialysis catheter                                                                                       ----------------------------------------------------------      SUBJECTIVE COMPLAINTS- follow up for Nausea and vomiting, abdominal pain,    Diet: ADULT DIET; Regular; 4 carb choices (60 gm/meal);  Low Phosphorus (Less than 1000 mg); 1800 ml      OBJECTIVE:   Patient Active Problem List   Diagnosis    Diabetic retinopathy ([facility]    Mixed hyperlipidemia    DMII (diabetes mellitus, type 2) ([facility]    Vitreous hemorrhage of right eye ([facility]    Essential hypertension    Acute kidney injury ([facility]    ESRD (end stage renal disease) ([facility]    Acute renal failure superimposed on chronic kidney disease (HCC)    Volume overload    Peritonitis ([facility])    Generalized abdominal pain    Peritoneal dialysis status ([facility]    Morbid obesity due to excess calories ([facility])    Nausea and vomiting    Hyponatremia    Diarrhea    Dizziness    Hypertensive emergency    Hypokalemia    Hypertensive urgency    Cerebrovascular accident (CVA) ([facility]    Electrolyte imbalance    Acute respiratory failure with hypoxia ([facility])    Sepsis ([facility])    Electrolyte abnormality    Diverticulitis       Allergies  Bactrim [sulfamethoxazole-trimethoprim], Doxazosin, Geocillin [carbenicillin], Lisinopril, Other, Spironolactone, Tramadol, Bumetanide, and Ibuprofen    Medications    Scheduled Meds:   potassium chloride  10 mEq IntraVENous Q1H    magnesium sulfate  2,000 mg IntraVENous Once    insulin lispro  0-6 Units SubCUTAneous TID     insulin lispro  0-3 Units SubCUTAneous Nightly    sodium chloride flush  5-40 mL IntraVENous 2 times per day    heparin (porcine)  5,000 Units SubCUTAneous 3 times per day    pantoprazole  40 mg Oral Daily    metroNIDAZOLE  500 mg IntraVENous Q8H    cefepime  1,000 mg IntraVENous Q24H    custom dialysis builder   IntraPERitoneal Nightly    And    custom dialysis builder   IntraPERitoneal Nightly    calcium acetate  2 capsule Oral TID      Continuous Infusions:   dextrose      sodium chloride 25 mL (02/18/22 0837)     PRN Meds:  diphenhydrAMINE, glucose, dextrose, glucagon (rDNA), dextrose, sodium chloride flush, sodium chloride, acetaminophen **OR** acetaminophen, ondansetron, diphenhydrAMINE    Vitals   Vitals /wt Patient Vitals for the past 8 hrs:   BP Temp Temp src Pulse Resp SpO2 Weight   02/18/22 0924 -- -- -- -- 18 91 % --   02/18/22 0818 137/87 98.3 °F (36.8 °C) Oral 92 18 98 % --   02/18/22 0630 -- -- -- -- -- -- 194 lb 14.2 oz (88.4 kg)   02/18/22 0347 130/76 98 °F (36.7 °C) Oral 88 16 94 % --        72HR INTAKE/OUTPUT:      Intake/Output Summary (Last 24 hours) at 2/18/2022 1034  Last data filed at 2/18/2022 1001  Gross per 24 hour   Intake 320 ml   Output 729 ml   Net -409 ml       Exam:    Gen:   Alert and oriented ×3    Eyes: PERRL. No sclera icterus. No conjunctival injection. ENT: No discharge. Pharynx clear. External appearance of ears and nose normal.  Neck: Trachea midline. No obvious mass. Resp: No accessory muscle use. No crackles. No wheezes. No rhonchi. CV: Regular rate. Regular rhythm. No murmur or rub. No edema. GI: Non-tender. Non-distended. No hernia. Skin: Warm, dry, normal texture and turgor. Lymph: No cervical LAD. No supraclavicular LAD. M/S: / Ext. No cyanosis. No clubbing. No joint deformity. Neuro: CN 2-12 are intact,  no neurologic deficits noted. PT/INR: No results for input(s): PROTIME, INR in the last 72 hours. APTT: No results for input(s): APTT in the last 72 hours. CBC:   Recent Labs     02/15/22  1747 02/16/22  0343 02/17/22  0810 02/18/22  0435   WBC 12.5* 14.4* 5.5 4.9   HGB 12.2 10.6* 9.1* 9.6*   HCT 36.0 31.7* 27.0* 28.9*   MCV 90.3 91.8 92.5 93.3    139 119* 128*       BMP:   Recent Labs     02/16/22  0343 02/16/22  2259 02/17/22  0810 02/18/22  0435   * 133* 130* 129*   K 2.5* 2.9* 3.2* 3.0*   CL 90* 90* 89* 89*   CO2 26 29 28 27   PHOS  --  6.4* 4.5  --    BUN 38* 42* 39* 38*   CREATININE 11.3* 10.8* 10.7* 10.0*       LIVER PROFILE:   Recent Labs     02/15/22  1747   ALKPHOS 117   AST 14*   ALT 7*   BILITOT 0.6     No results for input(s): AMYLASE in the last 72 hours.     Recent Labs     02/15/22  1747   LIPASE 16.0       UA:No results for input(s): NITRITE, LABCAST, WBCUA, RBCUA, MUCUS in the last 72 hours. TROPONIN: No results for input(s): Angie Anchors in the last 72 hours. Lab Results   Component Value Date/Time    URRFLXCULT Yes 08/04/2019 06:40 PM       No results for input(s): TSHREFLEX in the last 72 hours. No components found for: [id number]  POC GLUCOSE:    Recent Labs     02/17/22  0732 02/17/22  1122 02/17/22  1653 02/17/22  1930 02/18/22  0737   POCGLU 238* 119* 126* 105* 190*     No results for input(s): LABA1C in the last 72 hours. Lab Results   Component Value Date    LABA1C 8.1 01/07/2022         ASSESSMENT AND PLAN    Sepsis  Continue on cefepime 1 g daily 24 hours  Oral Flagyl  Trend on peritoneal dialysis fluid cell count  Blood cultures no growth to date  Fluid culture is positive for Pseudomonas  ID is following      Diverticulitis  IV antibiotics    Peritonitis  Patient on peritoneal dialysis    Electrolyte normality    ESRD  Nephrology is consulted        Code Status: Full Code        Dispo -cc        The patient and / or the family were informed of the results of any tests, a time was given to answer questions, a plan was proposed and they agreed with plan. Racquel Posey MD    This note was transcribed using [id number] [facility]. Please disregard any translational errors.

## 2022-02-19 NOTE — PLAN OF CARE
Problem: Falls - Risk of:  Goal: Will remain free from falls  Description: Will remain free from falls  2/19/2022 0900 by Svitlana Chand RN  Outcome: Ongoing     Problem: Falls - Risk of:  Goal: Absence of physical injury  Description: Absence of physical injury  2/19/2022 0900 by Svitlana Chand RN  Outcome: Ongoing     Problem: SAFETY  Goal: Free from accidental physical injury  2/19/2022 0900 by Svitlana Chadn RN  Outcome: Ongoing     Problem: SAFETY  Goal: Free from intentional harm  2/19/2022 0900 by Svitlana Chand RN  Outcome: Ongoing     Problem: DAILY CARE  Goal: Daily care needs are met  2/19/2022 0900 by Svitlana Chand RN  Outcome: Ongoing     Problem: PAIN  Goal: Patient's pain/discomfort is manageable  Outcome: Ongoing

## 2022-02-19 NOTE — PROGRESS NOTES
Patient still with complaints of neasuea even after dose of phenergan this morning. Zofran due again, zofran dose given. See MAR.     Electronically signed by Jona Scruggs RN on 2/19/2022 at 11:49 AM

## 2022-02-19 NOTE — PROGRESS NOTES
 Vitreous hemorrhage of right eye ([address] 8/15/2017       Past Surgical History:    Past Surgical History:   Procedure Laterality Date    APPENDECTOMY      KIDNEY STONE SURGERY      LAPAROSCOPY INSERTION PERITONEAL CATHETER N/A 8/12/2019    LAPAROSCOPIC PERITONEAL DIALYSIS CATHETER PLACEMENT WITH AXEL TROCHAR performed by Jeronimo Adame MD at [facility]         Current Medications:    Outpatient Medications Marked as Taking for the 2/15/22 encounter [facility] Encounter)   Medication Sig Dispense Refill    torsemide (DEMADEX) 100 MG tablet TAKE 4 TABLETS BY MOUTH ONCE NIGHTLY 360 tablet 5    amLODIPine (NORVASC) 10 MG tablet TAKE ONE TABLET BY MOUTH DAILY 30 tablet 3    losartan (COZAAR) 100 MG tablet Take 100 mg by mouth nightly      pantoprazole (PROTONIX) 40 MG tablet Take 1 tablet by mouth daily 90 tablet 1    insulin lispro (HUMALOG KWIKPEN) 100 UNIT/ML pen Inject 2-4 Units into the skin 3 times daily (before meals) Sliding scale          Allergies:  Bactrim [sulfamethoxazole-trimethoprim], Doxazosin, Geocillin [carbenicillin], Lisinopril, Other, Spironolactone, Tramadol, Bumetanide, and Ibuprofen    Immunizations : There is no immunization history on file for this patient.     Social History:     Social History     Tobacco Use    Smoking status: Never Smoker    Smokeless tobacco: Never Used   Vaping Use    Vaping Use: Never used   Substance Use Topics    Alcohol use: No    Drug use: No     Social History     Tobacco Use   Smoking Status Never Smoker   Smokeless Tobacco Never Used      Family History   Problem Relation Age of Onset    Heart Disease Mother         heart attack 2007    High Blood Pressure Mother     Diabetes Father     Heart Disease Father     Emphysema Father     Cancer Maternal Grandmother         cervical cancer    Lung Cancer Maternal Grandfather         black lung    Diabetes Paternal Grandmother     Heart Disease Paternal Grandfather       REVIEW OF SYSTEMS:    Constitutional:  negative for fevers, chills, night sweats  Eyes:  negative for blurred vision, eye discharge, visual disturbance   HEENT:  negative for hearing loss, ear drainage,nasal congestion  Respiratory:  negative for cough, shortness of breath or hemoptysis   Cardiovascular:  negative for chest pain, palpitations, syncope  Gastrointestinal:  negative for nausea, vomiting, diarrhea, constipation, abdominal pain++   Genitourinary:  negative for frequency, dysuria, urinary incontinence, hematuria  Hematologic/Lymphatic:  negative for easy bruising, bleeding and lymphadenopathy  Allergic/Immunologic:  negative for recurrent infections, angioedema, anaphylaxis   Endocrine:  negative for weight changes, polyuria, polydipsia and polyphagia  Musculoskeletal:  negative for joint  pain, swelling, decreased range of motion  Integumentary: No rashes, skin lesions  Neurological:  negative for headaches, slurred speech, unilateral weakness  Psychiatric: negative for hallucinations,confusion,agitation.                 PHYSICAL EXAM:      Vitals:    BP (!) 173/72   Pulse 86   Temp 97.9 °F (36.6 °C) (Oral)   Resp 16   Ht 5' 3\" (1.6 m)   Wt 194 lb 14.2 oz (88.4 kg)   SpO2 95%   BMI 34.52 kg/m²       General Appearance: alert,in no acute distress, +  pallor, no icterus chronic ill appearing woman +  Skin: warm and dry, no rash or erythema  Head: normocephalic and atraumatic  Eyes: pupils equal, round, and reactive to light, conjunctivae normal  ENT: tympanic membrane, external ear and ear canal normal bilaterally, nose without deformity, nasal mucosa and turbinates normal without polyps  Neck: supple and non-tender without mass, no thyromegaly  no cervical lymphadenopathy  Pulmonary/Chest: clear to auscultation bilaterally- no wheezes, rales or rhonchi, normal air movement, no respiratory distress  Cardiovascular: normal rate, regular rhythm, normal S1 and S2, no murmurs, rubs, clicks, or gallops, no carotid bruits  Abdomen: soft, ++ -tender, + -distended, normal bowel sounds, no masses or organomegaly  Extremities: no cyanosis, clubbing or edema  Musculoskeletal: normal range of motion, no joint swelling, deformity or tenderness  Integumentary: No rashes, no abnormal skin lesions, no petechiae  Neurologic: reflexes normal and symmetric, no cranial nerve deficit  Psych:  Orientation, sensorium, mood normal            Lines: PD catheter +        Data Review:    CBC:   Lab Results   Component Value Date    WBC 5.6 02/19/2022    HGB 10.1 (L) 02/19/2022    HCT 30.3 (L) 02/19/2022    MCV 93.1 02/19/2022     02/19/2022     RENAL:   Lab Results   Component Value Date    CREATININE 9.4 (HH) 02/19/2022    BUN 36 (H) 02/19/2022     (L) 02/19/2022    K 3.1 (L) 02/19/2022    CL 92 (L) 02/19/2022    CO2 25 02/19/2022     SED RATE: No results found for: SEDRATE  CK: No results found for: CKTOTAL  CRP: No results found for: CRP  Hepatic Function Panel:   Lab Results   Component Value Date    ALKPHOS 117 02/15/2022    ALT 7 02/15/2022    AST 14 02/15/2022    PROT 5.6 02/15/2022    BILITOT 0.6 02/15/2022    LABALBU 1.7 02/17/2022     UA:  Lab Results   Component Value Date    COLORU YELLOW 05/27/2021    CLARITYU Clear 05/27/2021    GLUCOSEU 250 05/27/2021    BILIRUBINUR Negative 05/27/2021    KETUA Negative 05/27/2021    SPECGRAV 1.016 05/27/2021    BLOODU TRACE 05/27/2021    PHUR 7.0 05/27/2021    PROTEINU 100 05/27/2021    UROBILINOGEN 0.2 05/27/2021    NITRU Negative 05/27/2021    LEUKOCYTESUR Negative 05/27/2021    LABMICR YES 05/27/2021    URINETYPE NotGiven 05/27/2021      Urine Microscopic:   Lab Results   Component Value Date    LABCAST 3-5 Hyaline 08/27/2014    BACTERIA RARE 05/27/2021    COMU see below 05/27/2021    HYALCAST 0 05/27/2021    WBCUA 3 05/27/2021    RBCUA 4 05/27/2021    EPIU 4 05/27/2021     Urine Reflex to Culture:   Lab Results   Component Value Date    URRFLXCULT Yes 08/04/2019    Results for Kina Ruiz (MRN [id number]) as of 2/17/2022 16:24    Ref. Range 2/16/2022 00:40 2/16/2022 17:25 2/17/2022 03:30   Source + CELL CT/DIFF-BF Unknown Peritoneal dial Peritoneal Fl. Peritoneal dial   Appearance, Fluid Unknown Hazy Turbid Cloudy   Color, Fluid Unknown Yellow Yellow Yellow   Eos, Fluid Latest Units: % 1       RBC, Fluid Latest Units: /cumm 1,000 5,000 <2,000   Lymphocytes, Body Fluid Latest Units: %   8 4   Monocyte Count, Fluid Latest Units: % 25   15   Neutrophil Count, Fluid Latest Units: % 54 86 76   Nucl Cell, Fluid Latest Units: /cumm 18,185 >50 2,161   Mesothelial, Fluid Latest Units: % 16 1 1   Clot Eval. Unknown see below see below see below   Number of Cells Counted Fluid Unknown 100 100 100        MICRO: cultures reviewed and updated by me   Blood Culture:   Lab Results   Component Value Date    [facility]  02/15/2022     No Growth to date. Any change in status will be called. BLOODCULT2  02/15/2022     No Growth to date. Any change in status will be called. Respiratory Culture:  Lab Results   Component Value Date    LABGRAM 2+ WBC's  No organisms seen   02/18/2022     AFB:No results found for: [facility]  Viral Culture:  Lab Results   Component Value Date    COVID19 Detected 01/07/2022     Urine Culture: No results for input(s): Afsaneh Clement in the last 72 hours. IMAGING:    CT ABDOMEN PELVIS WO CONTRAST Additional Contrast? None   Final Result   1. Sigmoid colon diverticulosis. There is confluent inflammatory change   within the mesenteric fat surrounding a short segment of simple sigmoid colon   containing the diverticula. This could represent sequela of diverticulitis. 2.  2 cm ventral abdominal wall hernia containing inflamed fat. 3.  9 mm ground-glass nodule in the left lung base. Recommend follow-up with   a noncontrast chest CT at 6-12 months to confirm persistence then additional   noncontrast chest CTs every 2 years until 5 years. All the pertinent images and reports for the current Hospitalization were reviewed by me     Scheduled Meds:   ciprofloxacin  200 mg IntraVENous Q24H    [START ON 2/20/2022] sodium chloride (PF)  10 mL IntraVENous Daily    And    [START ON 2/20/2022] pantoprazole  40 mg IntraVENous Daily    insulin lispro  0-6 Units SubCUTAneous TID WC    insulin lispro  0-3 Units SubCUTAneous Nightly    sodium chloride flush  5-40 mL IntraVENous 2 times per day    heparin (porcine)  5,000 Units SubCUTAneous 3 times per day    metroNIDAZOLE  500 mg IntraVENous Q8H    cefepime  1,000 mg IntraVENous Q24H    custom dialysis builder   IntraPERitoneal Nightly    And    custom dialysis builder   IntraPERitoneal Nightly    calcium acetate  2 capsule Oral TID        Continuous Infusions:   dextrose      sodium chloride 25 mL (02/18/22 2052)       PRN Meds:  promethazine, diphenhydrAMINE, glucose, dextrose, glucagon (rDNA), dextrose, sodium chloride flush, sodium chloride, acetaminophen **OR** acetaminophen, ondansetron, diphenhydrAMINE      Assessment:     Patient Active Problem List   Diagnosis    Diabetic retinopathy ([address]    Mixed hyperlipidemia    DMII (diabetes mellitus, type 2) ([address]    Vitreous hemorrhage of right eye ([address]    Essential hypertension    Acute kidney injury ([address]    ESRD (end stage renal disease) ([address]    Acute renal failure superimposed on chronic kidney disease ([address]    Volume overload    Peritonitis ([address]    Generalized abdominal pain    Peritoneal dialysis status ([address]    Morbid obesity due to excess calories ([address]    Nausea and vomiting    Hyponatremia    Diarrhea    Dizziness    Hypertensive emergency    Hypokalemia    Hypertensive urgency    Cerebrovascular accident (CVA) ([address]    Electrolyte imbalance    Acute respiratory failure with hypoxia ([address]    Sepsis ([address]    Electrolyte abnormality    Diverticulitis          Sepsis on admit  PD peritonitis  Diverticulitis  Pseudomonas on PD fluid cx  WBC elevation   Lactic acidosis  ESRD on PD  DM with several complications  PD fluid cell counts very abnormal         Will need IV abx for now and at d.c hopefully able to do IV abx through PD exchanges      PD fluid cx in follow up pending will need to see improvement in cell counts before d/c planning AND Fluid cx have to be negative        Labs, Microbiology, Radiology and all the pertinent results from current hospitalization and  care every where were reviewed  by me as a part of the evaluation   Plan:   1. Cont IV Cefepime x 1 gm q 24 hrs  2. Cont IV  Flagyl  X 500 MG q 8 HRS  3. Trend PD fluid cell counts and culture  4. Blood cx in process ngtd  5. CT abd/pelvis results noted  6. Hope to save PD catheter will follow cell counts  7. THERE is IUD per CT results need to dw pt about removal to prevent infections given post menopausal age   6. Change to IV Cipro x 200 mg daily for additional coverage GIVEN Pseudomonas      Discussed with patient/Family and Nursing   Risk of Complications/Morbidity: High      · Illness(es)/ Infection present that pose threat to bodily function. · There is potential for severe exacerbation of infection/side effects of treatment. · Therapy requires intensive monitoring for antimicrobial agent toxicity. Discussed with patient/Family and Nursing staff     Thanks for allowing me to participate in your patient's care and please call me with any questions or concerns.     Santiago Segovia MD  Infectious Disease  [facility]) Physician  Phone: [phone]   Fax : [phone]

## 2022-02-19 NOTE — PROGRESS NOTES
Hospitalist Progress Note  2/19/2022 8:57 AM    PCP: No primary care provider on file. [phone]     Date of Admission: 2/15/2022                                                                                                                     HOSPITAL COURSE    Patient demographics:  The patient  Guillermo Joel is a 55 y.o. female     Significant past medical history:   Patient Active Problem List   Diagnosis    [facility]    Mixed hyperlipidemia    DMII (diabetes mellitus, type 2) ([facility]    Vitreous hemorrhage of right eye ([address]    Essential hypertension    Acute kidney injury ([address]    ESRD (end stage renal disease) ([address]    Acute renal failure superimposed on chronic kidney disease ([address]    Volume overload    Peritonitis ([address]    Generalized abdominal pain    Peritoneal dialysis status ([facility]    Morbid obesity due to excess calories ([address]    Nausea and vomiting    Hyponatremia    Diarrhea    Dizziness    Hypertensive emergency    Hypokalemia    Hypertensive urgency    Cerebrovascular accident (CVA) ([address]    Electrolyte imbalance    Acute respiratory failure with hypoxia (HCC)    Sepsis ([address]    Electrolyte abnormality    Diverticulitis         Presenting symptoms:  Nausea and vomiting, abdominal pain, fever and chills, generalized weakness    Diagnostic workup:      CONSULTS DURING ADMISSION :   IP CONSULT TO NEPHROLOGY  IP CONSULT TO NEPHROLOGY  IP CONSULT TO INFECTIOUS DISEASES      Patient was diagnosed with:  Acute peritonitis  Diverticulitis    Treatment while inpatient:  Pt [facility] with concern for new cloudy output from her peritoneal dialysis catheter                                                                                       ----------------------------------------------------------      SUBJECTIVE COMPLAINTS- follow up for Nausea and vomiting, abdominal pain,    Diet: ADULT DIET; Regular; 4 carb choices (60 gm/meal);  Low Phosphorus (Less than 1000 mg); 1800 ml      OBJECTIVE:   Patient Active Problem List   Diagnosis    Diabetic retinopathy ([facility]    Mixed hyperlipidemia    DMII (diabetes mellitus, type 2) [facility]    [facility]re[facility] hemorrhage of right eye ([facility]    Essential hypertension    Acute kidney injury ([facility]    ESRD (end stage renal disease) ([facility]    Acute renal failure superimposed on chronic kidney disease (HCC)    Volume overload    Peritonitis (HCC)    Generalized abdominal pain    Peritoneal dialysis status ([facility]    Morbid obesity due to excess calories ([facility])    Nausea and vomiting    Hyponatremia    Diarrhea    Dizziness    Hypertensive emergency    Hypokalemia    Hypertensive urgency    Cerebrovascular accident (CVA) ([facility]    Electrolyte imbalance    Acute respiratory failure with hypoxia ([facility])    Sepsis ([facility])    Electrolyte abnormality    Diverticulitis       Allergies  Bactrim [sulfamethoxazole-trimethoprim], Doxazosin, Geocillin [carbenicillin], Lisinopril, Other, Spironolactone, Tramadol, Bumetanide, and Ibuprofen    Medications    Scheduled Meds:   ciprofloxacin  200 mg IntraVENous Q24H    [START ON 2/20/2022] sodium chloride (PF)  10 mL IntraVENous Daily    And    [START ON 2/20/2022] pantoprazole  40 mg IntraVENous Daily    insulin lispro  0-6 Units SubCUTAneous TID WC    insulin lispro  0-3 Units SubCUTAneous Nightly    sodium chloride flush  5-40 mL IntraVENous 2 times per day    heparin (porcine)  5,000 Units SubCUTAneous 3 times per day    metroNIDAZOLE  500 mg IntraVENous Q8H    cefepime  1,000 mg IntraVENous Q24H    custom dialysis builder   IntraPERitoneal Nightly    And    custom dialysis builder   IntraPERitoneal Nightly    calcium acetate  2 capsule Oral TID WC     Continuous Infusions:   dextrose      sodium chloride 25 mL (02/18/22 2052)     PRN Meds:  promethazine, diphenhydrAMINE, glucose, dextrose, glucagon (rDNA), dextrose, sodium chloride flush, sodium chloride, acetaminophen **OR** acetaminophen, ondansetron, diphenhydrAMINE    Vitals   Vitals /wt   Patient Vitals for the past 8 hrs:   BP Temp Temp src Pulse Resp SpO2   02/19/22 0738 (!) 144/68 98.2 °F (36.8 °C) Oral 83 16 95 %   02/19/22 0600 -- -- -- 87 -- --   02/19/22 0444 (!) 142/83 97.4 °F (36.3 °C) Oral 90 18 90 %   02/19/22 0400 -- -- -- 107 -- --   02/19/22 0200 -- -- -- 86 -- --        72HR INTAKE/OUTPUT:      Intake/Output Summary (Last 24 hours) at 2/19/2022 0857  Last data filed at 2/19/2022 0640  Gross per 24 hour   Intake 885.27 ml   Output 400 ml   Net 485.27 ml       Exam:    Gen:   Alert and oriented ×3    Eyes: PERRL. No sclera icterus. No conjunctival injection. ENT: No discharge. Pharynx clear. External appearance of ears and nose normal.  Neck: Trachea midline. No obvious mass. Resp: No accessory muscle use. No crackles. No wheezes. No rhonchi. CV: Regular rate. Regular rhythm. No murmur or rub. No edema. GI: Non-tender. Non-distended. No hernia. Skin: Warm, dry, normal texture and turgor. Lymph: No cervical LAD. No supraclavicular LAD. M/S: / Ext. No cyanosis. No clubbing. No joint deformity. Neuro: CN 2-12 are intact,  no neurologic deficits noted. PT/INR: No results for input(s): PROTIME, INR in the last 72 hours. APTT: No results for input(s): APTT in the last 72 hours. CBC:   Recent Labs     02/17/22  0810 02/18/22  0435 02/19/22  0437   WBC 5.5 4.9 5.6   HGB 9.1* 9.6* 10.1*   HCT 27.0* 28.9* 30.3*   MCV 92.5 93.3 93.1   * 128* 139       BMP:   Recent Labs     02/16/22  2259 02/17/22  0810 02/18/22  0435 02/19/22  0437   * 130* 129* 133*   K 2.9* 3.2* 3.0* 3.1*   CL 90* 89* 89* 92*   CO2 29 28 27 25   PHOS 6.4* 4.5  --   --    BUN 42* 39* 38* 36*   CREATININE 10.8* 10.7* 10.0* 9.4*       LIVER PROFILE:   No results for input(s): ALKPHOS, AST, ALT, ALB, BILIDIR, BILITOT, ALKPHOS in the last 72 hours. No results for input(s): AMYLASE in the last 72 hours.     No results for input(s): LIPASE in the last 72 hours. UA:No results for input(s): NITRITE, LABCAST, WBCUA, RBCUA, MUCUS in the last 72 hours. TROPONIN: No results for input(s): Cheikh Min in the last 72 hours. Lab Results   Component Value Date/Time    URRFLXCULT Yes 08/04/2019 06:40 PM       No results for input(s): TSHREFLEX in the last 72 hours. No components found for: [id number]  POC GLUCOSE:    Recent Labs     02/18/22  0737 02/18/22  1112 02/18/22  1702 02/18/22 2016 02/19/22  0734   POCGLU 190* 153* 140* 123* 268*     No results for input(s): LABA1C in the last 72 hours. Lab Results   Component Value Date    LABA1C 8.1 01/07/2022         ASSESSMENT AND PLAN    Sepsis  Continue on cefepime 1 g daily 24 hours  Oral Flagyl  Trend on peritoneal dialysis fluid cell count  Blood cultures no growth to date  Body fluid culture is positive for Pseudomonas  Continue on cefepime  Infectious diseases following    Diverticulitis  IV antibiotics    Peritonitis  Patient on peritoneal dialysis    Electrolyte normality    ESRD  Nephrology is consulted        Code Status: Full Code        Dispo -cc        The patient and / or the family were informed of the results of any tests, a time was given to answer questions, a plan was proposed and they agreed with plan. Angie Berry MD    This note was transcribed using [id number]. Please disregard any translational errors.

## 2022-02-19 NOTE — PROGRESS NOTES
Office: [phone]       Fax: [phone]      Nephrology Progress Note        Patient's Name: Yenny Swartz  Date of Visit: 2/19/2022    Reason for Consult:  ESRD management      History of Present Illness:      Yenny Swartz is a 55 y.o. female with PMHx of hypertension, diabetes mellitus, ESRD who was admitted on 2/15/2022 with complaints of abdominal pain   PD fluid cloudy  history of PD peritonitis in past.  Does 2 exchanges with 2.5% day time and Icodextrin overnight    INTERVAL HISTORY      Seen on PD   No acute issue while on PD    C/o nausea +      Feels better  Shortness of breath: No   mild abdominal pain        Medications:        Allergies:  Bactrim [sulfamethoxazole-trimethoprim], Doxazosin, Geocillin [carbenicillin], Lisinopril, Other, Spironolactone, Tramadol, Bumetanide, and Ibuprofen  Scheduled Meds:   ciprofloxacin  200 mg IntraVENous Q24H    [START ON 2/20/2022] sodium chloride (PF)  10 mL IntraVENous Daily    And    [START ON 2/20/2022] pantoprazole  40 mg IntraVENous Daily    insulin lispro  0-6 Units SubCUTAneous TID WC    insulin lispro  0-3 Units SubCUTAneous Nightly    sodium chloride flush  5-40 mL IntraVENous 2 times per day    heparin (porcine)  5,000 Units SubCUTAneous 3 times per day    metroNIDAZOLE  500 mg IntraVENous Q8H    cefepime  1,000 mg IntraVENous Q24H    custom dialysis builder   IntraPERitoneal Nightly    And    custom dialysis builder   IntraPERitoneal Nightly    calcium acetate  2 capsule Oral TID WC     Continuous Infusions:   dextrose      sodium chloride 25 mL (02/18/22 2052)         Objective:       Vitals:  BP (!) 147/78   Pulse 87   Temp 98 °F (36.7 °C) (Oral)   Resp 16   Ht 5' 3\" (1.6 m)   Wt 194 lb 14.2 oz (88.4 kg)   SpO2 90%   BMI 34.52 kg/m²   Constitutional:  awake, NAD  Skin: no rash, turgor wnl  HEENT:  MMM, No icterus  Cardiovascular:  S1, S2 reg  Respiratory: CTA, no crackles  Abdomen:  +BS, soft, NT, ND  Ext: no lower extremity edema  Access: abd PD catheter in place, no drainage/redness around exit site. No tunnel tenderness  CNS: alert, no agitation    Data:     Labs:  Hepatic:   No results for input(s): AST, ALT, ALB, BILITOT, ALKPHOS in the last 72 hours. BNP: No results for input(s): BNP in the last 72 hours. ABGs: No results for input(s): PHART, PO2ART, LGO1GAM in the last 72 hours. IMAGING:  CT ABDOMEN PELVIS WO CONTRAST Additional Contrast? None   Final Result   1. Sigmoid colon diverticulosis. There is confluent inflammatory change   within the mesenteric fat surrounding a short segment of simple sigmoid colon   containing the diverticula. This could represent sequela of diverticulitis. 2.  2 cm ventral abdominal wall hernia containing inflamed fat. 3.  9 mm ground-glass nodule in the left lung base. Recommend follow-up with   a noncontrast chest CT at 6-12 months to confirm persistence then additional   noncontrast chest CTs every 2 years until 5 years. Assessment :       1. ESRD   Etiology: suspected DN  Access: Abd PD cath  Volume: Hypervolemic      Seen on PD    2. Electrolytes/Acid base  Hypokalemia    Recent Labs     02/19/22  0437   *   K 3.1*   CO2 25   MG 2.00       3. BMD  -Hyperphosphatemia, SHPT  -maintained on Renvela    Lab Results   Component Value Date    CALCIUM 7.1 (L) 02/19/2022    PHOS 4.5 02/17/2022        4. HTN  -Blood pressure at goal        BP Readings from Last 1 Encounters:   02/19/22 (!) 147/78       5. Anemia  -anemia of CKD  -assess for CHRIS    Recent Labs     02/19/22  0437   HGB 10.1*     6.  DM    7. PD peritonitis  -Cult: pseudomonas    PLAN :       - continue PD as per current prescription   - Antibiotics directed by culture  - replace K, Mg as needed  -maintenance PD tonight (do 3 exchanges with 2.5% Dianeal). Added heparin.   -Hold BP meds:   -MBD management  -Anemia management  -Dose meds to GFR<10    Thank you for allowing us to participate in care of Danae CarrMercateo . We will continue to follow. Feel free to contact me with any questions.       Robby Chisholm MD  2/19/2022    [facility] [address] [place] [address] S  Office : [phone]  Fax :[phone]

## 2022-02-19 NOTE — PLAN OF CARE
Problem: Falls - Risk of:  Goal: Will remain free from falls  Description: Will remain free from falls  2/19/2022 0017 by Shelly Melendez RN  Outcome: Ongoing    Note: Pt has made no attempts to get out of bed on her own. Call light within reach. Goal: Absence of physical injury  Description: Absence of physical injury  2/19/2022 0017 by Shelly Melendez RN  Outcome: Ongoing    Note: No signs of physical injury. Problem: SAFETY  Goal: Free from accidental physical injury  2/19/2022 0017 by Shelly Melendez RN  Outcome: Ongoing    Note: Patient assessed for fall risk; fall precautions initiated. Patient and family instructed about safety devices. Environment kept free of clutter and adequate lighting provided. Bed locked and in lowest position. Call light within reach. Problem: DAILY CARE  Goal: Daily care needs are met  2/19/2022 0017 by Shelly Melendez RN  Outcome: Ongoing  2  Note: Patient's ability assessed to perform self care and independent activity encouraged according to that ability. Assisted with ADL's as needed. Risk for skin breakdown assessed. Potential discharge needs assessed. Patient and family included in daily care decisions. Problem: SKIN INTEGRITY  Goal: Skin integrity is maintained or improved  2/19/2022 0017 by Shelly Melendez RN  Outcome: Ongoing    Note: Pt is able to change position independently. Skin assessment completed every shift. Pt assessed for incontinence, appropriate barrier cream applied prn. Pt encouraged to turn/rotate every 2 hours. Assistance provided if pt unable to do so themselves. Problem: KNOWLEDGE DEFICIT  Goal: Patient/S.O. demonstrates understanding of disease process, treatment plan, medications, and discharge instructions.   2/19/2022 0017 by Shelly Melendez RN  Outcome: Ongoing         Problem: DISCHARGE BARRIERS  Goal: Patient's continuum of care needs are met  2/19/2022 0017 by Shelly Melendez RN  Outcome: Ongoing    Note: Prior to admission pt was living at home. Plan is for pt to return home. No discharge plans in place. Pt continues to receive IV antibiotics and she has needed replacement for her potassium and magnesium. Problem: Discharge Planning:  Goal: Discharged to appropriate level of care  Description: Discharged to appropriate level of care  2/19/2022 0017 by Lian Mckinney RN  Outcome: Ongoing              Problem: Infection, Septic Shock:  Goal: Will show no infection signs and symptoms  Description: Will show no infection signs and symptoms  2/19/2022 0017 by Lian Mckinney RN  Outcome: Ongoing    Note: Pt continues to receive IV antibiotics. Problem: Tissue Perfusion, Altered:  Goal: Circulatory function within specified parameters  Description: Circulatory function within specified parameters  2/19/2022 0017 by Lian Mckinney RN  Outcome: Ongoing    Note: Skin is warm and dry. Palpable pulses. Problem: Venous Thromboembolism:  Goal: Will show no signs or symptoms of venous thromboembolism  Description: Will show no signs or symptoms of venous thromboembolism  2/19/2022 0017 by Lian Mckinney RN  Outcome: Ongoing    Note: Pt is receiving Heparin SQ as a prophylaxis. Goal: Absence of signs or symptoms of impaired coagulation  Description: Absence of signs or symptoms of impaired coagulation  2/19/2022 0017 by Lian Mckinney RN  Outcome: Ongoing         Problem: Skin Integrity:  Goal: Will show no infection signs and symptoms  Description: Will show no infection signs and symptoms  2/19/2022 0017 by Lian Mckinney RN  Outcome: Ongoing  Note: Skin assessment completed every shift. Pt assessed for incontinence, appropriate barrier cream applied prn. Pt encouraged to turn/rotate every 2 hours. Assistance provided if pt unable to do so themselves.      Goal: Absence of new skin breakdown  Description: Absence of new skin breakdown  2/19/2022 0017 by Lian Mckinney RN  Outcome: Ongoing    Note: No signs of new skin breakdown. Problem: Pain:  Goal: Pain level will decrease  Description: Pain level will decrease  Note: Pt has had no complaints of pain.

## 2022-02-19 NOTE — PROGRESS NOTES
Did not have a good night. Was up to the bathroom with diarrhea, got nauseated with activity and had 2 episodes of green liquid emesis. This morning sat up to take her protonix with a sip of water and proceeded to vomit large amount of green liquid emesis. zofran  Seemed to not be effective. Is not able to keep anything down.

## 2022-02-20 NOTE — PROGRESS NOTES
Pt has had several episodes of green liquid emesis. She has had several episodes of vomit. Has had nothing to eat or drink. Did not seem get relief from the zofran or the phenergan. Her potassium level was 2.7 and was ordered to receive IV replacement, which has begun. Has had very little sleep, denies pain.

## 2022-02-20 NOTE — PROGRESS NOTES
Patient with run of ST from 1205 to 1226. Patient came out of high heart rate. Patient then coughed at [id number] and had 12 beats of vtach and went back into heart rate up to 160s sustaining. Electronically signed by Chelsie Rosenbaum RN on 2/20/2022 at 12:31 PM        5mg lopressor given patient's heat rate lowered to 84 NSR.     Electronically signed by Chelsie Rosenbaum RN on 2/20/2022 at 12:47 PM

## 2022-02-20 NOTE — PLAN OF CARE
Problem: Falls - Risk of:  Goal: Will remain free from falls  Description: Will remain free from falls  Outcome: Ongoing  Goal: Absence of physical injury  Description: Absence of physical injury  Outcome: Ongoing   Fall risk assessment completed every shift. All precautions in place. Pt has call light within reach at all times. Room clear of clutter. Pt aware to call for assistance when getting up. Problem: SAFETY  Goal: Free from accidental physical injury  Outcome: Ongoing  Goal: Free from intentional harm  Outcome: Ongoing   Patient assessed for fall risk; fall precautions initiated. Patient and family instructed about safety devices. Environment kept free of clutter and adequate lighting provided. Bed locked and in lowest position. Call light within reach. Problem: DAILY CARE  Goal: Daily care needs are met  Outcome: Ongoing     Problem: PAIN  Goal: Patient's pain/discomfort is manageable  Outcome: Ongoing     Problem: SKIN INTEGRITY  Goal: Skin integrity is maintained or improved  Outcome: Ongoing     Problem: KNOWLEDGE DEFICIT  Goal: Patient/S.O. demonstrates understanding of disease process, treatment plan, medications, and discharge instructions.   Outcome: Ongoing     Problem: DISCHARGE BARRIERS  Goal: Patient's continuum of care needs are met  Outcome: Ongoing     Problem: Discharge Planning:  Goal: Discharged to appropriate level of care  Description: Discharged to appropriate level of care  Outcome: Ongoing          Problem: Infection, Septic Shock:  Goal: Will show no infection signs and symptoms  Description: Will show no infection signs and symptoms  Outcome: Ongoing     Problem: Serum Glucose Level - Abnormal:  Goal: Ability to maintain appropriate glucose levels will improve  Description: Ability to maintain appropriate glucose levels will improve  Outcome: Ongoing     Problem: Tissue Perfusion, Altered:  Goal: Circulatory function within specified parameters  Description: Circulatory

## 2022-02-20 NOTE — PROGRESS NOTES
Hospitalist Progress Note  2/20/2022 8:49 AM    PCP: No primary care provider on file. [phone]     Date of Admission: 2/15/2022                                                                                                                     HOSPITAL COURSE    Patient demographics:  The patient  Onel Cartagena is a 55 y.o. female     Significant past medical history:   Patient Active Problem List   Diagnosis    [facility]    Mixed hyperlipidemia    DMII (diabetes mellitus, type 2) ([address]    Vitreous hemorrhage of right eye ([address]    Essential hypertension    Acute kidney injury ([address]    ESRD (end stage renal disease) ([address]    Acute renal failure superimposed on chronic kidney disease ([address]    Volume overload    Peritonitis ([address]    Generalized abdominal pain    Peritoneal dialysis status ([address]    Morbid obesity due to excess calories ([address]    Nausea and vomiting    Hyponatremia    Diarrhea    Dizziness    Hypertensive emergency    Hypokalemia    Hypertensive urgency    Cerebrovascular accident (CVA) ([address]    Electrolyte imbalance    Acute respiratory failure with hypoxia (HCC)    Sepsis ([address]    Electrolyte abnormality    Diverticulitis         Presenting symptoms:  Nausea and vomiting, abdominal pain, fever and chills, generalized weakness    Diagnostic workup:      CONSULTS DURING ADMISSION :   IP CONSULT TO NEPHROLOGY  IP CONSULT TO NEPHROLOGY  IP CONSULT TO INFECTIOUS DISEASES      Patient was diagnosed with:  Acute peritonitis  Diverticulitis    Treatment while inpatient:  [facility] for new cloudy output from her peritoneal dialysis catheter                                                                                       ----------------------------------------------------------      SUBJECTIVE COMPLAINTS- follow up for Nausea and vomiting, abdominal pain,    Diet: ADULT DIET; Regular; 4 carb choices (60 gm/meal);  Low Phosphorus (Less than 1000 mg); 1800 ml      OBJECTIVE:   Patient Active Problem List   Diagnosis    [facility]opathy ([facility]    Mixed hyperlipidemia    DMII (diabetes mellitus, type 2) ([facility]    Vitreous hemorrhage of right eye ([facility]    Essential hypertension    Acute kidney injury ([facility]    ESRD (end stage renal disease) ([facility]    Acute renal failure superimposed on chronic kidney disease (HCC)    Volume overload    Peritonitis ([facility]    Generalized abdominal pain    Peritoneal dialysis status ([facility]    Morbid obesity due to excess calories ([facility])    Nausea and vomiting    Hyponatremia    Diarrhea    Dizziness    Hypertensive emergency    Hypokalemia    Hypertensive urgency    Cerebrovascular accident (CVA) ([facility]    Electrolyte imbalance    Acute respiratory failure with hypoxia ([facility])    Sepsis ([facility])    Electrolyte abnormality    Diverticulitis       Allergies  Bactrim [sulfamethoxazole-trimethoprim], Doxazosin, Geocillin [carbenicillin], Lisinopril, Other, Spironolactone, Tramadol, Bumetanide, and Ibuprofen    Medications    Scheduled Meds:   ciprofloxacin  200 mg IntraVENous Q24H    sodium chloride (PF)  10 mL IntraVENous Daily    And    pantoprazole  40 mg IntraVENous Daily    insulin lispro  0-6 Units SubCUTAneous TID     insulin lispro  0-3 Units SubCUTAneous Nightly    sodium chloride flush  5-40 mL IntraVENous 2 times per day    heparin (porcine)  5,000 Units SubCUTAneous 3 times per day    metroNIDAZOLE  500 mg IntraVENous Q8H    cefepime  1,000 mg IntraVENous Q24H    custom dialysis builder   IntraPERitoneal Nightly    And    custom dialysis builder   IntraPERitoneal Nightly    calcium acetate  2 capsule Oral TID WC     Continuous Infusions:   dextrose      sodium chloride 5 mL/hr at 02/20/22 0516     PRN Meds:  promethazine, diphenhydrAMINE, glucose, dextrose, glucagon (rDNA), dextrose, sodium chloride flush, sodium chloride, acetaminophen **OR** acetaminophen, ondansetron, diphenhydrAMINE    Vitals   Vitals /wt   Patient Vitals for the past 8 hrs:   BP Temp Temp src Pulse Resp SpO2 Weight   02/20/22 0742 (!) 151/83 97.9 °F (36.6 °C) Oral 84 18 91 % --   02/20/22 0600 -- -- -- -- -- -- 196 lb 6.9 oz (89.1 kg)   02/20/22 0515 -- -- -- 89 -- -- --   02/20/22 0319 (!) 148/89 97.8 °F (36.6 °C) Oral 93 16 96 % --   02/20/22 0200 -- -- -- 79 -- -- --        72HR INTAKE/OUTPUT:      Intake/Output Summary (Last 24 hours) at 2/20/2022 0849  Last data filed at 2/20/2022 0198  Gross per 24 hour   Intake 2113.83 ml   Output 497 ml   Net 1616.83 ml       Exam:    Gen:   Alert and oriented ×3    Eyes: PERRL. No sclera icterus. No conjunctival injection. ENT: No discharge. Pharynx clear. External appearance of ears and nose normal.  Neck: Trachea midline. No obvious mass. Resp: No accessory muscle use. No crackles. No wheezes. No rhonchi. CV: Regular rate. Regular rhythm. No murmur or rub. No edema. GI: Non-tender. Non-distended. No hernia. Skin: Warm, dry, normal texture and turgor. Lymph: No cervical LAD. No supraclavicular LAD. M/S: / Ext. No cyanosis. No clubbing. No joint deformity. Neuro: CN 2-12 are intact,  no neurologic deficits noted. PT/INR: No results for input(s): PROTIME, INR in the last 72 hours. APTT: No results for input(s): APTT in the last 72 hours. CBC:   Recent Labs     02/18/22  0435 02/19/22  0437 02/20/22  0352   WBC 4.9 5.6 5.2   HGB 9.6* 10.1* 10.2*   HCT 28.9* 30.3* 30.9*   MCV 93.3 93.1 92.8   * 139 143       BMP:   Recent Labs     02/18/22  0435 02/19/22  0437 02/20/22  0352   * 133* 133*   K 3.0* 3.1* 2.7*   CL 89* 92* 91*   CO2 27 25 23   BUN 38* 36* 30*   CREATININE 10.0* 9.4* 9.4*       LIVER PROFILE:   No results for input(s): ALKPHOS, AST, ALT, ALB, BILIDIR, BILITOT, ALKPHOS in the last 72 hours. No results for input(s): AMYLASE in the last 72 hours. No results for input(s): LIPASE in the last 72 hours.     UA:No results for input(s): NITRITE, LABCAST, WBCUA, RBCUA, MUCUS in the last 72 hours. TROPONIN: No results for input(s): Ethelene Soulier in the last 72 hours. Lab Results   Component Value Date/Time    URRFLXCULT Yes 08/04/2019 06:40 PM       No results for input(s): TSHREFLEX in the last 72 hours. No components found for: [id number]  POC GLUCOSE:    Recent Labs     02/19/22  0734 02/19/22  1114 02/19/22  1609 02/19/22  2038 02/20/22  0726   POCGLU 268* 173* 117* 93 202*     No results for input(s): LABA1C in the last 72 hours. Lab Results   Component Value Date    LABA1C 8.1 01/07/2022         ASSESSMENT AND PLAN    Sepsis  Continue on cefepime 1 g daily 24 hours    Trend on peritoneal dialysis fluid cell count  Blood cultures no growth to date  Body fluid culture is positive for Pseudomonas  Continue on cefepime  Infectious diseases following    Diverticulitis  Hold Flagyl for intractable nausea  IV antibiotics    Peritonitis  Patient on peritoneal dialysis    Electrolyte normality    ESRD  Nephrology is consulted        Code Status: Full Code        Dispo -cc        The patient and / or the family were informed of the results of any tests, a time was given to answer questions, a plan was proposed and they agreed with plan. Tyree Salgado MD    This note was transcribed using [id number]. Please disregard any translational errors.

## 2022-02-20 NOTE — PROGRESS NOTES
Office: [phone]       Fax: [phone]      Nephrology Progress Note        Patient's Name: Shala Mcdonald  Date of Visit: 2/20/2022    Reason for Consult:  ESRD management      History of Present Illness:      Shala Mcdonald is a 55 y.o. female with PMHx of hypertension, diabetes mellitus, ESRD who was admitted on 2/15/2022 with complaints of abdominal pain   PD fluid cloudy  history of PD peritonitis in past.  Does 2 exchanges with 2.5% day time and Icodextrin overnight    INTERVAL HISTORY    C/o nausea and vomiting +  Seen on PD   No acute issue while on PD        Feels better  Shortness of breath: No   mild abdominal pain        Medications:        Allergies:  Bactrim [sulfamethoxazole-trimethoprim], Doxazosin, Geocillin [carbenicillin], Lisinopril, Other, Spironolactone, Tramadol, Bumetanide, and Ibuprofen  Scheduled Meds:   ciprofloxacin  200 mg IntraVENous Q24H    sodium chloride (PF)  10 mL IntraVENous Daily    And    pantoprazole  40 mg IntraVENous Daily    insulin lispro  0-6 Units SubCUTAneous TID WC    insulin lispro  0-3 Units SubCUTAneous Nightly    sodium chloride flush  5-40 mL IntraVENous 2 times per day    heparin (porcine)  5,000 Units SubCUTAneous 3 times per day    metroNIDAZOLE  500 mg IntraVENous Q8H    cefepime  1,000 mg IntraVENous Q24H    custom dialysis builder   IntraPERitoneal Nightly    And    custom dialysis builder   IntraPERitoneal Nightly    calcium acetate  2 capsule Oral TID WC     Continuous Infusions:   dextrose      sodium chloride 5 mL/hr at 02/20/22 0516         Objective:       Vitals:  BP (!) 151/83   Pulse 84   Temp 97.9 °F (36.6 °C) (Oral)   Resp 18   Ht 5' 3\" (1.6 m)   Wt 196 lb 6.9 oz (89.1 kg) Comment: patient feeling to ill to stand  SpO2 91%   BMI 34.80 kg/m²   Constitutional:  awake, exchanges with 2.5% Dianeal). Added heparin.   -Hold BP meds:   -MBD management  -Anemia management  -Dose meds to GFR<10    Symptomatic treatment of Nausea,vomiting    Thank you for allowing us to participate in care of Danae [email or web address] Dunn . We will continue to follow. Feel free to contact me with any questions.       Lela Braun MD  2/20/2022    [facility] [address] [place] [address] S  Office : [phone]  Fax :[phone]

## 2022-02-20 NOTE — PLAN OF CARE
Problem: Falls - Risk of:  Goal: Will remain free from falls  Description: Will remain free from falls  2/20/2022 0854 by Norm Encarnacion RN  Outcome: Ongoing     Problem: Falls - Risk of:  Goal: Absence of physical injury  Description: Absence of physical injury  2/20/2022 0854 by Norm Encarnacion RN  Outcome: Ongoing     Problem: SAFETY  Goal: Free from intentional harm  2/20/2022 0854 by Norm Encarnacion RN  Outcome: Ongoing       Problem: DAILY CARE  Goal: Daily care needs are met  2/20/2022 0854 by Norm Encarnacion RN  Outcome: Ongoing

## 2022-02-21 NOTE — PROGRESS NOTES
Hospitalist Progress Note  2/21/2022 10:11 AM    PCP: No primary care provider on file.     [phone]     Date of Admission: 2/15/2022                                                                                                                     HOSPITAL COURSE    Patient demographics:  The patient  Rui Salmeron is a 55 y.o. female     Significant past medical history:   Patient Active Problem List   Diagnosis    Diabetic retinopathy ([facility]    Mixed hyperlipidemia    DMII (diabetes mellitus, type 2) ([address]    Vitreous hemorrhage of right eye ([address]    Essential hypertension    Acute kidney injury ([address]    ESRD (end stage renal disease) ([address]    Acute renal failure superimposed on chronic kidney disease ([address]    Volume overload    Peritonitis ([address]    Generalized abdominal pain    Peritoneal dialysis status ([address]    Morbid obesity due to excess calories ([address]    Nausea and vomiting    Hyponatremia    Diarrhea    Dizziness    Hypertensive emergency    Hypokalemia    Hypertensive urgency    Cerebrovascular accident (CVA) ([address]    Electrolyte imbalance    Acute respiratory failure with hypoxia ([address]    Sepsis ([address]    Electrolyte abnormality    Diverticulitis         Presenting symptoms:  Nausea and vomiting, abdominal pain, fever and chills, generalized weakness    Diagnostic workup:      CONSULTS DURING ADMISSION :   IP CONSULT TO NEPHROLOGY  IP CONSULT TO NEPHROLOGY  IP CONSULT TO INFECTIOUS DISEASES  IP CONSULT TO GI      Patient was diagnosed with:  Acute peritonitis  Diverticulitis    Treatment while inpatient:  Pt presented to [facility] with concern for new cloudy output from her peritoneal dialysis catheter                                                                                       ----------------------------------------------------------      SUBJECTIVE COMPLAINTS- follow up for Nausea and vomiting, abdominal pain,    Diet: Diet NPO      OBJECTIVE:   Patient Active Problem List   Diagnosis    [facility]opathy ([facility]    Mixed hyperlipidemia    DMII (diabetes mellitus, type 2) ([facility]    Vitreous hemorrhage of right eye ([facility]    Essential hypertension    Acute kidney injury ([facility]    ESRD (end stage renal disease) ([facility]    Acute renal failure superimposed on chronic kidney disease (HCC)    Volume overload    Peritonitis (HCC)    Generalized abdominal pain    Peritoneal dialysis status ([facility]    Morbid obesity due to excess calories ([facility])    Nausea and vomiting    Hyponatremia    Diarrhea    Dizziness    Hypertensive emergency    Hypokalemia    Hypertensive urgency    Cerebrovascular accident (CVA) ([facility]    Electrolyte imbalance    Acute respiratory failure with hypoxia ([facility])    Sepsis ([facility])    Electrolyte abnormality    Diverticulitis       Allergies  Bactrim [sulfamethoxazole-trimethoprim], Doxazosin, Geocillin [carbenicillin], Lisinopril, Other, Spironolactone, Tramadol, Bumetanide, and Ibuprofen    Medications    Scheduled Meds:   scopolamine  1 patch TransDERmal Q72H    meropenem  500 mg IntraVENous Q24H    sodium chloride (PF)  10 mL IntraVENous Daily    And    pantoprazole  40 mg IntraVENous Daily    insulin lispro  0-6 Units SubCUTAneous TID WC    insulin lispro  0-3 Units SubCUTAneous Nightly    sodium chloride flush  5-40 mL IntraVENous 2 times per day    heparin (porcine)  5,000 Units SubCUTAneous 3 times per day    custom dialysis builder   IntraPERitoneal Nightly    And    custom dialysis builder   IntraPERitoneal Nightly    calcium acetate  2 capsule Oral TID      Continuous Infusions:   dextrose      sodium chloride 5 mL/hr at 02/20/22 0516     PRN Meds:  promethazine, diphenhydrAMINE, glucose, dextrose, glucagon (rDNA), dextrose, sodium chloride flush, sodium chloride, acetaminophen **OR** acetaminophen, ondansetron, diphenhydrAMINE    Vitals   Vitals /wt   Patient Vitals for the past 8 hrs:   BP Temp Temp src Pulse Resp SpO2   02/21/22 0750 (!) 150/74 97.4 °F (36.3 °C) Oral 75 16 94 %   02/21/22 0424 (!) 142/78 97.7 °F (36.5 °C) Oral 85 16 93 %        72HR INTAKE/OUTPUT:      Intake/Output Summary (Last 24 hours) at 2/21/2022 1011  Last data filed at 2/21/2022 4739  Gross per 24 hour   Intake 0 ml   Output 391 ml   Net -391 ml       Exam:    Gen:   Alert and oriented ×3    Eyes: PERRL. No sclera icterus. No conjunctival injection. ENT: No discharge. Pharynx clear. External appearance of ears and nose normal.  Neck: Trachea midline. No obvious mass. Resp: No accessory muscle use. No crackles. No wheezes. No rhonchi. CV: Regular rate. Regular rhythm. No murmur or rub. No edema. GI: Non-tender. Non-distended. No hernia. Skin: Warm, dry, normal texture and turgor. Lymph: No cervical LAD. No supraclavicular LAD. M/S: / Ext. No cyanosis. No clubbing. No joint deformity. Neuro: CN 2-12 are intact,  no neurologic deficits noted. PT/INR: No results for input(s): PROTIME, INR in the last 72 hours. APTT: No results for input(s): APTT in the last 72 hours. CBC:   Recent Labs     02/19/22 0437 02/20/22  0352 02/21/22  0342   WBC 5.6 5.2 6.6   HGB 10.1* 10.2* 10.3*   HCT 30.3* 30.9* 31.3*   MCV 93.1 92.8 94.0    143 143       BMP:   Recent Labs     02/19/22 0437 02/20/22  0352 02/21/22  0342   * 133* 134*   K 3.1* 2.7*  --    CL 92* 91* 91*   CO2 25 23 24   BUN 36* 30* 30*   CREATININE 9.4* 9.4* 9.3*       LIVER PROFILE:   No results for input(s): ALKPHOS, AST, ALT, ALB, BILIDIR, BILITOT, ALKPHOS in the last 72 hours. No results for input(s): AMYLASE in the last 72 hours. No results for input(s): LIPASE in the last 72 hours. UA:No results for input(s): NITRITE, LABCAST, WBCUA, RBCUA, MUCUS in the last 72 hours. TROPONIN: No results for input(s): Myrla Greulich in the last 72 hours.     Lab Results   Component Value Date/Time    URRFLXCULT Yes 08/04/2019 06:40 PM       No results for input(s): TSHREFLEX in the last 72 hours. No components found for: [id number]  POC GLUCOSE:    Recent Labs     02/20/22  0726 02/20/22  1152 02/20/22  1724 02/20/22  2020 02/21/22  0729   POCGLU 202* 156* 125* 115* 216*     No results for input(s): LABA1C in the last 72 hours. Lab Results   Component Value Date    LABA1C 8.1 01/07/2022         ASSESSMENT AND PLAN    Intractable nausea vomiting  Likely related to diabetic gastroparesis  Continue patient on Reglan 5 mg 4 times daily  Proton pump inhibitors twice daily for possible GERD  GI consult is appreciated    Sepsis  Antibiotics changed to meropenem    Peritonitis  Monitor peritoneal dialysis fluid cell count  Blood cultures no growth to date  Body fluid culture is positive for Pseudomonas  Antibiotics changed to meropenem  Infectious diseases following    Diverticulitis  Patient on meropenem  Check procalcitonin    Electrolyte normality    ESRD  Nephrology is consulted        Code Status: Full Code        Dispo -cc        The patient and / or the family were informed of the results of any tests, a time was given to answer questions, a plan was proposed and they agreed with plan. Huy Telles MD    This note was transcribed using [id number]. Please disregard any translational errors.

## 2022-02-21 NOTE — PROGRESS NOTES
Office: [phone]       Fax: [phone]      Nephrology Progress Note        Patient's Name: Deepti Crum  Date of Visit: 2/21/2022    Reason for Consult:  ESRD management      History of Present Illness:      Deepti Crum is a 55 y.o. female with PMHx of hypertension, diabetes mellitus, ESRD who was admitted on 2/15/2022 with complaints of abdominal pain   PD fluid cloudy  history of PD peritonitis in past.  Does 2 exchanges with 2.5% day time and Icodextrin overnight    INTERVAL HISTORY      Feels same  Shortness of breath: No   mild abdominal pain    having nausea    Medications:        Allergies:  Bactrim [sulfamethoxazole-trimethoprim], Doxazosin, Geocillin [carbenicillin], Lisinopril, Other, Spironolactone, Tramadol, Bumetanide, and Ibuprofen  Scheduled Meds:   scopolamine  1 patch TransDERmal Q72H    meropenem  500 mg IntraVENous Q24H    sodium chloride (PF)  10 mL IntraVENous Daily    And    pantoprazole  40 mg IntraVENous Daily    insulin lispro  0-6 Units SubCUTAneous TID WC    insulin lispro  0-3 Units SubCUTAneous Nightly    sodium chloride flush  5-40 mL IntraVENous 2 times per day    heparin (porcine)  5,000 Units SubCUTAneous 3 times per day    custom dialysis builder   IntraPERitoneal Nightly    And    custom dialysis builder   IntraPERitoneal Nightly    calcium acetate  2 capsule Oral TID WC     Continuous Infusions:   dextrose      sodium chloride 5 mL/hr at 02/20/22 0516         Objective:       Vitals:  BP (!) 150/74   Pulse 75   Temp 97.4 °F (36.3 °C) (Oral)   Resp 16   Ht 5' 3\" (1.6 m)   Wt 196 lb 6.9 oz (89.1 kg) Comment: patient feeling to ill to stand  SpO2 94%   BMI 34.80 kg/m²   Constitutional:  awake, NAD  Skin: no rash, turgor wnl  HEENT:  MMM, No icterus  Cardiovascular:  S1, S2 reg  Respiratory: CTA, no crackles  Abdomen:  +BS, soft, NT, ND  Ext: no lower extremity edema  Access: abd PD catheter in place, no drainage/redness around exit site. No tunnel tenderness  CNS: alert, no agitation    Data:     Labs:  Hepatic:   No results for input(s): AST, ALT, ALB, BILITOT, ALKPHOS in the last 72 hours. BNP: No results for input(s): BNP in the last 72 hours. ABGs: No results for input(s): PHART, PO2ART, YVO7LIK in the last 72 hours. IMAGING:  CT ABDOMEN PELVIS WO CONTRAST Additional Contrast? None   Final Result   1. Sigmoid colon diverticulosis. There is confluent inflammatory change   within the mesenteric fat surrounding a short segment of simple sigmoid colon   containing the diverticula. This could represent sequela of diverticulitis. 2.  2 cm ventral abdominal wall hernia containing inflamed fat. 3.  9 mm ground-glass nodule in the left lung base. Recommend follow-up with   a noncontrast chest CT at 6-12 months to confirm persistence then additional   noncontrast chest CTs every 2 years until 5 years. Assessment :       1. ESRD   Etiology: suspected DN  Access: Abd PD cath  Volume: Hypervolemic      Seen on PD    2. Electrolytes/Acid base  Hypokalemia    Recent Labs     02/20/22  0352 02/20/22  0352 02/21/22  0342   *   < > 134*   K 2.7*  --   --    CO2 23   < > 24   MG 1.90   < > 1.80    < > = values in this interval not displayed. 3. BMD  -Hyperphosphatemia, SHPT  -maintained on Renvela    Lab Results   Component Value Date    CALCIUM 7.3 (L) 02/21/2022    PHOS 4.5 02/17/2022        4. HTN  -Blood pressure high       BP Readings from Last 1 Encounters:   02/21/22 (!) 150/74       5. Anemia  -anemia of CKD  -assess for CHRIS    Recent Labs     02/21/22  0342   HGB 10.3*     6.  DM    7. PD peritonitis  -Cult: pseudomonas    PLAN :       - Antibiotics directed by culture  - may need to replace PD catheter  - replace K, Mg as needed  -maintenance PD tonight (do 3 exchanges with 2.5% Dianeal). Added heparin.   -Hold BP meds:   -MBD management  -Anemia management  -Dose meds to GFR<10    Spoke to ID    Thank you for allowing us to participate in care of aDnae Dunn . We will continue to follow. Feel free to contact me with any questions.       Clive Ganser, MD  2/21/2022    [facility] [address]  Office : [phone]  Fax :[phone]

## 2022-02-21 NOTE — PLAN OF CARE
Problem: Falls - Risk of:  Goal: Will remain free from falls  Description: Will remain free from falls  Outcome: Ongoing  Goal: Absence of physical injury  Description: Absence of physical injury  Outcome: Ongoing     Problem: SAFETY  Goal: Free from accidental physical injury  Outcome: Ongoing  Goal: Free from intentional harm  Outcome: Ongoing     Problem: DAILY CARE  Goal: Daily care needs are met  Outcome: Ongoing     Problem: PAIN  Goal: Patient's pain/discomfort is manageable  Outcome: Ongoing     Problem: SKIN INTEGRITY  Goal: Skin integrity is maintained or improved  Outcome: Ongoing     Problem: KNOWLEDGE DEFICIT  Goal: Patient/S.O. demonstrates understanding of disease process, treatment plan, medications, and discharge instructions.   Outcome: Ongoing     Problem: DISCHARGE BARRIERS  Goal: Patient's continuum of care needs are met  Outcome: Ongoing     Problem: Discharge Planning:  Goal: Discharged to appropriate level of care  Description: Discharged to appropriate level of care  Outcome: Ongoing     Problem: Gas Exchange - Impaired:  Goal: Levels of oxygenation will improve  Description: Levels of oxygenation will improve  Outcome: Ongoing     Problem: Infection, Septic Shock:  Goal: Will show no infection signs and symptoms  Description: Will show no infection signs and symptoms  Outcome: Ongoing     Problem: Serum Glucose Level - Abnormal:  Goal: Ability to maintain appropriate glucose levels will improve  Description: Ability to maintain appropriate glucose levels will improve  Outcome: Ongoing     Problem: Tissue Perfusion, Altered:  Goal: Circulatory function within specified parameters  Description: Circulatory function within specified parameters  Outcome: Ongoing     Problem: Venous Thromboembolism:  Goal: Will show no signs or symptoms of venous thromboembolism  Description: Will show no signs or symptoms of venous thromboembolism  Outcome: Ongoing  Goal: Absence of signs or symptoms of impaired coagulation  Description: Absence of signs or symptoms of impaired coagulation  Outcome: Ongoing     Problem: Skin Integrity:  Goal: Will show no infection signs and symptoms  Description: Will show no infection signs and symptoms  Outcome: Ongoing  Goal: Absence of new skin breakdown  Description: Absence of new skin breakdown  Outcome: Ongoing     Problem: Pain:  Goal: Pain level will decrease  Description: Pain level will decrease  Outcome: Ongoing  Goal: Control of acute pain  Description: Control of acute pain  Outcome: Ongoing  Goal: Control of chronic pain  Description: Control of chronic pain  Outcome: Ongoing

## 2022-02-21 NOTE — PROGRESS NOTES
Potassium of 2.9. Deborah Gunderson MD notified. Will continue to monitor.     Electronically signed by Abiodun Carrillo RN on 2/21/2022 at 11:11 AM

## 2022-02-21 NOTE — PROGRESS NOTES
Infectious Disease Follow up Notes  Admit Date: 2/15/2022    Antibiotics :   IV Meropenem      CHIEF COMPLAINT:     PD peritonitis  Pseudomonas infection   Diverticulitis    Subjective interval History :  55 y.o. man with a past medical history of end-stage renal disease on peritoneal dialysis for the past 2 years, diabetic retinopathy , diabetes neuropathy, kidney stone, hypertension, hyperlipidemia, admitted to the hospital secondary to abdominal pain and diarrhea. She also has a history of COVID-19 infection for which she is recovering okay but unfortunately developed GI symptoms consistent with nausea diarrhea abdominal pain. PD fluid on admission with abnormal PD fluid culture positive for Pseudomonas. Admit labs indicate lactic acid 4.1 glucose at 295, WBC elevated 14.4. Blood cultures from admission negative. CT abdomen pelvis indicate sigmoid colon diverticulosis but there is small sequelae of diverticulitis. PD fluid cell count was grossly abnormal with WBC count at 18,000.   Location : abd pain+      Quality :  Aching      Severity :  10/10   Duration : weeks     Timing :constant   Context : PD     Modifying factors : none   Associated signs and symptoms:diarrhea, nausea        Interval History : Complains of ongoing abdominal pain and nausea + and not feeling well WBC counts on the Fluid still high and Fluid cx still positive is concerning        Past Medical History:    Past Medical History:   Diagnosis Date    Anemia 9/15/2014    Chronic kidney disease     Diabetes mellitus ([address]     Diabetes mellitus type 1 ([address]     Diabetic retinopathy ([address]     Diabetic retinopathy associated with type 2 diabetes mellitus, without macular edema     Hyperlipidemia 7/27/2015    Hypertension     Kidney stone     Mixed hyperlipidemia 7/27/2015    Orthostatic hypotension     Vitreous hemorrhage of right eye ([address] 8/15/2017 Past Surgical History:    Past Surgical History:   Procedure Laterality Date    APPENDECTOMY      KIDNEY STONE SURGERY      LAPAROSCOPY INSERTION PERITONEAL CATHETER N/A 8/12/2019    LAPAROSCOPIC PERITONEAL DIALYSIS CATHETER PLACEMENT WITH AXEL TROCHAR performed by Aravind Huber MD at [facility]         Current Medications:    Outpatient Medications Marked as Taking for the 2/15/22 encounter [facility] Encounter)   Medication Sig Dispense Refill    torsemide (DEMADEX) 100 MG tablet TAKE 4 TABLETS BY MOUTH ONCE NIGHTLY 360 tablet 5    amLODIPine (NORVASC) 10 MG tablet TAKE ONE TABLET BY MOUTH DAILY 30 tablet 3    losartan (COZAAR) 100 MG tablet Take 100 mg by mouth nightly      pantoprazole (PROTONIX) 40 MG tablet Take 1 tablet by mouth daily 90 tablet 1    insulin lispro (HUMALOG KWIKPEN) 100 UNIT/ML pen Inject 2-4 Units into the skin 3 times daily (before meals) Sliding scale          Allergies:  Bactrim [sulfamethoxazole-trimethoprim], Doxazosin, Geocillin [carbenicillin], Lisinopril, Other, Spironolactone, Tramadol, Bumetanide, and Ibuprofen    Immunizations : There is no immunization history on file for this patient.     Social History:     Social History     Tobacco Use    Smoking status: Never Smoker    Smokeless tobacco: Never Used   Vaping Use    Vaping Use: Never used   Substance Use Topics    Alcohol use: No    Drug use: No     Social History     Tobacco Use   Smoking Status Never Smoker   Smokeless Tobacco Never Used      Family History   Problem Relation Age of Onset    Heart Disease Mother         heart attack 2007    High Blood Pressure Mother     Diabetes Father     Heart Disease Father     Emphysema Father     Cancer Maternal Grandmother         cervical cancer    Lung Cancer Maternal Grandfather         black lung    Diabetes Paternal Grandmother     Heart Disease Paternal Grandfather       REVIEW OF SYSTEMS:    Constitutional:  negative for fevers, chills, night sweats  Eyes:  negative for blurred vision, eye discharge, visual disturbance   HEENT:  negative for hearing loss, ear drainage,nasal congestion  Respiratory:  negative for cough, shortness of breath or hemoptysis   Cardiovascular:  negative for chest pain, palpitations, syncope  Gastrointestinal:  negative for nausea, vomiting, diarrhea, constipation, abdominal pain++   Genitourinary:  negative for frequency, dysuria, urinary incontinence, hematuria  Hematologic/Lymphatic:  negative for easy bruising, bleeding and lymphadenopathy  Allergic/Immunologic:  negative for recurrent infections, angioedema, anaphylaxis   Endocrine:  negative for weight changes, polyuria, polydipsia and polyphagia  Musculoskeletal:  negative for joint  pain, swelling, decreased range of motion  Integumentary: No rashes, skin lesions  Neurological:  negative for headaches, slurred speech, unilateral weakness  Psychiatric: negative for hallucinations,confusion,agitation.                 PHYSICAL EXAM:      Vitals:    /69   Pulse 89   Temp 97.9 °F (36.6 °C) (Oral)   Resp 16   Ht 5' 3\" (1.6 m)   Wt 196 lb 6.9 oz (89.1 kg) Comment: patient feeling to ill to stand  SpO2 91%   BMI 34.80 kg/m²       General Appearance: alert,in no acute distress, +  pallor, no icterus chronic ill appearing woman +  Skin: warm and dry, no rash or erythema  Head: normocephalic and atraumatic  Eyes: pupils equal, round, and reactive to light, conjunctivae normal  ENT: tympanic membrane, external ear and ear canal normal bilaterally, nose without deformity, nasal mucosa and turbinates normal without polyps  Neck: supple and non-tender without mass, no thyromegaly  no cervical lymphadenopathy  Pulmonary/Chest: clear to auscultation bilaterally- no wheezes, rales or rhonchi, normal air movement, no respiratory distress  Cardiovascular: normal rate, regular rhythm, normal S1 and S2, no murmurs, rubs, clicks, or gallops, no carotid bruits  Abdomen: soft, ++ -tender, + -distended, normal bowel sounds, no masses or organomegaly  Extremities: no cyanosis, clubbing or edema  Musculoskeletal: normal range of motion, no joint swelling, deformity or tenderness  Integumentary: No rashes, no abnormal skin lesions, no petechiae  Neurologic: reflexes normal and symmetric, no cranial nerve deficit  Psych:  Orientation, sensorium, mood normal            Lines: PD catheter +        Data Review:    CBC:   Lab Results   Component Value Date    WBC 5.2 02/20/2022    HGB 10.2 (L) 02/20/2022    HCT 30.9 (L) 02/20/2022    MCV 92.8 02/20/2022     02/20/2022     RENAL:   Lab Results   Component Value Date    CREATININE 9.4 (HH) 02/20/2022    BUN 30 (H) 02/20/2022     (L) 02/20/2022    K 2.7 (LL) 02/20/2022    CL 91 (L) 02/20/2022    CO2 23 02/20/2022     SED RATE: No results found for: SEDRATE  CK: No results found for: CKTOTAL  CRP: No results found for: CRP  Hepatic Function Panel:   Lab Results   Component Value Date    ALKPHOS 117 02/15/2022    ALT 7 02/15/2022    AST 14 02/15/2022    PROT 5.6 02/15/2022    BILITOT 0.6 02/15/2022    LABALBU 1.7 02/17/2022     UA:  Lab Results   Component Value Date    COLORU YELLOW 05/27/2021    CLARITYU Clear 05/27/2021    GLUCOSEU 250 05/27/2021    BILIRUBINUR Negative 05/27/2021    KETUA Negative 05/27/2021    SPECGRAV 1.016 05/27/2021    BLOODU TRACE 05/27/2021    PHUR 7.0 05/27/2021    PROTEINU 100 05/27/2021    UROBILINOGEN 0.2 05/27/2021    NITRU Negative 05/27/2021    LEUKOCYTESUR Negative 05/27/2021    LABMICR YES 05/27/2021    URINETYPE NotGiven 05/27/2021      Urine Microscopic:   Lab Results   Component Value Date    LABCAST 3-5 Hyaline 08/27/2014    BACTERIA RARE 05/27/2021    COMU see below 05/27/2021    HYALCAST 0 05/27/2021    WBCUA 3 05/27/2021    RBCUA 4 05/27/2021    EPIU 4 05/27/2021     Urine Reflex to Culture:   Lab Results   Component Value Date    URRFLXCULT Yes 08/04/2019          Results for Terry Soares (MRN [id number]) as of 2/17/2022 16:24    Ref. Range 2/16/2022 00:40 2/16/2022 17:25 2/17/2022 03:30   Source + CELL CT/DIFF-BF Unknown Peritoneal dial Peritoneal Fl. Peritoneal dial   Appearance, Fluid Unknown Hazy Turbid Cloudy   Color, Fluid Unknown Yellow Yellow Yellow   Eos, Fluid Latest Units: % 1       RBC, Fluid Latest Units: /cumm 1,000 5,000 <2,000   Lymphocytes, Body Fluid Latest Units: %   8 4   Monocyte Count, Fluid Latest Units: % 25   15   Neutrophil Count, Fluid Latest Units: % 54 86 76   Nucl Cell, Fluid Latest Units: /cumm 18,185 >50 2,161   Mesothelial, Fluid Latest Units: % 16 1 1   Clot Eval. Unknown see below see below see below   Number of Cells Counted Fluid Unknown 100 100 100        MICRO: cultures reviewed and updated by me   Blood Culture:   Lab Results   Component Value Date    BC No Growth after 4 days of incubation. 02/15/2022    BLOODCULT2 No Growth after 4 days of incubation. 02/15/2022       Respiratory Culture:  Lab Results   Component Value Date    LABGRAM 2+ WBC's  No organisms seen   02/18/2022     AFB:No results found for: [facility]  Viral Culture:  Lab Results   Component Value Date    COVID19 Detected 01/07/2022     Urine Culture: No results for input(s): Binnie Shape in the last 72 hours. IMAGING:    CT ABDOMEN PELVIS WO CONTRAST Additional Contrast? None   Final Result   1. Sigmoid colon diverticulosis. There is confluent inflammatory change   within the mesenteric fat surrounding a short segment of simple sigmoid colon   containing the diverticula. This could represent sequela of diverticulitis. 2.  2 cm ventral abdominal wall hernia containing inflamed fat. 3.  9 mm ground-glass nodule in the left lung base. Recommend follow-up with   a noncontrast chest CT at 6-12 months to confirm persistence then additional   noncontrast chest CTs every 2 years until 5 years.                All the pertinent images and reports for the current Hospitalization were reviewed by me     Scheduled Meds:   scopolamine  1 patch TransDERmal Q72H    meropenem  500 mg IntraVENous Q24H    sodium chloride (PF)  10 mL IntraVENous Daily    And    pantoprazole  40 mg IntraVENous Daily    insulin lispro  0-6 Units SubCUTAneous TID WC    insulin lispro  0-3 Units SubCUTAneous Nightly    sodium chloride flush  5-40 mL IntraVENous 2 times per day    heparin (porcine)  5,000 Units SubCUTAneous 3 times per day    custom dialysis builder   IntraPERitoneal Nightly    And    custom dialysis builder   IntraPERitoneal Nightly    calcium acetate  2 capsule Oral TID        Continuous Infusions:   dextrose      sodium chloride 5 mL/hr at 02/20/22 0516       PRN Meds:  promethazine, diphenhydrAMINE, glucose, dextrose, glucagon (rDNA), dextrose, sodium chloride flush, sodium chloride, acetaminophen **OR** acetaminophen, ondansetron, diphenhydrAMINE      Assessment:     Patient Active Problem List   Diagnosis    Diabetic retinopathy ([address]    Mixed hyperlipidemia    DMII (diabetes mellitus, type 2) ([address]    Vitreous hemorrhage of right eye ([address]    Essential hypertension    Acute kidney injury ([address]    ESRD (end stage renal disease) ([address]    Acute renal failure superimposed on chronic kidney disease (HCC)    Volume overload    Peritonitis ([address]    Generalized abdominal pain    Peritoneal dialysis status ([address]    Morbid obesity due to excess calories ([address]    Nausea and vomiting    Hyponatremia    Diarrhea    Dizziness    Hypertensive emergency    Hypokalemia    Hypertensive urgency    Cerebrovascular accident (CVA) ([address]    Electrolyte imbalance    Acute respiratory failure with hypoxia (HCC)    Sepsis ([address]    Electrolyte abnormality    Diverticulitis          Sepsis on admit  PD peritonitis  Diverticulitis  Pseudomonas on PD fluid cx  WBC elevation   Lactic acidosis  ESRD on PD  DM with several complications  PD fluid cell counts very abnormal       Will need IV abx for now and at d.c hopefully able to do IV abx through PD exchanges      PD fluid cx in follow up pending will need to see improvement in cell counts before d/c planning AND Fluid cx have to be negative     PD fluid cx with Pseudomonas still and she is not clearing her infection indicating that PD catheter may be now involved    WILL d/w  about plans for possible PD catheter removal and holiday with HD and then go for new PD placement     Will see if her NAUSEA improves with change in her IV abx        Labs, Microbiology, Radiology and all the pertinent results from current hospitalization and  care every where were reviewed  by me as a part of the evaluation   Plan:   1. D/C IV Cefepime x 1 gm q 24 hrs  2. D/C  IV  Flagyl    3. Trend PD fluid cell counts and culture  4. Blood cx in process ngtd  5. CT abd/pelvis results noted  6. Hope to save PD catheter will follow cell counts BUT MAY now likely needs removal as PD fluid cx still positive for Pseudomonas   7. THERE is IUD per CT results need to dw pt about removal to prevent infections given post menopausal age   6. Change to IV Meropenem x 500 mg Q 24 hr      Discussed with patient/Family and Nursing   Risk of Complications/Morbidity: High      · Illness(es)/ Infection present that pose threat to bodily function. · There is potential for severe exacerbation of infection/side effects of treatment. · Therapy requires intensive monitoring for antimicrobial agent toxicity. Discussed with patient/Family and Nursing staff     Thanks for allowing me to participate in your patient's care and please call me with any questions or concerns.     Frances Rodrigues MD  Infectious Disease  [facility]) Physician  Phone: [phone]   Fax : [phone]

## 2022-02-21 NOTE — PROGRESS NOTES
Infectious Disease Follow up Notes  Admit Date: 2/15/2022    Antibiotics :   IV Meropenem      CHIEF COMPLAINT:     PD peritonitis  Pseudomonas infection   Diverticulitis    Subjective interval History :  55 y.o. man with a past medical history of end-stage renal disease on peritoneal dialysis for the past 2 years, diabetic retinopathy , diabetes neuropathy, kidney stone, hypertension, hyperlipidemia, admitted to the hospital secondary to abdominal pain and diarrhea. She also has a history of COVID-19 infection for which she is recovering okay but unfortunately developed GI symptoms consistent with nausea diarrhea abdominal pain. PD fluid on admission with abnormal PD fluid culture positive for Pseudomonas. Admit labs indicate lactic acid 4.1 glucose at 295, WBC elevated 14.4. Blood cultures from admission negative. CT abdomen pelvis indicate sigmoid colon diverticulosis but there is small sequelae of diverticulitis. PD fluid cell count was grossly abnormal with WBC count at 18,000.   Location : abd pain+      Quality :  Aching      Severity :  10/10   Duration : weeks     Timing :constant   Context : PD     Modifying factors : none   Associated signs and symptoms:diarrhea, nausea        Interval History : Complains of ongoing abdominal pain and nausea + and some slight improvement with calvert in IV abx therapy PD fluid still very abnormal dw  Pt        Past Medical History:    Past Medical History:   Diagnosis Date    Anemia 9/15/2014    Chronic kidney disease     Diabetes mellitus ([address]     Diabetes mellitus type 1 ([address]     Diabetic retinopathy ([address]     Diabetic retinopathy associated with type 2 diabetes mellitus, without macular edema     Hyperlipidemia 7/27/2015    Hypertension     Kidney stone     Mixed hyperlipidemia 7/27/2015    Orthostatic hypotension     Vitreous hemorrhage of right eye ([address] 8/15/2017 Past Surgical History:    Past Surgical History:   Procedure Laterality Date    APPENDECTOMY      KIDNEY STONE SURGERY      LAPAROSCOPY INSERTION PERITONEAL CATHETER N/A 8/12/2019    LAPAROSCOPIC PERITONEAL DIALYSIS CATHETER PLACEMENT WITH AXEL RENATAAR performed by Sharon Walker MD at [address]         Current Medications:    Outpatient Medications Marked as Taking for the 2/15/22 encounter [facility] Encounter)   Medication Sig Dispense Refill    torsemide (DEMADEX) 100 MG tablet TAKE 4 TABLETS BY MOUTH ONCE NIGHTLY 360 tablet 5    amLODIPine (NORVASC) 10 MG tablet TAKE ONE TABLET BY MOUTH DAILY 30 tablet 3    losartan (COZAAR) 100 MG tablet Take 100 mg by mouth nightly      pantoprazole (PROTONIX) 40 MG tablet Take 1 tablet by mouth daily 90 tablet 1    insulin lispro (HUMALOG KWIKPEN) 100 UNIT/ML pen Inject 2-4 Units into the skin 3 times daily (before meals) Sliding scale          Allergies:  Bactrim [sulfamethoxazole-trimethoprim], Doxazosin, Geocillin [carbenicillin], Lisinopril, Other, Spironolactone, Tramadol, Bumetanide, and Ibuprofen    Immunizations : There is no immunization history on file for this patient.     Social History:     Social History     Tobacco Use    Smoking status: Never Smoker    Smokeless tobacco: Never Used   Vaping Use    Vaping Use: Never used   Substance Use Topics    Alcohol use: No    Drug use: No     Social History     Tobacco Use   Smoking Status Never Smoker   Smokeless Tobacco Never Used      Family History   Problem Relation Age of Onset    Heart Disease Mother         heart attack 2007    High Blood Pressure Mother     Diabetes Father     Heart Disease Father     Emphysema Father     Cancer Maternal Grandmother         cervical cancer    Lung Cancer Maternal Grandfather         black lung    Diabetes Paternal Grandmother     Heart Disease Paternal Grandfather       REVIEW OF SYSTEMS:    Constitutional:  negative for fevers, chills, night sweats  Eyes:  negative for blurred vision, eye discharge, visual disturbance   HEENT:  negative for hearing loss, ear drainage,nasal congestion  Respiratory:  negative for cough, shortness of breath or hemoptysis   Cardiovascular:  negative for chest pain, palpitations, syncope  Gastrointestinal:  negative for nausea, vomiting, diarrhea, constipation, abdominal pain++   Genitourinary:  negative for frequency, dysuria, urinary incontinence, hematuria  Hematologic/Lymphatic:  negative for easy bruising, bleeding and lymphadenopathy  Allergic/Immunologic:  negative for recurrent infections, angioedema, anaphylaxis   Endocrine:  negative for weight changes, polyuria, polydipsia and polyphagia  Musculoskeletal:  negative for joint  pain, swelling, decreased range of motion  Integumentary: No rashes, skin lesions  Neurological:  negative for headaches, slurred speech, unilateral weakness  Psychiatric: negative for hallucinations,confusion,agitation.                 PHYSICAL EXAM:      Vitals:    BP (!) 146/72   Pulse 82   Temp 98.3 °F (36.8 °C) (Oral)   Resp 18   Ht 5' 3\" (1.6 m)   Wt 196 lb 6.9 oz (89.1 kg) Comment: patient feeling to ill to stand  SpO2 91%   BMI 34.80 kg/m²       General Appearance: alert,in no acute distress, +  pallor, no icterus chronic ill appearing woman +  Skin: warm and dry, no rash or erythema  Head: normocephalic and atraumatic  Eyes: pupils equal, round, and reactive to light, conjunctivae normal  ENT: tympanic membrane, external ear and ear canal normal bilaterally, nose without deformity, nasal mucosa and turbinates normal without polyps  Neck: supple and non-tender without mass, no thyromegaly  no cervical lymphadenopathy  Pulmonary/Chest: clear to auscultation bilaterally- no wheezes, rales or rhonchi, normal air movement, no respiratory distress  Cardiovascular: normal rate, regular rhythm, normal S1 and S2, no murmurs, rubs, clicks, or gallops, no carotid bruits  Abdomen: soft, ++ -tender, + -distended, normal bowel sounds, no masses or organomegaly  Extremities: no cyanosis, clubbing or edema  Musculoskeletal: normal range of motion, no joint swelling, deformity or tenderness  Integumentary: No rashes, no abnormal skin lesions, no petechiae  Neurologic: reflexes normal and symmetric, no cranial nerve deficit  Psych:  Orientation, sensorium, mood normal            Lines: PD catheter +        Data Review:    CBC:   Lab Results   Component Value Date    WBC 6.6 02/21/2022    HGB 10.3 (L) 02/21/2022    HCT 31.3 (L) 02/21/2022    MCV 94.0 02/21/2022     02/21/2022     RENAL:   Lab Results   Component Value Date    CREATININE 9.3 (HH) 02/21/2022    BUN 30 (H) 02/21/2022     (L) 02/21/2022    K 2.9 (LL) 02/21/2022    CL 91 (L) 02/21/2022    CO2 24 02/21/2022     SED RATE: No results found for: SEDRATE  CK: No results found for: CKTOTAL  CRP: No results found for: CRP  Hepatic Function Panel:   Lab Results   Component Value Date    ALKPHOS 117 02/15/2022    ALT 7 02/15/2022    AST 14 02/15/2022    PROT 5.6 02/15/2022    BILITOT 0.6 02/15/2022    LABALBU 1.7 02/17/2022     UA:  Lab Results   Component Value Date    COLORU YELLOW 05/27/2021    CLARITYU Clear 05/27/2021    GLUCOSEU 250 05/27/2021    BILIRUBINUR Negative 05/27/2021    KETUA Negative 05/27/2021    SPECGRAV 1.016 05/27/2021    BLOODU TRACE 05/27/2021    PHUR 7.0 05/27/2021    PROTEINU 100 05/27/2021    UROBILINOGEN 0.2 05/27/2021    NITRU Negative 05/27/2021    LEUKOCYTESUR Negative 05/27/2021    LABMICR YES 05/27/2021    URINETYPE NotGiven 05/27/2021      Urine Microscopic:   Lab Results   Component Value Date    LABCAST 3-5 Hyaline 08/27/2014    BACTERIA RARE 05/27/2021    COMU see below 05/27/2021    HYALCAST 0 05/27/2021    WBCUA 3 05/27/2021    RBCUA 4 05/27/2021    EPIU 4 05/27/2021     Urine Reflex to Culture:   Lab Results   Component Value Date    URRFLXCULT Yes 08/04/2019          Results for Doreen Lokce (MRN [id number]) as of 2/17/2022 16:24    Ref. Range 2/16/2022 00:40 2/16/2022 17:25 2/17/2022 03:30   Source + CELL CT/DIFF-BF Unknown Peritoneal dial Peritoneal Fl. Peritoneal dial   Appearance, Fluid Unknown Hazy Turbid Cloudy   Color, Fluid Unknown Yellow Yellow Yellow   Eos, Fluid Latest Units: % 1       RBC, Fluid Latest Units: /cumm 1,000 5,000 <2,000   Lymphocytes, Body Fluid Latest Units: %   8 4   Monocyte Count, Fluid Latest Units: % 25   15   Neutrophil Count, Fluid Latest Units: % 54 86 76   Nucl Cell, Fluid Latest Units: /cumm 18,185 >50 2,161   Mesothelial, Fluid Latest Units: % 16 1 1   Clot Eval. Unknown see below see below see below   Number of Cells Counted Fluid Unknown 100 100 100        MICRO: cultures reviewed and updated by me   Blood Culture:   Lab Results   Component Value Date    BC No Growth after 4 days of incubation. 02/15/2022    BLOODCULT2 No Growth after 4 days of incubation. 02/15/2022       Respiratory Culture:  Lab Results   Component Value Date    LABGRAM 2+ WBC's  No organisms seen   02/18/2022     AFB:No results found for: [facility]  Viral Culture:  Lab Results   Component Value Date    COVID19 Detected 01/07/2022     Urine Culture: No results for input(s): Cole President in the last 72 hours. IMAGING:    CT ABDOMEN PELVIS WO CONTRAST Additional Contrast? None   Final Result   1. Sigmoid colon diverticulosis. There is confluent inflammatory change   within the mesenteric fat surrounding a short segment of simple sigmoid colon   containing the diverticula. This could represent sequela of diverticulitis. 2.  2 cm ventral abdominal wall hernia containing inflamed fat. 3.  9 mm ground-glass nodule in the left lung base. Recommend follow-up with   a noncontrast chest CT at 6-12 months to confirm persistence then additional   noncontrast chest CTs every 2 years until 5 years.                All the pertinent images and reports for the current Hospitalization were reviewed by me     Scheduled Meds:   metoclopramide  5 mg IntraVENous Q6H    pantoprazole  40 mg IntraVENous BID    And    sodium chloride (PF)  10 mL IntraVENous BID    scopolamine  1 patch TransDERmal Q72H    meropenem  500 mg IntraVENous Q24H    insulin lispro  0-6 Units SubCUTAneous TID     insulin lispro  0-3 Units SubCUTAneous Nightly    sodium chloride flush  5-40 mL IntraVENous 2 times per day    heparin (porcine)  5,000 Units SubCUTAneous 3 times per day    custom dialysis builder   IntraPERitoneal Nightly    And    custom dialysis builder   IntraPERitoneal Nightly    calcium acetate  2 capsule Oral TID        Continuous Infusions:   dextrose      sodium chloride 5 mL/hr at 02/20/22 0516       PRN Meds:  promethazine, diphenhydrAMINE, glucose, dextrose, glucagon (rDNA), dextrose, sodium chloride flush, sodium chloride, acetaminophen **OR** acetaminophen, ondansetron, diphenhydrAMINE      Assessment:     Patient Active Problem List   Diagnosis    Diabetic retinopathy ([address]    Mixed hyperlipidemia    DMII (diabetes mellitus, type 2) ([address]    Vitreous hemorrhage of right eye ([address]    Essential hypertension    Acute kidney injury ([address]    ESRD (end stage renal disease) ([address]    Acute renal failure superimposed on chronic kidney disease (HCC)    Volume overload    Peritonitis ([address]    Generalized abdominal pain    Peritoneal dialysis status ([address]    Morbid obesity due to excess calories ([facility])    Nausea and vomiting    Hyponatremia    Diarrhea    Dizziness    Hypertensive emergency    Hypokalemia    Hypertensive urgency    Cerebrovascular accident (CVA) ([address]    Electrolyte imbalance    Acute respiratory failure with hypoxia ([facility])    Sepsis ([address]    Electrolyte abnormality    Diverticulitis          Sepsis on admit  PD peritonitis  Diverticulitis  Pseudomonas on PD fluid cx  WBC elevation   Lactic acidosis  ESRD on PD  DM with several complications  PD fluid cell counts very abnormal         Will need IV abx for now and at d.c hopefully able to do IV abx through PD exchanges      PD fluid cx in follow up pending will need to see improvement in cell counts before d/c planning AND Fluid cx have to be negative     PD fluid cx with Pseudomonas still and she is not clearing her infection indicating that PD catheter may be now involved    WILL d/w  about plans for possible PD catheter removal and holiday with HD and then go for new PD placement     Will see if her NAUSEA improves with change in her IV abx        Labs, Microbiology, Radiology and all the pertinent results from current hospitalization and  care every where were reviewed  by me as a part of the evaluation   Plan:   1. Trend PD fluid cell counts and culture  2. IV Meropenem x 500 mg q 24 hrs  . Watch for complications  4. Blood cx in process ngtd  5. CT abd/pelvis results noted  6. Hope to save PD catheter will follow cell counts BUT MAY now likely needs removal as PD fluid cx still positive for Pseudomonas d/w Dr Thanh Espino    7. THERE is IUD per CT results need to dw pt about removal to prevent infections given post menopausal age        Discussed with patient/Family and Nursing   Risk of Complications/Morbidity: High      · Illness(es)/ Infection present that pose threat to bodily function. · There is potential for severe exacerbation of infection/side effects of treatment. · Therapy requires intensive monitoring for antimicrobial agent toxicity. Discussed with patient/Family and Nursing staff     Thanks for allowing me to participate in your patient's care and please call me with any questions or concerns.     Alexis Overton MD  Infectious Disease  [address] [facility]  Phone: [phone]   Fax : [phone]

## 2022-02-21 NOTE — PLAN OF CARE
Problem: Falls - Risk of:  Goal: Will remain free from falls  Description: Will remain free from falls  2/21/2022 1639 by Zohra Trinh RN  Outcome: Ongoing  2/21/2022 0555 by Sagrario Daniel RN  Outcome: Ongoing  Goal: Absence of physical injury  Description: Absence of physical injury  2/21/2022 1639 by Zohra Trinh RN  Outcome: Ongoing  2/21/2022 0555 by Sagrario Daniel RN  Outcome: Ongoing     Problem: SAFETY  Goal: Free from accidental physical injury  2/21/2022 1639 by Zohra Trinh RN  Outcome: Ongoing  2/21/2022 0555 by Sagrario Daniel RN  Outcome: Ongoing  Goal: Free from intentional harm  2/21/2022 1639 by Zohra Trinh RN  Outcome: Ongoing  2/21/2022 0555 by Sagrario Daniel RN  Outcome: Ongoing     Problem: DAILY CARE  Goal: Daily care needs are met  2/21/2022 1639 by Zohra Trinh RN  Outcome: Ongoing  2/21/2022 0555 by Sagrario Daniel RN  Outcome: Ongoing     Problem: PAIN  Goal: Patient's pain/discomfort is manageable  2/21/2022 1639 by Zohra Trinh RN  Outcome: Ongoing  2/21/2022 0555 by Sagrario Daniel RN  Outcome: Ongoing     Problem: SKIN INTEGRITY  Goal: Skin integrity is maintained or improved  2/21/2022 1639 by Zohra Trinh RN  Outcome: Ongoing  2/21/2022 0555 by Sagrario Daniel RN  Outcome: Ongoing     Problem: KNOWLEDGE DEFICIT  Goal: Patient/S.O. demonstrates understanding of disease process, treatment plan, medications, and discharge instructions.   2/21/2022 1639 by Zohra Trinh RN  Outcome: Ongoing  2/21/2022 0555 by Sagrario Daniel RN  Outcome: Ongoing     Problem: DISCHARGE BARRIERS  Goal: Patient's continuum of care needs are met  2/21/2022 1639 by Zohra Trinh RN  Outcome: Ongoing  2/21/2022 0555 by Sagrario Daniel RN  Outcome: Ongoing     Problem: Discharge Planning:  Goal: Discharged to appropriate level of care  Description: Discharged to appropriate level of care  2/21/2022 1639 by Zohra Trinh RN  Outcome: Ongoing  2/21/2022 0555 by Marcos Robert RN  Outcome: Ongoing     Problem: Gas Exchange - Impaired:  Goal: Levels of oxygenation will improve  Description: Levels of oxygenation will improve  2/21/2022 1639 by Abiodun Carrillo RN  Outcome: Ongoing  2/21/2022 0555 by Marcos Robert RN  Outcome: Ongoing     Problem: Infection, Septic Shock:  Goal: Will show no infection signs and symptoms  Description: Will show no infection signs and symptoms  2/21/2022 1639 by Abiodun Carrillo RN  Outcome: Ongoing  2/21/2022 0555 by Marcos Robert RN  Outcome: Ongoing     Problem: Serum Glucose Level - Abnormal:  Goal: Ability to maintain appropriate glucose levels will improve  Description: Ability to maintain appropriate glucose levels will improve  2/21/2022 1639 by Abiodun Carrillo RN  Outcome: Ongoing  2/21/2022 0555 by Marcos Robert RN  Outcome: Ongoing     Problem: Tissue Perfusion, Altered:  Goal: Circulatory function within specified parameters  Description: Circulatory function within specified parameters  2/21/2022 1639 by Abiodun Carrillo RN  Outcome: Ongoing  2/21/2022 0555 by Marcos Robert RN  Outcome: Ongoing     Problem: Venous Thromboembolism:  Goal: Will show no signs or symptoms of venous thromboembolism  Description: Will show no signs or symptoms of venous thromboembolism  2/21/2022 1639 by Abiodun Carrillo RN  Outcome: Ongoing  2/21/2022 0555 by Marcos Robert RN  Outcome: Ongoing  Goal: Absence of signs or symptoms of impaired coagulation  Description: Absence of signs or symptoms of impaired coagulation  2/21/2022 1639 by Abiodun Carrillo RN  Outcome: Ongoing  2/21/2022 0555 by Marcos Robert RN  Outcome: Ongoing     Problem: Skin Integrity:  Goal: Will show no infection signs and symptoms  Description: Will show no infection signs and symptoms  2/21/2022 1639 by Abiodun Carrillo RN  Outcome: Ongoing  2/21/2022 0555 by Marcos Robert RN  Outcome: Ongoing  Goal: Absence of new skin breakdown  Description: Absence of new skin breakdown  2/21/2022 1639 by Brenda Leiva RN  Outcome: Ongoing  2/21/2022 0555 by La Nena Mitchell RN  Outcome: Ongoing     Problem: Pain:  Goal: Pain level will decrease  Description: Pain level will decrease  2/21/2022 1639 by Brenda Leiva RN  Outcome: Ongoing  2/21/2022 0555 by La Nena Mitchell RN  Outcome: Ongoing  Goal: Control of acute pain  Description: Control of acute pain  2/21/2022 1639 by Brenda Leiva RN  Outcome: Ongoing  2/21/2022 0555 by La Nena Mitchell RN  Outcome: Ongoing  Goal: Control of chronic pain  Description: Control of chronic pain  2/21/2022 1639 by Brenda Leiva RN  Outcome: Ongoing  2/21/2022 0555 by La Nena Mitchell RN  Outcome: Ongoing

## 2022-02-21 NOTE — CONSULTS
WITH AXEL GONZALEZ performed by Freddy Durand MD at [address]       Family History   Problem Relation Age of Onset    Heart Disease Mother         heart attack 2007    High Blood Pressure Mother     Diabetes Father     Heart Disease Father     Emphysema Father     Cancer Maternal Grandmother         cervical cancer    Lung Cancer Maternal Grandfather         black lung    Diabetes Paternal Grandmother     Heart Disease Paternal Grandfather      Social History     Socioeconomic History    Marital status:      Spouse name: None    Number of children: None    Years of education: None    Highest education level: None   Occupational History    None   Tobacco Use    Smoking status: Never Smoker    Smokeless tobacco: Never Used   Vaping Use    Vaping Use: Never used   Substance and Sexual Activity    Alcohol use: No    Drug use: No    Sexual activity: Yes     Partners: Male   Other Topics Concern    None   Social History Narrative    None     Social Determinants of Health     Financial Resource Strain:     Difficulty of Paying Living Expenses: Not on file   Food Insecurity:     Worried About Running Out of Food in the Last Year: Not on file    Sarah of Food in the Last Year: Not on file   Transportation Needs:     Lack of Transportation (Medical): Not on file    Lack of Transportation (Non-Medical):  Not on file   Physical Activity:     Days of Exercise per Week: Not on file    Minutes of Exercise per Session: Not on file   Stress:     Feeling of Stress : Not on file   Social Connections:     Frequency of Communication with Friends and Family: Not on file    Frequency of Social Gatherings with Friends and Family: Not on file    Attends Gnosticist Services: Not on file    Active Member of Clubs or Organizations: Not on file    Attends Club or Organization Meetings: Not on file    Marital Status: Not on file   Intimate Partner Violence:     Fear of Current or Ex-Partner: Not on file    Emotionally Abused: Not on file    Physically Abused: Not on file    Sexually Abused: Not on file   Housing Stability:     Unable to Pay for Housing in the Last Year: Not on file    Number of Places Lived in the Last Year: Not on file    Unstable Housing in the Last Year: Not on file       MEDICATIONS   SCHEDULED:  scopolamine, 1 patch, Q72H  meropenem, 500 mg, Q24H  sodium chloride (PF), 10 mL, Daily   And  pantoprazole, 40 mg, Daily  insulin lispro, 0-6 Units, TID WC  insulin lispro, 0-3 Units, Nightly  sodium chloride flush, 5-40 mL, 2 times per day  heparin (porcine), 5,000 Units, 3 times per day  custom dialysis builder, , Nightly   And  custom dialysis builder, , Nightly  calcium acetate, 2 capsule, TID WC      FLUIDS/DRIPS:     dextrose      sodium chloride 5 mL/hr at 02/20/22 0516     PRNs: promethazine, 12.5 mg, Q6H PRN  diphenhydrAMINE, 25 mg, Q8H PRN  glucose, 15 g, PRN  dextrose, 12.5 g, PRN  glucagon (rDNA), 1 mg, PRN  dextrose, 100 mL/hr, PRN  sodium chloride flush, 5-40 mL, PRN  sodium chloride, 25 mL, PRN  acetaminophen, 650 mg, Q6H PRN   Or  acetaminophen, 650 mg, Q6H PRN  ondansetron, 4 mg, Q6H PRN  diphenhydrAMINE, 25 mg, Q8H PRN      ALLERGIES:  She   Allergies   Allergen Reactions    Bactrim [Sulfamethoxazole-Trimethoprim]     Doxazosin Other (See Comments)     Other reaction(s): Other (See Comments)  Bleeding  Punch in chest      Geocillin [Carbenicillin]     Lisinopril Other (See Comments)     Pt thinks cr levels went up due to med.  Other      Artificial sweetners, nutrasweet    Spironolactone      Bad taste, emesis.  Pt refuses to take    Tramadol     Bumetanide Nausea And Vomiting    Ibuprofen Nausea And Vomiting       REVIEW OF SYSTEMS   Pertinent ROS noted in HPI    PHYSICAL EXAM     Vitals:    02/20/22 2111 02/21/22 0032 02/21/22 0424 02/21/22 0750   BP:  109/69 (!) 142/78 (!) 150/74   Pulse:  89 85 75   Resp:  16 16 16   Temp:  97.9 °F (36.6 °C) 97.7 °F (36.5 °C) 97.4 °F (36.3 °C)   TempSrc:  Oral Oral Oral   SpO2: 96% 91% 93% 94%   Weight:       Height:           I/O last 3 completed shifts: In: 1803.1 [I.V.:1506.4; IV Piggyback:296.7]  Out: 888 [Emesis/NG output:3]      Physical Exam:  General appearance: alert, cooperative, no distress, appears stated age  Eyes: Anicteric  Head: Normocephalic, without obvious abnormality  Lungs: clear to auscultation bilaterally, Normal Effort  Heart: regular rate and rhythm, normal S1 and S2, no murmurs or rubs  Abdomen: soft, non-distended, non-tender. Bowel sounds normal. No masses,  no organomegaly. Extremities: atraumatic, no cyanosis or edema  Skin: warm and dry, no jaundice  Neuro: Grossly intact, A&OX3      LABS AND IMAGING   Laboratory   Recent Labs     02/19/22 0437 02/20/22 0352 02/21/22  0342   WBC 5.6 5.2 6.6   HGB 10.1* 10.2* 10.3*   HCT 30.3* 30.9* 31.3*   MCV 93.1 92.8 94.0    143 143     Recent Labs     02/19/22 0437 02/20/22 0352 02/21/22  0342   * 133* 134*   K 3.1* 2.7*  --    CL 92* 91* 91*   CO2 25 23 24   BUN 36* 30* 30*   CREATININE 9.4* 9.4* 9.3*     No results for input(s): AST, ALT, ALB, BILIDIR, BILITOT, ALKPHOS in the last 72 hours. No results for input(s): LIPASE, AMYLASE in the last 72 hours. No results for input(s): PROTIME, INR in the last 72 hours. Imaging  CT ABDOMEN PELVIS WO CONTRAST Additional Contrast? None   Final Result   1. Sigmoid colon diverticulosis. There is confluent inflammatory change   within the mesenteric fat surrounding a short segment of simple sigmoid colon   containing the diverticula. This could represent sequela of diverticulitis. 2.  2 cm ventral abdominal wall hernia containing inflamed fat. 3.  9 mm ground-glass nodule in the left lung base. Recommend follow-up with   a noncontrast chest CT at 6-12 months to confirm persistence then additional   noncontrast chest CTs every 2 years until 5 years. ASSESSMENT AND RECOMMENDATIONS   55 y.o. female with a PMH of ESRD on PD, DM, diabetic neuropathy, diabetic retinopathy, HTN, HLD who presented on 2/15/2022 with nausea, vomiting, abdominal pain and diarrhea. Admitted with PD peritonitis and diverticulitis. GI consulted regarding intractable nausea vomiting    IMPRESSION:  1. Intractable nausea vomiting  -possible related to sepsis/infection vs side effect of antibiotic (was being treated with flagyl which has just recently been d/c'd) vs gastroparesis vs PUD/GERD vs other. 2. Diverticulitis   3. Peritonitis  4. Diabetes    RECOMMENDATIONS:    Trial Reglan 5 mg QID for possible component of gastroparesis  PPI BID for possible GERD  Continue antiemetics as needed (on scopolamine patch and phenergan and zofran prn)  Antibiotics per ID  Will discuss case with Dr. Terence Krishnamurthy. If no improvement with above measures ?consider EGD. Please await further input and recommendations from Dr. Terence Krishnamurthy  Patient will need outpt follow up colonoscopy once recovered from diverticulitis. If you have any questions or need any further information, please feel free to contact our consult team.  Thank you for allowing us to participate in the care of [address] [place] [address] Shaun. The note was completed using Dragon voice recognition transcription. Every effort was made to ensure accuracy; however, inadvertent transcription errors may be present despite my best efforts to edit errors. Eleanor Berrios PA-C    Attending physician addendum:    I have personally seen and examined the patient, reviewed the patient's medical record and pertinent labs and clinical imaging. I have personally staffed the case with Eleanor ASCENCIO. I agree with her consultation note, exam findings, assessment and plans  as written above. I have made appropriate modifications and edited her assessment and plan where needed to reflect my impression and plans for this patient.        Holli Menjivar is a 55 YO female with past medical history of diabetes, hyperlipidemia, hypertension, ESRD on peritoneal dialysis, and previous COVID-19 virus infection back in January of this year who presents to [facility] with concern for new cloudy output from her peritoneal dialysis catheter. CT on admission concerning for diverticulitis. She has been treated for a peritoneal fluid infection and diverticulitis. Flagyl was add on 2/18 but now discontinued. Patient reports nausea is chronic and started prior to admission. Concern she may have a component on Gastroparesis with history of DM. We will also put on a PPI. Plan for EGD tomorrow if not improvement. Thank you for allowing me to participate in this patient's care. If there are any questions or concerns regarding this patient, or the plan we have set in place, please feel free to contact me at [phone].      Judge Bobbi MD

## 2022-02-22 NOTE — PLAN OF CARE
Problem: Nutrition  Goal: Optimal nutrition therapy  Outcome: Ongoing   Nutrition Problem #1: Inadequate oral intake  Intervention: Food and/or Nutrient Delivery: Continue Current Diet  Nutritional Goals: PO intake >50%

## 2022-02-22 NOTE — PROGRESS NOTES
Infectious Disease Follow up Notes  Admit Date: 2/15/2022    Antibiotics :   IV Meropenem      CHIEF COMPLAINT:     PD peritonitis  Pseudomonas infection   Diverticulitis    Subjective interval History :  55 y.o. man with a past medical history of end-stage renal disease on peritoneal dialysis for the past 2 years, diabetic retinopathy , diabetes neuropathy, kidney stone, hypertension, hyperlipidemia, admitted to the hospital secondary to abdominal pain and diarrhea. She also has a history of COVID-19 infection for which she is recovering okay but unfortunately developed GI symptoms consistent with nausea diarrhea abdominal pain. PD fluid on admission with abnormal PD fluid culture positive for Pseudomonas. Admit labs indicate lactic acid 4.1 glucose at 295, WBC elevated 14.4. Blood cultures from admission negative. CT abdomen pelvis indicate sigmoid colon diverticulosis but there is small sequelae of diverticulitis. PD fluid cell count was grossly abnormal with WBC count at 18,000.   Location : abd pain+      Quality :  Aching      Severity :  10/10   Duration : weeks     Timing :constant   Context : PD     Modifying factors : none   Associated signs and symptoms:diarrhea, nausea        Interval History :  Nausea some improvement with change in IV abx and now on antiemetics and PD fluid cell counts some improvement cx in process has some abd pain +       Past Medical History:    Past Medical History:   Diagnosis Date    Anemia 9/15/2014    Chronic kidney disease     Diabetes mellitus ([address]     Diabetes mellitus type 1 ([address]     Diabetic retinopathy ([address]     Diabetic retinopathy associated with type 2 diabetes mellitus, without macular edema     Hyperlipidemia 7/27/2015    Hypertension     Kidney stone     Mixed hyperlipidemia 7/27/2015    Orthostatic hypotension     Vitreous hemorrhage of right eye ([address] 8/15/2017 Past Surgical History:    Past Surgical History:   Procedure Laterality Date    APPENDECTOMY      KIDNEY STONE SURGERY      LAPAROSCOPY INSERTION PERITONEAL CATHETER N/A 8/12/2019    LAPAROSCOPIC PERITONEAL DIALYSIS CATHETER PLACEMENT WITH AXEL TROCHAR performed by Henrique Adler MD at [facility]         Current Medications:    Outpatient Medications Marked as Taking for the 2/15/22 encounter [facility] Encounter)   Medication Sig Dispense Refill    torsemide (DEMADEX) 100 MG tablet TAKE 4 TABLETS BY MOUTH ONCE NIGHTLY 360 tablet 5    amLODIPine (NORVASC) 10 MG tablet TAKE ONE TABLET BY MOUTH DAILY 30 tablet 3    losartan (COZAAR) 100 MG tablet Take 100 mg by mouth nightly      pantoprazole (PROTONIX) 40 MG tablet Take 1 tablet by mouth daily 90 tablet 1    insulin lispro (HUMALOG KWIKPEN) 100 UNIT/ML pen Inject 2-4 Units into the skin 3 times daily (before meals) Sliding scale          Allergies:  Bactrim [sulfamethoxazole-trimethoprim], Doxazosin, Geocillin [carbenicillin], Lisinopril, Other, Spironolactone, Tramadol, Bumetanide, and Ibuprofen    Immunizations : There is no immunization history on file for this patient.     Social History:     Social History     Tobacco Use    Smoking status: Never Smoker    Smokeless tobacco: Never Used   Vaping Use    Vaping Use: Never used   Substance Use Topics    Alcohol use: No    Drug use: No     Social History     Tobacco Use   Smoking Status Never Smoker   Smokeless Tobacco Never Used      Family History   Problem Relation Age of Onset    Heart Disease Mother         heart attack 2007    High Blood Pressure Mother     Diabetes Father     Heart Disease Father     Emphysema Father     Cancer Maternal Grandmother         cervical cancer    Lung Cancer Maternal Grandfather         black lung    Diabetes Paternal Grandmother     Heart Disease Paternal Grandfather       REVIEW OF SYSTEMS:    Constitutional:  negative for fevers, chills, night sweats  Eyes:  negative for blurred vision, eye discharge, visual disturbance   HEENT:  negative for hearing loss, ear drainage,nasal congestion  Respiratory:  negative for cough, shortness of breath or hemoptysis   Cardiovascular:  negative for chest pain, palpitations, syncope  Gastrointestinal:  negative for nausea, vomiting, diarrhea, constipation, abdominal pain++   Genitourinary:  negative for frequency, dysuria, urinary incontinence, hematuria  Hematologic/Lymphatic:  negative for easy bruising, bleeding and lymphadenopathy  Allergic/Immunologic:  negative for recurrent infections, angioedema, anaphylaxis   Endocrine:  negative for weight changes, polyuria, polydipsia and polyphagia  Musculoskeletal:  negative for joint  pain, swelling, decreased range of motion  Integumentary: No rashes, skin lesions  Neurological:  negative for headaches, slurred speech, unilateral weakness  Psychiatric: negative for hallucinations,confusion,agitation.                 PHYSICAL EXAM:      Vitals:    BP (!) 158/51   Pulse 105   Temp 98.4 °F (36.9 °C) (Oral)   Resp 16   Ht 5' 3\" (1.6 m)   Wt 190 lb 0.6 oz (86.2 kg)   SpO2 92%   BMI 33.66 kg/m²       General Appearance: alert,in no acute distress, +  pallor, no icterus chronic ill appearing woman +  Skin: warm and dry, no rash or erythema  Head: normocephalic and atraumatic  Eyes: pupils equal, round, and reactive to light, conjunctivae normal  ENT: tympanic membrane, external ear and ear canal normal bilaterally, nose without deformity, nasal mucosa and turbinates normal without polyps  Neck: supple and non-tender without mass, no thyromegaly  no cervical lymphadenopathy  Pulmonary/Chest: clear to auscultation bilaterally- no wheezes, rales or rhonchi, normal air movement, no respiratory distress  Cardiovascular: normal rate, regular rhythm, normal S1 and S2, no murmurs, rubs, clicks, or gallops, no carotid bruits  Abdomen: soft, ++ -tender, + -distended, normal bowel sounds, no masses or organomegaly  Extremities: no cyanosis, clubbing or edema  Musculoskeletal: normal range of motion, no joint swelling, deformity or tenderness  Integumentary: No rashes, no abnormal skin lesions, no petechiae  Neurologic: reflexes normal and symmetric, no cranial nerve deficit  Psych:  Orientation, sensorium, mood normal            Lines: PD catheter +        Data Review:    CBC:   Lab Results   Component Value Date    WBC 7.2 02/22/2022    HGB 10.1 (L) 02/22/2022    HCT 30.4 (L) 02/22/2022    MCV 93.8 02/22/2022     02/22/2022     RENAL:   Lab Results   Component Value Date    CREATININE 9.6 (HH) 02/22/2022    BUN 30 (H) 02/22/2022     (L) 02/22/2022    K 3.0 (L) 02/22/2022    CL 94 (L) 02/22/2022    CO2 23 02/22/2022     SED RATE: No results found for: SEDRATE  CK: No results found for: CKTOTAL  CRP: No results found for: CRP  Hepatic Function Panel:   Lab Results   Component Value Date    ALKPHOS 117 02/15/2022    ALT 7 02/15/2022    AST 14 02/15/2022    PROT 5.6 02/15/2022    BILITOT 0.6 02/15/2022    LABALBU 1.7 02/17/2022     UA:  Lab Results   Component Value Date    COLORU YELLOW 05/27/2021    CLARITYU Clear 05/27/2021    GLUCOSEU 250 05/27/2021    BILIRUBINUR Negative 05/27/2021    KETUA Negative 05/27/2021    SPECGRAV 1.016 05/27/2021    BLOODU TRACE 05/27/2021    PHUR 7.0 05/27/2021    PROTEINU 100 05/27/2021    UROBILINOGEN 0.2 05/27/2021    NITRU Negative 05/27/2021    LEUKOCYTESUR Negative 05/27/2021    LABMICR YES 05/27/2021    URINETYPE NotGiven 05/27/2021      Urine Microscopic:   Lab Results   Component Value Date    LABCAST 3-5 Hyaline 08/27/2014    BACTERIA RARE 05/27/2021    COMU see below 05/27/2021    HYALCAST 0 05/27/2021    WBCUA 3 05/27/2021    RBCUA 4 05/27/2021    EPIU 4 05/27/2021     Urine Reflex to Culture:   Lab Results   Component Value Date    URRFLXCULT Yes 08/04/2019          Results for Lynn Colbert (MRN [id number]) as of Meds:   metoclopramide  5 mg IntraVENous Q6H    pantoprazole  40 mg IntraVENous BID    And    sodium chloride (PF)  10 mL IntraVENous BID    scopolamine  1 patch TransDERmal Q72H    meropenem  500 mg IntraVENous Q24H    insulin lispro  0-6 Units SubCUTAneous TID     insulin lispro  0-3 Units SubCUTAneous Nightly    sodium chloride flush  5-40 mL IntraVENous 2 times per day    heparin (porcine)  5,000 Units SubCUTAneous 3 times per day    custom dialysis builder   IntraPERitoneal Nightly    And    custom dialysis builder   IntraPERitoneal Nightly    calcium acetate  2 capsule Oral TID        Continuous Infusions:   dextrose      sodium chloride 5 mL/hr at 02/20/22 0516       PRN Meds:  promethazine, diphenhydrAMINE, glucose, dextrose, glucagon (rDNA), dextrose, sodium chloride flush, sodium chloride, acetaminophen **OR** acetaminophen, ondansetron, diphenhydrAMINE      Assessment:     Patient Active Problem List   Diagnosis    Diabetic retinopathy ([address]    Mixed hyperlipidemia    DMII (diabetes mellitus, type 2) ([address]    Vitreous hemorrhage of right eye ([address]    Essential hypertension    Acute kidney injury ([address]    ESRD (end stage renal disease) ([address]    Acute renal failure superimposed on chronic kidney disease ([address]    Volume overload    Peritonitis ([address]    Generalized abdominal pain    Peritoneal dialysis status ([address]    Morbid obesity due to excess calories ([address]    Nausea and vomiting    Hyponatremia    Diarrhea    Dizziness    Hypertensive emergency    Hypokalemia    Hypertensive urgency    Cerebrovascular accident (CVA) ([address]    Electrolyte imbalance    Acute respiratory failure with hypoxia (HCC)    Sepsis ([address]    Electrolyte abnormality    Diverticulitis          Sepsis on admit  PD peritonitis  Diverticulitis  Pseudomonas on PD fluid cx  WBC elevation   Lactic acidosis  ESRD on PD  DM with several complications  PD fluid cell counts very abnormal         Will need IV abx for now and at d.c hopefully able to do IV abx through PD exchanges      PD fluid cx in follow up pending will need to see improvement in cell counts before d/c planning AND Fluid cx have to be negative     PD fluid cx with Pseudomonas still and she is not clearing her infection indicating that PD catheter may be now involved    WILL d/w  about plans for possible PD catheter removal and holiday with HD and then go for new PD placement     Will see if her NAUSEA improves with change in her IV abx now tolerating a bit better     Would like PD fluid cx to be negative soon        Labs, Microbiology, Radiology and all the pertinent results from current hospitalization and  care every where were reviewed  by me as a part of the evaluation   Plan:   1. Trend PD fluid cell counts and culture  2. IV Meropenem x 500 mg q 24 hrs  . Watch for complications  4. Blood cx negative   5. CT abd/pelvis results noted  6. Hope to save PD catheter will follow cell counts BUT MAY now likely needs removal as PD fluid cx still positive for Pseudomonas d/w Dr Lane Fox         Discussed with patient/Family and Nursing   Risk of Complications/Morbidity: High      · Illness(es)/ Infection present that pose threat to bodily function. · There is potential for severe exacerbation of infection/side effects of treatment. · Therapy requires intensive monitoring for antimicrobial agent toxicity. Discussed with patient/Family and Nursing staff     Thanks for allowing me to participate in your patient's care and please call me with any questions or concerns.     Cayden Blum MD  Infectious Disease  [facility]) Physician  Phone: [phone]   Fax : [phone]

## 2022-02-22 NOTE — PLAN OF CARE
Problem: Falls - Risk of:  Goal: Will remain free from falls  Description: Will remain free from falls  2/22/2022 0746 by Svitlana Chand RN  Outcome: Ongoing     Problem: Falls - Risk of:  Goal: Absence of physical injury  Description: Absence of physical injury  Outcome: Ongoing     Problem: SAFETY  Goal: Free from accidental physical injury  Outcome: Ongoing     Problem: SAFETY  Goal: Free from intentional harm  Outcome: Ongoing     Problem: DAILY CARE  Goal: Daily care needs are met  Outcome: Ongoing     Problem: PAIN  Goal: Patient's pain/discomfort is manageable  Outcome: Ongoing     Problem: SKIN INTEGRITY  Goal: Skin integrity is maintained or improved  2/22/2022 0746 by Svitlana Chand RN  Outcome: Ongoing     Problem: KNOWLEDGE DEFICIT  Goal: Patient/S.O. demonstrates understanding of disease process, treatment plan, medications, and discharge instructions.   Outcome: Ongoing

## 2022-02-22 NOTE — PLAN OF CARE
Problem: Falls - Risk of:  Goal: Will remain free from falls  Description: Will remain free from falls  2/22/2022 0005 by Gloria Dash RN  Outcome: Ongoing  Note: Fall risk assessment completed every shift. All precautions in place. Pt has call light within reach at all times. Room clear of clutter. Pt aware to call for assistance when getting up. Patient assessed for fall risk; fall precautions initiated. Patient and family instructed about safety devices. Environment kept free of clutter and adequate lighting provided. Bed locked and in lowest position. Call light within reach. Will continue to monitor. Problem: SKIN INTEGRITY  Goal: Skin integrity is maintained or improved  2/22/2022 0006 by Gloria Dash RN  Outcome: Ongoing  Note: Skin assessment complete. No breakdown noted. Interventions in place. See doc flowsheet for interventions initiated. Pt encouraged to turn.  Will continue to monitor for additional needs and skin breakdown      Problem: Fluid Volume:  Goal: Ability to achieve a balanced intake and output will improve  Description: Ability to achieve a balanced intake and output will improve  Outcome: Ongoing

## 2022-02-22 NOTE — PROGRESS NOTES
Hospitalist Progress Note  2/22/2022 10:39 AM    PCP: No primary care provider on file. [phone]     Date of Admission: 2/15/2022                                                                                                                     HOSPITAL COURSE    Patient demographics:  The patient  Yenny Swartz is a 55 y.o. female     Significant past medical history:   Patient Active Problem List   Diagnosis    Diabetic retinopathy ([address]    Mixed hyperlipidemia    DMII (diabetes mellitus, type 2) ([address]    Vitreous hemorrhage of right eye ([address]    Essential hypertension    Acute kidney injury ([address]    ESRD (end stage renal disease) ([address]    Acute renal failure superimposed on chronic kidney disease ([address]    Volume overload    Peritonitis ([address]    Generalized abdominal pain    Peritoneal dialysis status ([address]    Morbid obesity due to excess calories ([address]    Nausea and vomiting    Hyponatremia    Diarrhea    Dizziness    Hypertensive emergency    Hypokalemia    Hypertensive urgency    Cerebrovascular accident (CVA) ([address]    Electrolyte imbalance    Acute respiratory failure with hypoxia ([address]    Sepsis ([address]    Electrolyte abnormality    Diverticulitis         Presenting symptoms:  Nausea and vomiting, abdominal pain, fever and chills, generalized weakness    Diagnostic workup:      CONSULTS DURING ADMISSION :   IP CONSULT TO NEPHROLOGY  IP CONSULT TO NEPHROLOGY  IP CONSULT TO INFECTIOUS DISEASES  IP CONSULT TO GI      Patient was diagnosed with:  Acute peritonitis  Diverticulitis    Treatment while inpatient:  Pt presented to [facility] with concern for new cloudy output from her peritoneal dialysis catheter                                                                                       ----------------------------------------------------------      SUBJECTIVE COMPLAINTS- follow up for Nausea and vomiting, abdominal pain,    Diet: ADULT DIET;  Regular; 4 carb choices (60 gm/meal);  Low Phosphorus (Less than 1000 mg)      OBJECTIVE:   Patient Active Problem List   Diagnosis    Diabetic retinopathy ([email or web address]    Mixed hyperlipidemia    DMII (diabetes mellitus, type 2) ([email or web address]    Vitreous hemorrhage of right eye ([email or web address]    Essential hypertension    Acute kidney injury ([email or web address]    ESRD (end stage renal disease) ([email or web address]    Acute renal failure superimposed on chronic kidney disease (HCC)    Volume overload    Peritonitis ([facility])    Generalized abdominal pain    Peritoneal dialysis status ([email or web address]    Morbid obesity due to excess calories ([facility])    Nausea and vomiting    Hyponatremia    Diarrhea    Dizziness    Hypertensive emergency    Hypokalemia    Hypertensive urgency    Cerebrovascular accident (CVA) ([email or web address]    Electrolyte imbalance    Acute respiratory failure with hypoxia ([facility])    Sepsis ([facility])    Electrolyte abnormality    Diverticulitis       Allergies  Bactrim [sulfamethoxazole-trimethoprim], Doxazosin, Geocillin [carbenicillin], Lisinopril, Other, Spironolactone, Tramadol, Bumetanide, and Ibuprofen    Medications    Scheduled Meds:   metoclopramide  5 mg IntraVENous Q6H    pantoprazole  40 mg IntraVENous BID    And    sodium chloride (PF)  10 mL IntraVENous BID    scopolamine  1 patch TransDERmal Q72H    meropenem  500 mg IntraVENous Q24H    insulin lispro  0-6 Units SubCUTAneous TID     insulin lispro  0-3 Units SubCUTAneous Nightly    sodium chloride flush  5-40 mL IntraVENous 2 times per day    heparin (porcine)  5,000 Units SubCUTAneous 3 times per day    custom dialysis builder   IntraPERitoneal Nightly    And    custom dialysis builder   IntraPERitoneal Nightly    calcium acetate  2 capsule Oral TID      Continuous Infusions:   dextrose      sodium chloride 5 mL/hr at 02/20/22 0516     PRN Meds:  promethazine, diphenhydrAMINE, glucose, dextrose, glucagon (rDNA), dextrose, sodium chloride flush, sodium chloride, acetaminophen **OR** acetaminophen, ondansetron, diphenhydrAMINE    Vitals   Vitals /wt   Patient Vitals for the past 8 hrs:   BP Temp Temp src Pulse Resp SpO2 Weight   02/22/22 0926 -- -- -- -- -- 92 % --   02/22/22 0731 (!) 158/51 98.4 °F (36.9 °C) Oral 105 16 92 % --   02/22/22 0545 -- -- -- -- -- -- 190 lb 0.6 oz (86.2 kg)   02/22/22 0425 (!) 150/85 98.4 °F (36.9 °C) Oral 96 18 96 % --        72HR INTAKE/OUTPUT:      Intake/Output Summary (Last 24 hours) at 2/22/2022 1039  Last data filed at 2/22/2022 0421  Gross per 24 hour   Intake 220 ml   Output 257 ml   Net -37 ml       Exam:    Gen:   Alert and oriented ×3    Eyes: PERRL. No sclera icterus. No conjunctival injection. ENT: No discharge. Pharynx clear. External appearance of ears and nose normal.  Neck: Trachea midline. No obvious mass. Resp: No accessory muscle use. No crackles. No wheezes. No rhonchi. CV: Regular rate. Regular rhythm. No murmur or rub. No edema. GI: Non-tender. Non-distended. No hernia. Skin: Warm, dry, normal texture and turgor. Lymph: No cervical LAD. No supraclavicular LAD. M/S: / Ext. No cyanosis. No clubbing. No joint deformity. Neuro: CN 2-12 are intact,  no neurologic deficits noted. PT/INR: No results for input(s): PROTIME, INR in the last 72 hours. APTT: No results for input(s): APTT in the last 72 hours. CBC:   Recent Labs     02/20/22  0352 02/21/22 0342 02/22/22 0435   WBC 5.2 6.6 7.2   HGB 10.2* 10.3* 10.1*   HCT 30.9* 31.3* 30.4*   MCV 92.8 94.0 93.8    143 147       BMP:   Recent Labs     02/20/22  0352 02/21/22 0342 02/22/22  0435   * 134* 135*   K 2.7* 2.9* 3.0*   CL 91* 91* 94*   CO2 23 24 23   BUN 30* 30* 30*   CREATININE 9.4* 9.3* 9.6*       LIVER PROFILE:   No results for input(s): ALKPHOS, AST, ALT, ALB, BILIDIR, BILITOT, ALKPHOS in the last 72 hours. No results for input(s): AMYLASE in the last 72 hours. No results for input(s): LIPASE in the last 72 hours.     UA:No results for input(s): NITRITE, LABCAST, Mirza Craig, MUCUS in the last 72 hours. TROPONIN: No results for input(s): Marc Snuffer in the last 72 hours. Lab Results   Component Value Date/Time    URRFLXCULT Yes 08/04/2019 06:40 PM       No results for input(s): TSHREFLEX in the last 72 hours. No components found for: [id number]  POC GLUCOSE:    Recent Labs     02/21/22  0729 02/21/22  1130 02/21/22  1700 02/21/22 2005 02/22/22  0743   POCGLU 216* 155* 126* 120* 211*     No results for input(s): LABA1C in the last 72 hours. Lab Results   Component Value Date    LABA1C 8.1 01/07/2022         ASSESSMENT AND PLAN    Intractable nausea vomiting  Likely related to diabetic gastroparesis  Continue patient on Reglan 5 mg 4 times daily  Proton pump inhibitors twice daily for possible GERD  Patient reports improvement in nausea    Sepsis  Antibiotics changed to meropenem    Peritonitis  Monitor peritoneal dialysis fluid cell count  Blood cultures no growth to date  Body fluid culture is positive for Pseudomonas  Antibiotics changed to meropenem  Infectious diseases following      Dizziness on standing  Discontinue losartan      Diverticulitis  Patient on meropenem  Procalcitonin is still high at 0.60    Electrolyte normality    ESRD  Nephrology is consulted        Code Status: Full Code        Dispo -cc        The patient and / or the family were informed of the results of any tests, a time was given to answer questions, a plan was proposed and they agreed with plan. Raven Leon MD    This note was transcribed using [id number] [employer]. Please disregard any translational errors.

## 2022-02-22 NOTE — PROGRESS NOTES
Office: [phone]       Fax: [phone]      Nephrology Progress Note        Patient's Name: Wesley Dugan  Date of Visit: 2/22/2022    Reason for Consult:  ESRD management      History of Present Illness:      Wesley Dugan is a 55 y.o. female with PMHx of hypertension, diabetes mellitus, ESRD who was admitted on 2/15/2022 with complaints of abdominal pain   PD fluid cloudy  history of PD peritonitis in past.  Does 2 exchanges with 2.5% day time and Icodextrin overnight    INTERVAL HISTORY      Feels same  Shortness of breath: No   mild abdominal pain   Nausea better    Medications:        Allergies:  Bactrim [sulfamethoxazole-trimethoprim], Doxazosin, Geocillin [carbenicillin], Lisinopril, Other, Spironolactone, Tramadol, Bumetanide, and Ibuprofen  Scheduled Meds:   metoclopramide  5 mg IntraVENous Q6H    pantoprazole  40 mg IntraVENous BID    And    sodium chloride (PF)  10 mL IntraVENous BID    scopolamine  1 patch TransDERmal Q72H    meropenem  500 mg IntraVENous Q24H    insulin lispro  0-6 Units SubCUTAneous TID WC    insulin lispro  0-3 Units SubCUTAneous Nightly    sodium chloride flush  5-40 mL IntraVENous 2 times per day    heparin (porcine)  5,000 Units SubCUTAneous 3 times per day    custom dialysis builder   IntraPERitoneal Nightly    And    custom dialysis builder   IntraPERitoneal Nightly    calcium acetate  2 capsule Oral TID WC     Continuous Infusions:   dextrose      sodium chloride 5 mL/hr at 02/20/22 0516         Objective:       Vitals:  BP (!) 158/51   Pulse 105   Temp 98.4 °F (36.9 °C) (Oral)   Resp 16   Ht 5' 3\" (1.6 m)   Wt 190 lb 0.6 oz (86.2 kg)   SpO2 92%   BMI 33.66 kg/m²   Constitutional:  awake, NAD  Skin: no rash, turgor wnl  HEENT:  MMM, No icterus  Cardiovascular:  S1, S2 reg  Respiratory: CTA, no crackles  Abdomen:  +BS, soft, NT, ND  Ext: no lower extremity edema  Access: abd PD catheter in place, no drainage/redness around exit site. No tunnel tenderness  CNS: alert, no agitation    Data:     Labs:  Hepatic:   No results for input(s): AST, ALT, ALB, BILITOT, ALKPHOS in the last 72 hours. BNP: No results for input(s): BNP in the last 72 hours. ABGs: No results for input(s): PHART, PO2ART, RZK5GVF in the last 72 hours. IMAGING:  CT ABDOMEN PELVIS WO CONTRAST Additional Contrast? None   Final Result   1. Sigmoid colon diverticulosis. There is confluent inflammatory change   within the mesenteric fat surrounding a short segment of simple sigmoid colon   containing the diverticula. This could represent sequela of diverticulitis. 2.  2 cm ventral abdominal wall hernia containing inflamed fat. 3.  9 mm ground-glass nodule in the left lung base. Recommend follow-up with   a noncontrast chest CT at 6-12 months to confirm persistence then additional   noncontrast chest CTs every 2 years until 5 years. Assessment :       1. ESRD   Etiology: suspected DN  Access: Abd PD cath  Volume: Hypervolemic      Seen on PD    2. Electrolytes/Acid base  Hypokalemia    Recent Labs     02/22/22  0435   *   K 3.0*   CO2 23   MG 1.80       3. BMD  -Hyperphosphatemia, SHPT  -maintained on Renvela    Lab Results   Component Value Date    CALCIUM 7.3 (L) 02/22/2022    PHOS 4.5 02/17/2022        4. HTN  -Blood pressure high       BP Readings from Last 1 Encounters:   02/22/22 (!) 158/51       5. Anemia  -anemia of CKD  -assess for CHRIS    Recent Labs     02/22/22  0435   HGB 10.1*     6. DM    7. PD peritonitis  -Cult: pseudomonas    PLAN :       - Antibiotics directed by culture  - plan to replace PD catheter  - replace K, Mg as needed  -maintenance PD tonight (do 3 exchanges with 2.5% Dianeal).  Added heparin.   -resume BP meds:   -MBD management  -Anemia management  -Dose meds to GFR<10      Thank you for allowing us to participate in care of [employer]e [employer] . We will continue to follow. Feel free to contact me with any questions.       Conchita Moreland MD  2/22/2022    [facility] [address] [place] [address]  Office : [phone]  Fax :[phone]

## 2022-02-22 NOTE — PROGRESS NOTES
INPATIENT PROGRESS NOTE        IDENTIFYING DATA/REASON FOR CONSULTATION   PATIENT:  Patricia Barrera  MRN:  [id number]  ADMIT DATE: 2/15/2022  TIME OF EVALUATION: 2/22/2022 9:13 AM  HOSPITAL STAY:   LOS: 7 days   CONSULTING PHYSICIAN: Rachid Whitney MD   REASON FOR CONSULTATION: nausea, vomiting    Subjective:    Patient seen in follow up for nausea and vomiting. She was started on metoclopramide and pantoprazole yesterday. She reports her nausea is better. She's had no further vomiting overnight or this morning. Her throat is sore from prior vomiting episode and therefore she is not eating much    MEDICATIONS   SCHEDULED:  metoclopramide, 5 mg, Q6H  pantoprazole, 40 mg, BID   And  sodium chloride (PF), 10 mL, BID  scopolamine, 1 patch, Q72H  meropenem, 500 mg, Q24H  insulin lispro, 0-6 Units, TID WC  insulin lispro, 0-3 Units, Nightly  sodium chloride flush, 5-40 mL, 2 times per day  heparin (porcine), 5,000 Units, 3 times per day  custom dialysis builder, , Nightly   And  custom dialysis builder, , Nightly  calcium acetate, 2 capsule, TID WC      FLUIDS/DRIPS:     dextrose      sodium chloride 5 mL/hr at 02/20/22 0516     PRNs: promethazine, 12.5 mg, Q6H PRN  diphenhydrAMINE, 25 mg, Q8H PRN  glucose, 15 g, PRN  dextrose, 12.5 g, PRN  glucagon (rDNA), 1 mg, PRN  dextrose, 100 mL/hr, PRN  sodium chloride flush, 5-40 mL, PRN  sodium chloride, 25 mL, PRN  acetaminophen, 650 mg, Q6H PRN   Or  acetaminophen, 650 mg, Q6H PRN  ondansetron, 4 mg, Q6H PRN  diphenhydrAMINE, 25 mg, Q8H PRN      ALLERGIES:    Allergies   Allergen Reactions    Bactrim [Sulfamethoxazole-Trimethoprim]     Doxazosin Other (See Comments)     Other reaction(s): Other (See Comments)  Bleeding  Punch in chest      Geocillin [Carbenicillin]     Lisinopril Other (See Comments)     Pt thinks cr levels went up due to med.  Other      Artificial sweetners, nutrasweet    Spironolactone      Bad taste, emesis.  Pt refuses to take   Yu Tramadol     Bumetanide Nausea And Vomiting    Ibuprofen Nausea And Vomiting         PHYSICAL EXAM   VITALS:  BP (!) 158/51   Pulse 105   Temp 98.4 °F (36.9 °C) (Oral)   Resp 16   Ht 5' 3\" (1.6 m)   Wt 190 lb 0.6 oz (86.2 kg)   SpO2 92%   BMI 33.66 kg/m²   TEMPERATURE:  Current - Temp: 98.4 °F (36.9 °C); Max - Temp  Av.3 °F (36.8 °C)  Min: 97.9 °F (36.6 °C)  Max: 98.5 °F (36.9 °C)    Physical Exam:  General appearance: alert, cooperative, no distress, appears stated age  Eyes: Anicteric  Head: Normocephalic, without obvious abnormality  Lungs: clear to auscultation bilaterally, Normal Effort  Heart: regular rate and rhythm, normal S1 and S2, no murmurs or rubs  Abdomen: soft, non-distended, non-tender. Bowel sounds normal.  +PD catheter   Extremities: atraumatic, no cyanosis or edema  Skin: warm and dry, no jaundice  Neuro: Grossly intact, A&OX3    LABS AND IMAGING   Laboratory   Recent Labs     22  0352 22  0342 22  0435   WBC 5.2 6.6 7.2   HGB 10.2* 10.3* 10.1*   HCT 30.9* 31.3* 30.4*   MCV 92.8 94.0 93.8    143 147     Recent Labs     22  0352 22  0342 22  0435   * 134* 135*   K 2.7* 2.9* 3.0*   CL 91* 91* 94*   CO2 23 24 23   BUN 30* 30* 30*   CREATININE 9.4* 9.3* 9.6*     No results for input(s): AST, ALT, ALB, BILIDIR, BILITOT, ALKPHOS in the last 72 hours. No results for input(s): LIPASE, AMYLASE in the last 72 hours. No results for input(s): PROTIME, INR in the last 72 hours. Imaging  CT ABDOMEN PELVIS WO CONTRAST Additional Contrast? None   Final Result   1. Sigmoid colon diverticulosis. There is confluent inflammatory change   within the mesenteric fat surrounding a short segment of simple sigmoid colon   containing the diverticula. This could represent sequela of diverticulitis. 2.  2 cm ventral abdominal wall hernia containing inflamed fat. 3.  9 mm ground-glass nodule in the left lung base.   Recommend follow-up with   a noncontrast chest CT at 6-12 months to confirm persistence then additional   noncontrast chest CTs every 2 years until 5 years. Endoscopy      ASSESSMENT AND RECOMMENDATIONS   Tami Parra is a 55 y.o. female with PMH of ESRD on PD, DM, diabetic neuropathy, diabetic retinopathy, HTN, HLD who presented on 2/15/2022 with nausea, vomiting, abdominal pain and diarrhea. Admitted with PD peritonitis and diverticulitis. GI consulted regarding intractable nausea vomiting    1. Intractable nausea vomiting  -possible related to sepsis/infection vs side effect of antibiotic (was being treated with flagyl which has just recently been d/c'd) vs gastroparesis vs PUD/GERD vs other.  -started on metoclopramide and PPI. Symptoms improving    2. Diverticulitis   3. Peritonitis  4. Diabetes      RECOMMENDATIONS:    Continue metoclopramide 5mg QID  Continue PPI BID  continue antiemetics as needed  Hold off on EGD   Diet as tolerated  Antibiotics per ID  Will sign off. Please call with questions    If you have any questions or need any further information, please feel free to contact us [phone]. Thank you for allowing us to participate in the care of [address] [place] [address]. The note was completed using Dragon voice recognition transcription. Every effort was made to ensure accuracy; however, inadvertent transcription errors may be present despite my best efforts to edit errors. Александр ASCENCIO    Attending physician addendum:      I have personally seen and examined the patient, reviewed the patient's medical record and pertinent labs and clinical imaging. I have personally staffed the case with Александр ASCENCIO. I agree with her consultation note, exam findings, assessment and plans  as written above. I have made appropriate modifications and edited her assessment and plan where needed to reflect my impression and plans for this patient.        Fozia Beltran is a 54 YO female with past medical history of diabetes, hyperlipidemia, hypertension, ESRD on peritoneal dialysis, and previous COVID-19 virus infection back in January of this year who presents to [facility] with concern for new cloudy output from her peritoneal dialysis catheter. CT on admission concerning for diverticulitis. She has been treated for a peritoneal fluid infection and diverticulitis. Flagyl was add on 2/18 but now discontinued. Nausea has improved with PPI/Reglan. We have reviewed side effects. Will hold off on EGD. Will have follow-up with outpatient Vladimir Frias in 2 weeks after DC. Will sign off. Please call with questions. Thank you for allowing me to participate in this patient's care. If there are any questions or concerns regarding this patient, or the plan we have set in place, please feel free to contact me at [phone].      Mery Kim MD

## 2022-02-22 NOTE — PROGRESS NOTES
Comprehensive Nutrition Assessment    Type and Reason for Visit:  RD Nutrition Re-Screen/LOS    Nutrition Recommendations/Plan:   Regular; 4 carb choices (60 gm/meal); Low Phosphorus (Less than 1000 mg) diet. Nutrition Assessment:  LOS. Pt admitted for abdominal pain, N/V. PMH ESRD on PD, diabetes, HTN. Regular 4 carb, low phos diet. Pt reports poor appetite and PO intake d/t sore throat, NV/D. States her UBW is 205#. loss of ~15 lbs in 6 months. Malnutrition Assessment:  Malnutrition Status: At risk for malnutrition (Comment)    Context:  Acute Illness     Findings of the 6 clinical characteristics of malnutrition:  Energy Intake:  Mild decrease in energy intake (Comment)  Weight Loss:  No significant weight loss     Body Fat Loss:  Unable to assess     Muscle Mass Loss:  Unable to assess    Fluid Accumulation:  Unable to assess     Strength:  Not Performed    Estimated Daily Nutrient Needs:  Energy (kcal):  4869-5632 kcal (18-20 kcal/kg); Weight Used for Energy Requirements:  Admission     Protein (g):  70-87 g (0.8-1.0 g/kg); Weight Used for Protein Requirements:  Admission        Fluid (ml/day):   ; Method Used for Fluid Requirements:  1 ml/kcal      Nutrition Related Findings:  Elevated glucose. Wounds:  None       Current Nutrition Therapies:    ADULT DIET; Regular; 4 carb choices (60 gm/meal); Low Phosphorus (Less than 1000 mg)    Anthropometric Measures:  · Height: 5' 3\" (160 cm)  · Current Body Weight: 190 lb (86.2 kg)   · Admission Body Weight: 192 lb (87.1 kg)    · Usual Body Weight: 205 lb (93 kg)     · Ideal Body Weight: 115 lbs;   · BMI: 33.7  · Adjusted Body Weight:  ; No Adjustment   · Adjusted BMI:    · BMI Categories: Obese Class 1 (BMI 30.0-34. 9)       Nutrition Diagnosis:   · Inadequate oral intake related to altered GI function as evidenced by intake 0-25%      Nutrition Interventions:   Food and/or Nutrient Delivery:  Continue Current Diet  Nutrition Education/Counseling:  No recommendation at this time   Coordination of Nutrition Care:  Continue to monitor while inpatient    Goals:  PO intake >50%       Nutrition Monitoring and Evaluation:   Behavioral-Environmental Outcomes:  None Identified   Food/Nutrient Intake Outcomes:  Food and Nutrient Intake  Physical Signs/Symptoms Outcomes:  Biochemical Data,Diarrhea,GI Status,Nausea or Vomiting,Nutrition Focused Physical Findings,Skin,Weight     Discharge Planning:     Too soon to determine     Electronically signed by Emelia Sánchez on 2/22/22 at 11:52 AM EST    Contact: [phone]

## 2022-02-22 NOTE — PROGRESS NOTES
This RN notified of several beats of V-tach. Attending physician notified. Verbal order received for potassium protocol. This AM potassium 3.0. Potassium infusion started. Critical lab called to this RN with protocol lab draw of 2.8 with potassium already infusing.

## 2022-02-23 PROBLEM — R65.21 SEPTIC SHOCK (HCC): Status: ACTIVE | Noted: 2022-01-01

## 2022-02-23 NOTE — PROGRESS NOTES
Office: [phone]       Fax: [phone]      Nephrology Progress Note        Patient's Name: Belkis Acosta  Date of Visit: 2/23/2022    Reason for Consult:  ESRD management      History of Present Illness:      Belkis Acosta is a 55 y.o. female with PMHx of hypertension, diabetes mellitus, ESRD who was admitted on 2/15/2022 with complaints of abdominal pain   PD fluid cloudy  history of PD peritonitis in past.  Does 2 exchanges with 2.5% day time and Icodextrin overnight    INTERVAL HISTORY      Feels better  Shortness of breath: No   mild abdominal pain   Nausea better    Medications:        Allergies:  Bactrim [sulfamethoxazole-trimethoprim], Doxazosin, Geocillin [carbenicillin], Lisinopril, Other, Spironolactone, Tramadol, Bumetanide, and Ibuprofen  Scheduled Meds:   calcium acetate  1 capsule Oral TID WC    torsemide  100 mg Oral Daily    dianeal lo-queenie 2.5%  6,000 mL IntraPERitoneal Nightly    And    dianeal lo-queenie 2.5%  2,000 mL IntraPERitoneal Nightly    metoclopramide  5 mg IntraVENous Q6H    pantoprazole  40 mg IntraVENous BID    And    sodium chloride (PF)  10 mL IntraVENous BID    scopolamine  1 patch TransDERmal Q72H    meropenem  500 mg IntraVENous Q24H    insulin lispro  0-6 Units SubCUTAneous TID WC    insulin lispro  0-3 Units SubCUTAneous Nightly    sodium chloride flush  5-40 mL IntraVENous 2 times per day    heparin (porcine)  5,000 Units SubCUTAneous 3 times per day     Continuous Infusions:   dextrose      sodium chloride 5 mL/hr at 02/20/22 0516         Objective:       Vitals:  BP (!) 153/61   Pulse 91   Temp 97.8 °F (36.6 °C) (Oral)   Resp 16   Ht 5' 3\" (1.6 m)   Wt 191 lb 5.8 oz (86.8 kg)   SpO2 95%   BMI 33.90 kg/m²   Constitutional:  awake, NAD  Skin: no rash, turgor wnl  HEENT:  MMM, No icterus  Cardiovascular:  S1, S2 reg  Respiratory: CTA, no crackles  Abdomen:  +BS, soft, NT, ND  Ext: no lower extremity edema  Access: abd PD catheter in place, no drainage/redness around exit site. No tunnel tenderness  CNS: alert, no agitation    Data:     Labs:  Hepatic:   No results for input(s): AST, ALT, ALB, BILITOT, ALKPHOS in the last 72 hours. BNP: No results for input(s): BNP in the last 72 hours. ABGs: No results for input(s): PHART, PO2ART, TUO3VEA in the last 72 hours. IMAGING:  CT ABDOMEN PELVIS WO CONTRAST Additional Contrast? None   Final Result   1. Sigmoid colon diverticulosis. There is confluent inflammatory change   within the mesenteric fat surrounding a short segment of simple sigmoid colon   containing the diverticula. This could represent sequela of diverticulitis. 2.  2 cm ventral abdominal wall hernia containing inflamed fat. 3.  9 mm ground-glass nodule in the left lung base. Recommend follow-up with   a noncontrast chest CT at 6-12 months to confirm persistence then additional   noncontrast chest CTs every 2 years until 5 years. Assessment :       1. ESRD   Etiology: suspected DN  Access: Abd PD cath  Volume: Hypervolemic      Seen on PD    2. Electrolytes/Acid base  Hypokalemia    Recent Labs     02/23/22  0413   *   K 3.1*   CO2 24   MG 1.60*       3. BMD  -Hyperphosphatemia, SHPT  -maintained on Renvela    Lab Results   Component Value Date    CALCIUM 7.4 (L) 02/23/2022    PHOS 4.5 02/17/2022        4. HTN  -Blood pressure high       BP Readings from Last 1 Encounters:   02/23/22 (!) 153/61       5. Anemia  -anemia of CKD  -assess for CHRIS    Recent Labs     02/23/22  0413   HGB 10.6*     6. DM    7. PD peritonitis  -Cult: pseudomonas    PLAN :       - Antibiotics directed by culture  - plan to replace PD catheter. Pt wants to get it done here  - replace K, Mg as needed  -maintenance PD tonight (do 4 exchanges with 2.5% Dianeal).    -resume BP meds:   - Torsemide  -MBD management  -Anemia management  -Dose meds to GFR<10    Spoke to ID  Seen on PD    Thank you for allowing us to participate in care of Danae Dunn . We will continue to follow. Feel free to contact me with any questions.       Elly Beyer MD  2/23/2022    [facility] [address] [place] [address] S  Office : [phone]  Fax :[phone]

## 2022-02-23 NOTE — FLOWSHEET NOTE
Dr. Alba Solis ordered orthostats:         02/23/22 1815   Vital Signs   Orthostatic B/P and Pulse?  Yes   Blood Pressure Lying 127/65   Pulse Lying 98 PER MINUTE   Blood Pressure Sitting 133/75   Pulse Sitting 99 PER MINUTE   Blood Pressure Standing 90/33   Pulse Standing 115 PER MINUTE

## 2022-02-23 NOTE — CONSULTS
[facility]  Consultation Note    2/23/2022 12:41 PM    Chief Complaint: Abdominal Pain     Reason for Consultation: PD Catheter Exchange    History of Present Illness  Patient is a 55 y.o. female who presented to the ED on 2/15/2022 with complaints of Abdominal Pain. She has a significant PMH of Anemia, CKD, DM 1, HLD, HTN, and PD Catheter Placement (8/13/2019 with Dr. Genevia Collet). The patient reports on admission, she was experiencing Abdominal Pain in addition to cloudy output from her Peritoneal Dialysis Catheter. The patient had a CT of her Abdomen on admission and was found to have a short segment of diverticulitis as well. GI was consulted d/t her persistent nausea and has signed off since her nausea has improved. ID has been following the patient and treating the patient for positive peritoneal fluid cultures which grew Pseudomonas. The most recent culture from 2/21/2022 shows no growth to date. We have been asked to evaluate the patient to Exchange her PD Catheter. At present, the patient is seen sitting up in the bed and appears to be in stable condition. Review of Systems  Review of Systems   Constitutional: Negative for chills. HENT: Negative. Respiratory: Negative for chest tightness and shortness of breath. Cardiovascular: Negative for chest pain and leg swelling. Gastrointestinal: Positive for abdominal pain. Improving gradually since admission   Skin: Negative for wound. Neurological: Negative for dizziness. Psychiatric/Behavioral: Negative for confusion.         Past Medical History:   Diagnosis Date    Anemia 9/15/2014    Chronic kidney disease     Diabetes mellitus ([address]     Diabetes mellitus type 1 ([address]     [facility]     Diabetic retinopathy associated with type 2 diabetes mellitus, without macular edema     Hyperlipidemia 7/27/2015    Hypertension     Kidney stone     Mixed hyperlipidemia 7/27/2015    Orthostatic hypotension     Vitreous hemorrhage of right eye ([address] 8/15/2017       Past Surgical History:   Procedure Laterality Date    APPENDECTOMY      KIDNEY STONE SURGERY      LAPAROSCOPY INSERTION PERITONEAL CATHETER N/A 8/12/2019    LAPAROSCOPIC PERITONEAL DIALYSIS CATHETER PLACEMENT WITH AXEL GONZALEZ performed by Brie Marcano MD at [facility]         Allergies   Allergen Reactions    Bactrim [Sulfamethoxazole-Trimethoprim]     Doxazosin Other (See Comments)     Other reaction(s): Other (See Comments)  Bleeding  Punch in chest      Geocillin [Carbenicillin]     Lisinopril Other (See Comments)     Pt thinks cr levels went up due to med.  Other      Artificial sweetners, nutrasweet    Spironolactone      Bad taste, emesis. Pt refuses to take    Tramadol     Bumetanide Nausea And Vomiting    Ibuprofen Nausea And Vomiting       Social History     Socioeconomic History    Marital status:      Spouse name: Not on file    Number of children: Not on file    Years of education: Not on file    Highest education level: Not on file   Occupational History    Not on file   Tobacco Use    Smoking status: Never Smoker    Smokeless tobacco: Never Used   Vaping Use    Vaping Use: Never used   Substance and Sexual Activity    Alcohol use: No    Drug use: No    Sexual activity: Yes     Partners: Male   Other Topics Concern    Not on file   Social History Narrative    Not on file     Social Determinants of Health     Financial Resource Strain:     Difficulty of Paying Living Expenses: Not on file   Food Insecurity:     Worried About Running Out of Food in the Last Year: Not on file    Sarah of Food in the Last Year: Not on file   Transportation Needs:     Lack of Transportation (Medical): Not on file    Lack of Transportation (Non-Medical):  Not on file   Physical Activity:     Days of Exercise per Week: Not on file    Minutes of Exercise per Session: Not on file   Stress:  Feeling of Stress : Not on file   Social Connections:     Frequency of Communication with Friends and Family: Not on file    Frequency of Social Gatherings with Friends and Family: Not on file    Attends Gnosticist Services: Not on file    Active Member of Clubs or Organizations: Not on file    Attends Club or Organization Meetings: Not on file    Marital Status: Not on file   Intimate Partner Violence:     Fear of Current or Ex-Partner: Not on file    Emotionally Abused: Not on file    Physically Abused: Not on file    Sexually Abused: Not on file   Housing Stability:     Unable to Pay for Housing in the Last Year: Not on file    Number of Places Lived in the Last Year: Not on file    Unstable Housing in the Last Year: Not on file       Family History   Problem Relation Age of Onset    Heart Disease Mother         heart attack 2007    High Blood Pressure Mother     Diabetes Father     Heart Disease Father     Emphysema Father     Cancer Maternal Grandmother         cervical cancer    Lung Cancer Maternal Grandfather         black lung    Diabetes Paternal Grandmother     Heart Disease Paternal Grandfather        Vital Signs  Vitals:    02/23/22 0350 02/23/22 0545 02/23/22 0723 02/23/22 0735   BP: 134/77   (!) 153/61   Pulse: 90   91   Resp: 16   16   Temp: 97.8 °F (36.6 °C)   97.8 °F (36.6 °C)   TempSrc: Oral   Oral   SpO2: 93%  92% 95%   Weight:  191 lb 5.8 oz (86.8 kg)     Height:           Physical Exam:  General: no apparent distress, alert and oriented x3, afebrile  Psychiatric: affect appropriate, cooperative  Head/Eyes/Ears/Nose/Throat: normocephalic, atraumatic, face symmetric  Neck: supple, symmetrical, trachea midline  Chest/Lungs: clear to auscultation bilaterally, no accessory muscle use  Cardiac: regular rate and rhythm  Abdomen: soft, nontender, active bowel sounds, LLQ PD Catheter in place, cuff of PD catheter withdrawn from skin approximately 2-3 cm  Extremities: warm and well perfused, no signs of cyanosis or ischemia, bilateral upper and lower extremity motorsensory intact  Wounds: scattered scratches on skin    Labs  Lab Results   Component Value Date    WBC 6.3 02/23/2022    HGB 10.6 02/23/2022    HCT 31.9 02/23/2022    MCV 94.3 02/23/2022     02/23/2022     Lab Results   Component Value Date     02/23/2022    K 3.1 02/23/2022    K 2.6 02/15/2022    CL 92 02/23/2022    CO2 24 02/23/2022    PHOS 4.5 02/17/2022    BUN 31 02/23/2022    CREATININE 9.5 02/23/2022      No results found for: GLU    Scheduled Meds:    magnesium sulfate  2,000 mg IntraVENous Once    potassium chloride 40 mEq in sodium chloride 0.9% 500 mL IVPB  40 mEq IntraVENous Once    calcium acetate  1 capsule Oral TID WC    torsemide  100 mg Oral Daily    dianeal lo-queenie 2.5%  6,000 mL IntraPERitoneal Nightly    And    dianeal lo-queenie 2.5%  2,000 mL IntraPERitoneal Nightly    metoclopramide  5 mg IntraVENous Q6H    pantoprazole  40 mg IntraVENous BID    And    sodium chloride (PF)  10 mL IntraVENous BID    scopolamine  1 patch TransDERmal Q72H    meropenem  500 mg IntraVENous Q24H    insulin lispro  0-6 Units SubCUTAneous TID WC    insulin lispro  0-3 Units SubCUTAneous Nightly    sodium chloride flush  5-40 mL IntraVENous 2 times per day    heparin (porcine)  5,000 Units SubCUTAneous 3 times per day     Continuous Infusions:    dextrose      sodium chloride 5 mL/hr at 02/20/22 0516       Imaging:   CT Abdomen Pelvis - 2/15/2022   Impression  1. Sigmoid colon diverticulosis. There is confluent inflammatory change within the mesenteric fat surrounding a short segment of simple sigmoid colon containing the diverticula. This could represent sequela of diverticulitis. 2.  2 cm ventral abdominal wall hernia containing inflamed fat. 3.  9 mm ground-glass nodule in the left lung base.  Recommend follow-up with a noncontrast chest CT at 6-12 months to confirm persistence then additional noncontrast chest CTs every 2 years until 5 years. Assessment:  -ESRD - Peritoneal Dialysis  -Peritoneal Fluid positive for Pseudomonas  -No growth to Peritoneal Fluid from 2/21/2022  -Ventral Hernia    Plan:   -Will check with ID and Nephrology to determine if pt needs holiday from PD Catheter or if catheter can be exchanged   -If okay for exchange, will tentatively plan for PD catheter exchange with hernia repair with Dr. Parag Denny tomorrow in OR    All pertinent information and plan of care discussed with Dr. Parag Denny. All questions and concerns were addressed with the patient. I have discussed the above stated plan with the patient and the nurse. The patient verbalized understanding and agreed with the plan. Thank you for allowing to us to participate in the care of [employer]      Electronically signed by ABDIAZIZ Michael - CNP on 2/23/2022 at 12:41 PM  Pt seen and examined. for DIAGNOSIS OF resolving peritonitis agree with ABDIAZIZ Michael's note. Labs reviewed. Vitals   Vitals:    02/23/22 0723 02/23/22 0735 02/23/22 1245 02/23/22 1529   BP:  (!) 153/61 (!) 144/68 125/73   Pulse:  91 95 89   Resp:  16 16 16   Temp:  97.8 °F (36.6 °C) 97.9 °F (36.6 °C) 97.8 °F (36.6 °C)   TempSrc:  Oral Oral Oral   SpO2: 92% 95% 96% 91%   Weight:       Height:       PD catheter that I placed in 2019 the external cuff is sticking out about 2 cm beyond the skin. Question of whether this started with primary peritonitis or from diverticulitis. Abdomen is soft now no redness at the exit site and last peritoneal culture was negative. Lisa Wayne sure spoke with Dr. Irving Alcala I spoke with Dr. Bia Thompson.   And the patient plan now is to remove the previous PD catheter place a new PD catheter laparoscopically closed the ventral hernia but no mesh is planned  Skin reviewed the ventral hernia is at the umbilicus we will plan to go through this is our initial laparoscopic incision and then close it as we go out

## 2022-02-23 NOTE — PROGRESS NOTES
(60 gm/meal);  Low Phosphorus (Less than 1000 mg)  ADULT ORAL NUTRITION SUPPLEMENT; Breakfast; Renal Oral Supplement      OBJECTIVE:   Patient Active Problem List   Diagnosis    Diabetic retinopathy ([facility]    Mixed hyperlipidemia    DMII (diabetes mellitus, type 2) ([facility]    Vitreous hemorrhage of right eye ([facility]    Essential hypertension    Acute kidney injury ([facility]    ESRD (end stage renal disease) ([facility]    Acute renal failure superimposed on chronic kidney disease (HCC)    Volume overload    Per[facility]itis ([facility]    Generalized abdominal pain    Peritoneal dialysis status ([facility]    Morbid obesity due to excess calories ([facility])    Nausea and vomiting    Hyponatremia    Diarrhea    Dizziness    Hypertensive emergency    Hypokalemia    Hypertensive urgency    Cerebrovascular accident (CVA) ([facility]    Electrolyte imbalance    Acute respiratory failure with hypoxia ([facility])    Sepsis ([facility])    Electrolyte abnormality    Diverticulitis       Allergies  Bactrim [sulfamethoxazole-trimethoprim], Doxazosin, Geocillin [carbenicillin], Lisinopril, Other, Spironolactone, Tramadol, Bumetanide, and Ibuprofen    Medications    Scheduled Meds:   calcium acetate  1 capsule Oral TID WC    torsemide  100 mg Oral Daily    dianeal lo-queenie 2.5%  6,000 mL IntraPERitoneal Nightly    And    dianeal lo-queenie 2.5%  2,000 mL IntraPERitoneal Nightly    metoclopramide  5 mg IntraVENous Q6H    pantoprazole  40 mg IntraVENous BID    And    sodium chloride (PF)  10 mL IntraVENous BID    scopolamine  1 patch TransDERmal Q72H    meropenem  500 mg IntraVENous Q24H    insulin lispro  0-6 Units SubCUTAneous TID WC    insulin lispro  0-3 Units SubCUTAneous Nightly    sodium chloride flush  5-40 mL IntraVENous 2 times per day    heparin (porcine)  5,000 Units SubCUTAneous 3 times per day     Continuous Infusions:   dextrose      sodium chloride 5 mL/hr at 02/20/22 0516     PRN Meds:  promethazine, diphenhydrAMINE, glucose, dextrose, glucagon (rDNA), dextrose, sodium chloride flush, sodium chloride, acetaminophen **OR** acetaminophen, ondansetron, diphenhydrAMINE    Vitals   Vitals /wt   Patient Vitals for the past 8 hrs:   BP Temp Temp src Pulse Resp SpO2 Weight   02/23/22 0735 (!) 153/61 97.8 °F (36.6 °C) Oral 91 16 95 % --   02/23/22 0723 -- -- -- -- -- 92 % --   02/23/22 0545 -- -- -- -- -- -- 191 lb 5.8 oz (86.8 kg)   02/23/22 0350 134/77 97.8 °F (36.6 °C) Oral 90 16 93 % --        72HR INTAKE/OUTPUT:      Intake/Output Summary (Last 24 hours) at 2/23/2022 0501  Last data filed at 2/23/2022 0604  Gross per 24 hour   Intake 360 ml   Output 456 ml   Net -96 ml       Exam:    Gen:   Alert and oriented ×3    Eyes: PERRL. No sclera icterus. No conjunctival injection. ENT: No discharge. Pharynx clear. External appearance of ears and nose normal.  Neck: Trachea midline. No obvious mass. Resp: No accessory muscle use. No crackles. No wheezes. No rhonchi. CV: Regular rate. Regular rhythm. No murmur or rub. No edema. GI: Non-tender. Non-distended. No hernia. Skin: Warm, dry, normal texture and turgor. Lymph: No cervical LAD. No supraclavicular LAD. M/S: / Ext. No cyanosis. No clubbing. No joint deformity. Neuro: CN 2-12 are intact,  no neurologic deficits noted. PT/INR: No results for input(s): PROTIME, INR in the last 72 hours. APTT: No results for input(s): APTT in the last 72 hours. CBC:   Recent Labs     02/21/22  0342 02/22/22  0435 02/23/22  0413   WBC 6.6 7.2 6.3   HGB 10.3* 10.1* 10.6*   HCT 31.3* 30.4* 31.9*   MCV 94.0 93.8 94.3    147 143       BMP:   Recent Labs     02/21/22  0342 02/22/22  0435 02/22/22  1249 02/23/22  0413   * 135*  --  132*   K 2.9* 3.0* 2.8* 3.1*   CL 91* 94*  --  92*   CO2 24 23  --  24   BUN 30* 30*  --  31*   CREATININE 9.3* 9.6*  --  9.5*       LIVER PROFILE:   No results for input(s): ALKPHOS, AST, ALT, ALB, BILIDIR, BILITOT, ALKPHOS in the last 72 hours.   No results for input(s): AMYLASE in the last 72 hours. No results for input(s): LIPASE in the last 72 hours. UA:No results for input(s): NITRITE, LABCAST, WBCUA, RBCUA, MUCUS in the last 72 hours. TROPONIN: No results for input(s): Kelly Ronald in the last 72 hours. Lab Results   Component Value Date/Time    URRFLXCULT Yes 08/04/2019 06:40 PM       No results for input(s): TSHREFLEX in the last 72 hours. No components found for: LQL1995  POC GLUCOSE:    Recent Labs     02/22/22  0743 02/22/22  1102 02/22/22  1701 02/22/22  2127 02/23/22  0737   POCGLU 211* 199* 167* 164* 313*     No results for input(s): LABA1C in the last 72 hours. Lab Results   Component Value Date    LABA1C 8.1 01/07/2022         ASSESSMENT AND PLAN    Intractable nausea vomiting  Likely related to diabetic gastroparesis  Continue patient on Reglan 5 mg 4 times daily  Proton pump inhibitors twice daily for possible GERD  Patient reports improvement in nausea    Sepsis  Antibiotics changed to meropenem  Decrease the meropenem dose to 500 mg every 24    Peritonitis  Monitor peritoneal dialysis fluid cell count  Blood cultures no growth to date  Body fluid culture is positive for Pseudomonas  Antibiotics changed to meropenem  Infectious diseases following      Dizziness on standing  Discontinue losartan      Diverticulitis  Patient on meropenem  Procalcitonin is still high at 0.60    Electrolyte normality    ESRD  Nephrology is consulted        Code Status: Full Code        Dispo -cc        The patient and / or the family were informed of the results of any tests, a time was given to answer questions, a plan was proposed and they agreed with plan. Saravanan Morales MD    This note was transcribed using [id number] [employer]. Please disregard any translational errors.

## 2022-02-23 NOTE — PROGRESS NOTES
Infectious Disease Follow up Notes  Admit Date: 2/15/2022    Antibiotics :   IV Meropenem      CHIEF COMPLAINT:     PD peritonitis  Pseudomonas infection   Diverticulitis    Subjective interval History :  55 y.o. man with a past medical history of end-stage renal disease on peritoneal dialysis for the past 2 years, diabetic retinopathy , diabetes neuropathy, kidney stone, hypertension, hyperlipidemia, admitted to the hospital secondary to abdominal pain and diarrhea. She also has a history of COVID-19 infection for which she is recovering okay but unfortunately developed GI symptoms consistent with nausea diarrhea abdominal pain. PD fluid on admission with abnormal PD fluid culture positive for Pseudomonas. Admit labs indicate lactic acid 4.1 glucose at 295, WBC elevated 14.4. Blood cultures from admission negative. CT abdomen pelvis indicate sigmoid colon diverticulosis but there is small sequelae of diverticulitis. PD fluid cell count was grossly abnormal with WBC count at 18,000.   Location : abd pain+      Quality :  Aching      Severity :  10/10   Duration : weeks     Timing :constant   Context : PD     Modifying factors : none   Associated signs and symptoms:diarrhea, nausea        Interval History :  Nausea some improvement with change in IV abx diarrhea + some dizziness and WBC in the PD fluid some improvement and d/w      Past Medical History:    Past Medical History:   Diagnosis Date    Anemia 9/15/2014    Chronic kidney disease     Diabetes mellitus ([address]     Diabetes mellitus type 1 ([address]     Diabetic retinopathy ([address]     Diabetic retinopathy associated with type 2 diabetes mellitus, without macular edema     Hyperlipidemia 7/27/2015    Hypertension     Kidney stone     Mixed hyperlipidemia 7/27/2015    Orthostatic hypotension     Vitreous hemorrhage of right eye ([address] 8/15/2017       Past Surgical History:    Past Surgical History:   Procedure Laterality Date    APPENDECTOMY      KIDNEY STONE SURGERY      LAPAROSCOPY INSERTION PERITONEAL CATHETER N/A 8/12/2019    LAPAROSCOPIC PERITONEAL DIALYSIS CATHETER PLACEMENT WITH AXEL TROCHAR performed by Jeronimo Adame MD at [facility]         Current Medications:    Outpatient Medications Marked as Taking for the 2/15/22 encounter [facility] Encounter)   Medication Sig Dispense Refill    torsemide (DEMADEX) 100 MG tablet TAKE 4 TABLETS BY MOUTH ONCE NIGHTLY 360 tablet 5    amLODIPine (NORVASC) 10 MG tablet TAKE ONE TABLET BY MOUTH DAILY 30 tablet 3    losartan (COZAAR) 100 MG tablet Take 100 mg by mouth nightly      pantoprazole (PROTONIX) 40 MG tablet Take 1 tablet by mouth daily 90 tablet 1    insulin lispro (HUMALOG KWIKPEN) 100 UNIT/ML pen Inject 2-4 Units into the skin 3 times daily (before meals) Sliding scale          Allergies:  Bactrim [sulfamethoxazole-trimethoprim], Doxazosin, Geocillin [carbenicillin], Lisinopril, Other, Spironolactone, Tramadol, Bumetanide, and Ibuprofen    Immunizations : There is no immunization history on file for this patient.     Social History:     Social History     Tobacco Use    Smoking status: Never Smoker    Smokeless tobacco: Never Used   Vaping Use    Vaping Use: Never used   Substance Use Topics    Alcohol use: No    Drug use: No     Social History     Tobacco Use   Smoking Status Never Smoker   Smokeless Tobacco Never Used      Family History   Problem Relation Age of Onset    Heart Disease Mother         heart attack 2007    High Blood Pressure Mother     Diabetes Father     Heart Disease Father     Emphysema Father     Cancer Maternal Grandmother         cervical cancer    Lung Cancer Maternal Grandfather         black lung    Diabetes Paternal Grandmother     Heart Disease Paternal Grandfather       REVIEW OF SYSTEMS:    Constitutional:  negative for fevers, chills, night sweats  Eyes:  negative for blurred vision, eye discharge, visual disturbance   HEENT:  negative for hearing loss, ear drainage,nasal congestion  Respiratory:  negative for cough, shortness of breath or hemoptysis   Cardiovascular:  negative for chest pain, palpitations, syncope  Gastrointestinal:  negative for nausea, vomiting, diarrhea, constipation, abdominal pain++   Genitourinary:  negative for frequency, dysuria, urinary incontinence, hematuria  Hematologic/Lymphatic:  negative for easy bruising, bleeding and lymphadenopathy  Allergic/Immunologic:  negative for recurrent infections, angioedema, anaphylaxis   Endocrine:  negative for weight changes, polyuria, polydipsia and polyphagia  Musculoskeletal:  negative for joint  pain, swelling, decreased range of motion  Integumentary: No rashes, skin lesions  Neurological:  negative for headaches, slurred speech, unilateral weakness  Psychiatric: negative for hallucinations,confusion,agitation.                 PHYSICAL EXAM:      Vitals:    BP (!) 144/68   Pulse 95   Temp 97.9 °F (36.6 °C) (Oral)   Resp 16   Ht 5' 3\" (1.6 m)   Wt 191 lb 5.8 oz (86.8 kg)   SpO2 96%   BMI 33.90 kg/m²       General Appearance: alert,in no acute distress, +  pallor, no icterus chronic ill appearing woman +  Skin: warm and dry, no rash or erythema  Head: normocephalic and atraumatic  Eyes: pupils equal, round, and reactive to light, conjunctivae normal  ENT: tympanic membrane, external ear and ear canal normal bilaterally, nose without deformity, nasal mucosa and turbinates normal without polyps  Neck: supple and non-tender without mass, no thyromegaly  no cervical lymphadenopathy  Pulmonary/Chest: clear to auscultation bilaterally- no wheezes, rales or rhonchi, normal air movement, no respiratory distress  Cardiovascular: normal rate, regular rhythm, normal S1 and S2, no murmurs, rubs, clicks, or gallops, no carotid bruits  Abdomen: soft, ++ -tender, + -distended, normal bowel sounds, no masses or organomegaly  Extremities: no cyanosis, clubbing or edema  Musculoskeletal: normal range of motion, no joint swelling, deformity or tenderness  Integumentary: No rashes, no abnormal skin lesions, no petechiae  Neurologic: reflexes normal and symmetric, no cranial nerve deficit  Psych:  Orientation, sensorium, mood normal            Lines: PD catheter +        Data Review:    CBC:   Lab Results   Component Value Date    WBC 6.3 02/23/2022    HGB 10.6 (L) 02/23/2022    HCT 31.9 (L) 02/23/2022    MCV 94.3 02/23/2022     02/23/2022     RENAL:   Lab Results   Component Value Date    CREATININE 9.5 (HH) 02/23/2022    BUN 31 (H) 02/23/2022     (L) 02/23/2022    K 3.1 (L) 02/23/2022    CL 92 (L) 02/23/2022    CO2 24 02/23/2022     SED RATE: No results found for: SEDRATE  CK: No results found for: CKTOTAL  CRP: No results found for: CRP  Hepatic Function Panel:   Lab Results   Component Value Date    ALKPHOS 117 02/15/2022    ALT 7 02/15/2022    AST 14 02/15/2022    PROT 5.6 02/15/2022    BILITOT 0.6 02/15/2022    LABALBU 1.7 02/17/2022     UA:  Lab Results   Component Value Date    COLORU YELLOW 05/27/2021    CLARITYU Clear 05/27/2021    GLUCOSEU 250 05/27/2021    BILIRUBINUR Negative 05/27/2021    KETUA Negative 05/27/2021    SPECGRAV 1.016 05/27/2021    BLOODU TRACE 05/27/2021    PHUR 7.0 05/27/2021    PROTEINU 100 05/27/2021    UROBILINOGEN 0.2 05/27/2021    NITRU Negative 05/27/2021    LEUKOCYTESUR Negative 05/27/2021    LABMICR YES 05/27/2021    URINETYPE NotGiven 05/27/2021      Urine Microscopic:   Lab Results   Component Value Date    LABCAST 3-5 Hyaline 08/27/2014    BACTERIA RARE 05/27/2021    COMU see below 05/27/2021    HYALCAST 0 05/27/2021    WBCUA 3 05/27/2021    RBCUA 4 05/27/2021    EPIU 4 05/27/2021     Urine Reflex to Culture:   Lab Results   Component Value Date    URRFLXCULT Yes 08/04/2019          Results for Josesito Bird (MRN [id number]) as of 2/17/2022 16:24    Ref. Range 2/16/2022 00:40 2/16/2022 17:25 2/17/2022 03:30   Source + CELL CT/DIFF-BF Unknown Peritoneal dial Peritoneal Fl. Peritoneal dial   Appearance, Fluid Unknown Hazy Turbid Cloudy   Color, Fluid Unknown Yellow Yellow Yellow   Eos, Fluid Latest Units: % 1       RBC, Fluid Latest Units: /cumm 1,000 5,000 <2,000   Lymphocytes, Body Fluid Latest Units: %   8 4   Monocyte Count, Fluid Latest Units: % 25   15   Neutrophil Count, Fluid Latest Units: % 54 86 76   Nucl Cell, Fluid Latest Units: /cumm 18,185 >50 2,161   Mesothelial, Fluid Latest Units: % 16 1 1   Clot Eval. Unknown see below see below see below   Number of Cells Counted Fluid Unknown 100 100 100        MICRO: cultures reviewed and updated by me   Blood Culture:   Lab Results   Component Value Date    BC No Growth after 4 days of incubation. 02/15/2022    BLOODCULT2 No Growth after 4 days of incubation. 02/15/2022       Respiratory Culture:  Lab Results   Component Value Date    LABGRAM 1+ WBC's (Polymorphonuclear)  No organisms seen   02/21/2022     AFB:No results found for: [facility]  Viral Culture:  Lab Results   Component Value Date    COVID19 Detected 01/07/2022     Urine Culture: No results for input(s): Kvng Rafia in the last 72 hours. IMAGING:    CT ABDOMEN PELVIS WO CONTRAST Additional Contrast? None   Final Result   1. Sigmoid colon diverticulosis. There is confluent inflammatory change   within the mesenteric fat surrounding a short segment of simple sigmoid colon   containing the diverticula. This could represent sequela of diverticulitis. 2.  2 cm ventral abdominal wall hernia containing inflamed fat. 3.  9 mm ground-glass nodule in the left lung base. Recommend follow-up with   a noncontrast chest CT at 6-12 months to confirm persistence then additional   noncontrast chest CTs every 2 years until 5 years.                All the pertinent images and reports for the current Hospitalization were reviewed by me     Scheduled Meds:   magnesium sulfate  2,000 mg IntraVENous Once    potassium chloride 40 mEq in sodium chloride 0.9% 500 mL IVPB  40 mEq IntraVENous Once    calcium acetate  1 capsule Oral TID     torsemide  100 mg Oral Daily    dianeal lo-queenie 2.5%  6,000 mL IntraPERitoneal Nightly    And    dianeal lo-queenie 2.5%  2,000 mL IntraPERitoneal Nightly    metoclopramide  5 mg IntraVENous Q6H    pantoprazole  40 mg IntraVENous BID    And    sodium chloride (PF)  10 mL IntraVENous BID    scopolamine  1 patch TransDERmal Q72H    meropenem  500 mg IntraVENous Q24H    insulin lispro  0-6 Units SubCUTAneous TID     insulin lispro  0-3 Units SubCUTAneous Nightly    sodium chloride flush  5-40 mL IntraVENous 2 times per day    [Held by provider] heparin (porcine)  5,000 Units SubCUTAneous 3 times per day       Continuous Infusions:   dextrose      sodium chloride 5 mL/hr at 02/20/22 0516       PRN Meds:  promethazine, diphenhydrAMINE, glucose, dextrose, glucagon (rDNA), dextrose, sodium chloride flush, sodium chloride, acetaminophen **OR** acetaminophen, ondansetron, diphenhydrAMINE      Assessment:     Patient Active Problem List   Diagnosis    Diabetic retinopathy ([address]    Mixed hyperlipidemia    DMII (diabetes mellitus, type 2) ([address]    Vitreous hemorrhage of right eye ([address]    Essential hypertension    Acute kidney injury ([address]    ESRD (end stage renal disease) ([address]    Acute renal failure superimposed on chronic kidney disease (HCC)    Volume overload    Peritonitis ([address]    Generalized abdominal pain    Peritoneal dialysis status ([address]    Morbid obesity due to excess calories ([facility])    Nausea and vomiting    Hyponatremia    Diarrhea    Dizziness    Hypertensive emergency    Hypokalemia    Hypertensive urgency    Cerebrovascular accident (CVA) ([address]    Electrolyte imbalance    Acute respiratory failure with hypoxia ([facility])    Sepsis ([address]    Electrolyte abnormality    Diverticulitis    Sepsis on admit  PD peritonitis  Diverticulitis  Pseudomonas on PD fluid cx  WBC elevation   Lactic acidosis  ESRD on PD  DM with several complications  PD fluid cell counts very abnormal         Will need IV abx for now and at d.c hopefully able to do IV abx through PD exchanges      PD fluid cx in follow up pending will need to see improvement in cell counts before d/c planning AND Fluid cx have to be negative     PD fluid cx with Pseudomonas still and she is not clearing her infection indicating that PD catheter may be now involved    WILL d/w  about plans for possible PD catheter removal     Will see if her NAUSEA improves with change in her IV abx now tolerating a bit better     Would like PD fluid cx to be negative and may go for PD exchange by Johnny, Microbiology, Radiology and all the pertinent results from current hospitalization and  care every where were reviewed  by me as a part of the evaluation   Plan:   1. Trend PD fluid cell counts and culture noted   2. IV Meropenem x 500 mg q 24 hrs  3. Watch for complications  4. Blood cx negative   5. CT abd/pelvis results noted  6. D/w  for PD catheter exchange tomorrow ? 7. Check Orthostatics due to Dizziness          Discussed with patient/Family and Nursing   Risk of Complications/Morbidity: High      · Illness(es)/ Infection present that pose threat to bodily function. · There is potential for severe exacerbation of infection/side effects of treatment. · Therapy requires intensive monitoring for antimicrobial agent toxicity. Discussed with patient/Family and Nursing staff     Thanks for allowing me to participate in your patient's care and please call me with any questions or concerns.     Dinesh Bartlett MD  Infectious Disease  [facility]) Physician  Phone: [phone]   Fax : [phone]

## 2022-02-23 NOTE — PLAN OF CARE
Problem: Falls - Risk of:  Goal: Will remain free from falls  Description: Will remain free from falls  Outcome: Ongoing  Note: Fall risk assessment completed every shift. All precautions in place. Pt has call light within reach at all times. Room clear of clutter. Pt aware to call for assistance when getting up. Patient assessed for fall risk; fall precautions initiated. Patient and family instructed about safety devices. Environment kept free of clutter and adequate lighting provided. Bed locked and in lowest position. Call light within reach. Will continue to monitor.       Problem: Fluid Volume:  Goal: Ability to achieve a balanced intake and output will improve  Description: Ability to achieve a balanced intake and output will improve  Outcome: Ongoing     Problem: Nutrition  Goal: Optimal nutrition therapy  2/23/2022 0023 by Mitesh Garrett RN  Outcome: Ongoing  2/22/2022 1157 by Brandan Goldstein  Outcome: Ongoing

## 2022-02-24 NOTE — PROGRESS NOTES
Agree with H&P from Inpt notes, no changes noted . I have presented reasonable alternatives to the patient's proposed care, treatment, and services. The discussion I have done encompassed risks, benefits, and side effects related to the alternatives and the risks related to not receiving the proposed care, treatment, and services. All questions answered. Patient wishes to proceed. Remove old PD cath place new PD cath and fix ventral hernia no mesh.

## 2022-02-24 NOTE — PROGRESS NOTES
Infectious Disease Follow up Notes  Admit Date: 2/15/2022    Antibiotics :   IV Meropenem      CHIEF COMPLAINT:     PD peritonitis  Pseudomonas infection   Diverticulitis    Subjective interval History :  55 y.o. man with a past medical history of end-stage renal disease on peritoneal dialysis for the past 2 years, diabetic retinopathy , diabetes neuropathy, kidney stone, hypertension, hyperlipidemia, admitted to the hospital secondary to abdominal pain and diarrhea. She also has a history of COVID-19 infection for which she is recovering okay but unfortunately developed GI symptoms consistent with nausea diarrhea abdominal pain. PD fluid on admission with abnormal PD fluid culture positive for Pseudomonas. Admit labs indicate lactic acid 4.1 glucose at 295, WBC elevated 14.4. Blood cultures from admission negative. CT abdomen pelvis indicate sigmoid colon diverticulosis but there is small sequelae of diverticulitis. PD fluid cell count was grossly abnormal with WBC count at 18,000.   Location : abd pain+      Quality :  Aching      Severity :  10/10   Duration : weeks     Timing :constant   Context : PD     Modifying factors : none   Associated signs and symptoms:diarrhea, nausea        Interval History :  Nausea some improvement c/o post op pain s/p PD cathter exchange also ventral hernia repair it was incarcerated per report     Past Medical History:    Past Medical History:   Diagnosis Date    Anemia 9/15/2014    Chronic kidney disease     Diabetes mellitus ([address]     Diabetes mellitus type 1 ([address]     Diabetic retinopathy ([address]     Diabetic retinopathy associated with type 2 diabetes mellitus, without macular edema     Hyperlipidemia 7/27/2015    Hypertension     Kidney stone     Mixed hyperlipidemia 7/27/2015    Orthostatic hypotension     Vitreous hemorrhage of right eye ([address] 8/15/2017       Past Surgical History: Past Surgical History:   Procedure Laterality Date    APPENDECTOMY      KIDNEY STONE SURGERY      LAPAROSCOPY INSERTION PERITONEAL CATHETER N/A 8/12/2019    LAPAROSCOPIC PERITONEAL DIALYSIS CATHETER PLACEMENT WITH AXEL TROCHAR performed by Modesta Nolan MD at [address] [place] [address]         Current Medications:    Outpatient Medications Marked as Taking for the 2/15/22 encounter [facility] Encounter)   Medication Sig Dispense Refill    torsemide (DEMADEX) 100 MG tablet TAKE 4 TABLETS BY MOUTH ONCE NIGHTLY 360 tablet 5    amLODIPine (NORVASC) 10 MG tablet TAKE ONE TABLET BY MOUTH DAILY 30 tablet 3    losartan (COZAAR) 100 MG tablet Take 100 mg by mouth nightly      pantoprazole (PROTONIX) 40 MG tablet Take 1 tablet by mouth daily 90 tablet 1    insulin lispro (HUMALOG KWIKPEN) 100 UNIT/ML pen Inject 2-4 Units into the skin 3 times daily (before meals) Sliding scale          Allergies:  Bactrim [sulfamethoxazole-trimethoprim], Doxazosin, Geocillin [carbenicillin], Lisinopril, Other, Spironolactone, Tramadol, Bumetanide, and Ibuprofen    Immunizations : There is no immunization history on file for this patient.     Social History:     Social History     Tobacco Use    Smoking status: Never Smoker    Smokeless tobacco: Never Used   Vaping Use    Vaping Use: Never used   Substance Use Topics    Alcohol use: No    Drug use: No     Social History     Tobacco Use   Smoking Status Never Smoker   Smokeless Tobacco Never Used      Family History   Problem Relation Age of Onset    Heart Disease Mother         heart attack 2007    High Blood Pressure Mother     Diabetes Father     Heart Disease Father     Emphysema Father     Cancer Maternal Grandmother         cervical cancer    Lung Cancer Maternal Grandfather         black lung    Diabetes Paternal Grandmother     Heart Disease Paternal Grandfather       REVIEW OF SYSTEMS:    Constitutional:  negative for fevers, chills, night sweats  Eyes: negative for blurred vision, eye discharge, visual disturbance   HEENT:  negative for hearing loss, ear drainage,nasal congestion  Respiratory:  negative for cough, shortness of breath or hemoptysis   Cardiovascular:  negative for chest pain, palpitations, syncope  Gastrointestinal:  negative for nausea, vomiting, diarrhea, constipation, abdominal pain++   Genitourinary:  negative for frequency, dysuria, urinary incontinence, hematuria  Hematologic/Lymphatic:  negative for easy bruising, bleeding and lymphadenopathy  Allergic/Immunologic:  negative for recurrent infections, angioedema, anaphylaxis   Endocrine:  negative for weight changes, polyuria, polydipsia and polyphagia  Musculoskeletal:  negative for joint  pain, swelling, decreased range of motion  Integumentary: No rashes, skin lesions  Neurological:  negative for headaches, slurred speech, unilateral weakness  Psychiatric: negative for hallucinations,confusion,agitation.                 PHYSICAL EXAM:      Vitals:    BP (!) 140/80   Pulse 99   Temp 97.7 °F (36.5 °C) (Temporal)   Resp 18   Ht 5' 3\" (1.6 m)   Wt 193 lb 2 oz (87.6 kg)   SpO2 100%   BMI 34.21 kg/m²       General Appearance: alert,in no acute distress, +  pallor, no icterus chronic ill appearing woman +  Skin: warm and dry, no rash or erythema  Head: normocephalic and atraumatic  Eyes: pupils equal, round, and reactive to light, conjunctivae normal  ENT: tympanic membrane, external ear and ear canal normal bilaterally, nose without deformity, nasal mucosa and turbinates normal without polyps  Neck: supple and non-tender without mass, no thyromegaly  no cervical lymphadenopathy  Pulmonary/Chest: clear to auscultation bilaterally- no wheezes, rales or rhonchi, normal air movement, no respiratory distress  Cardiovascular: normal rate, regular rhythm, normal S1 and S2, no murmurs, rubs, clicks, or gallops, no carotid bruits  Abdomen: soft, ++ -tender, + -distended, normal bowel sounds, no masses or organomegaly  Extremities: no cyanosis, clubbing or edema  Musculoskeletal: normal range of motion, no joint swelling, deformity or tenderness  Integumentary: No rashes, no abnormal skin lesions, no petechiae  Neurologic: reflexes normal and symmetric, no cranial nerve deficit  Psych:  Orientation, sensorium, mood normal            Lines: PD catheter +new and dressing+         Data Review:    CBC:   Lab Results   Component Value Date    WBC 5.9 02/24/2022    HGB 11.1 (L) 02/24/2022    HCT 33.0 (L) 02/24/2022    MCV 94.6 02/24/2022     02/24/2022     RENAL:   Lab Results   Component Value Date    CREATININE 9.0 (HH) 02/24/2022    BUN 33 (H) 02/24/2022     (L) 02/24/2022    K 3.6 02/24/2022    CL 89 (L) 02/24/2022    CO2 23 02/24/2022     SED RATE: No results found for: SEDRATE  CK: No results found for: CKTOTAL  CRP: No results found for: CRP  Hepatic Function Panel:   Lab Results   Component Value Date    ALKPHOS 117 02/15/2022    ALT 7 02/15/2022    AST 14 02/15/2022    PROT 5.6 02/15/2022    BILITOT 0.6 02/15/2022    LABALBU 1.7 02/17/2022     UA:  Lab Results   Component Value Date    COLORU YELLOW 05/27/2021    CLARITYU Clear 05/27/2021    GLUCOSEU 250 05/27/2021    BILIRUBINUR Negative 05/27/2021    KETUA Negative 05/27/2021    SPECGRAV 1.016 05/27/2021    BLOODU TRACE 05/27/2021    PHUR 7.0 05/27/2021    PROTEINU 100 05/27/2021    UROBILINOGEN 0.2 05/27/2021    NITRU Negative 05/27/2021    LEUKOCYTESUR Negative 05/27/2021    LABMICR YES 05/27/2021    URINETYPE NotGiven 05/27/2021      Urine Microscopic:   Lab Results   Component Value Date    LABCAST 3-5 Hyaline 08/27/2014    BACTERIA RARE 05/27/2021    COMU see below 05/27/2021    HYALCAST 0 05/27/2021    WBCUA 3 05/27/2021    RBCUA 4 05/27/2021    EPIU 4 05/27/2021     Urine Reflex to Culture:   Lab Results   Component Value Date    URRFLXCULT Yes 08/04/2019          Results for Lynn Filemon (MRN [id number]) as of 2/17/2022 16:24    Ref. Range 2/16/2022 00:40 2/16/2022 17:25 2/17/2022 03:30   Source + CELL CT/DIFF-BF Unknown Peritoneal dial Peritoneal Fl. Peritoneal dial   Appearance, Fluid Unknown Hazy Turbid Cloudy   Color, Fluid Unknown Yellow Yellow Yellow   Eos, Fluid Latest Units: % 1       RBC, Fluid Latest Units: /cumm 1,000 5,000 <2,000   Lymphocytes, Body Fluid Latest Units: %   8 4   Monocyte Count, Fluid Latest Units: % 25   15   Neutrophil Count, Fluid Latest Units: % 54 86 76   Nucl Cell, Fluid Latest Units: /cumm 18,185 >50 2,161   Mesothelial, Fluid Latest Units: % 16 1 1   Clot Eval. Unknown see below see below see below   Number of Cells Counted Fluid Unknown 100 100 100        Procedure Component Value Units Date/Time   Culture, Body Fluid [[id number]] Collected: 02/23/22 0520   Order Status: Completed Specimen: Body Fluid from Peritoneal Dialysis Fluid Updated: 02/24/22 0919    Body Fluid Culture, Sterile No growth to date    Gram Stain Result 3+ WBC's   No Epithelial Cells seen   No organisms seen    Narrative:     ORDER#: [id number]                          ORDERED BY: Eliana Walters   SOURCE: Peritoneal Dialysis Fluid          COLLECTED:  02/23/22 05:20   [facility] :                      RECEIVED :  02/23/22 05:40   Performed at:   [place] [address]   Phone [phone]   Culture, Body Fluid [[id number]] Collected: 02/21/22 0150   Order Status: Completed Specimen: Body Fluid from Peritoneal Dialysis Fluid Updated: 02/24/22 0729    Body Fluid Culture, Sterile No growth 60 to 72 hours    Gram Stain Result 1+ WBC's (Polymorphonuclear)   No organisms seen    Narrative:     ORDER#: [id number]                          ORDERED BY: Eliana Walters   SOURCE: Peritoneal Dialysis Fluid          COLLECTED:  02/21/22 01:50   [facility] :                      RECEIVED :  02/21/22 14:57   Performed at:   [facility] MICRO: cultures reviewed and updated by me   Blood Culture:   Lab Results   Component Value Date    BC No Growth after 4 days of incubation. 02/15/2022    BLOODCULT2 No Growth after 4 days of incubation. 02/15/2022       Respiratory Culture:  Lab Results   Component Value Date    LABGRAM  02/23/2022     3+ WBC's  No Epithelial Cells seen  No organisms seen       AFB:No results found for: [facility]  Viral Culture:  Lab Results   Component Value Date    COVID19 Not Detected 02/23/2022    COVID19 Detected 01/07/2022     Urine Culture: No results for input(s): Walford Fu in the last 72 hours. IMAGING:    CT ABDOMEN PELVIS WO CONTRAST Additional Contrast? None   Final Result   1. Sigmoid colon diverticulosis. There is confluent inflammatory change   within the mesenteric fat surrounding a short segment of simple sigmoid colon   containing the diverticula. This could represent sequela of diverticulitis. 2.  2 cm ventral abdominal wall hernia containing inflamed fat. 3.  9 mm ground-glass nodule in the left lung base. Recommend follow-up with   a noncontrast chest CT at 6-12 months to confirm persistence then additional   noncontrast chest CTs every 2 years until 5 years.                All the pertinent images and reports for the current Hospitalization were reviewed by me     Scheduled Meds:   sodium chloride flush  5-40 mL IntraVENous 2 times per day    calcium acetate  1 capsule Oral TID WC    torsemide  100 mg Oral Daily    dianeal lo-queenie 2.5%  6,000 mL IntraPERitoneal Nightly    And    dianeal lo-queenie 2.5%  2,000 mL IntraPERitoneal Nightly    metoclopramide  5 mg IntraVENous Q6H    pantoprazole  40 mg IntraVENous BID    And    sodium chloride (PF)  10 mL IntraVENous BID    scopolamine  1 patch TransDERmal Q72H    meropenem  500 mg IntraVENous Q24H    insulin lispro  0-6 Units SubCUTAneous TID WC    insulin lispro  0-3 Units SubCUTAneous Nightly    sodium chloride flush  5-40 mL IntraVENous 2 times per day    [Held by provider] heparin (porcine)  5,000 Units SubCUTAneous 3 times per day       Continuous Infusions:   sodium chloride      dextrose      sodium chloride 5 mL/hr at 02/20/22 2120       PRN Meds:  sodium chloride flush, sodium chloride, meperidine, fentanNYL, fentanNYL, oxyCODONE **OR** oxyCODONE, ondansetron, promethazine, diphenhydrAMINE, glucose, dextrose, glucagon (rDNA), dextrose, sodium chloride flush, sodium chloride, acetaminophen **OR** acetaminophen, ondansetron, diphenhydrAMINE      Assessment:     Patient Active Problem List   Diagnosis    [facility]    Mixed hyperlipidemia    DMII [facility]diabetes mellitus, type 2) ([facility]    Vitreous hemorrhage of right eye ([address]    Essential hypertension    Acute kidney injury ([address]    ESRD (end stage renal disease) ([facility]    Acute renal failure superimposed on chronic kidney disease ([facility])    Volume overload    Peritonitis ([address]    Generalized abdominal pain    Peritoneal dialysis status ([address]    Morbid obesity due to excess calories ([facility])    Nausea and vomiting    Hyponatremia    Diarrhea    Dizziness    Hypertensive emergency    Hypokalemia    Hypertensive urgency    Cerebrovascular accident (CVA) ([address]    Electrolyte imbalance    Acute respiratory failure with hypoxia ([facility])    Septic shock ([facility])    Electrolyte abnormality    Diverticulitis          Sepsis on admit  PD peritonitis  Diverticulitis  Pseudomonas on PD fluid cx  WBC elevation   Lactic acidosis  ESRD on PD  DM with several complications  PD fluid cell counts very abnormal         Will need IV abx for now and at d.c hopefully able to do IV abx through PD exchanges      PD fluid cx in follow up pending will need to see improvement in cell counts before d/c planning AND Fluid cx have to be negative     PD fluid cx with Pseudomonas still and she is not clearing her infection indicating that PD catheter may be now involved   d/w Destiny Christie about plans for possible PD catheter removal     Will see if her NAUSEA improves with change in her IV abx now tolerating a bit better     Would like PD fluid cx to be negative s/p PD exchange on 2/24 and ventral Hernia repair by Ernesto, Microbiology, Radiology and all the pertinent results from current hospitalization and  care every where were reviewed  by me as a part of the evaluation   Plan:   1. Trend PD fluid cell counts and culture   2. IV Meropenem x 500 mg q 24 hrs  3. Watch for complications  4. Blood cx negative   5. CT abd/pelvis results noted  6. S/P PD catheter exchange t  7. Add Anidulafungin x 100 mg daily for now      Discussed with patient/Family and Nursing   Risk of Complications/Morbidity: High      · Illness(es)/ Infection present that pose threat to bodily function. · There is potential for severe exacerbation of infection/side effects of treatment. · Therapy requires intensive monitoring for antimicrobial agent toxicity. Discussed with patient/Family and Nursing staff     Thanks for allowing me to participate in your patient's care and please call me with any questions or concerns.     Javier Scanlon MD  Infectious Disease  [facility]) Physician  Phone: [phone]   Fax : [phone]

## 2022-02-24 NOTE — PROGRESS NOTES
Occupational Therapy    OT orders received and chart reviewed; unable to complete OT eval as pt out of room for vascular surgery; per Dr Leopold Cornell note pt is having  Removal of old PD cath place new PD cath and fix ventral hernia no mesh.  Will follow up as schedule and pt condition permit    Electronically signed by Loni Vazquez OT on 2/24/2022 at 11:00 AM

## 2022-02-24 NOTE — PROGRESS NOTES
Pt arrived to the PACU from the OR sleeping. No signs of pain at this time. VSS. Dressings dry and intact. Will continue to monitor.

## 2022-02-24 NOTE — PROGRESS NOTES
Per Dr. Myers January, Pt new PD catheter can not be used tonight. Also, Tomorrow (Friday) Pt can only have a very small amount of fluid instilled and ran through New PD catheter. Nephrology made aware of Dr. Lucia Caba orders and will adjust PD orders as needed. Nephrology also increased pt PO torsemide because of pt weight gain, swelling, and missing PD treatments because of new catheter.        Electronically signed by Gurpreet Narayanan RN on 2/24/2022 at 4:21 PM

## 2022-02-24 NOTE — PROGRESS NOTES
Hospitalist Progress Note  2/24/2022 1:17 PM    PCP: No primary care provider on file.     [phone]     Date of Admission: 2/15/2022                                                                                                                     HOSPITAL COURSE    Patient demographics:  The patient  Hamida Caban is a 55 y.o. female     Significant past medical history:   Patient Active Problem List   Diagnosis    [facility] retinopathy ([facility]    Mixed hyperlipidemia    DMII (diabetes mellitus, type 2) ([facility]    Vitreous hemorrhage of right eye ([address]    Essential hypertension    Acute kidney injury ([address]    ESRD (end stage renal disease) ([address]    Acute renal failure superimposed on chronic kidney disease ([address]    Volume overload    Peritonitis ([address]    Generalized abdominal pain    Peritoneal dialysis status ([address]    Morbid obesity due to excess calories ([address]    Nausea and vomiting    Hyponatremia    Diarrhea    Dizziness    Hypertensive emergency    Hypokalemia    Hypertensive urgency    Cerebrovascular accident (CVA) ([address]    Electrolyte imbalance    Acute respiratory failure with hypoxia (HCC)    Septic shock (HCC)    Electrolyte abnormality    Diverticulitis         Presenting symptoms:  Nausea and vomiting, abdominal pain, fever and chills, generalized weakness    Diagnostic workup:      CONSULTS DURING ADMISSION :   IP CONSULT TO NEPHROLOGY  IP CONSULT TO NEPHROLOGY  IP CONSULT TO INFECTIOUS DISEASES  IP CONSULT TO GI  IP CONSULT TO VASCULAR SURGERY      Patient was diagnosed with:  Acute peritonitis  Diverticulitis    Treatment while inpatient:  Pt presented [facility] with concern for new cloudy output from her peritoneal dialysis catheter                                                                                       ----------------------------------------------------------      SUBJECTIVE COMPLAINTS- follow up for Nausea and vomiting, abdominal pain,    Seen in PACU drowsy but arousable. Still having dizzi spells. Denies N/V/F/C. No acute event reported overnight.      Diet: Diet NPO Exceptions are: Sips of Water with Meds      OBJECTIVE:   Patient Active Problem List   Diagnosis    Diabetic retinopathy ([facility]    Mixed hyperlipidemia    DMII (diabetes mellitus, type 2) ([facility]    Vitreous hemorrhage of right eye ([facility]    Essential hypertension    Acute kidney injury ([facility]    ESRD (end stage renal disease) ([facility]    Acute renal failure superimposed on chronic kidney disease ([facility]    Volume overload    Peritonitis ([facility]    Generalized abdominal pain    [facility]    Morbid obesity due to excess calories ([facility]    Nausea and vomiting    Hyponatremia    Diarrhea    Dizziness    Hypertensive emergency    Hypokalemia    Hypertensive urgency    Cerebrovascular accident (CVA) ([facility]    Electrolyte imbalance    Acute respiratory failure with hypoxia (HCC)    Septic shock (HCC)    Electrolyte abnormality    Diverticulitis       Allergies  Bactrim [sulfamethoxazole-trimethoprim], Doxazosin, Geocillin [carbenicillin], Lisinopril, Other, Spironolactone, Tramadol, Bumetanide, and Ibuprofen    Medications    Scheduled Meds:   sodium chloride flush  5-40 mL IntraVENous 2 times per day    calcium acetate  1 capsule Oral TID WC    torsemide  100 mg Oral Daily    dianeal lo-queenie 2.5%  6,000 mL IntraPERitoneal Nightly    And    dianeal lo-queenie 2.5%  2,000 mL IntraPERitoneal Nightly    metoclopramide  5 mg IntraVENous Q6H    pantoprazole  40 mg IntraVENous BID    And    sodium chloride (PF)  10 mL IntraVENous BID    scopolamine  1 patch TransDERmal Q72H    meropenem  500 mg IntraVENous Q24H    insulin lispro  0-6 Units SubCUTAneous TID     insulin lispro  0-3 Units SubCUTAneous Nightly    sodium chloride flush  5-40 mL IntraVENous 2 times per day    [Held by provider] heparin (porcine)  5,000 Units SubCUTAneous 3 times per day Continuous Infusions:   sodium chloride      sodium chloride      dextrose      sodium chloride 5 mL/hr at 02/20/22 2120     PRN Meds:  sodium chloride flush, sodium chloride, meperidine, fentanNYL, fentanNYL, oxyCODONE **OR** oxyCODONE, ondansetron, sodium chloride, promethazine, diphenhydrAMINE, glucose, dextrose, glucagon (rDNA), dextrose, sodium chloride flush, sodium chloride, acetaminophen **OR** acetaminophen, ondansetron, diphenhydrAMINE    Vitals   Vitals /wt   Patient Vitals for the past 8 hrs:   BP Temp Temp src Pulse Resp SpO2 Weight   02/24/22 1300 130/65 -- -- 82 19 100 % --   02/24/22 1255 129/66 -- -- 80 20 100 % --   02/24/22 1250 129/63 -- -- 80 20 99 % --   02/24/22 1248 129/65 96.7 °F (35.9 °C) Temporal 81 20 100 % --   02/24/22 0852 (!) 140/80 97.7 °F (36.5 °C) Temporal 99 18 100 % --   02/24/22 0842 (!) 140/80 -- -- 103 19 97 % --   02/24/22 0738 135/70 97.9 °F (36.6 °C) Oral 94 18 96 % --   02/24/22 0616 -- -- -- -- -- -- 193 lb 2 oz (87.6 kg)        72HR INTAKE/OUTPUT:      Intake/Output Summary (Last 24 hours) at 2/24/2022 1317  Last data filed at 2/24/2022 1245  Gross per 24 hour   Intake 580 ml   Output 372 ml   Net 208 ml       Exam:    Gen:   Alert and oriented ×3  Drowsy but arouse able. Eyes: PERRL. No sclera icterus. No conjunctival injection. ENT: No discharge. Pharynx clear. External appearance of ears and nose normal.  Neck: Trachea midline. No obvious mass. Resp: No accessory muscle use. No crackles. No wheezes. No rhonchi. CV: Regular rate. Regular rhythm. No murmur or rub. No edema. GI: Non-tender. Non-distended. No hernia. Skin: Warm, dry, normal texture and turgor. Lymph: No cervical LAD. No supraclavicular LAD. M/S: / Ext. No cyanosis. No clubbing. No joint deformity. Neuro: CN 2-12 are intact,  no neurologic deficits noted. PT/INR: No results for input(s): PROTIME, INR in the last 72 hours. APTT: No results for input(s):  APTT in the last 72 hours.    CBC:   Recent Labs     02/22/22  0435 02/23/22  0413 02/24/22  0454   WBC 7.2 6.3 5.9   HGB 10.1* 10.6* 11.1*   HCT 30.4* 31.9* 33.0*   MCV 93.8 94.3 94.6    143 143       BMP:   Recent Labs     02/22/22  0435 02/22/22  1249 02/23/22  0413 02/24/22  0454   *  --  132* 129*   K 3.0* 2.8* 3.1* 3.6   CL 94*  --  92* 89*   CO2 23  --  24 23   BUN 30*  --  31* 33*   CREATININE 9.6*  --  9.5* 9.0*       LIVER PROFILE:   No results for input(s): ALKPHOS, AST, ALT, ALB, BILIDIR, BILITOT, ALKPHOS in the last 72 hours. No results for input(s): AMYLASE in the last 72 hours. No results for input(s): LIPASE in the last 72 hours. UA:No results for input(s): NITRITE, LABCAST, WBCUA, RBCUA, MUCUS in the last 72 hours. TROPONIN: No results for input(s): Maximo Killings in the last 72 hours. Lab Results   Component Value Date/Time    URRFLXCULT Yes 08/04/2019 06:40 PM       No results for input(s): TSHREFLEX in the last 72 hours. No components found for: CSL1899  POC GLUCOSE:    Recent Labs     02/23/22  1652 02/23/22  2048 02/24/22  0737 02/24/22  0859 02/24/22  1306   POCGLU 135* 162* 286* 266* 194*     No results for input(s): LABA1C in the last 72 hours.    Lab Results   Component Value Date    LABA1C 8.1 01/07/2022         ASSESSMENT AND PLAN    Intractable nausea vomiting  Likely related to diabetic gastroparesis  Continue patient on Reglan 5 mg 4 times daily  Proton pump inhibitors twice daily for possible GERD  Patient reports improvement in nausea    Sepsis  Antibiotics changed to meropenem  Decrease the meropenem dose to 500 mg every 24    Peritonitis  Monitor peritoneal dialysis fluid cell count  Blood cultures no growth to date  Body fluid culture is positive for Pseudomonas  Antibiotics changed to meropenem  Infectious diseases following  S/P REMOVAL OF EXISTING PERITONEAL DIALYSIS CATHETER  LAPAROSCOPIC PLACEMENT OF PERITONEAL DIALYSIS CATHETER  REPAIR OF SMALL VENTRAL HERNIA on 2/24    Dizziness on standing  Discontinue losartan      Diverticulitis  Patient on meropenem  Procalcitonin is still high at 0.60    Electrolyte normality    ESRD  Nephrology is consulted        Code Status: Full Code        Dispo -cc        The patient and / or the family were informed of the results of any tests, a time was given to answer questions, a plan was proposed and they agreed with plan. Nicol Puentes MD    This note was transcribed using [id number] [email or web address]. Please disregard any translational errors.

## 2022-02-24 NOTE — PROGRESS NOTES
Patient awake and alert. Patient denies C/O pain or nausea. Abdominal surgical dressings x 3 dry and intact. Patient stable to transfer to room 5105. Report to [facility] receiving patient hortensia room 6350.

## 2022-02-24 NOTE — ANESTHESIA POSTPROCEDURE EVALUATION
Department of Anesthesiology  Postprocedure Note    Patient: Alexey Hunter  MRN: [id number]  YOB: 1965  Date of evaluation: 2/24/2022  Time:  5:15 PM     Procedure Summary     Date: 02/24/22 Room / Location:  [facility] [address] [place] [address]    Anesthesia Start: 1038 Anesthesia Stop: 1250    Procedures:       REMOVAL OF EXISTING PERITONEAL DIALYSIS CATHETER (N/A Abdomen)      LAPAROSCOPIC PLACEMENT OF PERITONEAL DIALYSIS CATHETER (N/A Abdomen)      REPAIR OF SMALL VENTRAL HERNIA (N/A Abdomen) Diagnosis:       End stage renal disease ([address]      (END STAGE RENAL DISEASE)    Surgeons: Dewayne Ramos MD Responsible Provider: Derrick Gomes MD    Anesthesia Type: general ASA Status: 3          Anesthesia Type: general    Jen Phase I: Jen Score: 10    Jen Phase II:      Last vitals: Reviewed and per EMR flowsheets.        Anesthesia Post Evaluation    Patient location during evaluation: PACU  Patient participation: complete - patient participated  Level of consciousness: awake and alert  Pain score: 2  Airway patency: patent  Nausea & Vomiting: no nausea and no vomiting  Complications: no  Cardiovascular status: blood pressure returned to baseline  Respiratory status: acceptable  Hydration status: euvolemic

## 2022-02-24 NOTE — ANESTHESIA PRE PROCEDURE
Department of Anesthesiology  Preprocedure Note       Name:  Tk Ibrahim   Age:  55 y.o.  :  1965                                          MRN:  [id number]         Date:  2022      Surgeon: Joe Mayberry):  Ale Tapia MD    Procedure: REMOVAL OF EXISTING PERITONEAL DIALYSIS CATHETER (N/A Abdomen)  LAPAROSCOPIC PLACEMENT OF PERITONEAL DIALYSIS CATHETER (N/A Abdomen)  REPAIR OF SMALL VENTRAL HERNIA (N/A Abdomen)    Medications prior to admission:   Prior to Admission medications    Medication Sig Start Date End Date Taking? Authorizing Provider   torsemide (DEMADEX) 100 MG tablet TAKE 4 TABLETS BY MOUTH ONCE NIGHTLY 1/3/22   Tima Landaverde MD   amLODIPine (NORVASC) 10 MG tablet TAKE ONE TABLET BY MOUTH DAILY 1/3/22   Tima Landaverde MD   losartan (COZAAR) 100 MG tablet Take 100 mg by mouth nightly    Historical Provider, MD   ergocalciferol (ERGOCALCIFEROL) 1.25 MG (88666 UT) capsule Take 50,000 Units by mouth once a week On     Historical Provider, MD   pantoprazole (PROTONIX) 40 MG tablet Take 1 tablet by mouth daily 3/16/21   Tima Landaverde MD   insulin lispro (HUMALOG KWIKPEN) 100 UNIT/ML pen Inject 2-4 Units into the skin 3 times daily (before meals) Sliding scale     Historical Provider, MD       Current medications:    No current facility-administered medications for this visit. No current outpatient medications on file.      Facility-Administered Medications Ordered in Other Visits   Medication Dose Route Frequency Provider Last Rate Last Admin    calcium acetate (PHOSLO) capsule 667 mg  1 capsule Oral TID  Tiffany Denny MD        torsemide [facility]) tablet 100 mg  100 mg Oral Daily Tiffany Denny MD   100 mg at 22 4382    dianeal lo-queenie 2.5% 6,000 mL  6,000 mL IntraPERitoneal Nightly Tiffany Denny MD   6,000 mL at 221    And    dianeal lo-queenie 2.5% 2,000 mL  2,000 mL IntraPERitoneal Nightly Tiffany Denny MD   2,000 mL at 22    metoclopramide (REGLAN) injection 5 mg  5 mg IntraVENous Q6H SONU Davidson   5 mg at 02/24/22 0552    pantoprazole (PROTONIX) injection 40 mg  40 mg IntraVENous BID SONU Mackenzie   40 mg at 02/24/22 9085    And    sodium chloride (PF) 0.9 % injection 10 mL  10 mL IntraVENous BID SONU Mackenzie   10 mL at 02/24/22 4617    scopolamine (TRANSDERM-SCOP) transdermal patch 1 patch  1 patch TransDERmal Q72H Asad Hodges MD   1 patch at 02/23/22 1148    meropenem (MERREM) 500 mg IVPB (mini-bag)  500 mg IntraVENous Q24H Lesley Jimenez MD   Stopped at 02/23/22 1833    promethazine (PHENERGAN) 12.5mg in sodium chloride 0.9% 50 mL IVPB 12.5 mg  12.5 mg IntraVENous Q6H PRN Asad Hodges MD   Stopped at 02/20/22 0749    diphenhydrAMINE (BENADRYL) injection 25 mg  25 mg IntraVENous Q8H PRN Asad Hodges MD   25 mg at 02/20/22 0320    insulin lispro (HUMALOG) injection vial 0-6 Units  0-6 Units SubCUTAneous TID  Demariomol Modesto Collet, DO   3 Units at 02/24/22 0824    insulin lispro (HUMALOG) injection vial 0-3 Units  0-3 Units SubCUTAneous Nightly [facility]   1 Units at 02/23/22 2148    glucose (GLUTOSE) 40 % oral gel 15 g  15 g Oral PRN Demario  Genaro, DO        dextrose 50 % IV solution  12.5 g IntraVENous PRN [facility]        glucagon (rDNA) injection 1 mg  1 mg IntraMUSCular PRN Demario  Genaro, DO        dextrose 5 % solution  100 mL/hr IntraVENous PRN Demario  Genaro, DO        sodium chloride flush 0.9 % injection 5-40 mL  5-40 mL IntraVENous 2 times per day Demario  Genaro, DO   10 mL at 02/24/22 5303    sodium chloride flush 0.9 % injection 5-40 mL  5-40 mL IntraVENous PRN Demario M Genaro, DO   10 mL at 02/20/22 0320    0.9 % sodium chloride infusion  25 mL IntraVENous PRN Demario Lam DO 5 mL/hr at 02/20/22 2120 Rate Verify at 02/20/22 2120    acetaminophen (TYLENOL) tablet 650 mg  650 mg Oral Q6H PRN Demario Modesto Collet, DO Or    acetaminophen (TYLENOL) suppository 650 mg  650 mg Rectal Q6H PRN Demario Lam DO        [Held by provider] heparin (porcine) injection 5,000 Units  5,000 Units SubCUTAneous 3 times per day Bienvenido Rivera DO   5,000 Units at 02/23/22 0543    ondansetron (ZOFRAN) injection 4 mg  4 mg IntraVENous Q6H PRN Amber Garcia MD   4 mg at 02/22/22 0810    diphenhydrAMINE (BENADRYL) tablet 25 mg  25 mg Oral Q8H PRN Margarita Borden MD   25 mg at 02/16/22 2127       Allergies: Allergies   Allergen Reactions    Bactrim [Sulfamethoxazole-Trimethoprim]     Doxazosin Other (See Comments)     Other reaction(s): Other (See Comments)  Bleeding  Punch in chest      Geocillin [Carbenicillin]     Lisinopril Other (See Comments)     Pt thinks cr levels went up due to med.  Other      Artificial sweetners, nutrasweet    Spironolactone      Bad taste, emesis.  Pt refuses to take    Tramadol     Bumetanide Nausea And Vomiting    Ibuprofen Nausea And Vomiting       Problem List:    Patient Active Problem List   Diagnosis Code    Diabetic retinopathy ([facility] E11.319    Mixed hyperlipidemia E78.2    DMII (diabetes mellitus, type 2) ([address] E11.9    Vitreous hemorrhage of right eye ([address] H43.11    Essential hypertension I10    Acute kidney injury ([address] N17.9    ESRD (end stage renal disease) ([facility] N18.6    Acute renal failure superimposed on chronic kidney disease ([address] N17.9, N18.9    Volume overload E87.70    Peritonitis ([address] K65.9    Generalized abdominal pain R10.84    Peritoneal dialysis status ([facility] Z99.2    Morbid obesity due to excess calories ([facility]) E66.01    Nausea and vomiting R11.2    Hyponatremia E87.1    Diarrhea R19.7    Dizziness R42    Hypertensive emergency I16.1    Hypokalemia E87.6    Hypertensive urgency I16.0    Cerebrovascular accident (CVA) ([facility]) I63.9    Electrolyte imbalance E87.8    Acute respiratory failure with hypoxia ([facility]) J96.01    Septic shock ([facility]) A41.9, R65.21    Electrolyte abnormality E87.8    Diverticulitis K57.92       Past Medical History:        Diagnosis Date    Anemia 9/15/2014    Chronic kidney disease     Diabetes mellitus ([address]     Diabetes mellitus type 1 ([address]     [facility]     Diabetic retinopathy associated with type 2 diabetes mellitus, without macular edema     Hyperlipidemia 7/27/2015    Hypertension     Kidney stone     Mixed hyperlipidemia 7/27/2015    Orthostatic hypotension     Vitreous hemorrhage of right eye ([address] 8/15/2017       Past Surgical History:        Procedure Laterality Date    APPENDECTOMY      KIDNEY STONE SURGERY      LAPAROSCOPY INSERTION PERITONEAL CATHETER N/A 8/12/2019    LAPAROSCOPIC PERITONEAL DIALYSIS CATHETER PLACEMENT WITH AXEL TROCHAR performed by Cristiano Bateman MD at [address] History:    Social History     Tobacco Use    Smoking status: Never Smoker    Smokeless tobacco: Never Used   Substance Use Topics    Alcohol use: No                                Counseling given: Not Answered      Vital Signs (Current): There were no vitals filed for this visit.                                            BP Readings from Last 3 Encounters:   02/24/22 (!) 140/80   01/08/22 133/74   08/12/21 (!) 140/70       NPO Status:                                                                                 BMI:   Wt Readings from Last 3 Encounters:   02/24/22 193 lb 2 oz (87.6 kg)   01/08/22 188 lb 7.9 oz (85.5 kg)   08/12/21 205 lb 7.5 oz (93.2 kg)     There is no height or weight on file to calculate BMI.    CBC:   Lab Results   Component Value Date    WBC 5.9 02/24/2022    RBC 3.48 02/24/2022    HGB 11.1 02/24/2022    HCT 33.0 02/24/2022    MCV 94.6 02/24/2022    RDW 16.1 02/24/2022     02/24/2022       CMP:   Lab Results   Component Value Date     02/24/2022    K 3.6 02/24/2022    K 2.6 02/15/2022    CL 89 02/24/2022    CO2 23 02/24/2022    BUN 33 02/24/2022    CREATININE 9.0 02/24/2022    GFRAA 6 02/24/2022    AGRATIO 0.6 02/15/2022    LABGLOM 5 02/24/2022    GLUCOSE 341 02/24/2022    PROT 5.6 02/15/2022    CALCIUM 7.7 02/24/2022    BILITOT 0.6 02/15/2022    ALKPHOS 117 02/15/2022    AST 14 02/15/2022    ALT 7 02/15/2022       POC Tests:   Recent Labs     02/24/22  0859   POCGLU 266*       Coags:   Lab Results   Component Value Date    PROTIME 11.0 08/10/2021    INR 0.97 08/10/2021       HCG (If Applicable):   Lab Results   Component Value Date    PREGTESTUR Negative 08/11/2019        ABGs: No results found for: PHART, PO2ART, OCU7SFI, LJS3ZQB, BEART, Y4GICAAX     Type & Screen (If Applicable):  No results found for: [facility]    Anesthesia Evaluation  Patient summary reviewed history of anesthetic complications (difficulty breathing):   Airway: Mallampati: II  TM distance: >3 FB   Neck ROM: full  Mouth opening: > = 3 FB Dental:      Comment: Very poor dentition, multiple broken teeth    Pulmonary: breath sounds clear to auscultation      (-) COPD, asthma, shortness of breath, recent URI and sleep apnea                           Cardiovascular:    (+) hypertension: severe, hyperlipidemia    (-) valvular problems/murmurs, past MI, CAD, CABG/stent, dysrhythmias,  angina,  CHF and no pulmonary hypertension      Rhythm: regular  Rate: normal                    Neuro/Psych:      (-) seizures, neuromuscular disease, TIA, CVA, headaches and psychiatric history           GI/Hepatic/Renal:   (+) renal disease (PD was done last night): ESRD and dialysis,      (-) GERD, PUD, hepatitis and bowel prep       Endo/Other:    (+) Diabetes (A1c 8.1% Jan 2022)Type II DM, poorly controlled, , blood dyscrasia::., electrolyte abnormalities, .    (-) hypothyroidism, hyperthyroidism               Abdominal:             Vascular: Other Findings:             Anesthesia Plan      general     ASA 3       Induction: intravenous.     MIPS: Postoperative opioids intended and Prophylactic antiemetics administered. Anesthetic plan and risks discussed with patient. Plan discussed with CRNA. This pre-anesthesia assessment may be used as a history and physical.    DOS STAFF ADDENDUM:    Pt seen and examined, chart reviewed (including anesthesia, drug and allergy history). No interval changes to history and physical examination. Anesthetic plan, risks, benefits, alternatives, and personnel involved discussed with patient. Patient verbalized an understanding and agrees to proceed.       Charles Cheng MD  February 24, 2022  9:16 AM      Charles Cheng MD   2/24/2022

## 2022-02-24 NOTE — PLAN OF CARE
Problem: Falls - Risk of:  Goal: Will remain free from falls  Description: Will remain free from falls  Outcome: Ongoing     Problem: Falls - Risk of:  Goal: Absence of physical injury  Description: Absence of physical injury  Outcome: Ongoing   Fall risk assessment completed every shift. All precautions in place. Pt has call light within reach at all times. Room clear of clutter. Pt aware to call for assistance when getting up. Problem: SAFETY  Goal: Free from accidental physical injury  Outcome: Ongoing     Problem: SAFETY  Goal: Free from intentional harm  Outcome: Ongoing   Patient assessed for fall risk; fall precautions initiated. Patient and family instructed about safety devices. Environment kept free of clutter and adequate lighting provided. Bed locked and in lowest position. Call light within reach. Problem: DAILY CARE  Goal: Daily care needs are met  Outcome: Ongoing     Problem: SKIN INTEGRITY  Goal: Skin integrity is maintained or improved  Outcome: Ongoing     Problem: KNOWLEDGE DEFICIT  Goal: Patient/S.O. demonstrates understanding of disease process, treatment plan, medications, and discharge instructions.   Outcome: Ongoing     Problem: DISCHARGE BARRIERS  Goal: Patient's continuum of care needs are met  Outcome: Ongoing     Problem: DISCHARGE BARRIERS  Goal: Patient's continuum of care needs are met  Outcome: Ongoing     Problem: Discharge Planning:  Goal: Discharged to appropriate level of care  Description: Discharged to appropriate level of care  Outcome: Ongoing     Problem: Venous Thromboembolism:  Goal: Will show no signs or symptoms of venous thromboembolism  Description: Will show no signs or symptoms of venous thromboembolism  Outcome: Ongoing     Problem: Nutrition  Goal: Optimal nutrition therapy  Outcome: Ongoing     Problem: Physical Regulation:  Goal: Ability to maintain clinical measurements within normal limits will improve  Description: Ability to maintain clinical measurements within normal limits will improve  Outcome: Ongoing

## 2022-02-24 NOTE — PROGRESS NOTES
Office: [phone]       Fax: [phone]      Nephrology Progress Note        Patient's Name: Nicole Lyles  Date of Visit: 2/24/2022    Reason for Consult:  ESRD management      History of Present Illness:      Nicole Lyles is a 55 y.o. female with PMHx of hypertension, diabetes mellitus, ESRD who was admitted on 2/15/2022 with complaints of abdominal pain   PD fluid cloudy  history of PD peritonitis in past.  Does 2 exchanges with 2.5% day time and Icodextrin overnight    INTERVAL HISTORY      Feels same  Shortness of breath: No   mild abdominal pain   Nausea better    Medications:        Allergies:  Bactrim [sulfamethoxazole-trimethoprim], Doxazosin, Geocillin [carbenicillin], Lisinopril, Other, Spironolactone, Tramadol, Bumetanide, and Ibuprofen  Scheduled Meds:   calcium acetate  1 capsule Oral TID WC    torsemide  100 mg Oral Daily    dianeal lo-queenie 2.5%  6,000 mL IntraPERitoneal Nightly    And    dianeal lo-queenie 2.5%  2,000 mL IntraPERitoneal Nightly    metoclopramide  5 mg IntraVENous Q6H    pantoprazole  40 mg IntraVENous BID    And    sodium chloride (PF)  10 mL IntraVENous BID    scopolamine  1 patch TransDERmal Q72H    meropenem  500 mg IntraVENous Q24H    insulin lispro  0-6 Units SubCUTAneous TID WC    insulin lispro  0-3 Units SubCUTAneous Nightly    sodium chloride flush  5-40 mL IntraVENous 2 times per day    [Held by provider] heparin (porcine)  5,000 Units SubCUTAneous 3 times per day     Continuous Infusions:   dextrose      sodium chloride 5 mL/hr at 02/20/22 2120         Objective:       Vitals:  BP (!) 140/80   Pulse 99   Temp 97.7 °F (36.5 °C) (Temporal)   Resp 18   Ht 5' 3\" (1.6 m)   Wt 193 lb 2 oz (87.6 kg)   SpO2 100%   BMI 34.21 kg/m²   Constitutional:  awake, NAD  Skin: no rash, turgor wnl  HEENT:  MMM, No icterus  Cardiovascular:  S1, S2 reg  Respiratory: CTA, no crackles  Abdomen:  +BS, soft, NT, ND  Ext: no lower extremity edema  Access: abd PD catheter in place, no drainage/redness around exit site. No tunnel tenderness  CNS: alert, no agitation    Data:     Labs:  Hepatic:   No results for input(s): AST, ALT, ALB, BILITOT, ALKPHOS in the last 72 hours. BNP: No results for input(s): BNP in the last 72 hours. ABGs: No results for input(s): PHART, PO2ART, HNP0PEU in the last 72 hours. IMAGING:  CT ABDOMEN PELVIS WO CONTRAST Additional Contrast? None   Final Result   1. Sigmoid colon diverticulosis. There is confluent inflammatory change   within the mesenteric fat surrounding a short segment of simple sigmoid colon   containing the diverticula. This could represent sequela of diverticulitis. 2.  2 cm ventral abdominal wall hernia containing inflamed fat. 3.  9 mm ground-glass nodule in the left lung base. Recommend follow-up with   a noncontrast chest CT at 6-12 months to confirm persistence then additional   noncontrast chest CTs every 2 years until 5 years. Assessment :       1. ESRD   Etiology: suspected DN  Access: Abd PD cath  Volume: Hypervolemic      2. Electrolytes/Acid base  Hypokalemia    Recent Labs     02/24/22  0454   *   K 3.6   CO2 23   MG 2.00       3. BMD  -Hyperphosphatemia, SHPT  -maintained on Renvela    Lab Results   Component Value Date    CALCIUM 7.7 (L) 02/24/2022    PHOS 4.5 02/17/2022        4. HTN  -Blood pressure high       BP Readings from Last 1 Encounters:   02/24/22 (!) 140/80       5. Anemia  -anemia of CKD  -assess for CHRIS    Recent Labs     02/24/22  0454   HGB 11.1*     6. DM    7. PD peritonitis  -Cult: pseudomonas    PLAN :       - Antibiotics directed by culture  - plan to replace PD catheter today. - replace K, Mg as needed  -maintenance PD tonight (do 4 exchanges with 2.5% Dianeal).  No PD tonight  -resume BP meds:   - Torsemide  -MBD management  -Anemia management  -Dose meds to GFR<10    Seen on PD    Thank you for allowing us to participate in care of Danae Dunn . We will continue to follow. Feel free to contact me with any questions.       Lazara Villafana MD  2/24/2022    [facility] [address]  Office : [phone]  Fax :[phone]

## 2022-02-24 NOTE — PROGRESS NOTES
Physical Therapy    PT orders received and chart reviewed; unable to complete PT eval as pt out of room for vascular surgery; per Dr Duarte Parker note pt is having  Removal of old PD cath place new PD cath and fix ventral hernia no mesh.  Will follow for new orders s/p surgery  Electronically signed by SUMI LEE PT on 2/24/2022 at 9:47 AM

## 2022-02-24 NOTE — CARE COORDINATION
Patient having ventral hernia repair without mesh, old PD cath removed and new PD cath placed today. Discharge plan remains to home with house at this time.      Sharon GONCALVES RN  Case Management  [phone]    Electronically signed by Sharon Gilmore RN on 2/24/2022 at 10:58 AM

## 2022-02-25 NOTE — CARE COORDINATION
Discharge plan at this time is for home with PD exchanges. Currently doing small exchanges at night. Per ID will most likely require IV antibiotics at discharge and hopeful for ability to give through PD exchanges. Patient is active with [facility]. They will need to be contacted with the information in order to provide the antibiotic upon discharge. Possible Home healthcare services upon discharge. List provided. The Plan for Transition of Care is related to the following treatment goals: Home    The Patient and/or patient representative was provided with a choice of provider and agrees   with the discharge plan. [x] Yes [] No    Freedom of choice list was provided with basic dialogue that supports the patient's individualized plan of care/goals, treatment preferences and shares the quality data associated with the providers.  [x] Yes [] No      Lisa GONCALVES RN  Case Management  [phone]    Electronically signed by Lsia Meyers RN on 2/25/2022 at 2:16 PM

## 2022-02-25 NOTE — PROGRESS NOTES
Hospitalist Progress Note  2/25/2022 2:04 PM    PCP: No primary care provider on file.     [phone]     Date of Admission: 2/15/2022                                                                                                                     HOSPITAL COURSE    Patient demographics:  The patient  Merle Lynn is a 55 y.o. female     Significant past medical history:   Patient Active Problem List   Diagnosis    [facility] retinopathy [facility]    Mixed hyperlipidemia    DMII (diabetes mellitus, type 2) ([address]    Vitreous hemorrhage of right eye ([address]    Essential hypertension    Acute kidney injury ([address]    ESRD (end stage renal disease) ([address]    Acute renal failure superimposed on chronic kidney disease ([address]    Volume overload    Peritonitis ([address]    Generalized abdominal pain    Peritoneal dialysis status ([address]    Morbid obesity due to excess calories ([address]    Nausea and vomiting    Hyponatremia    Diarrhea    Dizziness    Hypertensive emergency    Hypokalemia    Hypertensive urgency    Cerebrovascular accident (CVA) ([address]    Electrolyte imbalance    Acute respiratory failure with hypoxia (HCC)    Septic shock (HCC)    Electrolyte abnormality    Diverticulitis         Presenting symptoms:  Nausea and vomiting, abdominal pain, fever and chills, generalized weakness    Diagnostic workup:      CONSULTS DURING ADMISSION :   IP CONSULT TO NEPHROLOGY  IP CONSULT TO NEPHROLOGY  IP CONSULT TO INFECTIOUS DISEASES  IP CONSULT TO GI  IP CONSULT TO VASCULAR SURGERY      Patient was diagnosed with:  Acute peritonitis  Diverticulitis    Treatment while inpatient:  Pt presented [facility] with concern for new cloudy output from her peritoneal dialysis catheter                                                                                       ----------------------------------------------------------      SUBJECTIVE COMPLAINTS- follow up for Nausea and vomiting, abdominal pain,    Sitting up in chair. Reports that abdominal pain is better. Denies any nausea, vomiting, fever, chills. She is feeling puffy. Her hands are swollen. Diet: ADULT DIET;  Regular; 4 carb choices (60 gm/meal); 1500 ml      OBJECTIVE:   Patient Active Problem List   Diagnosis    Diabetic retinopathy ([facility]    Mixed hyperlipidemia    DMII (diabetes mellitus, type 2) ([facility]    Vitreous hemorrhage of right eye ([facility]    Essential hypertension    Acute kidney injury ([facility]    ESRD (end stage renal disease) ([facility]    Acute renal failure superimposed on chronic kidney disease (HCC)    Volume overload    Peritonitis (HCC)    Generalized abdominal pain    Peritoneal dialysis status ([facility]    Morbid obesity due to excess calories ([facility])    Nausea and vomiting    Hyponatremia    Diarrhea    Dizziness    Hypertensive emergency    Hypokalemia    Hypertensive urgency    Cerebrovascular accident (CVA) ([facility]    Electrolyte imbalance    Acute respiratory failure with hypoxia ([facility])    Septic shock ([facility])    Electrolyte abnormality    Diverticulitis       Allergies  Bactrim [sulfamethoxazole-trimethoprim], Doxazosin, Geocillin [carbenicillin], Lisinopril, Other, Spironolactone, Tramadol, Bumetanide, and Ibuprofen    Medications    Scheduled Meds:   dianeal lo-queenie 4.25%  6,000 mL IntraPERitoneal Nightly    torsemide  200 mg Oral BID    anidulafungin (ERAXIS) 100 mg IVPB  100 mg IntraVENous Daily    calcium acetate  1 capsule Oral TID WC    metoclopramide  5 mg IntraVENous Q6H    pantoprazole  40 mg IntraVENous BID    And    sodium chloride (PF)  10 mL IntraVENous BID    scopolamine  1 patch TransDERmal Q72H    meropenem  500 mg IntraVENous Q24H    insulin lispro  0-6 Units SubCUTAneous TID WC    insulin lispro  0-3 Units SubCUTAneous Nightly    sodium chloride flush  5-40 mL IntraVENous 2 times per day    heparin (porcine)  5,000 Units SubCUTAneous 3 times per day     Continuous Infusions:   dextrose      sodium chloride 25 mL (02/25/22 1357)     PRN Meds:  oxyCODONE-acetaminophen, promethazine, diphenhydrAMINE, glucose, dextrose, glucagon (rDNA), dextrose, sodium chloride flush, sodium chloride, acetaminophen **OR** acetaminophen, ondansetron, diphenhydrAMINE    Vitals   Vitals /wt   Patient Vitals for the past 8 hrs:   BP Temp Temp src Pulse Resp SpO2   02/25/22 1145 129/66 97.5 °F (36.4 °C) Oral -- 18 97 %   02/25/22 0932 -- -- -- -- -- 97 %   02/25/22 0745 (!) 142/76 98.3 °F (36.8 °C) Oral 91 16 97 %        72HR INTAKE/OUTPUT:      Intake/Output Summary (Last 24 hours) at 2/25/2022 1404  Last data filed at 2/25/2022 1307  Gross per 24 hour   Intake 600 ml   Output --   Net 600 ml       Exam:    Gen:   Alert and oriented ×3  Drowsy but arouse able. Eyes: PERRL. No sclera icterus. No conjunctival injection. ENT: No discharge. Pharynx clear. External appearance of ears and nose normal.  Neck: Trachea midline. No obvious mass. Resp: No accessory muscle use. No crackles. No wheezes. No rhonchi. CV: Regular rate. Regular rhythm. No murmur or rub. GI: Non-tender. Non-distended. No hernia. Skin: Warm, dry, normal texture and turgor. Lymph: No cervical LAD. No supraclavicular LAD. M/S: / Ext.  2+ bilateral lower and upper extremity edema. .    Neuro: CN 2-12 are intact,  no neurologic deficits noted. PT/INR: No results for input(s): PROTIME, INR in the last 72 hours. APTT: No results for input(s): APTT in the last 72 hours.     CBC:   Recent Labs     02/23/22  0413 02/24/22  0454 02/25/22  0606   WBC 6.3 5.9 9.6   HGB 10.6* 11.1* 11.0*   HCT 31.9* 33.0* 33.5*   MCV 94.3 94.6 96.4    143 103*       BMP:   Recent Labs     02/23/22  0413 02/24/22  0454 02/25/22  0606   * 129* 127*   K 3.1* 3.6 4.2   CL 92* 89* 92*   CO2 24 23 19*   BUN 31* 33* 35*   CREATININE 9.5* 9.0* 9.8*       LIVER PROFILE:   No results for input(s): ALKPHOS, AST, ALT, ALB, BILIDIR, BILITOT, ALKPHOS in the last 72 hours. No results for input(s): AMYLASE in the last 72 hours. No results for input(s): LIPASE in the last 72 hours. UA:No results for input(s): NITRITE, LABCAST, WBCUA, RBCUA, MUCUS in the last 72 hours. TROPONIN: No results for input(s): Myrla Greulich in the last 72 hours. Lab Results   Component Value Date/Time    URRFLXCULT Yes 08/04/2019 06:40 PM       No results for input(s): TSHREFLEX in the last 72 hours. No components found for: TAP2493  POC GLUCOSE:    Recent Labs     02/24/22  1306 02/24/22  1659 02/24/22  2004 02/25/22  0812 02/25/22  1125   POCGLU 194* 219* 311* 352* 378*     No results for input(s): LABA1C in the last 72 hours. Lab Results   Component Value Date    LABA1C 8.1 01/07/2022         ASSESSMENT AND PLAN    Intractable nausea vomiting  Likely related to diabetic gastroparesis  Continue patient on Reglan 5 mg 4 times daily  Proton pump inhibitors twice daily for possible GERD  Patient reports improvement in nausea    Sepsis  Antibiotics changed to meropenem  Decrease the meropenem dose to 500 mg every 24    Peritonitis  Monitor peritoneal dialysis fluid cell count  Blood cultures no growth to date  Body fluid culture is positive for Pseudomonas  Antibiotics changed to meropenem  Infectious diseases following  S/P REMOVAL OF EXISTING PERITONEAL DIALYSIS CATHETER  LAPAROSCOPIC PLACEMENT OF PERITONEAL DIALYSIS CATHETER  REPAIR OF SMALL VENTRAL HERNIA on 2/24  Resume PD once cleared by vascular surgery. Dizziness on standing  Discontinue losartan      Diverticulitis  Patient on meropenem  Procalcitonin is still high at 0.60    Electrolyte normality    ESRD on PD  Nephrology is consulted        Code Status: Full Code        Dispo -cc        The patient and / or the family were informed of the results of any tests, a time was given to answer questions, a plan was proposed and they agreed with plan.     Daylin Madrid MD    This note was transcribed using Dragon Dictation software. Please disregard any translational errors.

## 2022-02-25 NOTE — OP NOTE
[address]                                OPERATIVE REPORT    PATIENT NAME: Josesito Bird                     :        1965  MED REC NO:   [id number]                          ROOM:       5105  ACCOUNT NO:   [de-identified]                           ADMIT DATE: 02/15/2022  PROVIDER:     Dewayne Ramos MD    DATE OF PROCEDURE:  2022    PREOPERATIVE DIAGNOSES:  Infected PD catheter and incarcerated ventral  hernia. POSTOPERATIVE DIAGNOSES:  Infected PD catheter and incarcerated ventral  hernia. OPERATIONS PERFORMED:  Replacement laparoscopically of PD catheter,  moved to the right; repair of incarcerated umbilical hernia; and removal  of previous left-sided PD catheter. SURGEON:  Maya Webber MD    ASSISTANT:  Austin Santos    ANESTHESIA:  General endotracheal.    ESTIMATED BLOOD LOSS:  Minimal.    INDICATIONS:  This is a 52-year-old white female, born 1965. OPERATIVE PROCEDURE:  The patient was placed on the table in supine  position. The abdomen was prepped and draped in the usual sterile  fashion. The old PD catheter was cut shorter, but prepped into the  field, put directly laterally, and then once the prep was dry, an [place]  was placed over this with the prepping and draping. We then started with Marcaine infiltrated into the old incision above  the umbilicus, carried down through the skin and subcutaneous fatty  tissue, identified the incarcerated hernia. It seemed to go from center  towards the left side, somewhat difficult to dissect free. Eventually,  we were able to open the hernia sac and we actually in the scar tissue  cut through some of the deep umbilical skin. We freed up the  incarcerated fatty tissue in the hernia and resected some of it, so we  could reduce it back into the abdomen. Extended the incision on the  fascia to get the Eddy trocar in.   We put the Eddy trocar in,  inflated the balloon, and then looked around. We saw the old catheter,  just a few adhesions, and no obvious pus. The patient had a question of  diverticulitis and/or just peritonitis from the tube. She has had this  catheter in place since 2019. Any case, we saw that the right side was free of adhesions and a fresh  area, so we made an 8-mm incision and obliquely put in an 8-mm trocar  and watched it enter directly into the abdomen avoiding the epigastric  vessels. Then, we took a new 57-cm double-cuffed curl cath, put it down  through the port, positioned the curl cath in the pelvis, positioned the  inner cuff at the level of the muscle, removed the 8-mm sheath and the  guidewire leaving the cuff at the level of the muscle. Then made a  separate incision to the right laterally with a 15-blade scalpel,  tunneled over from the new incision to the existing 8-mm incision, fed  the catheter onto the guidewire bearing the second cuff in the  subcutaneous tissue. We then deflated the abdomen, hooked the catheter up to irrigation; 1 L  of fluid eventually ran in, and then we put this on the floor and let it  drain out, slightly bloody, but good 700 plus obtained. In the  meantime, we closed the hernia deficit starting with 2-0 Ethibond  started at the top and bottom of the fascial defect, tied it, and ran it  towards the center and tied it. Added an additional figure-of-eight  suture to secure the level since they are planning to use this PD  catheter relatively soon. We then approached the umbilical skin, removed the thinned out part, and  closed it with a 4-0 subcuticular Vicryl. Then, a 3-0 Vicryl was used  to imbricate this. We worked from the inside out and then we tacked  this umbilical skin to the fascia with two 3-0 Vicryls. We closed the  subcutaneous tissue with 3-0 Vicryl and the skin with 4-0 Vicryl.   The  8-mm incision was closed with 3-0 Vicryl subcutaneous and 4-0 Vicryl  subcuticular. The Skin Affix glue was then applied to both of those  incisions and the catheter was clamped and capped, coiled on the sponge,  sealed with Tegaderm, and then we took the [place] off the old PD. Made a  new incision after infiltrating with Marcaine, dissected out the deep  cuff. Then, before finishing it, put in a pursestring of 2-0 Ethibond,  then freed up the cuff completely, took out the catheter in its  entirety, and closed the pursestring. This seemed waterproof and then  we closed the subcu with 3-0 Vicryl and the skin with 4-0 Vicryl,  followed by Skin Affix glue and gauze on the exit site. The patient was given antibiotics preoperatively, as she has already  been on them for about a week and a half, and hopefully, she does not  recur her peritonitis, which caused this problem in the first place.         Monster Larios MD    D: 02/25/2022 8:31:32       T: 02/25/2022 10:10:10     [email or web address]  Job#: [id number]     Doc#: [id number]    CC:  Sharon Walker MD Dr. _____ Irving Alcala Dr. _____ Karina Chaney

## 2022-02-25 NOTE — PLAN OF CARE
Problem: Falls - Risk of:  Goal: Will remain free from falls  Description: Will remain free from falls  Outcome: Ongoing     Problem: SAFETY  Goal: Free from accidental physical injury  Outcome: Ongoing     Problem: PAIN  Goal: Patient's pain/discomfort is manageable  Outcome: Ongoing     Problem: SKIN INTEGRITY  Goal: Skin integrity is maintained or improved  Outcome: Ongoing     Problem: Infection, Septic Shock:  Goal: Will show no infection signs and symptoms  Description: Will show no infection signs and symptoms  Outcome: Ongoing

## 2022-02-25 NOTE — PROGRESS NOTES
[facility]  Progress Note    2/25/2022 10:05 AM    Chief Complaint: Abdominal Pain     Reason for Consult: PD Catheter Exchange    POD #1 Removal of Existing Peritoneal Dialysis Catheter, Laparoscopic Placement of Peritoneal Dialysis Catheter and Repair of Small Ventral Hernia with Dr. Maninder Oshea    Subjective: Pt seen resting bed, she reports she is feeling much better today than she did yesterday after surgery, no complaints of abdominal pain    Vital Signs:  Vitals:    02/25/22 0400 02/25/22 0528 02/25/22 0745 02/25/22 0932   BP:   (!) 142/76    Pulse: 98  91    Resp:   16    Temp:   98.3 °F (36.8 °C)    TempSrc:   Oral    SpO2:   97% 97%   Weight:  198 lb 6.6 oz (90 kg)     Height:           I/O:    Intake/Output Summary (Last 24 hours) at 2/25/2022 1005  Last data filed at 2/25/2022 0915  Gross per 24 hour   Intake 120 ml   Output 20 ml   Net 100 ml       Physical Exam:   General: no apparent distress, alert and oriented x3, afebrile  Chest/Lungs: no accessory muscle use, respirations easy and unlabored  Cardiac: regular rate and rhythm  Abdomen: soft, non-tender, RUQ PD Catheter in place, small amount of drainage seen around exit site under dressing, two other abdominal dressings remain intact with no drainage seen to dressing  Extremities: warm and well perfused, no signs of cyanosis or ischemia, generalized edema present, bilateral upper and lower extremity motorsensory intact    Labs:   Lab Results   Component Value Date     02/25/2022    K 4.2 02/25/2022    K 2.6 02/15/2022    CL 92 02/25/2022    CO2 19 02/25/2022    BUN 35 02/25/2022    CREATININE 9.8 02/25/2022    GFRAA 5 02/25/2022    LABGLOM 4 02/25/2022    GLUCOSE 351 02/25/2022    PHOS 4.5 02/17/2022    MG 2.00 02/25/2022    CALCIUM 7.6 02/25/2022     Lab Results   Component Value Date    WBC 9.6 02/25/2022    RBC 3.47 02/25/2022    HGB 11.0 02/25/2022    HCT 33.5 02/25/2022    MCV 96.4 02/25/2022    RDW 16.6 02/25/2022     02/25/2022     Lab Results   Component Value Date    INR 0.97 08/10/2021    PROTIME 11.0 08/10/2021        Scheduled Meds:    torsemide  200 mg Oral BID    anidulafungin (ERAXIS) 100 mg IVPB  100 mg IntraVENous Daily    calcium acetate  1 capsule Oral TID WC    dianeal lo-queenie 2.5%  6,000 mL IntraPERitoneal Nightly    And    dianeal lo-queenie 2.5%  2,000 mL IntraPERitoneal Nightly    metoclopramide  5 mg IntraVENous Q6H    pantoprazole  40 mg IntraVENous BID    And    sodium chloride (PF)  10 mL IntraVENous BID    scopolamine  1 patch TransDERmal Q72H    meropenem  500 mg IntraVENous Q24H    insulin lispro  0-6 Units SubCUTAneous TID WC    insulin lispro  0-3 Units SubCUTAneous Nightly    sodium chloride flush  5-40 mL IntraVENous 2 times per day    heparin (porcine)  5,000 Units SubCUTAneous 3 times per day     Continuous Infusions:    dextrose      sodium chloride 5 mL/hr at 02/20/22 2120       Assessment:   -POD #1 Removal of Existing Peritoneal Dialysis Catheter, Laparoscopic Placement of Peritoneal Dialysis Catheter and Repair of Small Ventral Hernia with Dr. Melani Patten  -Surgical sites without significant drainage  -Small amount of drainage from PD Catheter insertion site  -ESRD - Nephrology following    Plan:  -PD with small volumes at discretion of Nephrology  -Replace dressings to abdomen as needed, keep surgical sites clean and dry    All pertinent information and plan of care discussed with Dr. Melani Patten. All questions and concerns were addressed with the patient. I have discussed the above stated plan with the patient and the nurse. The patient verbalized understanding and agreed with the plan. Thank you for allowing to us to participate in the care of Danae Dunn      Electronically signed by ABDIAZIZ Archuleta - CNP on 2/25/2022 at 10:05 AM     Pt seen and examined. for DIAGNOSIS OF infected PD catheter removed new in place agree with ABDIAZIZ Archuleta's note.  Labs reviewed. Vitals   Vitals:    02/25/22 0528 02/25/22 0745 02/25/22 0932 02/25/22 1145   BP:  (!) 142/76  129/66   Pulse:  91     Resp:  16  18   Temp:  98.3 °F (36.8 °C)  97.5 °F (36.4 °C)   TempSrc:  Oral  Oral   SpO2:  97% 97% 97%   Weight: 198 lb 6.6 oz (90 kg)      Height:           Wounds checked some leakage out of the new PD catheter exit site otherwise rest of the incisions look okay.   Patient is gaining fluid and probably needs dialysis so they can use the PD catheter with small amounts when they are ready

## 2022-02-25 NOTE — PROGRESS NOTES
Office: [phone]       Fax: [phone]      Nephrology Progress Note        Patient's Name: Patricia Barrera  Date of Visit: 2/25/2022    Reason for Consult:  ESRD management      History of Present Illness:      Patricia Barrera is a 55 y.o. female with PMHx of hypertension, diabetes mellitus, ESRD who was admitted on 2/15/2022 with complaints of abdominal pain   PD fluid cloudy  history of PD peritonitis in past.  Does 2 exchanges with 2.5% day time and Icodextrin overnight    INTERVAL HISTORY      Feels better  Shortness of breath: No   Had PD catheter exchanged  Wt up    Medications:        Allergies:  Bactrim [sulfamethoxazole-trimethoprim], Doxazosin, Geocillin [carbenicillin], Lisinopril, Other, Spironolactone, Tramadol, Bumetanide, and Ibuprofen  Scheduled Meds:   torsemide  200 mg Oral BID    anidulafungin (ERAXIS) 100 mg IVPB  100 mg IntraVENous Daily    calcium acetate  1 capsule Oral TID WC    dianeal lo-queenie 2.5%  6,000 mL IntraPERitoneal Nightly    And    dianeal lo-queenie 2.5%  2,000 mL IntraPERitoneal Nightly    metoclopramide  5 mg IntraVENous Q6H    pantoprazole  40 mg IntraVENous BID    And    sodium chloride (PF)  10 mL IntraVENous BID    scopolamine  1 patch TransDERmal Q72H    meropenem  500 mg IntraVENous Q24H    insulin lispro  0-6 Units SubCUTAneous TID WC    insulin lispro  0-3 Units SubCUTAneous Nightly    sodium chloride flush  5-40 mL IntraVENous 2 times per day    [Held by provider] heparin (porcine)  5,000 Units SubCUTAneous 3 times per day     Continuous Infusions:   dextrose      sodium chloride 5 mL/hr at 02/20/22 2120         Objective:       Vitals:  BP (!) 142/76   Pulse 91   Temp 98.3 °F (36.8 °C) (Oral)   Resp 16   Ht 5' 3\" (1.6 m)   Wt 198 lb 6.6 oz (90 kg)   SpO2 97%   BMI 35.15 kg/m²   Constitutional: awake, NAD  Skin: no rash, turgor wnl  HEENT:  MMM, No icterus  Cardiovascular:  S1, S2 reg  Respiratory: CTA, no crackles  Abdomen:  +BS, soft, NT, ND  Ext: no lower extremity edema  Access: abd PD catheter in place,  CNS: alert, no agitation    Data:     Labs:  Hepatic:   No results for input(s): AST, ALT, ALB, BILITOT, ALKPHOS in the last 72 hours. BNP: No results for input(s): BNP in the last 72 hours. ABGs: No results for input(s): PHART, PO2ART, QVR9EDC in the last 72 hours. IMAGING:  CT ABDOMEN PELVIS WO CONTRAST Additional Contrast? None   Final Result   1. Sigmoid colon diverticulosis. There is confluent inflammatory change   within the mesenteric fat surrounding a short segment of simple sigmoid colon   containing the diverticula. This could represent sequela of diverticulitis. 2.  2 cm ventral abdominal wall hernia containing inflamed fat. 3.  9 mm ground-glass nodule in the left lung base. Recommend follow-up with   a noncontrast chest CT at 6-12 months to confirm persistence then additional   noncontrast chest CTs every 2 years until 5 years. Assessment :       1. ESRD   Etiology: suspected DN  Access: Abd PD cath  Volume: Hypervolemic      2. Electrolytes/Acid base  No Dyskalemia    Recent Labs     02/25/22  0606   *   K 4.2   CO2 19*   MG 2.00       3. BMD  -Hyperphosphatemia, SHPT  -maintained on Renvela    Lab Results   Component Value Date    CALCIUM 7.6 (L) 02/25/2022    PHOS 4.5 02/17/2022        4. HTN  -Blood pressure high       BP Readings from Last 1 Encounters:   02/25/22 (!) 142/76       5. Anemia  -anemia of CKD  -assess for CHRIS    Recent Labs     02/24/22  0454   HGB 11.1*     6.  DM    7. PD peritonitis  -Cult: pseudomonas  -PD catheter exchanged 2/24/22    PLAN :       - Antibiotics directed by culture    - replace K, Mg as needed  -maintenance PD tonight (do small exchanges)  -resume BP meds:   - continue Torsemide  -MBD management  -Anemia management  -Dose meds to GFR<10      Thank you for allowing us to participate in care of Danae Dunn . We will continue to follow. Feel free to contact me with any questions.       Madi Hernandez MD  2/25/2022    [facility] [address] [place] [address]  Office : [phone]  Fax :[phone]

## 2022-02-25 NOTE — PROGRESS NOTES
Occupational Therapy   Occupational Therapy Initial Assessment  Date: 2022   Patient Name: Onel Cartagena  MRN: [id number]     : 1965    Date of Service: 2022    Discharge Recommendations:  Home with assist PRN  OT Equipment Recommendations  Equipment Needed: No    Onel Cartagena scored a  on the AM-PAC ADL Inpatient form. At this time, no further OT is recommended upon discharge. Recommend patient returns to prior setting with prior services. Assessment   Performance deficits / Impairments: Decreased functional mobility ; Decreased endurance;Decreased strength;Decreased ADL status; Decreased high-level IADLs;Decreased balance  Assessment: 56 y/o female admitted 2/15/2022 with complaints of abdominal pain and PD fluid cloudy. CT abdomen pelvis indicate sigmoid colon diverticulosis.  S/P REMOVAL OF EXISTING PERITONEAL DIALYSIS CATHETERLAPAROSCOPIC PLACEMENT OF PERITONEAL DIALYSIS CATHETERREPAIR OF SMALL VENTRAL HERNIA. PTA pt lives at home with spouse and independent for ADLs and functional mobility without AD. Pt reports she does not have a bathroom on main floor and primarily uses BSC. Today, pt completed ADL transfers and functional mobility around room wit RW and CGA. Pt did c/o intermittent dizziness but did not hinder ambulation and resolved once seated. Pt with functional UE ROM for self care and anticipate pt will require CGA for balance during standing components of ADLs. Pt is functioning below baseline and benefit from skilled therapy while in hospital. Anticipate pt will be safe to return home at discharge with family support. Pt denied home therapy.   Prognosis: Good  Decision Making: Medium Complexity  OT Education: Transfer Training;OT Role;Plan of Care  REQUIRES OT FOLLOW UP: Yes  Activity Tolerance  Activity Tolerance: Patient Tolerated treatment well  Activity Tolerance: pt c/o intermittent dizziness but did not hinder mobility  Safety Devices  Safety Devices in place: Yes  Type of devices: Left in chair;Nurse notified;Gait belt;Call light within reach           Patient Diagnosis(es): The primary encounter diagnosis was Septic shock ([address] Diagnoses of Peritonitis ([address] ESRD on peritoneal dialysis ([address] Hypokalemia, Non-intractable vomiting with nausea, unspecified vomiting type, Pulmonary nodule, Ventral hernia without obstruction or gangrene, and End stage renal disease ([address] were also pertinent to this visit. has a past medical history of Anemia, Chronic kidney disease, Diabetes mellitus ([address] Diabetes mellitus type 1 ([address] Diabetic retinopathy ([address] Diabetic retinopathy associated with type 2 diabetes mellitus, without macular edema, Hyperlipidemia, Hypertension, Kidney stone, Mixed hyperlipidemia, Orthostatic hypotension, and Vitreous hemorrhage of right eye ([address] has a past surgical history that includes Appendectomy; Kidney stone surgery; Tonsillectomy; LAPAROSCOPY INSERTION PERITONEAL CATHETER (N/A, 2019); Dialysis Catheter Removal (N/A, 2022); Dialysis Catheter Insertion (N/A, 2022); and ventral hernia repair (N/A, 2022). Restrictions  Restrictions/Precautions  Restrictions/Precautions: Fall Risk     Subjective   General  Chart Reviewed: Yes  Patient assessed for rehabilitation services?: Yes  Additional Pertinent Hx: 54 y/o female admitted 2/15/2022 with complaints of abdominal pain and PD fluid cloudy. CT abdomen pelvis indicate sigmoid colon diverticulosis.  S/P REMOVAL OF EXISTING PERITONEAL DIALYSIS CATHETERLAPAROSCOPIC PLACEMENT OF PERITONEAL DIALYSIS CATHETERREPAIR OF SMALL VENTRAL HERNIA  Family / Caregiver Present: No  Referring Practitioner: Dr. Deepti Villafuerte  Subjective  Subjective: Pt seen bedside and agreeable to therapy. Pt c/o intermittent dizziness with activity. Pt denied pain.   General Comment  Comments: Per RN ok for therapy  Vital Signs  Orthostatic B/P and Pulse?: Yes  Blood Pressure Lyin/78  Pulse Lyin PER MINUTE  Blood Pressure Sittin/87  Pulse Sittin PER MINUTE  Blood Pressure Standin/85  Pulse Standin PER MINUTE  Orthostatic B/P and Pulse?: Yes  Oxygen Therapy  SpO2: 97 %  Pulse Oximeter Device Mode: Intermittent  Pulse Oximeter Device Location: Finger  O2 Device: None (Room air)     Social/Functional History  Social/Functional History  Lives With: Spouse  Type of Home: House  Home Layout: Two level,Laundry in basement (bedroom on main level but bathroom on 2nd level, rail on stairs)  Home Access: Stairs to enter without rails  Entrance Stairs - Number of Steps: 5 MICHELLE with no rails; flight steps up to bathroom with rail  Bathroom Shower/Tub: Tub/Shower unit  Bathroom Toilet: Standard  Bathroom Equipment: Grab bars in shower  Bathroom Accessibility: Accessible  Home Equipment:  (walking stick)  ADL Assistance: Independent  Homemaking Assistance: Independent (shares IADLs with spouse)  Ambulation Assistance: Independent  Transfer Assistance: Independent  Active : Yes       Objective   Vision: Impaired  Vision Exceptions: Wears glasses for reading  Hearing: Within functional limits    Orientation  Overall Orientation Status: Within Functional Limits  Observation/Palpation  Edema: edema in harmeet hands ( left worse than right)     Balance  Sitting Balance: Supervision  Standing Balance: Contact guard assistance  Standing Balance  Time: stood prn for mobility  Functional Mobility  Functional Mobility Comments: ambulated around room with RW and CGA.      ADL  Additional Comments: Declined ADLS but anticipate will be CGA for standing balance during standing components of ADLs 2/2 intermittent dizziness        Bed mobility  Supine to Sit: Supervision (HOB slightly elevated, use of bed rail)  Sit to Supine:  (pt in chair at end of session)  Transfers  Sit to stand: Contact guard assistance  Stand to sit: Contact guard assistance  Transfer Comments: to/from RW     Cognition  Overall Cognitive Status: [facility]          Plan   Plan  Times per week: 3-5  Current Treatment Recommendations: Strengthening,Functional Mobility Training,Gait Training,Endurance Training,Balance Training,Self-Care / ADL    AM-PAC Score  AM-PAC Inpatient Daily Activity Raw Score: 19 (02/25/22 1031)  AM-PAC Inpatient ADL T-Scale Score : 40.22 (02/25/22 1031)  ADL Inpatient CMS 0-100% Score: 42.8 (02/25/22 1031)  ADL Inpatient CMS G-Code Modifier : CK (02/25/22 1031)    Goals  Short term goals  Time Frame for Short term goals: Prior to DC: Short term goal 1: Pt will complete ADL transfers/mobility with supervision  Short term goal 2: Pt will tolerate standing  > 5 min for functional task with supervision  Short term goal 3: Pt will complete toileting with supervision  Short term goal 4: Pt will complete LB Dressing with supervision  Patient Goals   Patient goals : to return home       Therapy Time   Individual Concurrent Group Co-treatment   Time In 0952         Time Out 1030         Minutes 38         Timed Code Treatment Minutes: 25 Minutes        This note to serve as OT d/c summary if pt is d/c-ed prior to next therapy session.     Taylor Goodwin, OTR/L

## 2022-02-25 NOTE — PROGRESS NOTES
Physical Therapy    Facility/Department: [facility] 5F PROGRESSIVE CARE  Initial Assessment    NAME: Nicolle Qiu  : 1965  MRN: [id number]    Date of Service: 2022    Discharge Recommendations:  Home with assist PRN   PT Equipment Recommendations  Equipment Needed: No  Other: pt declines any AD  Tami Forrest scored a 19/24 on the AM-PAC short mobility form. At this time, no further PT is recommended upon discharge. Recommend patient returns to prior setting with prior services. Assessment   Body structures, Functions, Activity limitations: Decreased functional mobility   Assessment: pt is a 54 yo female who was adm to [facility] with sepsis, vomiting, peritonitis and dizziness; 2-24 s/p REMOVAL OF EXISTING PERITONEAL DIALYSIS CATHETER, LAPAROSCOPIC PLACEMENT OF PERITONEAL DIALYSIS CATHETER and REPAIR OF SMALL VENTRAL HERNIA; pt at baseline \"furniture\" walks and has assist as needed from her ; pt does not go upstairs when her  is not with her; anticipate pt will be safe to return home at discharge with PRN assist from ; pt declines home PT or any assistive equipment  Prognosis: Good  Decision Making: Medium Complexity  PT Education: PT Role;General Safety  Barriers to Learning: none  REQUIRES PT FOLLOW UP: Yes  Activity Tolerance  Activity Tolerance: Patient limited by endurance       Patient Diagnosis(es): The primary encounter diagnosis was Septic shock ([address] Diagnoses of Peritonitis ([address] ESRD on peritoneal dialysis ([address] Hypokalemia, Non-intractable vomiting with nausea, unspecified vomiting type, Pulmonary nodule, Ventral hernia without obstruction or gangrene, and End stage renal disease ([address] were also pertinent to this visit.      has a past medical history of Anemia, Chronic kidney disease, Diabetes mellitus ([address] Diabetes mellitus type 1 ([address] Diabetic retinopathy ([address] Diabetic retinopathy associated with type 2 diabetes mellitus, without macular edema, Hyperlipidemia, Hypertension, Kidney stone, Mixed hyperlipidemia, Orthostatic hypotension, and Vitreous hemorrhage of right eye ([address] has a past surgical history that includes Appendectomy; Kidney stone surgery; Tonsillectomy; LAPAROSCOPY INSERTION PERITONEAL CATHETER (N/A, 8/12/2019); Dialysis Catheter Removal (N/A, 2/24/2022); Dialysis Catheter Insertion (N/A, 2/24/2022); and ventral hernia repair (N/A, 2/24/2022). Restrictions  Restrictions/Precautions  Restrictions/Precautions: Fall Risk (Simultaneous filing.  User may not have seen previous data.)  Vision/Hearing  Vision: Impaired  Hearing: Within functional limits     Subjective  General  Chart Reviewed: Yes  Patient assessed for rehabilitation services?: Yes  Additional Pertinent Hx: pt is a 56 yo female who was adm to [facility] with sepsis, vomiting, peritonitis and dizziness; 2-24 s/p REMOVAL OF EXISTING PERITONEAL DIALYSIS CATHETER, LAPAROSCOPIC PLACEMENT OF PERITONEAL DIALYSIS CATHETER and REPAIR OF SMALL VENTRAL HERNIA  Response To Previous Treatment: Not applicable  Family / Caregiver Present: No  Referral Date : 02/24/22  Follows Commands: Within Functional Limits  Subjective  Subjective: pt very pleasant and agreeable to working with therapy; denies any pain states \"I feel much better than yesterday\"  Pain Screening  Patient Currently in Pain: Denies          Orientation  Orientation  Overall Orientation Status: Within Functional Limits  Social/Functional History  Social/Functional History  Lives With: Spouse  Type of Home: House  Home Layout: Two level,Laundry in basement (bedroom on main level but bathroom on 2nd level, rail on stairs)  Home Access: Stairs to enter without rails  Entrance Stairs - Number of Steps: 5 MICHELLE with no rails; flight steps up to bathroom with rail  Bathroom Shower/Tub: Tub/Shower unit  Bathroom Toilet: Standard  Bathroom Equipment: Grab bars in shower  Bathroom Accessibility: Daniel Keene:  (walking stick)  ADL Assistance: Independent  Homemaking Assistance: Independent (shares IADLs with spouse)  Ambulation Assistance: Independent  Transfer Assistance: Independent  Active : Yes  Cognition   Cognition  Overall Cognitive Status: WFL    Objective          AROM RLE (degrees)  RLE AROM: WFL  AROM LLE (degrees)  LLE AROM : WFL  Strength RLE  Strength RLE: WFL  Comment: early fatigue  Strength LLE  Strength LLE: WFL  Comment: early fatigue        Bed mobility  Supine to Sit: Supervision (HOB slightly elevated, use of bed rail)  Sit to Supine:  (pt in chair at end of session)  Transfers  Sit to Stand: Stand by assistance  Stand to sit: Stand by assistance  Ambulation  Ambulation?: Yes  Ambulation 1  Device: Rolling Walker  Assistance: Contact guard assistance;Stand by assistance  Quality of Gait: no LOB but pt has intermittent dizziness therefore provided more assist for safety  Distance: 25'  Comments: assisted with positioning in chair for comfort with all needs in reach     Balance  Comments: sup for sitting balance at EOB; SBA for standing with RW        Plan   Plan  Times per week: 1-2 more times  Current Treatment Recommendations: Functional Mobility Training  Safety Devices  Type of devices: Call light within reach,Left in chair,Nurse notified,All fall risk precautions in place    G-Code       OutComes Score                                                  AM-PAC Score  AM-PAC Inpatient Mobility Raw Score : 19 (02/25/22 1035)  AM-PAC Inpatient T-Scale Score : 45.44 (02/25/22 1035)  Mobility Inpatient CMS 0-100% Score: 41.77 (02/25/22 1035)  Mobility Inpatient CMS G-Code Modifier : CK (02/25/22 1035)          Goals  Short term goals  Time Frame for Short term goals: by discharge  Short term goal 1: transfers MI  Short term goal 2: amb 48' with LRAD SBA  Patient Goals   Patient goals : to get home       Therapy Time   Individual Concurrent Group Co-treatment   Time In 0953         Time Out 1035         Minutes 42 SUMI LEE, PT    Electronically signed by SUMI LEE PT on 2/25/2022 at 10:36 AM

## 2022-02-25 NOTE — PROGRESS NOTES
MD Hamlin (Infectious Disease) ordered Eraxis IV for patient starting this morning. Pt was educated on new medication and pt refused. Pt stated her Kidney doctor took her off all antifungals due to them causing her abdominal pain, cramping, diarrhea, and dizziness. ID MD made aware of patient refusal via perfect serve. Awaiting response.        Electronically signed by Sarah Rivas RN on 2/25/2022 at 9:42 AM

## 2022-02-25 NOTE — PROGRESS NOTES
Infectious Disease Follow up Notes  Admit Date: 2/15/2022    Antibiotics :   IV Meropenem      CHIEF COMPLAINT:     PD peritonitis  Pseudomonas infection   Diverticulitis    Subjective interval History :  55 y.o. man with a past medical history of end-stage renal disease on peritoneal dialysis for the past 2 years, diabetic retinopathy , diabetes neuropathy, kidney stone, hypertension, hyperlipidemia, admitted to the hospital secondary to abdominal pain and diarrhea. She also has a history of COVID-19 infection for which she is recovering okay but unfortunately developed GI symptoms consistent with nausea diarrhea abdominal pain. PD fluid on admission with abnormal PD fluid culture positive for Pseudomonas. Admit labs indicate lactic acid 4.1 glucose at 295, WBC elevated 14.4. Blood cultures from admission negative. CT abdomen pelvis indicate sigmoid colon diverticulosis but there is small sequelae of diverticulitis. PD fluid cell count was grossly abnormal with WBC count at 18,000.   Location : abd pain+      Quality :  Aching      Severity :  10/10   Duration : weeks     Timing :constant   Context : PD     Modifying factors : none   Associated signs and symptoms:diarrhea, nausea        Interval History :  Nausea better sitting up in the bed wants to be liberal with fluids advised to d/w Nephrology - tolerating IV abx ok t     Past Medical History:    Past Medical History:   Diagnosis Date    Anemia 9/15/2014    Chronic kidney disease     Diabetes mellitus ([address]     Diabetes mellitus type 1 ([address]     Diabetic retinopathy ([address]     Diabetic retinopathy associated with type 2 diabetes mellitus, without macular edema     Hyperlipidemia 7/27/2015    Hypertension     Kidney stone     Mixed hyperlipidemia 7/27/2015    Orthostatic hypotension     Vitreous hemorrhage of right eye ([address] 8/15/2017       Past Surgical History: Past Surgical History:   Procedure Laterality Date    APPENDECTOMY      DIALYSIS CATHETER INSERTION N/A 2/24/2022    LAPAROSCOPIC PLACEMENT OF PERITONEAL DIALYSIS CATHETER performed by Alejandra Isaac MD at [address] N/A 2/24/2022    REMOVAL OF EXISTING PERITONEAL DIALYSIS CATHETER performed by Alejandra Isaac MD at [address]      LAPAROSCOPY INSERTION PERITONEAL CATHETER N/A 8/12/2019    LAPAROSCOPIC PERITONEAL DIALYSIS CATHETER PLACEMENT WITH AXEL TROCHAR performed by Alejandra Isaac MD at [address] N/A 2/24/2022    REPAIR OF SMALL VENTRAL HERNIA performed by Alejandra Isaac MD at [place] [address]       Current Medications:    Outpatient Medications Marked as Taking for the 2/15/22 encounter [facility] Encounter)   Medication Sig Dispense Refill    torsemide (DEMADEX) 100 MG tablet TAKE 4 TABLETS BY MOUTH ONCE NIGHTLY 360 tablet 5    amLODIPine (NORVASC) 10 MG tablet TAKE ONE TABLET BY MOUTH DAILY 30 tablet 3    losartan (COZAAR) 100 MG tablet Take 100 mg by mouth nightly      pantoprazole (PROTONIX) 40 MG tablet Take 1 tablet by mouth daily 90 tablet 1    insulin lispro (HUMALOG KWIKPEN) 100 UNIT/ML pen Inject 2-4 Units into the skin 3 times daily (before meals) Sliding scale          Allergies:  Bactrim [sulfamethoxazole-trimethoprim], Doxazosin, Geocillin [carbenicillin], Lisinopril, Other, Spironolactone, Tramadol, Bumetanide, and Ibuprofen    Immunizations : There is no immunization history on file for this patient.     Social History:     Social History     Tobacco Use    Smoking status: Never Smoker    Smokeless tobacco: Never Used   Vaping Use    Vaping Use: Never used   Substance Use Topics    Alcohol use: No    Drug use: No     Social History     Tobacco Use   Smoking Status Never Smoker   Smokeless Tobacco Never Used      Family History   Problem Relation Age of Onset    Heart Disease Mother heart attack 2007    High Blood Pressure Mother     Diabetes Father     Heart Disease Father     Emphysema Father     Cancer Maternal Grandmother         cervical cancer    Lung Cancer Maternal Grandfather         black lung    Diabetes Paternal Grandmother     Heart Disease Paternal Grandfather       REVIEW OF SYSTEMS:    Constitutional:  negative for fevers, chills, night sweats  Eyes:  negative for blurred vision, eye discharge, visual disturbance   HEENT:  negative for hearing loss, ear drainage,nasal congestion  Respiratory:  negative for cough, shortness of breath or hemoptysis   Cardiovascular:  negative for chest pain, palpitations, syncope  Gastrointestinal:  negative for nausea, vomiting, diarrhea, constipation, abdominal pain++   Genitourinary:  negative for frequency, dysuria, urinary incontinence, hematuria  Hematologic/Lymphatic:  negative for easy bruising, bleeding and lymphadenopathy  Allergic/Immunologic:  negative for recurrent infections, angioedema, anaphylaxis   Endocrine:  negative for weight changes, polyuria, polydipsia and polyphagia  Musculoskeletal:  negative for joint  pain, swelling, decreased range of motion  Integumentary: No rashes, skin lesions  Neurological:  negative for headaches, slurred speech, unilateral weakness  Psychiatric: negative for hallucinations,confusion,agitation.                 PHYSICAL EXAM:      Vitals:    BP (!) 142/76   Pulse 91   Temp 98.3 °F (36.8 °C) (Oral)   Resp 16   Ht 5' 3\" (1.6 m)   Wt 198 lb 6.6 oz (90 kg)   SpO2 97%   BMI 35.15 kg/m²       General Appearance: alert,in no acute distress, +  pallor, no icterus chronic ill appearing woman +  Skin: warm and dry, no rash or erythema  Head: normocephalic and atraumatic  Eyes: pupils equal, round, and reactive to light, conjunctivae normal  ENT: tympanic membrane, external ear and ear canal normal bilaterally, nose without deformity, nasal mucosa and turbinates normal without polyps  Neck: supple and non-tender without mass, no thyromegaly  no cervical lymphadenopathy  Pulmonary/Chest: clear to auscultation bilaterally- no wheezes, rales or rhonchi, normal air movement, no respiratory distress  Cardiovascular: normal rate, regular rhythm, normal S1 and S2, no murmurs, rubs, clicks, or gallops, no carotid bruits  Abdomen: soft, ++ -tender, + -distended, normal bowel sounds, no masses or organomegaly  Extremities: no cyanosis, clubbing or edema  Musculoskeletal: normal range of motion, no joint swelling, deformity or tenderness  Integumentary: No rashes, no abnormal skin lesions, no petechiae  Neurologic: reflexes normal and symmetric, no cranial nerve deficit  Psych:  Orientation, sensorium, mood normal            Lines: PD catheter +new and dressing+         Data Review:    CBC:   Lab Results   Component Value Date    WBC 9.6 02/25/2022    HGB 11.0 (L) 02/25/2022    HCT 33.5 (L) 02/25/2022    MCV 96.4 02/25/2022     (L) 02/25/2022     RENAL:   Lab Results   Component Value Date    CREATININE 9.8 (HH) 02/25/2022    BUN 35 (H) 02/25/2022     (L) 02/25/2022    K 4.2 02/25/2022    CL 92 (L) 02/25/2022    CO2 19 (L) 02/25/2022     SED RATE: No results found for: SEDRATE  CK: No results found for: CKTOTAL  CRP: No results found for: CRP  Hepatic Function Panel:   Lab Results   Component Value Date    ALKPHOS 117 02/15/2022    ALT 7 02/15/2022    AST 14 02/15/2022    PROT 5.6 02/15/2022    BILITOT 0.6 02/15/2022    LABALBU 1.7 02/17/2022     UA:  Lab Results   Component Value Date    COLORU YELLOW 05/27/2021    CLARITYU Clear 05/27/2021    GLUCOSEU 250 05/27/2021    BILIRUBINUR Negative 05/27/2021    KETUA Negative 05/27/2021    SPECGRAV 1.016 05/27/2021    BLOODU TRACE 05/27/2021    PHUR 7.0 05/27/2021    PROTEINU 100 05/27/2021    UROBILINOGEN 0.2 05/27/2021    NITRU Negative 05/27/2021    LEUKOCYTESUR Negative 05/27/2021    LABMICR YES 05/27/2021    URINETYPE NotGiven 05/27/2021 Urine Microscopic:   Lab Results   Component Value Date    LABCAST 3-5 Hyaline 08/27/2014    BACTERIA RARE 05/27/2021    COMU see below 05/27/2021    HYALCAST 0 05/27/2021    WBCUA 3 05/27/2021    RBCUA 4 05/27/2021    EPIU 4 05/27/2021     Urine Reflex to Culture:   Lab Results   Component Value Date    URRFLXCULT Yes 08/04/2019          Results for David Curry (MRN [id number]) as of 2/17/2022 16:24    Ref. Range 2/16/2022 00:40 2/16/2022 17:25 2/17/2022 03:30   Source + CELL CT/DIFF-BF Unknown Peritoneal dial Peritoneal Fl. Peritoneal dial   Appearance, Fluid Unknown Hazy Turbid Cloudy   Color, Fluid Unknown Yellow Yellow Yellow   Eos, Fluid Latest Units: % 1       RBC, Fluid Latest Units: /cumm 1,000 5,000 <2,000   Lymphocytes, Body Fluid Latest Units: %   8 4   Monocyte Count, Fluid Latest Units: % 25   15   Neutrophil Count, Fluid Latest Units: % 54 86 76   Nucl Cell, Fluid Latest Units: /cumm 18,185 >50 2,161   Mesothelial, Fluid Latest Units: % 16 1 1   Clot Eval. Unknown see below see below see below   Number of Cells Counted Fluid Unknown 100 100 100        Procedure Component Value Units Date/Time   Culture, Body Fluid [[id number]] Collected: 02/23/22 0520   Order Status: Completed Specimen: Body Fluid from Peritoneal Dialysis Fluid Updated: 02/24/22 0919    Body Fluid Culture, Sterile No growth to date    Gram Stain Result 3+ WBC's   No Epithelial Cells seen   No organisms seen    Narrative:     ORDER#: [id number]                          ORDERED BY: Rosy Figueerdo   SOURCE: Peritoneal Dialysis Fluid          COLLECTED:  02/23/22 05:20   [facility] :                      RECEIVED :  02/23/22 05:40   Performed at:   [facility]   [address] [facility] [place] [address] [address]   Phone [phone]   Culture, Body Fluid [[id number]] Collected: 02/21/22 0150   Order Status: Completed Specimen:  Body Fluid from Peritoneal Dialysis Fluid Updated: 02/24/22 7668 Body Fluid Culture, Sterile No growth 60 to 72 hours    Gram Stain Result 1+ WBC's (Polymorphonuclear)   No organisms seen    Narrative:     ORDER#: [id number]                          ORDERED BY: Delia Huang   SOURCE: Peritoneal Dialysis Fluid          COLLECTED:  02/21/22 01:50   [facility] :                      RECEIVED :  02/21/22 14:57   Performed at:   [facility]        MICRO: cultures reviewed and updated by me   Blood Culture:   Lab Results   Component Value Date    BC No Growth after 4 days of incubation. 02/15/2022    BLOODCULT2 No Growth after 4 days of incubation. 02/15/2022            Culture, Body Fluid [[id number]] Collected: 02/23/22 0520   Order Status: Completed Specimen: Body Fluid from Peritoneal Dialysis Fluid Updated: 02/25/22 0753    Body Fluid Culture, Sterile --    No growth to date   No growth 36 to 48 hours     Gram Stain Result 3+ WBC's   No Epithelial Cells seen   No organisms seen    Narrative:     ORDER#: [id number]                          ORDERED BY: Delia Huang   SOURCE: Peritoneal Dialysis Fluid          COLLECTED:  02/23/22 05:20   [facility] :                      RECEIVED :  02/23/22 05:40   Performed at:   [facility]   [address] [facility] [address]   Phone [phone]   Culture, Body Fluid [[id number]]    Order Status: No result Specimen: Body Fluid from Peritoneal Dialysis Fluid    Culture, Tip [[id number]] Collected: 02/24/22 1225   Order Status: Sent Specimen: Catheter Tip Updated: 02/24/22 1250   Culture, Body Fluid [[id number]] Collected: 02/21/22 0150   Order Status: Completed Specimen:  Body Fluid from Peritoneal Dialysis Fluid Updated: 02/24/22 0729    Body Fluid Culture, Sterile No growth 60 to 72 hours    Gram Stain Result 1+ WBC's (Polymorphonuclear)   No organisms seen    Narrative:     ORDER#: [id number]                          ORDERED BY: Delia Huang   SOURCE: Peritoneal Dialysis Fluid          COLLECTED:  02/21/22 01:50   [facility] :                      RECEIVED :  02/21/22 14:57          Respiratory Culture:  Lab Results   Component Value Date    LABGRAM  02/23/2022     3+ WBC's  No Epithelial Cells seen  No organisms seen       AFB:No results found for: [facility]  Viral Culture:  Lab Results   Component Value Date    COVID19 Not Detected 02/23/2022    COVID19 Detected 01/07/2022     Urine Culture: No results for input(s): LABURIN in the last 72 hours. IMAGING:    CT ABDOMEN PELVIS WO CONTRAST Additional Contrast? None   Final Result   1. Sigmoid colon diverticulosis. There is confluent inflammatory change   within the mesenteric fat surrounding a short segment of simple sigmoid colon   containing the diverticula. This could represent sequela of diverticulitis. 2.  2 cm ventral abdominal wall hernia containing inflamed fat. 3.  9 mm ground-glass nodule in the left lung base. Recommend follow-up with   a noncontrast chest CT at 6-12 months to confirm persistence then additional   noncontrast chest CTs every 2 years until 5 years.                All the pertinent images and reports for the current Hospitalization were reviewed by me     Scheduled Meds:   torsemide  200 mg Oral BID    anidulafungin (ERAXIS) 100 mg IVPB  100 mg IntraVENous Daily    calcium acetate  1 capsule Oral TID WC    dianeal lo-queenie 2.5%  6,000 mL IntraPERitoneal Nightly    And    dianeal lo-queenie 2.5%  2,000 mL IntraPERitoneal Nightly    metoclopramide  5 mg IntraVENous Q6H    pantoprazole  40 mg IntraVENous BID    And    sodium chloride (PF)  10 mL IntraVENous BID    scopolamine  1 patch TransDERmal Q72H    meropenem  500 mg IntraVENous Q24H    insulin lispro  0-6 Units SubCUTAneous TID WC    insulin lispro  0-3 Units SubCUTAneous Nightly    sodium chloride flush  5-40 mL IntraVENous 2 times per day    heparin (porcine)  5,000 Units SubCUTAneous 3 times per day Continuous Infusions:   dextrose      sodium chloride 5 mL/hr at 02/20/22 2120       PRN Meds:  oxyCODONE-acetaminophen, promethazine, diphenhydrAMINE, glucose, dextrose, glucagon (rDNA), dextrose, sodium chloride flush, sodium chloride, acetaminophen **OR** acetaminophen, ondansetron, diphenhydrAMINE      Assessment:     Patient Active Problem List   Diagnosis    Diabetic retinopathy [facility]    Mixed hyperlipidemia    DMII (diabetes mellitus, type 2) ([facility]    Vitreous hemorrhage of right eye ([address]    Essential hypertension    Acute kidney injury ([address]    ESRD (end stage renal disease) ([facility]    Acute renal failure superimposed on chronic kidney disease (HCC)    Volume overload    Peritonitis ([address]    Generalized abdominal pain    Peritoneal dialysis status ([facility]    Morbid obesity due to excess calories ([facility])    Nausea and vomiting    Hyponatremia    Diarrhea    Dizziness    Hypertensive emergency    Hypokalemia    Hypertensive urgency    Cerebrovascular accident (CVA) ([address]    Electrolyte imbalance    Acute respiratory failure with hypoxia ([facility])    Septic shock ([address]    Electrolyte abnormality    Diverticulitis          Sepsis on admit  PD peritonitis  Diverticulitis  Pseudomonas on PD fluid cx  WBC elevation   Lactic acidosis  ESRD on PD  DM with several complications  PD fluid cell counts very abnormal         Will need IV abx for now and at d.c hopefully able to do IV abx through PD exchanges      PD fluid cx in follow up pending will need to see improvement in cell counts before d/c planning AND Fluid cx have to be negative     PD fluid cx with Pseudomonas still and she is not clearing her infection indicating that PD catheter may be now involved   d/w  about plans for possible PD catheter removal     Will see if her NAUSEA improves with change in her IV abx now tolerating a bit better     Would like PD fluid cx to be negative s/p PD exchange on 2/24 and ventral Hernia repair by [facility], Microbiology, Radiology and all the pertinent results from current hospitalization and  care every where were reviewed  by me as a part of the evaluation   Plan:   1. Trend PD fluid cell counts and culture   2. IV Meropenem x 500 mg q 24 hrs  3. Watch for complications  4. Blood cx negative   5. CT abd/pelvis results noted  6. S/P PD catheter exchange , Tip cx In process  7. Cont  Anidulafungin x 100 mg daily for now pending clinical improvement      Discussed with patient/Family and Nursing   Risk of Complications/Morbidity: High      · Illness(es)/ Infection present that pose threat to bodily function. · There is potential for severe exacerbation of infection/side effects of treatment. · Therapy requires intensive monitoring for antimicrobial agent toxicity. Discussed with patient/Family and Nursing staff     Thanks for allowing me to participate in your patient's care and please call me with any questions or concerns.     Anabelle Lyman MD  Infectious Disease  [facility]) Physician  Phone: [phone]   Fax : [phone]

## 2022-02-25 NOTE — FLOWSHEET NOTE
Orthostatic vitals ordered, see below       02/25/22 0845   Vital Signs   Orthostatic B/P and Pulse?  Yes   Blood Pressure Lying 141/78   Pulse Lying 97 PER MINUTE   Blood Pressure Sitting 129/87   Pulse Sitting 99 PER MINUTE   Blood Pressure Standing 122/85   Pulse Standing 107 PER MINUTE

## 2022-02-26 NOTE — PROGRESS NOTES
Hospitalist Progress Note      PCP: No primary care provider on file. Date of Admission: 2/15/2022    Chief Complaint: abdominal pain    Hospital Course:   43-year-old female patient with a past medical history of ESRD on PD, diabetic retinopathy, diabetic neuropathy, history of kidney stones, hypertension, hyperlipidemia, patient presented to emergency with abdominal pain and diarrhea. Patient was admitted with PD related peritonitis. Subjective:   Patient continues to be vitally stable, reported improved nausea and vomiting. Requested increase in her fluid restriction limit ( changed to 1800).      Medications:  Reviewed    Infusion Medications    dextrose      sodium chloride 25 mL (02/25/22 5467)     Scheduled Medications    insulin lispro  0-18 Units SubCUTAneous TID WC    insulin lispro  0-9 Units SubCUTAneous Nightly    insulin glargine  5 Units SubCUTAneous QAM    dianeal lo-queenie 4.25%  6,000 mL IntraPERitoneal Nightly    torsemide  200 mg Oral BID    anidulafungin (ERAXIS) 100 mg IVPB  100 mg IntraVENous Daily    calcium acetate  1 capsule Oral TID WC    metoclopramide  5 mg IntraVENous Q6H    pantoprazole  40 mg IntraVENous BID    And    sodium chloride (PF)  10 mL IntraVENous BID    scopolamine  1 patch TransDERmal Q72H    meropenem  500 mg IntraVENous Q24H    insulin lispro  0-6 Units SubCUTAneous TID WC    insulin lispro  0-3 Units SubCUTAneous Nightly    sodium chloride flush  5-40 mL IntraVENous 2 times per day    heparin (porcine)  5,000 Units SubCUTAneous 3 times per day     PRN Meds: oxyCODONE-acetaminophen, promethazine, diphenhydrAMINE, glucose, dextrose, glucagon (rDNA), dextrose, sodium chloride flush, sodium chloride, acetaminophen **OR** acetaminophen, ondansetron, diphenhydrAMINE      Intake/Output Summary (Last 24 hours) at 2/26/2022 0903  Last data filed at 2/25/2022 1800  Gross per 24 hour   Intake 840 ml   Output --   Net 840 ml       Physical Exam Performed:    BP (!) 156/70   Pulse 92   Temp 97.6 °F (36.4 °C) (Oral)   Resp 16   Ht 5' 3\" (1.6 m)   Wt 198 lb 6.6 oz (90 kg)   SpO2 100%   BMI 35.15 kg/m²     General appearance: No apparent distress, appears stated age and cooperative. HEENT: Pupils equal, round, and reactive to light. Conjunctivae/corneas clear. Neck: Supple, with full range of motion. No jugular venous distention. Trachea midline. Respiratory:  Normal respiratory effort. Clear to auscultation, bilaterally without Rales/Wheezes/Rhonchi. Cardiovascular: Regular rate and rhythm with normal S1/S2 without murmurs, rubs or gallops. Abdomen: PD cath noted, abdominal tenderness present. Musculoskeletal: bilateral edema present   Skin: Skin color, texture, turgor normal.  No rashes or lesions. Neurologic:  Neurovascularly intact without any focal sensory/motor deficits. Cranial nerves: II-XII intact, grossly non-focal.  Psychiatric: Alert and oriented, thought content appropriate, normal insight  Capillary Refill: Brisk,3 seconds, normal   Peripheral Pulses: +2 palpable, equal bilaterally       Labs:   Recent Labs     02/24/22  0454 02/25/22  0606 02/26/22  0435   WBC 5.9 9.6 8.8   HGB 11.1* 11.0* 9.9*   HCT 33.0* 33.5* 30.0*    103* 141     Recent Labs     02/24/22  0454 02/25/22  0606 02/26/22  0435   * 127* 127*   K 3.6 4.2 3.5   CL 89* 92* 88*   CO2 23 19* 23   BUN 33* 35* 45*   CREATININE 9.0* 9.8* 10.1*   CALCIUM 7.7* 7.6* 7.7*     No results for input(s): AST, ALT, BILIDIR, BILITOT, ALKPHOS in the last 72 hours. No results for input(s): INR in the last 72 hours. No results for input(s): Antonia [place] in the last 72 hours.     Urinalysis:      Lab Results   Component Value Date    NITRU Negative 05/27/2021    WBCUA 3 05/27/2021    BACTERIA RARE 05/27/2021    RBCUA 4 05/27/2021    BLOODU TRACE 05/27/2021    SPECGRAV 1.016 05/27/2021    GLUCOSEU 250 05/27/2021       Radiology:  CT ABDOMEN PELVIS WO CONTRAST Additional Contrast? None   Final Result   1. Sigmoid colon diverticulosis. There is confluent inflammatory change   within the mesenteric fat surrounding a short segment of simple sigmoid colon   containing the diverticula. This could represent sequela of diverticulitis. 2.  2 cm ventral abdominal wall hernia containing inflamed fat. 3.  9 mm ground-glass nodule in the left lung base. Recommend follow-up with   a noncontrast chest CT at 6-12 months to confirm persistence then additional   noncontrast chest CTs every 2 years until 5 years. Assessment/Plan:    Active Hospital Problems    Diagnosis     Septic shock ([facility] [A41.9, R65.21]     Electrolyte abnormality [E87.8]     Diverticulitis [K57.92]     Peritonitis ([address] [K65.9]     ESRD (end stage renal disease) ([address] [N18.6]        Sepsis  PD related Peritonitis  Admitted with abdominal pain, found to have PD related peritonitis with culture growing  Pseudomonas, PD cath replaced 2/24 ( and repair of hernia )  Monitor peritoneal dialysis fluid cell count  Blood cultures no growth to date  Body fluid culture is positive for Pseudomonas  Plan :  - continue bernarda day 7.   - appreciate ID input. DM. BG not controlled. Plan :  - start on 5 units of g;argine.   - increase sliding scale to high     Dizziness on standing  Discontinue losartan      Intractable nausea vomiting ( improved)   Likely related to diabetic gastroparesis  Continue patient on Reglan 5 mg 4 times daily  Proton pump inhibitors twice daily for possible GERD  Patient reports improvement in nausea     Diverticulitis  Patient on meropenem  Procalcitonin is still high at 0.60       ESRD on PD  appreciate nephrology input. DVT Prophylaxis: heparin  Diet: ADULT DIET; Regular; 4 carb choices (60 gm/meal); 1500 ml  Code Status: Full Code    PT/OT Eval Status: following.      Dispo - pending clinical improvement      Peña Delgado MD

## 2022-02-26 NOTE — PROGRESS NOTES
Pt's CCPD started at 2223 this evening. Prior to this, pt's PD catheter site dressing changed as old dressing was noted to be moderately saturated with some serosanguinous drainage. CCPD orders as follows d/t recent PD catheter exchange: 6L, 12hrs, 1L/exchange, 6 exchanges, no last fill. Pt's initial drain with CCPD noted to be blood tinged. Pt denies pain with CCPD however, even after first fill was completed. Abdominal dressings remain clean, dry, and intact. Will continue to monitor pt closely; will send fluid sample in the AM per orders.

## 2022-02-26 NOTE — PLAN OF CARE
Problem: Falls - Risk of:  Goal: Will remain free from falls  Description: Will remain free from falls  Outcome: Ongoing  Goal: Absence of physical injury  Description: Absence of physical injury  Outcome: Ongoing     Problem: SAFETY  Goal: Free from accidental physical injury  Outcome: Ongoing  Goal: Free from intentional harm  Outcome: Ongoing     Problem: DAILY CARE  Goal: Daily care needs are met  Outcome: Ongoing     Problem: PAIN  Goal: Patient's pain/discomfort is manageable  Outcome: Ongoing     Problem: SKIN INTEGRITY  Goal: Skin integrity is maintained or improved  Outcome: Ongoing     Problem: KNOWLEDGE DEFICIT  Goal: Patient/S.O. demonstrates understanding of disease process, treatment plan, medications, and discharge instructions.   Outcome: Ongoing     Problem: DISCHARGE BARRIERS  Goal: Patient's continuum of care needs are met  Outcome: Ongoing     Problem: Discharge Planning:  Goal: Discharged to appropriate level of care  Description: Discharged to appropriate level of care  Outcome: Ongoing     Problem: Gas Exchange - Impaired:  Goal: Levels of oxygenation will improve  Description: Levels of oxygenation will improve  Outcome: Ongoing     Problem: Infection, Septic Shock:  Goal: Will show no infection signs and symptoms  Description: Will show no infection signs and symptoms  Outcome: Ongoing     Problem: Serum Glucose Level - Abnormal:  Goal: Ability to maintain appropriate glucose levels will improve  Description: Ability to maintain appropriate glucose levels will improve  Outcome: Ongoing     Problem: Tissue Perfusion, Altered:  Goal: Circulatory function within specified parameters  Description: Circulatory function within specified parameters  Outcome: Ongoing     Problem: Venous Thromboembolism:  Goal: Will show no signs or symptoms of venous thromboembolism  Description: Will show no signs or symptoms of venous thromboembolism  Outcome: Ongoing  Goal: Absence of signs or symptoms of impaired coagulation  Description: Absence of signs or symptoms of impaired coagulation  Outcome: Ongoing     Problem: Skin Integrity:  Goal: Will show no infection signs and symptoms  Description: Will show no infection signs and symptoms  Outcome: Ongoing  Goal: Absence of new skin breakdown  Description: Absence of new skin breakdown  Outcome: Ongoing     Problem: Pain:  Goal: Pain level will decrease  Description: Pain level will decrease  Outcome: Ongoing  Goal: Control of acute pain  Description: Control of acute pain  Outcome: Ongoing  Goal: Control of chronic pain  Description: Control of chronic pain  Outcome: Ongoing     Problem: Fluid Volume:  Goal: Ability to achieve a balanced intake and output will improve  Description: Ability to achieve a balanced intake and output will improve  Outcome: Ongoing     Problem: Physical Regulation:  Goal: Ability to maintain clinical measurements within normal limits will improve  Description: Ability to maintain clinical measurements within normal limits will improve  Outcome: Ongoing  Goal: Will show no signs and symptoms of electrolyte imbalance  Description: Will show no signs and symptoms of electrolyte imbalance  Outcome: Ongoing     Problem: Nutrition  Goal: Optimal nutrition therapy  Outcome: Ongoing

## 2022-02-26 NOTE — PROGRESS NOTES
Office: [phone]       Fax: [phone]      Nephrology Progress Note        Patient's Name: Jayme Davila  Date of Visit: 2/26/2022    Reason for Consult:  ESRD management      History of Present Illness:      Jayme Davila is a 55 y.o. female with PMHx of hypertension, diabetes mellitus, ESRD who was admitted on 2/15/2022 with complaints of abdominal pain   PD fluid cloudy  history of PD peritonitis in past.  Does 2 exchanges with 2.5% day time and Icodextrin overnight    INTERVAL HISTORY      Feels better  Shortness of breath: No   Has abdominal pain   Wt up    Medications:        Allergies:  Bactrim [sulfamethoxazole-trimethoprim], Doxazosin, Geocillin [carbenicillin], Lisinopril, Other, Spironolactone, Tramadol, Bumetanide, and Ibuprofen  Scheduled Meds:   dianeal lo-queenie 4.25%  6,000 mL IntraPERitoneal Nightly    torsemide  200 mg Oral BID    anidulafungin (ERAXIS) 100 mg IVPB  100 mg IntraVENous Daily    calcium acetate  1 capsule Oral TID WC    metoclopramide  5 mg IntraVENous Q6H    pantoprazole  40 mg IntraVENous BID    And    sodium chloride (PF)  10 mL IntraVENous BID    scopolamine  1 patch TransDERmal Q72H    meropenem  500 mg IntraVENous Q24H    insulin lispro  0-6 Units SubCUTAneous TID WC    insulin lispro  0-3 Units SubCUTAneous Nightly    sodium chloride flush  5-40 mL IntraVENous 2 times per day    heparin (porcine)  5,000 Units SubCUTAneous 3 times per day     Continuous Infusions:   dextrose      sodium chloride 25 mL (02/25/22 1357)         Objective:       Vitals:  BP (!) 156/70   Pulse 89   Temp 97.6 °F (36.4 °C) (Oral)   Resp 16   Ht 5' 3\" (1.6 m)   Wt 198 lb 6.6 oz (90 kg)   SpO2 100%   BMI 35.15 kg/m²   Constitutional:  awake, NAD  Skin: no rash, turgor wnl  HEENT:  MMM, No icterus  Cardiovascular:  S1, S2 reg  Respiratory: CTA, no crackles  Abdomen:  +BS, soft, NT, ND  Ext: no lower extremity edema  Access: abd PD catheter in place,  CNS: alert, no agitation    Data:     Labs:  Hepatic:   No results for input(s): AST, ALT, ALB, BILITOT, ALKPHOS in the last 72 hours. BNP: No results for input(s): BNP in the last 72 hours. ABGs: No results for input(s): PHART, PO2ART, [id number] in the last 72 hours. IMAGING:  CT ABDOMEN PELVIS WO CONTRAST Additional Contrast? None   Final Result   1. Sigmoid colon diverticulosis. There is confluent inflammatory change   within the mesenteric fat surrounding a short segment of simple sigmoid colon   containing the diverticula. This could represent sequela of diverticulitis. 2.  2 cm ventral abdominal wall hernia containing inflamed fat. 3.  9 mm ground-glass nodule in the left lung base. Recommend follow-up with   a noncontrast chest CT at 6-12 months to confirm persistence then additional   noncontrast chest CTs every 2 years until 5 years. Assessment :       1. ESRD   Etiology: suspected DN  Access: Abd PD cath  Volume: Hypervolemic      2. Electrolytes/Acid base  No Dyskalemia    Recent Labs     02/26/22  0435   *   K 3.5   CO2 23   MG 1.90       3. BMD  -Hyperphosphatemia, SHPT  -maintained on Renvela    Lab Results   Component Value Date    CALCIUM 7.7 (L) 02/26/2022    PHOS 4.5 02/17/2022        4. HTN  -Blood pressure high       BP Readings from Last 1 Encounters:   02/26/22 (!) 156/70       5. Anemia  -anemia of CKD  -assess for CHRIS    Recent Labs     02/26/22  0435   HGB 9.9*     6.  DM    7. PD peritonitis  -Cult: pseudomonas  -PD catheter exchanged 2/24/22    PLAN :       - Antibiotics directed by culture    - replace K, Mg as needed  -maintenance PD tonight (inc exchange vol)  -resume BP meds:   - continue Torsemide  -MBD management  -Anemia management  -Dose meds to GFR<10    Seen on PD    Thank you for allowing us to participate in care of

## 2022-02-26 NOTE — PLAN OF CARE
Problem: Falls - Risk of:  Goal: Will remain free from falls  Description: Will remain free from falls  2/26/2022 1514 by Garret Hough RN  Outcome: Ongoing  Patient is wearing nonskid socks. Bed is alarmed, locked, and in lowest position. Room is free of clutter. Call light is within reach and used appropriately. Fall risk assessed per protocol. Will continue to monitor. Problem: DAILY CARE  Goal: Daily care needs are met  2/26/2022 1514 by Garret Hough RN  Outcome: Ongoing  Assistance provided as needed      Problem: PAIN  Goal: Patient's pain/discomfort is manageable  2/26/2022 1514 by Garret Hough RN  Outcome: Ongoing  Assessing pain using appropriate pain rating scale q shift and PRN. Medicating pt as prescribed and indicated. Offering pt non-pharmacologic pain interventions. Reassessing pain and pts response to pain intervention. Problem: SKIN INTEGRITY  Goal: Skin integrity is maintained or improved  2/26/2022 1514 by Garret Hough RN  Outcome: Ongoing  Skin assessment completed every shift per protocol. Pt assessed for incontinence, appropriate barrier cream applied as needed. Pt encouraged to turn/rotate every 2 hours. Assistance provided if pt unable to do so themselves. Will continue to monitor.       Problem: Discharge Planning:  Goal: Discharged to appropriate level of care  Description: Discharged to appropriate level of care  2/26/2022 1514 by Garret Hough RN  SW involved in d/c planning      Problem: Gas Exchange - Impaired:  Goal: Levels of oxygenation will improve  Description: Levels of oxygenation will improve  2/26/2022 1514 by Garret Hough RN  Outcome: Ongoing  No supplemental oxygen      Problem: Infection, Septic Shock:  Goal: Will show no infection signs and symptoms  Description: Will show no infection signs and symptoms  2/26/2022 1514 by Garret Hough RN  Outcome: Ongoing  IV abx      Problem: Serum Glucose Level - Abnormal:  Goal: Ability to maintain appropriate glucose levels will improve  Description: Ability to maintain appropriate glucose levels will improve  2/26/2022 1514 by Corey Pizano RN  Outcome: Ongoing  Fsbs AC&HS      Problem: Tissue Perfusion, Altered:  Goal: Circulatory function within specified parameters  Description: Circulatory function within specified parameters  2/26/2022 1514 by Corey Pizano RN  Outcome: Ongoing  Palpable pulses      Problem: Venous Thromboembolism:  Goal: Will show no signs or symptoms of venous thromboembolism  Description: Will show no signs or symptoms of venous thromboembolism  2/26/2022 1514 by Corey Pizano RN  Outcome: Ongoing  Sub Q heparin DVT      Problem: Skin Integrity:  Goal: Will show no infection signs and symptoms  Description: Will show no infection signs and symptoms  2/26/2022 1514 by Corey Pizano RN  Outcome: Ongoing  Skin assessment completed every shift per protocol. Pt assessed for incontinence, appropriate barrier cream applied as needed. Pt encouraged to turn/rotate every 2 hours. Assistance provided if pt unable to do so themselves. Will continue to monitor. Problem: Pain:  Description: Pain management should include both nonpharmacologic and pharmacologic interventions. Goal: Pain level will decrease  Description: Pain level will decrease  2/26/2022 1514 by Corey Pizano RN  Outcome: Ongoing  . Assessing pain using appropriate pain rating scale q shift and PRN. Medicating pt as prescribed and indicated. Offering pt non-pharmacologic pain interventions. Reassessing pain and pts response to pain intervention.      Problem: Fluid Volume:  Goal: Ability to achieve a balanced intake and output will improve  Description: Ability to achieve a balanced intake and output will improve  2/26/2022 1514 by Corey Pizano RN  Outcome: Ongoing  PD pt, Is and Os monitored      Problem: Physical Regulation:  Goal: Ability to maintain clinical measurements within normal limits will improve  Description: Ability to maintain clinical measurements within normal limits will improve  2/26/2022 1514 by Luis Enrique Burnette RN  Outcome: Ongoing  Goal: Will show no signs and symptoms of electrolyte imbalance  Description: Will show no signs and symptoms of electrolyte imbalance  2/26/2022 1514 by Luis Enrique Burnette RN  Outcome: Ongoing  Daily labs      Problem: Nutrition  Goal: Optimal nutrition therapy  2/26/2022 1514 by Luis Enrique Burnette RN  Outcome: Ongoing  Fair appetite

## 2022-02-27 NOTE — PROGRESS NOTES
Constitutional:  awake, NAD  Skin: no rash, turgor wnl  HEENT:  MMM, No icterus  Cardiovascular:  S1, S2 reg  Respiratory: CTA, no crackles  Abdomen:  +BS, soft, NT, ND  Ext: no lower extremity edema  Access: abd PD catheter in place,  CNS: alert, no agitation    Data:     Labs:  Hepatic:   No results for input(s): AST, ALT, ALB, BILITOT, ALKPHOS in the last 72 hours. BNP: No results for input(s): BNP in the last 72 hours. ABGs: No results for input(s): PHART, PO2ART, LUC6JBZ in the last 72 hours. IMAGING:  CT ABDOMEN PELVIS WO CONTRAST Additional Contrast? None   Final Result   1. Sigmoid colon diverticulosis. There is confluent inflammatory change   within the mesenteric fat surrounding a short segment of simple sigmoid colon   containing the diverticula. This could represent sequela of diverticulitis. 2.  2 cm ventral abdominal wall hernia containing inflamed fat. 3.  9 mm ground-glass nodule in the left lung base. Recommend follow-up with   a noncontrast chest CT at 6-12 months to confirm persistence then additional   noncontrast chest CTs every 2 years until 5 years. Assessment :       1. ESRD   Etiology: suspected DN  Access: Abd PD cath  Volume: Hypervolemic      2. Electrolytes/Acid base  No Dyskalemia    Recent Labs     02/27/22  0351   *   K 3.5   CO2 22   MG 1.70*       3. BMD  -Hyperphosphatemia, SHPT  -maintained on Renvela    Lab Results   Component Value Date    CALCIUM 7.5 (L) 02/27/2022    PHOS 4.5 02/17/2022        4. HTN  -Blood pressure high       BP Readings from Last 1 Encounters:   02/27/22 134/75       5. Anemia  -anemia of CKD  -assess for CHRIS    Recent Labs     02/27/22  0351   HGB 10.2*     6.  DM    7. PD peritonitis  -Cult: pseudomonas  -PD catheter exchanged 2/24/22    PLAN :       - Antibiotics directed by culture    - replace K, Mg as needed  -maintenance PD tonight (4.25% bags)  -resume BP meds:   - continue Torsemide  -MBD management  -Anemia management  -Dose meds to GFR<10    Seen on PD    Thank you for allowing us to participate in care of Danae Dunn . We will continue to follow. Feel free to contact me with any questions.       Tyra Oquendo MD  2/27/2022    [facility] [address] [place] [address] S  Office : [phone]  Fax :[phone]

## 2022-02-27 NOTE — PROGRESS NOTES
Hospitalist Progress Note      PCP: No primary care provider on file. Date of Admission: 2/15/2022    Chief Complaint: abdominal pain    Hospital Course:   64-year-old female patient with a past medical history of ESRD on PD, diabetic retinopathy, diabetic neuropathy, history of kidney stones, hypertension, hyperlipidemia, patient presented to emergency with abdominal pain and diarrhea. Patient was admitted with PD related peritonitis. Subjective:   Patient continues to be vitally stable, denies any episodes of vomiting or nausea. Continues to report diarrhea.      Medications:  Reviewed    Infusion Medications    dextrose      sodium chloride 25 mL (02/25/22 0957)     Scheduled Medications    insulin lispro  0-18 Units SubCUTAneous TID WC    insulin lispro  0-9 Units SubCUTAneous Nightly    insulin glargine  5 Units SubCUTAneous QAM    dianeal lo-queenie 4.25%  6,000 mL IntraPERitoneal Nightly    And    dianeal lo-queenie 4.25%  6,000 mL IntraPERitoneal Nightly    torsemide  200 mg Oral BID    anidulafungin (ERAXIS) 100 mg IVPB  100 mg IntraVENous Daily    calcium acetate  1 capsule Oral TID WC    metoclopramide  5 mg IntraVENous Q6H    pantoprazole  40 mg IntraVENous BID    And    sodium chloride (PF)  10 mL IntraVENous BID    scopolamine  1 patch TransDERmal Q72H    meropenem  500 mg IntraVENous Q24H    sodium chloride flush  5-40 mL IntraVENous 2 times per day    heparin (porcine)  5,000 Units SubCUTAneous 3 times per day     PRN Meds: oxyCODONE-acetaminophen, promethazine, diphenhydrAMINE, glucose, dextrose, glucagon (rDNA), dextrose, sodium chloride flush, sodium chloride, acetaminophen **OR** acetaminophen, ondansetron, diphenhydrAMINE      Intake/Output Summary (Last 24 hours) at 2/27/2022 1049  Last data filed at 2/27/2022 0900  Gross per 24 hour   Intake 720 ml   Output --   Net 720 ml       Physical Exam Performed:    /75   Pulse 97   Temp 97.4 °F (36.3 °C) (Oral)   Resp 16   Ht 5' 3\" (1.6 m)   Wt 203 lb 4.2 oz (92.2 kg)   SpO2 97%   BMI 36.01 kg/m²     General appearance: No apparent distress, appears stated age and cooperative. HEENT: Pupils equal, round, and reactive to light. Conjunctivae/corneas clear. Neck: Supple, with full range of motion. No jugular venous distention. Trachea midline. Respiratory:  Normal respiratory effort. Clear to auscultation, bilaterally without Rales/Wheezes/Rhonchi. Cardiovascular: Regular rate and rhythm with normal S1/S2 without murmurs, rubs or gallops. Abdomen: PD cath noted, abdominal tenderness present. Musculoskeletal: bilateral edema present   Skin: Skin color, texture, turgor normal.  No rashes or lesions. Neurologic:  Neurovascularly intact without any focal sensory/motor deficits. Cranial nerves: II-XII intact, grossly non-focal.  Psychiatric: Alert and oriented, thought content appropriate, normal insight  Capillary Refill: Brisk,3 seconds, normal   Peripheral Pulses: +2 palpable, equal bilaterally       Labs:   Recent Labs     02/25/22  0606 02/26/22  0435 02/27/22  0351   WBC 9.6 8.8 8.7   HGB 11.0* 9.9* 10.2*   HCT 33.5* 30.0* 31.2*   * 141 156     Recent Labs     02/25/22  0606 02/26/22  0435 02/27/22  0351   * 127* 131*   K 4.2 3.5 3.5   CL 92* 88* 92*   CO2 19* 23 22   BUN 35* 45* 41*   CREATININE 9.8* 10.1* 10.2*   CALCIUM 7.6* 7.7* 7.5*     No results for input(s): AST, ALT, BILIDIR, BILITOT, ALKPHOS in the last 72 hours. No results for input(s): INR in the last 72 hours. No results for input(s): Claire Yovanny in the last 72 hours. Urinalysis:      Lab Results   Component Value Date    NITRU Negative 05/27/2021    WBCUA 3 05/27/2021    BACTERIA RARE 05/27/2021    RBCUA 4 05/27/2021    BLOODU TRACE 05/27/2021    SPECGRAV 1.016 05/27/2021    GLUCOSEU 250 05/27/2021       Radiology:  CT ABDOMEN PELVIS WO CONTRAST Additional Contrast? None   Final Result   1. Sigmoid colon diverticulosis.   There is

## 2022-02-27 NOTE — PLAN OF CARE
Problem: Falls - Risk of:  Goal: Will remain free from falls  Description: Will remain free from falls  2/27/2022 0512 by Ryan Thomas RN  Outcome: Ongoing     Problem: Falls - Risk of:  Goal: Absence of physical injury  Description: Absence of physical injury  2/27/2022 9080 by Ryan Thomas RN  Outcome: Ongoing     Problem: SAFETY  Goal: Free from accidental physical injury  2/27/2022 0512 by Ryan Thomas RN  Outcome: Ongoing     Problem: SAFETY  Goal: Free from intentional harm  2/27/2022 0512 by Ryan Thomas RN  Outcome: Ongoing     Problem: DAILY CARE  Goal: Daily care needs are met  2/27/2022 4831 by Ryan Thomas RN  Outcome: Ongoing     Problem: PAIN  Goal: Patient's pain/discomfort is manageable  2/27/2022 0512 by Ryan Thomas RN  Outcome: Ongoing     Problem: SKIN INTEGRITY  Goal: Skin integrity is maintained or improved  2/27/2022 0512 by Ryan Thomas RN  Outcome: Ongoing     Problem: KNOWLEDGE DEFICIT  Goal: Patient/S.O. demonstrates understanding of disease process, treatment plan, medications, and discharge instructions.   2/27/2022 6621 by Ryan Thomas RN  Outcome: Ongoing     Problem: DISCHARGE BARRIERS  Goal: Patient's continuum of care needs are met  2/27/2022 8207 by Ryan Thomas RN  Outcome: Ongoing     Problem: Discharge Planning:  Goal: Discharged to appropriate level of care  Description: Discharged to appropriate level of care  2/27/2022 0512 by Ryan Thomas RN  Outcome: Ongoing     Problem: Gas Exchange - Impaired:  Goal: Levels of oxygenation will improve  Description: Levels of oxygenation will improve  2/27/2022 0512 by Ryan Thomas RN  Outcome: Ongoing     Problem: Infection, Septic Shock:  Goal: Will show no infection signs and symptoms  Description: Will show no infection signs and symptoms  2/27/2022 0512 by Ryan Thomas RN  Outcome: Ongoing     Problem: Serum Glucose Level - Abnormal:  Goal: Ability to maintain appropriate glucose levels will improve  Description: Ability to maintain appropriate glucose levels will improve  2/27/2022 0512 by Franklin Hall RN  Outcome: Ongoing     Problem: Tissue Perfusion, Altered:  Goal: Circulatory function within specified parameters  Description: Circulatory function within specified parameters  2/27/2022 0512 by Franklin Hall RN  Outcome: Ongoing     Problem: Venous Thromboembolism:  Goal: Will show no signs or symptoms of venous thromboembolism  Description: Will show no signs or symptoms of venous thromboembolism  2/27/2022 0512 by Franklin Hall RN  Outcome: Ongoing     Problem: Venous Thromboembolism:  Goal: Absence of signs or symptoms of impaired coagulation  Description: Absence of signs or symptoms of impaired coagulation  2/27/2022 0512 by Franklin Hall RN  Outcome: Ongoing     Problem: Skin Integrity:  Goal: Will show no infection signs and symptoms  Description: Will show no infection signs and symptoms  2/27/2022 0512 by Franklin Hall RN  Outcome: Ongoing     Problem: Skin Integrity:  Goal: Absence of new skin breakdown  Description: Absence of new skin breakdown  2/27/2022 0512 by Franklin Hall RN  Outcome: Ongoing     Problem: Pain:  Goal: Pain level will decrease  Description: Pain level will decrease  2/27/2022 0512 by Franklin Hall RN  Outcome: Ongoing     Problem: Pain:  Goal: Control of acute pain  Description: Control of acute pain  2/27/2022 0512 by Franklin Hall RN  Outcome: Ongoing     Problem: Pain:  Goal: Control of chronic pain  Description: Control of chronic pain  2/27/2022 0512 by Franklin Hall RN  Outcome: Ongoing     Problem: Fluid Volume:  Goal: Ability to achieve a balanced intake and output will improve  Description: Ability to achieve a balanced intake and output will improve  2/27/2022 0512 by Franklin Hall RN  Outcome: Ongoing     Problem: Physical Regulation:  Goal: Ability to maintain clinical measurements within normal limits will improve  Description: Ability to maintain clinical measurements within normal limits will improve  2/27/2022 0512 by Elke Cullen RN  Outcome: Ongoing     Problem: Physical Regulation:  Goal: Will show no signs and symptoms of electrolyte imbalance  Description: Will show no signs and symptoms of electrolyte imbalance  2/27/2022 0512 by Elke Cullen RN  Outcome: Ongoing     Problem: Nutrition  Goal: Optimal nutrition therapy  2/27/2022 0512 by Elke Cullen RN  Outcome: Ongoing

## 2022-02-27 NOTE — PLAN OF CARE
Problem: Falls - Risk of:  Goal: Will remain free from falls  Description: Will remain free from falls  2/27/2022 0909 by Kaitlynn Rojas RN  Outcome: Ongoing  Patient is wearing nonskid socks. Bed is alarmed, locked, and in lowest position. Room is free of clutter. Call light is within reach and used appropriately. Fall risk assessed per protocol. Will continue to monitor. Problem: SAFETY  Goal: Free from accidental physical injury  2/27/2022 0909 by Kaitlynn Rojas RN  Outcome: Ongoing  Call light within reach     Problem: DAILY CARE  Goal: Daily care needs are met  2/27/2022 3646 by Kaitlynn Rojas RN  Outcome: Ongoing  Assistance provided as needed      Problem: PAIN  Goal: Patient's pain/discomfort is manageable  2/27/2022 0909 by Kaitlynn Rojas RN  Outcome: Ongoing  Assessing pain using appropriate pain rating scale q shift and PRN. Medicating pt as prescribed and indicated. Offering pt non-pharmacologic pain interventions. Reassessing pain and pts response to pain intervention. Problem: SKIN INTEGRITY  Goal: Skin integrity is maintained or improved  2/27/2022 0909 by Kaitlynn Rojas RN  Outcome: Ongoing  Skin assessment completed every shift per protocol. Pt assessed for incontinence, appropriate barrier cream applied as needed. Pt encouraged to turn/rotate every 2 hours. Assistance provided if pt unable to do so themselves. Will continue to monitor.       Problem: DISCHARGE BARRIERS  Goal: Patient's continuum of care needs are met  2/27/2022 0909 by Kaitlynn Rojas RN  Outcome: Ongoing  SW involved in d/c planning      Problem: Gas Exchange - Impaired:  Goal: Levels of oxygenation will improve  Description: Levels of oxygenation will improve  2/27/2022 0909 by Kaitlynn Rojas RN  Outcome: Ongoing  No supplemental oxygen      Problem: Infection, Septic Shock:  Goal: Will show no infection signs and symptoms  Description: Will show no infection signs and symptoms  2/27/2022 0909 by Kaitlynn Rojas RN  Outcome: Ongoing  No s/s of infection      Problem: Serum Glucose Level - Abnormal:  Goal: Ability to maintain appropriate glucose levels will improve  Description: Ability to maintain appropriate glucose levels will improve  2/27/2022 0909 by Torres Kwon RN  Outcome: Ongoing  Fsbs AC&HS      Problem: Tissue Perfusion, Altered:  Goal: Circulatory function within specified parameters  Description: Circulatory function within specified parameters  2/27/2022 0909 by Torres Kwon RN  Outcome: Ongoing  Palpable pulses      Problem: Venous Thromboembolism:  Goal: Will show no signs or symptoms of venous thromboembolism  Description: Will show no signs or symptoms of venous thromboembolism  2/27/2022 0909 by Torres Kwon RN  Outcome: Ongoing  No s/s   Goal: Absence of signs or symptoms of impaired coagulation  Description: Absence of signs or symptoms of impaired coagulation  2/27/2022 0909 by Torres Kwon RN  Outcome: Ongoing  Heparin sub Q      Problem: Skin Integrity:  Goal: Will show no infection signs and symptoms  Description: Will show no infection signs and symptoms  2/27/2022 0909 by Torres Kwon RN  Outcome: Ongoing  Skin assessment completed every shift per protocol. Pt assessed for incontinence, appropriate barrier cream applied as needed. Pt encouraged to turn/rotate every 2 hours. Assistance provided if pt unable to do so themselves. Will continue to monitor. Problem: Fluid Volume:  Goal: Ability to achieve a balanced intake and output will improve  Description: Ability to achieve a balanced intake and output will improve  2/27/2022 0909 by Torres Kwon RN  Outcome: Ongoing  Is and Os monitored.  HD pt      Problem: Physical Regulation:  Goal: Ability to maintain clinical measurements within normal limits will improve  Description: Ability to maintain clinical measurements within normal limits will improve  2/27/2022 0909 by Torres Kwon RN  Outcome: Ongoing  Goal: Will show no signs and symptoms of electrolyte imbalance  Description: Will show no signs and symptoms of electrolyte imbalance  2/27/2022 0909 by Yevgeniy Lorenz RN  Outcome: Ongoing  Daily labs      Problem: Nutrition  Goal: Optimal nutrition therapy  2/27/2022 0909 by Yevgeniy Lorenz RN  Outcome: Ongoing  Good appetite

## 2022-02-28 NOTE — PROGRESS NOTES
Office: [phone]       Fax: [phone]      Nephrology Progress Note        Patient's Name: Devra Gowers  Date of Visit: 2/28/2022    Reason for Consult:  ESRD management      History of Present Illness:      Devra Gowers is a 55 y.o. female with PMHx of hypertension, diabetes mellitus, ESRD who was admitted on 2/15/2022 with complaints of abdominal pain   PD fluid cloudy  history of PD peritonitis in past.  Does 2 exchanges with 2.5% day time and Icodextrin overnight    INTERVAL HISTORY      Feels better  Shortness of breath: No   Has abdominal pain   UF better    Medications:        Allergies:  Bactrim [sulfamethoxazole-trimethoprim], Doxazosin, Geocillin [carbenicillin], Lisinopril, Other, Spironolactone, Tramadol, Bumetanide, and Ibuprofen  Scheduled Meds:   potassium chloride  40 mEq Oral Once    magnesium sulfate  1,000 mg IntraVENous Once    insulin glargine  5 Units SubCUTAneous BID    insulin lispro  5 Units SubCUTAneous TID WC    insulin lispro  0-18 Units SubCUTAneous TID WC    insulin lispro  0-9 Units SubCUTAneous Nightly    dianeal lo-queenie 4.25%  6,000 mL IntraPERitoneal Nightly    And    dianeal lo-queenie 4.25%  6,000 mL IntraPERitoneal Nightly    torsemide  200 mg Oral BID    anidulafungin (ERAXIS) 100 mg IVPB  100 mg IntraVENous Daily    calcium acetate  1 capsule Oral TID WC    metoclopramide  5 mg IntraVENous Q6H    pantoprazole  40 mg IntraVENous BID    And    sodium chloride (PF)  10 mL IntraVENous BID    scopolamine  1 patch TransDERmal Q72H    meropenem  500 mg IntraVENous Q24H    sodium chloride flush  5-40 mL IntraVENous 2 times per day    heparin (porcine)  5,000 Units SubCUTAneous 3 times per day     Continuous Infusions:   dextrose      sodium chloride 25 mL (02/25/22 8957)         Objective:       Vitals:  BP (!) 156/91   Pulse 110   Temp 99.1 °F (37.3 °C) (Oral)   Resp 16   Ht 5' 3\" (1.6 m)   Wt 201 lb 8 oz (91.4 kg)   SpO2 97%   BMI 35.69 kg/m²   Constitutional:  awake, NAD  HEENT:  MMM, No icterus  Cardiovascular:  S1, S2 reg  Respiratory: CTA, no crackles  Abdomen:  +BS, soft, NT, ND  Ext: no lower extremity edema  Access: abd PD catheter in place,  CNS: alert, no agitation    Data:     Labs:  Hepatic:   No results for input(s): AST, ALT, ALB, BILITOT, ALKPHOS in the last 72 hours. BNP: No results for input(s): BNP in the last 72 hours. ABGs: No results for input(s): PHART, PO2ART, IRL3HVY in the last 72 hours. IMAGING:  CT ABDOMEN PELVIS WO CONTRAST Additional Contrast? None   Final Result   1. Sigmoid colon diverticulosis. There is confluent inflammatory change   within the mesenteric fat surrounding a short segment of simple sigmoid colon   containing the diverticula. This could represent sequela of diverticulitis. 2.  2 cm ventral abdominal wall hernia containing inflamed fat. 3.  9 mm ground-glass nodule in the left lung base. Recommend follow-up with   a noncontrast chest CT at 6-12 months to confirm persistence then additional   noncontrast chest CTs every 2 years until 5 years. Assessment :       1. ESRD   Etiology: suspected DN  Access: Abd PD cath  Volume: Hypervolemic      2. Electrolytes/Acid base  Hypomagnesemia and No Dyskalemia    Recent Labs     02/28/22  0359   *   K 3.5   CO2 22   MG 1.50*       3. BMD  -Hyperphosphatemia, SHPT  -maintained on Renvela    Lab Results   Component Value Date    CALCIUM 7.3 (L) 02/28/2022    PHOS 4.5 02/17/2022        4. HTN  -Blood pressure high       BP Readings from Last 1 Encounters:   02/28/22 (!) 156/91       5. Anemia  -anemia of CKD  -assess for CHRIS    Recent Labs     02/28/22  0359   HGB 9.5*     6.  DM    7. PD peritonitis  -Cult: pseudomonas  -PD catheter exchanged 2/24/22    PLAN :       - Antibiotics directed by culture    - replace K, Mg as needed  -maintenance PD tonight (4.25% bags)  -resume BP meds:   - continue Torsemide  -MBD management  -Anemia management  -Dose meds to GFR<10    Seen on PD      Thank you for allowing us to participate in care of Danae Dunn . We will continue to follow. Feel free to contact me with any questions.       Silvana Graham MD  2/28/2022    [facility] [address] [place] [address]  Office : [phone]  Fax :[phone]

## 2022-02-28 NOTE — CARE COORDINATION
[facility]  Diabetes Education   Progress Note       NAME:  Eunice Zuñiga 574 RECORD NUMBER:  [id number]  AGE: 55 y.o. GENDER: female  : 1965  TODAY'S DATE:  2022    Subjective   Reason for Diabetic Education Evaluation and Assessment: general diabetes support    Tami agrees to meet with me for diabetes education. She prefers to keep her blood sugars near 150. Reports previous hypoglycemia with Lantus.       Visit Type: evaluation      Wesley Dugan is a 55 y.o. female referred by:     [] Physician  [x] Nursing  [] Chart Review   [] Other:     PAST MEDICAL HISTORY        Diagnosis Date    Anemia 9/15/2014    Chronic kidney disease     Diabetes mellitus ([address]     Diabetes mellitus type 1 ([address]     Diabetic retinopathy ([address]     Diabetic retinopathy associated with type 2 diabetes mellitus, without macular edema     Hyperlipidemia 2015    Hypertension     Kidney stone     Mixed hyperlipidemia 2015    Orthostatic hypotension     Vitreous hemorrhage of right eye ([address] 8/15/2017       PAST SURGICAL HISTORY    Past Surgical History:   Procedure Laterality Date    APPENDECTOMY      DIALYSIS CATHETER INSERTION N/A 2022    LAPAROSCOPIC PLACEMENT OF PERITONEAL DIALYSIS CATHETER performed by Susanna Johnson MD at [address] [place] [address] N/A 2022    REMOVAL OF EXISTING PERITONEAL DIALYSIS CATHETER performed by Susanna Johnson MD at [address] [place] [address] N/A 2019    LAPAROSCOPIC PERITONEAL DIALYSIS CATHETER PLACEMENT WITH AXEL TROCHAR performed by Susanna Johnson MD at [address] [place] [address] N/A 2022    REPAIR OF SMALL VENTRAL HERNIA performed by Susanna Johnson MD at [place] [address] HISTORY    Family History   Problem Relation Age of Onset    Heart Disease Mother         heart attack     High Blood Pressure Mother    Caridad Outhouse Diabetes Father     Heart Disease Father     Emphysema Father     Cancer Maternal Grandmother         cervical cancer    Lung Cancer Maternal Grandfather         black lung    Diabetes Paternal Grandmother     Heart Disease Paternal Grandfather        SOCIAL HISTORY    Social History     Tobacco Use    Smoking status: Never Smoker    Smokeless tobacco: Never Used   Vaping Use    Vaping Use: Never used   Substance Use Topics    Alcohol use: No    Drug use: No       ALLERGIES    Allergies   Allergen Reactions    Bactrim [Sulfamethoxazole-Trimethoprim]     Doxazosin Other (See Comments)     Other reaction(s): Other (See Comments)  Bleeding  Punch in chest      Geocillin [Carbenicillin]     Lisinopril Other (See Comments)     Pt thinks cr levels went up due to med.  Other      Artificial sweetners, nutrasweet    Spironolactone      Bad taste, emesis.  Pt refuses to take    Tramadol     Bumetanide Nausea And Vomiting    Ibuprofen Nausea And Vomiting       MEDICATIONS     dianeal lo-queenie 4.25%  6,000 mL IntraPERitoneal Nightly    And    dianeal lo-queenie 4.25%  3,000 mL IntraPERitoneal Nightly    And    dianeal lo-queenie 4.25%  3,000 mL IntraPERitoneal Nightly    insulin glargine  5 Units SubCUTAneous BID    insulin lispro  5 Units SubCUTAneous TID WC    insulin lispro  0-18 Units SubCUTAneous TID WC    insulin lispro  0-9 Units SubCUTAneous Nightly    torsemide  200 mg Oral BID    anidulafungin (ERAXIS) 100 mg IVPB  100 mg IntraVENous Daily    calcium acetate  1 capsule Oral TID WC    metoclopramide  5 mg IntraVENous Q6H    pantoprazole  40 mg IntraVENous BID    And    sodium chloride (PF)  10 mL IntraVENous BID    scopolamine  1 patch TransDERmal Q72H    meropenem  500 mg IntraVENous Q24H    sodium chloride flush  5-40 mL IntraVENous 2 times per day    heparin (porcine)  5,000 Units SubCUTAneous 3 times per day       Objective        Patient Active Problem List   Diagnosis Code    Diabetic retinopathy ([facility] E11.319    Mixed hyperlipidemia E78.2    DMII (diabetes mellitus, type 2) ([facility]) E11.9    Vitreous hemorrhage of right eye ([facility]) H43.11    Essential hypertension I10    Acute kidney injury ([facility] N17.9    ESRD (end stage renal disease) ([facility] N18.6    Acute renal failure superimposed on chronic kidney disease ([facility]) N17.9, N18.9    Volume overload E87.70    Peritonitis ([facility]) K65.9    Generalized abdominal pain R10.84    Peritoneal dialysis status ([facility] Z99.2    Morbid obesity due to excess calories ([facility]) E66.01    Nausea and vomiting R11.2    Hyponatremia E87.1    Diarrhea R19.7    Dizziness R42    Hypertensive emergency I16.1    Hypokalemia E87.6    Hypertensive urgency I16.0    Cerebrovascular accident (CVA) ([facility]) I63.9    Electrolyte imbalance E87.8    Acute respiratory failure with hypoxia ([facility]) J96.01    Septic shock ([facility]) A41.9, R65.21    Electrolyte abnormality E87.8    Diverticulitis K57.92        /82   Pulse 113   Temp 98.8 °F (37.1 °C) (Oral)   Resp 16   Ht 5' 3\" (1.6 m)   Wt 200 lb 6.4 oz (90.9 kg)   SpO2 100%   BMI 35.50 kg/m²     HgBA1c:    Lab Results   Component Value Date    LABA1C 8.1 01/07/2022       Recent Labs     02/27/22  1741 02/27/22  2013 02/28/22  0808 02/28/22  1111   POCGLU 140* 209* 564* 334*       BUN/Creatinine:    Lab Results   Component Value Date    BUN 41 02/28/2022    CREATININE 9.2 02/28/2022       Assessment        Diabetes Management and Education     Does the patient require new medication instruction? Yes  Discussed insulin requirements with current dialysate and BG trends. Level of patient/caregiver understanding able to:       [x] Verbalized Understanding   [] Demonstrated Understanding       [] Teach Back       [] Needs Reinforcement     []  Other:        Does the patient/caregiver monitor Blood Glucoses? Yes  Reviewed glycemic control targets, testing frequency and when to call PCP.      Level of patient/caregiver understanding able to:        [x] Verbalized Understanding   [] Demonstrated Understanding       [] Teach Back       [] Needs Reinforcement     [x]  Other:  Receptive to additional BG testing at 0200. Does the patient/caregiver follow a Meal Plan? Yes     Does the patient/caregiver understand S/S of Hypoglycemia? Yes. Reports feeling lows when less than 70. Reviewed symptoms, prevention and treatment. Level of patient/caregiver understanding able to:       [x] Verbalized Understanding   [] Demonstrated Understanding       [] Teach Back       [] Needs Reinforcement     [x]  Other:  Agrees to notify staff if she has any symptoms. Does the patient/caregiver understand S/S of Hyperglycemia? No:     Reviewed symptoms, prevention and treatment. Level of patient/caregiver understanding able to:        [x] Verbalized Understanding   [] Demonstrated Understanding       [] Teach Back       [] Needs Reinforcement     []  Other:           Plan        Ongoing diabetes education and blood glucose monitoring. Recommend adding correction scale at 0200. Discussed with Tamera Haywood MD .  New orders updated.                                            Discharge Plan:  Discharge needs: no prescription needs identified       Teaching Time Diabetes Education:  30 minutes     Electronically signed by Jennifer Trinidad on 2/28/2022 at 4:34 PM

## 2022-02-28 NOTE — PROGRESS NOTES
Spoke with Infectious disease regarding patient refusing the medication Eraxis. Infectious disease to discontinue medication.     Electronically signed by Roberto Ayala RN on 2/28/2022 at 6:48 PM

## 2022-02-28 NOTE — PROGRESS NOTES
Comprehensive Nutrition Assessment    Type and Reason for Visit:  Reassess    Nutrition Recommendations/Plan:   Continue CCC / 1800 ml per day    Nutrition Assessment:  Follow-up. Pt reported tolerating CCC (4) / 1800 ml per day. Intake reported and noted at generally > 50%. Denied issues with N/V. When asked about UBW at home pt reported ~189 lbs. Pt also stated that at times, r/t fluid shifts wt might be > 200 lbs. Will monitor trend. Noted pt is s/p surgery for hernia repair and replacement of PD catheter on 2/24. Pt with improving status. Current nutrition regimen appropriate. Will continue to follow. Malnutrition Assessment:  Malnutrition Status: At risk for malnutrition (Comment)    Context:  Acute Illness     Findings of the 6 clinical characteristics of malnutrition:  Energy Intake:  1 - 75% or less of estimated energy requirements for 7 or more days  Weight Loss:  No significant weight loss     Body Fat Loss:  No significant body fat loss     Muscle Mass Loss:  No significant muscle mass loss    Fluid Accumulation:  1 - Mild Generalized   Strength:  Not Performed    Estimated Daily Nutrient Needs:  Energy (kcal):  7734-3432 kcal (18-20 kcal/kg); Weight Used for Energy Requirements:  Admission     Protein (g):  70-87 g (0.8-1.0 g/kg); Weight Used for Protein Requirements:  Admission        Fluid (ml/day):   ; Method Used for Fluid Requirements:  1 ml/kcal      Nutrition Related Findings:  BS managed with lantus and coverage. Noted BM on 2/28. Noted non-pitting generalized edema. Wounds:  Surgical Incision (abd. No issues noted. Noted weeping LUE.)       Current Nutrition Therapies:    ADULT DIET;  Regular; 4 carb choices (60 gm/meal); 1800 ml    Anthropometric Measures:  · Height: 5' 3\" (160 cm)  · Current Body Weight: 200 lb (90.7 kg)   · Admission Body Weight: 192 lb (87.1 kg)    · Usual Body Weight: 205 lb (93 kg)     · Ideal Body Weight: 115 lbs; % Ideal Body Weight 173.9 %   · BMI: 35.4  · Adjusted Body Weight:  ; No Adjustment    · BMI Categories: Obese Class 2 (BMI 35.0 -39.9)       Nutrition Diagnosis:   · Other (Comment) (PES of inadequate po intake, resolving)       Nutrition Interventions:   Food and/or Nutrient Delivery:  Continue Current Diet  Nutrition Education/Counseling:  Education not indicated   Coordination of Nutrition Care:  Continue to monitor while inpatient    Goals:  PO intake >50%       Nutrition Monitoring and Evaluation:   Behavioral-Environmental Outcomes:  None Identified   Food/Nutrient Intake Outcomes:  Food and Nutrient Intake  Physical Signs/Symptoms Outcomes:  Biochemical Data,Constipation,Diarrhea,Nausea or Vomiting,Fluid Status or Edema,Weight,Skin     Discharge Planning:     Too soon to determine     Electronically signed by Victorina Pérez RD, LD on 2/28/22 at 3:08 PM EST    Contact: [phone]

## 2022-02-28 NOTE — PROGRESS NOTES
Infectious Disease Follow up Notes  Admit Date: 2/15/2022    Antibiotics :   IV Meropenem      CHIEF COMPLAINT:     PD peritonitis  Pseudomonas infection   Diverticulitis    Subjective interval History :  55 y.o. man with a past medical history of end-stage renal disease on peritoneal dialysis for the past 2 years, diabetic retinopathy , diabetes neuropathy, kidney stone, hypertension, hyperlipidemia, admitted to the hospital secondary to abdominal pain and diarrhea. She also has a history of COVID-19 infection for which she is recovering okay but unfortunately developed GI symptoms consistent with nausea diarrhea abdominal pain. PD fluid on admission with abnormal PD fluid culture positive for Pseudomonas. Admit labs indicate lactic acid 4.1 glucose at 295, WBC elevated 14.4. Blood cultures from admission negative. CT abdomen pelvis indicate sigmoid colon diverticulosis but there is small sequelae of diverticulitis. PD fluid cell count was grossly abnormal with WBC count at 18,000.   Location : abd pain+      Quality :  Aching      Severity :  10/10   Duration : weeks     Timing :constant   Context : PD     Modifying factors : none   Associated signs and symptoms:diarrhea, nausea        Interval History :  Nausea better still having some loose stools PD catheter working ok no fevers no chills has some abd pain family at bed side     Past Medical History:    Past Medical History:   Diagnosis Date    Anemia 9/15/2014    Chronic kidney disease     Diabetes mellitus ([address]     Diabetes mellitus type 1 ([address]     Diabetic retinopathy ([address]     Diabetic retinopathy associated with type 2 diabetes mellitus, without macular edema     Hyperlipidemia 7/27/2015    Hypertension     Kidney stone     Mixed hyperlipidemia 7/27/2015    Orthostatic hypotension     Vitreous hemorrhage of right eye ([address] 8/15/2017       Past Surgical History:    Past Surgical History:   Procedure Laterality Date    APPENDECTOMY      DIALYSIS CATHETER INSERTION N/A 2/24/2022    LAPAROSCOPIC PLACEMENT OF PERITONEAL DIALYSIS CATHETER performed by Glen Isbell MD at [address] N/A 2/24/2022    REMOVAL OF EXISTING PERITONEAL DIALYSIS CATHETER performed by Glen Isbell MD at [address]      LAPAROSCOPY INSERTION PERITONEAL CATHETER N/A 8/12/2019    LAPAROSCOPIC PERITONEAL DIALYSIS CATHETER PLACEMENT WITH AXEL TROCHAR performed by Glen Isbell MD at [address] N/A 2/24/2022    REPAIR OF SMALL VENTRAL HERNIA performed by Glen Isbell MD at [facility] [place] [id number]       Current Medications:    Outpatient Medications Marked as Taking for the 2/15/22 encounter [facility] Encounter)   Medication Sig Dispense Refill    torsemide (DEMADEX) 100 MG tablet TAKE 4 TABLETS BY MOUTH ONCE NIGHTLY 360 tablet 5    amLODIPine (NORVASC) 10 MG tablet TAKE ONE TABLET BY MOUTH DAILY 30 tablet 3    losartan (COZAAR) 100 MG tablet Take 100 mg by mouth nightly      pantoprazole (PROTONIX) 40 MG tablet Take 1 tablet by mouth daily 90 tablet 1    insulin lispro (HUMALOG KWIKPEN) 100 UNIT/ML pen Inject 2-4 Units into the skin 3 times daily (before meals) Sliding scale          Allergies:  Bactrim [sulfamethoxazole-trimethoprim], Doxazosin, Geocillin [carbenicillin], Lisinopril, Other, Spironolactone, Tramadol, Bumetanide, and Ibuprofen    Immunizations : There is no immunization history on file for this patient.     Social History:     Social History     Tobacco Use    Smoking status: Never Smoker    Smokeless tobacco: Never Used   Vaping Use    Vaping Use: Never used   Substance Use Topics    Alcohol use: No    Drug use: No     Social History     Tobacco Use   Smoking Status Never Smoker   Smokeless Tobacco Never Used      Family History   Problem Relation Age of Onset    Heart Disease Mother         heart attack 2007    High Blood Pressure Mother     Diabetes Father     Heart Disease Father     Emphysema Father     Cancer Maternal Grandmother         cervical cancer    Lung Cancer Maternal Grandfather         black lung    Diabetes Paternal Grandmother     Heart Disease Paternal Grandfather       REVIEW OF SYSTEMS:    Constitutional:  negative for fevers, chills, night sweats  Eyes:  negative for blurred vision, eye discharge, visual disturbance   HEENT:  negative for hearing loss, ear drainage,nasal congestion  Respiratory:  negative for cough, shortness of breath or hemoptysis   Cardiovascular:  negative for chest pain, palpitations, syncope  Gastrointestinal:  negative for nausea, vomiting, diarrhea, constipation, abdominal pain++   Genitourinary:  negative for frequency, dysuria, urinary incontinence, hematuria  Hematologic/Lymphatic:  negative for easy bruising, bleeding and lymphadenopathy  Allergic/Immunologic:  negative for recurrent infections, angioedema, anaphylaxis   Endocrine:  negative for weight changes, polyuria, polydipsia and polyphagia  Musculoskeletal:  negative for joint  pain, swelling, decreased range of motion  Integumentary: No rashes, skin lesions  Neurological:  negative for headaches, slurred speech, unilateral weakness  Psychiatric: negative for hallucinations,confusion,agitation.                 PHYSICAL EXAM:      Vitals:    BP (!) 148/76   Pulse 111   Temp 98.8 °F (37.1 °C) (Oral)   Resp 16   Ht 5' 3\" (1.6 m)   Wt 200 lb 6.4 oz (90.9 kg)   SpO2 98%   BMI 35.50 kg/m²       General Appearance: alert,in no acute distress, +  pallor, no icterus chronic ill appearing woman +  Skin: warm and dry, no rash or erythema  Head: normocephalic and atraumatic  Eyes: pupils equal, round, and reactive to light, conjunctivae normal  ENT: tympanic membrane, external ear and ear canal normal bilaterally, nose without deformity, nasal mucosa and turbinates normal without polyps  Neck: supple and non-tender without mass, no thyromegaly  no cervical lymphadenopathy  Pulmonary/Chest: clear to auscultation bilaterally- no wheezes, rales or rhonchi, normal air movement, no respiratory distress  Cardiovascular: normal rate, regular rhythm, normal S1 and S2, no murmurs, rubs, clicks, or gallops, no carotid bruits  Abdomen: soft, ++ -tender, + -distended, normal bowel sounds, no masses or organomegaly  Extremities: no cyanosis, clubbing or edema  Musculoskeletal: normal range of motion, no joint swelling, deformity or tenderness  Integumentary: No rashes, no abnormal skin lesions, no petechiae  Neurologic: reflexes normal and symmetric, no cranial nerve deficit  Psych:  Orientation, sensorium, mood normal            Lines: PD catheter +new and dressing+         Data Review:    CBC:   Lab Results   Component Value Date    WBC 7.6 02/28/2022    HGB 9.5 (L) 02/28/2022    HCT 29.4 (L) 02/28/2022    MCV 96.6 02/28/2022     02/28/2022     RENAL:   Lab Results   Component Value Date    CREATININE 9.2 (HH) 02/28/2022    BUN 41 (H) 02/28/2022     (L) 02/28/2022    K 3.5 02/28/2022    CL 92 (L) 02/28/2022    CO2 22 02/28/2022     SED RATE: No results found for: SEDRATE  CK: No results found for: CKTOTAL  CRP: No results found for: CRP  Hepatic Function Panel:   Lab Results   Component Value Date    ALKPHOS 117 02/15/2022    ALT 7 02/15/2022    AST 14 02/15/2022    PROT 5.6 02/15/2022    BILITOT 0.6 02/15/2022    LABALBU 1.7 02/17/2022     UA:  Lab Results   Component Value Date    COLORU YELLOW 05/27/2021    CLARITYU Clear 05/27/2021    GLUCOSEU 250 05/27/2021    BILIRUBINUR Negative 05/27/2021    KETUA Negative 05/27/2021    SPECGRAV 1.016 05/27/2021    BLOODU TRACE 05/27/2021    PHUR 7.0 05/27/2021    PROTEINU 100 05/27/2021    UROBILINOGEN 0.2 05/27/2021    NITRU Negative 05/27/2021    LEUKOCYTESUR Negative 05/27/2021    LABMICR YES 05/27/2021    URINETYPE NotGiven 05/27/2021 Urine Microscopic:   Lab Results   Component Value Date    LABCAST 3-5 Hyaline 08/27/2014    BACTERIA RARE 05/27/2021    COMU see below 05/27/2021    HYALCAST 0 05/27/2021    WBCUA 3 05/27/2021    RBCUA 4 05/27/2021    EPIU 4 05/27/2021     Urine Reflex to Culture:   Lab Results   Component Value Date    URRFLXCULT Yes 08/04/2019          Results for Lakhwinder Rivas (MRN [id number]) as of 2/17/2022 16:24    Ref. Range 2/16/2022 00:40 2/16/2022 17:25 2/17/2022 03:30   Source + CELL CT/DIFF-BF Unknown Peritoneal dial Peritoneal Fl. Peritoneal dial   Appearance, Fluid Unknown Hazy Turbid Cloudy   Color, Fluid Unknown Yellow Yellow Yellow   Eos, Fluid Latest Units: % 1       RBC, Fluid Latest Units: /cumm 1,000 5,000 <2,000   Lymphocytes, Body Fluid Latest Units: %   8 4   Monocyte Count, Fluid Latest Units: % 25   15   Neutrophil Count, Fluid Latest Units: % 54 86 76   Nucl Cell, Fluid Latest Units: /cumm 18,185 >50 2,161   Mesothelial, Fluid Latest Units: % 16 1 1   Clot Eval. Unknown see below see below see below   Number of Cells Counted Fluid Unknown 100 100 100        Procedure Component Value Units Date/Time   Culture, Body Fluid [[id number]] Collected: 02/23/22 0520   Order Status: Completed Specimen: Body Fluid from Peritoneal Dialysis Fluid Updated: 02/24/22 0919    Body Fluid Culture, Sterile No growth to date    Gram Stain Result 3+ WBC's   No Epithelial Cells seen   No organisms seen    Narrative:     ORDER#: [id number]                          ORDERED BY: Brisa Soliman   SOURCE: Peritoneal Dialysis Fluid          COLLECTED:  02/23/22 05:20   [facility] :                      RECEIVED :  02/23/22 05:40   Performed at:   [address] [facility] [address]   Phone [phone]   Culture, Body Fluid [[id number]] Collected: 02/21/22 0150   Order Status: Completed Specimen:  Body Fluid from Peritoneal Dialysis Fluid Updated: 02/24/22 7751 Body Fluid Culture, Sterile No growth 60 to 72 hours    Gram Stain Result 1+ WBC's (Polymorphonuclear)   No organisms seen    Narrative:     ORDER#: [id number]                          ORDERED BY: Eliana Walters   SOURCE: Peritoneal Dialysis Fluid          COLLECTED:  02/21/22 01:50   [facility] :                      RECEIVED :  02/21/22 14:57   Performed at:   [facility]        MICRO: cultures reviewed and updated by me   Blood Culture:   Lab Results   Component Value Date    BC No Growth after 4 days of incubation. 02/15/2022    BLOODCULT2 No Growth after 4 days of incubation. 02/15/2022            Culture, Body Fluid [[id number]] Collected: 02/23/22 0520   Order Status: Completed Specimen: Body Fluid from Peritoneal Dialysis Fluid Updated: 02/25/22 0753    Body Fluid Culture, Sterile --    No growth to date   No growth 36 to 48 hours     Gram Stain Result 3+ WBC's   No Epithelial Cells seen   No organisms seen    Narrative:     ORDER#: [id number]                          ORDERED BY: Eliana Walters   SOURCE: Peritoneal Dialysis Fluid          COLLECTED:  02/23/22 05:20   [facility] :                      RECEIVED :  02/23/22 05:40   Performed at:   [facility]   [address] [facility] [address]   Phone [phone]   Culture, Body Fluid [[id number]]    Order Status: No result Specimen: Body Fluid from Peritoneal Dialysis Fluid    Culture, Tip [[id number]] Collected: 02/24/22 1225   Order Status: Sent Specimen: Catheter Tip Updated: 02/24/22 1250   Culture, Body Fluid [[id number]] Collected: 02/21/22 0150   Order Status: Completed Specimen:  Body Fluid from Peritoneal Dialysis Fluid Updated: 02/24/22 0729    Body Fluid Culture, Sterile No growth 60 to 72 hours    Gram Stain Result 1+ WBC's (Polymorphonuclear)   No organisms seen    Narrative:     ORDER#: [id number]                          ORDERED BY: Eliana Walters   SOURCE: Peritoneal Dialysis Fluid          COLLECTED:  02/21/22 01:50   [facility] :                      RECEIVED :  02/21/22 14:57          Respiratory Culture:  Lab Results   Component Value Date    LABGRAM  02/23/2022     3+ WBC's  No Epithelial Cells seen  No organisms seen       AFB:No results found for: [facility]  Viral Culture:  Lab Results   Component Value Date    COVID19 Not Detected 02/23/2022    COVID19 Detected 01/07/2022     Urine Culture: No results for input(s): LABURIN in the last 72 hours. IMAGING:    CT ABDOMEN PELVIS WO CONTRAST Additional Contrast? None   Final Result   1. Sigmoid colon diverticulosis. There is confluent inflammatory change   within the mesenteric fat surrounding a short segment of simple sigmoid colon   containing the diverticula. This could represent sequela of diverticulitis. 2.  2 cm ventral abdominal wall hernia containing inflamed fat. 3.  9 mm ground-glass nodule in the left lung base. Recommend follow-up with   a noncontrast chest CT at 6-12 months to confirm persistence then additional   noncontrast chest CTs every 2 years until 5 years. Procedure Component Value Units Date/Time   Culture, Tip [[id number]] (Abnormal) Collected: 02/24/22 1225   Order Status: Completed Specimen: Catheter Tip Updated: 02/26/22 1050    Organism Diphtheroids Abnormal     Culture Catheter Tip --    >TNTC colonies   No further workup    Narrative:     ORDER#: [id number]                          ORDERED BY: RAMIREZ DURAN   SOURCE: Catheter Tip B. PERITONEAL DIALYSISCOLLECTED:  02/24/22 12:25   [facility] :                      RECEIVED :  02/24/22 12:49   Performed at:   [address] [address]   Phone [phone]         All the pertinent images and reports for the current Hospitalization were reviewed by me     Scheduled Meds:   insulin glargine  5 Units SubCUTAneous BID    insulin lispro  5 Units SubCUTAneous TID     insulin lispro  0-18 Units SubCUTAneous TID     insulin lispro  0-9 Units SubCUTAneous Nightly    dianeal lo-queenie 4.25%  6,000 mL IntraPERitoneal Nightly    And    dianeal lo-queenie 4.25%  6,000 mL IntraPERitoneal Nightly    torsemide  200 mg Oral BID    anidulafungin (ERAXIS) 100 mg IVPB  100 mg IntraVENous Daily    calcium acetate  1 capsule Oral TID WC    metoclopramide  5 mg IntraVENous Q6H    pantoprazole  40 mg IntraVENous BID    And    sodium chloride (PF)  10 mL IntraVENous BID    scopolamine  1 patch TransDERmal Q72H    meropenem  500 mg IntraVENous Q24H    sodium chloride flush  5-40 mL IntraVENous 2 times per day    heparin (porcine)  5,000 Units SubCUTAneous 3 times per day       Continuous Infusions:   dextrose      sodium chloride 25 mL (02/25/22 3457)       PRN Meds:  oxyCODONE-acetaminophen, promethazine, diphenhydrAMINE, glucose, dextrose, glucagon (rDNA), dextrose, sodium chloride flush, sodium chloride, acetaminophen **OR** acetaminophen, ondansetron, diphenhydrAMINE      Assessment:     Patient Active Problem List   Diagnosis    Diabetic retinopathy ([address]    Mixed hyperlipidemia    DMII (diabetes mellitus, type 2) ([address]    Vitreous hemorrhage of right eye ([address]    Essential hypertension    Acute kidney injury ([address]    ESRD (end stage renal disease) ([address]    Acute renal failure superimposed on chronic kidney disease (HCC)    Volume overload    Peritonitis ([address]    Generalized abdominal pain    Peritoneal dialysis status ([address]    Morbid obesity due to excess calories ([facility])    Nausea and vomiting    Hyponatremia    Diarrhea    Dizziness    Hypertensive emergency    Hypokalemia    Hypertensive urgency    Cerebrovascular accident (CVA) ([address]    Electrolyte imbalance    Acute respiratory failure with hypoxia ([facility])    Septic shock ([facility])    Electrolyte abnormality    Diverticulitis          Sepsis on admit  PD peritonitis  Diverticulitis  Pseudomonas on PD fluid cx  WBC elevation   Lactic acidosis resolved   ESRD on PD  DM with several complications  PD fluid cell counts very abnormal         Will need IV abx for now and at d.c hopefully able to do IV abx through PD exchanges      PD fluid cx in follow up pending will need to see improvement in cell counts before d/c planning AND Fluid cx have to be negative     PD fluid cx with Pseudomonas still and she is not clearing her infection indicating that PD catheter may be now involved   d/w  about plans for possible PD catheter removal     Will see if her NAUSEA improves with change in her IV abx now tolerating a bit better     S/p PD catheter exchanged and ventral Hernia repair on 2/24 by Steph Ortega    Will check PD fluid cell count and cx with exchange today    She declines to take IV Antifungal therapy so will d/c this       Labs, Microbiology, Radiology and all the pertinent results from current hospitalization and  care every where were reviewed  by me as a part of the evaluation   Plan:   1. Trend PD fluid cell counts and culture check today   2. IV Meropenem x 500 mg q 24 hrs  3. Watch for complications  4. Blood cx negative   5. CT abd/pelvis results noted  6. S/P PD catheter exchange , Tip cx diptheroids not significant    7.d/c   Anidulafungin   8. We will be able to choose IV Cefepime with PD exchanges at d/c will d/w Bradley Kellogg in AM         Discussed with patient/Family and Nursing   Risk of Complications/Morbidity: High      · Illness(es)/ Infection present that pose threat to bodily function. · There is potential for severe exacerbation of infection/side effects of treatment. · Therapy requires intensive monitoring for antimicrobial agent toxicity. Discussed with patient/Family and Nursing staff     Thanks for allowing me to participate in your patient's care and please call me with any questions or concerns.     Fernando Hernandez MD  Infectious Disease  [facility] ([facility]) Physician  Phone: [phone]   Fax : [phone]

## 2022-02-28 NOTE — PROGRESS NOTES
Hospitalist Progress Note      PCP: No primary care provider on file. Date of Admission: 2/15/2022    Chief Complaint: abdominal pain    Hospital Course:   56-year-old female patient with a past medical history of ESRD on PD, diabetic retinopathy, diabetic neuropathy, history of kidney stones, hypertension, hyperlipidemia, patient presented to emergency with abdominal pain and diarrhea. Patient was admitted with PD related peritonitis. Subjective:   Patient continues to be vitally stable, denies any episodes of vomiting or nausea. Ported improved diarrhea. Patient had declined her glargine yesterday, was educated today about importance of adherence with insulin.   BG was 500+ this morning    Medications:  Reviewed    Infusion Medications    dextrose      sodium chloride 25 mL (02/25/22 7507)     Scheduled Medications    insulin glargine  5 Units SubCUTAneous BID    insulin lispro  5 Units SubCUTAneous TID WC    insulin lispro  0-18 Units SubCUTAneous TID WC    insulin lispro  0-9 Units SubCUTAneous Nightly    dianeal lo-queenie 4.25%  6,000 mL IntraPERitoneal Nightly    And    dianeal lo-queenie 4.25%  6,000 mL IntraPERitoneal Nightly    torsemide  200 mg Oral BID    anidulafungin (ERAXIS) 100 mg IVPB  100 mg IntraVENous Daily    calcium acetate  1 capsule Oral TID WC    metoclopramide  5 mg IntraVENous Q6H    pantoprazole  40 mg IntraVENous BID    And    sodium chloride (PF)  10 mL IntraVENous BID    scopolamine  1 patch TransDERmal Q72H    meropenem  500 mg IntraVENous Q24H    sodium chloride flush  5-40 mL IntraVENous 2 times per day    heparin (porcine)  5,000 Units SubCUTAneous 3 times per day     PRN Meds: oxyCODONE-acetaminophen, promethazine, diphenhydrAMINE, glucose, dextrose, glucagon (rDNA), dextrose, sodium chloride flush, sodium chloride, acetaminophen **OR** acetaminophen, ondansetron, diphenhydrAMINE      Intake/Output Summary (Last 24 hours) at 2/28/2022 1208  Last data filed at 2/28/2022 1029  Gross per 24 hour   Intake 600 ml   Output 1127 ml   Net -527 ml       Physical Exam Performed:    BP (!) 148/76   Pulse 111   Temp 98.8 °F (37.1 °C) (Oral)   Resp 16   Ht 5' 3\" (1.6 m)   Wt 200 lb 6.4 oz (90.9 kg)   SpO2 98%   BMI 35.50 kg/m²     General appearance: No apparent distress, appears stated age and cooperative. HEENT: Pupils equal, round, and reactive to light. Conjunctivae/corneas clear. Neck: Supple, with full range of motion. No jugular venous distention. Trachea midline. Respiratory:  Normal respiratory effort. Clear to auscultation, bilaterally without Rales/Wheezes/Rhonchi. Cardiovascular: Regular rate and rhythm with normal S1/S2 without murmurs, rubs or gallops. Abdomen: PD cath noted, abdominal tenderness improved. Musculoskeletal: bilateral edema present   Skin: Skin color, texture, turgor normal.  No rashes or lesions. Neurologic:  Neurovascularly intact without any focal sensory/motor deficits. Cranial nerves: II-XII intact, grossly non-focal.  Psychiatric: Alert and oriented, thought content appropriate, normal insight  Capillary Refill: Brisk,3 seconds, normal   Peripheral Pulses: +2 palpable, equal bilaterally       Labs:   Recent Labs     02/26/22 0435 02/27/22  0351 02/28/22  0359   WBC 8.8 8.7 7.6   HGB 9.9* 10.2* 9.5*   HCT 30.0* 31.2* 29.4*    156 149     Recent Labs     02/26/22  0435 02/27/22  0351 02/28/22  0359   * 131* 129*   K 3.5 3.5 3.5   CL 88* 92* 92*   CO2 23 22 22   BUN 45* 41* 41*   CREATININE 10.1* 10.2* 9.2*   CALCIUM 7.7* 7.5* 7.3*     No results for input(s): AST, ALT, BILIDIR, BILITOT, ALKPHOS in the last 72 hours. No results for input(s): INR in the last 72 hours. No results for input(s): Colt Bares in the last 72 hours.     Urinalysis:      Lab Results   Component Value Date    NITRU Negative 05/27/2021    WBCUA 3 05/27/2021    BACTERIA RARE 05/27/2021    RBCUA 4 05/27/2021    BLOODU TRACE 05/27/2021 SPECGRAV 1.016 05/27/2021    GLUCOSEU 250 05/27/2021       Radiology:  CT ABDOMEN PELVIS WO CONTRAST Additional Contrast? None   Final Result   1. Sigmoid colon diverticulosis. There is confluent inflammatory change   within the mesenteric fat surrounding a short segment of simple sigmoid colon   containing the diverticula. This could represent sequela of diverticulitis. 2.  2 cm ventral abdominal wall hernia containing inflamed fat. 3.  9 mm ground-glass nodule in the left lung base. Recommend follow-up with   a noncontrast chest CT at 6-12 months to confirm persistence then additional   noncontrast chest CTs every 2 years until 5 years. Assessment/Plan:    Active Hospital Problems    Diagnosis     Septic shock ([address] [A41.9, R65.21]     Electrolyte abnormality [E87.8]     Diverticulitis [K57.92]     Peritonitis ([address] [K65.9]     ESRD (end stage renal disease) ([address] [N18.6]        Sepsis  PD related Peritonitis  Admitted with abdominal pain, found to have PD related peritonitis with culture growing  Pseudomonas, PD cath replaced 2/24 ( and repair of hernia )  Monitor peritoneal dialysis fluid cell count  Blood cultures no growth to date  Body fluid culture is positive for Pseudomonas as well as Diphtheroids. Plan :  - continue bernarda day 9.   - appreciate ID input. DM. BG not controlled. ( up to +500)   Patient had been declining her insulin. Patient was educated about the importance of blood sugar control. Plan :  -Continue glargine 5 BID. - continue on high sliding scale.    - 5 units lispro TID  -Consult diabetic educator     Dizziness on standing  Discontinue losartan      Intractable nausea vomiting ( improved)   Likely related to diabetic gastroparesis  Continue patient on Reglan 5 mg 4 times daily  Proton pump inhibitors twice daily for possible GERD  Patient reports improvement in nausea     Diverticulitis  Patient on meropenem  Procalcitonin improving from 0.6 to 0.2     ESRD on PD  appreciate nephrology input. DVT Prophylaxis: heparin  Diet: ADULT DIET; Regular; 4 carb choices (60 gm/meal); 1800 ml  Code Status: Full Code    PT/OT Eval Status: following. Dispo - pending clinical improvement ( possibly mid week) .        Tamera Haywood MD

## 2022-02-28 NOTE — PLAN OF CARE
Problem: Falls - Risk of:  Goal: Will remain free from falls  Description: Will remain free from falls  2/28/2022 0856 by Pradip Andrade RN  Outcome: Ongoing     Problem: Falls - Risk of:  Goal: Absence of physical injury  Description: Absence of physical injury  2/28/2022 0856 by Pradip Andrade RN  Outcome: Ongoing     Problem: SAFETY  Goal: Free from accidental physical injury  2/28/2022 0856 by Pradip Andrade RN  Outcome: Ongoing     Problem: SAFETY  Goal: Free from intentional harm  2/28/2022 0856 by Pradip Andrade RN  Outcome: Ongoing     Problem: DAILY CARE  Goal: Daily care needs are met  2/28/2022 0856 by Pradip Andrade RN  Outcome: Ongoing     Problem: PAIN  Goal: Patient's pain/discomfort is manageable  2/28/2022 0856 by Pradip Andrade RN  Outcome: Ongoing     Problem: SKIN INTEGRITY  Goal: Skin integrity is maintained or improved  2/28/2022 0856 by Pradip Andrade RN  Outcome: Ongoing     Problem: KNOWLEDGE DEFICIT  Goal: Patient/S.O. demonstrates understanding of disease process, treatment plan, medications, and discharge instructions.   2/28/2022 0856 by Pradip Andrade RN  Outcome: Ongoing     Problem: DISCHARGE BARRIERS  Goal: Patient's continuum of care needs are met  2/28/2022 0856 by Pradip Andrade RN  Outcome: Ongoing     Problem: Discharge Planning:  Goal: Discharged to appropriate level of care  Description: Discharged to appropriate level of care  2/28/2022 0856 by Pradip Andrade RN  Outcome: Ongoing

## 2022-02-28 NOTE — DISCHARGE INSTR - COC
Continuity of Care Form    Patient Name: Natalie Nguyen   :  1965  MRN:  [id number]    Admit date:  2/15/2022  Discharge date:  3/1    Code Status Order: Full Code   Advance Directives:   [facility] Documentation       Date/Time Healthcare Directive Type of Healthcare Directive Copy in [facility] [address] Agent's Name Healthcare Agent's Phone Number    22 0830 No, patient does not have an advance directive for healthcare treatment -- -- -- -- --            Admitting Physician:  Jackie Gillespie DO  PCP: No primary care provider on file. Discharging Nurse: [facility] Unit/Room#: N4K-8310/5105-01  Discharging Unit Phone Number: [phone]    Emergency Contact:   Extended Emergency Contact Information  Primary Emergency Contact: Lynsey Delgado  Address: [address]  Home Phone: [phone]  Work Phone: [phone]  Relation: Spouse  Secondary Emergency Contact: Mervat Farooq  Address: mother-in- law   [address] Phone: [phone]  Relation: Other    Past Surgical History:  Past Surgical History:   Procedure Laterality Date    APPENDECTOMY      DIALYSIS CATHETER INSERTION N/A 2022    LAPAROSCOPIC PLACEMENT OF PERITONEAL DIALYSIS CATHETER performed by Natalie Chan MD at [address] [place] [address] N/A 2022    REMOVAL OF EXISTING PERITONEAL DIALYSIS CATHETER performed by Natalie Chan MD at [place] [address] N/A 2019    LAPAROSCOPIC PERITONEAL DIALYSIS CATHETER PLACEMENT WITH AXEL TROCHAR performed by Natalie Chan MD at [address] [place] [address] N/A 2022    REPAIR OF SMALL VENTRAL HERNIA performed by Natalie Chan MD at [place] [address]       Immunization History: There is no immunization history on file for this patient.     Active Problems:  Patient Active Problem List   Diagnosis Code    Diabetic retinopathy ([place] [address].) E11.319    Mixed hyperlipidemia E78.2    DMII (diabetes mellitus, type 2) ([facility] E11.9    Vitreous hemorrhage of right eye ([facility] H43.11    Essential hypertension I10    Acute kidney injury ([facility] N17.9    ESRD (end stage renal disease) ([facility] N18.6    Acute renal failure superimposed on chronic kidney disease ([facility] N17.9, N18.9    Volume overload E87.70    Peritonitis ([facility] K65.9    Generalized abdominal pain R10.84    Peritoneal dialysis status ([place] [address].) Z99.2    Morbid obesity due to excess calories ([facility]) E66.01    Nausea and vomiting R11.2    Hyponatremia E87.1    Diarrhea R19.7    Dizziness R42    Hypertensive emergency I16.1    Hypokalemia E87.6    Hypertensive urgency I16.0    Cerebrovascular accident (CVA) ([facility] I63.9    Electrolyte imbalance E87.8    Acute respiratory failure with hypoxia ([facility]) J96.01    Septic shock ([facility]) A41.9, R65.21    Electrolyte abnormality E87.8    Diverticulitis K57.92       Isolation/Infection:   Isolation            No Isolation          Patient Infection Status       Infection Onset Added Last Indicated Last Indicated By Review Planned Expiration Resolved Resolved By    None active    Resolved    COVID-19 (Rule Out) 22 COVID-19, Rapid (Ordered)   22 Rule-Out Test Resulted    COVID-19 22 COVID-19   22     COVID-19 (Rule Out) 22 COVID-19 (Ordered)   22 Rule-Out Test Resulted    C-diff Rule Out 20 Gastrointestinal Panel, Molecular (Ordered)   21 Daxa Fried RN            Nurse Assessment:  Last Vital Signs: BP (!) 148/76   Pulse 111   Temp 98.8 °F (37.1 °C) (Oral)   Resp 16   Ht 5' 3\" (1.6 m)   Wt 200 lb 6.4 oz (90.9 kg)   SpO2 98%   BMI 35.50 kg/m²     Last documented pain score (0-10 scale): Pain Level: 0  Last Weight:   Wt Readings from Last 1 Encounters:   22 200 lb 6.4 oz (90.9 kg)     Mental Status:  oriented and alert    IV Access:  - Dialysis Catheter  - site  abd, insertion date: 2/24    Nursing Mobility/ADLs:  Walking   Independent  Transfer  Independent  Bathing  Independent  Dressing  Independent  Toileting  Independent  Feeding  [facility]   whole    Wound Care Documentation and Therapy:        Elimination:  Continence: Bowel: Yes  Bladder: Yes  Urinary Catheter: None   Colostomy/Ileostomy/Ileal Conduit: No       Date of Last BM: 2/28    Intake/Output Summary (Last 24 hours) at 2/28/2022 1116  Last data filed at 2/28/2022 1029  Gross per 24 hour   Intake 600 ml   Output 1127 ml   Net -527 ml     I/O last 3 completed shifts: In: 65 [P.O.:660]  Out: 2577     Safety Concerns: At Risk for Falls    Impairments/Disabilities:      None    Nutrition Therapy:  Current Nutrition Therapy:   - Oral Diet:  Renal    Routes of Feeding: Oral  Liquids: Thin Liquids  Daily Fluid Restriction: no  Last Modified Barium Swallow with Video (Video Swallowing Test): not done    Treatments at the Time of Hospital Discharge:   Respiratory Treatments:   Oxygen Therapy:  is not on home oxygen therapy. Ventilator:    - No ventilator support    Rehab Therapies:   Weight Bearing Status/Restrictions: Other Medical Equipment (for information only, NOT a DME order):     Other Treatments:     Patient's personal belongings (please select all that are sent with patient):  None    RN SIGNATURE:  Electronically signed by Rosamaria Solis RN on 3/1/2022 at 2:30 PM      CASE MANAGEMENT/SOCIAL WORK SECTION    Inpatient Status Date: ***    Readmission Risk Assessment Score:  Readmission Risk              Risk of Unplanned Readmission:  31           Discharging to Facility/ Agency   Name:   Address:  Phone:  Fax:    Dialysis Facility (if applicable)   Name:  Address:  Dialysis Schedule:  Phone:  Fax:    / signature: {Esignature:035057593}    PHYSICIAN SECTION    Prognosis: {Prognosis:0364656879}    Condition at Discharge: Fortino Frias Patient Condition:166415871}    Rehab Potential (if transferring to Rehab): {Prognosis:0325488711}    Recommended Labs or Other Treatments After Discharge:    IV Cefepime with PD fluid  exchanges per Debbie Quinones stop date x 3/7/22    Stacy Ford MD  Infectious Disease  [facility] ([facility] Physician  Phone: [phone]   Fax : [phone]         Physician Certification: I certify the above information and transfer of Anmol Arnold  is necessary for the continuing treatment of the diagnosis listed and that she requires {Admit to Appropriate Level of Care:50497} for {GREATER/LESS:309636352} 30 days.      Update Admission H&P: {CHP DME Changes in DUK:772870812}    PHYSICIAN SIGNATURE:  {Esignature:625511573}

## 2022-02-28 NOTE — PROGRESS NOTES
Physical Therapy  Jay Shen  [phone]  [id number]    Attempted to see for PT session however pt reported she is up in room without assist and has no further therapy needs; will sign off from PT at this time per pt request  Electronically signed by SUMI LEE, PT on 2/28/2022 at 2:07 PM

## 2022-02-28 NOTE — PLAN OF CARE
Nutrition Problem #1: Other (Comment) (PES of inadequate po intake, resolving)  Intervention: Food and/or Nutrient Delivery: Continue Current Diet  Nutritional Goals: PO intake >50%

## 2022-02-28 NOTE — PROGRESS NOTES
[facility]  Progress Note    2/28/2022 11:39 AM    Chief Complaint: Abdominal Pain     Reason for Consult: PD Catheter Exchange    POD #4 Removal of Existing Peritoneal Dialysis Catheter, Laparoscopic Placement of Peritoneal Dialysis Catheter and Repair of Small Ventral Hernia with Dr. David Garza    Subjective: Pt seen resting bed, she reports tenderness near her incisions but otherwise denies abdominal pain, does feel the swelling in her upper extremities has decreased, reports some \"bloating\" in her abdomen    Vital Signs:  Vitals:    02/28/22 0857 02/28/22 0900 02/28/22 0945 02/28/22 1114   BP:  (!) 156/91  (!) 148/76   Pulse:  110  111   Resp:  16  16   Temp:  99.1 °F (37.3 °C)  98.8 °F (37.1 °C)   TempSrc:  Oral  Oral   SpO2: 94% 97%  98%   Weight:   200 lb 6.4 oz (90.9 kg)    Height:           I/O:    Intake/Output Summary (Last 24 hours) at 2/28/2022 1139  Last data filed at 2/28/2022 1029  Gross per 24 hour   Intake 600 ml   Output 1127 ml   Net -527 ml       Physical Exam:   General: no apparent distress, alert and oriented x3, afebrile  Chest/Lungs: no accessory muscle use, respirations easy and unlabored  Cardiac: regular rate and rhythm  Abdomen: soft, non-tender, mild distension, RUQ PD Catheter in place, no drainage seen to dressing, two other abdominal dressings remain intact with no drainage seen to dressing  Extremities: warm and well perfused, no signs of cyanosis or ischemia, generalized edema present, bilateral upper and lower extremity motorsensory intact    Labs:   Lab Results   Component Value Date     02/28/2022    K 3.5 02/28/2022    K 2.6 02/15/2022    CL 92 02/28/2022    CO2 22 02/28/2022    BUN 41 02/28/2022    CREATININE 9.2 02/28/2022    GFRAA 5 02/28/2022    LABGLOM 4 02/28/2022    GLUCOSE 487 02/28/2022    PHOS 4.5 02/17/2022    MG 1.50 02/28/2022    CALCIUM 7.3 02/28/2022     Lab Results   Component Value Date    WBC 7.6 02/28/2022    RBC 3.04 participate in the care of Danae Dunn      Electronically signed by ABDIAZIZ Contreras - CNP on 2/28/2022 at 11:39 AM     Pt seen and examined. for DIAGNOSIS OF status post removal and replacement of PD catheter agree with ABDIAZIZ Contreras's note. Labs reviewed. Vitals   Vitals:    02/28/22 0857 02/28/22 0900 02/28/22 0945 02/28/22 1114   BP:  (!) 156/91  (!) 148/76   Pulse:  110  111   Resp:  16  16   Temp:  99.1 °F (37.3 °C)  98.8 °F (37.1 °C)   TempSrc:  Oral  Oral   SpO2: 94% 97%  98%   Weight:   200 lb 6.4 oz (90.9 kg)    Height:           Dressings and incisions look good patient getting 12 L total exchanges per day 2 L at a time seems to be tolerating it.   Overall edema of her hands etc. he seems to be improving

## 2022-03-01 NOTE — PROGRESS NOTES
[facility]  Progress Note    3/1/2022 9:16 AM    Chief Complaint: Abdominal Pain     Reason for Consult: PD Catheter Exchange    POD #5 Removal of Existing Peritoneal Dialysis Catheter, Laparoscopic Placement of Peritoneal Dialysis Catheter and Repair of Small Ventral Hernia with Dr. Genevia Collet    Subjective: Pt seen resting bed, seen during PD run, reports feeling \"okay\" overall, some tenderness to incisions but otherwise without complaints    Vital Signs:  Vitals:    02/28/22 2053 03/01/22 0020 03/01/22 0454 03/01/22 0726   BP: 135/73 (!) 146/80 121/74 (!) 149/83   Pulse: 107 104 94 97   Resp: 16 16 16 20   Temp: 98.4 °F (36.9 °C) 97.9 °F (36.6 °C) 97.6 °F (36.4 °C) 97.3 °F (36.3 °C)   TempSrc: Oral Oral Oral Oral   SpO2: 95% 93% 92% 94%   Weight:       Height:           I/O:    Intake/Output Summary (Last 24 hours) at 3/1/2022 2936  Last data filed at 3/1/2022 0030  Gross per 24 hour   Intake 2067.34 ml   Output --   Net 2067.34 ml       Physical Exam:   General: no apparent distress, alert and oriented x3, afebrile  Chest/Lungs: no accessory muscle use, respirations easy and unlabored  Cardiac: regular rate and rhythm  Abdomen: soft, non-tender, mild distension, RUQ PD Catheter in place, no drainage seen to dressing, two other abdominal dressings remain intact with no drainage seen to dressing, dressing over PD catheter replaced  Extremities: warm and well perfused, no signs of cyanosis or ischemia, generalized edema present - improving, bilateral upper and lower extremity motorsensory intact    Labs:   Lab Results   Component Value Date     03/01/2022    K 3.5 03/01/2022    K 2.6 02/15/2022    CL 94 03/01/2022    CO2 23 03/01/2022    BUN 38 03/01/2022    CREATININE 9.4 03/01/2022    GFRAA 5 03/01/2022    LABGLOM 4 03/01/2022    GLUCOSE 268 03/01/2022    PHOS 4.5 02/17/2022    MG 1.50 03/01/2022    CALCIUM 7.9 03/01/2022     Lab Results   Component Value Date    WBC 7.3 03/01/2022 RBC 2.99 03/01/2022    HGB 9.3 03/01/2022    HCT 28.4 03/01/2022    MCV 95.0 03/01/2022    RDW 16.2 03/01/2022     03/01/2022     Lab Results   Component Value Date    INR 0.97 08/10/2021    PROTIME 11.0 08/10/2021        Scheduled Meds:    dianeal lo-queenie 4.25%  6,000 mL IntraPERitoneal Nightly    And    dianeal lo-queenie 4.25%  3,000 mL IntraPERitoneal Nightly    And    dianeal lo-queenie 4.25%  3,000 mL IntraPERitoneal Nightly    insulin lispro  0-9 Units SubCUTAneous Q24H    insulin glargine  5 Units SubCUTAneous BID    insulin lispro  5 Units SubCUTAneous TID WC    insulin lispro  0-18 Units SubCUTAneous TID WC    insulin lispro  0-9 Units SubCUTAneous Nightly    torsemide  200 mg Oral BID    calcium acetate  1 capsule Oral TID WC    metoclopramide  5 mg IntraVENous Q6H    pantoprazole  40 mg IntraVENous BID    And    sodium chloride (PF)  10 mL IntraVENous BID    scopolamine  1 patch TransDERmal Q72H    meropenem  500 mg IntraVENous Q24H    sodium chloride flush  5-40 mL IntraVENous 2 times per day    heparin (porcine)  5,000 Units SubCUTAneous 3 times per day     Continuous Infusions:    dextrose      sodium chloride 5 mL/hr at 02/26/22 1447       Assessment:   -POD #5 Removal of Existing Peritoneal Dialysis Catheter, Laparoscopic Placement of Peritoneal Dialysis Catheter and Repair of Small Ventral Hernia with Dr. Myers January  -PD Catheter Tip Culture with Diphtheroids  -Surgical sites without significant drainage  -No abdominal pain other than some incisional discomfort   -ESRD - Nephrology following    Plan:  -PD at discretion of Nephrology  -Replace dressings to abdomen as needed, keep surgical sites clean and dry  -Antibiotics with PD - per ID and Nephrology    All pertinent information and plan of care discussed with Dr. Myers January. All questions and concerns were addressed with the patient. I have discussed the above stated plan with the patient and the nurse.  The patient verbalized understanding and agreed with the plan. Thank you for allowing to us to participate in the care of Danae Dunn      Electronically signed by ABDIAZIZ Ware - CNP on 3/1/2022 at 9:16 AM   Pt seen and examined. for DIAGNOSIS OF postdate day #5 removal of PD catheter and placement of new PD catheter agree with ABDIAZIZ Ware's note. Labs reviewed. Vitals   Vitals:    02/28/22 2053 03/01/22 0020 03/01/22 0454 03/01/22 0726   BP: 135/73 (!) 146/80 121/74 (!) 149/83   Pulse: 107 104 94 97   Resp: 16 16 16 20   Temp: 98.4 °F (36.9 °C) 97.9 °F (36.6 °C) 97.6 °F (36.4 °C) 97.3 °F (36.3 °C)   TempSrc: Oral Oral Oral Oral   SpO2: 95% 93% 92% 94%   Weight:       Height:       Fluid exchange is apparently going well 2 L at a time 6 exchanges through the night. Dressing replaced as it was not attached to the exit site.   Patient has a history of poor compliance with this, told her I did not want to wait of the catheter pulling on it

## 2022-03-01 NOTE — CARE COORDINATION
CASE MANAGEMENT DISCHARGE SUMMARY: Discharge order noted. Patient will be discharging to home with spouse. Requires antibiotics via PD exchanges. Patient instructed to contact [facility], contact information provided, prior to 5pm today in order to schedule time for 3/2/22 in the morning to meet with her assigned PD nurse to pickup antibiotic and review process.      DISCHARGE DATE: 3/1/22    DISCHARGED TO HOME     TRANSPORTATION: Family             TIME: Evening     PREFERRED PHARMACY: [address] [place] [address] [phone]                Sonia GONCALVES RN  Case Management  [phone]    Electronically signed by Sonia Johnson RN on 3/1/2022 at 4:25 PM

## 2022-03-01 NOTE — PROGRESS NOTES
Discharge orders acknowledged by RN . Discharge teaching completed with pt. AVS reviewed and all questions answered. Medication regimen reviewed and pt understands schedule. Follow up appointments also reviewed with pt and resources given for discharge. Pt was sent electronic to be filled and understands schedule. IV removed. Telemonitor removed and returned to [facility]. 60+ minutes of education completed. Required core measures completed. Pt vitals WDL. Pt discharged with all belongings to home with . Pt transported off of unit via wheelchair. No complications. Patient has appointment set up with nephrology for her antibiotics in her PD bag.     Electronically signed by Amparo Farr RN on 3/1/2022 at 4:50 PM

## 2022-03-01 NOTE — PROGRESS NOTES
Hospitalist Progress Note      PCP: No primary care provider on file. Date of Admission: 2/15/2022    Chief Complaint: abdominal pain    Hospital Course:   50-year-old female patient with a past medical history of ESRD on PD, diabetic retinopathy, diabetic neuropathy, history of kidney stones, hypertension, hyperlipidemia, patient presented to emergency with abdominal pain and diarrhea. Patient was admitted with PD related peritonitis. Subjective:   Improved BG however continues to be above target. Denies any complaints, continues to report diarrhea.      Medications:  Reviewed    Infusion Medications    dextrose      sodium chloride 5 mL/hr at 02/26/22 1447     Scheduled Medications    dianeal lo-queenie 4.25%  6,000 mL IntraPERitoneal Nightly    And    dianeal lo-queenie 4.25%  3,000 mL IntraPERitoneal Nightly    And    dianeal lo-queenie 4.25%  3,000 mL IntraPERitoneal Nightly    insulin lispro  0-9 Units SubCUTAneous Q24H    insulin glargine  5 Units SubCUTAneous BID    insulin lispro  5 Units SubCUTAneous TID WC    insulin lispro  0-18 Units SubCUTAneous TID WC    insulin lispro  0-9 Units SubCUTAneous Nightly    torsemide  200 mg Oral BID    calcium acetate  1 capsule Oral TID WC    metoclopramide  5 mg IntraVENous Q6H    pantoprazole  40 mg IntraVENous BID    And    sodium chloride (PF)  10 mL IntraVENous BID    scopolamine  1 patch TransDERmal Q72H    meropenem  500 mg IntraVENous Q24H    sodium chloride flush  5-40 mL IntraVENous 2 times per day    heparin (porcine)  5,000 Units SubCUTAneous 3 times per day     PRN Meds: oxyCODONE-acetaminophen, promethazine, diphenhydrAMINE, glucose, dextrose, glucagon (rDNA), dextrose, sodium chloride flush, sodium chloride, acetaminophen **OR** acetaminophen, ondansetron, diphenhydrAMINE      Intake/Output Summary (Last 24 hours) at 3/1/2022 1051  Last data filed at 3/1/2022 1025  Gross per 24 hour   Intake 2067.34 ml   Output --   Net 2067.34 ml Physical Exam Performed:    BP (!) 149/83   Pulse 97   Temp 97.3 °F (36.3 °C) (Oral)   Resp 20   Ht 5' 3\" (1.6 m)   Wt 200 lb 6.4 oz (90.9 kg)   SpO2 94%   BMI 35.50 kg/m²     General appearance: No apparent distress, appears stated age and cooperative. HEENT: Pupils equal, round, and reactive to light. Conjunctivae/corneas clear. Neck: Supple, with full range of motion. No jugular venous distention. Trachea midline. Respiratory:  Normal respiratory effort. Clear to auscultation, bilaterally without Rales/Wheezes/Rhonchi. Cardiovascular: Regular rate and rhythm with normal S1/S2 without murmurs, rubs or gallops. Abdomen: PD cath noted, abdominal tenderness improved. Musculoskeletal: bilateral edema present   Skin: Skin color, texture, turgor normal.  No rashes or lesions. Neurologic:  Neurovascularly intact without any focal sensory/motor deficits. Cranial nerves: II-XII intact, grossly non-focal.  Psychiatric: Alert and oriented, thought content appropriate, normal insight  Capillary Refill: Brisk,3 seconds, normal   Peripheral Pulses: +2 palpable, equal bilaterally       Labs:   Recent Labs     02/27/22  0351 02/28/22  0359 03/01/22 0428   WBC 8.7 7.6 7.3   HGB 10.2* 9.5* 9.3*   HCT 31.2* 29.4* 28.4*    149 168     Recent Labs     02/27/22  0351 02/28/22  0359 03/01/22  0428   * 129* 132*   K 3.5 3.5 3.5   CL 92* 92* 94*   CO2 22 22 23   BUN 41* 41* 38*   CREATININE 10.2* 9.2* 9.4*   CALCIUM 7.5* 7.3* 7.9*     No results for input(s): AST, ALT, BILIDIR, BILITOT, ALKPHOS in the last 72 hours. No results for input(s): INR in the last 72 hours. No results for input(s): Ernie Gates in the last 72 hours.     Urinalysis:      Lab Results   Component Value Date    NITRU Negative 05/27/2021    WBCUA 3 05/27/2021    BACTERIA RARE 05/27/2021    RBCUA 4 05/27/2021    BLOODU TRACE 05/27/2021    SPECGRAV 1.016 05/27/2021    GLUCOSEU 250 05/27/2021       Radiology:  CT ABDOMEN PELVIS WO improving from 0.6 to 0.2     ESRD on PD  appreciate nephrology input. DVT Prophylaxis: heparin  Diet: ADULT DIET; Regular; 4 carb choices (60 gm/meal); 1800 ml  Code Status: Full Code    PT/OT Eval Status: following. Dispo - pending clinical improvement ( possibly mid week) .        Surinder Hawthorne MD

## 2022-03-01 NOTE — CARE COORDINATION
CM contacted [facility] [phone] to determine if prescription for antbiotic for PD solution has been called to them. Dr. Italo Knight completed. Patient instructed to contact her PD nurse at [facility] today before 5pm to schedule a time to meet with her tomorrow morning to review and to obtain the antibiotic and solution.      Sabrina GONCALVES RN  Case Management  [phone]    Electronically signed by Sabrina Perdomo RN on 3/1/2022 at 3:43 PM

## 2022-03-01 NOTE — PROGRESS NOTES
Office: [phone]       Fax: [phone]      Nephrology Progress Note        Patient's Name: Joan Vázquez  Date of Visit: 3/1/2022    Reason for Consult:  ESRD management      History of Present Illness:      Joan Vázquez is a 55 y.o. female with PMHx of hypertension, diabetes mellitus, ESRD who was admitted on 2/15/2022 with complaints of abdominal pain   PD fluid cloudy  history of PD peritonitis in past.  Does 2 exchanges with 2.5% day time and last fill    INTERVAL HISTORY      Feels better  Shortness of breath: No   mild abdominal pain   UF better    Medications:        Allergies:  Bactrim [sulfamethoxazole-trimethoprim], Doxazosin, Geocillin [carbenicillin], Lisinopril, Other, Spironolactone, Tramadol, Bumetanide, and Ibuprofen  Scheduled Meds:   dianeal lo-queenie 4.25%  6,000 mL IntraPERitoneal Nightly    And    dianeal lo-queenie 4.25%  3,000 mL IntraPERitoneal Nightly    And    dianeal lo-queenie 4.25%  3,000 mL IntraPERitoneal Nightly    insulin lispro  0-9 Units SubCUTAneous Q24H    insulin glargine  5 Units SubCUTAneous BID    insulin lispro  5 Units SubCUTAneous TID WC    insulin lispro  0-18 Units SubCUTAneous TID WC    insulin lispro  0-9 Units SubCUTAneous Nightly    torsemide  200 mg Oral BID    calcium acetate  1 capsule Oral TID WC    metoclopramide  5 mg IntraVENous Q6H    pantoprazole  40 mg IntraVENous BID    And    sodium chloride (PF)  10 mL IntraVENous BID    scopolamine  1 patch TransDERmal Q72H    meropenem  500 mg IntraVENous Q24H    sodium chloride flush  5-40 mL IntraVENous 2 times per day    heparin (porcine)  5,000 Units SubCUTAneous 3 times per day     Continuous Infusions:   dextrose      sodium chloride 5 mL/hr at 02/26/22 1447         Objective:       Vitals:  BP (!) 149/83   Pulse 97   Temp 97.3 °F (36.3 °C) (Oral)   Resp 20   Ht 5' 3\" (1.6 m)   Wt 200 lb 6.4 oz (90.9 kg)   SpO2 94%   BMI 35.50 kg/m²   Constitutional:  awake, NAD  HEENT:  MMM, No icterus  Cardiovascular:  S1, S2 reg  Respiratory: CTA, no crackles  Abdomen:  +BS, soft, NT, ND  Ext: trace lower extremity edema  Access: abd PD catheter in place,  CNS: alert, no agitation    Data:     Labs:  Hepatic:   No results for input(s): AST, ALT, ALB, BILITOT, ALKPHOS in the last 72 hours. BNP: No results for input(s): BNP in the last 72 hours. ABGs: No results for input(s): PHART, PO2ART, RHX3ERE in the last 72 hours. IMAGING:  CT ABDOMEN PELVIS WO CONTRAST Additional Contrast? None   Final Result   1. Sigmoid colon diverticulosis. There is confluent inflammatory change   within the mesenteric fat surrounding a short segment of simple sigmoid colon   containing the diverticula. This could represent sequela of diverticulitis. 2.  2 cm ventral abdominal wall hernia containing inflamed fat. 3.  9 mm ground-glass nodule in the left lung base. Recommend follow-up with   a noncontrast chest CT at 6-12 months to confirm persistence then additional   noncontrast chest CTs every 2 years until 5 years. Assessment :       1. ESRD   Etiology: suspected DN  Access: Abd PD cath  Volume: Hypervolemic      2. Electrolytes/Acid base  Hypomagnesemia and No Dyskalemia    Recent Labs     03/01/22  0428   *   K 3.5   CO2 23   MG 1.50*       3. BMD  -Hyperphosphatemia, SHPT  -maintained on Renvela    Lab Results   Component Value Date    CALCIUM 7.9 (L) 03/01/2022    PHOS 4.5 02/17/2022        4. HTN  -Blood pressure high       BP Readings from Last 1 Encounters:   03/01/22 (!) 149/83       5. Anemia  -anemia of CKD  -assess for CHRIS    Recent Labs     03/01/22  0428   HGB 9.3*     6.  DM    7. PD peritonitis  -Cult: pseudomonas  -PD catheter exchanged 2/24/22    PLAN :       - Antibiotics directed by culture: ID recs noted  - replace K, Mg as needed  -maintenance PD   -resume BP meds:   -continue Torsemide  -MBD management  -Anemia management  -Dose meds to GFR<10    Seen on PD  Spoke to Richard PECK MD     Thank you for allowing us to participate in care of Danae Dunn . We will continue to follow. Feel free to contact me with any questions.       Syd Finn MD  3/1/2022    [facility] [address] [place] [address]  Office : [phone]  Fax :[phone]

## 2022-03-01 NOTE — PLAN OF CARE
Problem: Falls - Risk of:  Goal: Will remain free from falls  Description: Will remain free from falls  Outcome: Ongoing  Goal: Absence of physical injury  Description: Absence of physical injury  Outcome: Ongoing     Problem: SAFETY  Goal: Free from accidental physical injury  Outcome: Ongoing  Goal: Free from intentional harm  Outcome: Ongoing     Problem: DAILY CARE  Goal: Daily care needs are met  Outcome: Ongoing     Problem: PAIN  Goal: Patient's pain/discomfort is manageable  Outcome: Ongoing     Problem: SKIN INTEGRITY  Goal: Skin integrity is maintained or improved  Outcome: Ongoing     Problem: KNOWLEDGE DEFICIT  Goal: Patient/S.O. demonstrates understanding of disease process, treatment plan, medications, and discharge instructions.   Outcome: Ongoing     Problem: DISCHARGE BARRIERS  Goal: Patient's continuum of care needs are met  Outcome: Ongoing     Problem: Discharge Planning:  Goal: Discharged to appropriate level of care  Description: Discharged to appropriate level of care  Outcome: Ongoing     Problem: Gas Exchange - Impaired:  Goal: Levels of oxygenation will improve  Description: Levels of oxygenation will improve  Outcome: Ongoing     Problem: Infection, Septic Shock:  Goal: Will show no infection signs and symptoms  Description: Will show no infection signs and symptoms  Outcome: Ongoing     Problem: Serum Glucose Level - Abnormal:  Goal: Ability to maintain appropriate glucose levels will improve  Description: Ability to maintain appropriate glucose levels will improve  Outcome: Ongoing     Problem: Tissue Perfusion, Altered:  Goal: Circulatory function within specified parameters  Description: Circulatory function within specified parameters  Outcome: Ongoing     Problem: Venous Thromboembolism:  Goal: Will show no signs or symptoms of venous thromboembolism  Description: Will show no signs or symptoms of venous thromboembolism  Outcome: Ongoing  Goal: Absence of signs or symptoms of impaired coagulation  Description: Absence of signs or symptoms of impaired coagulation  Outcome: Ongoing     Problem: Skin Integrity:  Goal: Will show no infection signs and symptoms  Description: Will show no infection signs and symptoms  Outcome: Ongoing  Goal: Absence of new skin breakdown  Description: Absence of new skin breakdown  Outcome: Ongoing     Problem: Pain:  Description: Pain management should include both nonpharmacologic and pharmacologic interventions.   Goal: Pain level will decrease  Description: Pain level will decrease  Outcome: Ongoing  Goal: Control of acute pain  Description: Control of acute pain  Outcome: Ongoing  Goal: Control of chronic pain  Description: Control of chronic pain  Outcome: Ongoing     Problem: Fluid Volume:  Goal: Ability to achieve a balanced intake and output will improve  Description: Ability to achieve a balanced intake and output will improve  Outcome: Ongoing     Problem: Physical Regulation:  Goal: Ability to maintain clinical measurements within normal limits will improve  Description: Ability to maintain clinical measurements within normal limits will improve  Outcome: Ongoing  Goal: Will show no signs and symptoms of electrolyte imbalance  Description: Will show no signs and symptoms of electrolyte imbalance  Outcome: Ongoing     Problem: Nutrition  Goal: Optimal nutrition therapy  Outcome: Ongoing

## 2022-03-01 NOTE — PROGRESS NOTES
Infectious Disease Follow up Notes  Admit Date: 2/15/2022    Antibiotics :   IV Meropenem      CHIEF COMPLAINT:     PD peritonitis  Pseudomonas infection   Diverticulitis    Subjective interval History :  55 y.o. man with a past medical history of end-stage renal disease on peritoneal dialysis for the past 2 years, diabetic retinopathy , diabetes neuropathy, kidney stone, hypertension, hyperlipidemia, admitted to the hospital secondary to abdominal pain and diarrhea. She also has a history of COVID-19 infection for which she is recovering okay but unfortunately developed GI symptoms consistent with nausea diarrhea abdominal pain. PD fluid on admission with abnormal PD fluid culture positive for Pseudomonas. Admit labs indicate lactic acid 4.1 glucose at 295, WBC elevated 14.4. Blood cultures from admission negative. CT abdomen pelvis indicate sigmoid colon diverticulosis but there is small sequelae of diverticulitis. PD fluid cell count was grossly abnormal with WBC count at 18,000.   Location : abd pain+      Quality :  Aching      Severity :  10/10   Duration : weeks     Timing :constant   Context : PD     Modifying factors : none   Associated signs and symptoms:diarrhea, nausea        Interval History :  No fevers no chills and PD catheter working well no nausea today      Past Medical History:    Past Medical History:   Diagnosis Date    Anemia 9/15/2014    Chronic kidney disease     Diabetes mellitus ([address]     Diabetes mellitus type 1 ([address]     Diabetic retinopathy ([address]     Diabetic retinopathy associated with type 2 diabetes mellitus, without macular edema     Hyperlipidemia 7/27/2015    Hypertension     Kidney stone     Mixed hyperlipidemia 7/27/2015    Orthostatic hypotension     Vitreous hemorrhage of right eye ([address] 8/15/2017       Past Surgical History:    Past Surgical History:   Procedure Laterality Date Mother     Diabetes Father     Heart Disease Father     Emphysema Father     Cancer Maternal Grandmother         cervical cancer    Lung Cancer Maternal Grandfather         black lung    Diabetes Paternal Grandmother     Heart Disease Paternal Grandfather       REVIEW OF SYSTEMS:    Constitutional:  negative for fevers, chills, night sweats  Eyes:  negative for blurred vision, eye discharge, visual disturbance   HEENT:  negative for hearing loss, ear drainage,nasal congestion  Respiratory:  negative for cough, shortness of breath or hemoptysis   Cardiovascular:  negative for chest pain, palpitations, syncope  Gastrointestinal:  negative for nausea, vomiting, diarrhea, constipation, abdominal pain++   Genitourinary:  negative for frequency, dysuria, urinary incontinence, hematuria  Hematologic/Lymphatic:  negative for easy bruising, bleeding and lymphadenopathy  Allergic/Immunologic:  negative for recurrent infections, angioedema, anaphylaxis   Endocrine:  negative for weight changes, polyuria, polydipsia and polyphagia  Musculoskeletal:  negative for joint  pain, swelling, decreased range of motion  Integumentary: No rashes, skin lesions  Neurological:  negative for headaches, slurred speech, unilateral weakness  Psychiatric: negative for hallucinations,confusion,agitation.                 PHYSICAL EXAM:      Vitals:    BP (!) 149/83   Pulse 97   Temp 97.3 °F (36.3 °C) (Oral)   Resp 20   Ht 5' 3\" (1.6 m)   Wt 200 lb 6.4 oz (90.9 kg)   SpO2 94%   BMI 35.50 kg/m²       General Appearance: alert,in no acute distress, +  pallor, no icterus chronic ill appearing woman +  Skin: warm and dry, no rash or erythema  Head: normocephalic and atraumatic  Eyes: pupils equal, round, and reactive to light, conjunctivae normal  ENT: tympanic membrane, external ear and ear canal normal bilaterally, nose without deformity, nasal mucosa and turbinates normal without polyps  Neck: supple and non-tender without mass, no thyromegaly  no cervical lymphadenopathy  Pulmonary/Chest: clear to auscultation bilaterally- no wheezes, rales or rhonchi, normal air movement, no respiratory distress  Cardiovascular: normal rate, regular rhythm, normal S1 and S2, no murmurs, rubs, clicks, or gallops, no carotid bruits  Abdomen: soft, ++ -tender, + -distended, normal bowel sounds, no masses or organomegaly  Extremities: no cyanosis, clubbing or edema  Musculoskeletal: normal range of motion, no joint swelling, deformity or tenderness  Integumentary: No rashes, no abnormal skin lesions, no petechiae  Neurologic: reflexes normal and symmetric, no cranial nerve deficit  Psych:  Orientation, sensorium, mood normal            Lines: PD catheter +new and dressing+         Data Review:    CBC:   Lab Results   Component Value Date    WBC 7.3 03/01/2022    HGB 9.3 (L) 03/01/2022    HCT 28.4 (L) 03/01/2022    MCV 95.0 03/01/2022     03/01/2022     RENAL:   Lab Results   Component Value Date    CREATININE 9.4 (HH) 03/01/2022    BUN 38 (H) 03/01/2022     (L) 03/01/2022    K 3.5 03/01/2022    CL 94 (L) 03/01/2022    CO2 23 03/01/2022     SED RATE: No results found for: SEDRATE  CK: No results found for: CKTOTAL  CRP: No results found for: CRP  Hepatic Function Panel:   Lab Results   Component Value Date    ALKPHOS 117 02/15/2022    ALT 7 02/15/2022    AST 14 02/15/2022    PROT 5.6 02/15/2022    BILITOT 0.6 02/15/2022    LABALBU 1.7 02/17/2022     UA:  Lab Results   Component Value Date    COLORU YELLOW 05/27/2021    CLARITYU Clear 05/27/2021    GLUCOSEU 250 05/27/2021    BILIRUBINUR Negative 05/27/2021    KETUA Negative 05/27/2021    SPECGRAV 1.016 05/27/2021    BLOODU TRACE 05/27/2021    PHUR 7.0 05/27/2021    PROTEINU 100 05/27/2021    UROBILINOGEN 0.2 05/27/2021    NITRU Negative 05/27/2021    LEUKOCYTESUR Negative 05/27/2021    LABMICR YES 05/27/2021    URINETYPE NotGiven 05/27/2021      Urine Microscopic:   Lab Results   Component Value Date LABCAST 3-5 Hyaline 08/27/2014    BACTERIA RARE 05/27/2021    COMU see below 05/27/2021    HYALCAST 0 05/27/2021    WBCUA 3 05/27/2021    RBCUA 4 05/27/2021    EPIU 4 05/27/2021     Urine Reflex to Culture:   Lab Results   Component Value Date    URRFLXCULT Yes 08/04/2019          Results for Doreen Locke (MRN [id number]) as of 2/17/2022 16:24    Ref. Range 2/16/2022 00:40 2/16/2022 17:25 2/17/2022 03:30   Source + CELL CT/DIFF-BF Unknown Peritoneal dial Peritoneal Fl. Peritoneal dial   Appearance, Fluid Unknown Hazy Turbid Cloudy   Color, Fluid Unknown Yellow Yellow Yellow   Eos, Fluid Latest Units: % 1       RBC, Fluid Latest Units: /cumm 1,000 5,000 <2,000   Lymphocytes, Body Fluid Latest Units: %   8 4   Monocyte Count, Fluid Latest Units: % 25   15   Neutrophil Count, Fluid Latest Units: % 54 86 76   Nucl Cell, Fluid Latest Units: /cumm 18,185 >50 2,161   Mesothelial, Fluid Latest Units: % 16 1 1   Clot Eval. Unknown see below see below see below   Number of Cells Counted Fluid Unknown 100 100 100        Procedure Component Value Units Date/Time   Culture, Body Fluid [[id number]] Collected: 02/23/22 0520   Order Status: Completed Specimen: Body Fluid from Peritoneal Dialysis Fluid Updated: 02/24/22 0919    Body Fluid Culture, Sterile No growth to date    Gram Stain Result 3+ WBC's   No Epithelial Cells seen   No organisms seen    Narrative:     ORDER#: [id number]                          ORDERED BY: Jessica Hoffman   SOURCE: Peritoneal Dialysis Fluid          COLLECTED:  02/23/22 05:20   [facility] :                      RECEIVED :  02/23/22 05:40   Performed at:   [place] [address] [address] [facility] [address]   Phone [phone]   Culture, Body Fluid [[id number]] Collected: 02/21/22 0150   Order Status: Completed Specimen:  Body Fluid from Peritoneal Dialysis Fluid Updated: 02/24/22 0729    Body Fluid Culture, Sterile No growth 60 to 72 hours    Gram Stain Result 1+ WBC's (Polymorphonuclear)   No organisms seen    Narrative:     ORDER#: [id number]                          ORDERED BY: Tamara Lara   SOURCE: Peritoneal Dialysis Fluid          COLLECTED:  02/21/22 01:50   [facility] :                      RECEIVED :  02/21/22 14:57   Performed at:   [facility]        MICRO: cultures reviewed and updated by me   Blood Culture:   Lab Results   Component Value Date    BC No Growth after 4 days of incubation. 02/15/2022    BLOODCULT2 No Growth after 4 days of incubation. 02/15/2022            Culture, Body Fluid [[id number]] Collected: 02/23/22 0520   Order Status: Completed Specimen: Body Fluid from Peritoneal Dialysis Fluid Updated: 02/25/22 0753    Body Fluid Culture, Sterile --    No growth to date   No growth 36 to 48 hours     Gram Stain Result 3+ WBC's   No Epithelial Cells seen   No organisms seen    Narrative:     ORDER#: [id number]                          ORDERED BY: Tamara Lara   SOURCE: Peritoneal Dialysis Fluid          COLLECTED:  02/23/22 05:20   [facility] :                      RECEIVED :  02/23/22 05:40   Performed at:   [place] [address]   Phone [phone]   Culture, Body Fluid [[id number]]    Order Status: No result Specimen: Body Fluid from Peritoneal Dialysis Fluid    Culture, Tip [[id number]] Collected: 02/24/22 1225   Order Status: Sent Specimen: Catheter Tip Updated: 02/24/22 1250   Culture, Body Fluid [[id number]] Collected: 02/21/22 0150   Order Status: Completed Specimen:  Body Fluid from Peritoneal Dialysis Fluid Updated: 02/24/22 0729    Body Fluid Culture, Sterile No growth 60 to 72 hours    Gram Stain Result 1+ WBC's (Polymorphonuclear)   No organisms seen    Narrative:     ORDER#: [id number]                          ORDERED BY: Tamara Lara   SOURCE: Peritoneal Dialysis Fluid          COLLECTED:  02/21/22 01:50   [facility] :                      RECEIVED :  02/21/22 14:57          Respiratory Culture:  Lab Results   Component Value Date    LABGRAM  02/23/2022     3+ WBC's  No Epithelial Cells seen  No organisms seen       AFB:No results found for: [facility]  Viral Culture:  Lab Results   Component Value Date    COVID19 Not Detected 02/23/2022    COVID19 Detected 01/07/2022     Urine Culture: No results for input(s): LABURIN in the last 72 hours. IMAGING:    CT ABDOMEN PELVIS WO CONTRAST Additional Contrast? None   Final Result   1. Sigmoid colon diverticulosis. There is confluent inflammatory change   within the mesenteric fat surrounding a short segment of simple sigmoid colon   containing the diverticula. This could represent sequela of diverticulitis. 2.  2 cm ventral abdominal wall hernia containing inflamed fat. 3.  9 mm ground-glass nodule in the left lung base. Recommend follow-up with   a noncontrast chest CT at 6-12 months to confirm persistence then additional   noncontrast chest CTs every 2 years until 5 years. Procedure Component Value Units Date/Time   Culture, Tip [[id number]] (Abnormal) Collected: 02/24/22 1225   Order Status: Completed Specimen: Catheter Tip Updated: 02/26/22 1050    Organism Diphtheroids Abnormal     Culture Catheter Tip --    >TNTC colonies   No further workup    Narrative:     ORDER#: [id number]                          ORDERED BY: RAMIREZ DURAN   SOURCE: Catheter Tip B. PERITONEAL DIALYSISCOLLECTED:  02/24/22 12:25   [facility] :                      RECEIVED :  02/24/22 12:49   Performed at:   [address] [address]   Phone [phone]         All the pertinent images and reports for the current Hospitalization were reviewed by me     Scheduled Meds:   dianeal lo-queenie 4.25%  6,000 mL IntraPERitoneal Nightly    And    dianeal lo-queenie 4.25%  3,000 mL IntraPERitoneal Nightly    And    dianeal lo-queenie 4.25%  3,000 mL IntraPERitoneal Nightly    insulin lispro  0-9 Units SubCUTAneous Q24H    insulin glargine  5 Units SubCUTAneous BID    insulin lispro  5 Units SubCUTAneous TID WC    insulin lispro  0-18 Units SubCUTAneous TID WC    insulin lispro  0-9 Units SubCUTAneous Nightly    torsemide  200 mg Oral BID    calcium acetate  1 capsule Oral TID WC    metoclopramide  5 mg IntraVENous Q6H    pantoprazole  40 mg IntraVENous BID    And    sodium chloride (PF)  10 mL IntraVENous BID    scopolamine  1 patch TransDERmal Q72H    meropenem  500 mg IntraVENous Q24H    sodium chloride flush  5-40 mL IntraVENous 2 times per day    heparin (porcine)  5,000 Units SubCUTAneous 3 times per day       Continuous Infusions:   dextrose      sodium chloride 5 mL/hr at 02/26/22 1447       PRN Meds:  oxyCODONE-acetaminophen, promethazine, diphenhydrAMINE, glucose, dextrose, glucagon (rDNA), dextrose, sodium chloride flush, sodium chloride, acetaminophen **OR** acetaminophen, ondansetron, diphenhydrAMINE      Assessment:     Patient Active Problem List   Diagnosis    Diabetic retinopathy ([address]    Mixed hyperlipidemia    DMII (diabetes mellitus, type 2) ([facility]    Vitreous hemorrhage of right eye ([address]    Essential hypertension    Acute kidney injury ([address]    ESRD (end stage renal disease) ([address]    Acute renal failure superimposed on chronic kidney disease (HCC)    Volume overload    Peritonitis ([address]    Generalized abdominal pain    Peritoneal dialysis status ([address]    Morbid obesity due to excess calories ([facility])    Nausea and vomiting    Hyponatremia    Diarrhea    Dizziness    Hypertensive emergency    Hypokalemia    Hypertensive urgency    Cerebrovascular accident (CVA) ([address]    Electrolyte imbalance    Acute respiratory failure with hypoxia (HCC)    Septic shock ([facility])    Electrolyte abnormality    Diverticulitis          Sepsis on admit resolved  PD peritonitis  Diverticulitis  Pseudomonas on PD fluid cx  WBC elevation  Resolved   Lactic acidosis resolved   ESRD on PD  DM with several complications  PD fluid cell counts very abnormal         Will need IV abx for now and at d.c hopefully able to do IV abx through PD exchanges      PD fluid cx in follow up pending will need to see improvement in cell counts before d/c planning AND Fluid cx have to be negative     PD fluid cx with Pseudomonas still and she is not clearing her infection indicating that PD catheter may be now involved   d/w  about plans for possible PD catheter removal     Will see if her NAUSEA improves with change in her IV abx now tolerating a bit better     S/p PD catheter exchanged and ventral Hernia repair on 2/24 by Yris Gonzalez    Will check PD fluid cell count and cx with exchange today    She declines to take IV Antifungal therapy so will d/c this       Labs, Microbiology, Radiology and all the pertinent results from current hospitalization and  care every where were reviewed  by me as a part of the evaluation   Plan:   1. Trend PD fluid cell counts now improving    2. IV Meropenem x 500 mg q 24 hrs as in patient    3. Watch for complications  4. Blood cx negative   5. CT abd/pelvis results noted  6. S/P PD catheter exchange , Tip cx diptheroids not significant    7. We will be able to choose IV Cefepime with PD exchanges  d/w  x for 7 days   8.ok for d/c when ok with primary              Discussed with patient/Family and Nursing staff     Thanks for allowing me to participate in your patient's care and please call me with any questions or concerns.     Jeanmarie Rahman MD  Infectious Disease  [facility]) Physician  Phone: [phone]   Fax : [phone]

## 2022-03-02 NOTE — DISCHARGE SUMMARY
Hospital Medicine Discharge Summary    Patient ID: Ramone Gonzalez      Patient's PCP: No primary care provider on file. Admit Date: 2/15/2022     Discharge Date: 3/1/2022      Admitting Provider: Triston Rivera DO     Discharge Provider: Donna Carrillo MD     Discharge Diagnoses: Active Hospital Problems    Diagnosis     Septic shock ([facility] [A41.9, R65.21]     Electrolyte abnormality [E87.8]     Diverticulitis [K57.92]     Peritonitis ([facility] [K65.9]     [facility] stage [facility] ([facility] [N18.6]        The patient was seen and examined on day of discharge and this discharge summary is in conjunction with any daily progress note from day of discharge. Hospital Course:     81-year-old female patient with a past medical history of ESRD on PD, diabetic retinopathy, diabetic neuropathy, history of kidney stones, hypertension, hyperlipidemia, patient presented to emergency with abdominal pain and diarrhea. Patient was admitted with PD related peritonitis    Sepsis  PD related Peritonitis  Admitted with abdominal pain, found to have PD related peritonitis with culture growing  Pseudomonas, PD cath replaced 2/24 ( and repair of hernia )  Monitor peritoneal dialysis fluid cell count  Blood cultures no growth to date  Body fluid culture is positive for Pseudomonas as well as Diphtheroids. Patient will be discharged on peritoneal antibiotics. I was contacted by Dr. Seema Glass from nephrology, he preferred Cipro prescription for 1 week until patient can get her PD antibiotics . DM. Patient had been declining her insulin. Patient was educated about the importance of blood sugar control.   Continue home medications    Dizziness on standing  Discontinue losartan      Intractable nausea vomiting ( improved)   Likely related to diabetic gastroparesis  Continue patient on Reglan 5 mg 4 times daily  Proton pump inhibitors twice daily for possible GERD  Patient reports improvement in nausea     Diverticulitis  Patient on meropenem  Procalcitonin improving from 0.6 to 0.2     ESRD on PD  appreciate nephrology input. Physical Exam Performed:     BP (!) 149/85   Pulse 98   Temp 98 °F (36.7 °C) (Oral)   Resp 18   Ht 5' 3\" (1.6 m)   Wt 199 lb 4.7 oz (90.4 kg)   SpO2 98%   BMI 35.30 kg/m²     General appearance: No apparent distress, appears stated age and cooperative. HEENT: Pupils equal, round, and reactive to light. Conjunctivae/corneas clear. Neck: Supple, with full range of motion. No jugular venous distention. Trachea midline. Respiratory:  Normal respiratory effort. Clear to auscultation, bilaterally without Rales/Wheezes/Rhonchi. Cardiovascular: Regular rate and rhythm with normal S1/S2 without murmurs, rubs or gallops. Abdomen: PD cath noted, abdominal tenderness improved. Musculoskeletal: bilateral edema present   Skin: Skin color, texture, turgor normal.  No rashes or lesions. Neurologic:  Neurovascularly intact without any focal sensory/motor deficits. Cranial nerves: II-XII intact, grossly non-focal.  Psychiatric: Alert and oriented, thought content appropriate, normal insight  Capillary Refill: Brisk,3 seconds, normal   Peripheral Pulses: +2 palpable, equal bilaterally   Labs: For convenience and continuity at follow-up the following most recent labs are provided:      CBC:    Lab Results   Component Value Date    WBC 7.3 03/01/2022    HGB 9.3 03/01/2022    HCT 28.4 03/01/2022     03/01/2022       Renal:    Lab Results   Component Value Date     03/01/2022    K 3.5 03/01/2022    K 2.6 02/15/2022    CL 94 03/01/2022    CO2 23 03/01/2022    BUN 38 03/01/2022    CREATININE 9.4 03/01/2022    CALCIUM 7.9 03/01/2022    PHOS 4.5 02/17/2022         Significant Diagnostic Studies    Radiology:   CT ABDOMEN PELVIS WO CONTRAST Additional Contrast? None   Final Result   1. Sigmoid colon diverticulosis.   There is confluent inflammatory change   within the mesenteric fat surrounding a short segment of simple sigmoid colon   containing the diverticula. This could represent sequela of diverticulitis. 2.  2 cm ventral abdominal wall hernia containing inflamed fat. 3.  9 mm ground-glass nodule in the left lung base. Recommend follow-up with   a noncontrast chest CT at 6-12 months to confirm persistence then additional   noncontrast chest CTs every 2 years until 5 years. Consults:     IP CONSULT TO NEPHROLOGY  IP CONSULT TO NEPHROLOGY  IP CONSULT TO INFECTIOUS DISEASES  IP CONSULT TO GI  IP CONSULT TO VASCULAR SURGERY  IP CONSULT TO DIABETES EDUCATOR    Disposition:  Home      Condition at Discharge: Stable    Discharge Instructions/Follow-up:    -Follow-up with nephrology, patient will need peritoneal antibiotics. Code Status:  Prior     Activity: activity as tolerated    Diet: regular diet      Discharge Medications:     Discharge Medication List as of 3/1/2022  4:06 PM           Details   ciprofloxacin (CIPRO) 500 MG tablet Take 1 tablet by mouth daily for 14 days, Disp-14 tablet, R-0Normal              Details   torsemide (DEMADEX) 100 MG tablet TAKE 4 TABLETS BY MOUTH ONCE NIGHTLY, Disp-360 tablet, R-5Normal      amLODIPine (NORVASC) 10 MG tablet TAKE ONE TABLET BY MOUTH DAILY, Disp-30 tablet, R-3Normal      losartan (COZAAR) 100 MG tablet Take 100 mg by mouth nightlyHistorical Med      ergocalciferol (ERGOCALCIFEROL) 1.25 MG (93879 UT) capsule Take 50,000 Units by mouth once a week On MondaysHistorical Med      pantoprazole (PROTONIX) 40 MG tablet Take 1 tablet by mouth daily, Disp-90 tablet, R-1Normal      insulin lispro (HUMALOG KWIKPEN) 100 UNIT/ML pen Inject 2-4 Units into the skin 3 times daily (before meals) Sliding scale Historical Med             Time Spent on discharge is more than 30 minutes in the examination, evaluation, counseling and review of medications and discharge plan.       Signed:    Harish Romero MD   3/2/2022      Thank you No primary care provider on file. for the opportunity to be involved in this patient's care. If you have any questions or concerns please feel free to contact me at [phone].

## 2022-03-07 NOTE — TELEPHONE ENCOUNTER
Tami said her nephrologist recommended she go see Dr. Crystal Morataya, Oncologist.     Nephrologist is Dr. Aiden Laguerre.

## 2022-03-08 NOTE — TELEPHONE ENCOUNTER
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 Also explained that the CAT scan that she had beforehand showed no lymphadenopathy no liver mets only some possible diverticulitis which fit with her clinical picture of peritonitis from the old PD catheter. She is going to see oncology next Monday.   I told her she I would expect her to need a colonoscopy and possible colon surgery to investigate this further

## 2022-03-22 PROBLEM — K52.9 COLITIS: Status: ACTIVE | Noted: 2022-01-01

## 2022-03-22 NOTE — FLOWSHEET NOTE
03/22/22 1253   Readmission Assessment   Number of Days since last admission? 8-30 days   Previous Disposition Home with Family   Who is being Interviewed Patient   What was the patient's/caregiver's perception as to why they think they needed to return back to the hospital? Other (Comment)  (Different reason)   Did you visit your Primary Care Physician after you left the hospital, before you returned this time? No   Why weren't you able to visit your PCP? Did not have an appointment   Did you see a specialist, such as Cardiac, Pulmonary, Orthopedic Physician, etc. after you left the hospital? Yes   Who advised the patient to return to the hospital? Self-referral   Does the patient report anything that got in the way of taking their medications? No   In our efforts to provide the best possible care to you and others like you, can you think of anything that we could have done to help you after you left the hospital the first time, so that you might not have needed to return so soon?  Other (Comment)  (No.)

## 2022-03-22 NOTE — ED PROVIDER NOTES
[address] [facility]        Pt Name: Danae Dunn  MRN: [id number]  [place] 1965  Date of evaluation: 3/22/2022  Provider: ABDIAZIZ Liao CNP  PCP: No primary care provider on file. Note Started: 8:58 AM EDT       I have seen and evaluated this patient with my supervising physician Ld Mays MD.      Abdirizak Rojas [address].       Chief Complaint   Patient presents with    Abdominal Pain     cramping, +N/V/D, was supposed to get a colonoscopy today; this started last night around 815pm    Other     peritoneal dialysis fluid was darker than normal this morning          HISTORY OF PRESENT ILLNESS   (Location/Symptom, Timing/Onset, Context/Setting, Quality, Duration, Modifying Factors, Severity)  Note limiting factors. Danae Dunn is a 55 y.o. female who presents to the ED with multiple complaints. 1-lower abdominal pain, nausea, vomiting. This all started after taking some medication for a colonoscopy cleanse. Was scheduled for colonoscopy today secondary to concern for adenocarcinoma. Called office and was told to come into ED. Pain is crampy. Has not tried any medications for it. Pain does not radiate. 2-daughter is concerned that PD fluid is dark. Patient had recent PD catheter replacement and recently got off HD. Reports has not had PD last night. 3-chronic SoB since Covid diagnosis in December. No new cough, chest pain or Covid concerns today    Nursing Notes were all reviewed and agreed with or any disagreements were addressed in the HPI. REVIEW OF SYSTEMS    (2-9 systems for level 4, 10 or more for level 5)     Review of Systems   Constitutional: Negative for chills, diaphoresis and fever. HENT: Negative for congestion, rhinorrhea and sore throat. Eyes: Negative for pain and visual disturbance. Respiratory: Negative for cough and shortness of breath.     Cardiovascular: Negative for chest pain and leg swelling. Gastrointestinal: Positive for abdominal pain, nausea and vomiting. Negative for constipation and diarrhea. Genitourinary: Negative for frequency and hematuria. Musculoskeletal: Negative for back pain and neck pain. Skin: Negative for rash and wound. Neurological: Negative for dizziness and light-headedness.        PAST MEDICAL HISTORY     Past Medical History:   Diagnosis Date    Anemia 9/15/2014    Chronic kidney disease     Diabetes mellitus ([address]     Diabetes mellitus type 1 ([address]     Diabetic retinopathy ([address]     Diabetic retinopathy associated with type 2 diabetes mellitus, without macular edema     Hyperlipidemia 7/27/2015    Hypertension     Kidney stone     Mixed hyperlipidemia 7/27/2015    Orthostatic hypotension     Vitreous hemorrhage of right eye ([address] 8/15/2017       SURGICAL HISTORY     Past Surgical History:   Procedure Laterality Date    APPENDECTOMY      DIALYSIS CATHETER INSERTION N/A 2/24/2022    LAPAROSCOPIC PLACEMENT OF PERITONEAL DIALYSIS CATHETER performed by She Romero MD at [address] [place] [address] N/A 2/24/2022    REMOVAL OF EXISTING PERITONEAL DIALYSIS CATHETER performed by She Romero MD at [address] [facility] N/A 8/12/2019    LAPAROSCOPIC PERITONEAL DIALYSIS CATHETER PLACEMENT WITH AXEL TROCHAR performed by She Romero MD at [address] [facility] N/A 2/24/2022    REPAIR OF SMALL VENTRAL HERNIA performed by She Romero MD at [address] [facility]       Previous Medications    INSULIN LISPRO (HUMALOG KWIKPEN) 100 UNIT/ML PEN    Inject 2-4 Units into the skin 3 times daily as needed for High Blood Sugar Sliding scale        ALLERGIES     Bactrim [sulfamethoxazole-trimethoprim], Doxazosin, Geocillin [carbenicillin], Lisinopril, Other, Spironolactone, Tramadol, Bumetanide, and Ibuprofen    FAMILYHISTORY Family History   Problem Relation Age of Onset    Heart Disease Mother         heart attack 2007    High Blood Pressure Mother     Diabetes Father     Heart Disease Father     Emphysema Father     Cancer Maternal Grandmother         cervical cancer    Lung Cancer Maternal Grandfather         black lung    Diabetes Paternal Grandmother     Heart Disease Paternal Grandfather         SOCIAL HISTORY       Social History     Socioeconomic History    Marital status:      Spouse name: None    Number of children: None    Years of education: None    Highest education level: None   Occupational History    None   Tobacco Use    Smoking status: Never Smoker    Smokeless tobacco: Never Used   Vaping Use    Vaping Use: Never used   Substance and Sexual Activity    Alcohol use: No    Drug use: No    Sexual activity: Yes     Partners: Male   Other Topics Concern    None   Social History Narrative    None     Social Determinants of Health     Financial Resource Strain:     Difficulty of Paying Living Expenses: Not on file   Food Insecurity:     Worried About Running Out of Food in the Last Year: Not on file    Sarah of Food in the Last Year: Not on file   Transportation Needs:     Lack of Transportation (Medical): Not on file    Lack of Transportation (Non-Medical):  Not on file   Physical Activity:     Days of Exercise per Week: Not on file    Minutes of Exercise per Session: Not on file   Stress:     Feeling of Stress : Not on file   Social Connections:     Frequency of Communication with Friends and Family: Not on file    Frequency of Social Gatherings with Friends and Family: Not on file    Attends Hindu Services: Not on file    Active Member of Clubs or Organizations: Not on file    Attends Club or Organization Meetings: Not on file    Marital Status: Not on file   Intimate Partner Violence:     Fear of Current or Ex-Partner: Not on file    Emotionally Abused: Not on file  Physically Abused: Not on file    Sexually Abused: Not on file   Housing Stability:     Unable to Pay for Housing in the Last Year: Not on file    Number of Places Lived in the Last Year: Not on file    Unstable Housing in the Last Year: Not on file       SCREENINGS    [place] Coma Scale  Eye Opening: Spontaneous  Best Verbal Response: Oriented  Best Motor Response: Obeys commands  [place] Coma Scale Score: 15        PHYSICAL EXAM    (up to 7 for level 4, 8 or more for level 5)     ED Triage Vitals [03/22/22 0849]   BP Temp Temp Source Pulse Resp SpO2 Height Weight   (!) 142/93 98.6 °F (37 °C) Temporal 105 20 94 % -- --       Physical Exam  Vitals and nursing note reviewed. Constitutional:       General: She is in acute distress. Appearance: Normal appearance. She is obese. She is ill-appearing. She is not toxic-appearing or diaphoretic. HENT:      Head: Normocephalic and atraumatic. Nose: Nose normal.      Mouth/Throat:      Mouth: Mucous membranes are moist.      Pharynx: Oropharynx is clear. No pharyngeal swelling. Eyes:      General: No scleral icterus. Right eye: No discharge. Left eye: No discharge. Extraocular Movements: Extraocular movements intact. Pupils: Pupils are equal, round, and reactive to light. Cardiovascular:      Rate and Rhythm: Regular rhythm. Tachycardia present. Pulses: Normal pulses. Heart sounds: Normal heart sounds. No murmur heard. No friction rub. No gallop. Pulmonary:      Effort: Pulmonary effort is normal. No respiratory distress. Breath sounds: Normal breath sounds. No stridor. No wheezing, rhonchi or rales. Abdominal:      General: Bowel sounds are normal. There is no distension. Tenderness: There is abdominal tenderness in the right lower quadrant, suprapubic area and left lower quadrant. There is no right CVA tenderness or left CVA tenderness. Musculoskeletal:         General: Normal range of motion. Cervical back: Normal range of motion and neck supple. Skin:     General: Skin is warm and dry. Capillary Refill: Capillary refill takes less than 2 seconds. Coloration: Skin is pale. Skin is not jaundiced. Findings: No rash. Neurological:      General: No focal deficit present. Mental Status: She is alert and oriented to person, place, and time.    Psychiatric:         Mood and Affect: Mood normal.         Behavior: Behavior normal.         DIAGNOSTIC RESULTS   LABS:    Labs Reviewed   CBC WITH AUTO DIFFERENTIAL - Abnormal; Notable for the following components:       Result Value    WBC 13.7 (*)     RDW 17.2 (*)     Platelets 87 (*)     Neutrophils Absolute 12.4 (*)     Lymphocytes Absolute 0.5 (*)     Anisocytosis 1+ (*)     All other components within normal limits   COMPREHENSIVE METABOLIC PANEL W/ REFLEX TO MG FOR LOW K - Abnormal; Notable for the following components:    Sodium 133 (*)     Chloride 97 (*)     CO2 19 (*)     Anion Gap 17 (*)     Glucose 206 (*)     CREATININE 5.9 (*)     GFR Non- 7 (*)     GFR  9 (*)     Calcium 7.6 (*)     Total Protein 5.4 (*)     Albumin 2.4 (*)     Albumin/Globulin Ratio 0.8 (*)     Total Bilirubin 1.1 (*)     Alkaline Phosphatase 225 (*)     ALT 6 (*)     AST 12 (*)     All other components within normal limits    Narrative:     CALL  Andrez SIEGEL tel. [phone],  Chemistry results called to and read back by Aditya Godinez RN, 03/22/2022  10:34, by EBENEZER WATSON, SEPSIS - Abnormal; Notable for the following components:    Lactic Acid, Sepsis 2.2 (*)     All other components within normal limits   POCT GLUCOSE - Abnormal; Notable for the following components:    POC Glucose 194 (*)     All other components within normal limits   CULTURE, BLOOD 1   CULTURE, BLOOD 2   CULTURE, BODY FLUID   C DIFF TOXIN/ANTIGEN   GASTROINTESTINAL PANEL, MOLECULAR   LIPASE    Narrative:     CALL  Andrez Duffy,  Chemistry results called to and read back by Johanny Moreno RN, 03/22/2022  10:34, by PELSU   CELL COUNT WITH DIFFERENTIAL, BODY FLUID   URINALYSIS WITH REFLEX TO CULTURE   LACTATE, SEPSIS   POCT GLUCOSE       When ordered, only abnormal lab results are displayed. All other labs were within normal range or not returned as of this dictation. EKG: When ordered, EKG's are interpreted by the Emergency Department Physician in the absence of a cardiologist.  Please see their note for interpretation of EKG. RADIOLOGY:   Non-plain film images such as CT, Ultrasound and MRI are read by the radiologist. Plain radiographic images are visualized andpreliminarily interpreted by the  ED Provider with the below findings:        Interpretation perthe Radiologist below, if available at the time of this note:    CT ABDOMEN PELVIS W IV CONTRAST Additional Contrast? None   Final Result   1. Worsening inflammatory changes surrounding the mid sigmoid colon likely   due to colitis causing worsening colonic dilatation. Underlying malignancy   cannot be excluded; correlate with colonoscopy. 2. Multiple nodular bilateral ground-glass opacities. The differential   diagnosis includes infectious process and metastatic disease recommend CT of   the chest with contrast for further evaluation. 3. Multiple indeterminate hepatic lesions. Recommend nonemergent MRI of the   abdomen with without contrast utilizing Eovist.         [address] [employer]    (Results Pending)     No results found. PROCEDURES   Unless otherwise noted below, none     Procedures    CRITICAL CARE TIME   Total Critical Care time was 35 minutes, excluding separately reportable procedures. There was a high probability of clinically significant/life threatening deterioration in the patient's condition which required my urgent intervention.   This time spent assessing, reassessing patient, chart review and discussing case with other providers      CONSULTS:  IP CONSULT TO GI  IP CONSULT TO GI      EMERGENCY DEPARTMENT COURSE and DIFFERENTIAL DIAGNOSIS/MDM:   Vitals:    Vitals:    03/22/22 1130 03/22/22 1145 03/22/22 1400 03/22/22 1401   BP: (!) 145/94 (!) 149/92  132/86   Pulse: 98 99  96   Resp: 27 15  28   Temp:   97.9 °F (36.6 °C) 97.9 °F (36.6 °C)   TempSrc:    Oral   SpO2: 94% 95%  93%   Weight:       Height:           Patient was given thefollowing medications:  Medications   ondansetron (ZOFRAN-ODT) disintegrating tablet 4 mg (has no administration in time range)     Or   ondansetron (ZOFRAN) injection 4 mg (has no administration in time range)   oxyCODONE (ROXICODONE) immediate release tablet 5 mg (has no administration in time range)   lactated ringers bolus (0 mLs IntraVENous Stopped 3/22/22 1034)   metoclopramide (REGLAN) injection 10 mg (10 mg IntraVENous Given 3/22/22 0934)   iopamidol (ISOVUE-370) 76 % injection 75 mL (75 mLs IntraVENous Given 3/22/22 0904)   fentaNYL (SUBLIMAZE) injection 25 mcg (25 mcg IntraVENous Given 3/22/22 1004)   cefTRIAXone (ROCEPHIN) 1000 mg IVPB in 50 mL D5W minibag (0 mg IntraVENous Stopped 3/22/22 1105)   metronidazole (FLAGYL) 500 mg in NaCl 100 mL IVPB premix (0 mg IntraVENous Stopped 3/22/22 1207)   fentaNYL (SUBLIMAZE) injection 50 mcg (50 mcg IntraVENous Given 3/22/22 1214)     ED Course as of 03/22/22 1440   Tue Mar 22, 2022   0956 Re assessed- pain not improved with reglan. Ordered 25 mcg Fentanyl. SpO2 88% on room air before opiate. Nasal cannula 3l started [KM]   1226 Talked with Steven gen surg NP. Reccs admission with GI to see tomorrow. [KM]      ED Course User Index  [KM] Solitario Hernandez APRN - CNP        Ill-appearing 79-year-old presenting for nausea, vomiting after starting colonoscopy prep. Presentation HPI.   Differential diagnosis: Abdominal Aortic Aneurysm, Ischemic Bowel, Bowel Obstruction, Appendicitis, Diverticulitis, Pyelonephritis, UTI, STD, Ureterolithiasis, Colitis, Gonad Torsion, other    PE as above    Labs:  PD fluid cell count reviewed. CBC with leukocytosis 13.7. Platelets 87. ANC elevated 12.4. CMP consistent with patient's CKD. Sodium mildly decreased to 133. Glucose 206. LFTs-bili 1.1 alk phos 225. Hypocalcemia consistent with prior. Lipase 21. Lactate 2.2    Urine-order pending. Patient 1 units \"shot glass\" worth of urine daily. Imaging-CT abdomen pelvis as above with concerning findings. CT chest ordered however given patient's dialysis and admission CT chest will be done tomorrow    SEP-1 CORE MEASURE DATA      Sepsis Criteria   Severe Sepsis Criteria   Septic Shock Criteria     Must be confirmed or suspected to move forward with diagnosis of sepsis. Must meet 2:    [] Temperature > 100.9 F (38.3 C)        or < 96.8 F (36 C)  [x] HR > 90  [x] RR > 20  [] WBC > 12 or < 4 or 10% bands    AND:    [x] Infection Confirmed or        Suspected. OR:    [] Exclude from SEP-1 because:    [] No infection present or suspected  [] Does not have 2+ SIRS criteria but may have an incidental infection that requires treatment  [] May have sepsis, but does not meet criteria for severe sepsis or septic shock  [] Alternative explanation for abnormal labs and/or vitals (see MDM)  [] Viral etiology found or highly suspected (including COVID-19) without concomitant bacterial infection   Must meet 1:    [x] Lactate > 2       or   [] Signs of Organ Dysfunction:    - SBP < 90 or MAP < 65  - Altered mental status  - Creatinine > 2 or increased from      baseline  - Urine Output < 0.5 ml/kg/hr  - Bilirubin > 2  - INR > 1.5 (not anticoagulated)  - Platelets < 359,685  - Acute Respiratory Failure as     evidenced by new need for NIPPV     or mechanical ventilation        [] No criteria met for Severe Sepsis.    Must meet 1:    [] Lactate > 4        or   [] SBP < 90 or MAP < 65 for at        least two readings in the first        hour after fluid bolus        administration      [] Vasopressors initiated (if hypotension persists after fluid resuscitation)                [] No criteria met for Septic Shock. Patient Vitals for the past 6 hrs:   BP Temp Pulse Resp SpO2 Height Weight Weight Method Percent Weight Change   03/22/22 0849 (!) 142/93 98.6 °F (37 °C) 105 20 94 % -- -- -- --   03/22/22 0930 (!) 194/116 -- 99 16 90 % -- -- -- --   03/22/22 0945 (!) 197/121 -- 99 27 91 % -- -- -- --   03/22/22 1030 (!) 143/94 -- 98 25 95 % 5' 3\" (1.6 m) 199 lb 11.8 oz (90.6 kg) Bed scale; Actual 0   03/22/22 1045 (!) 156/96 -- 98 26 96 % -- -- -- --   03/22/22 1100 (!) 160/93 -- 99 27 95 % -- -- -- --   03/22/22 1115 (!) 152/99 -- 101 26 95 % -- -- -- --   03/22/22 1130 (!) 145/94 -- 98 27 94 % -- -- -- --   03/22/22 1145 (!) 149/92 -- 99 15 95 % -- -- -- --   03/22/22 1400 -- 97.9 °F (36.6 °C) -- -- -- -- -- -- --   03/22/22 1401 132/86 97.9 °F (36.6 °C) 96 28 93 % -- -- -- --      Recent Labs     03/22/22  0930   WBC 13.7*   CREATININE 5.9*   BILITOT 1.1*   PLT 87*         Sepsis Time Identified: 1030     Fluid Resuscitation Rational: less than 30mL/kg because of a history of ESRD or stage IV/V CKD. Instead, 1l was ordered. More fluid initially would be potentially detrimental to the patient. Pt has missed last PD    Infection Source: Abdominal    Repeat lactate level: pending    Reassessment Exam:   Vitals update: BP (!) 197/121   Pulse 99   Temp 98.6 °F (37 °C) (Temporal)   Resp 27   SpO2 91% , cardiopulmonary exam: unchanged, capillary refill: unchanged, peripheral pulses: unchanged, skin examination: unchanged       Patient above findings, medicine team consulted for admission. Also talked with GI SONU Pérez personally found on the bedside about patient. They will also be seeing the patient    FINAL IMPRESSION      1. Generalized abdominal pain    2. Nausea and vomiting, intractability of vomiting not specified, unspecified vomiting type    3. Colitis    4. Hepatic lesion    5.  Lung nodules          DISPOSITION/PLAN   DISPOSITION PATIENT REFERREDTO:  No follow-up provider specified.     DISCHARGE MEDICATIONS:  New Prescriptions    No medications on file       DISCONTINUED MEDICATIONS:  Discontinued Medications    AMLODIPINE (NORVASC) 10 MG TABLET    TAKE ONE TABLET BY MOUTH DAILY    ERGOCALCIFEROL (ERGOCALCIFEROL) 1.25 MG (62528 UT) CAPSULE    Take 50,000 Units by mouth once a week On Mondays    LOSARTAN (COZAAR) 100 MG TABLET    Take 100 mg by mouth nightly    PANTOPRAZOLE (PROTONIX) 40 MG TABLET    Take 1 tablet by mouth daily    TORSEMIDE (DEMADEX) 100 MG TABLET    TAKE 4 TABLETS BY MOUTH ONCE NIGHTLY              (Please note that portions ofthis note were completed with a voice recognition program.  Efforts were made to edit the dictations but occasionally words are mis-transcribed.)    ABDIAZIZ Godfrey CNP (electronically signed)             ABDIAZIZ Godfrey CNP  03/22/22 3848

## 2022-03-22 NOTE — ACP (ADVANCE CARE PLANNING)
Advance Care Planning     Advance Care Planning Activator (Inpatient)  Conversation Note      Date of ACP Conversation: 3/22/2022     Conversation Conducted with: Patient with Decision Making Capacity    ACP Activator: [address] [facility] Decision Maker:     Current Designated Health Care Decision Maker:     Primary Decision Maker: Manjinder Gtzerson - [phone]    Secondary Decision Maker: Sangeeta Blanton - [phone]    Today we documented Decision Maker(s) consistent with Legal Next of Kin hierarchy. Care Preferences    Ventilation: \"If you were in your present state of health and suddenly became very ill and were unable to breathe on your own, what would your preference be about the use of a ventilator (breathing machine) if it were available to you? \"      Would the patient desire the use of ventilator (breathing machine)?: no    \"If your health worsens and it becomes clear that your chance of recovery is unlikely, what would your preference be about the use of a ventilator (breathing machine) if it were available to you? \"     Would the patient desire the use of ventilator (breathing machine)?: No      Resuscitation  \"CPR works best to restart the heart when there is a sudden event, like a heart attack, in someone who is otherwise healthy. Unfortunately, CPR does not typically restart the heart for people who have serious health conditions or who are very sick. \"    \"In the event your heart stopped as a result of an underlying serious health condition, would you want attempts to be made to restart your heart (answer \"yes\" for attempt to resuscitate) or would you prefer a natural death (answer \"no\" for do not attempt to resuscitate)? \" yes       [x] Yes   [] No   Educated Patient / Kirit Tiwari regarding differences between Advance Directives and portable DNR orders.     Length of ACP Conversation in minutes:   5    Conversation Outcomes:  [x] ACP discussion completed  [] Existing advance directive reviewed with patient; no changes to patient's previously recorded wishes  [] New Advance Directive completed  [] Portable Do Not Rescitate prepared for Provider review and signature  [] POLST/POST/MOLST/MOST prepared for Provider review and signature      Follow-up plan:    [] Schedule follow-up conversation to continue planning  [] Referred individual to Provider for additional questions/concerns   [] Advised patient/agent/surrogate to review completed ACP document and update if needed with changes in condition, patient preferences or care setting    [] This note routed to one or more involved healthcare providers

## 2022-03-22 NOTE — CONSULTS
 LAPAROSCOPY INSERTION PERITONEAL CATHETER N/A 8/12/2019    LAPAROSCOPIC PERITONEAL DIALYSIS CATHETER PLACEMENT WITH AXEL RENATAAR performed by Weston Rico MD at [facility] N/A 2/24/2022    REPAIR OF SMALL VENTRAL HERNIA performed by Weston Rico MD at [facility] History   Problem Relation Age of Onset    Heart Disease Mother         heart attack 2007    High Blood Pressure Mother     Diabetes Father     Heart Disease Father     Emphysema Father     Cancer Maternal Grandmother         cervical cancer    Lung Cancer Maternal Grandfather         black lung    Diabetes Paternal Grandmother     Heart Disease Paternal Grandfather         reports that she has never smoked. She has never used smokeless tobacco. She reports that she does not drink alcohol and does not use drugs. Medications: Allergies:  Bactrim [sulfamethoxazole-trimethoprim], Doxazosin, Geocillin [carbenicillin], Lisinopril, Other, Spironolactone, Tramadol, Bumetanide, and Ibuprofen  Scheduled Meds:   metroNIDAZOLE  500 mg IntraVENous Once     Continuous Infusions:      Review of Systems:     All systems reviewed but were negative except as mentioned in HPI    Objective:       Vitals:  BP (!) 160/93   Pulse 99   Temp 98.6 °F (37 °C) (Temporal)   Resp 27   Ht 5' 3\" (1.6 m)   Wt 199 lb 11.8 oz (90.6 kg)   SpO2 95%   BMI 35.38 kg/m²   Constitutional:  awake, NAD  Skin: no rash, turgor wnl  HEENT:  MMM, No icterus  Neck: no bruits, No JVD  Cardiovascular:  S1, S2 reg  Respiratory: CTA, no crackles  Abdomen:  +BS, soft, tender, ND  Ext: no lower extremity edema  Psychiatric: mood and affect appropriate  Access: abd PD catheter in place, no drainage/redness around exit site.    CNS: alert, no agitation    Data:     Labs:  Hepatic:   Recent Labs     03/22/22  0930   AST 12*   ALT 6*   BILITOT 1.1*   ALKPHOS 225*     BNP: No results for input(s): BNP in the last 72 hours.  ABGs: No results for input(s): PHART, PO2ART, OGL3FGT in the last 72 hours. IMAGING:  CT ABDOMEN PELVIS W IV CONTRAST Additional Contrast? None   Final Result   1. Worsening inflammatory changes surrounding the mid sigmoid colon likely   due to colitis causing worsening colonic dilatation. Underlying malignancy   cannot be excluded; correlate with colonoscopy. 2. Multiple nodular bilateral ground-glass opacities. The differential   diagnosis includes infectious process and metastatic disease recommend CT of   the chest with contrast for further evaluation. 3. Multiple indeterminate hepatic lesions. Recommend nonemergent MRI of the   abdomen with without contrast utilizing [employer].         [address]    (Results Pending)       Assessment :       1. ESRD   Etiology: suspected DN  Access: Abd PD cath and Tunneled Cath  Volume: Hypervolemic        2. Electrolytes/Acid base  No Dyskalemia    Recent Labs     03/22/22  0930   *   K 4.4   CO2 19*       3. BMD  -Hyperphosphatemia, SHPT  -maintained on Renvela    Lab Results   Component Value Date    CALCIUM 7.6 (L) 03/22/2022    PHOS 4.5 02/17/2022        4. HTN  -Blood pressure high       BP Readings from Last 1 Encounters:   03/22/22 (!) 160/93       5. Anemia  -anemia of CKD  -assess for CHRIS    Recent Labs     03/22/22  0930   HGB 12.5     6. DM    7. abdominal pain   -colitis  -suspected malignancy    PLAN :     - Empiric antibiotics  - PD fluid for culture, cell count  - consider doing HD while in hospital  - continue BP meds:   - MBD management  - Anemia management  - Dose meds to GFR<10    Thank you for allowing us to participate in care of Jimmy Setting . We will continue to follow. Feel free to contact me with any questions.       Stephanie Johnson MD  3/22/2022    [facility] [address]  Office : [phone]  Fax :[phone]

## 2022-03-22 NOTE — ED NOTES
Patient states she makes about 60 mL of urine daily. Unable to provide urine at this time. Marisa Chow NP aware.       James Kearns RN  03/22/22 4080

## 2022-03-22 NOTE — ED NOTES
Report called to [facility]. All questions answered. Transport placed.       Guillermo Steven RN  03/22/22 5134

## 2022-03-22 NOTE — PROGRESS NOTES
Patient arrived from ED via stretcher, a/o x4, incontinent of soft stool. Oriented to room, nonskid footwear on, call light within reach.

## 2022-03-22 NOTE — CARE COORDINATION
INITIAL CASE MANAGEMENT ASSESSMENT    Reviewed chart, met with patient to assess possible discharge needs. Explained Case Management role/services. Living Situation: Patient lives with her  in a two-story single family home. There are six steps to enter the home. Patient's bedroom and bathroom are on the second floor. ADLs: Patient stated that she is still independent but her  has been helping her get up and doing more house chores of late. DME: Her DME provider is [facility]. She has home PD equipment. PT/OT Recs: TBD. Active Services: DME provider is [facility]. SW intern gave patient a list of [facility] and SNFs. Transportation: Patient does not drive. Her  normally transports her when needed and he will transport her at discharge. Medications: [employer] [facility]. Patient denies having any difficulties affording or obtaining medications at this time. PCP: Patient has no official PCP. She utilizes Dr. Annabelle Ponce for her needs. HD/PD: PD at home. [address] [facility] [phone]. PLAN/COMMENTS: Patient to return home with family support. Patient has no anticipated needs at this time. ACP Completed  LUCILLE Completed    The Plan for Transition of Care is related to the following treatment goals: return home     The Patient was provided with a choice of provider and agrees   with the discharge plan. [x] Yes [] No    Freedom of choice list was provided with basic dialogue that supports the patient's individualized plan of care/goals, treatment preferences and shares the quality data associated with the providers. [x] Yes [] No    SW/CM provided contact information for patient or family to call with any questions. SW/CM will follow and assist as needed.

## 2022-03-22 NOTE — CONSULTS
GASTROENTEROLOGY INPATIENT CONSULTATION        IDENTIFYING DATA/REASON FOR CONSULTATION   PATIENT:  Aaron Vernon  MRN:  [id number]  ADMIT DATE: 3/22/2022  TIME OF EVALUATION: 3/22/2022 12:45 PM  HOSPITAL STAY:   LOS: 0 days     REASON FOR CONSULTATION:      HISTORY OF PRESENT ILLNESS   Tami Carrillo is a 55 y.o. female with a PMH of ESRD on PD, DM, diabetic neuropathy, diabetic retinopathy, HTN, HLD who presented on 3/22/2022 with abdominal pain, nausea, vomiting. She was admitted last month with peritonitis as well as diverticulitis. She was treated with Cipro and Flagyl. We had seen her at that time for nausea and vomiting and started her and Reglan. We had also recommended outpatient follow-up colonoscopy after her diverticulitis resolved. She was scheduled to have a colonoscopy today with Dr. Lori Umana. She was given Dulcolax pills last night with plan to undergo high GI care for bowel prep this morning. She reports after taking 2 Dulcolax yesterday she developed diffuse abdominal pain however worse across her lower abdomen. She also had nausea and vomiting which she states is chronic for her and has been occurring since her Covid infection in December. She reports her stools have been black and loose. She denies seeing red blood in her stools. She denies seeing red blood in her vomit. She has never had a colonoscopy procedure. Her mother had colon polyps otherwise denies any known family history of colon cancer or colon polyps. CT A/P showed worsening inflammatory changes surrounding the mid sigmoid colon suspected due to colitis however unable to exclude underlying malignancy. There was also multiple nodular groundglass opacities throughout the bilateral lungs and multiple indeterminate hepatic lesions. Blood work notes a white count of 13.7, hemoglobin of 12.5, BUN of 14, creatinine of 5.9. Hepatic panel shows a elevated alk phos of 225 and bilirubin of 1.1.   AST and ALT normal.  Lipase normal.    PAST MEDICAL, SURGICAL, FAMILY, and SOCIAL HISTORY     Past Medical History:   Diagnosis Date    Anemia 9/15/2014    Chronic kidney disease     Diabetes mellitus ([address]     Diabetes mellitus type 1 ([address]     Diabetic retinopathy ([address]     Diabetic retinopathy associated with type 2 diabetes mellitus, without macular edema     Hyperlipidemia 7/27/2015    Hypertension     Kidney stone     Mixed hyperlipidemia 7/27/2015    Orthostatic hypotension     Vitreous hemorrhage of right eye ([address] 8/15/2017     Past Surgical History:   Procedure Laterality Date    APPENDECTOMY      DIALYSIS CATHETER INSERTION N/A 2/24/2022    LAPAROSCOPIC PLACEMENT OF PERITONEAL DIALYSIS CATHETER performed by Misti Bartlett MD at [address] [place] [address] N/A 2/24/2022    REMOVAL OF EXISTING PERITONEAL DIALYSIS CATHETER performed by Misti Bartlett MD at [address] [place] [address] N/A 8/12/2019    LAPAROSCOPIC PERITONEAL DIALYSIS CATHETER PLACEMENT WITH AXEL TROCHAR performed by Misti Bartlett MD at [address] [place] [address] N/A 2/24/2022    REPAIR OF SMALL VENTRAL HERNIA performed by Misti Bartlett MD at [facility] History   Problem Relation Age of Onset    Heart Disease Mother         heart attack 2007    High Blood Pressure Mother     Diabetes Father     Heart Disease Father     Emphysema Father     Cancer Maternal Grandmother         cervical cancer    Lung Cancer Maternal Grandfather         black lung    Diabetes Paternal Grandmother     Heart Disease Paternal Grandfather      Social History     Socioeconomic History    Marital status:      Spouse name: None    Number of children: None    Years of education: None    Highest education level: None   Occupational History    None   Tobacco Use    Smoking status: Never Smoker    Smokeless tobacco: Never Used   Vaping Use    Vaping Use: Never used   Substance and Sexual Activity    Alcohol use: No    Drug use: No    Sexual activity: Yes     Partners: Male   Other Topics Concern    None   Social History Narrative    None     Social Determinants of Health     Financial Resource Strain:     Difficulty of Paying Living Expenses: Not on file   Food Insecurity:     Worried About Running Out of Food in the Last Year: Not on file    Sarah of Food in the Last Year: Not on file   Transportation Needs:     Lack of Transportation (Medical): Not on file    Lack of Transportation (Non-Medical): Not on file   Physical Activity:     Days of Exercise per Week: Not on file    Minutes of Exercise per Session: Not on file   Stress:     Feeling of Stress : Not on file   Social Connections:     Frequency of Communication with Friends and Family: Not on file    Frequency of Social Gatherings with Friends and Family: Not on file    Attends [employer]ism Services: Not on file    Active Member of [address] [place] [address] or Organizations: Not on file    Attends Club or Organization Meetings: Not on file    Marital Status: Not on file   Intimate Partner Violence:     Fear of Current or Ex-Partner: Not on file    Emotionally Abused: Not on file    Physically Abused: Not on file    Sexually Abused: Not on file   Housing Stability:     Unable to Pay for Housing in the Last Year: Not on file    Number of [place] in the Last Year: Not on file    Unstable Housing in the Last Year: Not on file       MEDICATIONS   SCHEDULED:    FLUIDS/DRIPS:    PRNs:   ALLERGIES:  She   Allergies   Allergen Reactions    Bactrim [Sulfamethoxazole-Trimethoprim]     Doxazosin Other (See Comments)     Other reaction(s): Other (See Comments)  Bleeding  Punch in chest      Geocillin [Carbenicillin]     Lisinopril Other (See Comments)     Pt thinks cr levels went up due to med.  Other      Artificial sweetners, nutrasweet    Spironolactone      Bad taste, emesis.  Pt refuses to take  Tramadol     Bumetanide Nausea And Vomiting    Ibuprofen Nausea And Vomiting       REVIEW OF SYSTEMS   Pertinent ROS noted in HPI    PHYSICAL EXAM     Vitals:    03/22/22 1100 03/22/22 1115 03/22/22 1130 03/22/22 1145   BP: (!) 160/93 (!) 152/99 (!) 145/94 (!) 149/92   Pulse: 99 101 98 99   Resp: 27 26 27 15   Temp:       TempSrc:       SpO2: 95% 95% 94% 95%   Weight:       Height:           No intake/output data recorded. Physical Exam:  General appearance: alert, cooperative, no distress, appears stated age  Eyes: Anicteric  Head: Normocephalic, without obvious abnormality  Lungs: clear to auscultation bilaterally, Normal Effort  Heart: regular rate and rhythm, normal S1 and S2, no murmurs or rubs  Abdomen: soft, non-distended, non-tender. Bowel sounds normal. No masses,  no organomegaly. Extremities: atraumatic, no cyanosis or edema  Skin: warm and dry, no jaundice  Neuro: Grossly intact, A&OX3      LABS AND IMAGING   Laboratory   Recent Labs     03/22/22  0930   WBC 13.7*   HGB 12.5   HCT 38.3   MCV 95.3   PLT 87*     Recent Labs     03/22/22  0930   *   K 4.4   CL 97*   CO2 19*   BUN 14   CREATININE 5.9*     Recent Labs     03/22/22  0930   AST 12*   ALT 6*   BILITOT 1.1*   ALKPHOS 225*     Recent Labs     03/22/22  0930   LIPASE 21.0     No results for input(s): PROTIME, INR in the last 72 hours. Imaging  CT ABDOMEN PELVIS W IV CONTRAST Additional Contrast? None   Final Result   1. Worsening inflammatory changes surrounding the mid sigmoid colon likely   due to colitis causing worsening colonic dilatation. Underlying malignancy   cannot be excluded; correlate with colonoscopy. 2. Multiple nodular bilateral ground-glass opacities. The differential   diagnosis includes infectious process and metastatic disease recommend CT of   the chest with contrast for further evaluation. 3. Multiple indeterminate hepatic lesions.   Recommend nonemergent MRI of the   abdomen with without contrast utilizing [facility]    (Results Pending)         ASSESSMENT AND RECOMMENDATIONS   55 y.o. female with a PMH of ESRD on PD, DM, diabetic neuropathy, diabetic retinopathy, HTN, HLD who presented on 3/22/2022 with abdominal pain, nausea, vomiting    IMPRESSION:  1. Nausea, vomiting, abdominal pain, diarrhea possibly due to colitis. CT A/P showed wall thickening of the sigmoid colon suggestive of colitis however unable to exclude malignancy. She has been started on antibiotics. Will send stool studies. Would benefit from colonoscopy at some point. Will discuss timing of this with Dr. Olivia Jimenez. Concern she will be unable to tolerate a bowel prep. 2. Hepatic lesions. Consider MRI to further characterize. 3. Elevated liver chemistries  4. ESRD on PD    RECOMMENDATIONS:    Will review case with Dr. Olivia Jimenez. Please await his input and recommendations    If you have any questions or need any further information, please feel free to contact our consult team.  Thank you for allowing us to participate in the care of Danae Dunn. The note was completed using Dragon voice recognition transcription. Every effort was made to ensure accuracy; however, inadvertent transcription errors may be present despite my best efforts to edit errors. Marianna Cobb PA-C    Attending physician addendum:    I have personally seen and examined the patient, reviewed the patient's medical record and pertinent labs and clinical imaging. I have personally staffed the case with Marianna ASCENCIO. I agree with her consultation note, exam findings, assessment and plans  as written above. I have made appropriate modifications and edited her assessment and plan where needed to reflect my impression and plans for this patient.         Danae Dunn is a 55 y.o. female with a PMH of ESRD on PD, DM, diabetic neuropathy, diabetic retinopathy, HTN, HLD who presented on 3/22/2022 with abdominal pain, nausea, vomiting. Patient was recently admitted for a PD infection/diverticulitis and treated with Cipro/Flagyl. She was also treated with Reglan during that admission for suspected gastroparesis contributing to nausea/vomiting. Patient was scheduled for a outpatient colonoscopy today and developed severe abdominal pain after taking dulcolax pills in preparation for colonoscopy. Repeat CT scan showed inflammatory changes in the mid sigmoid colon. Patient reports no pain prior to the dulcolax pills. Continue IV fluids, antiemetics, and Reglan. Recommend we pursue a Flexible sigmoidoscopy tomorrow to exclude any malignancy. Thank you for allowing me to participate in this patient's care. If there are any questions or concerns regarding this patient, or the plan we have set in place, please feel free to contact me at [phone].      Ramesh Recio MD

## 2022-03-22 NOTE — PROGRESS NOTES
Medication Reconciliation    List of medications patient is currently taking is complete. Source of information: 1. Conversation with patient                                       2. EPIC records      Allergies  Bactrim [sulfamethoxazole-trimethoprim], Doxazosin, Geocillin [carbenicillin], Lisinopril, Other, Spironolactone, Tramadol, Bumetanide, and Ibuprofen     Notes regarding home medications:   1. Patient has not taken any of her oral medications for quite some time - she stopped taking them all secondary to adverse effects. 2. The only medication patient is currently taking is her Lispro insulin which is taken as needed based on a sliding scale.     Mukund Madrid RPH, PharmD, BCPS  3/22/2022 10:40 AM

## 2022-03-22 NOTE — H&P
Hospital Medicine History & Physical      PCP: No primary care provider on file. Date of Admission: 3/22/2022    Date of Service: Pt seen/examined, with 1st encounter, on 3/22/2022 and Admitted to Inpatient     Chief Complaint:  Abnormal c abdomen      History Of Present Illness: The patient is a 55 y.o. female with esrd on pd, who presents to [facility] with lower abdominal pain, nausea, vomiting. She had taken prep for a colposcopy today for colon cancer work up. Due to her symptoms she was told to go to the ed. She was in pt 2/15 - 3/1 with pseudomonas peritonitis, and diverticulitis, treated with merrem. She had a ventral hernia repaired 2/24, pathology revealed invasive moderately differentiated colonic adenocarcinoma. ED workup  Vitals: tachycardia, tachypnea, afebrile, on 2L o2  Pertinent labs: Na 133, lactic acid 2.2, alk phos 225, wbc 13.7, pd fluid cell count 153 and pmn 33  Imaging: ecg with sinus tachycardia,   ct abd/plevis 1. Worsening inflammatory changes surrounding the mid sigmoid colon likely   due to colitis causing worsening colonic dilatation.  Underlying malignancy   cannot be excluded; correlate with colonoscopy. 2. Multiple nodular bilateral ground-glass opacities.  The differential   diagnosis includes infectious process and metastatic disease recommend CT of   the chest with contrast for further evaluation.    3. Multiple indeterminate hepatic lesions.  Recommend nonemergent MRI of the   abdomen with without contrast utilizing Eovist.       Past Medical History:        Diagnosis Date    Anemia 9/15/2014    Chronic kidney disease     Diabetes mellitus ([address]     Diabetes mellitus type 1 ([address]     Diabetic retinopathy ([address]     Diabetic retinopathy associated with type 2 diabetes mellitus, without macular edema     Hyperlipidemia 7/27/2015    Hypertension     Kidney stone     Mixed hyperlipidemia 7/27/2015    Orthostatic hypotension     Vitreous hemorrhage of right eye ([address] 8/15/2017       Past Surgical History:        Procedure Laterality Date    APPENDECTOMY      DIALYSIS CATHETER INSERTION N/A 2/24/2022    LAPAROSCOPIC PLACEMENT OF PERITONEAL DIALYSIS CATHETER performed by Nata Cruz MD at [address] N/A 2/24/2022    REMOVAL OF EXISTING PERITONEAL DIALYSIS CATHETER performed by Nata Cruz MD at [facility]      LAPAROSCOPY INSERTION PERITONEAL CATHETER N/A 8/12/2019    LAPAROSCOPIC PERITONEAL DIALYSIS CATHETER PLACEMENT WITH AXEL TROCHAR performed by Nata Cruz MD at [address] N/A 2/24/2022    REPAIR OF SMALL VENTRAL HERNIA performed by Nata Cruz MD at [place] [address]       Medications Prior to Admission:    Prior to Admission medications    Medication Sig Start Date End Date Taking? Authorizing Provider   insulin lispro (HUMALOG KWIKPEN) 100 UNIT/ML pen Inject 2-4 Units into the skin 3 times daily as needed for High Blood Sugar Sliding scale     Historical Provider, MD       Allergies:  Bactrim [sulfamethoxazole-trimethoprim], Doxazosin, Geocillin [carbenicillin], Lisinopril, Other, Spironolactone, Tramadol, Bumetanide, and Ibuprofen    Social History:      TOBACCO:   reports that she has never smoked. She has never used smokeless tobacco.  ETOH:   reports no history of alcohol use.       Family History:          Problem Relation Age of Onset    Heart Disease Mother         heart attack 2007    High Blood Pressure Mother     Diabetes Father     Heart Disease Father     Emphysema Father     Cancer Maternal Grandmother         cervical cancer    Lung Cancer Maternal Grandfather         black lung    Diabetes Paternal Grandmother     Heart Disease Paternal Grandfather        REVIEW OF SYSTEMS:   Positive for abdomnial pain and as noted in the HPI. All other systems reviewed and negative. PHYSICAL EXAM:    BP (!) 149/92   Pulse 99   Temp 98.6 °F (37 °C) (Temporal)   Resp 15   Ht 5' 3\" (1.6 m)   Wt 199 lb 11.8 oz (90.6 kg)   SpO2 95%   BMI 35.38 kg/m²     General appearance: No apparent distress, cooperative. HEENT Normal cephalic, atraumatic without obvious deformity. PERRL. EOM. Conjunctivae/corneas clear. Neck: Supple, No jugular venous distention/bruits. Trachea midline   Lungs: Clear to auscultation, bilaterally without Rales/Wheezes/Rhonchi without accessory muscle use. Heart: Regular rate and rhythm with Normal S1/S2 without murmurs, rubs or gallops  Abdomen: Soft, diffusely tender,  non-distended without rigidity or guarding and positive bowel sounds all four quadrants. Extremities: No clubbing, cyanosis, or edema bilaterally. Skin: Skin color, texture, turgor normal.  No rashes or lesions. Neurologic: neurovascularly intact with sensory/motor intact upper extremities/lower extremities, bilaterally. Grossly non-focal.  Mental status: Alert, oriented x3, thought content appropriate. Peripheral Pulses: +3 Easily felt, not easily obliterated with pressure  Cap refill  +2 sec    CBC   Recent Labs     03/22/22  0930   WBC 13.7*   HGB 12.5   HCT 38.3   PLT 87*      RENAL  Recent Labs     03/22/22  0930   *   K 4.4   CL 97*   CO2 19*   BUN 14   CREATININE 5.9*     LFT'S  Recent Labs     03/22/22  0930   AST 12*   ALT 6*   BILITOT 1.1*   ALKPHOS 225*     COAG  No results for input(s): INR in the last 72 hours. CARDIAC ENZYMES  No results for input(s): CKTOTAL, CKMB, CKMBINDEX, TROPONINI in the last 72 hours.     U/A:    Lab Results   Component Value Date    COLORU YELLOW 05/27/2021    WBCUA 3 05/27/2021    RBCUA 4 05/27/2021    BACTERIA RARE 05/27/2021    CLARITYU Clear 05/27/2021    SPECGRAV 1.016 05/27/2021    LEUKOCYTESUR Negative 05/27/2021    BLOODU TRACE 05/27/2021    GLUCOSEU 250 05/27/2021       ABG  No results found for: KLY3CKY, BEART, P4PBQHCA, PHART, THGBART, VDF6KCT, PO2ART, YFN4HQR        There are no active hospital problems to display for this patient. PHYSICIANS CERTIFICATION:    I certify that Tommy Doyle is expected to be hospitalized for more than 2 midnights based on the following assessment and plan:      ASSESSMENT/PLAN:    Abnormal ct abdomen/pelvis, colon cancer  - merrem  - ivf, npo   - pain control  - consult GI  - consult surgery    Chronic conditions - continue home meds unless otherwise stated  esrd on od  Chronic respiratory failure, since covid 12/2022  Anemia  Dm  htn  Orthostatic hypotension    DVT Prophylaxis: lovenox  Diet: No diet orders on file  Code Status: Prior    Dispo - in pt       Flavia Colon,     Thank you No primary care provider on file. for the opportunity to be involved in this patient's care. If you have any questions or concerns please feel free to contact me at [phone].

## 2022-03-23 NOTE — PROGRESS NOTES
Patient remains hypotensive throughout the shift. Reji Acevedo NP came to bedside to evaluate patient around 2030 for concerns of hypotension and new onset rash. Patient intermittently tachypneic and anxious. Orders placed for benadryl and hydrocortisone IV. Patient's BP temporarily increased to 120's but throughout the night decreased to the 43U-31'A systolic. Perfect Serve message sent to Dr. Pam Reyes approximately 0300 concerning patient's continuing hypotension and intermittent tachypnea. Orders placed for 500 ml LR bolus and continuation of LR @ 75 ml/hr. Patient's only complaint is occasional shortness of breath which is chronic since having COVID in December; increased patient's oxygen to 5L nasal cannula. Patient resting quietly in bed. Will pass on to day shift.     Electronically signed by Aravind Escalante RN on 3/23/2022 at 6:51 AM

## 2022-03-23 NOTE — PLAN OF CARE
Problem: Pain:  Goal: Pain level will decrease  Description: Pain level will decrease  Outcome: Ongoing   Pain/discomfort being managed with PRN analgesics per MD orders. Pt able to express presence and absence of pain and rate pain appropriately using numerical scale. Problem: Nausea/Vomiting:  Goal: Absence of nausea/vomiting  Description: Absence of nausea/vomiting  Outcome: Ongoing   Patient is still with some nausea this AM prior to dialysis    Problem: Falls - Risk of:  Goal: Will remain free from falls  Description: Will remain free from falls  Outcome: Ongoing   Fall risk assessment completed per unit protocol. Patient's bed is in the lowest position, call light is within reach and the patient's room is free of clutter. The patient has been instructed to call for assistance before getting out of bed or the chair. Problem: Skin Integrity:  Goal: Will show no infection signs and symptoms  Description: Will show no infection signs and symptoms  Outcome: Ongoing   Patient's skin has been assessed per unit protocol and the patient is being repositioned or encouraged to turn every two hours to prevent skin breakdown and promote healing.

## 2022-03-23 NOTE — PROGRESS NOTES
Office: [phone]       Fax: [phone]      Nephrology Progress Note        Patient's Name: Tess Cardenas  Date of Visit: 3/23/2022    Reason for Consult:  ESRD management      History of Present Illness:      Tess Cardenas is a 55 y.o. female with PMHx of hypertension, diabetes mellitus, ESRD who was admitted on 3/22/2022 with complaints of abdominal pain   PD fluid dark  history of PD peritonitis recently with pseudomonas. PD catheter was exchanged. Pt was on HD and just started to do PD on 3/20  Pt was getting prep for colonoscopy   Does 2 exchanges with 2.5% day time and Icodextrin overnight    INTERVAL HISTORY    Feels same  Shortness of breath: Yes  BP low  Mild abdominal pain          Medications: Allergies:  Bactrim [sulfamethoxazole-trimethoprim], Doxazosin, Geocillin [carbenicillin], Lisinopril, Other, Spironolactone, Tramadol, Bumetanide, and Ibuprofen  Scheduled Meds:   lactated ringers bolus  500 mL IntraVENous Once    sodium chloride flush  5-40 mL IntraVENous 2 times per day    heparin (porcine)  5,000 Units SubCUTAneous 3 times per day    insulin lispro  0-6 Units SubCUTAneous TID WC    insulin lispro  0-3 Units SubCUTAneous Nightly    meropenem  500 mg IntraVENous Q12H     Continuous Infusions:   sodium chloride      lactated ringers 75 mL/hr at 03/22/22 1719    dextrose           Objective:       Vitals:  BP (!) 81/47   Pulse 90   Temp 98.5 °F (36.9 °C)   Resp 22   Ht 5' 3\" (1.6 m)   Wt 188 lb 7.9 oz (85.5 kg)   SpO2 100%   BMI 33.39 kg/m²   Constitutional:  awake, NAD  Neck: no bruits, No JVD  Cardiovascular:  S1, S2 reg  Respiratory: CTA, no crackles  Abdomen:  +BS, soft, tender, ND  Ext: trace lower extremity edema  Access: abd PD catheter in place, no drainage/redness around exit site.    CNS: alert, no agitation    Data:     Labs:  Hepatic:   Recent Labs     03/22/22  0930 03/23/22  0815   AST 12* 18   ALT 6* 6*   BILITOT 1.1* 0.5   ALKPHOS 225* 202*     BNP: No results for input(s): BNP in the last 72 hours. ABGs: No results for input(s): PHART, PO2ART, UUZ1HHU in the last 72 hours. IMAGING:  CT ABDOMEN PELVIS W IV CONTRAST Additional Contrast? None   Final Result   1. Worsening inflammatory changes surrounding the mid sigmoid colon likely   due to colitis causing worsening colonic dilatation. Underlying malignancy   cannot be excluded; correlate with colonoscopy. 2. Multiple nodular bilateral ground-glass opacities. The differential   diagnosis includes infectious process and metastatic disease recommend CT of   the chest with contrast for further evaluation. 3. Multiple indeterminate hepatic lesions. Recommend nonemergent MRI of the   abdomen with without contrast utilizing Eovist.         [address]    (Results Pending)       Assessment :       1. ESRD   Etiology: suspected DN  Access: Abd PD cath and Tunneled Cath  Volume: Hypervolemic        2. Electrolytes/Acid base  No Dyskalemia    Recent Labs     03/23/22  0815      K 4.5   CO2 24       3. BMD  -Hyperphosphatemia, SHPT  -maintained on Renvela    Lab Results   Component Value Date    CALCIUM 6.9 (L) 03/23/2022    PHOS 4.5 02/17/2022        4. HTN  -Blood pressure high       BP Readings from Last 1 Encounters:   03/23/22 (!) 81/47       5. Anemia  -anemia of CKD  -assess for CHRIS    Recent Labs     03/23/22  0815   HGB 12.2     6. DM    7. abdominal pain   -colitis  -suspected malignancy  -PD fluid: nuc cells 150    PLAN :     - Empiric antibiotics  - PD fluid for culture,   - consider doing HD while in hospital. Seen on HD  - hold BP meds:   - MBD management  - Anemia management  - Dose meds to GFR<10    Thank you for allowing us to participate in care of Danae Dunn . We will continue to follow.   Feel free to contact me with any questions.       Andrew Andrew MD  3/23/2022    [facility] [address]  Office : [phone]  Fax :[phone]

## 2022-03-23 NOTE — H&P
Pre-operative History and Physical    Patient: Kelin Latham  : 1965  Acct#:     Intended Procedure:  Flexible sigmoidoscopy     HISTORY OF PRESENT ILLNESS:  The patient is a 55 y.o. female  who presents for/due to Abnormal CT/Concern for Colon cancer. Past Medical History:        Diagnosis Date    Anemia 9/15/2014    Chronic kidney disease     Diabetes mellitus ([address]     Diabetes mellitus type 1 ([address]     Diabetic retinopathy ([address]     Diabetic retinopathy associated with type 2 diabetes mellitus, without macular edema     Hyperlipidemia 2015    Hypertension     Kidney stone     Mixed hyperlipidemia 2015    Orthostatic hypotension     Vitreous hemorrhage of right eye ([address] 8/15/2017     Past Surgical History:        Procedure Laterality Date    APPENDECTOMY      DIALYSIS CATHETER INSERTION N/A 2022    LAPAROSCOPIC PLACEMENT OF PERITONEAL DIALYSIS CATHETER performed by Steph Coates MD at [address] [place] [address] N/A 2022    REMOVAL OF EXISTING PERITONEAL DIALYSIS CATHETER performed by Steph Coates MD at [address] [place] [address] N/A 2019    LAPAROSCOPIC PERITONEAL DIALYSIS CATHETER PLACEMENT WITH AXEL TROCHAR performed by Steph Coates MD at [address] [place] [address] N/A 2022    REPAIR OF SMALL VENTRAL HERNIA performed by Steph Coates MD at [place] [address]     Medications Prior to Admission:   Prior to Admission medications    Medication Sig Start Date End Date Taking?  Authorizing Provider   insulin lispro (HUMALOG KWIKPEN) 100 UNIT/ML pen Inject 2-4 Units into the skin 3 times daily as needed for High Blood Sugar Sliding scale     Historical Provider, MD       Allergies:  Bactrim [sulfamethoxazole-trimethoprim], Doxazosin, Geocillin [carbenicillin], Lisinopril, Other, Spironolactone, Tramadol, Bumetanide, and Ibuprofen    Social History: TOBACCO:   reports that she has never smoked. She has never used smokeless tobacco.  ETOH:   reports no history of alcohol use. DRUGS:   reports no history of drug use. PHYSICAL EXAM:      Vital Signs: BP (!) 83/50   Pulse 101   Temp 98 °F (36.7 °C) (Oral)   Resp 28   Ht 5' 3\" (1.6 m)   Wt 188 lb 11.4 oz (85.6 kg)   SpO2 97%   BMI 33.43 kg/m²    Airway: No stridor or wheezing noted. Good air movement  Pulmonary: without wheezes. Clear to auscultation  Cardiac:regular rate and rhythm without loud murmurs  Abdomen:soft, nontender,  Bowel sounds present    Pre-Procedure Assessment / Plan:  1) Flexible sigmoidoscopy     ASA Grade:  ASA 3 - Patient with moderate systemic disease with functional limitations  Mallampati Classification:  Class II    Level of Sedation Plan: Moderate sedation    Post Procedure plan: Return to same level of care    I assessed the patient and find that the patient is in satisfactory condition to proceed with the planned procedure and sedation plan. I have explained the risk, benefits, and alternatives to the procedure; the patient understands and agrees to proceed.        Rainer Shane MD  3/23/2022

## 2022-03-23 NOTE — CONSULTS
PATIENT NAME: Kelin Latham   YOB: 1965    ADMISSION DATE: 3/22/2022  8:46 AM      TODAY'S DATE: 3/23/2022    CHIEF COMPLAINT:  Abdominal pain      HISTORY OF PRESENT ILLNESS:  The patient is a 55 y.o. female  who presents with acute onset of abdominal pain starting yesterday. Has recently been found to have metastatic adenocarcinoma discovered during an umbilical hernia repair. Colonoscopy was planned in the near future but given CT findings last night of possible colon obstruction a flex sig was done today and revealed a nearly obstructing tumor in the sigmoid colon. Has also been found to have C. Dif and bilateral pulmonary emboli with possible liver and lung metastases as well. Recommend exploration for either colectomy and colostomy or proximal diverting colostomy based on operative findings. Will tentatively plan for afternoon of 3/24 depending on her pulmonary status. Currently on a heparin drip which we can hold in AM if we are able to proceed.     Past Medical History:        Diagnosis Date    Anemia 9/15/2014    Chronic kidney disease     Diabetes mellitus ([address]     Diabetes mellitus type 1 ([address]     Diabetic retinopathy ([address]     Diabetic retinopathy associated with type 2 diabetes mellitus, without macular edema     Hyperlipidemia 7/27/2015    Hypertension     Kidney stone     Mixed hyperlipidemia 7/27/2015    Orthostatic hypotension     Vitreous hemorrhage of right eye ([address] 8/15/2017       Past Surgical History:        Procedure Laterality Date    APPENDECTOMY      DIALYSIS CATHETER INSERTION N/A 2/24/2022    LAPAROSCOPIC PLACEMENT OF PERITONEAL DIALYSIS CATHETER performed by Steph Coates MD at [address] [place] [address] N/A 2/24/2022    REMOVAL OF EXISTING PERITONEAL DIALYSIS CATHETER performed by Steph Coates MD at [address] [place] [address] N/A 8/12/2019    LAPAROSCOPIC PERITONEAL DIALYSIS CATHETER PLACEMENT WITH AXEL GONZALEZ performed by Aline Villa MD at [address] [place] [address] N/A 2/24/2022    REPAIR OF SMALL VENTRAL HERNIA performed by Aline Villa MD at [facility] [place] [address]       Medications Prior to Admission:   Medications Prior to Admission: insulin lispro (HUMALOG KWIKPEN) 100 UNIT/ML pen, Inject 2-4 Units into the skin 3 times daily as needed for High Blood Sugar Sliding scale     Allergies:  Bactrim [sulfamethoxazole-trimethoprim], Doxazosin, Geocillin [carbenicillin], Lisinopril, Other, Spironolactone, Tramadol, Bumetanide, and Ibuprofen    Social History:   TOBACCO:   reports that she has never smoked. She has never used smokeless tobacco.  ETOH:   reports no history of alcohol use. DRUGS:   reports no history of drug use. Family History:       Problem Relation Age of Onset    Heart Disease Mother         heart attack 2007    High Blood Pressure Mother     Diabetes Father     Heart Disease Father     Emphysema Father     Cancer Maternal Grandmother         cervical cancer    Lung Cancer Maternal Grandfather         black lung    Diabetes Paternal Grandmother     Heart Disease Paternal Grandfather        REVIEW OF SYSTEMS:    CONSTITUTIONAL:  negative  HEENT:  negative  CARDIOVASCULAR:  negative  GASTROINTESTINAL:  positive for abdominal pain, diarrhea  GENITOURINARY:  negative  HEMATOLOGIC/LYMPHATIC:  negative  ENDOCRINE:  negative  All other systems negative    PHYSICAL EXAM:    VITALS:  /70   Pulse 100   Temp 97.8 °F (36.6 °C) (Oral)   Resp 30   Ht 5' 3\" (1.6 m)   Wt 188 lb 11.4 oz (85.6 kg)   SpO2 100%   BMI 33.43 kg/m²   INTAKE/OUTPUT:   I/O last 3 completed shifts:  In: -   Out: 500 [Emesis/NG output:500]  I/O this shift:  In: 1440 [P.O.:240;  I.V.:100]  Out: 900   CONSTITUTIONAL:  awake, alert, no apparent distress and normal weight  ENT:  normocepalic, without obvious abnormality  NECK:  supple, symmetrical, trachea midline LUNGS:  clear to auscultation, no crackles or wheezing  CARDIOVASCULAR:  regular rate and rhythm and no murmur noted  ABDOMEN: soft, mildly distended, mild tenderness diffusely without guarding, PD catheter noted  MUSCULOSKELETAL:  0+ pitting edema lower extremities  NEUROLOGIC:  Mental Status Exam:  Level of Alertness:   awake  Orientation:   person, place, time      ASSESSMENT AND PLAN:    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

## 2022-03-23 NOTE — PROGRESS NOTES
Full range of motion without deformity. Skin: Skin color, texture, turgor normal.  No rashes or lesions. Neurologic:  Neurovascularly intact without any focal sensory/motor deficits. Cranial nerves: II-XII intact, grossly non-focal.  Psychiatric: Alert and oriented, thought content appropriate, normal insight  Capillary Refill: Brisk,< 3 seconds   Peripheral Pulses: +2 palpable, equal bilaterally       Labs:   Recent Labs     03/22/22  0930 03/23/22  0815   WBC 13.7* 14.4*   HGB 12.5 12.2   HCT 38.3 38.4   PLT 87* 88*     Recent Labs     03/22/22  0930 03/23/22  0815   * 137   K 4.4 4.5   CL 97* 97*   CO2 19* 24   BUN 14 26*   CREATININE 5.9* 6.6*   CALCIUM 7.6* 6.9*     Recent Labs     03/22/22  0930 03/23/22  0815   AST 12* 18   ALT 6* 6*   BILITOT 1.1* 0.5   ALKPHOS 225* 202*     No results for input(s): INR in the last 72 hours. No results for input(s): Michelle Alvaro in the last 72 hours. Urinalysis:      Lab Results   Component Value Date    NITRU Negative 05/27/2021    WBCUA 3 05/27/2021    BACTERIA RARE 05/27/2021    RBCUA 4 05/27/2021    BLOODU TRACE 05/27/2021    SPECGRAV 1.016 05/27/2021    GLUCOSEU 250 05/27/2021       Radiology:  CT CHEST W CONTRAST   Final Result   Bilateral pulmonary emboli slightly greater on the right than left. Potential concern for septic emboli given the small foci of irregular   parenchymal lung lesions new from the prior chest CT study, only partially   included and demonstrated on abdomen pelvic CT 1 day earlier. These could   certainly also be metastatic as well but somewhat atypical given the   irregular margins. Multiple liver metastases partially included, unchanged as seen on abdomen   pelvic CT 1 day earlier      Abdominal ascites, decreased from the prior exam 1 day earlier, with recently   demonstrated pneumoperitoneum not seen currently in the included upper   abdomen. .      Esophageal mural thickening suspected esophagitis.       Findings were discussed with Elena Currie at 3:50 pm on 3/23/2022. CT ABDOMEN PELVIS W IV CONTRAST Additional Contrast? None   Final Result   1. Worsening inflammatory changes surrounding the mid sigmoid colon likely   due to colitis causing worsening colonic dilatation. Underlying malignancy   cannot be excluded; correlate with colonoscopy. 2. Multiple nodular bilateral ground-glass opacities. The differential   diagnosis includes infectious process and metastatic disease recommend CT of   the chest with contrast for further evaluation. 3. Multiple indeterminate hepatic lesions. Recommend nonemergent MRI of the   abdomen with without contrast utilizing Eovist.                 Assessment/Plan:    Active Hospital Problems    Diagnosis Date Noted    Colitis [K52.9] 03/22/2022     Sepsis:  - blood/urine cultures, PD fluid cultures  - antibiotics    Drug reaction:  -hypotension and urticaria to ceftriaxone  - discussed with pharmacy --> ok to use Merrem  - do not use Cipro due to prolonged QTc    Presumed Metastatic colon cancer:  - colonoscopy 3/23 with colonic mass  - CT scan concerning for liver mets, possibly pulmonary as well  - oncology consulted  - surgery consulted for potential resection    Acute bilateral pulmonary embolism:  - heparin drip due to colonoscopy on 3/23    C dif infection:  - acute  - oral vanc    ESRD on dialysis:  - HD due to concern for infection on PD (recent peritonitis)    DVT Prophylaxis: heparin drip  Diet: ADULT DIET;  Clear Liquid  Code Status: Full Code    PT/OT Eval Status: n/a    Dispo - PCU    Elena Currie MD

## 2022-03-23 NOTE — PROGRESS NOTES
Clinical Pharmacy Note  Heparin Dosing Consult    Garrett Ayers is a 55 y.o. female ordered heparin per high dose nomogram by Dr. Genoveva Chavez. Lab Results   Component Value Date    APTT 44.9 03/23/2022     Lab Results   Component Value Date    HGB 12.2 03/23/2022    HCT 38.4 03/23/2022    PLT 88 03/23/2022    INR 0.97 08/10/2021       Ht Readings from Last 1 Encounters:   03/22/22 5' 3\" (1.6 m)        Wt Readings from Last 1 Encounters:   03/23/22 188 lb 11.4 oz (85.6 kg)        Assessment/Plan:  Initial bolus: 6800 units  Initial infusion rate: 1540 units/hr  Next aPTT: 2230 3/23    Pharmacy will continue to monitor adjust heparin based on aPTT results using nomogram below:     HIGH DOSE HEPARIN PROTOCOL (DVT/PE)     Initial Bolus: 80 units/kg Max Bolus: 10,000 units       Initial Rate: 18 units/kg/hr Max Initial Rate: 2,750 units/hr     aPTT < 45   Heparin 80 units/kg bolus Increase infusion by 4 units/kg/hr       (maximum 10,000 units)   aPTT 45-59.9   Heparin 40 units/kg bolus Increase infusion by 2 units/kg/hr       (maximum 5,000 units)   aPTT 60-90   No bolus   No change   aPTT 90.1-97.5 No bolus   Decrease infusion by 1 units/kg/hr   aPTT 97.6-105  No bolus   Decrease infusion by 2 units/kg/hr   aPTT > 105    Hold heparin for 1 hour Decrease infusion by 3 units/kg/hr    Obtain aPTT 6 hours after initial bolus and 6 hours after any dose change until two consecutive therapeutic aPTTs are achieved - then daily.     Tisha Monroe, [facility] 3/23/2022 5:08 PM

## 2022-03-23 NOTE — FLOWSHEET NOTE
Patient to be transferred to CT then to her room. Patient on tele monitor and 3L nasal cannula. Report given to Myriam Williamson.

## 2022-03-23 NOTE — FLOWSHEET NOTE
Treatment time: 3 hrs  Net UF: none, +200 mls saline given. Pre weight: 85.5 kg   Post weight: 85.6 kg   EDW: tbd     Access used: R TDC  Access function: good, new dressing applied     Medications or blood products given: Albumin 25gX 2     Regular outpatient schedule: MWF    Summary of response to treatment: no fluid removed. Pt tolerated tx well.  Post VSS  Report given to primary nurse Cleve RAMSAY    Copy of dialysis treatment record placed in chart, to be scanned into EMR.     03/23/22 0819 03/23/22 1126   Vital Signs   BP (!) 81/47 (!) 123/53   Temp 98.5 °F (36.9 °C) 97.4 °F (36.3 °C)   Pulse 90 89   Weight 188 lb 7.9 oz (85.5 kg) 188 lb 11.4 oz (85.6 kg)   Weight Method Bed scale Bed scale   Percent Weight Change 0  --    Post-Hemodialysis Assessment   Hemodialysis Intake (ml)  --  1100 ml   Hemodialysis Output (ml)  --  900 ml   NET Removed (ml)  --  200 ml   Tolerated Treatment  --  Fair

## 2022-03-23 NOTE — PROGRESS NOTES
Patient made aware of plan for exploratory laparotomy, colostomy placement, possible bowel resection and possible liver biopsy tomorrow. Consent has been signed and is in the chart. Patient would not like any information given to children at this time until more information is obtained.

## 2022-03-23 NOTE — OP NOTE
Flexible Sigmoidoscopy Note      Patient: Tracy Frances  : 1965  Acct#:     Procedure: Flexible Sigmoidoscopy to 20 cm with biopsy/submcuosal injection     Date:  3/23/2022    Surgeon:  Rainer Shane MD    Referring Physician:      Preoperative Diagnosis:  1. Abnormal CT/Concern for Colon cancer. Postoperative Diagnosis:  1. Obstructing Rectosigmoid Mass     Anesthesia:  Versed 2 mg IV, Fentanyl 25 mcg IV (Sedation time 13:39-13:48)    Consent:  The patient or their legal guardian has signed a consent, and is aware of the potential risks, benefits, alternatives, and potential complications of this procedure. These include, but are not limited to hemorrhage, bleeding, post procedural pain, perforation, phlebitis, aspiration, hypotension, hypoxia, cardiovascular events such as arryhthmia, and possibly death. Additionally, the possibility of missed colonic polyps and interval colon cancer was discussed in the consent. Procedure: An informed consent was obtained from the patient after explanation of indications, benefits, possible risks and complications of the procedure. The patient was then taken to the endoscopy suite, placed in the left lateral decubitus position, and the above IV anesthesia was administered. A digital rectal examination was performed and revealed negative without mass, lesions or tenderness. The Olympus video colonoscope was placed in the patient's rectum under digital direction and advanced 20 cm from the anal verge. The prep was poor. The scope was then withdrawn back through the sigmoid colon. Carefull circumferential examination of the mucosa in these areas demonstrated:    1. The preparation was poor but I was able to advance the colonoscope to approximally 20 cm from the anal verge and an obstructing malignant appearing mass was present. Multiple biopsies obtained. A tattoo was placed distal with a total of 1.4 CC of belkis ink.  The visualization of the rest of the sigmoid and rectum was limited by prep quality. The scope was then withdrawn into the rectum and retroflexed. The retroflexed view of the anal verge and rectum demonstrates no abnormalities. The scope was straightened, the colon was decompressed and the scope was withdrawn from the patient. The patient tolerated the procedure well and was taken to the PACU in good condition. Estimated Blood Loss (mL): < 5 CC     Complications: None    Specimens:   ID Type Source Tests Collected by Time Destination   A : Sigmoid Mass Tissue Colon SURGICAL PATHOLOGY Yvonne Napoles MD 3/23/2022 1344      Recommendations:  1. Await pathology results. Will place a surgery consult. Patient will likely need diverted prior to any treatment due to concerns of impending obstruction.      Yvonne Napoles MD  [address] [facility] [address]  3/23/2022

## 2022-03-23 NOTE — PROGRESS NOTES
Medications administered per MD verbal order for conscience sedation.    Electronically signed by Denise Coulter RN on 3/23/2022 at 1:24 PM

## 2022-03-24 NOTE — PROGRESS NOTES
INPATIENT PROGRESS NOTE        IDENTIFYING DATA/REASON FOR CONSULTATION   PATIENT:  Jimmy Setting  MRN:  [id number]  ADMIT DATE: 3/22/2022  TIME OF EVALUATION: 3/24/2022 9:35 AM  HOSPITAL STAY:   LOS: 2 days   CONSULTING PHYSICIAN: Morteza Emery MD   REASON FOR CONSULTATION: colon mass    Subjective:    Patient seen in follow up. Denies abd pain. Abd soft    MEDICATIONS   SCHEDULED:  lactated ringers bolus, 500 mL, Once  midodrine, 5 mg, BID WC  vancomycin, 250 mg, 4x Daily  sodium chloride flush, 5-40 mL, 2 times per day  insulin lispro, 0-6 Units, TID WC  insulin lispro, 0-3 Units, Nightly  [Held by provider] meropenem, 500 mg, Q12H      FLUIDS/DRIPS:     heparin (porcine) Stopped (03/24/22 0746)    sodium chloride      dextrose       PRNs: albumin human, 25 g, PRN  midodrine, 10 mg, PRN  heparin (porcine), 3,000 Units, PRN  diphenhydrAMINE, 25 mg, Q6H PRN  sodium chloride flush, 5-40 mL, PRN  sodium chloride, 25 mL, PRN  ondansetron, 4 mg, Q8H PRN   Or  ondansetron, 4 mg, Q6H PRN  polyethylene glycol, 17 g, Daily PRN  acetaminophen, 650 mg, Q6H PRN   Or  acetaminophen, 650 mg, Q6H PRN  oxyCODONE, 5 mg, Q4H PRN  loperamide, 2 mg, 4x Daily PRN  glucose, 15 g, PRN  dextrose, 12.5 g, PRN  glucagon (rDNA), 1 mg, PRN  dextrose, 100 mL/hr, PRN      ALLERGIES:    Allergies   Allergen Reactions    Bactrim [Sulfamethoxazole-Trimethoprim]     Doxazosin Other (See Comments)     Other reaction(s): Other (See Comments)  Bleeding  Punch in chest      Geocillin [Carbenicillin]     Lisinopril Other (See Comments)     Pt thinks cr levels went up due to med.  Other      Artificial sweetners, nutrasweet    Spironolactone      Bad taste, emesis.  Pt refuses to take    Tramadol     Bumetanide Nausea And Vomiting    Ibuprofen Nausea And Vomiting         PHYSICAL EXAM   VITALS:  /68   Pulse 95   Temp 99.1 °F (37.3 °C) (Oral)   Resp 22   Ht 5' 3\" (1.6 m)   Wt 187 lb 13.3 oz (85.2 kg)   SpO2 98%   BMI 33.27 kg/m²   TEMPERATURE:  Current - Temp: 99.1 °F (37.3 °C); Max - Temp  Av.7 °F (36.5 °C)  Min: 97.2 °F (36.2 °C)  Max: 99.1 °F (37.3 °C)    Physical Exam:  General appearance: alert, cooperative, no distress, appears stated age=  Eyes: Anicteric  Head: Normocephalic, without obvious abnormality  Lungs: clear to auscultation bilaterally, Normal Effort  Heart: regular rate and rhythm, normal S1 and S2, no murmurs or rubs  Abdomen: soft, non-distended, non-tender. Bowel sounds normal.   Extremities: atraumatic, no cyanosis or edema  Skin: warm and dry, no jaundice  Neuro: Grossly intact, A&OX3    LABS AND IMAGING   Laboratory   Recent Labs     22  0922  0815   WBC 13.7* 14.4*   HGB 12.5 12.2   HCT 38.3 38.4   MCV 95.3 96.3   PLT 87* 88*     Recent Labs     22  0930 22  0815   * 137   K 4.4 4.5   CL 97* 97*   CO2 19* 24   BUN 14 26*   CREATININE 5.9* 6.6*     Recent Labs     22  0930 22  0815   AST 12* 18   ALT 6* 6*   BILITOT 1.1* 0.5   ALKPHOS 225* 202*     Recent Labs     22  0930   LIPASE 21.0     No results for input(s): PROTIME, INR in the last 72 hours. Imaging  CT CHEST W CONTRAST   Final Result   Bilateral pulmonary emboli slightly greater on the right than left. Potential concern for septic emboli given the small foci of irregular   parenchymal lung lesions new from the prior chest CT study, only partially   included and demonstrated on abdomen pelvic CT 1 day earlier. These could   certainly also be metastatic as well but somewhat atypical given the   irregular margins. Multiple liver metastases partially included, unchanged as seen on abdomen   pelvic CT 1 day earlier      Abdominal ascites, decreased from the prior exam 1 day earlier, with recently   demonstrated pneumoperitoneum not seen currently in the included upper   abdomen. .      Esophageal mural thickening suspected esophagitis.       Findings were discussed with Evert Antonio at 3:50 pm on 3/23/2022. CT ABDOMEN PELVIS W IV CONTRAST Additional Contrast? None   Final Result   1. Worsening inflammatory changes surrounding the mid sigmoid colon likely   due to colitis causing worsening colonic dilatation. Underlying malignancy   cannot be excluded; correlate with colonoscopy. 2. Multiple nodular bilateral ground-glass opacities. The differential   diagnosis includes infectious process and metastatic disease recommend CT of   the chest with contrast for further evaluation. 3. Multiple indeterminate hepatic lesions. Recommend nonemergent MRI of the   abdomen with without contrast utilizing Eovist.             Endoscopy  Flex Sig 3/23/22 with Dr. Dupont Scales     1. The preparation was poor but I was able to advance the colonoscope to approximally 20 cm from the anal verge and an obstructing malignant appearing mass was present. Multiple biopsies obtained. A tattoo was placed distal with a total of 1.4 CC of belkis ink. The visualization of the rest of the sigmoid and rectum was limited by prep quality. ASSESSMENT AND RECOMMENDATIONS   Tami Rahman is a 55 y.o. female with PMH of ESRD on PD, DM, diabetic neuropathy, diabetic retinopathy, HTN, HLD who presented on 3/22/2022 with abdominal pain, nausea, vomiting. She recently underwent ventral hernia repair and path showed pathology showed invasive moderately adenocarcinoma extensively involving an infiltrating the fibrous tissue and adipose tissue. She was scheduled for PET scan as well as colonoscopy. On day of colonoscopy after taking dulocolax she developed severe abd pain, nausea, vomiting and presented to ED. CT A/P showed thickening of the sigmoid colon and multiple nodular groundglass opacities throughout the bilateral lungs and multiple indeterminate hepatic lesions. She underwent flex sig revealing an obstructing malignant appearing mass at 20 cm from anal verge. Site was tattooed     1. Obstructing Sigmoid colon mass. Path pending. She is scheduled for OR for diverting colostomy vs resection and colostomy  2. Suspected metastatic disease. Oncology following  3. PE on heparin gtt  4. ESRD. RECOMMENDATIONS:    Follow up on path report  Keep npo for surgery today    If you have any questions or need any further information, please feel free to contact us [phone]. Thank you for allowing us to participate in the care of Danae Dunn. The note was completed using Dragon voice recognition transcription. Every effort was made to ensure accuracy; however, inadvertent transcription errors may be present despite my best efforts to edit errors. Abiodun ASCENCIO    Attending physician addendum:    I have personally seen and examined the patient, reviewed the patient's medical record and pertinent labs and clinical imaging. I have personally staffed the case with Abiodun ASCENCIO. I agree with her consultation note, exam findings, assessment and plans  as written above. I have made appropriate modifications and edited her assessment and plan where needed to reflect my impression and plans for this patient. Danae Dunn is a 55 y.o. female with PMH of ESRD on PD, DM, diabetic neuropathy, diabetic retinopathy, HTN, HLD who presented on 3/22/2022 with abdominal pain, nausea, vomiting. Flexible Sigmoidoscopy with evidence of obstruction colon mass. Discussed results with patient. Dr. Juan M Romo for diverting colostomy today. Will sign off. Thank you for allowing me to participate in this patient's care. If there are any questions or concerns regarding this patient, or the plan we have set in place, please feel free to contact me at [phone].      Pedro Marinelli MD

## 2022-03-24 NOTE — CONSULTS
Infectious Diseases Inpatient Consult Note      Reason for Consult:  C diff diarrhea, recent admit for PD related Peritonitis and now admitted with Obstructive Sigmoid  Colon mass  And cancer     Requesting Physician: Blade Rivera      Primary Care Physician:  No primary care provider on file. History Obtained From:  Epic and patient     CHIEF COMPLAINT:     Chief Complaint   Patient presents with    Abdominal Pain     cramping, +N/V/D, was supposed to get a colonoscopy today; this started last night around 815pm    Other     peritoneal dialysis fluid was darker than normal this morning          HISTORY OF PRESENT ILLNESS:  55 y.o. woman with a history of end-stage renal disease on peritoneal dialysis, diabetes, diabetic retinopathy, neuropathy, recent admission for PD related Peritonitis and Fluid   culture was positive for Pseudomonas. On that admission she also required ventral hernia repair pathology later reported to be positive for adenocarcinoma. She had a CT abdomen pelvis on that admission indicating sigmoid colitis she was supposed to get a colonoscopy as an outpatient. Given the new diagnosis of adenocarcinoma she was referred to see Dr. Fina Johnson oncologist.  In the process of work-up she underwent a colonoscopy on 3/23/22 noted to have obstructing rectosigmoid mass and she was directly admitted through ED for further evaluation. She was also complaining of ongoing diarrhea after finishing her antibiotic therapy she was treated with a course of ciprofloxacin and Flagyl. She still tested positive for C. difficile on this admission. CT abdomen pelvis from 3/22/2 positive for worsening inflammatory changes in the sigmoid colon concerning for malignancy with multiple nodular bilateral progressive opacities concerning for metastatic disease, she also noted to have multiple hepatic lesions on the CT scan.   She was found to be tachycardic and tachypneic ,  shortness of breath hence underwent CT chest PE protocol positive for bilateral pulmonary embolism. Subsequently placed on heparin drip. Given ongoing C. difficile diarrhea we are consulted for recommendations  Location : abd pain, nausea, vomiting       Quality : aching +       Severity 10/10:     Duration :1 week      Timing : intermittent   Context : Recent diagnosis of colon cancer     Modifying factors :none   Associated signs and symptoms: sob, abd pain, nausea, vomiting, diarrhea.         Past Medical History:    Past Medical History:   Diagnosis Date    Anemia 9/15/2014    Chronic kidney disease     Diabetes mellitus ([address]     Diabetes mellitus type 1 ([address]     Diabetic retinopathy ([address]     Diabetic retinopathy associated with type 2 diabetes mellitus, without macular edema     Hyperlipidemia 7/27/2015    Hypertension     Kidney stone     Mixed hyperlipidemia 7/27/2015    Orthostatic hypotension     Vitreous hemorrhage of right eye ([facility] 8/15/2017       Past Surgical History:    Past Surgical History:   Procedure Laterality Date    APPENDECTOMY      DIALYSIS CATHETER INSERTION N/A 2/24/2022    LAPAROSCOPIC PLACEMENT OF PERITONEAL DIALYSIS CATHETER performed by Estefania Palacios MD at [address] [place] [address] N/A 2/24/2022    REMOVAL OF EXISTING PERITONEAL DIALYSIS CATHETER performed by Estefania Palacios MD at [address] [facility] N/A 8/12/2019    LAPAROSCOPIC PERITONEAL DIALYSIS CATHETER PLACEMENT WITH AXEL TROCHAR performed by Estefania Palacios MD at [place] [address] N/A 3/23/2022    SIGMOIDOSCOPY BIOPSY FLEXIBLE performed by Fareed Javier MD at [address]  3/23/2022    SIGMOIDOSCOPY SUBMUCOSAL TATTOO INJECTION performed by Fareed Javier MD at [address] N/A 2/24/2022    REPAIR OF SMALL VENTRAL HERNIA performed by Estefania Palacios MD at [address] [facility] Medications:    No outpatient medications have been marked as taking for the 3/22/22 encounter [facility] Encounter). Allergies:  Bactrim [sulfamethoxazole-trimethoprim], Doxazosin, Geocillin [carbenicillin], Lisinopril, Other, Spironolactone, Tramadol, Bumetanide, and Ibuprofen    Immunizations : There is no immunization history on file for this patient.       Social History:   Social History     Tobacco Use    Smoking status: Never Smoker    Smokeless tobacco: Never Used   Vaping Use    Vaping Use: Never used   Substance Use Topics    Alcohol use: No    Drug use: No     Social History     Tobacco Use   Smoking Status Never Smoker   Smokeless Tobacco Never Used      Family History   Problem Relation Age of Onset    Heart Disease Mother         heart attack 2007    High Blood Pressure Mother     Diabetes Father     Heart Disease Father     Emphysema Father     Cancer Maternal Grandmother         cervical cancer    Lung Cancer Maternal Grandfather         black lung    Diabetes Paternal Grandmother     Heart Disease Paternal Grandfather        REVIEW OF SYSTEMS:     Constitutional:  negative for fevers, chills, night sweats  Eyes:  negative for blurred vision, eye discharge, visual disturbance   HEENT:  negative for hearing loss, ear drainage,nasal congestion  Respiratory:  negative for cough, shortness of breath++  or hemoptysis   Cardiovascular:  negative for chest pain,+ palpitations, syncope  Gastrointestinal:  negative for nausea, vomiting, diarrhea++, constipation, abdominal pain++   Genitourinary:  negative for frequency, dysuria, urinary incontinence, hematuria  Hematologic/Lymphatic:  negative for easy bruising, bleeding and lymphadenopathy  Allergic/Immunologic:  negative for recurrent infections, angioedema, anaphylaxis   Endocrine:  negative for weight changes, polyuria, polydipsia and polyphagia  Musculoskeletal:  negative for joint  pain, swelling, decreased range of motion  Integumentary: No rashes, skin lesions  Neurological:  negative for headaches, slurred speech, unilateral weakness  Psychiatric: negative for hallucinations,confusion,agitation.      PHYSICAL EXAM:      Vitals:    /68   Pulse 96   Temp 97.8 °F (36.6 °C) (Temporal)   Resp 16   Ht 5' 3\" (1.6 m)   Wt 187 lb (84.8 kg)   SpO2 97%   BMI 33.13 kg/m²     General Appearance: alert,in some  acute distress, ++  pallor, no icterus  On nasal cannula chronic ill appearing woman+  Skin: warm and dry, no rash or erythema  Head: normocephalic and atraumatic  Eyes: pupils equal, round, and reactive to light, conjunctivae normal  ENT: tympanic membrane, external ear and ear canal normal bilaterally, nose without deformity, nasal mucosa and turbinates normal without polyps  Neck: supple and non-tender without mass, no thyromegaly  no cervical lymphadenopathy  Pulmonary/Chest: Bi basal crepts++ wheezes, rales or rhonchi, normal air movement, no respiratory distress  Cardiovascular:   S1 and S2, no murmurs, rubs, clicks, or gallops, no carotid bruits  Abdomen: soft, + -tender, non-distended, normal bowel sounds, no masses or organomegaly  Ostomy ++  Extremities: no cyanosis, clubbing or +edema  Musculoskeletal: normal range of motion, no joint swelling, deformity or tenderness  Integumentary: No rashes, no abnormal skin lesions, no petechiae  Neurologic: reflexes normal and symmetric, no cranial nerve deficit  Psych:  Orientation, sensorium, mood normal   Lines:  HD line +  COLOSTOMY+     DATA:    CBC:   Lab Results   Component Value Date    WBC 14.4 (H) 03/23/2022    HGB 12.2 03/23/2022    HCT 38.4 03/23/2022    MCV 96.3 03/23/2022    PLT 88 (L) 03/23/2022     RENAL:   Lab Results   Component Value Date    CREATININE 6.6 (HH) 03/23/2022    BUN 26 (H) 03/23/2022     03/23/2022    K 4.5 03/23/2022    CL 97 (L) 03/23/2022    CO2 24 03/23/2022     SED RATE: No results found for: SEDRATE  CK: No results found for: CKTOTAL  CRP: No results found for: CRP  Hepatic Function Panel:   Lab Results   Component Value Date    ALKPHOS 202 03/23/2022    ALT 6 03/23/2022    AST 18 03/23/2022    PROT 3.9 03/23/2022    BILITOT 0.5 03/23/2022    LABALBU 1.7 03/23/2022     UA:  Lab Results   Component Value Date    COLORU YELLOW 05/27/2021    CLARITYU Clear 05/27/2021    GLUCOSEU 250 05/27/2021    BILIRUBINUR Negative 05/27/2021    KETUA Negative 05/27/2021    SPECGRAV 1.016 05/27/2021    BLOODU TRACE 05/27/2021    PHUR 7.0 05/27/2021    PROTEINU 100 05/27/2021    UROBILINOGEN 0.2 05/27/2021    NITRU Negative 05/27/2021    LEUKOCYTESUR Negative 05/27/2021    LABMICR YES 05/27/2021    URINETYPE NotGiven 05/27/2021      Urine Microscopic:   Lab Results   Component Value Date    LABCAST 3-5 Hyaline 08/27/2014    BACTERIA RARE 05/27/2021    COMU see below 05/27/2021    HYALCAST 0 05/27/2021    WBCUA 3 05/27/2021    RBCUA 4 05/27/2021    EPIU 4 05/27/2021     Urine Reflex to Culture:   Lab Results   Component Value Date    URRFLXCULT Yes 08/04/2019     Lactic acid  2.2     Procal  1.36     WBC 14.4         MICRO: cultures reviewed and updated by me       Procedure Component Value Units Date/Time   Culture, Body Fluid [[id number]] Collected: 03/22/22 0944   Order Status: Completed Specimen: Body Fluid from Peritoneal Dialysis Fluid Updated: 03/24/22 0947    Body Fluid Culture, Sterile --    No growth to date   No growth 36 to 48 hours     Gram Stain Result 1+ WBC's (Mononuclear)   No Epithelial Cells seen   No organisms seen    Narrative:     ORDER#: [id number]                          ORDERED BY: Devyn Coburn   SOURCE: Peritoneal Dialysis Fluid          COLLECTED:  03/22/22 09:44   [facility] :                      RECEIVED :  03/22/22 09:57   C. difficile toxin Molecular [[id number]] (Abnormal) Collected: 03/23/22 0700   Order Status: Completed Specimen: Stool Updated: 03/23/22 1541    Organism C. difficile toxin B gene detected Abnormal     C. difficile toxin Molecular -- Abnormal     POSITIVE FOR   Normal Range: Not detected   CONTACT PRECAUTIONS INDICATED    Abnormal    Narrative:     ORDER#: [id number]                          ORDERED BY: Mildred Rosenthal   SOURCE: Stool                              COLLECTED:  03/23/22 07:00   [facility] :                      RECEIVED :  03/23/22 07:17   CALL  [facility]  [id number] tel. [phone],   Microbiology results called to and read back by aure koroma rn, 03/23/2022   15:41, by [facility]   GI Bacterial Pathogens By PCR [[id number]] Collected: 03/23/22 0700   Order Status: Completed Specimen: Stool Updated: 03/23/22 1536    GI Bacterial Pathogens By PCR --    No Shigella spp/EIEC DNA detected   No Shiga toxin-producing gene(s) detected   No Campylobacter spp. (jejuni and coli)DNA detected   No Salmonella spp. DNA detected   Normal Range:  None detected    Narrative:     ORDER#: [id number]                          ORDERED BY: Mildred Rosenthal   SOURCE: Stool                              COLLECTED:  03/23/22 07:00   [facility] :                      RECEIVED :  03/23/22 07:17   Culture, Blood 2 [[id number]] Collected: 03/22/22 1024   Order Status: Completed Specimen: Blood Updated: 03/23/22 1117    Culture, Blood 2 No Growth to date.  Any change in status will be called. Narrative:     ORDER#: [id number]                          ORDERED BY: Grey Ferguson   SOURCE: Blood                              COLLECTED:  03/22/22 10:24   [facility] :                      RECEIVED :  03/22/22 10:29   If child <=2 yrs old please draw pediatric bottle. ~Blood Culture #2   Culture, Blood 1 [[id number]] Collected: 03/22/22 1024   Order Status: Completed Specimen: Blood Updated: 03/23/22 1117    Blood Culture, Routine No Growth to date.  Any change in status will be called.    Narrative:     ORDER#: [id number]                          ORDERED BY: Grey Ferguson   SOURCE: Blood                              COLLECTED:  03/22/22 10:24   [facility] :                      RECEIVED :  03/22/22 10:30   If child <=2 yrs old please draw pediatric bottle. ~Blood Culture 1   Clostridium Difficile Toxin/Antigen [[id number]] Collected: 03/23/22 0700   Order Status: Completed Specimen: Stool Updated: 03/23/22 0807    C.diff Toxin/Antigen --    Indeterminate see results of C. difficile amplification(PCR)   Normal Range: Negative    Narrative:     ORDER#: [id number]                          ORDERED BY: Leslee Bryant   SOURCE: Stool                              COLLECTED:  03/23/22 07:00   [facility] :                      RECEIVED :  03/23/22 07:17   Collect White vial (sterile container)      0 Result Notes    Component Ref Range & Units 3/22/22 0944 3/1/22 0610 2/28/22 0725 2/27/22 0653 2/26/22 0654 2/24/22 0035 2/23/22 0520   Cell Count Fluid Type  Peritoneal dial  Peritoneal dial  Peritoneal dial  Peritoneal dial  Peritoneal dial  Peritoneal dial  Peritoneal dial    Color, Fluid  Pale Yellow  Colorless  Pale Yellow  Colorless  Pale Yellow  Colorless  Colorless    Appearance, Fluid  Clear  Clear  Hazy  Clear  Clear  Clear  Clear    Clot Eval.  see below  see below CM  see below CM  see below CM  see below CM  see below CM  see below CM    Comment: No Clots Seen   Nucl Cell, Fluid /cumm 153  7  18  32  40  124  97    RBC, Fluid /cumm <2,000  17  <2,000  <2,000  <2,000  <2,000  <2,000    Comment: rbc 684 manually counted   Neutrophil Count, Fluid % 33   6  15  27  22  46    Lymphocytes, Body Fluid % 42   48  71  61  55  32    Eos, Fluid % 1   2  4  3  12  2    Macrophages % 22    8  7  0  2              FINAL DIAGNOSIS:     Sigmoid colon mass, biopsy:   - Invasive adenocarcinoma, well to moderately differentiated.    JIAJA/JIAJA         Preoperative Diagnosis:  Abnormal CT   Postoperative Diagnosis:  Evaluate for malignancy   Blood Culture:   Lab Results   Component Value Date    [facility]  03/22/2022     No Growth to date.  Any change in status will be called. BLOODCULT2  03/22/2022     No Growth to date. Any change in status will be called. Viral Culture:    Lab Results   Component Value Date    COVID19 Not Detected 02/23/2022    COVID19 Detected 01/07/2022     Urine Culture: No results for input(s): Genoveva Gatica in the last 72 hours. Scheduled Meds:   ciprofloxacin  400 mg IntraVENous Once    metroNIDAZOLE  500 mg IntraVENous Once    lactated ringers bolus  500 mL IntraVENous Once    midodrine  5 mg Oral BID WC    vancomycin  250 mg Oral 4x Daily    sodium chloride flush  5-40 mL IntraVENous 2 times per day    insulin lispro  0-6 Units SubCUTAneous TID WC    insulin lispro  0-3 Units SubCUTAneous Nightly    [Held by provider] meropenem  500 mg IntraVENous Q12H        FINAL DIAGNOSIS:     Sigmoid colon mass, biopsy:   - Invasive adenocarcinoma, well to moderately differentiated.    YAMILA/YAMILA         Preoperative Diagnosis:  Abnormal CT   Postoperative Diagnosis:  Evaluate for malignancy   Continuous Infusions:   heparin (porcine) Stopped (03/24/22 0746)    sodium chloride      dextrose        FINAL DIAGNOSIS:     Ventral hernia, repair:   -Invasive moderately differentiated colonic adenocarcinoma, involving   fibrous tissue and adipose tissue.   -See comment. PRN Meds:  albumin human, midodrine, heparin (porcine), diphenhydrAMINE, sodium chloride flush, sodium chloride, ondansetron **OR** ondansetron, polyethylene glycol, acetaminophen **OR** acetaminophen, oxyCODONE, loperamide, glucose, dextrose, glucagon (rDNA), dextrose    Imaging:     CT chest :  3/22/22       Impression   1. Worsening inflammatory changes surrounding the mid sigmoid colon likely   due to colitis causing worsening colonic dilatation.  Underlying malignancy   cannot be excluded; correlate with colonoscopy.    2. Multiple nodular bilateral ground-glass opacities.  The differential   diagnosis includes infectious process and right eye ([address]    Essential hypertension    Acute kidney injury ([address]    ESRD (end stage renal disease) ([address]    Acute renal failure superimposed on chronic kidney disease (HCC)    Volume overload    Peritonitis (HCC)    Generalized abdominal pain    Peritoneal dialysis status ([address]    Morbid obesity due to excess calories (HCC)    Nausea and vomiting    Hyponatremia    Diarrhea    Dizziness    Hypertensive emergency    Hypokalemia    Hypertensive urgency    Cerebrovascular accident (CVA) ([address]    Electrolyte imbalance    Acute respiratory failure with hypoxia (HCC)    Septic shock (HCC)    Electrolyte abnormality    Diverticulitis    Colitis     Admitted with Obstructing Rectosigmoid mass  Colon cancer on pathology  Metastatic disease based on the CT scans  C diff diarrhea  Recent admit for PD related Peritonitis with Pseudomonas  H/o Ventral hernia repair on 2/24 and Path now reported to be adenocarcinoma  She was being evaluated by Hem/Onc and GI now unfortunately admitted with impeding obstruction   S/p Exp lap Colostomy by  on 3/24/22  Bi lateraL PE on this admit   WBC elevation   ESRD on HD now was on PD before    Unfortunately now admitted with worsening of symptoms with Metastatic Colon cancer diagnosis underwent Colosty and op notes indicate extensive disease burden - will cont therapy for C diff and she has received Prophylactic abx for surgery     She is on anticoagulation for PE . Labs, Microbiology, Radiology and pertinent results from current hospitalization and care every where were reviewed by me as a part of the consultation. PLAN :  1. Cont IV Flagyl for C diff and anaerobes  2. Cont oral Vancomycin x 250  Mg Q 6 hRS  3. Wbc ELEVATION likely from C diff and metastatic disease   4. On Anticoagulation for PE  5. Check venous duplex extremities  6. Hem/ONC following   7.   Watch for complications     Discussed with patient/Family and Nursing   Risk of Complications/Morbidity: High      · Illness(es)/ Infection present that pose threat to bodily function. · There is potential for severe exacerbation of infection/side effects of treatment. · Therapy requires intensive monitoring for antimicrobial agent toxicity. Thanks for allowing me to participate in your patient's care please call me with any questions or concerns.     Dr. Alexa Fields MD  [address] [place] [address] Physician  Phone: [phone]   Fax : [phone]

## 2022-03-24 NOTE — PROGRESS NOTES
Comprehensive Nutrition Assessment    Type and Reason for Visit:  Initial,Positive Nutrition Screen    Nutrition Recommendations/Plan:   NPO, will monitor POC for nutrition post surgery  Will monitor nutritional adequacy, nutrition-related labs, weights, BMs, and clinical progress     Nutrition Assessment:  + Screen for MST 2. Pt with hx of ESRD on PD, DM, diabetic neuropathy, diabetic retinopathy, HTN, HLD. Pt with recent ventral hernia repair which showed invasive moderately adenocarcinoma. Pt out of room during visit, plan for diverting colostomy vs resection and colostomy. Reviewed EMR, pt with significant wt loss over last 6 months. Will trial supplement when po resumes. Malnutrition Assessment:  Malnutrition Status:  Insufficient data (will perform on f/u)    Context:  Chronic Illness       Estimated Daily Nutrient Needs:  Energy (kcal):  5323-4143 kcal (20-22 kcal/kg ABW); Weight Used for Energy Requirements:  Current     Protein (g):  102-128 gm (1.2-1.5 gm/kg ABW); Weight Used for Protein Requirements:  Current        Fluid (ml/day):   ; Method Used for Fluid Requirements:  1 ml/kcal      Nutrition Related Findings:  Reviewed labs      Wounds:  None       Current Nutrition Therapies:    Diet NPO Exceptions are: Ice Chips, Sips of Water with Meds    Anthropometric Measures:  · Height: 5' 3\" (160 cm)  · Current Body Weight: 187 lb (84.8 kg)   · Admission Body Weight: 199 lb (90.3 kg)    · Usual Body Weight: 215 lb (97.5 kg)     · Ideal Body Weight: 115 lbs; % Ideal Body Weight 162.6 %   · BMI: 33.1  · Adjusted Body Weight:  ; No Adjustment   · BMI Categories: Obese Class 1 (BMI 30.0-34. 9)       Nutrition Diagnosis:   · Inadequate oral intake related to altered GI function as evidenced by NPO or clear liquid status due to medical condition      Nutrition Interventions:   Food and/or Nutrient Delivery:  Continue NPO (start nutrition when appropriate)  Nutrition Education/Counseling:  No recommendation at this time   Coordination of Nutrition Care:  Continue to monitor while inpatient    Goals: Tolerate most appropriate nutrition therapy       Nutrition Monitoring and Evaluation:   Behavioral-Environmental Outcomes:  None Identified   Food/Nutrient Intake Outcomes:  Diet Advancement/Tolerance  Physical Signs/Symptoms Outcomes:  Biochemical Data,GI Status,Nutrition Focused Physical Findings,Skin,Weight,Hemodynamic Status     Discharge Planning:     Too soon to determine     Electronically signed by Kristin Nicole RD, LD on 3/24/22 at 11:25 AM EDT    Contact: [phone]

## 2022-03-24 NOTE — PLAN OF CARE
Problem: Pain:  Goal: Pain level will decrease  Description: Pain level will decrease  3/23/2022 2146 by Abel Kwan RN  Outcome: Ongoing     Problem: Nausea/Vomiting:  Goal: Absence of nausea/vomiting  Description: Absence of nausea/vomiting  3/23/2022 2146 by Abel Kwan RN  Outcome: Ongoing     Problem: Falls - Risk of:  Goal: Will remain free from falls  Description: Will remain free from falls  3/23/2022 2146 by Abel Kwan RN  Outcome: Ongoing     Problem: Skin Integrity:  Goal: Will show no infection signs and symptoms  Description: Will show no infection signs and symptoms  3/23/2022 2146 by Abel Kwan RN  Outcome: Ongoing

## 2022-03-24 NOTE — ANESTHESIA PRE PROCEDURE
Department of Anesthesiology  Preprocedure Note       Name:  Jay Shen   Age:  55 y.o.  :  1965                                          MRN:  [id number]         Date:  3/24/2022      Surgeon: Jose Zarate):  Jayme Cabral MD    Procedure: EXPLORATORY LAPAROTOMY, OSTOMY (N/A Abdomen)    Medications prior to admission:   Prior to Admission medications    Medication Sig Start Date End Date Taking? Authorizing Provider   insulin lispro (HUMALOG KWIKPEN) 100 UNIT/ML pen Inject 2-4 Units into the skin 3 times daily as needed for High Blood Sugar Sliding scale     Historical Provider, MD       Current medications:    No current facility-administered medications for this visit. No current outpatient medications on file.      Facility-Administered Medications Ordered in Other Visits   Medication Dose Route Frequency Provider Last Rate Last Admin    metronidazole (FLAGYL) 500 mg in NaCl 100 mL IVPB premix  500 mg IntraVENous Once Jayme Cabral MD   Stopped at 22 1117    lactated ringers bolus  500 mL IntraVENous Once Perfecto Nunes MD        albumin human 25 % IV solution 25 g  25 g IntraVENous PRN Perfecto Nunes MD   Stopped at 22 1026    midodrine (PROAMATINE) tablet 10 mg  10 mg Oral PRN Perfecto Nunes MD        heparin (porcine) injection 3,000 Units  3,000 Units IntraCATHeter PRN Perfecto Nunes MD        midodrine (PROAMATINE) tablet 5 mg  5 mg Oral BID WC Perfecto Nunes MD   5 mg at 22 0802    diphenhydrAMINE (BENADRYL) tablet 25 mg  25 mg Oral Q6H PRN Demetrius Treadwell MD   25 mg at 22 0446    vancomycin (VANCOCIN) capsule 250 mg  250 mg Oral 4x Daily Demetrius Treadwell MD   250 mg at 22 0803    heparin 25,000 units in dextrose 5 % 250 mL infusion (rate based)  0-3,000 Units/hr IntraVENous Continuous Demetrius Treadwell MD   Stopped at 22 0746    sodium chloride flush 0.9 % injection 5-40 mL  5-40 mL IntraVENous 2 times per day Jose Sneed MD   10 mL at 03/24/22 0804    sodium chloride flush 0.9 % injection 5-40 mL  5-40 mL IntraVENous PRN Jose Sneed MD        0.9 % sodium chloride infusion  25 mL IntraVENous PRN Jose Sneed  mL/hr at 03/24/22 1114 25 mL at 03/24/22 1114    ondansetron (ZOFRAN-ODT) disintegrating tablet 4 mg  4 mg Oral Q8H PRN Jose Sneed MD   4 mg at 03/22/22 1519    Or    ondansetron (ZOFRAN) injection 4 mg  4 mg IntraVENous Q6H PRN Jose Sneed MD   4 mg at 03/23/22 0553    polyethylene glycol (GLYCOLAX) packet 17 g  17 g Oral Daily PRN Jose Sneed MD        acetaminophen (TYLENOL) tablet 650 mg  650 mg Oral Q6H PRN Jose Sneed MD        Or    acetaminophen (TYLENOL) suppository 650 mg  650 mg Rectal Q6H PRN Jose Sneed MD        oxyCODONE (ROXICODONE) immediate release tablet 5 mg  5 mg Oral Q4H PRN Jose Sneed MD   5 mg at 03/22/22 1519    glucose (GLUTOSE) 40 % oral gel 15 g  15 g Oral PRN Jose Sneed MD        dextrose 50 % IV solution  12.5 g IntraVENous PRN Jose Sneed MD        glucagon (rDNA) injection 1 mg  1 mg IntraMUSCular PRN Jose Sneed MD        dextrose 5 % solution  100 mL/hr IntraVENous PRN Jose Sneed MD        insulin lispro (HUMALOG) injection vial 0-6 Units  0-6 Units SubCUTAneous TID WC Jose Sneed MD        insulin lispro (HUMALOG) injection vial 0-3 Units  0-3 Units SubCUTAneous Nightly Jose Sneed MD   1 Units at 03/23/22 2032    [Held by provider] meropenem (MERREM) 500 mg IVPB (mini-bag)  500 mg IntraVENous Q12H Jose Sneed MD   Stopped at 03/23/22 0149       Allergies: Allergies   Allergen Reactions    Bactrim [Sulfamethoxazole-Trimethoprim]     Doxazosin Other (See Comments)     Other reaction(s):  Other (See Comments)  Bleeding  Punch in chest      Geocillin [Carbenicillin]     Lisinopril Other (See Comments)     Pt thinks cr levels went up due to med.  Other      Artificial sweetners, nutrasweet    Spironolactone      Bad taste, emesis.  Pt refuses to take    Tramadol     Bumetanide Nausea And Vomiting    Ibuprofen Nausea And Vomiting       Problem List:    Patient Active Problem List   Diagnosis Code    Diabetic retinopathy ([facility] E11.319    Mixed hyperlipidemia E78.2    DMII (diabetes mellitus, type 2) ([facility] E11.9    Vitreous hemorrhage of right eye ([facility] H43.11    Essential hypertension I10    Acute kidney injury ([facility] N17.9    ESRD (end stage renal disease) ([facility] N18.6    Acute renal failure superimposed on chronic kidney disease ([facility] N17.9, N18.9    Volume overload E87.70    Peritonitis ([facility] K65.9    Generalized abdominal pain R10.84    Peritoneal dialysis status ([facility] Z99.2    Morbid obesity due to excess calories ([facility]) E66.01    Nausea and vomiting R11.2    Hyponatremia E87.1    Diarrhea R19.7    Dizziness R42    Hypertensive emergency I16.1    Hypokalemia E87.6    Hypertensive urgency I16.0    Cerebrovascular accident (CVA) ([facility]) I63.9    Electrolyte imbalance E87.8    Acute respiratory failure with hypoxia ([facility]) J96.01    Septic shock ([facility]) A41.9, R65.21    Electrolyte abnormality E87.8    Diverticulitis K57.92    Colitis K52.9       Past Medical History:        Diagnosis Date    Anemia 9/15/2014    Chronic kidney disease     Diabetes mellitus ([facility]     Diabetes mellitus type 1 ([facility]     Diabetic retinopathy ([facility]     Diabetic retinopathy associated with type 2 diabetes mellitus, without macular edema     Hyperlipidemia 7/27/2015    Hypertension     Kidney stone     Mixed hyperlipidemia 7/27/2015    Orthostatic hypotension     Vitreous hemorrhage of right eye ([facility] 8/15/2017       Past Surgical History:        Procedure Laterality Date    APPENDECTOMY      DIALYSIS CATHETER INSERTION N/A 2/24/2022    LAPAROSCOPIC PLACEMENT OF PERITONEAL DIALYSIS CATHETER performed by Brie Marcano MD at [address] [facility] N/A 2/24/2022    REMOVAL OF EXISTING PERITONEAL DIALYSIS CATHETER performed by Brie Marcano MD at [address] [facility]      LAPAROSCOPY INSERTION PERITONEAL CATHETER N/A 8/12/2019    LAPAROSCOPIC PERITONEAL DIALYSIS CATHETER PLACEMENT WITH AXEL TROCHAR performed by Brie Marcano MD at [address] N/A 3/23/2022    SIGMOIDOSCOPY BIOPSY FLEXIBLE performed by Ginna Kumari MD at [address]  3/23/2022    SIGMOIDOSCOPY SUBMUCOSAL TATTOO INJECTION performed by Ginna Kumari MD at [address] N/A 2/24/2022    REPAIR OF SMALL VENTRAL HERNIA performed by Brie Marcano MD at [address] History:    Social History     Tobacco Use    Smoking status: Never Smoker    Smokeless tobacco: Never Used   Substance Use Topics    Alcohol use: No                                Counseling given: Not Answered      Vital Signs (Current): There were no vitals filed for this visit.                                            BP Readings from Last 3 Encounters:   03/24/22 135/68   03/01/22 (!) 149/85   02/24/22 (!) 91/50       NPO Status:                                                                                 BMI:   Wt Readings from Last 3 Encounters:   03/24/22 187 lb (84.8 kg)   03/01/22 199 lb 4.7 oz (90.4 kg)   01/08/22 188 lb 7.9 oz (85.5 kg)     There is no height or weight on file to calculate BMI.    CBC:   Lab Results   Component Value Date    WBC 14.4 03/23/2022    RBC 3.99 03/23/2022    HGB 12.2 03/23/2022    HCT 38.4 03/23/2022    MCV 96.3 03/23/2022    RDW 17.3 03/23/2022    PLT 88 03/23/2022       CMP:   Lab Results   Component Value Date     03/23/2022    K 4.5 03/23/2022    CL 97 03/23/2022    CO2 24 03/23/2022    BUN 26 03/23/2022    CREATININE 6.6 03/23/2022    GFRAA 8 03/23/2022    AGRATIO 0.8 03/23/2022    LABGLOM 7 03/23/2022    GLUCOSE 219 03/23/2022    PROT 3.9 03/23/2022    CALCIUM 6.9 03/23/2022    BILITOT 0.5 03/23/2022    ALKPHOS 202 03/23/2022    AST 18 03/23/2022    ALT 6 03/23/2022       POC Tests:   Recent Labs     03/24/22  0748   POCGLU 130*       Coags:   Lab Results   Component Value Date    PROTIME 11.0 08/10/2021    INR 0.97 08/10/2021    APTT 49.2 03/24/2022       HCG (If Applicable):   Lab Results   Component Value Date    PREGTESTUR Negative 08/11/2019        ABGs: No results found for: PHART, PO2ART, AAZ2JVG, YZF5UGC, BEART, Z7JHKJWR     Type & Screen (If Applicable):  No results found for: LABABO, [address]    Anesthesia Evaluation  Patient summary reviewed history of anesthetic complications (difficulty breathing):   Airway: Mallampati: II  TM distance: >3 FB   Neck ROM: full  Mouth opening: > = 3 FB Dental:      Comment: Very poor dentition, multiple broken teeth    Pulmonary:       (-) COPD, asthma, shortness of breath, recent URI and sleep apnea                           Cardiovascular:    (+) hypertension: severe, hyperlipidemia    (-) valvular problems/murmurs, past MI, CAD, CABG/stent, dysrhythmias,  angina,  CHF and no pulmonary hypertension                Neuro/Psych:      (-) seizures, neuromuscular disease, TIA, CVA, headaches and psychiatric history           GI/Hepatic/Renal:   (+) renal disease (PD was done last night): ESRD and dialysis,      (-) GERD, PUD, hepatitis and bowel prep       Endo/Other:    (+) DiabetesType II DM, poorly controlled, , blood dyscrasia::., electrolyte abnormalities, .    (-) hypothyroidism, hyperthyroidism               Abdominal:             Vascular: Other Findings:             Anesthesia Plan      general     ASA 3     (I discussed with the patient the risks and benefits of PIV, general anesthesia, IV Narcotics, PACU.   All questions were answered the patient agrees with the plan.)  Induction:

## 2022-03-24 NOTE — PROGRESS NOTES
Hospitalist Progress Note      PCP: No primary care provider on file. Date of Admission: 3/22/2022    Chief Complaint: confusion    Hospital Course:     Subjective: patient in OR at time of my rounds. Medications:  Reviewed    Infusion Medications    heparin (porcine) Stopped (03/24/22 0746)    sodium chloride      dextrose       Scheduled Medications    lactated ringers bolus  500 mL IntraVENous Once    midodrine  5 mg Oral BID WC    vancomycin  250 mg Oral 4x Daily    sodium chloride flush  5-40 mL IntraVENous 2 times per day    insulin lispro  0-6 Units SubCUTAneous TID WC    insulin lispro  0-3 Units SubCUTAneous Nightly    [Held by provider] meropenem  500 mg IntraVENous Q12H     PRN Meds: albumin human, midodrine, heparin (porcine), diphenhydrAMINE, sodium chloride flush, sodium chloride, ondansetron **OR** ondansetron, polyethylene glycol, acetaminophen **OR** acetaminophen, oxyCODONE, loperamide, glucose, dextrose, glucagon (rDNA), dextrose      Intake/Output Summary (Last 24 hours) at 3/24/2022 0947  Last data filed at 3/23/2022 1903  Gross per 24 hour   Intake 1490 ml   Output 900 ml   Net 590 ml       Exam:    /68   Pulse 95   Temp 99.1 °F (37.3 °C) (Oral)   Resp 22   Ht 5' 3\" (1.6 m)   Wt 187 lb 13.3 oz (85.2 kg)   SpO2 98%   BMI 33.27 kg/m²     General appearance: No apparent distress, appears stated age and cooperative. HEENT: Pupils equal, round, and reactive to light. Conjunctivae/corneas clear. Neck: Supple, with full range of motion. No jugular venous distention. Trachea midline. Respiratory:  Normal respiratory effort. Clear to auscultation, bilaterally without Rales/Wheezes/Rhonchi. Cardiovascular: Regular rate and rhythm with normal S1/S2 without murmurs, rubs or gallops. Abdomen: Soft, non-tender, non-distended with normal bowel sounds. Musculoskeletal: No clubbing, cyanosis or edema bilaterally. Full range of motion without deformity.   Skin: Skin color, texture, turgor normal.  No rashes or lesions. Neurologic:  Neurovascularly intact without any focal sensory/motor deficits. Cranial nerves: II-XII intact, grossly non-focal.  Psychiatric: Alert and oriented, thought content appropriate, normal insight  Capillary Refill: Brisk,< 3 seconds   Peripheral Pulses: +2 palpable, equal bilaterally       Labs:   Recent Labs     03/22/22  0930 03/23/22  0815   WBC 13.7* 14.4*   HGB 12.5 12.2   HCT 38.3 38.4   PLT 87* 88*     Recent Labs     03/22/22  0930 03/23/22  0815   * 137   K 4.4 4.5   CL 97* 97*   CO2 19* 24   BUN 14 26*   CREATININE 5.9* 6.6*   CALCIUM 7.6* 6.9*     Recent Labs     03/22/22  0930 03/23/22  0815   AST 12* 18   ALT 6* 6*   BILITOT 1.1* 0.5   ALKPHOS 225* 202*     No results for input(s): INR in the last 72 hours. No results for input(s): Connee Matar in the last 72 hours. Urinalysis:      Lab Results   Component Value Date    NITRU Negative 05/27/2021    WBCUA 3 05/27/2021    BACTERIA RARE 05/27/2021    RBCUA 4 05/27/2021    BLOODU TRACE 05/27/2021    SPECGRAV 1.016 05/27/2021    GLUCOSEU 250 05/27/2021       Radiology:  CT CHEST W CONTRAST   Final Result   Bilateral pulmonary emboli slightly greater on the right than left. Potential concern for septic emboli given the small foci of irregular   parenchymal lung lesions new from the prior chest CT study, only partially   included and demonstrated on abdomen pelvic CT 1 day earlier. These could   certainly also be metastatic as well but somewhat atypical given the   irregular margins. Multiple liver metastases partially included, unchanged as seen on abdomen   pelvic CT 1 day earlier      Abdominal ascites, decreased from the prior exam 1 day earlier, with recently   demonstrated pneumoperitoneum not seen currently in the included upper   abdomen. .      Esophageal mural thickening suspected esophagitis. Findings were discussed with Henrique Ortiz at 3:50 pm on 3/23/2022. CT ABDOMEN PELVIS W IV CONTRAST Additional Contrast? None   Final Result   1. Worsening inflammatory changes surrounding the mid sigmoid colon likely   due to colitis causing worsening colonic dilatation. Underlying malignancy   cannot be excluded; correlate with colonoscopy. 2. Multiple nodular bilateral ground-glass opacities. The differential   diagnosis includes infectious process and metastatic disease recommend CT of   the chest with contrast for further evaluation. 3. Multiple indeterminate hepatic lesions.   Recommend nonemergent MRI of the   abdomen with without contrast utilizing Eovist.                 Assessment/Plan:    Active Hospital Problems    Diagnosis Date Noted    Colitis [K52.9] 03/22/2022     Sepsis:  - blood/urine cultures, PD fluid cultures  - antibiotics    Drug reaction:  -hypotension and urticaria to ceftriaxone  - discussed with pharmacy --> ok to use Merrem  - do not use Cipro due to prolonged QTc    Presumed Metastatic colon cancer:  - colonoscopy 3/23 with colonic mass  - CT scan concerning for liver mets, possibly pulmonary as well  - oncology consulted  - surgery:  Placed diverting colostomy 3/24-- significant pelvic tumors found    Acute bilateral pulmonary embolism:  - heparin drip due to colonoscopy on 3/23    C dif infection:  - acute  - oral vanc    ESRD on dialysis:  - HD due to concern for infection on PD (recent peritonitis)    DVT Prophylaxis: heparin drip  Diet: Diet NPO Exceptions are: Ice Chips, Sips of Water with Meds  Code Status: Full Code    PT/OT Eval Status: n/a    Dispo - PCU    Leonides Garcia MD

## 2022-03-24 NOTE — BRIEF OP NOTE
Brief Postoperative Note      Patient: Tess Cardenas  YOB: 1965  MRN: [id number]    Date of Procedure: 3/24/2022    Pre-Op Diagnosis: distal sigmoid cancer    Post-Op Diagnosis: Same       Procedure(s):  EXPLORATORY LAPAROTOMY, COLOSTOMY,    Surgeon(s):  Trent Rivera MD    Assistant:  Surgical Assistant: Millie Dandy    Anesthesia: General    Estimated Blood Loss (mL): less than 50     Complications: None    Specimens:   ID Type Source Tests Collected by Time Destination   A : A) omentum Tissue Tissue SURGICAL PATHOLOGY Trent Rivera MD 3/24/2022 1335        Implants:  * No implants in log *      Drains: * No LDAs found *    Findings: extensive tumor burden in pelvis, unable to mobilize the sigmoid colon, proximal diverting loop colostomy done in mid transverse colon.  Obvious omental implant removed for biopsy    Electronically signed by Stuart Ulrich MD on 3/24/2022 at 1:50 PM

## 2022-03-24 NOTE — ANESTHESIA POSTPROCEDURE EVALUATION
Department of Anesthesiology  Postprocedure Note    Patient: Alexis Cabrera  MRN: [id number]  YOB: 1965  Date of evaluation: 3/24/2022  Time:  2:40 PM     Procedure Summary     Date: 03/24/22 Room / Location: [address]    Anesthesia Start: 0059 Anesthesia Stop: 5334    Procedure: EXPLORATORY LAPAROTOMY, COLOSTOMY, (N/A Abdomen) Diagnosis: (UNKNOWN)    Surgeons: Dylan Steven MD Responsible Provider: Renetta Bentley MD    Anesthesia Type: general ASA Status: 3          Anesthesia Type: general    Jen Phase I: Jen Score: 8    Jen Phase II: Jen Score: 8    Last vitals: Reviewed and per EMR flowsheets.        Anesthesia Post Evaluation    Patient location during evaluation: PACU  Patient participation: complete - patient participated  Level of consciousness: awake and alert  Pain score: 4  Airway patency: patent  Nausea & Vomiting: no nausea and no vomiting  Complications: no  Cardiovascular status: blood pressure returned to baseline  Respiratory status: acceptable  Hydration status: euvolemic

## 2022-03-24 NOTE — PROGRESS NOTES
Clinical Pharmacy Note  Heparin Dosing       Lab Results   Component Value Date    APTT >248.0 03/24/2022     Lab Results   Component Value Date    HGB 12.2 03/23/2022    HCT 38.4 03/23/2022    PLT 88 03/23/2022    INR 0.97 08/10/2021       Current Infusion Rate: 1540 units/hr    Plan:  Hold heparin for 1 hour  Rate: decrease to 1300 units/hr  Next aPTT: 0900 3/24/22    Pharmacy will continue to monitor and adjust based on aPTT results.   Melani Ryan, Dee DeeD

## 2022-03-24 NOTE — PLAN OF CARE
Problem: Pain:  Goal: Pain level will decrease  Description: Pain level will decrease  3/24/2022 0944 by Patti Vela RN  Outcome: Ongoing  3/23/2022 2146 by Abel Kwan RN  Outcome: Ongoing  Goal: Control of acute pain  Description: Control of acute pain  3/24/2022 0944 by Patti Vela RN  Outcome: Ongoing  3/23/2022 2146 by Abel Kwan RN  Outcome: Ongoing  Goal: Control of chronic pain  Description: Control of chronic pain  3/24/2022 0944 by Patti Vela RN  Outcome: Ongoing  3/23/2022 2146 by Abel Kwan RN  Outcome: Ongoing     Problem: Nausea/Vomiting:  Goal: Absence of nausea/vomiting  Description: Absence of nausea/vomiting  3/24/2022 0944 by Patti Vela RN  Outcome: Ongoing  3/23/2022 2146 by Abel Kwan RN  Outcome: Ongoing  Goal: Able to drink  Description: Able to drink  3/24/2022 0944 by Patti Vela RN  Outcome: Ongoing  3/23/2022 2146 by Abel Kwan RN  Outcome: Ongoing  Goal: Able to eat  Description: Able to eat  3/24/2022 0944 by Patti Vela RN  Outcome: Ongoing  3/23/2022 2146 by Abel Kwan RN  Outcome: Ongoing  Goal: Ability to achieve adequate nutritional intake will improve  Description: Ability to achieve adequate nutritional intake will improve  3/24/2022 0944 by Patti Vela RN  Outcome: Ongoing  3/23/2022 2146 by Abel Kwan RN  Outcome: Ongoing     Problem: Falls - Risk of:  Goal: Will remain free from falls  Description: Will remain free from falls  3/24/2022 0944 by Patti Vela RN  Outcome: Ongoing  Note: Fall precautions in place. Bed locked and in lowest position. Alarm on. Call light within reach.    3/23/2022 2146 by Abel Kwan RN  Outcome: Ongoing  Goal: Absence of physical injury  Description: Absence of physical injury  3/24/2022 0944 by Patti Vela RN  Outcome: Ongoing  3/23/2022 2146 by Abel Kwan RN  Outcome: Ongoing     Problem: Skin Integrity:  Goal: Will show no infection signs and symptoms  Description: Will show no infection signs and symptoms  3/24/2022 0944 by Lemuel Parr RN  Outcome: Ongoing  3/23/2022 2146 by Kyung Delacruz RN  Outcome: Ongoing  Goal: Absence of new skin breakdown  Description: Absence of new skin breakdown  3/24/2022 0944 by Lemuel Parr RN  Outcome: Ongoing  3/23/2022 2146 by Kyung Delacruz RN  Outcome: Ongoing

## 2022-03-24 NOTE — PROGRESS NOTES
Office: [phone]       Fax: [phone]      Nephrology Progress Note        Patient's Name: Derrick Pierre  Date of Visit: 3/24/2022    Reason for Consult:  ESRD management      History of Present Illness:      Derrick Pierre is a 55 y.o. female with PMHx of hypertension, diabetes mellitus, ESRD who was admitted on 3/22/2022 with complaints of abdominal pain   PD fluid dark  history of PD peritonitis recently with pseudomonas. PD catheter was exchanged. Pt was on HD and just started to do PD on 3/20  Pt was getting prep for colonoscopy   Does 2 exchanges with 2.5% day time and Icodextrin overnight    INTERVAL HISTORY    Feels same  Shortness of breath: Yes  BP low  Mild abdominal pain          Medications:        Allergies:  Bactrim [sulfamethoxazole-trimethoprim], Doxazosin, Geocillin [carbenicillin], Lisinopril, Other, Spironolactone, Tramadol, Bumetanide, and Ibuprofen  Scheduled Meds:   metroNIDAZOLE  500 mg IntraVENous Once    sodium chloride flush  5-40 mL IntraVENous 2 times per day    meropenem  500 mg IntraVENous Once    lactated ringers bolus  500 mL IntraVENous Once    midodrine  5 mg Oral BID WC    vancomycin  250 mg Oral 4x Daily    sodium chloride flush  5-40 mL IntraVENous 2 times per day    insulin lispro  0-6 Units SubCUTAneous TID WC    insulin lispro  0-3 Units SubCUTAneous Nightly     Continuous Infusions:   sodium chloride      heparin (porcine) Stopped (03/24/22 0746)    sodium chloride 25 mL (03/24/22 1114)    dextrose           Objective:       Vitals:  /68   Pulse 96   Temp 97.8 °F (36.6 °C) (Temporal)   Resp 16   Ht 5' 3\" (1.6 m)   Wt 187 lb (84.8 kg)   SpO2 97%   BMI 33.13 kg/m²   Constitutional:  awake, NAD  Neck: no bruits, No JVD  Cardiovascular:  S1, S2 reg  Respiratory: CTA, no crackles  Abdomen:  +BS, soft, tender, ND  Ext: trace lower extremity edema  Access: abd PD catheter in place, no drainage/redness around exit site. CNS: alert, no agitation    Data:     Labs:  Hepatic:   Recent Labs     03/22/22  0930 03/23/22  0815   AST 12* 18   ALT 6* 6*   BILITOT 1.1* 0.5   ALKPHOS 225* 202*     BNP: No results for input(s): BNP in the last 72 hours. ABGs: No results for input(s): PHART, PO2ART, EKO2ZCT in the last 72 hours. IMAGING:  CT CHEST W CONTRAST   Final Result   Bilateral pulmonary emboli slightly greater on the right than left. Potential concern for septic emboli given the small foci of irregular   parenchymal lung lesions new from the prior chest CT study, only partially   included and demonstrated on abdomen pelvic CT 1 day earlier. These could   certainly also be metastatic as well but somewhat atypical given the   irregular margins. Multiple liver metastases partially included, unchanged as seen on abdomen   pelvic CT 1 day earlier      Abdominal ascites, decreased from the prior exam 1 day earlier, with recently   demonstrated pneumoperitoneum not seen currently in the included upper   abdomen. .      Esophageal mural thickening suspected esophagitis. Findings were discussed with Billy Doe at 3:50 pm on 3/23/2022. CT ABDOMEN PELVIS W IV CONTRAST Additional Contrast? None   Final Result   1. Worsening inflammatory changes surrounding the mid sigmoid colon likely   due to colitis causing worsening colonic dilatation. Underlying malignancy   cannot be excluded; correlate with colonoscopy. 2. Multiple nodular bilateral ground-glass opacities. The differential   diagnosis includes infectious process and metastatic disease recommend CT of   the chest with contrast for further evaluation. 3. Multiple indeterminate hepatic lesions. Recommend nonemergent MRI of the   abdomen with without contrast utilizing Eovist.             Assessment :       1.  ESRD   Etiology: suspected DN  Access: Abd PD cath and Tunneled Cath  Volume: Hypervolemic        2. Electrolytes/Acid base  No Dyskalemia    Recent Labs     03/23/22  0815      K 4.5   CO2 24       3. BMD  -Hyperphosphatemia, SHPT  -maintained on Renvela    Lab Results   Component Value Date    CALCIUM 6.9 (L) 03/23/2022    PHOS 4.5 02/17/2022        4. HTN  -Blood pressure high       BP Readings from Last 1 Encounters:   03/24/22 135/68       5. Anemia  -anemia of CKD  -assess for CHRIS    Recent Labs     03/23/22  0815   HGB 12.2     6. DM    7. abdominal pain   -colitis  -suspected malignancy  -PD fluid: nuc cells 150    PLAN :     - Empiric antibiotics  - PD fluid for culture,   - consider doing HD while in hospital. Seen on HD  - hold BP meds:   - MBD management  - Anemia management  - Dose meds to GFR<10    Thank you for allowing us to participate in care of Danae Dunn . We will continue to follow. Feel free to contact me with any questions.       Skip Boykin MD  3/24/2022    [facility]  Office : [phone]  Fax :[phone]

## 2022-03-24 NOTE — H&P
Preoperative History and Physical Update    H&P from 3/22/2022 was reviewed    Sigmoidoscopy showed obstructing tumor at 20 cm   Biopsy shows adenocarcinoma    PE  Alert and oriented  Abdomen soft, non distended, mild diffuse tenderness    A/P  Sigmoid colon cancer   Nearly obstructing   Metastatic    For exploration and colostomy today. Will plan to resect the primary if able. Possible liver biopsy    The risks, benefits and alternatives to the planned procedure were discussed. Patient expressed an understanding and is willing to proceed.     Electronically signed by Joan Combs MD on 3/24/2022 at 12:26 PM

## 2022-03-24 NOTE — CONSULTS
[facility]    Consult Note        HISTORY OF PRESENT ILLNESS:      Ms. Yris Ramriez  is a 55 y.o. female we are seeing in consultation for metastatic colon cancer. Patient has a history of end-stage renal disease is on dialysis. She also has diabetes with complicating diabetic neuropathy and retinopathy. Also, hypertension hyperlipidemia and now metastatic colon cancer. She initially presented with septic shock on 2/15/2022 thought to be due to peritonitis as a complication of her peritoneal dialysis. She was hospitalized at [facility] from 2/15/2022 through 3/1/2022. She grew out Pseudomonas at that time and her peritoneal dialysis catheter was replaced on 2/24/2022. At that same time they elected to do a ventral hernia repair. Tissue surrounding the ventral hernia wall appeared to be inflamed it was thought that this was initially infectious. However, pathology showed invasive moderately differentiated colon adenocarcinoma. Her preoperative CT scan of 2/15/2022 had shown a 9 mm groundglass nodule in the left lung base, there was mild sigmoid colonic diverticulosis with confluent fat stranding surrounding a short segment of colon containing the diverticula. The area around the ventral abdominal wall hernia was described as containing \"inflamed fat. \"  I saw the patient in the outpatient setting on 3/14/2022 and recommended PET scan. Colonoscopy was also recommended. On the day that she was to have her PET scan she presented with abdominal pain and was admitted 3/23/22 for colitis. That same day she underwent colonoscopy and was found to have an obstructing rectosigmoid mass approximately 20 cm from the anal verge. She was seen yesterday by Dr. Atilio Tadeo as well and surgery is tentatively planned for today. Complicating her case were CT findings.   On 3/22/2022 she had a CT of the abdomen and pelvis that now showed multiple groundglass opacities in the lungs with a left lower lobe pulmonary nodule now measuring 0.8 x 1.6 cm. She had worsening inflammatory changes in the mid sigmoid colon and multiple indeterminate hepatic lesions. A CTA of the chest done yesterday confirmed bilateral pulmonary emboli without evidence of right heart strain. She was heparinized. No significant abdominal pain. She is not short of breath. She is fearful of surgery.   She had questions relative to stage of her cancer and prognosis      PAST MEDICAL HISTORY:      Past Medical History:   Diagnosis Date    Anemia 9/15/2014    Chronic kidney disease     Diabetes mellitus ([address]     Diabetes mellitus type 1 ([address]     Diabetic retinopathy ([address]     Diabetic retinopathy associated with type 2 diabetes mellitus, without macular edema     Hyperlipidemia 7/27/2015    Hypertension     Kidney stone     Mixed hyperlipidemia 7/27/2015    Orthostatic hypotension     Vitreous hemorrhage of right eye ([address] 8/15/2017       PAST SURGICAL HISTORY:      Past Surgical History:   Procedure Laterality Date    APPENDECTOMY      DIALYSIS CATHETER INSERTION N/A 2/24/2022    LAPAROSCOPIC PLACEMENT OF PERITONEAL DIALYSIS CATHETER performed by Raymond Cheatham MD at [address] [place] [address] N/A 2/24/2022    REMOVAL OF EXISTING PERITONEAL DIALYSIS CATHETER performed by Raymond Cheatham MD at [facility] N/A 8/12/2019    LAPAROSCOPIC PERITONEAL DIALYSIS CATHETER PLACEMENT WITH AXEL TROCHAR [facility] N/A 3/23/2022    SIGMOIDOSCOPY BIOPSY FLEXIBLE performed by Yenny Bynum MD at [address]  3/23/2022    SIGMOIDOSCOPY SUBMUCOSAL TATTOO INJECTION performed by Yenny Bynum MD at [address] N/A 2/24/2022    REPAIR OF SMALL VENTRAL HERNIA performed by Raymond Cheatham MD at [address] [facility] MEDICATIONS:    Current Facility-Administered Medications   Medication Dose Route Frequency Provider Last Rate Last Admin    lactated ringers bolus  500 mL IntraVENous Once Erick Varner MD        albumin human 25 % IV solution 25 g  25 g IntraVENous PRN Erick Varner MD   Stopped at 03/23/22 1026    midodrine (PROAMATINE) tablet 10 mg  10 mg Oral PRN Erick Varner MD        heparin (porcine) injection 3,000 Units  3,000 Units IntraCATHeter PRN Erick Varner MD        midodrine (PROAMATINE) tablet 5 mg  5 mg Oral BID WC Erick Varner MD   5 mg at 03/23/22 1633    diphenhydrAMINE (BENADRYL) tablet 25 mg  25 mg Oral Q6H PRN Sukhi Lyn MD   25 mg at 03/24/22 0446    vancomycin (VANCOCIN) capsule 250 mg  250 mg Oral 4x Daily Sukhi Lyn MD   250 mg at 03/23/22 2031    heparin 25,000 units in dextrose 5 % 250 mL infusion (rate based)  0-3,000 Units/hr IntraVENous Continuous Sukhi Lyn MD 13 mL/hr at 03/24/22 0444 1,300 Units/hr at 03/24/22 0444    sodium chloride flush 0.9 % injection 5-40 mL  5-40 mL IntraVENous 2 times per day Erick Varner MD   10 mL at 03/22/22 2104    sodium chloride flush 0.9 % injection 5-40 mL  5-40 mL IntraVENous PRN Erick Varner MD        0.9 % sodium chloride infusion  25 mL IntraVENous PRN Erick Varner MD        ondansetron (ZOFRAN-ODT) disintegrating tablet 4 mg  4 mg Oral Q8H PRN Erick Varner MD   4 mg at 03/22/22 1519    Or    ondansetron (ZOFRAN) injection 4 mg  4 mg IntraVENous Q6H PRN Erick Varner MD   4 mg at 03/23/22 0553    polyethylene glycol (GLYCOLAX) packet 17 g  17 g Oral Daily PRN Erick Varner MD        acetaminophen (TYLENOL) tablet 650 mg  650 mg Oral Q6H PRN Erick Varner MD        Or    acetaminophen (TYLENOL) suppository 650 mg  650 mg Rectal Q6H PRN Erick Varner MD        oxyCODONE (ROXICODONE) immediate release tablet 5 mg  5 mg Oral Q4H PRN Mac Vaughn MD   5 mg at 03/22/22 1519    loperamide (IMODIUM) capsule 2 mg  2 mg Oral 4x Daily PRN Mac Vaughn MD   2 mg at 03/24/22 0120    glucose (GLUTOSE) 40 % oral gel 15 g  15 g Oral PRN Mac Vaughn MD        dextrose 50 % IV solution  12.5 g IntraVENous PRN Mac Vaughn MD        glucagon (rDNA) injection 1 mg  1 mg IntraMUSCular PRN Mac Vaughn MD        dextrose 5 % solution  100 mL/hr IntraVENous PRN Mac Vaughn MD        insulin lispro (HUMALOG) injection vial 0-6 Units  0-6 Units SubCUTAneous TID WC Mac Vaughn MD        insulin lispro (HUMALOG) injection vial 0-3 Units  0-3 Units SubCUTAneous Nightly Mac Vaughn MD   1 Units at 03/23/22 2032    [Held by provider] meropenem (MERREM) 500 mg IVPB (mini-bag)  500 mg IntraVENous Q12H Mac Vaughn MD   Stopped at 03/23/22 0149       ALLERGIES:    Allergies   Allergen Reactions    Bactrim [Sulfamethoxazole-Trimethoprim]     Doxazosin Other (See Comments)     Other reaction(s): Other (See Comments)  Bleeding  Punch in chest      Geocillin [Carbenicillin]     Lisinopril Other (See Comments)     Pt thinks cr levels went up due to med.  Other      Artificial sweetners, nutrasweet    Spironolactone      Bad taste, emesis.  Pt refuses to take    Tramadol     Bumetanide Nausea And Vomiting    Ibuprofen Nausea And Vomiting       SOCIAL HISTORY:    Social History     Socioeconomic History    Marital status:      Spouse name: None    Number of children: None    Years of education: None    Highest education level: None   Occupational History    None   Tobacco Use    Smoking status: Never Smoker    Smokeless tobacco: Never Used   Vaping Use    Vaping Use: Never used   Substance and Sexual Activity    Alcohol use: No    Drug use: No    Sexual activity: Yes     Partners: Male   Other Topics Concern    None   Social History Narrative    None     Social Determinants of Health     Financial Resource Strain:     Difficulty of Paying Living Expenses: Not on file   Food Insecurity:     Worried About Running Out of Food in the Last Year: Not on file    Sarah of Food in the Last Year: Not on file   Transportation Needs:     Lack of Transportation (Medical): Not on file    Lack of Transportation (Non-Medical): Not on file   Physical Activity:     Days of Exercise per Week: Not on file    Minutes of Exercise per Session: Not on file   Stress:     Feeling of Stress : Not on file   Social Connections:     Frequency of Communication with Friends and Family: Not on file    Frequency of Social Gatherings with Friends and Family: Not on file    Attends Jew Services: Not on file    Active Member of [address] [place] [address] [employer] or Organizations: Not on file    Attends Club or Organization Meetings: Not on file    Marital Status: Not on file   Intimate Partner Violence:     Fear of Current or Ex-Partner: Not on file    Emotionally Abused: Not on file    Physically Abused: Not on file    Sexually Abused: Not on file   Housing Stability:     Unable to Pay for Housing in the Last Year: Not on file    Number of [place] in the Last Year: Not on file    Unstable Housing in the Last Year: Not on file   :      FAMILY HISTORY:    Family History   Problem Relation Age of Onset    Heart Disease Mother         heart attack 2007    High Blood Pressure Mother     Diabetes Father     Heart Disease Father     Emphysema Father     Cancer Maternal Grandmother         cervical cancer    Lung Cancer Maternal Grandfather         black lung    Diabetes Paternal Grandmother     Heart Disease Paternal Grandfather        REVIEW OF SYSTEMS:      · Constitutional: Denies fever, sweats, pos weight loss. .     · Eyes: No visual changes or diplopia. No scleral icterus. · ENT: No Headaches, hearing loss or vertigo.  No mouth sores or sore throat. · Cardiovascular: No chest pain, dyspnea on exertion, palpitations or loss of consciousness. · Respiratory: No cough or wheezing, no sputum production. No hemoptysis. .    · Gastrointestinal: See HPI  · Genitourinary: No dysuria, trouble voiding, or hematuria. · Musculoskeletal:  Generalized weakness. No joint complaints. · Integumentary: No rash or pruritis. · Neurological: No headache, diplopia. No change in gait, balance, or coordination. No paresthesias. · Endocrine: No temperature intolerance. No excessive thirst, fluid intake, or urination. · Hematologic/Lymphatic: No abnormal bruising or ecchymoses, blood clots or swollen lymph nodes. · Allergic/Immunologic: No nasal congestion or hives. PHYSICAL EXAM:      Vitals:  /66   Pulse 96   Temp 97.5 °F (36.4 °C) (Oral)   Resp 23   Ht 5' 3\" (1.6 m)   Wt 187 lb 13.3 oz (85.2 kg)   SpO2 100%   BMI 33.27 kg/m²     CONSTITUTIONAL: alert, cooperative, no apparent distress. EYES:  Pupils equal, round, sclera non-icteric, conjunctiva normal  ENT:  Normocephalic, without obvious abnormality, atraumatic, xternal ears without lesions, oral pharynx with moist mucus membranes, no mucositis. LUNGS:  Clear to auscultation bilaterally, no crackles or wheezing  CARDIOVASCULAR:  Regular rate and rhythm, normal S1 and S2, no S3 or S4, and no murmur noted  ABDOMEN:  Normal bowel sounds, soft, non-distended, non-tender, Peritoneal dialysis catheter intact  MUSCULOSKELETAL:  There is no redness, warmth, or swelling of the joints. NEUROLOGIC:  Alert. No focal findings. SKIN:  Normal turgor, no bruising or petichea  EXT: without clubbing, cyanosis or edema. Homans negative.       LAB DATA:    Labs:  General Labs:  CBC with Differential:    Lab Results   Component Value Date    WBC 14.4 03/23/2022    RBC 3.99 03/23/2022    HGB 12.2 03/23/2022    HCT 38.4 03/23/2022    PLT 88 03/23/2022    MCV 96.3 03/23/2022    MCH 30.5 03/23/2022    MCHC 31.6 abdomen and pelvis there were findings consistent with sigmoid colon diet this and the area around the 2 cm ventral abdominal wall was described as having \"containing inflamed fat. \" In retrospect this is probably the infiltrating cancer. We will ask for biomarker test to be done on the specimen. The pathologic findings are most consistent with colon cancer but a colon primary had not been identified. Now, colonoscopy we see that she has an obstructing sigmoid mass. There is also evidence of probable liver and pulmonary metastases in addition to her peritoneal metastases. We had previously ordered a PET scan to further evaluate for metastatic disease. Management of her disease will be challenging in light of her co-morbidities. We did not commit to any regimen at this time as we would like to see more information. We will ask for further characterization of her tumor. We do know that FOLFOX can be administered in the setting of hemodialysis but Xeloda has to be avoided completely. I will need to look into whether or not Avastin can be Biomarker testing may suggest other alternatives    The patient will continue her dialysis. She has multiple comorbidities. She does not wish to get COVID vaccination this was discussed today as well. Thank you very much for allowing me to participate in the care of this patient. I will plan to keep you fully appraised of your patient's progress. Tanja Michele. Esther Buenrostro M.D., MPH  Chair, Department of  Clinical [facility], [address] [place] [address]  Office [phone]  Cell [phone]  [email or web address]

## 2022-03-25 NOTE — CARE COORDINATION
Wound care consulted for new ostomy. Pt currently in dialysis. 1-piece pouch intact. Loop stoma pink and moist with bloody effluent in pouch. Bridge intact. Will plan ostomy teaching session on Monday.    Electronically signed by Zehra Webster RN on 3/25/2022 at 1:23 PM

## 2022-03-25 NOTE — PROGRESS NOTES
Clinical Pharmacy Note  Heparin Dosing       Lab Results   Component Value Date    APTT 51.2 03/25/2022     Lab Results   Component Value Date    HGB 12.2 03/23/2022    HCT 38.4 03/23/2022    PLT 88 03/23/2022    INR 0.97 08/10/2021       Current Infusion Rate: 1300 units/hr    Plan:  Bolus: 3400 units  Rate: increase to 1470 units/hr  Next aPTT: 1100 3/25/22    Pharmacy will continue to monitor and adjust based on aPTT results.   Juan Daniel Nava, PharmD

## 2022-03-25 NOTE — PROGRESS NOTES
Hospitalist Progress Note      PCP: No primary care provider on file. Date of Admission: 3/22/2022    Chief Complaint: s/p colostomy placement    Hospital Course:     Subjective: patient's pain controlled, no SOB, other complaints        Medications:  Reviewed    Infusion Medications    heparin (porcine) 1,470 Units/hr (03/25/22 0358)    sodium chloride 25 mL (03/24/22 1114)    dextrose       Scheduled Medications    metroNIDAZOLE  500 mg IntraVENous Once    meropenem  500 mg IntraVENous Once    lactated ringers bolus  500 mL IntraVENous Once    midodrine  5 mg Oral BID WC    vancomycin  250 mg Oral 4x Daily    sodium chloride flush  5-40 mL IntraVENous 2 times per day    insulin lispro  0-6 Units SubCUTAneous TID WC    insulin lispro  0-3 Units SubCUTAneous Nightly     PRN Meds: morphine **OR** morphine, albumin human, midodrine, heparin (porcine), diphenhydrAMINE, sodium chloride flush, sodium chloride, ondansetron **OR** ondansetron, polyethylene glycol, acetaminophen **OR** acetaminophen, oxyCODONE, glucose, dextrose, glucagon (rDNA), dextrose      Intake/Output Summary (Last 24 hours) at 3/25/2022 0859  Last data filed at 3/24/2022 1811  Gross per 24 hour   Intake 240 ml   Output --   Net 240 ml       Exam:    BP (!) 146/102   Pulse 94   Temp 97.9 °F (36.6 °C) (Oral)   Resp 13   Ht 5' 3\" (1.6 m)   Wt 191 lb 12.8 oz (87 kg)   SpO2 95%   BMI 33.98 kg/m²     General appearance: No apparent distress, appears stated age and cooperative. HEENT: Pupils equal, round, and reactive to light. Conjunctivae/corneas clear. Neck: Supple, with full range of motion. No jugular venous distention. Trachea midline. Respiratory:  Normal respiratory effort. Clear to auscultation, bilaterally without Rales/Wheezes/Rhonchi. Cardiovascular: Regular rate and rhythm with normal S1/S2 without murmurs, rubs or gallops.   Abdomen: Soft, appropriate post-surgical tenderness, non-distended with normal bowel sounds. Colostomy in place  Musculoskeletal: No clubbing, cyanosis or edema bilaterally. Full range of motion without deformity. Skin: Skin color, texture, turgor normal.  No rashes or lesions. Neurologic:  Neurovascularly intact without any focal sensory/motor deficits. Cranial nerves: II-XII intact, grossly non-focal.  Psychiatric: Alert and oriented, thought content appropriate, normal insight  Capillary Refill: Brisk,< 3 seconds   Peripheral Pulses: +2 palpable, equal bilaterally       Labs:   Recent Labs     03/22/22  0930 03/23/22  0815   WBC 13.7* 14.4*   HGB 12.5 12.2   HCT 38.3 38.4   PLT 87* 88*     Recent Labs     03/22/22  0930 03/23/22  0815   * 137   K 4.4 4.5   CL 97* 97*   CO2 19* 24   BUN 14 26*   CREATININE 5.9* 6.6*   CALCIUM 7.6* 6.9*     Recent Labs     03/22/22  0930 03/23/22  0815   AST 12* 18   ALT 6* 6*   BILITOT 1.1* 0.5   ALKPHOS 225* 202*     No results for input(s): INR in the last 72 hours. No results for input(s): Elton Snowball in the last 72 hours. Urinalysis:      Lab Results   Component Value Date    NITRU Negative 05/27/2021    WBCUA 3 05/27/2021    BACTERIA RARE 05/27/2021    RBCUA 4 05/27/2021    BLOODU TRACE 05/27/2021    SPECGRAV 1.016 05/27/2021    GLUCOSEU 250 05/27/2021       Radiology:  CT CHEST W CONTRAST   Final Result   Bilateral pulmonary emboli slightly greater on the right than left. Potential concern for septic emboli given the small foci of irregular   parenchymal lung lesions new from the prior chest CT study, only partially   included and demonstrated on abdomen pelvic CT 1 day earlier. These could   certainly also be metastatic as well but somewhat atypical given the   irregular margins.       Multiple liver metastases partially included, unchanged as seen on abdomen   pelvic CT 1 day earlier      Abdominal ascites, decreased from the prior exam 1 day earlier, with recently   demonstrated pneumoperitoneum not seen currently in the included upper   abdomen. .      Esophageal mural thickening suspected esophagitis. Findings were discussed with Heidi Lopez at 3:50 pm on 3/23/2022. CT ABDOMEN PELVIS W IV CONTRAST Additional Contrast? None   Final Result   1. Worsening inflammatory changes surrounding the mid sigmoid colon likely   due to colitis causing worsening colonic dilatation. Underlying malignancy   cannot be excluded; correlate with colonoscopy. 2. Multiple nodular bilateral ground-glass opacities. The differential   diagnosis includes infectious process and metastatic disease recommend CT of   the chest with contrast for further evaluation. 3. Multiple indeterminate hepatic lesions. Recommend nonemergent MRI of the   abdomen with without contrast utilizing Eovist.                 Assessment/Plan:    Active Hospital Problems    Diagnosis Date Noted    Cancer of sigmoid colon ([facility] [address] [C18.7]     Colitis [K52.9] 03/22/2022     Sepsis:  - blood/urine cultures, PD fluid cultures  - ID consulted:  Infectious source likely C dif   - cont flagyl and oral vancomycin    Drug reaction:  -hypotension and urticaria to ceftriaxone  - discussed with pharmacy --> ok to use Merrem if needed  - do not use Cipro due to prolonged QTc    Presumed Metastatic colon cancer:  - colonoscopy 3/23 with colonic mass  - CT scan concerning for liver mets, possibly pulmonary as well  - oncology consulted  - surgery:  Placed diverting colostomy 3/24-- significant pelvic tumors found    Acute bilateral pulmonary embolism:  - heparin drip due to colonoscopy on 3/23  - Coumadin initiated 3/25    C dif infection:  - acute  - oral vanc and IV flagyl    ESRD on dialysis:  - HD due to concern for infection on PD (recent peritonitis)    DVT Prophylaxis: heparin drip  Diet: ADULT DIET;  Full Liquid  Code Status: Full Code    PT/OT Eval Status: n/a    Dispo - PCU    Heidi Lopez MD

## 2022-03-25 NOTE — PROGRESS NOTES
Hematology Oncology Daily Progress Note    Admit Date: 3/22/2022  Hospital day several    Subjective:     Patient has complaints of mild to moderate fatigue--denies sob/cp. Medication side effects: none    Scheduled Meds:   metroNIDAZOLE  500 mg IntraVENous Once    meropenem  500 mg IntraVENous Once    lactated ringers bolus  500 mL IntraVENous Once    midodrine  5 mg Oral BID     vancomycin  250 mg Oral 4x Daily    sodium chloride flush  5-40 mL IntraVENous 2 times per day    insulin lispro  0-6 Units SubCUTAneous TID     insulin lispro  0-3 Units SubCUTAneous Nightly     Continuous Infusions:   heparin (porcine) 1,470 Units/hr (03/25/22 0358)    sodium chloride 25 mL (03/24/22 1114)    dextrose       PRN Meds:morphine **OR** morphine, albumin human, midodrine, heparin (porcine), diphenhydrAMINE, sodium chloride flush, sodium chloride, ondansetron **OR** ondansetron, polyethylene glycol, acetaminophen **OR** acetaminophen, oxyCODONE, glucose, dextrose, glucagon (rDNA), dextrose    Review of Systems  Pertinent items are noted in HPI. REVIEW OF SYSTEMS:         · Constitutional: Denies fever, sweats, weight loss     · Eyes: No visual changes or diplopia. No scleral icterus. · ENT: No Headaches, hearing loss or vertigo. No mouth sores or sore throat. · Cardiovascular: No chest pain, dyspnea on exertion, palpitations or loss of consciousness. · Respiratory: No cough or wheezing, no sputum production. No hemoptysis. .    · Gastrointestinal: No abdominal pain, appetite loss, blood in stools. No change in bowel habits. · Genitourinary: No dysuria, trouble voiding, or hematuria. · Musculoskeletal:  Generalized weakness. No joint complaints. · Integumentary: No rash or pruritis. · Neurological: No headache, diplopia. No change in gait, balance, or coordination. No paresthesias. · Endocrine: No temperature intolerance. No excessive thirst, fluid intake, or urination.    · Hematologic/Lymphatic: No abnormal bruising or ecchymoses, blood clots or swollen lymph nodes. · Allergic/Immunologic: No nasal congestion or hives. ·     Objective:     Patient Vitals for the past 8 hrs:   BP Temp Temp src Pulse Resp SpO2 Weight   03/25/22 0745 (!) 146/102 97.9 °F (36.6 °C) Oral 94 13 95 % --   03/25/22 0547 -- -- -- -- -- -- 191 lb 12.8 oz (87 kg)   03/25/22 0330 (!) 165/94 98.4 °F (36.9 °C) Oral 92 21 92 % --     I/O last 3 completed shifts: In: 290 [P.O.:290]  Out: -   No intake/output data recorded. BP (!) 146/102   Pulse 94   Temp 97.9 °F (36.6 °C) (Oral)   Resp 13   Ht 5' 3\" (1.6 m)   Wt 191 lb 12.8 oz (87 kg)   SpO2 95%   BMI 33.98 kg/m²     General Appearance:    Alert, cooperative, no distress, appears stated age   Head:    Normocephalic, without obvious abnormality, atraumatic   Eyes:    PERRL, conjunctiva/corneas clear, EOM's intact, fundi     benign, both eyes        Ears:    Normal TM's and external ear canals, both ears   Nose:   Nares normal, septum midline, mucosa normal, no drainage    or sinus tenderness   Throat:   Lips, mucosa, and tongue normal; teeth and gums normal   Neck:   Supple, symmetrical, trachea midline, no adenopathy;        thyroid:  No enlargement/tenderness/nodules; no carotid    bruit or JVD   Back:     Symmetric, no curvature, ROM normal, no CVA tenderness   Lungs:     Clear to auscultation bilaterally, respirations unlabored   Chest wall:    No tenderness or deformity   Heart:    Regular rate and rhythm, S1 and S2 normal, no murmur, rub   or gallop   Abdomen:     Soft, non-tender, decreased BSs,     no masses, no organomegaly           Extremities:   Extremities normal, atraumatic, no cyanosis or edema   Pulses:   2+ and symmetric all extremities   Skin:   Skin color, texture, turgor normal, no rashes or lesions   Lymph nodes:   Cervical, supraclavicular, and axillary nodes normal   Neurologic:   CNII-XII intact.  Normal strength, sensation and reflexes       throughout Data Review  CBC:   Lab Results   Component Value Date    WBC 14.4 03/23/2022    RBC 3.99 03/23/2022       Assessment:     Principal Problem:    Colitis  Active Problems:    Cancer of sigmoid colon ([facility] [address]  Resolved Problems:    * No resolved hospital problems. *      Plan:     1. Stage IV colon cancer. Dr. Vishnu Serra will come up with a treatment plan as an outpatient. 2.  Pulmonary embolus. Due to her end-stage renal disease, her anticoagulation options are limited.   Although Eliquis has been used with caution in patients with end-stage renal disease, Coumadin is likely the safest.        Electronically signed by Nathen Barrow MD on 3/25/2022 at 8:13 AM

## 2022-03-25 NOTE — PLAN OF CARE
Problem: Pain:  Goal: Pain level will decrease  Description: Pain level will decrease  3/24/2022 2109 by Carmine Avery RN  Outcome: Ongoing     Problem: Nausea/Vomiting:  Goal: Absence of nausea/vomiting  Description: Absence of nausea/vomiting  3/24/2022 2109 by Carmine Avery RN  Outcome: Ongoing     Problem: Falls - Risk of:  Goal: Will remain free from falls  Description: Will remain free from falls  3/24/2022 2109 by Carmine Avery RN  Outcome: Ongoing     Problem: Skin Integrity:  Goal: Will show no infection signs and symptoms  Description: Will show no infection signs and symptoms  3/24/2022 2109 by Carmine Avery RN  Outcome: Ongoing     Problem: Fluid Volume:  Goal: Will show no signs or symptoms of fluid imbalance  Description: Will show no signs or symptoms of fluid imbalance  Outcome: Ongoing     Problem: Nutrition  Goal: Optimal nutrition therapy  3/24/2022 2109 by Carmine Avery RN  Outcome: Ongoing

## 2022-03-25 NOTE — PROGRESS NOTES
Infectious Disease Follow up Notes  Admit Date: 3/22/2022  Hospital Day: 4    Antibiotics :   Oral Vancomycin    CHIEF COMPLAINT:     Colon cancer  Metastatic  PE  ESRD ON pd  C dif diarrhea    Subjective interval History :  55 y.o.  woman with a history of end-stage renal disease on peritoneal dialysis, diabetes, diabetic retinopathy, neuropathy, recent admission for PD related Peritonitis and Fluid   culture was positive for Pseudomonas. On that admission she also required ventral hernia repair pathology later reported to be positive for adenocarcinoma. She had a CT abdomen pelvis on that admission indicating sigmoid colitis she was supposed to get a colonoscopy as an outpatient. Given the new diagnosis of adenocarcinoma she was referred to see Dr. Eda Lawson oncologist.  In the process of work-up she underwent a colonoscopy on 3/23/22 noted to have obstructing rectosigmoid mass and she was directly admitted through ED for further evaluation. She was also complaining of ongoing diarrhea after finishing her antibiotic therapy she was treated with a course of ciprofloxacin and Flagyl. She still tested positive for C. difficile on this admission. CT abdomen pelvis from 3/22/2 positive for worsening inflammatory changes in the sigmoid colon concerning for malignancy with multiple nodular bilateral progressive opacities concerning for metastatic disease, she also noted to have multiple hepatic lesions on the CT scan. She was found to be tachycardic and tachypneic ,  shortness of breath hence underwent CT chest PE protocol positive for bilateral pulmonary embolism. Subsequently placed on heparin drip.   Given ongoing C. difficile diarrhea we are consulted for recommendations  Location : abd pain, nausea, vomiting       Quality : aching +       Severity 10/10:     Duration :1 week      Timing : intermittent   Context : Recent diagnosis of colon cancer     Modifying factors :none   Associated signs and symptoms: sob, abd pain, nausea, vomiting, diarrhea.       Interval History : Remains on nasal cannula and cough + ostomy working ok abd pain +       Past Medical History:    Past Medical History:   Diagnosis Date    Anemia 9/15/2014    Chronic kidney disease     [facility] mellitus [facility]     Diabetes mellitus type 1 ([address]     Diabetic retinopathy ([facility]     Diabetic retinopathy associated with type 2 diabetes mellitus, without macular edema     Hyperlipidemia 7/27/2015    Hypertension     Kidney stone     Mixed hyperlipidemia 7/27/2015    Orthostatic hypotension     Vitreous hemorrhage of right eye ([facility] [address] [facility] 8/15/2017       Past Surgical History:    Past Surgical History:   Procedure Laterality Date    APPENDECTOMY      DIALYSIS CATHETER INSERTION N/A 2/24/2022    LAPAROSCOPIC PLACEMENT OF PERITONEAL DIALYSIS CATHETER performed by Belem Mcmillan MD at [address] [facility] N/A 2/24/2022    REMOVAL OF EXISTING PERITONEAL DIALYSIS CATHETER performed by Belem Mcmillan MD at [address] [facility] N/A 8/12/2019    LAPAROSCOPIC PERITONEAL DIALYSIS CATHETER PLACEMENT WITH AXEL TROCHAR performed by Belem Mcmillan MD at [address] 3/24/2022    EXPLORATORY LAPAROTOMY, COLOSTOMY performed by Reymundo Dancer, MD at [facility] N/A 3/23/2022    SIGMOIDOSCOPY BIOPSY FLEXIBLE performed by Kris Sneed MD at [address]  3/23/2022    SIGMOIDOSCOPY SUBMUCOSAL TATTOO INJECTION performed by Kris Sneed MD at [address] N/A 2/24/2022    REPAIR OF SMALL VENTRAL HERNIA performed by Belem Mcmillan MD at [place] [address]       Current Medications:    No outpatient medications have been marked as taking for the 3/22/22 encounter [facility] Encounter). Allergies:  Bactrim [sulfamethoxazole-trimethoprim], Doxazosin, Geocillin [carbenicillin], Lisinopril, Other, Spironolactone, Tramadol, Bumetanide, and Ibuprofen    Immunizations : There is no immunization history on file for this patient.     Social History:    Social History     Tobacco Use    Smoking status: Never Smoker    Smokeless tobacco: Never Used   Vaping Use    Vaping Use: Never used   Substance Use Topics    Alcohol use: No    Drug use: No     Social History     Tobacco Use   Smoking Status Never Smoker   Smokeless Tobacco Never Used      Family History   Problem Relation Age of Onset    Heart Disease Mother         heart attack 2007    High Blood Pressure Mother     Diabetes Father     Heart Disease Father     Emphysema Father     Cancer Maternal Grandmother         cervical cancer    Lung Cancer Maternal Grandfather         black lung    Diabetes Paternal Grandmother     Heart Disease Paternal Grandfather        REVIEW OF SYSTEMS:       Constitutional:  negative for fevers, chills, night sweats  Eyes:  negative for blurred vision, eye discharge, visual disturbance   HEENT:  negative for hearing loss, ear drainage,nasal congestion  Respiratory  cough +  shortness of breath +  or hemoptysis   Cardiovascular:  negative for chest pain, palpitations, syncope  Gastrointestinal:  negative for nausea, vomiting, diarrhea++, constipation, abdominal pain+   Genitourinary:  negative for frequency, dysuria, urinary incontinence, hematuria  Hematologic/Lymphatic:  negative for easy bruising, bleeding and lymphadenopathy  Allergic/Immunologic:  negative for recurrent infections, angioedema, anaphylaxis   Endocrine:  negative for weight changes, polyuria, polydipsia and polyphagia  Musculoskeletal:  negative for joint  pain, swelling, decreased range of motion  Integumentary: No rashes, skin lesions  Neurological:  negative for headaches, slurred speech, unilateral weakness  Psychiatric: negative for hallucinations,confusion,agitation.                 PHYSICAL EXAM:      Vitals:    BP (!) 146/102   Pulse 94   Temp 97.9 °F (36.6 °C) (Oral)   Resp 13   Ht 5' 3\" (1.6 m)   Wt 191 lb 12.8 oz (87 kg)   SpO2 95%   BMI 33.98 kg/m²        General Appearance: alert,in some  acute distress, ++  pallor, no icterus  On nasal cannula chronic ill appearing woman+  Skin: warm and dry, no rash or erythema  Head: normocephalic and atraumatic  Eyes: pupils equal, round, and reactive to light, conjunctivae normal  ENT: tympanic membrane, external ear and ear canal normal bilaterally, nose without deformity, nasal mucosa and turbinates normal without polyps  Neck: supple and non-tender without mass, no thyromegaly  no cervical lymphadenopathy  Pulmonary/Chest: Bi basal crepts++ wheezes, rales or rhonchi, normal air movement, no respiratory distress  Cardiovascular:   S1 and S2, no murmurs, rubs, clicks, or gallops, no carotid bruits  Abdomen: soft, + -tender, non-distended, normal bowel sounds, no masses or organomegaly  Ostomy ++  Extremities: no cyanosis, clubbing or +edema  Musculoskeletal: normal range of motion, no joint swelling, deformity or tenderness  Integumentary: No rashes, no abnormal skin lesions, no petechiae  Neurologic: reflexes normal and symmetric, no cranial nerve deficit  Psych:  Orientation, sensorium, mood normal            Lines:  HD line +  COLOSTOMY+     Data Review:    CBC:   Lab Results   Component Value Date    WBC 10.8 03/25/2022    HGB 10.3 (L) 03/25/2022    HCT 32.3 (L) 03/25/2022    MCV 96.7 03/25/2022     (L) 03/25/2022     RENAL:   Lab Results   Component Value Date    CREATININE 6.6 (HH) 03/23/2022    BUN 26 (H) 03/23/2022     03/23/2022    K 4.5 03/23/2022    CL 97 (L) 03/23/2022    CO2 24 03/23/2022     SED RATE: No results found for: SEDRATE  CK: No results found for: CKTOTAL  CRP: No results found for: CRP  Hepatic Function Panel:   Lab Results   Component Value Date ALKPHOS 202 03/23/2022    ALT 6 03/23/2022    AST 18 03/23/2022    PROT 3.9 03/23/2022    BILITOT 0.5 03/23/2022    LABALBU 1.7 03/23/2022     UA:  Lab Results   Component Value Date    COLORU YELLOW 05/27/2021    CLARITYU Clear 05/27/2021    GLUCOSEU 250 05/27/2021    BILIRUBINUR Negative 05/27/2021    KETUA Negative 05/27/2021    SPECGRAV 1.016 05/27/2021    BLOODU TRACE 05/27/2021    PHUR 7.0 05/27/2021    PROTEINU 100 05/27/2021    UROBILINOGEN 0.2 05/27/2021    NITRU Negative 05/27/2021    LEUKOCYTESUR Negative 05/27/2021    LABMICR YES 05/27/2021    URINETYPE NotGiven 05/27/2021      Urine Microscopic:   Lab Results   Component Value Date    LABCAST 3-5 Hyaline 08/27/2014    BACTERIA RARE 05/27/2021    COMU see below 05/27/2021    HYALCAST 0 05/27/2021    WBCUA 3 05/27/2021    RBCUA 4 05/27/2021    EPIU 4 05/27/2021     Urine Reflex to Culture:   Lab Results   Component Value Date    URRFLXCULT Yes 08/04/2019         MICRO: cultures reviewed and updated by me   Blood Culture:   Lab Results   Component Value Date    [facility]  03/22/2022     No Growth to date. Any change in status will be called. BLOODCULT2  03/22/2022     No Growth to date. Any change in status will be called. Respiratory Culture:  Lab Results   Component Value Date    LABGRAM  03/22/2022     1+ WBC's (Mononuclear)  No Epithelial Cells seen  No organisms seen       AFB:No results found for: AFBSMEAR  Viral Culture:  Lab Results   Component Value Date    COVID19 Not Detected 02/23/2022    COVID19 Detected 01/07/2022     Urine Culture: No results for input(s): Hailey Scripture in the last 72 hours. IMAGING:    CT CHEST W CONTRAST   Final Result   Bilateral pulmonary emboli slightly greater on the right than left. Potential concern for septic emboli given the small foci of irregular   parenchymal lung lesions new from the prior chest CT study, only partially   included and demonstrated on abdomen pelvic CT 1 day earlier.   These could certainly also be metastatic as well but somewhat atypical given the   irregular margins. Multiple liver metastases partially included, unchanged as seen on abdomen   pelvic CT 1 day earlier      Abdominal ascites, decreased from the prior exam 1 day earlier, with recently   demonstrated pneumoperitoneum not seen currently in the included upper   abdomen. .      Esophageal mural thickening suspected esophagitis. Findings were discussed with Sonya Ma at 3:50 pm on 3/23/2022. CT ABDOMEN PELVIS W IV CONTRAST Additional Contrast? None   Final Result   1. Worsening inflammatory changes surrounding the mid sigmoid colon likely   due to colitis causing worsening colonic dilatation. Underlying malignancy   cannot be excluded; correlate with colonoscopy. 2. Multiple nodular bilateral ground-glass opacities. The differential   diagnosis includes infectious process and metastatic disease recommend CT of   the chest with contrast for further evaluation. 3. Multiple indeterminate hepatic lesions.   Recommend nonemergent MRI of the   abdomen with without contrast utilizing Eovist.               All the pertinent images and reports for the current Hospitalization were reviewed by me     Scheduled Meds:   warfarin placeholder: dosing by pharmacy   Other RX Placeholder    metroNIDAZOLE  500 mg IntraVENous Once    meropenem  500 mg IntraVENous Once    lactated ringers bolus  500 mL IntraVENous Once    midodrine  5 mg Oral BID WC    vancomycin  250 mg Oral 4x Daily    sodium chloride flush  5-40 mL IntraVENous 2 times per day    insulin lispro  0-6 Units SubCUTAneous TID     insulin lispro  0-3 Units SubCUTAneous Nightly       Continuous Infusions:   heparin (porcine) 1,470 Units/hr (03/25/22 0358)    sodium chloride 25 mL (03/24/22 1114)    dextrose         PRN Meds:  morphine **OR** morphine, albumin human, midodrine, heparin (porcine), diphenhydrAMINE, sodium chloride flush, sodium chloride, ondansetron **OR** ondansetron, polyethylene glycol, acetaminophen **OR** acetaminophen, oxyCODONE, glucose, dextrose, glucagon (rDNA), dextrose      Assessment:     Patient Active Problem List   Diagnosis    [facility]opathy [facility]    Mixed hyperlipidemia    DMII (diabetes mellitus, type 2) ([facility] [facility]    Vitreous hemorrhage of right eye ([facility]    Essential hypertension    Acute kidney injury ([facility] [facility]    ESRD (end stage renal disease) ([facility] [facility]    Acute renal failure superimposed on chronic kidney disease (HCC)    Volume overload    Peritonitis ([facility]    Generalized abdominal pain    [facility]    Morbid obesity due to excess calories ([facility])    Nausea and vomiting    Hyponatremia    Diarrhea    Dizziness    Hypertensive emergency    Hypokalemia    Hypertensive urgency    Cerebrovascular accident (CVA) ([facility]    Electrolyte imbalance    Acute respiratory failure with hypoxia ([facility]    Septic shock ([facility]    Electrolyte abnormality    Diverticulitis    Colitis    Cancer of sigmoid colon ([facility]     Admitted with Obstructing Rectosigmoid mass  Colon cancer on pathology  Metastatic disease based on the CT scans  C diff diarrhea  Recent admit for PD related Peritonitis with Pseudomonas  H/o Ventral hernia repair on 2/24 and Path now reported to be adenocarcinoma  She was being evaluated by Hem/Onc and GI now unfortunately admitted with impeding obstruction   S/p Exp lap Colostomy by  on 3/24/22  Bi lateraL PE on this admit   WBC elevation   ESRD on HD now was on PD before     Unfortunately now admitted with worsening of symptoms with Metastatic Colon cancer diagnosis underwent Colosty and op notes indicate extensive disease burden - will cont therapy for C diff and she has received Prophylactic abx for surgery      She is on anticoagulation for PE on IV Heparin may go on coumadin   .           Labs, Microbiology, Radiology and all the pertinent results from current hospitalization and  care every where were reviewed  by me as a part of the evaluation   Plan:   1. D/c  IV Flagyl in am  2. Cont oral Vancomycin x 250  Mg Q 6 hRS x 10 days   3. Wbc ELEVATION likely from C diff and metastatic disease now better   4. On Anticoagulation for PE  5. Check venous duplex extremities  6. Hem/ONC following   7. Watch for complications     Will sign off      Discussed with patient/Family and Nursing staff   Risk of Complications/Morbidity: High      · Illness(es)/ Infection present that pose threat to bodily function. · There is potential for severe exacerbation of infection/side effects of treatment. Therapy requires intensive monitoring for antimicrobial agent toxicity. Thanks for allowing me to participate in your patient's care and please call me with any questions or concerns.     Cathy Kelly MD  Infectious Disease  [facility] ([facility]) Physician  Phone: [phone]   Fax : [phone]

## 2022-03-25 NOTE — PROGRESS NOTES
Progress Note  Date:3/25/2022       Room:S7W-8242/5280-01  Patient Leonie Levin     YOB: 1965     Age:56 y.o. Subjective    Subjective:  Symptoms:  Improved. Diet:  No nausea or vomiting. Activity level: Returning to normal.    Pain:  She complains of pain that is mild. Review of Systems   Gastrointestinal: Negative for nausea and vomiting. Objective         Vitals Last 24 Hours:  TEMPERATURE:  Temp  Av.9 °F (36.6 °C)  Min: 97.4 °F (36.3 °C)  Max: 98.4 °F (36.9 °C)  RESPIRATIONS RANGE: Resp  Av.2  Min: 13  Max: 29  PULSE OXIMETRY RANGE: SpO2  Av.3 %  Min: 92 %  Max: 100 %  PULSE RANGE: Pulse  Av  Min: 90  Max: 94  BLOOD PRESSURE RANGE: Systolic (78FFR), VGJ:581 , Min:125 , YGW:425   ; Diastolic (72NPR), YOH:84, Min:81, Max:102    I/O (24Hr): Intake/Output Summary (Last 24 hours) at 3/25/2022 1520  Last data filed at 3/24/2022 1811  Gross per 24 hour   Intake 240 ml   Output --   Net 240 ml     Objective  Labs/Imaging/Diagnostics    Labs:  CBC:  Recent Labs     22  0815 22  1048   WBC 14.4* 10.8   RBC 3.99* 3.34*   HGB 12.2 10.3*   HCT 38.4 32.3*   MCV 96.3 96.7   RDW 17.3* 17.0*   PLT 88* 110*     CHEMISTRIES:  Recent Labs     22  0815 22  1048    134*   K 4.5 5.3*   CL 97* 95*   CO2 24 18*   BUN 26* 33*   CREATININE 6.6* 5.4*   GLUCOSE 219* 249*     PT/INR:No results for input(s): PROTIME, INR in the last 72 hours.   APTT:  Recent Labs     22  0840 22  0145 22  1048   APTT 49.2* 51.2* 73.8*     LIVER PROFILE:  Recent Labs     22  0815 22  1048   AST 18 12*   ALT 6* 7*   BILITOT 0.5 0.4   ALKPHOS 202* 189*       Imaging Last 24 Hours:  ECHO Complete 2D W Doppler W Color    Result Date: 3/24/2022  Transthoracic Echocardiography Report (TTE)  Demographics   Patient Name       Barber Walton   Date of Study      2022        Gender              Female   Patient Number     [id number] Date of Birth       1965   Visit Number       [id number]         Age                 55 year(s)   Accession Number   [id number]        Room Number         MANOHAR   Corporate ID       [id number]           Myranda Tee, RT   Ordering Physician Therman Level, MD  Interpreting        [address] [place] [address].                                       Physician           Douglas Wolfe MD  Procedure Type of Study   TTE procedure:ECHOCARDIOGRAM COMPLETE 2D W DOPPLER W COLOR. Procedure Date Date: 03/24/2022 Start: 08:21 AM Study Location: [facility] Echo Lab Technical Quality: Adequate visualization Indications:Pulmonary embolus. Additional Indications:Evaluate for right heart strain. Patient Status: Routine Height: 63 inches Weight: 188 pounds BSA: 1.88 m2 BMI: 33.3 kg/m2 Rhythm: Within normal limits BP: 129/68 mmHg  Conclusions   Summary  Severe pulmonary hypertension. There is moderate to severe tricuspid regurgitation with the systolic  pulmonary artery pressure (SPAP) estimated at 100 mmHg (estimated RA  pressure of 15 mmHg included). Normal chamber size. Moderate left ventricular hypertrophy. No wall motion abnormalities. Visually estimated ejection fraction is 60%. There is diastolic septal flattening consistent with right ventricular  pressure overload. The right ventricle is moderately enlarged. Right ventricular systolic function is normal .   RVS velocity= 9.46 cm/sec. Signature   ------------------------------------------------------------------  Electronically signed by Douglas Wolfe MD (Interpreting  physician) on 03/24/2022 at 12:34 PM  ------------------------------------------------------------------   Findings   Left Ventricle  Normal chamber size. Moderate left ventricular hypertrophy. No wall motion abnormalities. Visually estimated ejection fraction is 65-70%.   There is diastolic septal flattening consistent with right ventricular  pressure overload. Mitral Valve  Mitral valve is structurally normal.  Mild thickening of the mitral valve leaflets. Mild mitral annular calcification. No significant regurgitation or stenosis. Left Atrium  Moderate left atrial dilation. The left atrial volume measures 46.1 ml/m2. Aortic Valve  The aortic valve appears trileaflet. No significant stenosis or regurgitation of the aortic valve. Aorta  The aortic root is normal in size. Right Ventricle  The right ventricle is moderately enlarged. Right ventricular systolic function is normal .  TAPSE measures 1.33 cm by objective criteria. RVS velocity= 9.46 cm/sec. Tricuspid Valve  Severe pulmonary hypertension. There is moderate to severe tricuspid regurgitation with the systolic  pulmonary artery pressure (SPAP) estimated at 100 mmHg (estimated RA  pressure of 15 mmHg included). Right Atrium  Normal right atrial size. Pulmonic Valve  The pulmonic valve is not well visualized. Trivial pulmonic regurgitation present. Pericardial Effusion  There is a mild pericardial effusion. No evidence of cardiac tamponade. Pleural Effusion  No pleural effusion. Miscellaneous  IVC size is dilated (>2.1 cm) and collapses < 50% with respiration  consistent with markedly elevated RA pressure (15 mmHg).   M-Mode/2D Measurements (cm)   LV Diastolic Dimension: 4.1 cm LV Systolic Dimension: 2.6 cm  LV Septum Diastolic: 4.75 cm  LV PW Diastolic: 0.71 cm       AO Root Dimension: 3.1 cm                                 LA Dimension: 3.8 cm                                 LA Area: 25.2 cm2  LVOT: 1.9 cm                   LA volume/Index: 80.1 ml /43 ml/m2  Doppler Measurements   AV Peak Velocity: 164 cm/s     MV Peak E-Wave: 116 cm/s  AV Peak Gradient: 10.76 mmHg   MV Peak A-Wave: 105 cm/s  AV Mean Gradient: 7 mmHg       MV E/A Ratio: 1.1  LVOT Peak Velocity: 106 cm/s   MV P1/2t: 95 msec  AV Area (Continuity):2.02 cm2   TR Velocity:400 cm/s  TR Gradient:64 mmHg  Estimated RAP:15 mmHg          MV Area (PHT): 2.32 cm2  Estimated RVSP: 87 mmHg  E' Septal Velocity: 4.13 cm/s  E' Lateral Velocity: 7.94 cm/s  E/E' ratio: 14.6  PV Peak Velocity: 113 cm/s  PV Peak Gradient: 5.11 mmHg   Aortic Valve   Peak Velocity: 164 cm/s     Mean Velocity: 120 cm/s  Peak Gradient: 10.76 mmHg   Mean Gradient: 7 mmHg  Area (continuity): 2.02 cm2  AV VTI: 28.7 cm  Aorta   Aortic Root: 3.1 cm  LVOT Diameter: 1.9 cm      Assessment//Plan           Hospital Problems           Last Modified POA    * (Principal) Colitis 3/22/2022 Yes    Cancer of sigmoid colon ([facility] [address] 3/24/2022 Yes        Assessment & Plan     Sigmoid colon cancer   POD 1 from diverting colostomy   Stable   Ostomy pink, some stool present   Liquids for now   Await return of GI function    Electronically signed by Georgiana Grayson MD on 3/25/22 at 3:20 PM EDT

## 2022-03-25 NOTE — OP NOTE
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 POSTOPERATIVE DIAGNOSES:  Sigmoid colon cancer with abdominal metastasis  and partial obstruction. PROCEDURE PERFORMED:  Exploratory laparotomy with creation of diverting  loop colostomy in mid transverse colon. SURGEON:  Rosmeary Villafuerte MD    SPECIMEN:  Partial omentum with tumor implant. COMPLICATIONS:  None. ESTIMATED BLOOD LOSS:  Less than 50 mL. DISPOSITION:  To recovery in stable condition. INDICATION:  The patient is a 69-year-old female who is on peritoneal  dialysis. She recently underwent replacement of her peritoneal dialysis  catheter with repair of an umbilical hernia. Incidentally, the hernia  sac was evaluated in histology and noted to have adenocarcinoma. She  was preparing for workup including colonoscopy but presented with acute  abdominal pain prior to any additional testing. She was admitted with  an apparent colon obstruction and a flexible sigmoidoscopy revealed a  nearly obstructing mass at approximately 20 cm from the anal verge. This was biopsied and consistent with adenocarcinoma, likely  representing her primary. She has since developed loose bowel movements  and has been confirmed to have Clostridial difficile infection. Also  during her workup, she was noted to have probable metastasis to the  liver and lungs in addition to pulmonary emboli.   She has been  maintained on a heparin drip which was held preoperatively and given the  obstructive nature of the tumor, exploration for possible resection  versus diverting colostomy were reviewed and the patient agreed to  proceed. PROCEDURE:  The patient was brought to the operating room, placed  supine, general anesthesia and intubation were performed and the abdomen  prepped and draped in a sterile fashion. A lower midline incision was  made and carried into the peritoneum where a large amount of fluid was  encountered. This was a combination of ascites and likely dialysate. She had been converted to hemodialysis but there was remaining fluid in  the abdomen. Immediately upon entry into the abdomen, tumor implants  were noted on the omentum as well as along the pelvis and distal sigmoid  colon. We were able to reflect the small bowel cephalad and identify a  tumor mass in the pelvis. Unfortunately, this had completely frozen the  sigmoid into the presacral space as well as to the left ovary, fallopian  tube and anteriorly to the uterus and bladder. After inspection, I  deemed this was not safely resectable and given the overall status of  the patient, we elected to proceed with proximal diversion. The omentum  was identified and brought into the lower midline. There was a large  tumor implant. This was excised with a LigaSure device. The transverse  colon was then mobilized away from the omentum and a window created  through the mesentery. An ostomy site was chosen in the upper abdomen  just left of midline and this was excised with electrocautery down to  the fascia. The left rectus anterior fascia was then opened in a  cruciate manner. The posterior fascia was opened longitudinally and the  transverse colon brought through to the skin level and a bridge placed  through the mesenteric defect to hold the bowel in place. We now  confirmed our sponge and instrument counts, closed the lower midline  with 0 loop PDS and glenn.   Marcaine had been injected prior to  closure. The ostomy was then opened sharply and then the ostomy matured  over the bridge with interrupted 3-0 Vicryls. The bridge was anchored  with 2-0 nylons to the skin surface. Dressings were applied to the  incision and an ostomy appliance to the stoma. The patient was stable  and transferred to recovery in good condition.         Olinda Ware MD    D: 03/24/2022 14:10:24       T: 03/24/2022 14:13:58     [id number]  Job#: [id number]     Doc#: [id number]    CC:

## 2022-03-25 NOTE — FLOWSHEET NOTE
Treatment time: 3 hrs     Net UF: 1 liter     Pre weight: 87.9kg  Post weight: 86.8kg  EDW: tbd    Access used: R TDC  Access function: good     Medications or blood products given: none     Regular outpatient schedule: MWF    Summary of response to treatment: 1 liter removed. Pt tolerated tx well.  No meds given  Post VSS    Copy of dialysis treatment record placed in chart, to be scanned into EMR.     03/25/22 1245 03/25/22 1547   Vital Signs   BP (!) 171/95 (!) 166/93   Temp 97.9 °F (36.6 °C) 98.1 °F (36.7 °C)   Pulse 94 88   Weight 193 lb 12.6 oz (87.9 kg) 191 lb 5.8 oz (86.8 kg)   Weight Method Bed scale Bed scale   Post-Hemodialysis Assessment   Hemodialysis Intake (ml)  --  500 ml   Hemodialysis Output (ml)  --  1500 ml   NET Removed (ml)  --  1000 ml   Tolerated Treatment  --  Good

## 2022-03-25 NOTE — PLAN OF CARE
Problem: Pain:  Goal: Pain level will decrease  Description: Pain level will decrease  3/25/2022 1021 by Jade Gutierrez RN  Outcome: Ongoing  3/24/2022 2109 by Faith Horton RN  Outcome: Ongoing  Goal: Control of acute pain  Description: Control of acute pain  3/25/2022 1021 by Jade Gutierrez RN  Outcome: Ongoing  3/24/2022 2109 by Faith Horton RN  Outcome: Ongoing  Goal: Control of chronic pain  Description: Control of chronic pain  3/25/2022 1021 by Jade Gutierrez RN  Outcome: Ongoing  3/24/2022 2109 by Faith Horton RN  Outcome: Ongoing     Problem: Nausea/Vomiting:  Goal: Absence of nausea/vomiting  Description: Absence of nausea/vomiting  3/25/2022 1021 by Jade Gutierrez RN  Outcome: Ongoing  3/24/2022 2109 by Faith Horton RN  Outcome: Ongoing  Goal: Able to drink  Description: Able to drink  3/25/2022 1021 by Jade Gutierrez RN  Outcome: Ongoing  3/24/2022 2109 by Faith Horton RN  Outcome: Ongoing  Goal: Able to eat  Description: Able to eat  3/25/2022 1021 by Jade Gutierrez RN  Outcome: Ongoing  3/24/2022 2109 by Faith Horton RN  Outcome: Ongoing  Goal: Ability to achieve adequate nutritional intake will improve  Description: Ability to achieve adequate nutritional intake will improve  3/25/2022 1021 by Jade Gutierrez RN  Outcome: Ongoing  3/24/2022 2109 by Faith Horton RN  Outcome: Ongoing     Problem: Falls - Risk of:  Goal: Will remain free from falls  Description: Will remain free from falls  3/25/2022 1021 by Jade Gutierrez RN  Outcome: Ongoing  Note: Fall precautions in place. Bed locked and in lowest position. Alarm on. Call light within reach.    3/24/2022 2109 by Faith Horton RN  Outcome: Ongoing  Goal: Absence of physical injury  Description: Absence of physical injury  3/25/2022 1021 by Jade Gutierrez RN  Outcome: Ongoing  3/24/2022 2109 by Faith Horton RN  Outcome: Ongoing     Problem: Skin Integrity:  Goal: Will show no infection signs and symptoms  Description: Will show no infection signs and symptoms  3/25/2022 1021 by Beth Harada, RN  Outcome: Ongoing  3/24/2022 2109 by Asuncion Felty, RN  Outcome: Ongoing  Goal: Absence of new skin breakdown  Description: Absence of new skin breakdown  3/25/2022 1021 by Beth Harada, RN  Outcome: Ongoing  3/24/2022 2109 by Asuncion Felty, RN  Outcome: Ongoing     Problem: Fluid Volume:  Goal: Will show no signs or symptoms of fluid imbalance  Description: Will show no signs or symptoms of fluid imbalance  3/25/2022 1021 by Beth Harada, RN  Outcome: Ongoing  3/24/2022 2109 by Asuncion Felty, RN  Outcome: Ongoing     Problem: Nutrition  Goal: Optimal nutrition therapy  3/25/2022 1021 by Beth Harada, RN  Outcome: Ongoing  3/24/2022 2109 by Asuncion Felty, RN  Outcome: Ongoing

## 2022-03-25 NOTE — PROGRESS NOTES
Office: [phone]       Fax: [phone]      Nephrology Progress Note        Patient's Name: Cielo Esparza  Date of Visit: 3/25/2022    Reason for Consult:  ESRD management      History of Present Illness:      Cielo Esparza is a 55 y.o. female with PMHx of hypertension, diabetes mellitus, ESRD who was admitted on 3/22/2022 with complaints of abdominal pain   PD fluid dark  history of PD peritonitis recently with pseudomonas. PD catheter was exchanged. Pt was on HD and just started to do PD on 3/20  Pt was getting prep for colonoscopy   Does 2 exchanges with 2.5% day time and Icodextrin overnight    INTERVAL HISTORY    Feels same  Shortness of breath: Yes  BP low  Mild abdominal pain          Medications:        Allergies:  Bactrim [sulfamethoxazole-trimethoprim], Doxazosin, Geocillin [carbenicillin], Lisinopril, Other, Spironolactone, Tramadol, Bumetanide, and Ibuprofen  Scheduled Meds:   warfarin placeholder: dosing by pharmacy   Other RX Placeholder    metroNIDAZOLE  500 mg IntraVENous Once    lactated ringers bolus  500 mL IntraVENous Once    midodrine  5 mg Oral BID WC    vancomycin  250 mg Oral 4x Daily    sodium chloride flush  5-40 mL IntraVENous 2 times per day    insulin lispro  0-6 Units SubCUTAneous TID WC    insulin lispro  0-3 Units SubCUTAneous Nightly     Continuous Infusions:   heparin (porcine) 1,470 Units/hr (03/25/22 0358)    sodium chloride 25 mL (03/24/22 1114)    dextrose           Objective:       Vitals:  BP (!) 171/96   Pulse 92   Temp 97.9 °F (36.6 °C) (Oral)   Resp 29   Ht 5' 3\" (1.6 m)   Wt 191 lb 12.8 oz (87 kg)   SpO2 97%   BMI 33.98 kg/m²   Constitutional:  awake, NAD  Neck: no bruits, No JVD  Cardiovascular:  S1, S2 reg  Respiratory: CTA, no crackles  Abdomen:  +BS, soft, tender, ND  Ext: trace lower extremity edema  Access: abd PD catheter in place, no drainage/redness around exit site. CNS: alert, no agitation    Data:     Labs:  Hepatic:   Recent Labs     03/23/22  0815 03/25/22  1048   AST 18 12*   ALT 6* 7*   BILITOT 0.5 0.4   ALKPHOS 202* 189*     BNP: No results for input(s): BNP in the last 72 hours. ABGs: No results for input(s): PHART, PO2ART, ZDP9IJM in the last 72 hours. IMAGING:  CT CHEST W CONTRAST   Final Result   Bilateral pulmonary emboli slightly greater on the right than left. Potential concern for septic emboli given the small foci of irregular   parenchymal lung lesions new from the prior chest CT study, only partially   included and demonstrated on abdomen pelvic CT 1 day earlier. These could   certainly also be metastatic as well but somewhat atypical given the   irregular margins. Multiple liver metastases partially included, unchanged as seen on abdomen   pelvic CT 1 day earlier      Abdominal ascites, decreased from the prior exam 1 day earlier, with recently   demonstrated pneumoperitoneum not seen currently in the included upper   abdomen. .      Esophageal mural thickening suspected esophagitis. Findings were discussed with Deb Sen at 3:50 pm on 3/23/2022. CT ABDOMEN PELVIS W IV CONTRAST Additional Contrast? None   Final Result   1. Worsening inflammatory changes surrounding the mid sigmoid colon likely   due to colitis causing worsening colonic dilatation. Underlying malignancy   cannot be excluded; correlate with colonoscopy. 2. Multiple nodular bilateral ground-glass opacities. The differential   diagnosis includes infectious process and metastatic disease recommend CT of   the chest with contrast for further evaluation. 3. Multiple indeterminate hepatic lesions. Recommend nonemergent MRI of the   abdomen with without contrast utilizing Eovist.             Assessment :       1.  ESRD   Etiology: suspected DN  Access: Abd PD cath and Tunneled Cath  Volume: Hypervolemic        2. Electrolytes/Acid base  No Dyskalemia    Recent Labs     03/25/22  1048   *   K 5.3*   CO2 18*       3. BMD  -Hyperphosphatemia, SHPT  -maintained on Renvela    Lab Results   Component Value Date    CALCIUM 7.4 (L) 03/25/2022    PHOS 4.5 02/17/2022        4. HTN  -Blood pressure high       BP Readings from Last 1 Encounters:   03/25/22 (!) 171/96       5. Anemia  -anemia of CKD  -assess for CHRIS    Recent Labs     03/25/22  1048   HGB 10.3*     6. DM    7. abdominal pain   -colitis  -suspected malignancy  -PD fluid: nuc cells 150    PLAN :     - Empiric antibiotics  - PD fluid for culture,   - continue hemodialysis . For HD today   - hold BP meds:   - MBD management  - Anemia management  - Dose meds to GFR<10    Thank you for allowing us to participate in care of Danae Dunn . We will continue to follow. Feel free to contact me with any questions.       Florian Brandon MD  3/25/2022    [facility] [address]  Office : [phone]  Fax :[phone]

## 2022-03-25 NOTE — PROGRESS NOTES
Clinical Pharmacy Note  Heparin Dosing       Lab Results   Component Value Date    APTT 73.8 03/25/2022     Lab Results   Component Value Date    HGB 10.3 03/25/2022    HCT 32.3 03/25/2022     03/25/2022    INR 0.97 08/10/2021       Current Infusion Rate: 1470 units/hr    Plan:  Rate: 1584 units/hr  Next aPTT: 1700 3/25/22    Pharmacy will continue to monitor and adjust based on aPTT results.   Tracy Arzate, [address] [place] [address], 3/25/2022 12:11 PM

## 2022-03-25 NOTE — PROGRESS NOTES
Clinical Pharmacy Note  Warfarin Consult    Consuelo Frazier is a 55 y.o. female receiving warfarin managed by pharmacy. Patient being bridged with heparin. Warfarin Indication: PE  Target INR range: 2-3   Dose prior to admission: New start    Current warfarin drug-drug interactions:     Recent Labs     03/23/22  0815   HGB 12.2   HCT 38.4       Assessment/Plan:    Warfarin 5 mg tonight. Daily PT/INR until stable within therapeutic range. Thank you for the consult. Will continue to follow.   Electronically signed by Fernando Amin [facility] on 3/25/2022 at 9:35 AM

## 2022-03-26 NOTE — PROGRESS NOTES
Clinical Pharmacy Note  Heparin Dosing       Lab Results   Component Value Date    APTT 60.5 03/26/2022     Lab Results   Component Value Date    HGB 9.7 03/26/2022    HCT 30.0 03/26/2022    PLT 97 03/26/2022    INR 2.30 03/26/2022       Current Infusion Rate: 1470 units/hr    Plan:  Continue current rate: 1470 units/hr    Next aPTT: 1730 on 3-26-22    Pharmacy will continue to monitor and adjust based on aPTT results.   Isabella Simon, [facility]  3/26/2022  12:00 PM

## 2022-03-26 NOTE — PROGRESS NOTES
Clinical Pharmacy Note  Heparin Dosing       Lab Results   Component Value Date    APTT 157.0 03/26/2022     Lab Results   Component Value Date    HGB 10.3 03/25/2022    HCT 32.3 03/25/2022     03/25/2022    INR 0.97 08/10/2021       Current Infusion Rate: 1600 units/hr    Plan:  Hold heparin for 1 hour  Rate: decrease to 1470 units/hr  Next aPTT: 1100 3/26/22    Pharmacy will continue to monitor and adjust based on aPTT results.   Leela Mark, Dee DeeD

## 2022-03-26 NOTE — PROGRESS NOTES
Office: [phone]       Fax: [phone]      Nephrology Progress Note        Patient's Name: Indy Knox  Date of Visit: 3/26/2022    Reason for Consult:  ESRD management      History of Present Illness:      Indy Knox is a 55 y.o. female with PMHx of hypertension, diabetes mellitus, ESRD who was admitted on 3/22/2022 with complaints of abdominal pain   PD fluid dark  history of PD peritonitis recently with pseudomonas. PD catheter was exchanged. Pt was on HD and just started to do PD on 3/20  Pt was getting prep for colonoscopy   Does 2 exchanges with 2.5% day time and Icodextrin overnight    INTERVAL HISTORY    Feels same  Shortness of breath: Yes  BP low  Mild abdominal pain          Medications:        Allergies:  Bactrim [sulfamethoxazole-trimethoprim], Doxazosin, Geocillin [carbenicillin], Lisinopril, Other, Spironolactone, Tramadol, Bumetanide, and Ibuprofen  Scheduled Meds:   warfarin placeholder: dosing by pharmacy   Other RX Placeholder    lactated ringers bolus  500 mL IntraVENous Once    midodrine  5 mg Oral BID WC    vancomycin  250 mg Oral 4x Daily    sodium chloride flush  5-40 mL IntraVENous 2 times per day    insulin lispro  0-6 Units SubCUTAneous TID WC    insulin lispro  0-3 Units SubCUTAneous Nightly     Continuous Infusions:   heparin (porcine) 1,470 Units/hr (03/26/22 0524)    sodium chloride 25 mL (03/24/22 1114)    dextrose           Objective:       Vitals:  BP (!) 174/97   Pulse 91   Temp 98.4 °F (36.9 °C) (Oral)   Resp 24   Ht 5' 3\" (1.6 m)   Wt 188 lb 11.4 oz (85.6 kg)   SpO2 94%   BMI 33.43 kg/m²   Constitutional:  awake, NAD  Neck: no bruits, No JVD  Cardiovascular:  S1, S2 reg  Respiratory: CTA, no crackles  Abdomen:  +BS, soft, tender, ND  Ext: trace lower extremity edema  Access: abd PD catheter in place, no drainage/redness around exit site. CNS: alert, no agitation    Data:     Labs:  Hepatic:   Recent Labs     03/25/22  1048 03/26/22  0657   AST 12* 14*   ALT 7* 7*   BILITOT 0.4 0.3   ALKPHOS 189* 174*     BNP: No results for input(s): BNP in the last 72 hours. ABGs: No results for input(s): PHART, PO2ART, [id number] in the last 72 hours. IMAGING:  VL Extremity Venous Bilateral         CT CHEST W CONTRAST   Final Result   Bilateral pulmonary emboli slightly greater on the right than left. Potential concern for septic emboli given the small foci of irregular   parenchymal lung lesions new from the prior chest CT study, only partially   included and demonstrated on abdomen pelvic CT 1 day earlier. These could   certainly also be metastatic as well but somewhat atypical given the   irregular margins. Multiple liver metastases partially included, unchanged as seen on abdomen   pelvic CT 1 day earlier      Abdominal ascites, decreased from the prior exam 1 day earlier, with recently   demonstrated pneumoperitoneum not seen currently in the included upper   abdomen. .      Esophageal mural thickening suspected esophagitis. Findings were discussed with Linzy Kocher at 3:50 pm on 3/23/2022. CT ABDOMEN PELVIS W IV CONTRAST Additional Contrast? None   Final Result   1. Worsening inflammatory changes surrounding the mid sigmoid colon likely   due to colitis causing worsening colonic dilatation. Underlying malignancy   cannot be excluded; correlate with colonoscopy. 2. Multiple nodular bilateral ground-glass opacities. The differential   diagnosis includes infectious process and metastatic disease recommend CT of   the chest with contrast for further evaluation. 3. Multiple indeterminate hepatic lesions. Recommend nonemergent MRI of the   abdomen with without contrast utilizing Eovist.             Assessment :       1.  ESRD   Etiology: suspected DN  Access: Abd PD cath and Tunneled Cath  Volume: Hypervolemic        2. Electrolytes/Acid base  No Dyskalemia    Recent Labs     03/26/22  0657   *   K 4.2   CO2 22       3. BMD  -Hyperphosphatemia, SHPT  -maintained on Renvela    Lab Results   Component Value Date    CALCIUM 7.1 (L) 03/26/2022    PHOS 4.5 02/17/2022        4. HTN  -Blood pressure high       BP Readings from Last 1 Encounters:   03/26/22 (!) 174/97       5. Anemia  -anemia of CKD  -assess for CHRIS    Recent Labs     03/26/22  0657   HGB 9.7*     6. DM    7. abdominal pain   -colitis  -suspected malignancy  -PD fluid: nuc cells 150    PLAN :     - Empiric antibiotics  - PD fluid for culture,   - continue hemodialysis . For HD MWF   - hold BP meds:   - MBD management  - Anemia management  - Dose meds to GFR<10    Thank you for allowing us to participate in care of Danae Dunn . We will continue to follow. Feel free to contact me with any questions.       Ken Stevens MD  3/26/2022    [facility]  [address]  Office : [phone]  Fax :[phone]

## 2022-03-26 NOTE — PLAN OF CARE
Problem: Pain:  Goal: Pain level will decrease  Description: Pain level will decrease  3/25/2022 2247 by Aren Humphrey RN  Outcome: Ongoing     Problem: Nausea/Vomiting:  Goal: Absence of nausea/vomiting  Description: Absence of nausea/vomiting  3/25/2022 2247 by Aren Humphrey RN  Outcome: Ongoing     Problem: Falls - Risk of:  Goal: Will remain free from falls  Description: Will remain free from falls  3/25/2022 2247 by Aren Humphrey RN  Outcome: Ongoing     Problem: Skin Integrity:  Goal: Will show no infection signs and symptoms  Description: Will show no infection signs and symptoms  3/25/2022 2247 by Aren Humphrey RN  Outcome: Ongoing     Problem: Fluid Volume:  Goal: Will show no signs or symptoms of fluid imbalance  Description: Will show no signs or symptoms of fluid imbalance  3/25/2022 2247 by Aren Humphrey RN  Outcome: Ongoing     Problem: Nutrition  Goal: Optimal nutrition therapy  3/25/2022 2247 by Aren Humphrey RN  Outcome: Ongoing

## 2022-03-26 NOTE — PROGRESS NOTES
Clinical Pharmacy Note  Heparin Dosing       Lab Results   Component Value Date    APTT 59.1 03/26/2022     Lab Results   Component Value Date    HGB 9.7 03/26/2022    HCT 30.0 03/26/2022    PLT 97 03/26/2022    INR 2.30 03/26/2022       Current Infusion Rate: 1470 units/hr    Plan:  Bolus: 3400 units  Rate: 1640 units/hr  Next aPTT: 0000   midnight    Pharmacy will continue to monitor and adjust based on aPTT results.

## 2022-03-26 NOTE — PROGRESS NOTES
Clinical Pharmacy Note  Heparin Dosing       Lab Results   Component Value Date    APTT 39.2 03/25/2022     Lab Results   Component Value Date    HGB 10.3 03/25/2022    HCT 32.3 03/25/2022     03/25/2022    INR 0.97 08/10/2021       Current Infusion Rate: 1470 units/hr    Plan:  Heparin infusion was paused at some point this evening. Will go ahead and do a full bolus per protocol. Will provide a modified/conservative increase to the rate. Bolus: 6,800 units IVP x 1  Rate: Increase to 1,600 units/hr  Next aPTT: 0300 3/26/22    Pharmacy will continue to monitor and adjust based on aPTT results.   Alis Herring [facility], 3/25/2022 8:43 PM

## 2022-03-26 NOTE — PROGRESS NOTES
Clinical Pharmacy Note  Warfarin Consult    Danae Dunn is a 55 y.o. female receiving warfarin managed by pharmacy. Patient being bridged with heparin. Warfarin Indication: PE  Target INR range: 2-3   Dose prior to admission: New start    Current warfarin drug-drug interactions:     Recent Labs     03/25/22  1048 03/26/22  0657 03/26/22  0705   HGB 10.3* 9.7*  --    HCT 32.3* 30.0*  --    INR 1.03  --  2.30*       Assessment/Plan:    INR jumped from 1 to 2.3 today with only one dose of warfarin 5 mg given yesterday    Will hold warfarin today    Recommend continuing heparin drip for now for adequate bridging x 4-5 days in the setting of PE    Thank you for the consult. Will continue to follow.     Bree Gutierrez PharmD.  3/26/2022  10:33 AM

## 2022-03-26 NOTE — PROGRESS NOTES
[facility]  Oncology Hematology Care  Progress Note      SUBJECTIVE:   Pt doing ok   She denies bleeding  SHe has no questions and understands outpt discussion on her care will be with dr Tracy Aponte   ROS: No fever chills sweats, no nausea, vomiting, diarrhea  shortness of breath chest pain or other pain  OBJECTIVE      Medications    Current Facility-Administered Medications: warfarin placeholder: dosing by pharmacy, , Other, RX Placeholder  morphine (PF) injection 2 mg, 2 mg, IntraVENous, Q2H PRN **OR** morphine (PF) injection 4 mg, 4 mg, IntraVENous, Q2H PRN  heparin 25,000 units in dextrose 5 % 250 mL infusion (rate based), 0-3,000 Units/hr, IntraVENous, Continuous  lactated ringers bolus, 500 mL, IntraVENous, Once  albumin human 25 % IV solution 25 g, 25 g, IntraVENous, PRN  midodrine (PROAMATINE) tablet 10 mg, 10 mg, Oral, PRN  heparin (porcine) injection 3,000 Units, 3,000 Units, IntraCATHeter, PRN  midodrine (PROAMATINE) tablet 5 mg, 5 mg, Oral, BID WC  diphenhydrAMINE (BENADRYL) tablet 25 mg, 25 mg, Oral, Q6H PRN  vancomycin (VANCOCIN) capsule 250 mg, 250 mg, Oral, 4x Daily  sodium chloride flush 0.9 % injection 5-40 mL, 5-40 mL, IntraVENous, 2 times per day  sodium chloride flush 0.9 % injection 5-40 mL, 5-40 mL, IntraVENous, PRN  0.9 % sodium chloride infusion, 25 mL, IntraVENous, PRN  ondansetron (ZOFRAN-ODT) disintegrating tablet 4 mg, 4 mg, Oral, Q8H PRN **OR** ondansetron (ZOFRAN) injection 4 mg, 4 mg, IntraVENous, Q6H PRN  polyethylene glycol (GLYCOLAX) packet 17 g, 17 g, Oral, Daily PRN  acetaminophen (TYLENOL) tablet 650 mg, 650 mg, Oral, Q6H PRN **OR** acetaminophen (TYLENOL) suppository 650 mg, 650 mg, Rectal, Q6H PRN  oxyCODONE (ROXICODONE) immediate release tablet 5 mg, 5 mg, Oral, Q4H PRN  glucose (GLUTOSE) 40 % oral gel 15 g, 15 g, Oral, PRN  dextrose 50 % IV solution, 12.5 g, IntraVENous, PRN  glucagon (rDNA) injection 1 mg, 1 mg, IntraMUSCular, PRN  dextrose 5 % solution, 100 mL/hr, IntraVENous, PRN  insulin lispro (HUMALOG) injection vial 0-6 Units, 0-6 Units, SubCUTAneous, TID WC  insulin lispro (HUMALOG) injection vial 0-3 Units, 0-3 Units, SubCUTAneous, Nightly  Physical    VITALS:  BP (!) 172/102   Pulse 99   Temp 98 °F (36.7 °C) (Oral)   Resp 24   Ht 5' 3\" (1.6 m)   Wt 188 lb 11.4 oz (85.6 kg)   SpO2 (!) 85%   BMI 33.43 kg/m²   TEMPERATURE:  Current - Temp: 98 °F (36.7 °C); Max - Temp  Av °F (36.7 °C)  Min: 97.7 °F (36.5 °C)  Max: 98.4 °F (36.9 °C)  PULSE OXIMETRY RANGE: SpO2  Av.7 %  Min: 85 %  Max: 98 %  24HR INTAKE/OUTPUT:    Intake/Output Summary (Last 24 hours) at 3/26/2022 1028  Last data filed at 3/25/2022 1635  Gross per 24 hour   Intake 650.68 ml   Output 1540 ml   Net -889.32 ml       CONSTITUTIONAL:  awake, alert, cooperative, no apparent distress, HEENT oral pharynx , no scleral icterus  HEMATOLOGIC/LYMPHATICS:  no cervical lymphadenopathy, no supraclavicular lymphadenopathy, no axillary lymphadenopathy and no inguinal lymphadenopathy  LUNGS:  No increased work of breathing, good air exchange, clear to auscultation bilaterally, no crackles or wheezing  CARDIOVASCULAR:  , regular rate and rhythm, normal S1 and S2, no S3 or S4, and no murmur noted  ABDOMEN:  No scars, normal bowel sounds, soft, non-distended, non-tender, no masses palpated, no hepatosplenomegally  MUSCULOSKELETAL:  There is no redness, warmth, or swelling of the joints. EXTREMETIES: No clubbing cynosis or edema  NEUROLOGIC:  Awake, alert, oriented to name, place and time. Cranial nerves II-XII are grossly intact. Motor is 5 out of 5 bilaterally.    SKIN:  no bruising or bleeding      Data      Recent Labs     22  1048 22  0657   WBC 10.8 9.3   HGB 10.3* 9.7*   HCT 32.3* 30.0*   * 97*   MCV 96.7 95.8        Recent Labs     22  1048 22  0657   * 133*   K 5.3* 4.2   CL 95* 98*   CO2 18* 22   BUN 33* 19   CREATININE 5.4* 3.7*     Recent Labs     22  1048 03/26/22  0657   AST 12* 14*   ALT 7* 7*   BILITOT 0.4 0.3   ALKPHOS 189* 174*       Magnesium:    Lab Results   Component Value Date    MG 1.50 03/01/2022    MG 1.50 02/28/2022    MG 1.70 02/27/2022       Imaging ECHO Complete 2D W Doppler W Color    Result Date: 3/24/2022  Transthoracic Echocardiography Report (TTE)  Demographics   Patient Name       Tj Curry   Date of Study      03/24/2022        Gender              Female   Patient Number     [id number]        Date of Birth       1965   Visit Number       [id number]         Age                 55 year(s)   Accession Number   [id number]        Room Number         [facility]   Corporate ID       [id number]           RT Palomo   Ordering Physician Christiano Blake MD  Interpreting        [address] [place] [address].                                       Physician           Raina Srivastava MD  Procedure Type of Study   TTE procedure:ECHOCARDIOGRAM COMPLETE 2D W DOPPLER W COLOR. Procedure Date Date: 03/24/2022 Start: 08:21 AM Study Location: [facility] - Echo Lab Technical Quality: Adequate visualization Indications:Pulmonary embolus. Additional Indications:Evaluate for right heart strain. Patient Status: Routine Height: 63 inches Weight: 188 pounds BSA: 1.88 m2 BMI: 33.3 kg/m2 Rhythm: Within normal limits BP: 129/68 mmHg  Conclusions   Summary  Severe pulmonary hypertension. There is moderate to severe tricuspid regurgitation with the systolic  pulmonary artery pressure (SPAP) estimated at 100 mmHg (estimated RA  pressure of 15 mmHg included). Normal chamber size. Moderate left ventricular hypertrophy. No wall motion abnormalities. Visually estimated ejection fraction is 60%. There is diastolic septal flattening consistent with right ventricular  pressure overload. The right ventricle is moderately enlarged.   Right ventricular systolic function is normal .   RVS velocity= 9.46 cm/sec. Signature   ------------------------------------------------------------------  Electronically signed by Zoila Wei MD (Interpreting  physician) on 03/24/2022 at 12:34 PM  ------------------------------------------------------------------   Findings   Left Ventricle  Normal chamber size. Moderate left ventricular hypertrophy. No wall motion abnormalities. Visually estimated ejection fraction is 65-70%. There is diastolic septal flattening consistent with right ventricular  pressure overload. Mitral Valve  Mitral valve is structurally normal.  Mild thickening of the mitral valve leaflets. Mild mitral annular calcification. No significant regurgitation or stenosis. Left Atrium  Moderate left atrial dilation. The left atrial volume measures 46.1 ml/m2. Aortic Valve  The aortic valve appears trileaflet. No significant stenosis or regurgitation of the aortic valve. Aorta  The aortic root is normal in size. Right Ventricle  The right ventricle is moderately enlarged. Right ventricular systolic function is normal .  TAPSE measures 1.33 cm by objective criteria. RVS velocity= 9.46 cm/sec. Tricuspid Valve  Severe pulmonary hypertension. There is moderate to severe tricuspid regurgitation with the systolic  pulmonary artery pressure (SPAP) estimated at 100 mmHg (estimated RA  pressure of 15 mmHg included). Right Atrium  Normal right atrial size. Pulmonic Valve  The pulmonic valve is not well visualized. Trivial pulmonic regurgitation present. Pericardial Effusion  There is a mild pericardial effusion. No evidence of cardiac tamponade. Pleural Effusion  No pleural effusion. Miscellaneous  IVC size is dilated (>2.1 cm) and collapses < 50% with respiration  consistent with markedly elevated RA pressure (15 mmHg).   M-Mode/2D Measurements (cm)   LV Diastolic Dimension: 4.1 cm LV Systolic Dimension: 2.6 cm  LV Septum Diastolic: 3.15 cm  LV PW Diastolic: 6.60 cm       AO Root Dimension: 3.1 cm                                 LA Dimension: 3.8 cm                                 LA Area: 25.2 cm2  LVOT: 1.9 cm                   LA volume/Index: 80.1 ml /43 ml/m2  Doppler Measurements   AV Peak Velocity: 164 cm/s     MV Peak E-Wave: 116 cm/s  AV Peak Gradient: 10.76 mmHg   MV Peak A-Wave: 105 cm/s  AV Mean Gradient: 7 mmHg       MV E/A Ratio: 1.1  LVOT Peak Velocity: 106 cm/s   MV P1/2t: 95 msec  AV Area (Continuity):2.02 cm2   TR Velocity:400 cm/s  TR Gradient:64 mmHg  Estimated RAP:15 mmHg          MV Area (PHT): 2.32 cm2  Estimated RVSP: 87 mmHg  E' Septal Velocity: 4.13 cm/s  E' Lateral Velocity: 7.94 cm/s  E/E' ratio: 14.6  PV Peak Velocity: 113 cm/s  PV Peak Gradient: 5.11 mmHg   Aortic Valve   Peak Velocity: 164 cm/s     Mean Velocity: 120 cm/s  Peak Gradient: 10.76 mmHg   Mean Gradient: 7 mmHg  Area (continuity): 2.02 cm2  AV VTI: 28.7 cm  Aorta   Aortic Root: 3.1 cm  LVOT Diameter: 1.9 cm      CT CHEST W CONTRAST    Result Date: 3/23/2022  EXAMINATION: CT OF THE CHEST WITH CONTRAST 3/23/2022 3:12 pm TECHNIQUE: CT of the chest was performed with the administration of intravenous contrast. Multiplanar reformatted images are provided for review. Dose modulation, iterative reconstruction, and/or weight based adjustment of the mA/kV was utilized to reduce the radiation dose to as low as reasonably achievable. COMPARISON: Chest CT 06/20/2021 HISTORY: ORDERING SYSTEM PROVIDED HISTORY: per [facility] ct recc TECHNOLOGIST PROVIDED HISTORY: Reason for exam:->per [facility] ct recc Decision Support Exception - unselect if not a suspected or confirmed emergency medical condition->Emergency Medical Condition (MA) Reason for Exam: per [facility] ct recc FINDINGS: Pulmonary Arteries: There is adequate pulmonary artery enhancement. Bilateral pulmonary emboli present involving both upper and lower lobe on the right as well as left lower lobe segmental and subsegmental branches.   No central or main pulmonary artery emboli. Embolus does branch into the proximal descending pulmonary arteries on the right. Interventricular septum is midline, no evidence of significant right heart strain. Main pulmonary artery measures 3.2 cm transversely compared to adjacent slightly larger ascending aorta 3.4 cm. A right IJ catheter extends to the lower superior vena cava. There is mural thickening of the esophagus extending from the thoracic inlet inferiorly suspected esophagitis. Mediastinum: Small confluent lymph nodes are seen enlarging the left hilum medially no evidence of mediastinal lymphadenopathy. The heart and pericardium demonstrate no acute abnormality. Moderate to severe 3 vessel coronary artery calcification present. No evidence of aortic dissection. Lungs/pleura: Patchy areas of airspace disease are seen bilaterally. These could be related to small infarctions. The possibility of inflammatory disease or even septic emboli not entirely excluded. These are new from the prior study. There is no pleural effusion or pneumothorax. Airways are unremarkable. Upper Abdomen: There is ascites noted in the upper abdomen less than seen on the prior CT. However multiple low-density liver lesions are present not present previously as well. At least 1 or 2 splenic low-density lesions are seen. This could represent an embolic process as well including septic emboli. The recently demonstrated pneumoperitoneum is not seen on current exam which includes the upper abdomen. Soft Tissues/Bones: No acute bone or soft tissue abnormality. Bilateral pulmonary emboli slightly greater on the right than left. Potential concern for septic emboli given the small foci of irregular parenchymal lung lesions new from the prior chest CT study, only partially included and demonstrated on abdomen pelvic CT 1 day earlier. These could certainly also be metastatic as well but somewhat atypical given the irregular margins.  Multiple liver metastases partially included, unchanged as seen on abdomen pelvic CT 1 day earlier Abdominal ascites, decreased from the prior exam 1 day earlier, with recently demonstrated pneumoperitoneum not seen currently in the included upper abdomen. . Esophageal mural thickening suspected esophagitis. Findings were discussed with Henrique Ortiz at 3:50 pm on 3/23/2022. CT ABDOMEN PELVIS W IV CONTRAST Additional Contrast? None    Result Date: 3/22/2022  EXAMINATION: CT OF THE ABDOMEN AND PELVIS WITH CONTRAST 3/22/2022 9:00 am TECHNIQUE: CT of the abdomen and pelvis was performed with the administration of intravenous contrast. Multiplanar reformatted images are provided for review. Dose modulation, iterative reconstruction, and/or weight based adjustment of the mA/kV was utilized to reduce the radiation dose to as low as reasonably achievable. COMPARISON: 02/15/2022 HISTORY: ORDERING SYSTEM PROVIDED HISTORY: n/v/ lower abd pain TECHNOLOGIST PROVIDED HISTORY: Additional Contrast?->None Reason for exam:->n/v/ lower abd pain Decision Support Exception - unselect if not a suspected or confirmed emergency medical condition->Emergency Medical Condition (MA) Reason for Exam: lower abd pain, n/v Additional signs and symptoms: hx dialysis, appy FINDINGS: Lower Chest: There are multiple nodular ground-glass opacities throughout the bilateral lungs. An index left lower lobe pulmonary nodule measures 0.8 x 1.6 cm. There is bibasilar scarring. Coronary artery calcifications are a marker of atherosclerosis. The main pulmonary artery is dilated due to pulmonary hypertension. A central venous catheter terminates at the caval atrial junction. Organs: The spleen, pancreas, adrenal glands and kidneys are unremarkable. The bilateral kidneys are atrophic. However, there are multiple indeterminate subcentimeter low attenuating lesions throughout the liver. GI/Bowel:  There is worsening inflammatory changes surrounding the mid sigmoid colon due to colitis. This is causing worsening dilatation of the colon proximal to the inflammatory changes. Status post appendectomy. Pelvis: The pelvic viscera are within normal limits. An intrauterine device is in situ. Peritoneum/Retroperitoneum: There is no adenopathy. Moderate atherosclerosis involves the abdominal aorta and major branch vessels. A trace amount of free fluid is present throughout the abdomen and pelvis with mild presacral edema. A trace amount of free air is present in the right upper quadrant. A right lower quadrant peritoneal dialysis catheter is coiled within the anterior central pelvis. Bones/Soft Tissues: Degenerative changes involve the thoracolumbar spine and bilateral hips. A moderate amount of anasarca is present throughout the abdomen pelvis with worsening skin thickening involving the anterior abdominal wall. 1. Worsening inflammatory changes surrounding the mid sigmoid colon likely due to colitis causing worsening colonic dilatation. Underlying malignancy cannot be excluded; correlate with colonoscopy. 2. Multiple nodular bilateral ground-glass opacities. The differential diagnosis includes infectious process and metastatic disease recommend CT of the chest with contrast for further evaluation. 3. Multiple indeterminate hepatic lesions.   Recommend nonemergent MRI of the abdomen with without contrast utilizing Eovist.     VL Extremity Venous Bilateral    Result Date: 3/26/2022  Vascular Lower Extremities DVT Study Procedure -- PRELIMINARY SONOGRAPHER REPORT --   Demographics   Patient Name       Prudence Claire   Date of Study      03/26/2022         Gender              Female   Patient Number     [id number]         Date of Birth       1965   Visit Number       [id number]          Age                 55 year(s)   Accession Number   [id number]         Room Number         5333   Corporate ID       [id number]            Sonographer         Tamar Ayala RVT   Ordering Physician options are not as robust -but could likey do a 5fu weekly but likely day after dialysis      2.  Pulmonary embolus. Due to her end-stage renal disease, her anticoagulation options are limited.  Coumadin bridge recommended        Nguyễn Stanford MD, MD

## 2022-03-26 NOTE — PROGRESS NOTES
Hospitalist Progress Note      PCP: No primary care provider on file. Date of Admission: 3/22/2022    Chief Complaint: s/p colostomy placement    Hospital Course:     Subjective: having liquid ostomy output. Tolerating diet        Medications:  Reviewed    Infusion Medications    heparin (porcine) 1,470 Units/hr (03/26/22 0524)    sodium chloride 25 mL (03/24/22 1114)    dextrose       Scheduled Medications    warfarin placeholder: dosing by pharmacy   Other RX Placeholder    lactated ringers bolus  500 mL IntraVENous Once    midodrine  5 mg Oral BID WC    vancomycin  250 mg Oral 4x Daily    sodium chloride flush  5-40 mL IntraVENous 2 times per day    insulin lispro  0-6 Units SubCUTAneous TID WC    insulin lispro  0-3 Units SubCUTAneous Nightly     PRN Meds: morphine **OR** morphine, albumin human, midodrine, heparin (porcine), diphenhydrAMINE, sodium chloride flush, sodium chloride, ondansetron **OR** ondansetron, polyethylene glycol, acetaminophen **OR** acetaminophen, oxyCODONE, glucose, dextrose, glucagon (rDNA), dextrose      Intake/Output Summary (Last 24 hours) at 3/26/2022 0934  Last data filed at 3/25/2022 1635  Gross per 24 hour   Intake 650.68 ml   Output 1540 ml   Net -889.32 ml       Exam:    BP (!) 172/102   Pulse 99   Temp 98 °F (36.7 °C) (Oral)   Resp 24   Ht 5' 3\" (1.6 m)   Wt 188 lb 11.4 oz (85.6 kg)   SpO2 (!) 85%   BMI 33.43 kg/m²     General appearance: No apparent distress, appears stated age and cooperative. HEENT: Pupils equal, round, and reactive to light. Conjunctivae/corneas clear. Neck: Supple, with full range of motion. No jugular venous distention. Trachea midline. Respiratory:  Normal respiratory effort. Clear to auscultation, bilaterally without Rales/Wheezes/Rhonchi. Cardiovascular: Regular rate and rhythm with normal S1/S2 without murmurs, rubs or gallops. Abdomen: Soft, appropriate post-surgical tenderness, non-distended with normal bowel sounds. Colostomy in place  Musculoskeletal: No clubbing, cyanosis or edema bilaterally. Full range of motion without deformity. Skin: Skin color, texture, turgor normal.  No rashes or lesions. Neurologic:  Neurovascularly intact without any focal sensory/motor deficits. Cranial nerves: II-XII intact, grossly non-focal.  Psychiatric: Alert and oriented, thought content appropriate, normal insight  Capillary Refill: Brisk,< 3 seconds   Peripheral Pulses: +2 palpable, equal bilaterally       Labs:   Recent Labs     03/25/22  1048 03/26/22  0657   WBC 10.8 9.3   HGB 10.3* 9.7*   HCT 32.3* 30.0*   * 97*     Recent Labs     03/25/22  1048 03/26/22  0657   * 133*   K 5.3* 4.2   CL 95* 98*   CO2 18* 22   BUN 33* 19   CREATININE 5.4* 3.7*   CALCIUM 7.4* 7.1*     Recent Labs     03/25/22  1048 03/26/22  0657   AST 12* 14*   ALT 7* 7*   BILITOT 0.4 0.3   ALKPHOS 189* 174*     Recent Labs     03/26/22  0705   INR 2.30*     No results for input(s): CKTOTAL, TROPONINI in the last 72 hours. Urinalysis:      Lab Results   Component Value Date    NITRU Negative 05/27/2021    WBCUA 3 05/27/2021    BACTERIA RARE 05/27/2021    RBCUA 4 05/27/2021    BLOODU TRACE 05/27/2021    SPECGRAV 1.016 05/27/2021    GLUCOSEU 250 05/27/2021       Radiology:  VL Extremity Venous Bilateral         CT CHEST W CONTRAST   Final Result   Bilateral pulmonary emboli slightly greater on the right than left. Potential concern for septic emboli given the small foci of irregular   parenchymal lung lesions new from the prior chest CT study, only partially   included and demonstrated on abdomen pelvic CT 1 day earlier. These could   certainly also be metastatic as well but somewhat atypical given the   irregular margins.       Multiple liver metastases partially included, unchanged as seen on abdomen   pelvic CT 1 day earlier      Abdominal ascites, decreased from the prior exam 1 day earlier, with recently   demonstrated pneumoperitoneum not seen currently in the included upper   abdomen. .      Esophageal mural thickening suspected esophagitis. Findings were discussed with Meaghan Pichardo at 3:50 pm on 3/23/2022. CT ABDOMEN PELVIS W IV CONTRAST Additional Contrast? None   Final Result   1. Worsening inflammatory changes surrounding the mid sigmoid colon likely   due to colitis causing worsening colonic dilatation. Underlying malignancy   cannot be excluded; correlate with colonoscopy. 2. Multiple nodular bilateral ground-glass opacities. The differential   diagnosis includes infectious process and metastatic disease recommend CT of   the chest with contrast for further evaluation. 3. Multiple indeterminate hepatic lesions. Recommend nonemergent MRI of the   abdomen with without contrast utilizing Eovist.                 Assessment/Plan:    Active Hospital Problems    Diagnosis Date Noted    Cancer of sigmoid colon ([facility] [address] [C18.7]     Colitis [K52.9] 03/22/2022     Sepsis:  - blood/urine cultures, PD fluid cultures  - ID consulted:  Infectious source likely C dif   - cont flagyl and oral vancomycin    Drug reaction:  -hypotension and urticaria to ceftriaxone  - discussed with pharmacy --> ok to use Merrem if needed  - do not use Cipro due to prolonged QTc    Presumed Metastatic colon cancer:  - colonoscopy 3/23 with colonic mass  - CT scan concerning for liver mets, possibly pulmonary as well  - oncology consulted  - surgery:  Placed diverting colostomy 3/24-- significant pelvic tumors found    Acute bilateral pulmonary embolism:  - heparin drip due to colonoscopy on 3/23  - Coumadin initiated 3/25    C dif infection:  - acute  - oral vanc, s/p IV flagyl    ESRD on dialysis:  - HD due to concern for infection on PD (recent peritonitis)    DVT Prophylaxis: heparin drip  Diet: ADULT DIET;  Regular  Code Status: Full Code    PT/OT Eval Status: n/a    Dispo - PCU    Meaghan Pichardo MD

## 2022-03-27 NOTE — PROGRESS NOTES
General and Vascular Surgery                                                           Daily Progress Note                                                                 Pt Name: [address] [facility] Record Number: [id number]  Date of Birth 1965   Today's Date: 3/27/2022    ASSESSMENT/PLAN  Sigmoid colon cancer s/p diverting colostomy 3/24/22    -continue diet as able  -abx for c. Diff  -ostomy teaching tomorrow                SUBJECTIVE  Tami has improved from yesterday. Pain is well controlled. Tolerating regular PO. Stoma producing    OBJECTIVE  VITALS:  height is 5' 3\" (1.6 m) and weight is 187 lb 13.3 oz (85.2 kg). Her oral temperature is 97.9 °F (36.6 °C). Her blood pressure is 167/103 (abnormal) and her pulse is 93. Her respiration is 23 and oxygen saturation is 97%. VITALS:  BP (!) 167/103   Pulse 93   Temp 97.9 °F (36.6 °C) (Oral)   Resp 23   Ht 5' 3\" (1.6 m)   Wt 187 lb 13.3 oz (85.2 kg)   SpO2 97%   BMI 33.27 kg/m²   GENERAL: alert, no distress  ABDOMEN: wound clean. Abdomen soft. Stoma pink with stool  I/O last 3 completed shifts: In: 1288.2 [P.O.:750; I.V.:538.2]  Out: 200 [Stool:200]  No intake/output data recorded.     LABS  Recent Labs     03/27/22  0717   WBC 8.4   HGB 9.3*   HCT 28.3*   *   *   K 4.6   CL 94*   CO2 24   BUN 26*   CREATININE 4.6*   CALCIUM 7.2*   INR 1.33*   AST 21   ALT 7*   BILITOT 0.3     CBC:   Lab Results   Component Value Date    WBC 8.4 03/27/2022    RBC 3.00 03/27/2022    HGB 9.3 03/27/2022    HCT 28.3 03/27/2022    MCV 94.2 03/27/2022    MCH 30.8 03/27/2022    MCHC 32.7 03/27/2022    RDW 16.5 03/27/2022     03/27/2022    MPV 10.2 03/27/2022     CMP:    Lab Results   Component Value Date     03/27/2022    K 4.6 03/27/2022    CL 94 03/27/2022    CO2 24 03/27/2022    BUN 26 03/27/2022    CREATININE 4.6 03/27/2022    GFRAA 12 03/27/2022    AGRATIO 1.1 03/27/2022    LABGLOM 10 03/27/2022    GLUCOSE 145 03/27/2022    PROT 4.4 03/27/2022    LABALBU 2.3 03/27/2022    CALCIUM 7.2 03/27/2022    BILITOT 0.3 03/27/2022    ALKPHOS 192 03/27/2022    AST 21 03/27/2022    ALT 7 03/27/2022         Electronically signed by Emmanuel Steel MD on 3/27/2022 at 11:48 AM

## 2022-03-27 NOTE — PROGRESS NOTES
Patient has been in bed the entire shift, this nurse offered to assist pt to the chair but she refused. Pt makes slight adjustments in her position but has not allowed nurse/staff to turn and reposition her. She did allow her heels to be elevated off bed. She has had no complaints of pain. Pt did have several visitors this shift, she did seem to enjoy their visit otherwise she has offered very little conversation. Safety maintained. Call light within reach.

## 2022-03-27 NOTE — PROGRESS NOTES
Pt blood pressure continues to be elevated over night, 170s/110s. Denied pain, tolerated PO. Ostomy emptied 2x over night for medium stool. Encouraged pt to increase activity. Pt refused. Continue to encourage ambulation.

## 2022-03-27 NOTE — PROGRESS NOTES
Clinical Pharmacy Note  Heparin Dosing       Lab Results   Component Value Date    APTT 78.0 03/27/2022     Lab Results   Component Value Date    HGB 9.3 03/27/2022    HCT 28.3 03/27/2022     03/27/2022    INR 1.33 03/27/2022       Current Infusion Rate: 1550 units/hr    Plan:  Continue current rate: 1550 units/hr  Next aPTT: 1700 on 3-27-22    Pharmacy will continue to monitor and adjust based on aPTT results.   AMY Andujar [facility]  3/27/2022  11:49 AM

## 2022-03-27 NOTE — PROGRESS NOTES
Clinical Pharmacy Note  Heparin Dosing       Lab Results   Component Value Date    APTT 42.5 03/27/2022     Lab Results   Component Value Date    HGB 9.3 03/27/2022    HCT 28.3 03/27/2022     03/27/2022    INR 1.33 03/27/2022       Current Infusion Rate: 1550 units/hr    Plan:  Bolus: 3400 units  Rate: 1720 units/hr  Next aPTT: 0000 ob 3-28-22    Pharmacy will continue to monitor and adjust based on aPTT results.   Gay Billy [facility]  3/27/2022  5:46 PM

## 2022-03-27 NOTE — PROGRESS NOTES
Clinical Pharmacy Note  Heparin Dosing       Lab Results   Component Value Date    APTT 107.2 03/26/2022     Lab Results   Component Value Date    HGB 9.7 03/26/2022    HCT 30.0 03/26/2022    PLT 97 03/26/2022    INR 2.30 03/26/2022       Current Infusion Rate: 1640 units/hr    Plan:  Rate: decrease to 1550 units/hr  Next aPTT: 0900 3/27/22    Pharmacy will continue to monitor and adjust based on aPTT results.   Albin Mendez, PharmD

## 2022-03-27 NOTE — PLAN OF CARE
Problem: Pain:  Description: Pain management should include both nonpharmacologic and pharmacologic interventions. Goal: Pain level will decrease  Description: Pain level will decrease  3/27/2022 0837 by Amber Rosenbaum RN  Outcome: Ongoing   Educating patient on bracing self when cough due to abd pain with coughing. Problem: Nausea/Vomiting:  Goal: Absence of nausea/vomiting  Description: Absence of nausea/vomiting  3/27/2022 0837 by Amber Rosenbaum RN  Outcome: Ongoing  Patient states no nausea this AM will continue to assess. Problem: Nausea/Vomiting:  Goal: Able to eat  Description: Able to eat  3/27/2022 0106 by Amber Rosenbaum RN  Outcome: Ongoing     Problem: Nausea/Vomiting:  Goal: Able to drink  Description: Able to drink  3/27/2022 0837 by Amber Rosenbaum RN  Outcome: Ongoing  Patient's first full day of being able to eat and drink. No complaints of nausea. Problem: Falls - Risk of:  Goal: Will remain free from falls  Description: Will remain free from falls  3/27/2022 0837 by Amber Rosenbaum RN  Outcome: Ongoing  Bed alarm on. Patient informed to call before needing to get up.

## 2022-03-27 NOTE — PROGRESS NOTES
Was going to give pt the hydralazine for her blood pressure and she said she is allergic to it, makes her vomit. Pt said she is able to take losartan. Perfect serve message sent to Dr. Yessi Dent. She said she is going to order that.

## 2022-03-27 NOTE — PROGRESS NOTES
Perfect serve message sent to Dr. Can Oquendo regarding patient's blood pressure running high, 160s/100s. Informed her that patient's proamatine has been held.

## 2022-03-27 NOTE — PROGRESS NOTES
Clinical Pharmacy Note  Warfarin Consult    Indy Knox is a 55 y.o. female receiving warfarin managed by pharmacy. Patient being bridged with heparin. Warfarin Indication: PE  Target INR range: 2-3   Dose prior to admission: New start    Current warfarin drug-drug interactions:     Recent Labs     03/25/22  1048 03/26/22  0657 03/26/22  0705 03/27/22  0717   HGB 10.3* 9.7*  --  9.3*   HCT 32.3* 30.0*  --  28.3*   INR 1.03  --  2.30* 1.33*       Assessment/Plan:    Questioning whether INR from yesterday was accurate, as INR down to 1.33 today? Will give warfarin 5 mg today    Thank you for the consult. Will continue to follow.     Cathleen Jarrett PharmD.  3/27/2022  11:22 AM

## 2022-03-27 NOTE — PROGRESS NOTES
Hospitalist Progress Note      PCP: No primary care provider on file. Date of Admission: 3/22/2022    Chief Complaint: s/p colostomy placement    Hospital Course:     Subjective: having some dysphagia with swallowing      Medications:  Reviewed    Infusion Medications    heparin (Porcine) 1,550 Units/hr (03/27/22 0644)    sodium chloride 25 mL (03/24/22 1114)    dextrose       Scheduled Medications    warfarin placeholder: dosing by pharmacy   Other RX Placeholder    lactated ringers bolus  500 mL IntraVENous Once    midodrine  5 mg Oral BID WC    vancomycin  250 mg Oral 4x Daily    sodium chloride flush  5-40 mL IntraVENous 2 times per day    insulin lispro  0-6 Units SubCUTAneous TID WC    insulin lispro  0-3 Units SubCUTAneous Nightly     PRN Meds: morphine **OR** morphine, albumin human, midodrine, heparin (porcine), diphenhydrAMINE, sodium chloride flush, sodium chloride, ondansetron **OR** ondansetron, polyethylene glycol, acetaminophen **OR** acetaminophen, oxyCODONE, glucose, dextrose, glucagon (rDNA), dextrose      Intake/Output Summary (Last 24 hours) at 3/27/2022 0911  Last data filed at 3/27/2022 0644  Gross per 24 hour   Intake 1038.24 ml   Output 200 ml   Net 838.24 ml       Exam:    BP (!) 167/103   Pulse 93   Temp 97.9 °F (36.6 °C) (Oral)   Resp 23   Ht 5' 3\" (1.6 m)   Wt 187 lb 13.3 oz (85.2 kg)   SpO2 97%   BMI 33.27 kg/m²     General appearance: No apparent distress, appears stated age and cooperative. HEENT: Pupils equal, round, and reactive to light. Conjunctivae/corneas clear. Neck: Supple, with full range of motion. No jugular venous distention. Trachea midline. Respiratory:  Normal respiratory effort. Clear to auscultation, bilaterally without Rales/Wheezes/Rhonchi. Cardiovascular: Regular rate and rhythm with normal S1/S2 without murmurs, rubs or gallops. Abdomen: Soft, appropriate post-surgical tenderness, non-distended with normal bowel sounds.   Colostomy in place  Musculoskeletal: No clubbing, cyanosis or edema bilaterally. Full range of motion without deformity. Skin: Skin color, texture, turgor normal.  No rashes or lesions. Neurologic:  Neurovascularly intact without any focal sensory/motor deficits. Cranial nerves: II-XII intact, grossly non-focal.  Psychiatric: Alert and oriented, thought content appropriate, normal insight  Capillary Refill: Brisk,< 3 seconds   Peripheral Pulses: +2 palpable, equal bilaterally       Labs:   Recent Labs     03/25/22  1048 03/26/22  0657 03/27/22  0717   WBC 10.8 9.3 8.4   HGB 10.3* 9.7* 9.3*   HCT 32.3* 30.0* 28.3*   * 97* 104*     Recent Labs     03/25/22  1048 03/26/22  0657   * 133*   K 5.3* 4.2   CL 95* 98*   CO2 18* 22   BUN 33* 19   CREATININE 5.4* 3.7*   CALCIUM 7.4* 7.1*     Recent Labs     03/25/22  1048 03/26/22  0657   AST 12* 14*   ALT 7* 7*   BILITOT 0.4 0.3   ALKPHOS 189* 174*     Recent Labs     03/25/22  1048 03/26/22  0705 03/27/22  0717   INR 1.03 2.30* 1.33*     No results for input(s): CKTOTAL, TROPONINI in the last 72 hours. Urinalysis:      Lab Results   Component Value Date    NITRU Negative 05/27/2021    WBCUA 3 05/27/2021    BACTERIA RARE 05/27/2021    RBCUA 4 05/27/2021    BLOODU TRACE 05/27/2021    SPECGRAV 1.016 05/27/2021    GLUCOSEU 250 05/27/2021       Radiology:  VL Extremity Venous Bilateral   Final Result      CT CHEST W CONTRAST   Final Result   Bilateral pulmonary emboli slightly greater on the right than left. Potential concern for septic emboli given the small foci of irregular   parenchymal lung lesions new from the prior chest CT study, only partially   included and demonstrated on abdomen pelvic CT 1 day earlier. These could   certainly also be metastatic as well but somewhat atypical given the   irregular margins.       Multiple liver metastases partially included, unchanged as seen on abdomen   pelvic CT 1 day earlier      Abdominal ascites, decreased from the prior exam 1 day earlier, with recently   demonstrated pneumoperitoneum not seen currently in the included upper   abdomen. .      Esophageal mural thickening suspected esophagitis. Findings were discussed with Christiano Centeno at 3:50 pm on 3/23/2022. CT ABDOMEN PELVIS W IV CONTRAST Additional Contrast? None   Final Result   1. Worsening inflammatory changes surrounding the mid sigmoid colon likely   due to colitis causing worsening colonic dilatation. Underlying malignancy   cannot be excluded; correlate with colonoscopy. 2. Multiple nodular bilateral ground-glass opacities. The differential   diagnosis includes infectious process and metastatic disease recommend CT of   the chest with contrast for further evaluation. 3. Multiple indeterminate hepatic lesions.   Recommend nonemergent MRI of the   abdomen with without contrast utilizing Eovist.                 Assessment/Plan:    Active Hospital Problems    Diagnosis Date Noted    Hepatic lesion [K76.9]     Cancer of sigmoid colon ([facility] [address] [C18.7]     Colitis [K52.9] 03/22/2022     Sepsis:  improved  - blood/urine cultures, PD fluid cultures  - ID consulted:  Infectious source likely C dif   - s/p flagyl, cont oral vancomycin    Dysphagia:  - if persistent, consult GI to r/o esophagitis    Drug reaction:  -hypotension and urticaria to ceftriaxone  - discussed with pharmacy --> ok to use Merrem if needed  - do not use Cipro due to prolonged QTc    Presumed Metastatic colon cancer:  - colonoscopy 3/23 with colonic mass  - CT scan concerning for liver mets, possibly pulmonary as well  - oncology consulted  - surgery:  Placed diverting colostomy 3/24-- significant pelvic tumors found    Acute bilateral pulmonary embolism:  - heparin drip due to colonoscopy on 3/23  - Coumadin initiated 3/25    C dif infection:  - acute  - oral vanc, s/p IV flagyl    ESRD on dialysis:  - HD due to concern for infection on PD (recent peritonitis)    DVT Prophylaxis: heparin drip  Diet: ADULT DIET;  Regular  Code Status: Full Code    PT/OT Eval Status: n/a    Dispo - PCU    Beverly De La Vega MD

## 2022-03-27 NOTE — PROGRESS NOTES
Office: [phone]       Fax: [phone]      Nephrology Progress Note        Patient's Name: Jimmy Setting  Date of Visit: 3/27/2022    Reason for Consult:  ESRD management      History of Present Illness:      Jimmy Setting is a 55 y.o. female with PMHx of hypertension, diabetes mellitus, ESRD who was admitted on 3/22/2022 with complaints of abdominal pain   PD fluid dark  history of PD peritonitis recently with pseudomonas. PD catheter was exchanged. Pt was on HD and just started to do PD on 3/20  Pt was getting prep for colonoscopy   Does 2 exchanges with 2.5% day time and Icodextrin overnight    INTERVAL HISTORY    Feels same  Shortness of breath: Yes  BP better            Medications:        Allergies:  Bactrim [sulfamethoxazole-trimethoprim], Doxazosin, Geocillin [carbenicillin], Lisinopril, Other, Spironolactone, Tramadol, Bumetanide, and Ibuprofen  Scheduled Meds:   warfarin  5 mg Oral Once    hydrALAZINE  25 mg Oral 3 times per day    warfarin placeholder: dosing by pharmacy   Other RX Placeholder    lactated ringers bolus  500 mL IntraVENous Once    midodrine  5 mg Oral BID WC    vancomycin  250 mg Oral 4x Daily    sodium chloride flush  5-40 mL IntraVENous 2 times per day    insulin lispro  0-6 Units SubCUTAneous TID WC    insulin lispro  0-3 Units SubCUTAneous Nightly     Continuous Infusions:   heparin (Porcine) 1,550 Units/hr (03/27/22 0644)    sodium chloride 25 mL (03/24/22 1114)    dextrose           Objective:       Vitals:  BP (!) 173/100   Pulse 91   Temp 98 °F (36.7 °C) (Oral)   Resp 24   Ht 5' 3\" (1.6 m)   Wt 187 lb 13.3 oz (85.2 kg)   SpO2 92%   BMI 33.27 kg/m²   Constitutional:  awake, NAD  Neck: no bruits, No JVD  Cardiovascular:  S1, S2 reg  Respiratory: CTA, no crackles  Abdomen:  +BS, soft, tender, ND  Ext: trace lower extremity edema  Access: abd PD catheter in place, no drainage/redness around exit site. CNS: alert, no agitation    Data:     Labs:  Hepatic:   Recent Labs     03/25/22  1048 03/26/22  0657 03/27/22  0717   AST 12* 14* 21   ALT 7* 7* 7*   BILITOT 0.4 0.3 0.3   ALKPHOS 189* 174* 192*     BNP: No results for input(s): BNP in the last 72 hours. ABGs: No results for input(s): PHART, PO2ART, CZQ1TYQ in the last 72 hours. IMAGING:  VL Extremity Venous Bilateral   Final Result      CT CHEST W CONTRAST   Final Result   Bilateral pulmonary emboli slightly greater on the right than left. Potential concern for septic emboli given the small foci of irregular   parenchymal lung lesions new from the prior chest CT study, only partially   included and demonstrated on abdomen pelvic CT 1 day earlier. These could   certainly also be metastatic as well but somewhat atypical given the   irregular margins. Multiple liver metastases partially included, unchanged as seen on abdomen   pelvic CT 1 day earlier      Abdominal ascites, decreased from the prior exam 1 day earlier, with recently   demonstrated pneumoperitoneum not seen currently in the included upper   abdomen. .      Esophageal mural thickening suspected esophagitis. Findings were discussed with Heidi Lopez at 3:50 pm on 3/23/2022. CT ABDOMEN PELVIS W IV CONTRAST Additional Contrast? None   Final Result   1. Worsening inflammatory changes surrounding the mid sigmoid colon likely   due to colitis causing worsening colonic dilatation. Underlying malignancy   cannot be excluded; correlate with colonoscopy. 2. Multiple nodular bilateral ground-glass opacities. The differential   diagnosis includes infectious process and metastatic disease recommend CT of   the chest with contrast for further evaluation. 3. Multiple indeterminate hepatic lesions.   Recommend nonemergent MRI of the   abdomen with without contrast utilizing Eovist. Assessment :       1. ESRD   Etiology: suspected DN  Access: Abd PD cath and Tunneled Cath  Volume: Hypervolemic        2. Electrolytes/Acid base  No Dyskalemia    Recent Labs     03/27/22  0717   *   K 4.6   CO2 24       3. BMD  -Hyperphosphatemia, SHPT  -maintained on Renvela    Lab Results   Component Value Date    CALCIUM 7.2 (L) 03/27/2022    PHOS 4.5 02/17/2022        4. HTN  -Blood pressure high       BP Readings from Last 1 Encounters:   03/27/22 (!) 173/100       5. Anemia  -anemia of CKD  -assess for CHRIS    Recent Labs     03/27/22  0717   HGB 9.3*     6. DM    7. abdominal pain   -colitis  -suspected malignancy  -PD fluid: nuc cells 150    PLAN :     - Empiric antibiotics  - PD fluid for culture,   - continue hemodialysis . For HD Monday   - hold BP meds:   - MBD management  - Anemia management  - Dose meds to GFR<10    Thank you for allowing us to participate in care of [facility] . We will continue to follow. Feel free to contact me with any questions.       Rica Mariee MD  3/27/2022    [facility]  [address]  Office : [phone]  Fax :[phone]

## 2022-03-28 NOTE — PROGRESS NOTES
Office: [phone]       Fax: [phone]      Nephrology Progress Note        Patient's Name: Lashell Zavala  Date of Visit: 3/28/2022    Reason for Consult:  ESRD management      History of Present Illness:      Lashell Zavala is a 55 y.o. female with PMHx of hypertension, diabetes mellitus, ESRD who was admitted on 3/22/2022 with complaints of abdominal pain   PD fluid dark  history of PD peritonitis recently with pseudomonas. PD catheter was exchanged. Pt was on HD and just started to do PD on 3/20  Pt was getting prep for colonoscopy   Does 2 exchanges with 2.5% day time and Icodextrin overnight    INTERVAL HISTORY    Feels same  Shortness of breath: Yes  BP better            Medications:        Allergies:  Bactrim [sulfamethoxazole-trimethoprim], Doxazosin, Geocillin [carbenicillin], Lisinopril, Other, Spironolactone, Tramadol, Bumetanide, and Ibuprofen  Scheduled Meds:   losartan  50 mg Oral Nightly    warfarin placeholder: dosing by pharmacy   Other RX Placeholder    lactated ringers bolus  500 mL IntraVENous Once    midodrine  5 mg Oral BID WC    vancomycin  250 mg Oral 4x Daily    sodium chloride flush  5-40 mL IntraVENous 2 times per day    insulin lispro  0-6 Units SubCUTAneous TID WC    insulin lispro  0-3 Units SubCUTAneous Nightly     Continuous Infusions:   heparin (Porcine) Stopped (03/28/22 0053)    sodium chloride 25 mL (03/24/22 1114)    dextrose           Objective:       Vitals:  BP (!) 151/90   Pulse 93   Temp 98 °F (36.7 °C) (Oral)   Resp 22   Ht 5' 3\" (1.6 m)   Wt 191 lb 12.8 oz (87 kg)   SpO2 97%   BMI 33.98 kg/m²   Constitutional:  awake, NAD  Neck: no bruits, No JVD  Cardiovascular:  S1, S2 reg  Respiratory: CTA, no crackles  Abdomen:  +BS, soft, tender, ND  Ext: trace lower extremity edema  Access: abd PD catheter in place, no drainage/redness around exit site. CNS: alert, no agitation    Data:     Labs:  Hepatic:   Recent Labs     03/26/22  0657 03/27/22  0717 03/28/22  0528   AST 14* 21 23   ALT 7* 7* 8*   BILITOT 0.3 0.3 0.3   ALKPHOS 174* 192* 184*     BNP: No results for input(s): BNP in the last 72 hours. ABGs: No results for input(s): PHART, PO2ART, CQK5WPK in the last 72 hours. IMAGING:  CT ABDOMEN PELVIS W IV CONTRAST Additional Contrast? None   Final Result   1. Interval transverse colostomy creation, with mild colonic wall thickening   of the transverse colon at the level of the ostomy which may be related to   poor distention, though wall thickening of the descending colon as well as   the sigmoid colon may be related to colitis. The colonic distention seen on   the preoperative examination has improved. 2. There is a small volume of intra-and pelvic ascites, within indwelling   peritoneal catheter noted. 3. Numerous hepatic lesions are seen suspicious for possible metastasis. Low-attenuation nonspecific lesions noted within the spleen as well. 4. Circumferential distal esophageal thickening is identified suggestive of   esophagitis. 5. Wall thickening of the distal stomach and duodenum suggestive of   gastritis/enteritis. 6. Multifocal nodular appearing infiltrates are seen within the lung bases,   similar to the previous exam.   7. Other similar findings as above. VL Extremity Venous Bilateral   Final Result      CT CHEST W CONTRAST   Final Result   Bilateral pulmonary emboli slightly greater on the right than left. Potential concern for septic emboli given the small foci of irregular   parenchymal lung lesions new from the prior chest CT study, only partially   included and demonstrated on abdomen pelvic CT 1 day earlier. These could   certainly also be metastatic as well but somewhat atypical given the   irregular margins.       Multiple liver metastases partially included, unchanged as seen on abdomen   pelvic CT 1 day earlier      Abdominal ascites, decreased from the prior exam 1 day earlier, with recently   demonstrated pneumoperitoneum not seen currently in the included upper   abdomen. .      Esophageal mural thickening suspected esophagitis. Findings were discussed with Prabhu Salazar at 3:50 pm on 3/23/2022. CT ABDOMEN PELVIS W IV CONTRAST Additional Contrast? None   Final Result   1. Worsening inflammatory changes surrounding the mid sigmoid colon likely   due to colitis causing worsening colonic dilatation. Underlying malignancy   cannot be excluded; correlate with colonoscopy. 2. Multiple nodular bilateral ground-glass opacities. The differential   diagnosis includes infectious process and metastatic disease recommend CT of   the chest with contrast for further evaluation. 3. Multiple indeterminate hepatic lesions. Recommend nonemergent MRI of the   abdomen with without contrast utilizing Eovist.             Assessment :       1. ESRD   Etiology: suspected DN  Access: Abd PD cath and Tunneled Cath  Volume: Hypervolemic    Getting HD today     peritoneal fluid shows neutrophil count 73 %  Await cultures     2. Electrolytes/Acid base  No Dyskalemia    Recent Labs     03/28/22  0528   *   K 4.7   CO2 21       3. BMD  -Hyperphosphatemia, SHPT  -maintained on Renvela    Lab Results   Component Value Date    CALCIUM 6.8 (L) 03/28/2022    PHOS 4.5 02/17/2022        4. HTN  -Blood pressure high       BP Readings from Last 1 Encounters:   03/28/22 (!) 151/90       5. Anemia  -anemia of CKD  -assess for CHRIS    Recent Labs     03/28/22  0528   HGB 8.5*     6. DM    7. abdominal pain   Colon cancer on pathology   Extensive metastasis  - on po vancomycin for c. Diff     PLAN :     - Empiric antibiotics  - PD fluid for culture,   - continue hemodialysis .  For HD today   - hold BP meds:   - MBD management  - Anemia management  - Dose meds to GFR<10    Thank you for allowing us to participate [facility] . We will continue to follow. Feel free to contact me with any questions.       Gutierrez Jimenez MD  3/28/2022    [facility] [address] [place] [address]  Office : [phone]  Fax :[phone]

## 2022-03-28 NOTE — FLOWSHEET NOTE
Treatment time: 3 hrs    Net UF: 1.6 liters    Pre weight: 87.7 kg  Post weight: 86.1  EDW: TBD    Access used: RCW TDC   Access function: no issues noted. BFR per orders. Medications or blood products given: Midodrine and Albumin for hypotension. Regular outpatient schedule: MWF    Summary of response to treatment: Pt tolerated treatment.      Copy of dialysis treatment record placed in chart, to be scanned into EMR.       03/28/22 0900 03/28/22 1203   Treatment   Time On  --  0900   Time Off  --  1203   Vital Signs   BP (!) 174/102 (!) 162/90   Pulse 90 96   Dialysis Bath   K+ (Potassium) 2  --    Ca+ (Calcium) 2.5  --    Na+ (Sodium) 136  --    HCO3 (Bicarb) 34  --    Post-Hemodialysis Assessment   Total Liters Processed (l/min)  --  52 l/min   Duration of Treatment (minutes)  --  180 minutes   NET Removed (ml)  --  1600 ml

## 2022-03-28 NOTE — PROGRESS NOTES
Clinical Pharmacy Note  Warfarin Consult    Oskar Acevedo is a 55 y.o. female receiving warfarin managed by pharmacy. Patient being bridged with heparin. Warfarin Indication: PE  Target INR range: 2-3   Dose prior to admission: New start    Current warfarin drug-drug interactions:     Recent Labs     03/27/22  0717 03/27/22  0717 03/27/22  2040 03/28/22  0011 03/28/22  0528   HGB 9.3*   < > 9.3* 9.1* 8.5*   HCT 28.3*   < > 28.5* 27.6* 26.0*   INR 1.33*  --  1.35*  --  1.24*    < > = values in this interval not displayed. Assessment/Plan:    Questioning whether INR from 3/26 was accurate,  INR down to 1.24 today? Will give warfarin 5 mg today    Thank you for the consult. Will continue to follow.     Electronically signed by Silvestre Jaime [facility] on 3/28/2022 at 8:44 AM

## 2022-03-28 NOTE — CARE COORDINATION
Ostomy Referral Follow-up Progress Note      NAME:  Andre Winston  MEDICAL RECORD NUMBER:  [id number]  AGE: 55 y.o. GENDER:  female  :  1965  TODAY'S DATE:  3/28/2022    Subjective:   Sigmoid colon cancer s/p diverting loop colostomy 3/24/22  Ostomy teaching session today. Objective    BP (!) 146/80   Pulse 98   Temp 97.7 °F (36.5 °C) (Oral)   Resp 21   Ht 5' 3\" (1.6 m)   Wt 189 lb 13.1 oz (86.1 kg)   SpO2 92%   BMI 33.62 kg/m²     Enoch Risk Score Enoch Scale Score: 16    Assessment   Surgeon Dr. Jonathan Tran    Colostomy LUQ Loop (Active)   Stomal Appliance 1 piece 22   Stoma  Assessment Pink;Moist 22   Mucocutaneous Junction Intact 22   Peristomal Assessment Clean; Intact 22   Stool Appearance Loose 22   Stool Color Brown;Black 22   Stool Amount Small 22   Output (mL) 50 ml 22   Number of days: 4     Pt awake and alert sitting up in bed. Pt with flat affect. Pt was pleasant however did not seem very interested. When pouch removed pt called stoma \"gross. \"   Pt abd incision dry, intact, closed with staples. Covered with dry dressing. Pt loop stoma intact with bridge. Peristomal care provided. No peristomal skin breakdown. Pt stoma pink and moist. Barrier ring and paste applied over bridge. 1-piece pouch cut to fit. Barrier strips applied to wafer edges. Good seal obtained. Pt has loose brown stool. Pt educated on how to open and close pouch. Taught with demonstration for first pouch change. Will continue to meet with patient to go over pouch changes. Intake/Output Summary (Last 24 hours) at 3/28/2022 1544  Last data filed at 3/28/2022 1203  Gross per 24 hour   Intake 722.02 ml   Output 2150 ml   Net -1427.98 ml       Plan:   Plan for Ostomy Care: Will continue to follow for ostomy care and teaching.   Coloplast SenSura Rad flat 1 pc #[id number]  Brava protective Seal thin #[id number]    Ostomy Plan of Care  [] Supplies/Instructions left in room  [] Patient using home supplies  [] Brand/supplies at bedside     Current Diet: ADULT DIET;  Regular  Dietician consult:  Yes    Discharge Plan:  Placement for patient upon discharge: home with support    Outpatient visit plan N/A  Supplies given No   Samples requested N/A    Referrals:  []   [] [facility]  [] Supplies  [] Other      Patient/Caregiver Teaching:  Written Instructions given to patient/family  Teaching provided:  [x] Reviewed GI and A&P        [] Supplies  [x] Pouch emptying      [] Manipulate closure  [x] Routine Care         [] Comment  [] Pouch maintenance           Level of patient/caregiver understanding able to:  [x] Indicates understanding       [x] Needs reinforcement  [] Unsuccessful      [] Verbal Understanding  [] Demonstrated understanding       [] No evidence of learning  [] Refused teaching         [] N/A    Electronically signed by Maci Melgoza RN, CWOCN on 3/28/2022 at 3:44 PM

## 2022-03-28 NOTE — PROGRESS NOTES
Clinical Pharmacy Note  Heparin Dosing       Lab Results   Component Value Date    APTT 144.3 03/28/2022     Lab Results   Component Value Date    HGB 9.1 03/28/2022    HCT 27.6 03/28/2022    PLT 98 03/27/2022    INR 1.35 03/27/2022       Current Infusion Rate: 1720 units/hr    Plan:  Hold heparin for 1 hour  Rate: decrease to 1600 units/hr  Next aPTT: 0800 3/28/22    Pharmacy will continue to monitor and adjust based on aPTT results.   Nagi Telles, PharmD

## 2022-03-28 NOTE — PROGRESS NOTES
INPATIENT PROGRESS NOTE        IDENTIFYING DATA/REASON FOR CONSULTATION   PATIENT:  Bing Villela  MRN:  [id number]  ADMIT DATE: 3/22/2022  TIME OF EVALUATION: 3/28/2022 2:38 PM  HOSPITAL STAY:   LOS: 6 days   CONSULTING PHYSICIAN: Kolby Santo MD   REASON FOR CONSULTATION: colon mass    Subjective:    Patient seen in follow up. We have been reconsulted for pain with swallowing. Pt reports she's been experiencing a sore throat since her surgery and intubation but it has been slowly improving. She states she is not eating much because it hurts to swallow. She denies feeling food getting stuck in her esophagus. She does not want to pursue EGD at this time. MEDICATIONS   SCHEDULED:  warfarin, 5 mg, Once  losartan, 50 mg, Nightly  warfarin placeholder: dosing by pharmacy, , RX Placeholder  lactated ringers bolus, 500 mL, Once  midodrine, 5 mg, BID WC  vancomycin, 250 mg, 4x Daily  sodium chloride flush, 5-40 mL, 2 times per day  insulin lispro, 0-6 Units, TID WC  insulin lispro, 0-3 Units, Nightly      FLUIDS/DRIPS:     heparin (Porcine) Stopped (03/28/22 0053)    sodium chloride 25 mL (03/24/22 1114)    dextrose       PRNs: morphine, 2 mg, Q2H PRN   Or  morphine, 4 mg, Q2H PRN  albumin human, 25 g, PRN  midodrine, 10 mg, PRN  heparin (porcine), 3,000 Units, PRN  diphenhydrAMINE, 25 mg, Q6H PRN  sodium chloride flush, 5-40 mL, PRN  sodium chloride, 25 mL, PRN  ondansetron, 4 mg, Q8H PRN   Or  ondansetron, 4 mg, Q6H PRN  polyethylene glycol, 17 g, Daily PRN  acetaminophen, 650 mg, Q6H PRN   Or  acetaminophen, 650 mg, Q6H PRN  oxyCODONE, 5 mg, Q4H PRN  glucose, 15 g, PRN  dextrose, 12.5 g, PRN  glucagon (rDNA), 1 mg, PRN  dextrose, 100 mL/hr, PRN      ALLERGIES:    Allergies   Allergen Reactions    Bactrim [Sulfamethoxazole-Trimethoprim]     Doxazosin Other (See Comments)     Other reaction(s):  Other (See Comments)  Bleeding  Punch in chest      Geocillin [Carbenicillin]     Lisinopril Other (See Comments)     Pt thinks cr levels went up due to med.  Other      Artificial sweetners, nutrasweet    Spironolactone      Bad taste, emesis. Pt refuses to take    Tramadol     Bumetanide Nausea And Vomiting    Ibuprofen Nausea And Vomiting         PHYSICAL EXAM   VITALS:  BP (!) 146/80   Pulse 98   Temp 97.7 °F (36.5 °C) (Oral)   Resp 21   Ht 5' 3\" (1.6 m)   Wt 189 lb 13.1 oz (86.1 kg)   SpO2 92%   BMI 33.62 kg/m²   TEMPERATURE:  Current - Temp: 97.7 °F (36.5 °C); Max - Temp  Av.9 °F (36.6 °C)  Min: 97.5 °F (36.4 °C)  Max: 98.4 °F (36.9 °C)    Physical Exam:  General appearance: alert, cooperative, no distress, appears stated age=  Eyes: Anicteric  Head: Normocephalic, without obvious abnormality  Lungs: clear to auscultation bilaterally, Normal Effort  Heart: regular rate and rhythm, normal S1 and S2, no murmurs or rubs  Abdomen: soft, non-distended, non-tender. Bowel sounds normal.   Extremities: atraumatic, no cyanosis or edema  Skin: warm and dry, no jaundice  Neuro: Grossly intact, A&OX3    LABS AND IMAGING   Laboratory   Recent Labs     22  0717 22  0717 220 22  0011 22  0528   WBC 8.4  --  7.4  --  6.9   HGB 9.3*   < > 9.3* 9.1* 8.5*   HCT 28.3*   < > 28.5* 27.6* 26.0*   MCV 94.2  --  94.1  --  97.1   *  --  98*  --  88*    < > = values in this interval not displayed. Recent Labs     22  0657 22  0717 22  0528   * 130* 131*   K 4.2 4.6 4.7   CL 98* 94* 97*   CO2 22 24 21   BUN 19 26* 36*   CREATININE 3.7* 4.6* 5.5*     Recent Labs     22  0657 22  0717 22  0528   AST 14* 21 23   ALT 7* 7* 8*   BILITOT 0.3 0.3 0.3   ALKPHOS 174* 192* 184*     No results for input(s): LIPASE, AMYLASE in the last 72 hours.   Recent Labs     22  0717 22  0528   PROTIME 15.2* 15.4* 14.1*   INR 1.33* 1.35* 1.24*         Imaging  CT ABDOMEN PELVIS W IV CONTRAST Additional Contrast? None   Final Result 1. Interval transverse colostomy creation, with mild colonic wall thickening   of the transverse colon at the level of the ostomy which may be related to   poor distention, though wall thickening of the descending colon as well as   the sigmoid colon may be related to colitis. The colonic distention seen on   the preoperative examination has improved. 2. There is a small volume of intra-and pelvic ascites, within indwelling   peritoneal catheter noted. 3. Numerous hepatic lesions are seen suspicious for possible metastasis. Low-attenuation nonspecific lesions noted within the spleen as well. 4. Circumferential distal esophageal thickening is identified suggestive of   esophagitis. 5. Wall thickening of the distal stomach and duodenum suggestive of   gastritis/enteritis. 6. Multifocal nodular appearing infiltrates are seen within the lung bases,   similar to the previous exam.   7. Other similar findings as above. VL Extremity Venous Bilateral   Final Result      CT CHEST W CONTRAST   Final Result   Bilateral pulmonary emboli slightly greater on the right than left. Potential concern for septic emboli given the small foci of irregular   parenchymal lung lesions new from the prior chest CT study, only partially   included and demonstrated on abdomen pelvic CT 1 day earlier. These could   certainly also be metastatic as well but somewhat atypical given the   irregular margins. Multiple liver metastases partially included, unchanged as seen on abdomen   pelvic CT 1 day earlier      Abdominal ascites, decreased from the prior exam 1 day earlier, with recently   demonstrated pneumoperitoneum not seen currently in the included upper   abdomen. .      Esophageal mural thickening suspected esophagitis. Findings were discussed with Swathi Jones at 3:50 pm on 3/23/2022. CT ABDOMEN PELVIS W IV CONTRAST Additional Contrast? None   Final Result   1.  Worsening inflammatory changes surrounding the mid sigmoid colon likely   due to colitis causing worsening colonic dilatation. Underlying malignancy   cannot be excluded; correlate with colonoscopy. 2. Multiple nodular bilateral ground-glass opacities. The differential   diagnosis includes infectious process and metastatic disease recommend CT of   the chest with contrast for further evaluation. 3. Multiple indeterminate hepatic lesions. Recommend nonemergent MRI of the   abdomen with without contrast utilizing Eovist.             Endoscopy  Flex Sig 3/23/22 with Dr. Chrissie Cody     1. The preparation was poor but I was able to advance the colonoscope to approximally 20 cm from the anal verge and an obstructing malignant appearing mass was present. Multiple biopsies obtained. A tattoo was placed distal with a total of 1.4 CC of belkis ink. The visualization of the rest of the sigmoid and rectum was limited by prep quality  Sigmoid colon mass, biopsy:   - Invasive adenocarcinoma, well to moderately differentiated. .       Rosanne Norris is a 55 y.o. female with PMH of ESRD on PD, DM, diabetic neuropathy, diabetic retinopathy, HTN, HLD who presented on 3/22/2022 with abdominal pain, nausea, vomiting. She recently underwent ventral hernia repair and path showed pathology showed invasive moderately adenocarcinoma extensively involving an infiltrating the fibrous tissue and adipose tissue. She was scheduled for PET scan as well as colonoscopy. On day of colonoscopy after taking dulocolax she developed severe abd pain, nausea, vomiting and presented to ED. CT A/P showed thickening of the sigmoid colon and multiple nodular groundglass opacities throughout the bilateral lungs and multiple indeterminate hepatic lesions. She underwent flex sig revealing an obstructing malignant appearing mass at 20 cm from anal verge. Biopsies showed invasive adenocarcinoma.   She subsequently underwent ex lpa with diverting loop colostomy Hospital course further complicated by bilateral PE seen on CT 3/23 and cdiff positive stool studies. IMPRESSION/RECOMMENDATIONS:    1. 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 S/p ex lap with diverting loop colostomy. Onc following plans to start palliative chemo as outpt  2. PE on coumadin with heparin bridge  3. Cdiff  On oral vanc  4. ESRD on HD  5. Odynphagia/sore throat. Occurring since her surgery. Denies dysphagia. No obvious oral thrush. Pt declining EGD at this time. Will try oral chloraseptic spray as needed. Will also place her on a daily PPI. Further input and recommendations by Dr. Mercy Seth to follow. If you have any questions or need any further information, please feel free to contact us [phone]. Thank you for allowing us to participate in the care of Danae Dunn. The note was completed using Dragon voice recognition transcription. Every effort was made to ensure accuracy; however, inadvertent transcription errors may be present despite my best efforts to edit errors.     Michael ASCENCIO

## 2022-03-28 NOTE — PLAN OF CARE
Problem: Nutrition  Goal: Optimal nutrition therapy  3/28/2022 0952 by Tesha Haq  Outcome: Ongoing  3/28/2022 0714 by Zeeshan Long  Outcome: Ongoing  3/28/2022 0713 by Zeeshan Long  Outcome: Ongoing   Nutrition Problem #1: Altered GI function  Intervention: Food and/or Nutrient Delivery: Continue Current Diet  Nutritional Goals: PO intake >50%

## 2022-03-28 NOTE — PROGRESS NOTES
[facility]  INPATIENT PHYSICAL THERAPY  DAILY NOTE  [facility] 6E - 6E-55/055-A    Time In: 07  Time Out: 6335  Timed Code Treatment Minutes: 24 Minutes  Minutes: 24          Date: 2021  Patient Name: Marcin Dwyer,  Gender:  female        MRN: [id number]  : 1973  (56 y.o.)     Referring Practitioner: CHELSEA Rice  Diagnosis: Stroke-like symptoms  Additional Pertinent Hx: Per ER note on 3/5/2021: 56 y.o. female who presents for right-sided weakness. Patient reports that at noon, while driving, she started to experience right upper extremity tingling and heaviness. Her right lower extremity is weak. She also feels the tingling and weakness on the right side of her face. She feels it is difficult to speak but does not feel confused. The patient presented to [facility] earlier today and had a work-up but was discharged. Patient was able to drive herself here. She describes dizziness as off-balance. MRI results were negative. Per Dr. Ti Wild note: We need to ruleout possibility of conversion disorder. Prior Level of Function:  Lives With: Alone  Type of Home: Apartment  Home Layout: One level  Home Access: Stairs to enter with rails(20 railing on R side)  Home Equipment: 4 wheeled walker   Bathroom Shower/Tub: Tub/Shower unit  Bathroom Toilet: Standard    ADL Assistance: [address]: Independent  Ambulation Assistance: Independent  Transfer Assistance: Independent  Additional Comments: Pt reports fully indep PLOF.     Restrictions/Precautions:  Restrictions/Precautions: Fall Risk     SUBJECTIVE: pleasant and cooperative, pt stated feeling much better today, pt stated feels stiff from just waking up this morning but close to baseline    PAIN: stiffness, but no pain    Vitals: Vitals not assessed per clinical judgement, see nursing flowsheet    OBJECTIVE:  Bed Mobility:  Rolling to Left: Independent   Supine to Sit: Occupational Therapy  Unable to see pt at this time due to pt currently at dialysis at time of attempt. Will follow up with pt as time allows.     Perri Mccloud, OTR/L Independent  Scooting: Independent  HOB flat  Transfers:  Sit to Stand: Independent  Stand to Sit:Independent    Ambulation:  Supervision  Distance: 125'x2, 10'x1  Surface: Level Tile  Device:No Device  Gait Deviations:  Min trunk sway and dec katerin with pt c/o stiffness, no LOB, steady    Negotiated 10 steps with RHR, nonreciprocal pattern, SBA, steady and no LOB    Balance:  Static Sitting Balance:  Independent  Dynamic Sitting Balance: Independent, donning robe  Static Standing Balance: Supervision  Dynamic Standing Balance: Supervision, picked up pen off of floor without UE support and steady, no LOB        Functional Outcome Measures: Completed  Balance Score: 16  Gait Score: 12  Tinetti Total Score: 28  Risk Indicators:  Less than/equal to 18 = high risk  19-23 Moderate risk  Greater than/equal to 24 = low risk  AM-PAC Inpatient Mobility Raw Score : 22  AM-PAC Inpatient T-Scale Score : 53.28    ASSESSMENT:  Assessment: Patient progressing toward established goals. and pt scored 28 on Tinneti balance testing demonstrating low fall risk, pt c/o min stiffness but stated close to baseline, no pain this date  Activity Tolerance:  Patient tolerance of  treatment: good. Equipment Recommendations: Other: will monitor for needs- may need RW  Discharge Recommendations: home   Plan: Times per week: 6x N  Current Treatment Recommendations: Strengthening, Functional Mobility Training, Neuromuscular Re-education, Equipment Evaluation, Education, & procurement, Transfer Training, Gait Training, Stair training, Balance Training, Patient/Caregiver Education & Training, Safety Education & Training    Patient Education  Patient Education: [employer], Transfers, Gait, stairs    Goals:  Patient goals : to get back to normal  Short term goals  Time Frame for Short term goals: by discharge  Short term goal 1: Pt to transfer supine <--> sit S to enable pt to get in/out of bed. MET  Short term goal 2: Pt to transfer sit <-->

## 2022-03-28 NOTE — PROGRESS NOTES
General and Vascular Surgery                                                           Daily Progress Note                                                                 Pt Name: Moises [facility] Record Number: [id number]  Date of Birth 1965   Today's Date: 3/28/2022    ASSESSMENT/PLAN  Sigmoid colon cancer s/p diverting colostomy 3/24/22    -continue diet as able  -Regular diet. Sore throat since surgery. Was gong to give nystatin for possible thrush but patient doesn't want that.  -abx for c. Diff  -ostomy teaching tomorrow. Stoma ok. +stool and gas output                 SUBJECTIVE  Tami has improved from yesterday. Pain is well controlled. Tolerating regular PO. Stoma producing    OBJECTIVE  VITALS:  height is 5' 3\" (1.6 m) and weight is 193 lb 5.5 oz (87.7 kg). Her temperature is 97.6 °F (36.4 °C). Her blood pressure is 174/102 (abnormal) and her pulse is 90. Her respiration is 16 and oxygen saturation is 100%. VITALS:  BP (!) 174/102   Pulse 90   Temp 97.6 °F (36.4 °C)   Resp 16   Ht 5' 3\" (1.6 m)   Wt 193 lb 5.5 oz (87.7 kg)   SpO2 100%   BMI 34.25 kg/m²   GENERAL: alert, no distress  ABDOMEN: wound clean. Abdomen soft. Stoma pink with stool  I/O last 3 completed shifts: In: 799.2 [P.O.:310; I.V.:489.2]  Out: 300 [Stool:300]  No intake/output data recorded.     LABS  Recent Labs     03/28/22  0528   WBC 6.9   HGB 8.5*   HCT 26.0*   PLT 88*   *   K 4.7   CL 97*   CO2 21   BUN 36*   CREATININE 5.5*   CALCIUM 6.8*   INR 1.24*   AST 23   ALT 8*   BILITOT 0.3     CBC:   Lab Results   Component Value Date    WBC 6.9 03/28/2022    RBC 2.68 03/28/2022    HGB 8.5 03/28/2022    HCT 26.0 03/28/2022    MCV 97.1 03/28/2022    MCH 31.9 03/28/2022    MCHC 32.9 03/28/2022    RDW 16.8 03/28/2022    PLT 88 03/28/2022    MPV 10.5 03/28/2022     CMP:    Lab Results   Component Value Date     03/28/2022    K 4.7 03/28/2022    CL 97 03/28/2022    CO2 21 03/28/2022    BUN 36 03/28/2022    CREATININE 5.5 03/28/2022    GFRAA 10 03/28/2022    AGRATIO 0.9 03/28/2022    LABGLOM 8 03/28/2022    GLUCOSE 127 03/28/2022    PROT 4.1 03/28/2022    LABALBU 1.9 03/28/2022    CALCIUM 6.8 03/28/2022    BILITOT 0.3 03/28/2022    ALKPHOS 184 03/28/2022    AST 23 03/28/2022    ALT 8 03/28/2022         Electronically signed by Beatriz Lennox, PA-C on 3/28/2022 at 10:13 AM    As above  Feeling OK from colostomy creation  Ongoing diarrhea from C dif  Monitor Hg  On heparin for PE's    Electronically signed by Georgiana Grayson MD on 3/28/2022 at 4:11 PM

## 2022-03-28 NOTE — PROGRESS NOTES
Hematology Oncology Daily Progress Note    Admit Date: 3/22/2022  Hospital day several    Subjective:     Patient has complaints of abdominal pain this morning--denies sob/cp. Medication side effects: none    Scheduled Meds:   losartan  50 mg Oral Nightly    warfarin placeholder: dosing by pharmacy   Other RX Placeholder    lactated ringers bolus  500 mL IntraVENous Once    midodrine  5 mg Oral BID WC    vancomycin  250 mg Oral 4x Daily    sodium chloride flush  5-40 mL IntraVENous 2 times per day    insulin lispro  0-6 Units SubCUTAneous TID     insulin lispro  0-3 Units SubCUTAneous Nightly     Continuous Infusions:   heparin (Porcine) Stopped (03/28/22 0053)    sodium chloride 25 mL (03/24/22 1114)    dextrose       PRN Meds:morphine **OR** morphine, albumin human, midodrine, heparin (porcine), diphenhydrAMINE, sodium chloride flush, sodium chloride, ondansetron **OR** ondansetron, polyethylene glycol, acetaminophen **OR** acetaminophen, oxyCODONE, glucose, dextrose, glucagon (rDNA), dextrose    Review of Systems  Pertinent items are noted in HPI. REVIEW OF SYSTEMS:         · Constitutional: Denies fever, sweats, weight loss     · Eyes: No visual changes or diplopia. No scleral icterus. · ENT: No Headaches, hearing loss or vertigo. No mouth sores or sore throat. · Cardiovascular: No chest pain, dyspnea on exertion, palpitations or loss of consciousness. · Respiratory: No cough or wheezing, no sputum production. No hemoptysis. .    · Gastrointestinal: No abdominal pain, appetite loss, blood in stools. No change in bowel habits. · Genitourinary: No dysuria, trouble voiding, or hematuria. · Musculoskeletal:  Generalized weakness. No joint complaints. · Integumentary: No rash or pruritis. · Neurological: No headache, diplopia. No change in gait, balance, or coordination. No paresthesias. · Endocrine: No temperature intolerance. No excessive thirst, fluid intake, or urination. · Hematologic/Lymphatic: No abnormal bruising or ecchymoses, blood clots or swollen lymph nodes. · Allergic/Immunologic: No nasal congestion or hives. ·     Objective:     Patient Vitals for the past 8 hrs:   BP Temp Temp src Pulse Resp SpO2 Weight   03/28/22 0809 (!) 155/93 97.5 °F (36.4 °C) Oral 90 16 100 % --   03/28/22 0234 -- -- -- -- -- -- 191 lb 12.8 oz (87 kg)   03/28/22 0200 (!) 151/90 98 °F (36.7 °C) Oral 93 -- 97 % --   03/28/22 0050 -- -- -- -- 22 99 % --     I/O last 3 completed shifts: In: 799.2 [P.O.:310; I.V.:489.2]  Out: 300 [Stool:300]  No intake/output data recorded.     BP (!) 155/93   Pulse 90   Temp 97.5 °F (36.4 °C) (Oral)   Resp 16   Ht 5' 3\" (1.6 m)   Wt 191 lb 12.8 oz (87 kg)   SpO2 100%   BMI 33.98 kg/m²     General Appearance:    Alert, cooperative, no distress, appears stated age   Head:    Normocephalic, without obvious abnormality, atraumatic   Eyes:    PERRL, conjunctiva/corneas clear, EOM's intact, fundi     benign, both eyes        Ears:    Normal TM's and external ear canals, both ears   Nose:   Nares normal, septum midline, mucosa normal, no drainage    or sinus tenderness   Throat:   Lips, mucosa, and tongue normal; teeth and gums normal   Neck:   Supple, symmetrical, trachea midline, no adenopathy;        thyroid:  No enlargement/tenderness/nodules; no carotid    bruit or JVD   Back:     Symmetric, no curvature, ROM normal, no CVA tenderness   Lungs:     Clear to auscultation bilaterally, respirations unlabored   Chest wall:    No tenderness or deformity   Heart:    Regular rate and rhythm, S1 and S2 normal, no murmur, rub   or gallop   Abdomen:     Soft, non-tender, decreased BSs,     no masses, no organomegaly           Extremities:   Extremities normal, atraumatic, no cyanosis or edema   Pulses:   2+ and symmetric all extremities   Skin:   Skin color, texture, turgor normal, no rashes or lesions   Lymph nodes:   Cervical, supraclavicular, and axillary nodes normal Neurologic:   Stable         Data Review  CBC:   Lab Results   Component Value Date    WBC 6.9 03/28/2022    RBC 2.68 03/28/2022       Assessment:     Principal Problem:    Colitis  Active Problems:    Cancer of sigmoid colon ([address]    Hepatic lesion  Resolved Problems:    * No resolved hospital problems. *      Plan:     1. Stage IV colon cancer. Dr. Nestor Bird will start palliative chemotherapy as an outpatient when she recovers from this. 2.  Abdominal pain. She had a CT ordered today. 3.  Pulmonary embolus. She is now on Coumadin.         Electronically signed by Sherry Ron MD on 3/28/2022 at 8:39 AM

## 2022-03-28 NOTE — PROGRESS NOTES
Hospitalist Progress Note      PCP: No primary care provider on file. Date of Admission: 3/22/2022    Chief Complaint: s/p colostomy placement    Hospital Course: Ms. Memo Ayon was admitted with sepsis due to colitis. She was started on broad spectrum antibiotics but developed hypotensive drug reaction and urticaria and antibiotics were held. At the same time, her stool tested positive for C dif and she was started on oral vancomycin and IV flagyl. Surgery created diverting colostomy on 3/24 for obstructing sigmoid colon cancer. Patient has had return of bowel function post-op and ready for D/C home soon. Subjective: persistent dysphagia. Had some mild oozing from rectum.   Heparin stopped      Medications:  Reviewed    Infusion Medications    heparin (Porcine) Stopped (03/28/22 0053)    sodium chloride 25 mL (03/24/22 1114)    dextrose       Scheduled Medications    losartan  50 mg Oral Nightly    warfarin placeholder: dosing by pharmacy   Other RX Placeholder    lactated ringers bolus  500 mL IntraVENous Once    midodrine  5 mg Oral BID WC    vancomycin  250 mg Oral 4x Daily    sodium chloride flush  5-40 mL IntraVENous 2 times per day    insulin lispro  0-6 Units SubCUTAneous TID WC    insulin lispro  0-3 Units SubCUTAneous Nightly     PRN Meds: morphine **OR** morphine, albumin human, midodrine, heparin (porcine), diphenhydrAMINE, sodium chloride flush, sodium chloride, ondansetron **OR** ondansetron, polyethylene glycol, acetaminophen **OR** acetaminophen, oxyCODONE, glucose, dextrose, glucagon (rDNA), dextrose      Intake/Output Summary (Last 24 hours) at 3/28/2022 0728  Last data filed at 3/28/2022 0556  Gross per 24 hour   Intake 322.02 ml   Output 150 ml   Net 172.02 ml       Exam:    BP (!) 151/90   Pulse 93   Temp 98 °F (36.7 °C) (Oral)   Resp 22   Ht 5' 3\" (1.6 m)   Wt 191 lb 12.8 oz (87 kg)   SpO2 97%   BMI 33.98 kg/m²     General appearance: No apparent distress, appears stated age and cooperative. HEENT: Pupils equal, round, and reactive to light. Conjunctivae/corneas clear. Neck: Supple, with full range of motion. No jugular venous distention. Trachea midline. Respiratory:  Normal respiratory effort. Clear to auscultation, bilaterally without Rales/Wheezes/Rhonchi. Cardiovascular: Regular rate and rhythm with normal S1/S2 without murmurs, rubs or gallops. Abdomen: Soft, appropriate post-surgical tenderness, non-distended with normal bowel sounds. Colostomy in place  Musculoskeletal: No clubbing, cyanosis or edema bilaterally. Full range of motion without deformity. Skin: Skin color, texture, turgor normal.  No rashes or lesions. Neurologic:  Neurovascularly intact without any focal sensory/motor deficits. Cranial nerves: II-XII intact, grossly non-focal.  Psychiatric: Alert and oriented, thought content appropriate, normal insight  Capillary Refill: Brisk,< 3 seconds   Peripheral Pulses: +2 palpable, equal bilaterally       Labs:   Recent Labs     03/27/22 0717 03/27/22 0717 03/27/22 2040 03/28/22  0011 03/28/22  0528   WBC 8.4  --  7.4  --  6.9   HGB 9.3*   < > 9.3* 9.1* 8.5*   HCT 28.3*   < > 28.5* 27.6* 26.0*   *  --  98*  --  88*    < > = values in this interval not displayed. Recent Labs     03/26/22 0657 03/27/22 0717 03/28/22  0528   * 130* 131*   K 4.2 4.6 4.7   CL 98* 94* 97*   CO2 22 24 21   BUN 19 26* 36*   CREATININE 3.7* 4.6* 5.5*   CALCIUM 7.1* 7.2* 6.8*     Recent Labs     03/26/22  0657 03/27/22 0717 03/28/22  0528   AST 14* 21 23   ALT 7* 7* 8*   BILITOT 0.3 0.3 0.3   ALKPHOS 174* 192* 184*     Recent Labs     03/27/22 0717 03/27/22 2040 03/28/22  0528   INR 1.33* 1.35* 1.24*     No results for input(s): Nataly Penn in the last 72 hours.     Urinalysis:      Lab Results   Component Value Date    NITRU Negative 05/27/2021    WBCUA 3 05/27/2021    BACTERIA RARE 05/27/2021    RBCUA 4 05/27/2021    BLOODU TRACE 05/27/2021 SPECGRAV 1.016 05/27/2021    GLUCOSEU 250 05/27/2021       Radiology:  CT ABDOMEN PELVIS W IV CONTRAST Additional Contrast? None   Final Result   1. Interval transverse colostomy creation, with mild colonic wall thickening   of the transverse colon at the level of the ostomy which may be related to   poor distention, though wall thickening of the descending colon as well as   the sigmoid colon may be related to colitis. The colonic distention seen on   the preoperative examination has improved. 2. There is a small volume of intra-and pelvic ascites, within indwelling   peritoneal catheter noted. 3. Numerous hepatic lesions are seen suspicious for possible metastasis. Low-attenuation nonspecific lesions noted within the spleen as well. 4. Circumferential distal esophageal thickening is identified suggestive of   esophagitis. 5. Wall thickening of the distal stomach and duodenum suggestive of   gastritis/enteritis. 6. Multifocal nodular appearing infiltrates are seen within the lung bases,   similar to the previous exam.   7. Other similar findings as above. VL Extremity Venous Bilateral   Final Result      CT CHEST W CONTRAST   Final Result   Bilateral pulmonary emboli slightly greater on the right than left. Potential concern for septic emboli given the small foci of irregular   parenchymal lung lesions new from the prior chest CT study, only partially   included and demonstrated on abdomen pelvic CT 1 day earlier. These could   certainly also be metastatic as well but somewhat atypical given the   irregular margins. Multiple liver metastases partially included, unchanged as seen on abdomen   pelvic CT 1 day earlier      Abdominal ascites, decreased from the prior exam 1 day earlier, with recently   demonstrated pneumoperitoneum not seen currently in the included upper   abdomen. .      Esophageal mural thickening suspected esophagitis.       Findings were discussed with Lissette Church at 3:50 pm on 3/23/2022. CT ABDOMEN PELVIS W IV CONTRAST Additional Contrast? None   Final Result   1. Worsening inflammatory changes surrounding the mid sigmoid colon likely   due to colitis causing worsening colonic dilatation. Underlying malignancy   cannot be excluded; correlate with colonoscopy. 2. Multiple nodular bilateral ground-glass opacities. The differential   diagnosis includes infectious process and metastatic disease recommend CT of   the chest with contrast for further evaluation. 3. Multiple indeterminate hepatic lesions. Recommend nonemergent MRI of the   abdomen with without contrast utilizing Eovist.                 Assessment/Plan:    Active Hospital Problems    Diagnosis Date Noted    Hepatic lesion [K76.9]     Cancer of sigmoid colon ([facility] [address] [C18.7]     Colitis [K52.9] 03/22/2022     Sepsis due to colitis:  improved  - ID consulted:   - s/p flagyl, cont oral vancomycin    Metastatic colon cancer:  - colonoscopy 3/23 with colonic mass  - CT scan concerning for liver mets, possibly pulmonary as well  - oncology consulted  - surgery:  Placed diverting colostomy 3/24-- significant pelvic tumors found    C dif infection:  - acute  - oral vanc, s/p IV flagyl     Dysphagia:  - PPI and chloroseptic spray  - can consider nystatin and  EGD if symptoms persist    Drug reaction:  resolved  -hypotension and urticaria to ceftriaxone  - discussed with pharmacy --> ok to use Merrem if needed  - do not use Cipro due to prolonged QTc    Acute bilateral pulmonary embolism:  - heparin drip due to colonoscopy on 3/23  - Coumadin initiated 3/25    ESRD on dialysis:  - HD due to concern for infection on PD (recent peritonitis)    DVT Prophylaxis: heparin drip  Diet: ADULT DIET;  Regular  Code Status: Full Code    PT/OT Eval Status: n/a    Dispo - PCU    Marko Negron MD

## 2022-03-28 NOTE — PROGRESS NOTES
Turned patient to side to clean up moderate amount bright red bloody stool from rectum, large amount of blood then seeped from abdominal incision site, immediately saturating dressing, gown, linens. Patient placed back in supine position, held pressure, bleeding stopped in supine position. Continued to keep Heparin gtt paused. Vitals stable, no change. Patient complaining of increased abdominal pain and nausea. Notification from charge nurse to Kodi Cameron NP. Dr. Baltazar Cano notified. 0230-Spoke with Dr. Debbie Floyd on patient's  bleeding from incision site twice over night as well as rectal bleeding twice tonight. Dr. Baltazar Cano states will be up to see patient. Order to continue to keep heparin gtt off for now. 0330-Per verbal order from Dr. Dilip Long, drainage bag hooked up to PD catheter to assess for bloody drainage. Bag attached with sterile technique. Immediate return of punch-colored drainage. Dr. Baltazar Cano notified.

## 2022-03-28 NOTE — PROGRESS NOTES
Physical Therapy  PT referral received and chart reviewed. Pt off floor for HD. Will cont later today or tomorrow as schedule permits.   Jazmin Irene, PT

## 2022-03-28 NOTE — PROGRESS NOTES
BEDSIDE eval: RN called me for noticing bleeding on the pt gown and bedside. On my exam: no active bleeding. VSS. No surgical site dehisence. Surgical site approximated with staples. I do no appreciate hematoma, although could be. I applied pressure to surgical site: no evidence of bleeding provoked. Colostomy bag without evidence of gross casie bleeding. H/H today: 9.3/28, Platelets: 826  Currently on heparin gtt. STAT cbc, inr, pt/ptt-> CBC stable and comparable to this morning, PT and INR also stable. Platelet count did drop to 98. I do not see evidence that is consistent with HIT. We will continue to monitor.

## 2022-03-28 NOTE — PROGRESS NOTES
2000 pt had a large amount of bright red, bloody drainage that saturated the left side of the sheet and gown from abd incision during routine brief change after a small bowel movement while turned onto left side. Incision assessed. No dehiscing or changes noted and no further drainage was noted from the site. Site cleaned and new dressing applied. Vital signs remain stable and unchanged. APN Jacquetta Bamberger was contacted by RN and inspected the site, noting no drainage with pressure. CBC and PT/INR ordered. Pt was instructed to splint abd when coughing. Continue to monitor. 2105 1125 [facility] notified by RN of lab results. No significant change. No further orders, continue to monitor. 2250 pt called out for bowel movement and was discovered to have passed a large amount of bright red blood from the rectum. Vitals remain unchanged and stable and the pt is asymptomatic. No drainage noted from incision site. Heparin was held until further orders. 726 [facility] notified of rectal bleed by RN. Ordered to call surgery. 12 Dr. Daron Madera paged by RN. Notified of prior bleeding from incision site as well as new large rectal bleed. No new orders. Order to continue heparin drip and continue to monitor pt closely. 2330 Heparin restarted. Pt notified of communication with MD by RN. Pt told to call for any signs of bleeding, dizziness, lightheadedness, pain, or any other changes. Pt ok with plan with no further questions. Will continue to monitor.

## 2022-03-28 NOTE — PROGRESS NOTES
Comprehensive Nutrition Assessment    Type and Reason for Visit:  Reassess    Nutrition Recommendations/Plan:   -Continue Regular diet and monitor intakes/tolerance    Nutrition Assessment:  Folllow-up. s/p diverting colostomy w/ dx of sigmoid colon cancer. Noted increased bloody drainage from incision site, rectal bleeding, and reports of nausea and abdominal pain. Pt in dialysis at time of visit. Regular with intakes >50% since diet has resumed. Will continue to monitor intakes and tolerance to diet. Malnutrition Assessment:  Malnutrition Status: At risk for malnutrition (Comment)    Context:  Chronic Illness     Findings of the 6 clinical characteristics of malnutrition:  Energy Intake:  No significant decrease in energy intake  Weight Loss:  1 - Mild weight loss (specify amount and time period) (12# loss in 7 months (6%). May fluctuate for PD.)     Body Fat Loss:  No significant body fat loss     Muscle Mass Loss:  No significant muscle mass loss    Fluid Accumulation:  No significant fluid accumulation     Strength:  Not Performed    Estimated Daily Nutrient Needs:  Energy (kcal):  6296-6635 kcal (20-22 kcal/kg ABW); Weight Used for Energy Requirements:  Current     Protein (g):  102-128 gm (1.2-1.5 gm/kg ABW); Weight Used for Protein Requirements:  Current        Method Used for Fluid Requirements:  1 ml/kcal      Nutrition Related Findings:  Reviewed labs. Colostomy-BM 3/28      Wounds:  Surgical Incision       Current Nutrition Therapies:    ADULT DIET; Regular    Anthropometric Measures:  · Height: 5' 3\" (160 cm)  · Current Body Weight: 191 lb (86.6 kg)   · Admission Body Weight: 199 lb (90.3 kg)    · Usual Body Weight: 215 lb (97.5 kg)     · Ideal Body Weight: 115 lbs; % Ideal Body Weight 166.1 %   · BMI: 33.8  · Adjusted Body Weight: No Adjustment   · BMI Categories: Obese Class 1 (BMI 30.0-34. 9)       Nutrition Diagnosis:   · Altered GI function related to altered GI structure (Diverting loop colostomy) as evidenced by GI abnormality,nausea      Nutrition Interventions:   Food and/or Nutrient Delivery:  Continue Current Diet  Nutrition Education/Counseling:  No recommendation at this time   Coordination of Nutrition Care:  Continue to monitor while inpatient    Goals:  PO intake >50%       Nutrition Monitoring and Evaluation:   Behavioral-Environmental Outcomes:  None Identified   Food/Nutrient Intake Outcomes:  Diet Advancement/Tolerance,Food and Nutrient Intake  Physical Signs/Symptoms Outcomes:  GI Status,Biochemical Data,Nutrition Focused Physical Findings,Weight,Hemodynamic Status     Discharge Planning:     Too soon to determine     Electronically signed by Lashell Lance on 3/28/22 at 9:49 AM EDT    Contact: [phone]

## 2022-03-28 NOTE — PROGRESS NOTES
Called by nursing of patient having worsening abdominal pain intermittently but also having some more increasing bloody drainage from the incision site and also some rectal bleeding that they are uncertain as to why. Patient is known to have active PE is currently on heparin drip they are concerned that she is currently having pain with bleeding and could be suggestive of something more insidious. I came to evaluate patient at bedside. Ultrasound examination of the abdomen did not demonstrate any area of focal hematoma. Patient did have some slight point tenderness to the left lower quadrant abdomen which she had not had before and she knows that it was somewhat escalating to the upper abdomen on the left. There was drainage that was obtained from the patient's peritoneal dialysis catheter and was noted to be blood-tinged in nature. It was sent down for culture and cytology analysis. Uncertain whether patient has some component of peritonitis but rectal bleeding is also of uncertain etiology and could indicate possibly lack of surgical hemostasis given the blood in the peritoneum fluid as well as blood in the rectum. Patient was sent for a CAT scan of the abdomen pelvis with results as below. 1. Interval transverse colostomy creation, with mild colonic wall thickening   of the transverse colon at the level of the ostomy which may be related to   poor distention, though wall thickening of the descending colon as well as   the sigmoid colon may be related to colitis.  The colonic distention seen on   the preoperative examination has improved. 2. There is a small volume of intra-and pelvic ascites, within indwelling   peritoneal catheter noted. 3. Numerous hepatic lesions are seen suspicious for possible metastasis. Low-attenuation nonspecific lesions noted within the spleen as well. 4. Circumferential distal esophageal thickening is identified suggestive of   esophagitis.    5. Wall thickening of the distal stomach and duodenum suggestive of   gastritis/enteritis. 6. Multifocal nodular appearing infiltrates are seen within the lung bases,   similar to the previous exam.   7. Other similar findings as above.         General surgery was mentioned about the CAT scan inpatient, they would evaluate in the morning. They recommended holding the heparin drip for 6 hours until reevaluation in the morning.

## 2022-03-29 NOTE — PROGRESS NOTES
Occupational Therapy   Occupational Therapy Initial Assessment and Tentative D/C    Date: 3/29/2022   Patient Name: Aaron Vernon  MRN: [id number]     : 1965    Date of Service: 3/29/2022    Discharge Recommendations: Aaron Vernon scored a 17/24 on the AM-PAC ADL Inpatient form. Current research shows that an AM-PAC score of 18 or greater is typically associated with a discharge to the patient's home setting. Based on the patient's AM-PAC score, and their current ADL deficits, it is recommended that the patient have 2-3 sessions per week of Occupational Therapy at d/c to increase the patient's independence. At this time, this patient demonstrates the endurance and safety to discharge home with [facility] and a follow up treatment frequency of 2-3x/wk. Please see assessment section for further patient specific details. If patient discharges prior to next session this note will serve as a discharge summary. Please see below for the latest assessment towards goals. Continue to assess pending progress,24 hour supervision or assist,2-3 sessions per week,Patient would benefit from continued therapy after discharge  OT Equipment Recommendations  Equipment Needed: No    Assessment   Performance deficits / Impairments: Decreased functional mobility ; Decreased strength;Decreased endurance;Decreased ADL status; Decreased balance;Decreased high-level IADLs  Assessment: Ms. Dacia Jones was admitted with sepsis due to colitis. She was started on broad spectrum antibiotics but developed hypotensive drug reaction and urticaria and antibiotics were held. At the same time, her stool tested positive for C dif and she was started on oral vancomycin and IV flagyl. Surgery created diverting colostomy on 3/24 for obstructing sigmoid colon cancer. Patient has had return of bowel function post-op and ready for D/C home soon. PTA pt from home with  where pt was Ind with mobility and ADLs with use of walking stick.  Pt currently requires Min A for bed mobility. Pt completes transfers with Min A and use of RW. Pt noted with weakness and LE buckling. Pt does report some dizziness with OOB activity. Anticipate pt needing up to Mod/Max A for ADLs. Pt will benefit from skilled OT services at this time. Anticipate pt with need for ongoing OT at time of D/C. Prognosis: Fair;Good  Decision Making: Medium Complexity  Exam: see above  Assistance / Modification: RW  OT Education: Plan of Care;OT Role;Transfer Training  REQUIRES OT FOLLOW UP: Yes  Activity Tolerance  Activity Tolerance: Patient limited by fatigue;Patient Tolerated treatment well  Safety Devices  Safety Devices in place: Yes  Type of devices: Left in chair;Call light within reach;Nurse notified           Patient Diagnosis(es): The primary encounter diagnosis was Generalized abdominal pain. Diagnoses of Nausea and vomiting, intractability of vomiting not specified, unspecified vomiting type, Colitis, Hepatic lesion, and Lung nodules were also pertinent to this visit. has a past medical history of Anemia, Chronic kidney disease, Diabetes mellitus ([address] Diabetes mellitus type 1 ([address] Diabetic retinopathy ([address] Diabetic retinopathy associated with type 2 diabetes mellitus, without macular edema, Hyperlipidemia, Hypertension, Kidney stone, Mixed hyperlipidemia, Orthostatic hypotension, and Vitreous hemorrhage of right eye ([address] has a past surgical history that includes Appendectomy; Kidney stone surgery; Tonsillectomy; LAPAROSCOPY INSERTION PERITONEAL CATHETER (N/A, 8/12/2019); Dialysis Catheter Removal (N/A, 2/24/2022); Dialysis Catheter Insertion (N/A, 2/24/2022); ventral hernia repair (N/A, 2/24/2022); sigmoidoscopy (N/A, 3/23/2022); sigmoidoscopy (3/23/2022); and laparotomy (N/A, 3/24/2022).            Restrictions  Restrictions/Precautions  Restrictions/Precautions: Fall Risk  Position Activity Restriction  Other position/activity restrictions: abdominal incision; colostomy; 2L O2    Subjective   General  Chart Reviewed: Yes  Patient assessed for rehabilitation services?: Yes  Additional Pertinent Hx: per MD note, Ms. Corry Ashby was admitted with sepsis due to colitis. She was started on broad spectrum antibiotics but developed hypotensive drug reaction and urticaria and antibiotics were held. At the same time, her stool tested positive for C dif and she was started on oral vancomycin and IV flagyl. Surgery created diverting colostomy on 3/24 for obstructing sigmoid colon cancer. Patient has had return of bowel function post-op and ready for D/C home soon. Family / Caregiver Present: No  Referring Practitioner: Ramone Peter MD  Subjective  Subjective: Pt agreeable to OT evaluation. Pt reports no pain at rest. Pt on 2L O2 throughout. Social/Functional History  Social/Functional History  Lives With: Spouse  Type of Home: House  Home Layout: Two level,Laundry in basement (bedroom on main level but bathroom on 2nd level, rail on stairs)  Home Access: Stairs to enter without rails  Entrance Stairs - Number of Steps: 5 MICHELLE with no rails; flight steps up to bathroom with rail  Bathroom Shower/Tub: Tub/Shower unit  Bathroom Toilet: Standard  Bathroom Equipment: Grab bars in shower,3-in-1 commode  Bathroom Accessibility: Accessible  Home Equipment: 4 wheeled walker (walking stick)  ADL Assistance: [address]:  (shares IADLs with spouse)  Ambulation Assistance: Independent (use of walking stick recently)  Transfer Assistance: Independent  Active :  Yes  Additional Comments:  has been assisting more recently; pt reports no recent falls       Objective   Vision: Impaired  Vision Exceptions: Wears glasses for reading  Hearing: Within functional limits    Orientation  Overall Orientation Status: Within Functional Limits     Balance  Sitting Balance: Stand by assistance  Standing Balance: Minimal assistance (RW)  Functional Mobility  Functional - Mobility Device: Rolling Walker  Activity: Other (~4ft bed to chair)  Assist Level: Minimal assistance  Functional Mobility Comments: pt fatigues quickly; LE buckling noted  Wheelchair Bed Transfers  Wheelchair/Bed - Technique: Ambulating (RW)  Equipment Used: Bed;Other (bed to chair)  Level of Asssistance: Minimal assistance  ADL  LE Dressing: Maximum assistance (assist to don bilateral socks)  Additional Comments: Anticipate pt needing up to Mod/Max A for ADLs based on ROM, strength, and balance  Tone RUE  RUE Tone: Normotonic  Tone LUE  LUE Tone: Normotonic  Coordination  Movements Are Fluid And Coordinated: Yes     Bed mobility  Supine to Sit: Minimal assistance  Sit to Supine: Unable to assess  Scooting: Unable to assess  Transfers  Sit to stand: Minimal assistance  Stand to sit: Minimal assistance  Transfer Comments: to/from RW     Cognition  Overall Cognitive Status: WFL                 LUE AROM (degrees)  LUE AROM : WFL  RUE AROM (degrees)  RUE AROM : WFL  LUE Strength  Gross LUE Strength: WFL  RUE Strength  Gross RUE Strength: WFL                   Plan   Plan  Times per week: 3-5x  Current Treatment Recommendations: Strengthening,Functional Mobility Training,Gait Training,Endurance Training,Balance Training,Self-Care / ADL,Safety Education & Training,Equipment Evaluation, Education, & procurement,Patient/Caregiver Education & Training             AM-PAC Score        AM-[facility] Inpatient Daily Activity Raw Score: 17 (03/29/22 0734)  AM-PAC Inpatient ADL T-Scale Score : 37.26 (03/29/22 0734)  ADL Inpatient CMS 0-100% Score: 50.11 (03/29/22 0734)  ADL Inpatient CMS G-Code Modifier : CK (03/29/22 0734)    Goals  Short term goals  Time Frame for Short term goals: prior to D/C  Short term goal 1: complete bed mobility with supervision  Short term goal 2: complete functional mobility and transfers with supervision  Short term goal 3: complete bathing and dressing with supervision  Short term goal 4: complete toileting with supervision  Short term goal 5: complete grooming in stance at sink with supervision  Long term goals  Time Frame for Long term goals : STG=LTG  Patient Goals   Patient goals : return home       Therapy Time   Individual Concurrent Group Co-treatment   Time In 0700         Time Out 0730         Minutes 30         Timed Code Treatment Minutes: 15 Minutes (15 minute eval)       Saba Bentley, OTR/L

## 2022-03-29 NOTE — PLAN OF CARE
Problem: Pain:  Goal: Pain level will decrease  Description: Pain level will decrease  Outcome: Ongoing  Goal: Control of acute pain  Description: Control of acute pain  Outcome: Ongoing  Goal: Control of chronic pain  Description: Control of chronic pain  Outcome: Ongoing     Problem: Nausea/Vomiting:  Goal: Absence of nausea/vomiting  Description: Absence of nausea/vomiting  Outcome: Ongoing  Goal: Able to drink  Description: Able to drink  Outcome: Ongoing  Goal: Able to eat  Description: Able to eat  Outcome: Ongoing  Goal: Ability to achieve adequate nutritional intake will improve  Description: Ability to achieve adequate nutritional intake will improve  Outcome: Ongoing     Problem: Falls - Risk of:  Goal: Will remain free from falls  Description: Will remain free from falls  Outcome: Ongoing  Goal: Absence of physical injury  Description: Absence of physical injury  Outcome: Ongoing     Problem: Skin Integrity:  Goal: Will show no infection signs and symptoms  Description: Will show no infection signs and symptoms  Outcome: Ongoing  Goal: Absence of new skin breakdown  Description: Absence of new skin breakdown  Outcome: Ongoing     Problem: Fluid Volume:  Goal: Will show no signs or symptoms of fluid imbalance  Description: Will show no signs or symptoms of fluid imbalance  Outcome: Ongoing     Problem: Nutrition  Goal: Optimal nutrition therapy  3/28/2022 2309 by Lindy Lorenzo RN  Outcome: Ongoing       Problem: Bleeding:  Goal: Will show no signs and symptoms of excessive bleeding  Description: Will show no signs and symptoms of excessive bleeding  Outcome: Ongoing

## 2022-03-29 NOTE — PROGRESS NOTES
[facility]  Progress Note      SUBJECTIVE:   Patient continues to complain of mild to moderate abdominal pain. She is fearful. She had a CT scan done yesterday and is wondering about the results. She denies any nausea or vomiting. She had no fever chills sweats or rigors. She had her last dialysis yesterday    Oncologic history:    Patient has a history of end-stage renal disease is on dialysis. She also has diabetes with complicating diabetic neuropathy and retinopathy. Also, hypertension hyperlipidemia and now metastatic colon cancer. She initially presented with septic shock on 2/15/2022 thought to be due to peritonitis as a complication of her peritoneal dialysis. She was hospitalized at [facility] from 2/15/2022 through 3/1/2022. She grew out Pseudomonas at that time and her peritoneal dialysis catheter was replaced on 2/24/2022. At that same time they elected to do a ventral hernia repair. Tissue surrounding the ventral hernia wall appeared to be inflamed it was thought that this was initially infectious. However, pathology showed invasive moderately differentiated colon adenocarcinoma. Her preoperative CT scan of 2/15/2022 had shown a 9 mm groundglass nodule in the left lung base, there was mild sigmoid colonic diverticulosis with confluent fat stranding surrounding a short segment of colon containing the diverticula. The area around the ventral abdominal wall hernia was described as containing \"inflamed fat. \"  I saw the patient in the outpatient setting on 3/14/2022 and recommended PET scan. Colonoscopy was also recommended. On the day that she was to have her PET scan she presented with abdominal pain and was admitted 3/23/22 for colitis. That same day she underwent colonoscopy and was found to have an obstructing rectosigmoid mass approximately 20 cm from the anal verge. She was seen yesterday by Dr. Bella Stanford as well and surgery is tentatively planned for today. Complicating her case were CT findings. On 3/22/2022 she had a CT of the abdomen and pelvis that now showed multiple groundglass opacities in the lungs with a left lower lobe pulmonary nodule now measuring 0.8 x 1.6 cm. She had worsening inflammatory changes in the mid sigmoid colon and multiple indeterminate hepatic lesions. A CTA of the chest done yesterday confirmed bilateral pulmonary emboli without evidence of right heart strain.   She was heparinized.       ROS: No fever chills sweats, no nausea, vomiting, diarrhea  shortness of breath chest pain or other pain    OBJECTIVE      Medications    Current Facility-Administered Medications: warfarin (COUMADIN) tablet 1 mg, 1 mg, Oral, Once  nystatin (MYCOSTATIN) 635717 UNIT/ML suspension 500,000 Units, 5 mL, Oral, 4x Daily  pantoprazole (PROTONIX) tablet 40 mg, 40 mg, Oral, QAM AC  phenol 1.4 % mouth spray 1 spray, 1 spray, Mouth/Throat, Q2H PRN  amLODIPine (NORVASC) tablet 10 mg, 10 mg, Oral, Daily  heparin 25,000 unit in sodium chloride 0.45% 250 mL (premix) infusion, 0-3,000 Units/hr, IntraVENous, Continuous  losartan (COZAAR) tablet 50 mg, 50 mg, Oral, Nightly  warfarin placeholder: dosing by pharmacy, , Other, RX Placeholder  morphine (PF) injection 2 mg, 2 mg, IntraVENous, Q2H PRN **OR** morphine (PF) injection 4 mg, 4 mg, IntraVENous, Q2H PRN  lactated ringers bolus, 500 mL, IntraVENous, Once  albumin human 25 % IV solution 25 g, 25 g, IntraVENous, PRN  midodrine (PROAMATINE) tablet 10 mg, 10 mg, Oral, PRN  heparin (porcine) injection 3,000 Units, 3,000 Units, IntraCATHeter, PRN  diphenhydrAMINE (BENADRYL) tablet 25 mg, 25 mg, Oral, Q6H PRN  vancomycin (VANCOCIN) capsule 250 mg, 250 mg, Oral, 4x Daily  sodium chloride flush 0.9 % injection 5-40 mL, 5-40 mL, IntraVENous, 2 times per day  sodium chloride flush 0.9 % injection 5-40 mL, 5-40 mL, IntraVENous, PRN  0.9 % sodium chloride infusion, 25 mL, IntraVENous, PRN  ondansetron (ZOFRAN-ODT) disintegrating tablet 4 mg, 4 mg, Oral, Q8H PRN **OR** ondansetron (ZOFRAN) injection 4 mg, 4 mg, IntraVENous, Q6H PRN  polyethylene glycol (GLYCOLAX) packet 17 g, 17 g, Oral, Daily PRN  acetaminophen (TYLENOL) tablet 650 mg, 650 mg, Oral, Q6H PRN **OR** acetaminophen (TYLENOL) suppository 650 mg, 650 mg, Rectal, Q6H PRN  oxyCODONE (ROXICODONE) immediate release tablet 5 mg, 5 mg, Oral, Q4H PRN  glucose (GLUTOSE) 40 % oral gel 15 g, 15 g, Oral, PRN  dextrose 50 % IV solution, 12.5 g, IntraVENous, PRN  glucagon (rDNA) injection 1 mg, 1 mg, IntraMUSCular, PRN  dextrose 5 % solution, 100 mL/hr, IntraVENous, PRN  insulin lispro (HUMALOG) injection vial 0-6 Units, 0-6 Units, SubCUTAneous, TID WC  insulin lispro (HUMALOG) injection vial 0-3 Units, 0-3 Units, SubCUTAneous, Nightly     Physical    VITALS:  /73   Pulse 87   Temp 97 °F (36.1 °C) (Axillary)   Resp 29   Ht 5' 3\" (1.6 m)   Wt 184 lb 1.4 oz (83.5 kg)   SpO2 95%   BMI 32.61 kg/m²   TEMPERATURE:  Current - Temp: 97 °F (36.1 °C); Max - Temp  Av.6 °F (36.4 °C)  Min: 97 °F (36.1 °C)  Max: 98 °F (36.7 °C)  PULSE OXIMETRY RANGE: SpO2  Av.8 %  Min: 91 %  Max: 99 %  24HR INTAKE/OUTPUT:    Intake/Output Summary (Last 24 hours) at 3/29/2022 1244  Last data filed at 3/29/2022 0708  Gross per 24 hour   Intake --   Output 100 ml   Net -100 ml       CONSTITUTIONAL: alert, cooperative, no apparent distress. EYES:  Pupils equal, round, sclera non-icteric, conjunctiva normal  ENT:  Normocephalic, without obvious abnormality, atraumatic, xternal ears without lesions, oral pharynx with moist mucus membranes, no mucositis. LUNGS:  Clear to auscultation bilaterally, no crackles or wheezing  CARDIOVASCULAR:  Regular rate and rhythm, normal S1 and S2, no S3 or S4, and no murmur noted  ABDOMEN:  Normal bowel sounds, soft, non-distended, non-tender, Peritoneal dialysis catheter intact.  New ostomy which appears to be functional and intact  MUSCULOSKELETAL:  There is no redness, warmth, or swelling of the joints. NEUROLOGIC:  Alert. No focal findings. SKIN:  Normal turgor, no bruising or petichea  EXT: without clubbing, cyanosis or edema. Homans negative. Data      Recent Labs     03/27/22 2040 03/27/22 2040 03/28/22  0011 03/28/22  0528 03/29/22  0758   WBC 7.4  --   --  6.9 9.3   HGB 9.3*   < > 9.1* 8.5* 8.6*   HCT 28.5*   < > 27.6* 26.0* 25.5*   PLT 98*  --   --  88* 121*   MCV 94.1  --   --  97.1 94.1    < > = values in this interval not displayed. Recent Labs     03/27/22 0717 03/28/22  0528 03/29/22  0758   * 131* 134*   K 4.6 4.7 4.4   CL 94* 97* 97*   CO2 24 21 22   BUN 26* 36* 24*   CREATININE 4.6* 5.5* 4.5*     Recent Labs     03/27/22 0717 03/28/22 0528 03/29/22  0758   AST 21 23 21   ALT 7* 8* 8*   BILITOT 0.3 0.3 0.3   ALKPHOS 192* 184* 178*       Magnesium:    Lab Results   Component Value Date    MG 1.50 03/01/2022    MG 1.50 02/28/2022    MG 1.70 02/27/2022     CT abdomen and pelvis 3/28/2022:    1. Interval transverse colostomy creation, with mild colonic wall thickening   of the transverse colon at the level of the ostomy which may be related to   poor distention, though wall thickening of the descending colon as well as   the sigmoid colon may be related to colitis.  The colonic distention seen on   the preoperative examination has improved. 2. There is a small volume of intra-and pelvic ascites, within indwelling   peritoneal catheter noted. 3. Numerous hepatic lesions are seen suspicious for possible metastasis. Low-attenuation nonspecific lesions noted within the spleen as well. 4. Circumferential distal esophageal thickening is identified suggestive of   esophagitis. 5. Wall thickening of the distal stomach and duodenum suggestive of   gastritis/enteritis. 6. Multifocal nodular appearing infiltrates are seen within the lung bases,   similar to the previous exam.   7. Other similar findings as above.          Problem List  Patient Active Problem List   Diagnosis    Diabetic retinopathy ([address]    Mixed hyperlipidemia    DMII (diabetes mellitus, type 2) ([address]    Vitreous hemorrhage of right eye ([address]    Essential hypertension    Acute kidney injury ([address]    ESRD (end stage renal disease) ([address]    Acute renal failure superimposed on chronic kidney disease (HCC)    Volume overload    Peritonitis ([address]    Generalized abdominal pain    Peritoneal dialysis status ([address]    Morbid obesity due to excess calories (HCC)    Nausea and vomiting    Hyponatremia    Diarrhea    Dizziness    Hypertensive emergency    Hypokalemia    Hypertensive urgency    Cerebrovascular accident (CVA) ([address]    Electrolyte imbalance    Acute respiratory failure with hypoxia (HCC)    Septic shock (HCC)    Electrolyte abnormality    Diverticulitis    Colitis    Cancer of sigmoid colon ([address]    Hepatic lesion       ASSESSMENT AND PLAN    The patient continues to recover from her surgery. I reviewed with her the results of her CT scan done 3/28/2022. She seemed a bit reassured. She is still having pain that is perhaps slightly more than 1 would have expected in this situation. She is and remains fearful. She continues dialysis. Management of her disease will be challenging in light of her co-morbidities. We did not commit to any regimen at this time as we would like to see more information. We will ask for further characterization of her tumor. We do know that FOLFOX can be administered in the setting of hemodialysis but Xeloda has to be avoided completely. I will need to look into whether or not Avastin can be Biomarker testing may suggest other alternatives     The patient will continue her dialysis. She has multiple comorbidities. She does not wish to get COVID vaccination this was discussed today as well.     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  There was extensive lymphatic invasion noted      Melia Santo MD, MPH  [phone] office  [phone] cell (until 5 p.m. on days where a note is written)

## 2022-03-29 NOTE — PLAN OF CARE
Problem: Pain:  Goal: Pain level will decrease  Description: Pain level will decrease  Outcome: Ongoing     Problem: Nausea/Vomiting:  Goal: Absence of nausea/vomiting  Description: Absence of nausea/vomiting  Outcome: Ongoing     Problem: Falls - Risk of:  Goal: Will remain free from falls  Description: Will remain free from falls  Outcome: Ongoing     Problem: Skin Integrity:  Goal: Will show no infection signs and symptoms  Description: Will show no infection signs and symptoms  Outcome: Ongoing     Problem: Fluid Volume:  Goal: Will show no signs or symptoms of fluid imbalance  Description: Will show no signs or symptoms of fluid imbalance  Outcome: Ongoing     Problem: Nutrition  Goal: Optimal nutrition therapy  Outcome: Ongoing     Problem: Bleeding:  Goal: Will show no signs and symptoms of excessive bleeding  Description: Will show no signs and symptoms of excessive bleeding  Outcome: Ongoing

## 2022-03-29 NOTE — PROGRESS NOTES
Hospitalist Progress Note      PCP: No primary care provider on file. Date of Admission: 3/22/2022    Chief Complaint: s/p colostomy placement    Hospital Course:    Ms. Allegra Mac was admitted with sepsis due to colitis. She was started on broad spectrum antibiotics but developed hypotensive drug reaction and urticaria and antibiotics were held. At the same time, her stool tested positive for C dif and she was started on oral vancomycin and IV flagyl. Surgery created diverting colostomy on 3/24 for obstructing newly diagnosed sigmoid colon cancer. Was switched from PD to HD. Subjective:   Denies any new complaints, feels tired.   Continues to be vitally stable      Medications:  Reviewed    Infusion Medications    heparin (Porcine) Stopped (03/28/22 0053)    sodium chloride 25 mL (03/24/22 1114)    dextrose       Scheduled Medications    warfarin  1 mg Oral Once    nystatin  5 mL Oral 4x Daily    pantoprazole  40 mg Oral QAM AC    amLODIPine  10 mg Oral Daily    losartan  50 mg Oral Nightly    warfarin placeholder: dosing by pharmacy   Other RX Placeholder    lactated ringers bolus  500 mL IntraVENous Once    vancomycin  250 mg Oral 4x Daily    sodium chloride flush  5-40 mL IntraVENous 2 times per day    insulin lispro  0-6 Units SubCUTAneous TID WC    insulin lispro  0-3 Units SubCUTAneous Nightly     PRN Meds: phenol, morphine **OR** morphine, albumin human, midodrine, heparin (porcine), diphenhydrAMINE, sodium chloride flush, sodium chloride, ondansetron **OR** ondansetron, polyethylene glycol, acetaminophen **OR** acetaminophen, oxyCODONE, glucose, dextrose, glucagon (rDNA), dextrose      Intake/Output Summary (Last 24 hours) at 3/29/2022 1448  Last data filed at 3/29/2022 0708  Gross per 24 hour   Intake --   Output 100 ml   Net -100 ml       Physical Exam Performed:    /73   Pulse 87   Temp 97 °F (36.1 °C) (Axillary)   Resp 29   Ht 5' 3\" (1.6 m)   Wt 184 lb 1.4 oz (83.5 kg)   SpO2 95%   BMI 32.61 kg/m²     General appearance: No apparent distress, appears stated age and cooperative. HEENT: Pupils equal, round, and reactive to light. Conjunctivae/corneas clear. Neck: Supple, with full range of motion. No jugular venous distention. Trachea midline. Respiratory:  Normal respiratory effort. Clear to auscultation, bilaterally without Rales/Wheezes/Rhonchi. Cardiovascular: Regular rate and rhythm with normal S1/S2 without murmurs, rubs or gallops. Abdomen: Colostomy noted with with full bag, PD dialysis catheter noted. Minimal abdominal tenderness  Musculoskeletal: No clubbing, cyanosis or edema bilaterally. Full range of motion without deformity. Skin: Skin color, texture, turgor normal.  No rashes or lesions. Neurologic:  Neurovascularly intact without any focal sensory/motor deficits. Cranial nerves: II-XII intact, grossly non-focal.  Psychiatric: Alert and oriented, thought content appropriate, normal insight  Capillary Refill: Brisk,3 seconds, normal   Peripheral Pulses: +2 palpable, equal bilaterally       Labs:   Recent Labs     03/27/22 2040 03/27/22 2040 03/28/22 0011 03/28/22 0528 03/29/22 0758   WBC 7.4  --   --  6.9 9.3   HGB 9.3*   < > 9.1* 8.5* 8.6*   HCT 28.5*   < > 27.6* 26.0* 25.5*   PLT 98*  --   --  88* 121*    < > = values in this interval not displayed. Recent Labs     03/27/22 0717 03/28/22 0528 03/29/22 0758   * 131* 134*   K 4.6 4.7 4.4   CL 94* 97* 97*   CO2 24 21 22   BUN 26* 36* 24*   CREATININE 4.6* 5.5* 4.5*   CALCIUM 7.2* 6.8* 7.3*     Recent Labs     03/27/22 0717 03/28/22 0528 03/29/22  0758   AST 21 23 21   ALT 7* 8* 8*   BILITOT 0.3 0.3 0.3   ALKPHOS 192* 184* 178*     Recent Labs     03/27/22 2040 03/28/22 0528 03/29/22  0758   INR 1.35* 1.24* 2.34*     No results for input(s): CKTOTAL, TROPONINI in the last 72 hours.     Urinalysis:      Lab Results   Component Value Date    NITRU Negative 05/27/2021    WBCUA 3 05/27/2021    BACTERIA RARE 05/27/2021    RBCUA 4 05/27/2021    BLOODU TRACE 05/27/2021    SPECGRAV 1.016 05/27/2021    GLUCOSEU 250 05/27/2021       Radiology:  CT ABDOMEN PELVIS W IV CONTRAST Additional Contrast? None   Final Result   1. Interval transverse colostomy creation, with mild colonic wall thickening   of the transverse colon at the level of the ostomy which may be related to   poor distention, though wall thickening of the descending colon as well as   the sigmoid colon may be related to colitis. The colonic distention seen on   the preoperative examination has improved. 2. There is a small volume of intra-and pelvic ascites, within indwelling   peritoneal catheter noted. 3. Numerous hepatic lesions are seen suspicious for possible metastasis. Low-attenuation nonspecific lesions noted within the spleen as well. 4. Circumferential distal esophageal thickening is identified suggestive of   esophagitis. 5. Wall thickening of the distal stomach and duodenum suggestive of   gastritis/enteritis. 6. Multifocal nodular appearing infiltrates are seen within the lung bases,   similar to the previous exam.   7. Other similar findings as above. VL Extremity Venous Bilateral   Final Result      CT CHEST W CONTRAST   Final Result   Bilateral pulmonary emboli slightly greater on the right than left. Potential concern for septic emboli given the small foci of irregular   parenchymal lung lesions new from the prior chest CT study, only partially   included and demonstrated on abdomen pelvic CT 1 day earlier. These could   certainly also be metastatic as well but somewhat atypical given the   irregular margins. Multiple liver metastases partially included, unchanged as seen on abdomen   pelvic CT 1 day earlier      Abdominal ascites, decreased from the prior exam 1 day earlier, with recently   demonstrated pneumoperitoneum not seen currently in the included upper   abdomen. .      Esophageal

## 2022-03-29 NOTE — PROGRESS NOTES
Physical Therapy    Facility/Department: [address] PROGRESSIVE CARE  Initial Assessment    NAME: Cris Qiu  : 1965  MRN: [id number]    Date of Service: 3/29/2022    Discharge Recommendations:  Continue to assess pending progress,Home with Home health PT   PT Equipment Recommendations  Other: Will monitor for potential equipt needs. Current Am-Pac Score 15/24. Assessment   Body structures, Functions, Activity limitations: Decreased functional mobility ; Decreased strength;Decreased endurance;Decreased balance  Assessment: 56 y/o female admit 3/22/2022 with Stage IV Met Colon Ca, PE.  3/24/2022 S/P Exp Lap, Colostomy. CT Chest  : + B PE (R>L), Potential concern for Septic Emboli, Multiple Liver Mets. CT Abd : Numerous Hepatic Lesions suspicious for Possible Mets. LE Dopplers : R Indeterminate Partially Occluding DVT R Distal Peroneal Vein. PMH as noted including CKD (PD), Hernia Repair (2022; Met Adenocarcinoma discovered during Hernia Repair), DM, Neuropathy, Retinopathy, Orthostatic Hypotension. PTA pt living with  in multi level home with 5 steps to enter and 1st floor bed (bath on 2nd floor, uses C pta); independent amb and daily care pta. Currently,  Pt rosemary eob/oob with Walker short distance at bedside only with Min assist; weak/limited endurance and LEs buckling. Pt reports plan for d/c home with family assist/support; will monitor progress and probable recommend cont Home PT. Prognosis: Fair  Decision Making: Medium Complexity  History: 56 y/o female admit 3/22/2022 with Stage IV Met Colon Ca, PE.  3/24/2022 S/P Exp Lap, Colostomy. CT Chest  : + B PE (R>L), Potential concern for Septic Emboli, Multiple Liver Mets. CT Abd : Numerous Hepatic Lesions suspicious for Possible Mets. LE Dopplers : R Indeterminate Partially Occluding DVT R Distal Peroneal Vein. PMH as noted including CKD (PD), Hernia Repair (2022;  Met Adenocarcinoma discovered during Hernia Repair), DM, Neuropathy, Retinopathy, Orthostatic Hypotension. Exam: See above. Clinical Presentation: See above. Patient Education: Role of PT, POC, Need to call for assist, Safe use of Walker, LE Exs. Barriers to Learning: None. REQUIRES PT FOLLOW UP: Yes  Activity Tolerance  Activity Tolerance: Patient limited by endurance  Activity Tolerance: Pt rosemary eob/oob with Walker short distance at bedside only with Min assist; weak/limited endurance and LEs buckling. Patient Diagnosis(es): The primary encounter diagnosis was Generalized abdominal pain. Diagnoses of Nausea and vomiting, intractability of vomiting not specified, unspecified vomiting type, Colitis, Hepatic lesion, and Lung nodules were also pertinent to this visit. has a past medical history of Anemia, Chronic kidney disease, Diabetes mellitus ([address] Diabetes mellitus type 1 ([address] Diabetic retinopathy ([address] Diabetic retinopathy associated with type 2 diabetes mellitus, without macular edema, Hyperlipidemia, Hypertension, Kidney stone, Mixed hyperlipidemia, Orthostatic hypotension, and Vitreous hemorrhage of right eye ([address] has a past surgical history that includes Appendectomy; Kidney stone surgery; Tonsillectomy; LAPAROSCOPY INSERTION PERITONEAL CATHETER (N/A, 8/12/2019); Dialysis Catheter Removal (N/A, 2/24/2022); Dialysis Catheter Insertion (N/A, 2/24/2022); ventral hernia repair (N/A, 2/24/2022); sigmoidoscopy (N/A, 3/23/2022); sigmoidoscopy (3/23/2022); and laparotomy (N/A, 3/24/2022). Restrictions  Restrictions/Precautions  Restrictions/Precautions: Fall Risk,Contact Precautions  Position Activity Restriction  Other position/activity restrictions: Contact Precautions : C-Diff. Abd Incision, Colostomy. O2 2L via NC.   Vision/Hearing  Vision: Impaired  Vision Exceptions: Wears glasses for reading  Hearing: Within functional limits     Subjective  General  Chart Reviewed: Yes  Patient assessed for rehabilitation services?: Yes  Additional Pertinent Hx: 54 y/o female admit 3/22/2022 with Stage IV Met Colon Ca, PE.  3/24/2022 S/P Exp Lap, Colostomy. CT Chest  : + B PE (R>L), Potential concern for Septic Emboli, Multiple Liver Mets. CT Abd : Numerous Hepatic Lesions suspicious for Possible Mets. LE Dopplers : R Indeterminate Partially Occluding DVT R Distal Peroneal Vein. PMH as noted including CKD (PD), Hernia Repair (2/24/2022; Met Adenocarcinoma discovered during Hernia Repair), DM, Neuropathy, Retinopathy, Orthostatic Hypotension. Family / Caregiver Present: No  Referring Practitioner: Dr. Chaz Hernández: Within Functional Limits  Subjective  Subjective: Pt agreeable to PT Eval/Rx. Pain Screening  Patient Currently in Pain: Yes          Orientation  Orientation  Overall Orientation Status: Within Functional Limits  Social/Functional History  Social/Functional History  Lives With: Spouse  Type of Home: House  Home Layout: Two level,Laundry in basement (Bed main level (bath on 2nd floor only); uses BSC.)  Home Access: Stairs to enter without rails  Entrance Stairs - Number of Steps: 5 MICHELLE with no rails; flight steps up to bathroom with rail  Bathroom Shower/Tub: Tub/Shower unit  Bathroom Toilet: Standard  Bathroom Equipment: Grab bars in shower,3-in-1 commode  Bathroom Accessibility: Accessible  Home Equipment: 4 wheeled walker (\"Walking Stick\")  ADL Assistance: Independent  Homemaking Assistance:  (shares IADLs with spouse)  Ambulation Assistance: Independent (With Walking Stick recently.)  Transfer Assistance: Independent  Active : Yes  Additional Comments:  has been assisting more recently. Pt denies any recent falls. Cognition   Cognition  Overall Cognitive Status: WFL    Objective     Observation/Palpation  Observation: Recent Abd Surg; New Colostomy.     AROM RLE (degrees)  RLE AROM: WFL  AROM LLE (degrees)  LLE AROM : WFL  AROM RUE (degrees)  RUE AROM : WFL  AROM LUE (degrees)  LUE AROM : WFL  Strength RLE  Comment: Grossly 4- 4/5; weak with functional activities. Strength LLE  Comment: Grossly 4- 4/5; weak with functional activities. Bed mobility  Supine to Sit: Moderate assistance (HOB elevated. Pt able to move LEs to eob; mod assist to upright.)  Transfers  Sit to Stand: Minimal Assistance (With Duey Contes. Cues for safe hand placement.)  Stand to sit: Minimal Assistance (With Duey Contes. Cues for safe hand placement.)  Ambulation  Ambulation?: Yes  Ambulation 1  Surface: level tile  Device: Rolling Walker  Distance: 5-6 steps bed to chair with [employer] assist.  Diminished step length/clearance; weak/buckling LEs as approach chair. Assist to chair safely. Balance  Comments: EOB : SBA. Stand/Amb few steps to chair : Min assist.  Exercises  Quad Sets: 10x  Hip Flexion: 10x  Knee Long Arc Quad: 10x  Ankle Pumps: 10x     Plan   Plan  Times per week: 3-5x week while in acute care setting. Current Treatment Recommendations: Strengthening,Functional Mobility Training,Transfer Training,Gait Training,Safety Education & Training,Patient/Caregiver Education & Training  Safety Devices  Type of devices: Call light within reach,Left in chair,Nurse notified    AM-PAC Score  AM-PAC Inpatient Mobility Raw Score : 15 (03/29/22 0845)  AM-PAC Inpatient T-Scale Score : 39.45 (03/29/22 0845)  Mobility Inpatient CMS 0-100% Score: 57.7 (03/29/22 0845)  Mobility Inpatient CMS G-Code Modifier : CK (03/29/22 0845)          Goals  Short term goals  Time Frame for Short term goals: Upon d/c acute care setting. Short term goal 1: Bed Mob SBA. Short term goal 2: Transfers with assist device SBA/CGA. Short term goal 3: Amb with assist device 48' CGA. Short term goal 4: Pt participating in approp Strength Exs. Patient Goals   Patient goals : Return home with .        Therapy Time   Individual Concurrent Group Co-treatment   Time In 0700         Time Out 0745         Minutes [address], PT

## 2022-03-29 NOTE — PROGRESS NOTES
Office: [phone]       Fax: [phone]      Nephrology Progress Note        Patient's Name: Kayode Lea  Date of Visit: 3/29/2022    Reason for Consult:  ESRD management      History of Present Illness:      Kayode Lea is a 55 y.o. female with PMHx of hypertension, diabetes mellitus, ESRD who was admitted on 3/22/2022 with complaints of abdominal pain   PD fluid dark  history of PD peritonitis recently with pseudomonas. PD catheter was exchanged. Pt was on HD and just started to do PD on 3/20  Pt was getting prep for colonoscopy   Does 2 exchanges with 2.5% day time and Icodextrin overnight    INTERVAL HISTORY      Last HD on 3/28  Feels same  Not in acute distress  BP better            Medications:        Allergies:  Bactrim [sulfamethoxazole-trimethoprim], Doxazosin, Geocillin [carbenicillin], Lisinopril, Other, Spironolactone, Tramadol, Bumetanide, and Ibuprofen  Scheduled Meds:   warfarin  1 mg Oral Once    pantoprazole  40 mg Oral QAM AC    amLODIPine  10 mg Oral Daily    losartan  50 mg Oral Nightly    warfarin placeholder: dosing by pharmacy   Other RX Placeholder    lactated ringers bolus  500 mL IntraVENous Once    vancomycin  250 mg Oral 4x Daily    sodium chloride flush  5-40 mL IntraVENous 2 times per day    insulin lispro  0-6 Units SubCUTAneous TID WC    insulin lispro  0-3 Units SubCUTAneous Nightly     Continuous Infusions:   heparin (Porcine) Stopped (03/28/22 0053)    sodium chloride 25 mL (03/24/22 1114)    dextrose           Objective:       Vitals:  /80   Pulse 91   Temp 97 °F (36.1 °C) (Oral)   Resp 16   Ht 5' 3\" (1.6 m)   Wt 184 lb 1.4 oz (83.5 kg)   SpO2 99%   BMI 32.61 kg/m²   Constitutional:  awake, NAD  Neck: no bruits, No JVD  Cardiovascular:  S1, S2 reg  Respiratory: CTA, no crackles  Abdomen:  +BS, soft, tender, ND  Ext: trace lower extremity edema  Access: abd PD catheter in place, no drainage/redness around exit site. CNS: alert, no agitation    Data:     Labs:  Hepatic:   Recent Labs     03/27/22  0717 03/28/22  0528 03/29/22  0758   AST 21 23 21   ALT 7* 8* 8*   BILITOT 0.3 0.3 0.3   ALKPHOS 192* 184* 178*     BNP: No results for input(s): BNP in the last 72 hours. ABGs: No results for input(s): PHART, PO2ART, BOZ7TLK in the last 72 hours. IMAGING:  CT ABDOMEN PELVIS W IV CONTRAST Additional Contrast? None   Final Result   1. Interval transverse colostomy creation, with mild colonic wall thickening   of the transverse colon at the level of the ostomy which may be related to   poor distention, though wall thickening of the descending colon as well as   the sigmoid colon may be related to colitis. The colonic distention seen on   the preoperative examination has improved. 2. There is a small volume of intra-and pelvic ascites, within indwelling   peritoneal catheter noted. 3. Numerous hepatic lesions are seen suspicious for possible metastasis. Low-attenuation nonspecific lesions noted within the spleen as well. 4. Circumferential distal esophageal thickening is identified suggestive of   esophagitis. 5. Wall thickening of the distal stomach and duodenum suggestive of   gastritis/enteritis. 6. Multifocal nodular appearing infiltrates are seen within the lung bases,   similar to the previous exam.   7. Other similar findings as above. VL Extremity Venous Bilateral   Final Result      CT CHEST W CONTRAST   Final Result   Bilateral pulmonary emboli slightly greater on the right than left. Potential concern for septic emboli given the small foci of irregular   parenchymal lung lesions new from the prior chest CT study, only partially   included and demonstrated on abdomen pelvic CT 1 day earlier.   These could   certainly also be metastatic as well but somewhat atypical given the irregular margins. Multiple liver metastases partially included, unchanged as seen on abdomen   pelvic CT 1 day earlier      Abdominal ascites, decreased from the prior exam 1 day earlier, with recently   demonstrated pneumoperitoneum not seen currently in the included upper   abdomen. .      Esophageal mural thickening suspected esophagitis. Findings were discussed with Azar Ocampo at 3:50 pm on 3/23/2022. CT ABDOMEN PELVIS W IV CONTRAST Additional Contrast? None   Final Result   1. Worsening inflammatory changes surrounding the mid sigmoid colon likely   due to colitis causing worsening colonic dilatation. Underlying malignancy   cannot be excluded; correlate with colonoscopy. 2. Multiple nodular bilateral ground-glass opacities. The differential   diagnosis includes infectious process and metastatic disease recommend CT of   the chest with contrast for further evaluation. 3. Multiple indeterminate hepatic lesions. Recommend nonemergent MRI of the   abdomen with without contrast utilizing Eovist.             Assessment :       1. ESRD   Etiology: suspected DN  Access: Abd PD cath and Tunneled Cath  Volume: Hypervolemic    Getting HD today     peritoneal fluid shows neutrophil count 73 %  Await cultures     2. Electrolytes/Acid base  No Dyskalemia    Recent Labs     03/29/22  0758   *   K 4.4   CO2 22       3. BMD  -Hyperphosphatemia, SHPT  -maintained on Renvela    Lab Results   Component Value Date    CALCIUM 7.3 (L) 03/29/2022    PHOS 4.5 02/17/2022        4. HTN  -Blood pressure high       BP Readings from Last 1 Encounters:   03/29/22 131/80       5. Anemia  -anemia of CKD  -assess for CHRIS    Recent Labs     03/29/22  0758   HGB 8.6*     6. DM    7. abdominal pain   Colon cancer on pathology   Extensive metastasis  - on po vancomycin for c.  Diff     PLAN :     - Empiric antibiotics  - PD fluid for culture,   - continue hemodialysis as per MWF  - hold BP meds:   - MBD management  - Anemia management  - Dose meds to GFR<10    Thank you for allowing us to participate in care of Danae Dunn . We will continue to follow. Feel free to contact me with any questions.       Carlos Liang MD  3/29/2022    [facility] [address] [place] [address]  Office : [phone]  Fax :[phone]

## 2022-03-29 NOTE — PROGRESS NOTES
Clinical Pharmacy Note  Warfarin Consult    Rell Richards is a 55 y.o. female receiving warfarin managed by pharmacy. Patient being bridged with heparin. Warfarin Indication: PE  Target INR range: 2-3   Dose prior to admission: New start    Current warfarin drug-drug interactions:     Recent Labs     03/27/22 2040 03/27/22 2040 03/28/22  0011 03/28/22  0528 03/29/22  0758   HGB 9.3*   < > 9.1* 8.5* 8.6*   HCT 28.5*   < > 27.6* 26.0* 25.5*   INR 1.35*  --   --  1.24* 2.34*    < > = values in this interval not displayed. Assessment/Plan:  INR jumped again 1.24--> 2.34 after 2 days of 5mg doses. After last bump INR dropped considerably after holding. Will give warfarin 1 mg today    Thank you for the consult. Will continue to follow.     Electronically signed by Kaiden Mayers [facility] on 3/29/2022 at 10:21 AM

## 2022-03-29 NOTE — PROGRESS NOTES
General and Vascular Surgery                                                           Daily Progress Note                                                                 Pt Name: Moises [facility] Record Number: [id number]  Date of Birth 1965   Today's Date: 3/29/2022    ASSESSMENT/PLAN  Sigmoid colon cancer s/p diverting colostomy 3/24/22    -continue diet as able  -+ thrush noted. Will start nystatin  -Regular diet. Not taking in much secondary to pain with swallowing. Likely secondary to thrush.   -abx for c. Diff  -ostomy teaching tomorrow. Stoma ok. +stool and gas output                 SUBJECTIVE  Tami has improved from yesterday. Pain is well controlled. Tolerating regular PO. Stoma producing    OBJECTIVE  VITALS:  height is 5' 3\" (1.6 m) and weight is 184 lb 1.4 oz (83.5 kg). Her oral temperature is 97 °F (36.1 °C). Her blood pressure is 131/80 and her pulse is 91. Her respiration is 16 and oxygen saturation is 99%. VITALS:  /80   Pulse 91   Temp 97 °F (36.1 °C) (Oral)   Resp 16   Ht 5' 3\" (1.6 m)   Wt 184 lb 1.4 oz (83.5 kg)   SpO2 99%   BMI 32.61 kg/m²   GENERAL: alert, no distress  ABDOMEN: wound clean. Abdomen soft. Stoma pink with stool  I/O last 3 completed shifts:   In: 523.3 [I.V.:123.3]  Out: 2050 [Stool:50]  I/O this shift:  In: -   Out: 100 [Stool:100]    LABS  Recent Labs     03/29/22  0758   WBC 9.3   HGB 8.6*   HCT 25.5*   *   *   K 4.4   CL 97*   CO2 22   BUN 24*   CREATININE 4.5*   CALCIUM 7.3*   INR 2.34*   AST 21   ALT 8*   BILITOT 0.3     CBC:   Lab Results   Component Value Date    WBC 9.3 03/29/2022    RBC 2.72 03/29/2022    HGB 8.6 03/29/2022    HCT 25.5 03/29/2022    MCV 94.1 03/29/2022    MCH 31.6 03/29/2022    MCHC 33.6 03/29/2022    RDW 16.4 03/29/2022     03/29/2022    MPV 9.9 03/29/2022     CMP:    Lab Results   Component Value Date     03/29/2022    K 4.4 03/29/2022 CL 97 03/29/2022    CO2 22 03/29/2022    BUN 24 03/29/2022    CREATININE 4.5 03/29/2022    GFRAA 12 03/29/2022    AGRATIO 1.1 03/29/2022    LABGLOM 10 03/29/2022    GLUCOSE 112 03/29/2022    PROT 4.6 03/29/2022    LABALBU 2.4 03/29/2022    CALCIUM 7.3 03/29/2022    BILITOT 0.3 03/29/2022    ALKPHOS 178 03/29/2022    AST 21 03/29/2022    ALT 8 03/29/2022         Electronically signed by Dori Navas PA-C on 3/29/2022 at 11:19 AM

## 2022-03-30 NOTE — CARE COORDINATION
Case management follow up. Met with patient to discuss discharge plan. Patient hopeful to return home. Home care & skilled nursing facility lists given. If needs SNF will be a difficult placement due to age and needing HD. Prior to hospital patient was doing home PD, now will likely need hemodialysis. Will need to set up outpatient HD. Will follow for discharge needs.   Electronically signed by Eufemia Alvarez RN Case Management [phone] on 3/30/2022 at 4:25 PM

## 2022-03-30 NOTE — PROGRESS NOTES
Office: [phone]       Fax: [phone]      Nephrology Progress Note        Patient's Name: Paulina Morton  Date of Visit: 3/30/2022    Reason for Consult:  ESRD management      History of Present Illness:      Paulina Morton is a 55 y.o. female with PMHx of hypertension, diabetes mellitus, ESRD who was admitted on 3/22/2022 with complaints of abdominal pain   PD fluid dark  history of PD peritonitis recently with pseudomonas. PD catheter was exchanged. Pt was on HD and just started to do PD on 3/20  Pt was getting prep for colonoscopy   Does 2 exchanges with 2.5% day time and Icodextrin overnight    INTERVAL HISTORY      For HD today  Feels same  Not in acute distress  BP better            Medications:        Allergies:  Bactrim [sulfamethoxazole-trimethoprim], Doxazosin, Geocillin [carbenicillin], Lisinopril, Other, Spironolactone, Tramadol, Bumetanide, and Ibuprofen  Scheduled Meds:   nystatin  5 mL Oral 4x Daily    pantoprazole  40 mg Oral QAM AC    amLODIPine  10 mg Oral Daily    losartan  50 mg Oral Nightly    warfarin placeholder: dosing by pharmacy   Other RX Placeholder    lactated ringers bolus  500 mL IntraVENous Once    vancomycin  250 mg Oral 4x Daily    sodium chloride flush  5-40 mL IntraVENous 2 times per day    insulin lispro  0-6 Units SubCUTAneous TID WC    insulin lispro  0-3 Units SubCUTAneous Nightly     Continuous Infusions:   sodium chloride 25 mL (03/24/22 1114)    dextrose           Objective:       Vitals:  /77   Pulse 71   Temp 98.2 °F (36.8 °C)   Resp 20   Ht 5' 3\" (1.6 m)   Wt 186 lb 11.7 oz (84.7 kg)   SpO2 92%   BMI 33.08 kg/m²   Constitutional:  awake, NAD  Neck: no bruits, No JVD  Cardiovascular:  S1, S2 reg  Respiratory: CTA, no crackles  Abdomen:  +BS, soft, tender, ND  Ext: trace lower extremity edema  Access: abd PD catheter in place, no drainage/redness around exit site. CNS: alert, no agitation    Data:     Labs:  Hepatic:   Recent Labs     03/28/22  0528 03/29/22  0758 03/30/22  0656   AST 23 21 30   ALT 8* 8* 10   BILITOT 0.3 0.3 0.3   ALKPHOS 184* 178* 197*     BNP: No results for input(s): BNP in the last 72 hours. ABGs: No results for input(s): PHART, PO2ART, QYD0BHK in the last 72 hours. IMAGING:  CT ABDOMEN PELVIS W IV CONTRAST Additional Contrast? None   Final Result   1. Interval transverse colostomy creation, with mild colonic wall thickening   of the transverse colon at the level of the ostomy which may be related to   poor distention, though wall thickening of the descending colon as well as   the sigmoid colon may be related to colitis. The colonic distention seen on   the preoperative examination has improved. 2. There is a small volume of intra-and pelvic ascites, within indwelling   peritoneal catheter noted. 3. Numerous hepatic lesions are seen suspicious for possible metastasis. Low-attenuation nonspecific lesions noted within the spleen as well. 4. Circumferential distal esophageal thickening is identified suggestive of   esophagitis. 5. Wall thickening of the distal stomach and duodenum suggestive of   gastritis/enteritis. 6. Multifocal nodular appearing infiltrates are seen within the lung bases,   similar to the previous exam.   7. Other similar findings as above. VL Extremity Venous Bilateral   Final Result      CT CHEST W CONTRAST   Final Result   Bilateral pulmonary emboli slightly greater on the right than left. Potential concern for septic emboli given the small foci of irregular   parenchymal lung lesions new from the prior chest CT study, only partially   included and demonstrated on abdomen pelvic CT 1 day earlier. These could   certainly also be metastatic as well but somewhat atypical given the   irregular margins.       Multiple liver metastases partially included, unchanged as seen on abdomen   pelvic CT 1 day earlier      Abdominal ascites, decreased from the prior exam 1 day earlier, with recently   demonstrated pneumoperitoneum not seen currently in the included upper   abdomen. .      Esophageal mural thickening suspected esophagitis. Findings were discussed with Ping Booker at 3:50 pm on 3/23/2022. CT ABDOMEN PELVIS W IV CONTRAST Additional Contrast? None   Final Result   1. Worsening inflammatory changes surrounding the mid sigmoid colon likely   due to colitis causing worsening colonic dilatation. Underlying malignancy   cannot be excluded; correlate with colonoscopy. 2. Multiple nodular bilateral ground-glass opacities. The differential   diagnosis includes infectious process and metastatic disease recommend CT of   the chest with contrast for further evaluation. 3. Multiple indeterminate hepatic lesions. Recommend nonemergent MRI of the   abdomen with without contrast utilizing Eovist.             Assessment :       1. ESRD   Etiology: suspected DN  Access: Abd PD cath and Tunneled Cath  Volume: Hypervolemic    Getting HD today     peritoneal fluid shows neutrophil count 73 %  Await cultures     2. Electrolytes/Acid base  No Dyskalemia    Recent Labs     03/30/22  0656   *   K 4.4   CO2 21       3. BMD  -Hyperphosphatemia, SHPT  -maintained on Renvela    Lab Results   Component Value Date    CALCIUM 7.3 (L) 03/30/2022    PHOS 4.5 02/17/2022        4. HTN  -Blood pressure high       BP Readings from Last 1 Encounters:   03/30/22 134/77       5. Anemia  -anemia of CKD  -assess for CHRIS    Recent Labs     03/30/22  0656   HGB 7.7*     6. DM    7. abdominal pain   Colon cancer on pathology   Extensive metastasis  - on po vancomycin for c.  Diff     PLAN :     - Empiric antibiotics  - HD today  - PD fluid cx : NGTD  - continue hemodialysis as per MWF  - hold BP meds:   - MBD management  - Anemia management  - Dose meds to GFR<10    Thank you for allowing us to participate in care of Danae CarrHupu Shaun . We will continue to follow. Feel free to contact me with any questions.       Raffi Schwartz MD  3/30/2022    [facility] [address] [place] [address]  Office : [phone]  Fax :[phone]

## 2022-03-30 NOTE — PROGRESS NOTES
General and Vascular Surgery                                                           Daily Progress Note                                                                 Pt Name: Moises [facility] Record Number: [id number]  Date of Birth 1965   Today's Date: 3/30/2022    ASSESSMENT/PLAN  Sigmoid colon cancer s/p diverting colostomy 3/24/22    -continue diet as able  -+ thrush noted. Nystatin started yesterday. Pain improved. Able to tolerate more orally with throat pain controlled  -In HD this AM  -Feeling better  -abx for c. Diff  -ostomy teaching. Stoma ok. +stool and gas output                 SUBJECTIVE  Tami has improved from yesterday. Pain is well controlled. Tolerating regular PO. Stoma producing    OBJECTIVE  VITALS:  height is 5' 3\" (1.6 m) and weight is 186 lb 11.7 oz (84.7 kg). Her temperature is 98.2 °F (36.8 °C). Her blood pressure is 134/77 and her pulse is 71. Her respiration is 20 and oxygen saturation is 92%. VITALS:  /77   Pulse 71   Temp 98.2 °F (36.8 °C)   Resp 20   Ht 5' 3\" (1.6 m)   Wt 186 lb 11.7 oz (84.7 kg)   SpO2 92%   BMI 33.08 kg/m²   GENERAL: alert, no distress  ABDOMEN: wound clean. Abdomen soft.   Stoma pink with stool  I/O last 3 completed shifts:  In: -   Out: 100 [Stool:100]  I/O this shift:  In: -   Out: 100 [Stool:100]    LABS  Recent Labs     03/30/22  0656   WBC 9.4   HGB 7.7*   HCT 23.6*      *   K 4.4   CL 96*   CO2 21   BUN 28*   CREATININE 5.7*   CALCIUM 7.3*   INR 3.79*   AST 30   ALT 10   BILITOT 0.3     CBC:   Lab Results   Component Value Date    WBC 9.4 03/30/2022    RBC 2.48 03/30/2022    HGB 7.7 03/30/2022    HCT 23.6 03/30/2022    MCV 95.3 03/30/2022    MCH 31.2 03/30/2022    MCHC 32.7 03/30/2022    RDW 17.1 03/30/2022     03/30/2022    MPV 9.9 03/30/2022     CMP:    Lab Results   Component Value Date     03/30/2022    K 4.4 03/30/2022    CL 96 03/30/2022    CO2 21 03/30/2022    BUN 28 03/30/2022    CREATININE 5.7 03/30/2022    GFRAA 9 03/30/2022    AGRATIO 1.3 03/30/2022    LABGLOM 8 03/30/2022    GLUCOSE 119 03/30/2022    PROT 4.3 03/30/2022    LABALBU 2.4 03/30/2022    CALCIUM 7.3 03/30/2022    BILITOT 0.3 03/30/2022    ALKPHOS 197 03/30/2022    AST 30 03/30/2022    ALT 10 03/30/2022         Electronically signed by Meaghan Bruno PA-C on 3/30/2022 at 9:30 AM

## 2022-03-30 NOTE — PROGRESS NOTES
Physical Therapy  PT to beside in AM although pt off floor for HD. Return to bedside this afternoon; pt politely refusing due to recent back from HD and \"too tired. \" Will cont POC and follow-up tomorrow.   Digna Woo, PT

## 2022-03-30 NOTE — PROGRESS NOTES
Clinical Pharmacy Note  Warfarin Consult    Laura Mendoza is a 55 y.o. female receiving warfarin managed by pharmacy. Patient being bridged with heparin. Warfarin Indication: PE  Target INR range: 2-3   Dose prior to admission: New start    Current warfarin drug-drug interactions:     Recent Labs     03/28/22  0528 03/29/22  0758 03/30/22  0656   HGB 8.5* 8.6* 7.7*   HCT 26.0* 25.5* 23.6*   INR 1.24* 2.34* 3.79*       Assessment/Plan:  INR jumped again 1.24--> 2.34 after 2 days of 5mg doses. After last bump INR dropped considerably after holding. INR 2.34--> 3.79 after 1mg dose last evening  Will hold warfarin today    Thank you for the consult. Will continue to follow.     Electronically signed by Herlinda Samuel, [facility] on 3/30/2022 at 10:48 AM

## 2022-03-30 NOTE — CARE COORDINATION
Attempted f/u ostomy teaching with patient. Patient stated she had dialysis today and is really tired. Requested I come back tomorrow. Planned repeat ostomy teaching tomorrow.    Electronically signed by Zehra Webster RN [id number] on 3/30/2022 at 2:32 PM

## 2022-03-30 NOTE — PROGRESS NOTES
Occupational Therapy  Unable to see pt at this time due to pt currently off floor at dialysis at time of attempt. Will follow up with pt as time allows. Continue with POC.     Tino Au, OTR/L

## 2022-03-30 NOTE — PROGRESS NOTES
Hospitalist Progress Note      PCP: No primary care provider on file. Date of Admission: 3/22/2022    Chief Complaint: s/p colostomy placement    Hospital Course:    Ms. Pal Beckman was admitted with sepsis due to colitis. She was started on broad spectrum antibiotics but developed hypotensive drug reaction and urticaria and antibiotics were held. At the same time, her stool tested positive for C dif and she was started on oral vancomycin and IV flagyl. Surgery created diverting colostomy on 3/24 for obstructing newly diagnosed sigmoid colon cancer. Was switched from PD to HD. Subjective:   Denies any new complaints, was seen during dialysis today. Swallowing improved.      Medications:  Reviewed    Infusion Medications    sodium chloride 25 mL (03/24/22 1114)    dextrose       Scheduled Medications    nystatin  5 mL Oral 4x Daily    pantoprazole  40 mg Oral QAM AC    amLODIPine  10 mg Oral Daily    losartan  50 mg Oral Nightly    warfarin placeholder: dosing by pharmacy   Other RX Placeholder    lactated ringers bolus  500 mL IntraVENous Once    vancomycin  250 mg Oral 4x Daily    sodium chloride flush  5-40 mL IntraVENous 2 times per day    insulin lispro  0-6 Units SubCUTAneous TID WC    insulin lispro  0-3 Units SubCUTAneous Nightly     PRN Meds: phenol, morphine **OR** morphine, albumin human, midodrine, heparin (porcine), diphenhydrAMINE, sodium chloride flush, sodium chloride, ondansetron **OR** ondansetron, polyethylene glycol, acetaminophen **OR** acetaminophen, oxyCODONE, glucose, dextrose, glucagon (rDNA), dextrose      Intake/Output Summary (Last 24 hours) at 3/30/2022 1346  Last data filed at 3/30/2022 1200  Gross per 24 hour   Intake 400 ml   Output 2600 ml   Net -2200 ml       Physical Exam Performed:    BP (!) 183/92   Pulse 99   Temp 98.1 °F (36.7 °C) (Oral)   Resp 22   Ht 5' 3\" (1.6 m)   Wt 182 lb 1.6 oz (82.6 kg)   SpO2 100%   BMI 32.26 kg/m²     General appearance: No apparent distress, appears stated age and cooperative. HEENT: Pupils equal, round, and reactive to light. Conjunctivae/corneas clear. Neck: Supple, with full range of motion. No jugular venous distention. Trachea midline. Respiratory:  Normal respiratory effort. Clear to auscultation, bilaterally without Rales/Wheezes/Rhonchi. Cardiovascular: Regular rate and rhythm with normal S1/S2 without murmurs, rubs or gallops. Abdomen: Colostomy noted with with full bag, PD dialysis catheter noted. Minimal abdominal tenderness  Musculoskeletal: No clubbing, cyanosis or edema bilaterally. Full range of motion without deformity. Skin: Skin color, texture, turgor normal.  No rashes or lesions. Neurologic:  Neurovascularly intact without any focal sensory/motor deficits. Cranial nerves: II-XII intact, grossly non-focal.  Psychiatric: Alert and oriented, thought content appropriate, normal insight  Capillary Refill: Brisk,3 seconds, normal   Peripheral Pulses: +2 palpable, equal bilaterally       Labs:   Recent Labs     03/28/22 0528 03/29/22 0758 03/30/22  0656   WBC 6.9 9.3 9.4   HGB 8.5* 8.6* 7.7*   HCT 26.0* 25.5* 23.6*   PLT 88* 121* 140     Recent Labs     03/28/22 0528 03/29/22 0758 03/30/22  0656   * 134* 130*   K 4.7 4.4 4.4   CL 97* 97* 96*   CO2 21 22 21   BUN 36* 24* 28*   CREATININE 5.5* 4.5* 5.7*   CALCIUM 6.8* 7.3* 7.3*     Recent Labs     03/28/22 0528 03/29/22 0758 03/30/22  0656   AST 23 21 30   ALT 8* 8* 10   BILITOT 0.3 0.3 0.3   ALKPHOS 184* 178* 197*     Recent Labs     03/28/22 0528 03/29/22 0758 03/30/22  0656   INR 1.24* 2.34* 3.79*     No results for input(s): CKTOTAL, TROPONINI in the last 72 hours.     Urinalysis:      Lab Results   Component Value Date    NITRU Negative 05/27/2021    WBCUA 3 05/27/2021    BACTERIA RARE 05/27/2021    RBCUA 4 05/27/2021    BLOODU TRACE 05/27/2021    SPECGRAV 1.016 05/27/2021    GLUCOSEU 250 05/27/2021       Radiology:  CT ABDOMEN PELVIS W IV CONTRAST Additional Contrast? None   Final Result   1. Interval transverse colostomy creation, with mild colonic wall thickening   of the transverse colon at the level of the ostomy which may be related to   poor distention, though wall thickening of the descending colon as well as   the sigmoid colon may be related to colitis. The colonic distention seen on   the preoperative examination has improved. 2. There is a small volume of intra-and pelvic ascites, within indwelling   peritoneal catheter noted. 3. Numerous hepatic lesions are seen suspicious for possible metastasis. Low-attenuation nonspecific lesions noted within the spleen as well. 4. Circumferential distal esophageal thickening is identified suggestive of   esophagitis. 5. Wall thickening of the distal stomach and duodenum suggestive of   gastritis/enteritis. 6. Multifocal nodular appearing infiltrates are seen within the lung bases,   similar to the previous exam.   7. Other similar findings as above. VL Extremity Venous Bilateral   Final Result      CT CHEST W CONTRAST   Final Result   Bilateral pulmonary emboli slightly greater on the right than left. Potential concern for septic emboli given the small foci of irregular   parenchymal lung lesions new from the prior chest CT study, only partially   included and demonstrated on abdomen pelvic CT 1 day earlier. These could   certainly also be metastatic as well but somewhat atypical given the   irregular margins. Multiple liver metastases partially included, unchanged as seen on abdomen   pelvic CT 1 day earlier      Abdominal ascites, decreased from the prior exam 1 day earlier, with recently   demonstrated pneumoperitoneum not seen currently in the included upper   abdomen. .      Esophageal mural thickening suspected esophagitis. Findings were discussed with Kolby Santo at 3:50 pm on 3/23/2022.          CT ABDOMEN PELVIS W IV CONTRAST Additional Contrast? None   Final Result   1. Worsening inflammatory changes surrounding the mid sigmoid colon likely   due to colitis causing worsening colonic dilatation. Underlying malignancy   cannot be excluded; correlate with colonoscopy. 2. Multiple nodular bilateral ground-glass opacities. The differential   diagnosis includes infectious process and metastatic disease recommend CT of   the chest with contrast for further evaluation. 3. Multiple indeterminate hepatic lesions. Recommend nonemergent MRI of the   abdomen with without contrast utilizing Eovist.                 Assessment/Plan:    Active Hospital Problems    Diagnosis     Hepatic lesion [K76.9]     Cancer of sigmoid colon ([facility] [address] [C18.7]     Colitis [K52.9]      Sepsis due to colitis:  improved    Metastatic colon cancer:  - colonoscopy 3/23 with colonic mass  - CT scan concerning for liver mets, possibly pulmonary as well  - oncology consulted  - surgery:  Placed diverting colostomy 3/24-- significant pelvic tumors found.      C dif infection:  -On oral vancomycin day 8    Acute on chronic anemia(likely blood loss). Patient describes episode of bloody bowel motion yesterday. Hemoglobin is 7.7 today. We will continue to monitor    Dysphagia:  - PPI and chloroseptic spray  - continue nystatin.      Drug reaction:  resolved  -hypotension and urticaria to ceftriaxone  - discussed with pharmacy --> ok to use Merrem if needed  - do not use Cipro due to prolonged QTc     Acute bilateral pulmonary embolism:  - heparin drip due to colonoscopy on 3/23  - Coumadin initiated 3/25. INR therapeutic (DC heparin)      ESRD on dialysis:  - HD due to concern for infection on PD (recent peritonitis). DVT Prophylaxis: warfarin  Diet: ADULT DIET;  Regular  Code Status: Full Code    PT/OT Eval Status: following     Dispo -possible discharge in 2 to 3 days, will continue to follow with PT to determine ECF versus home care    Vicente Pichardo MD

## 2022-03-31 NOTE — PROGRESS NOTES
Hospitalist Progress Note      PCP: No primary care provider on file. Date of Admission: 3/22/2022    Chief Complaint: s/p colostomy placement    Hospital Course:    Ms. Daily Capps was admitted with sepsis due to colitis. She was started on broad spectrum antibiotics but developed hypotensive drug reaction and urticaria and antibiotics were held. At the same time, her stool tested positive for C dif and she was started on oral vancomycin and IV flagyl. Surgery created diverting colostomy on 3/24 for obstructing newly diagnosed sigmoid colon cancer. Was switched from PD to HD.      Subjective:   Denies any new complaints, continues to feel fatigued    Medications:  Reviewed    Infusion Medications    sodium chloride      sodium chloride 25 mL (03/24/22 1114)    dextrose       Scheduled Medications    warfarin  2 mg Oral Once    nystatin  5 mL Oral 4x Daily    pantoprazole  40 mg Oral QAM AC    amLODIPine  10 mg Oral Daily    losartan  50 mg Oral Nightly    warfarin placeholder: dosing by pharmacy   Other RX Placeholder    lactated ringers bolus  500 mL IntraVENous Once    vancomycin  250 mg Oral 4x Daily    sodium chloride flush  5-40 mL IntraVENous 2 times per day    insulin lispro  0-6 Units SubCUTAneous TID WC    insulin lispro  0-3 Units SubCUTAneous Nightly     PRN Meds: sodium chloride, phenol, morphine **OR** morphine, albumin human, midodrine, heparin (porcine), diphenhydrAMINE, sodium chloride flush, sodium chloride, ondansetron **OR** ondansetron, polyethylene glycol, acetaminophen **OR** acetaminophen, oxyCODONE, glucose, dextrose, glucagon (rDNA), dextrose      Intake/Output Summary (Last 24 hours) at 3/31/2022 1318  Last data filed at 3/30/2022 1649  Gross per 24 hour   Intake 480 ml   Output 100 ml   Net 380 ml       Physical Exam Performed:    BP (!) 149/80   Pulse 85   Temp 96 °F (35.6 °C) (Oral) Comment: pt took drink of cold juice prior  Resp 20   Ht 5' 3\" (1.6 m)   Wt 182 lb 12.2 oz (82.9 kg)   SpO2 93%   BMI 32.37 kg/m²     General appearance: No apparent distress, appears stated age and cooperative. HEENT: Pupils equal, round, and reactive to light. Conjunctivae/corneas clear. Neck: Supple, with full range of motion. No jugular venous distention. Trachea midline. Respiratory:  Normal respiratory effort. Clear to auscultation, bilaterally without Rales/Wheezes/Rhonchi. Cardiovascular: Regular rate and rhythm with normal S1/S2 without murmurs, rubs or gallops. Abdomen: Colostomy noted with with full bag, PD dialysis catheter noted. Minimal abdominal tenderness  Musculoskeletal: No clubbing, cyanosis or edema bilaterally. Full range of motion without deformity. Skin: Skin color, texture, turgor normal.  No rashes or lesions. Neurologic:  Neurovascularly intact without any focal sensory/motor deficits. Cranial nerves: II-XII intact, grossly non-focal.  Psychiatric: Alert and oriented, thought content appropriate, normal insight  Capillary Refill: Brisk,3 seconds, normal   Peripheral Pulses: +2 palpable, equal bilaterally       Labs:   Recent Labs     03/29/22 0758 03/30/22  0656 03/31/22  0710   WBC 9.3 9.4 8.0   HGB 8.6* 7.7* 7.0*   HCT 25.5* 23.6* 21.0*   * 140 142     Recent Labs     03/29/22 0758 03/30/22  0656 03/31/22  0710   * 130* 131*   K 4.4 4.4 4.0   CL 97* 96* 97*   CO2 22 21 24   BUN 24* 28* 14   CREATININE 4.5* 5.7* 4.2*   CALCIUM 7.3* 7.3* 7.0*     Recent Labs     03/29/22 0758 03/30/22  0656 03/31/22  0710   AST 21 30 35   ALT 8* 10 12   BILITOT 0.3 0.3 <0.2   ALKPHOS 178* 197* 177*     Recent Labs     03/29/22 0758 03/30/22  0656 03/31/22  0710   INR 2.34* 3.79* 2.94*     No results for input(s): Les Jagruti in the last 72 hours.     Urinalysis:      Lab Results   Component Value Date    NITRU Negative 05/27/2021    WBCUA 3 05/27/2021    BACTERIA RARE 05/27/2021    RBCUA 4 05/27/2021    BLOODU TRACE 05/27/2021    SPECGRAV 1.016 05/27/2021    GLUCOSEU 250 05/27/2021       Radiology:  CT ABDOMEN PELVIS W IV CONTRAST Additional Contrast? None   Final Result   1. Interval transverse colostomy creation, with mild colonic wall thickening   of the transverse colon at the level of the ostomy which may be related to   poor distention, though wall thickening of the descending colon as well as   the sigmoid colon may be related to colitis. The colonic distention seen on   the preoperative examination has improved. 2. There is a small volume of intra-and pelvic ascites, within indwelling   peritoneal catheter noted. 3. Numerous hepatic lesions are seen suspicious for possible metastasis. Low-attenuation nonspecific lesions noted within the spleen as well. 4. Circumferential distal esophageal thickening is identified suggestive of   esophagitis. 5. Wall thickening of the distal stomach and duodenum suggestive of   gastritis/enteritis. 6. Multifocal nodular appearing infiltrates are seen within the lung bases,   similar to the previous exam.   7. Other similar findings as above. VL Extremity Venous Bilateral   Final Result      CT CHEST W CONTRAST   Final Result   Bilateral pulmonary emboli slightly greater on the right than left. Potential concern for septic emboli given the small foci of irregular   parenchymal lung lesions new from the prior chest CT study, only partially   included and demonstrated on abdomen pelvic CT 1 day earlier. These could   certainly also be metastatic as well but somewhat atypical given the   irregular margins. Multiple liver metastases partially included, unchanged as seen on abdomen   pelvic CT 1 day earlier      Abdominal ascites, decreased from the prior exam 1 day earlier, with recently   demonstrated pneumoperitoneum not seen currently in the included upper   abdomen. .      Esophageal mural thickening suspected esophagitis.       Findings were discussed with Linzy Kocher at 3:50 pm on 3/23/2022. CT ABDOMEN PELVIS W IV CONTRAST Additional Contrast? None   Final Result   1. Worsening inflammatory changes surrounding the mid sigmoid colon likely   due to colitis causing worsening colonic dilatation. Underlying malignancy   cannot be excluded; correlate with colonoscopy. 2. Multiple nodular bilateral ground-glass opacities. The differential   diagnosis includes infectious process and metastatic disease recommend CT of   the chest with contrast for further evaluation. 3. Multiple indeterminate hepatic lesions. Recommend nonemergent MRI of the   abdomen with without contrast utilizing Eovist.                 Assessment/Plan:    Active Hospital Problems    Diagnosis     Hepatic lesion [K76.9]     Cancer of sigmoid colon ([address] [C18.7]     Colitis [K52.9]      Sepsis due to colitis:  improved    Metastatic colon cancer:  - colonoscopy 3/23 with colonic mass  - CT scan concerning for liver mets, possibly pulmonary as well  - oncology consulted  - surgery:  Placed diverting colostomy 3/24-- significant pelvic tumors found.      C dif infection:  -On oral vancomycin day 9    Acute on chronic anemia(likely blood loss). Patient describes episode of bloody bowel motion 2 days ago. Hemoglobin is 7.7--> 7 today. Transfuse 1 unit of packed red blood cells. Dysphagia:  - PPI and chloroseptic spray  - continue nystatin.      Drug reaction:  resolved  -hypotension and urticaria to ceftriaxone  - discussed with pharmacy --> ok to use Merrem if needed  - do not use Cipro due to prolonged QTc     Acute bilateral pulmonary embolism:  - heparin drip due to colonoscopy on 3/23  - Coumadin initiated 3/25. INR therapeutic, heparin discontinued     ESRD on dialysis:  - HD due to concern for infection on PD (recent peritonitis). DVT Prophylaxis: warfarin  Diet: ADULT DIET;  Regular  Code Status: Full Code    PT/OT Eval Status: following     Dispo -possible discharge in 2 to 3 days (pending stability of hemoglobin) , will continue to follow with PT to determine ECF versus home care     Paty Schuster MD

## 2022-03-31 NOTE — PROGRESS NOTES
Office: [phone]       Fax: [phone]      Nephrology Progress Note        Patient's Name: Consuelo Frazier  Date of Visit: 3/31/2022    Reason for Consult:  ESRD management      History of Present Illness:      Consuelo Frazier is a 55 y.o. female with PMHx of hypertension, diabetes mellitus, ESRD who was admitted on 3/22/2022 with complaints of abdominal pain   PD fluid dark  history of PD peritonitis recently with pseudomonas. PD catheter was exchanged. Pt was on HD and just started to do PD on 3/20  Pt was getting prep for colonoscopy   Does 2 exchanges with 2.5% day time and Icodextrin overnight    INTERVAL HISTORY      S/p HD on 3/30  Feels same  Not in acute distress  BP better            Medications:        Allergies:  Bactrim [sulfamethoxazole-trimethoprim], Doxazosin, Geocillin [carbenicillin], Lisinopril, Other, Spironolactone, Tramadol, Bumetanide, and Ibuprofen  Scheduled Meds:   nystatin  5 mL Oral 4x Daily    pantoprazole  40 mg Oral QAM AC    amLODIPine  10 mg Oral Daily    losartan  50 mg Oral Nightly    warfarin placeholder: dosing by pharmacy   Other RX Placeholder    lactated ringers bolus  500 mL IntraVENous Once    vancomycin  250 mg Oral 4x Daily    sodium chloride flush  5-40 mL IntraVENous 2 times per day    insulin lispro  0-6 Units SubCUTAneous TID WC    insulin lispro  0-3 Units SubCUTAneous Nightly     Continuous Infusions:   sodium chloride      sodium chloride 25 mL (03/24/22 1114)    dextrose           Objective:       Vitals:  BP (!) 148/87   Pulse 85   Temp 96.5 °F (35.8 °C) (Oral)   Resp 24   Ht 5' 3\" (1.6 m)   Wt 182 lb 12.2 oz (82.9 kg)   SpO2 96%   BMI 32.37 kg/m²   Constitutional:  awake, NAD  Neck: no bruits, No JVD  Cardiovascular:  S1, S2 reg  Respiratory: CTA, no crackles  Abdomen:  +BS, soft, tender, ND  Ext: trace lower extremity edema  Access: abd PD catheter in place, no drainage/redness around exit site. CNS: alert, no agitation    Data:     Labs:  Hepatic:   Recent Labs     03/29/22  0758 03/30/22  0656 03/31/22  0710   AST 21 30 35   ALT 8* 10 12   BILITOT 0.3 0.3 <0.2   ALKPHOS 178* 197* 177*     BNP: No results for input(s): BNP in the last 72 hours. ABGs: No results for input(s): PHART, PO2ART, CWK4CFQ in the last 72 hours. IMAGING:  CT ABDOMEN PELVIS W IV CONTRAST Additional Contrast? None   Final Result   1. Interval transverse colostomy creation, with mild colonic wall thickening   of the transverse colon at the level of the ostomy which may be related to   poor distention, though wall thickening of the descending colon as well as   the sigmoid colon may be related to colitis. The colonic distention seen on   the preoperative examination has improved. 2. There is a small volume of intra-and pelvic ascites, within indwelling   peritoneal catheter noted. 3. Numerous hepatic lesions are seen suspicious for possible metastasis. Low-attenuation nonspecific lesions noted within the spleen as well. 4. Circumferential distal esophageal thickening is identified suggestive of   esophagitis. 5. Wall thickening of the distal stomach and duodenum suggestive of   gastritis/enteritis. 6. Multifocal nodular appearing infiltrates are seen within the lung bases,   similar to the previous exam.   7. Other similar findings as above. VL Extremity Venous Bilateral   Final Result      CT CHEST W CONTRAST   Final Result   Bilateral pulmonary emboli slightly greater on the right than left. Potential concern for septic emboli given the small foci of irregular   parenchymal lung lesions new from the prior chest CT study, only partially   included and demonstrated on abdomen pelvic CT 1 day earlier.   These could   certainly also be metastatic as well but somewhat atypical given the   irregular margins. Multiple liver metastases partially included, unchanged as seen on abdomen   pelvic CT 1 day earlier      Abdominal ascites, decreased from the prior exam 1 day earlier, with recently   demonstrated pneumoperitoneum not seen currently in the included upper   abdomen. .      Esophageal mural thickening suspected esophagitis. Findings were discussed with Carolyn Obando at 3:50 pm on 3/23/2022. CT ABDOMEN PELVIS W IV CONTRAST Additional Contrast? None   Final Result   1. Worsening inflammatory changes surrounding the mid sigmoid colon likely   due to colitis causing worsening colonic dilatation. Underlying malignancy   cannot be excluded; correlate with colonoscopy. 2. Multiple nodular bilateral ground-glass opacities. The differential   diagnosis includes infectious process and metastatic disease recommend CT of   the chest with contrast for further evaluation. 3. Multiple indeterminate hepatic lesions. Recommend nonemergent MRI of the   abdomen with without contrast utilizing Eovist.             Assessment :       1. ESRD   Etiology: suspected DN  Access: Abd PD cath and Tunneled Cath  Volume: Hypervolemic    Getting HD today     peritoneal fluid shows neutrophil count 73 %  Await cultures     2. Electrolytes/Acid base  No Dyskalemia    Recent Labs     03/31/22  0710   *   K 4.0   CO2 24       3. BMD  -Hyperphosphatemia, SHPT  -maintained on Renvela    Lab Results   Component Value Date    CALCIUM 7.0 (L) 03/31/2022    PHOS 4.5 02/17/2022        4. HTN  -Blood pressure high       BP Readings from Last 1 Encounters:   03/31/22 (!) 148/87       5. Anemia  -anemia of CKD  -assess for CHRIS    Recent Labs     03/31/22  0710   HGB 7.0*     6. DM    7. abdominal pain   Colon cancer on pathology   Extensive metastasis  - on po vancomycin for c.  Diff     PLAN :     - Empiric antibiotics    - PD fluid cx : NGTD  - continue hemodialysis as per MWF  - hold BP meds:   - MBD management  - Anemia management  - Dose meds to GFR<10    Thank you for allowing us to participate in care of Danae Dunn . We will continue to follow. Feel free to contact me with any questions.       Angelica Bonilla MD  3/31/2022    [facility] [address] [place] [address] S  Office : [phone]  Fax :[phone]

## 2022-03-31 NOTE — PROGRESS NOTES
Occupational Therapy  Unable to see pt at this time due to pt with decreased hemoglobin and receiving blood at this time. Plan to follow up with pt tomorrow morning if able. Continue with POC.     Kat Scruggs, OTR/L

## 2022-03-31 NOTE — PLAN OF CARE
Problem: Pain:  Goal: Pain level will decrease  Description: Pain level will decrease  3/31/2022 1937 by Esther Clayton RN  Outcome: Ongoing     Problem: Nausea/Vomiting:  Goal: Absence of nausea/vomiting  Description: Absence of nausea/vomiting  3/31/2022 1937 by Esther Clayton RN  Outcome: Ongoing     Problem: Falls - Risk of:  Goal: Will remain free from falls  Description: Will remain free from falls  3/31/2022 1937 by Esther Clayton RN  Outcome: Ongoing     Problem: Skin Integrity:  Goal: Will show no infection signs and symptoms  Description: Will show no infection signs and symptoms  3/31/2022 1937 by Esther Clayton RN  Outcome: Ongoing     Problem: Fluid Volume:  Goal: Will show no signs or symptoms of fluid imbalance  Description: Will show no signs or symptoms of fluid imbalance  3/31/2022 1937 by Esther Clayton RN  Outcome: Ongoing     Problem: Nutrition  Goal: Optimal nutrition therapy  3/31/2022 1937 by Esther Clayton RN  Outcome: Ongoing     Problem: Bleeding:  Goal: Will show no signs and symptoms of excessive bleeding  Description: Will show no signs and symptoms of excessive bleeding  3/31/2022 1937 by Esther Clayton RN  Outcome: Ongoing

## 2022-03-31 NOTE — PROGRESS NOTES
Clinical Pharmacy Note  Warfarin Consult    Regulo Rodriguez is a 55 y.o. female receiving warfarin managed by pharmacy. Patient being bridged with heparin. Warfarin Indication: PE  Target INR range: 2-3   Dose prior to admission: New start    Current warfarin drug-drug interactions:     Recent Labs     03/29/22  0758 03/30/22  0656 03/31/22  0710   HGB 8.6* 7.7* 7.0*   HCT 25.5* 23.6* 21.0*   INR 2.34* 3.79* 2.94*       Assessment/Plan:  INR jumped again 1.24--> 2.34 after 2 days of 5mg doses. After last bump INR dropped considerably after holding. No Warfarin yesterday INR dropped to 2.94. Will resume Warfarin tonight with 2mg dose. Still trying to establish what tablet strength to use. Thank you for the consult. Will continue to follow.     Electronically signed by AMY [facility] on 3/31/2022 at 12:40 PM

## 2022-03-31 NOTE — PROGRESS NOTES
Physical Therapy  Facility/Department: [address] PROGRESSIVE CARE  Daily Treatment Note  NAME: Regulo Rodriguez  : 1965  MRN: [id number]    Date of Service: 3/31/2022    Discharge Recommendations:  Continue to assess pending progress,Home with Home health PT   PT Equipment Recommendations  Other: Will monitor for potential equipt needs. Current Am-Pac Score 16/24. Assessment   Body structures, Functions, Activity limitations: Decreased functional mobility ; Decreased strength;Decreased endurance;Decreased balance  Assessment: 54 y/o female admit 3/22/2022 with Stage IV Met Colon Ca, PE.  3/24/2022 S/P Exp Lap, Colostomy. CT Chest  : + B PE (R>L), Potential concern for Septic Emboli, Multiple Liver Mets. CT Abd : Numerous Hepatic Lesions suspicious for Possible Mets. LE Dopplers : R Indeterminate Partially Occluding DVT R Distal Peroneal Vein. PMH as noted including CKD (PD), Hernia Repair (2022; Met Adenocarcinoma discovered during Hernia Repair), DM, Neuropathy, Retinopathy, Orthostatic Hypotension. PTA pt living with  in multi level home with 5 steps to enter and 1st floor bed (bath on 2nd floor, uses C pta); independent amb and daily care pta. Pt rosemary Strength Exs including stand exs, Bed Mob Min assist, Able to Amb short distances at bedside with Walker CGA. Encourage increase oob/functional activities  (needs to negotiate stairs in/out of home and ability to rosemary transport to/from HD). Pt reports plan for d/c home with family assist/support; will monitor progress and probable recommend cont Home PT. Prognosis: Fair  Decision Making: Medium Complexity  History: 54 y/o female admit 3/22/2022 with Stage IV Met Colon Ca, PE.  3/24/2022 S/P Exp Lap, Colostomy. CT Chest  : + B PE (R>L), Potential concern for Septic Emboli, Multiple Liver Mets. CT Abd : Numerous Hepatic Lesions suspicious for Possible Mets.   LE Dopplers : R Indeterminate Partially Occluding DVT R Distal Peroneal Vein. PMH as noted including CKD (PD), Hernia Repair (2/24/2022; Met Adenocarcinoma discovered during Hernia Repair), DM, Neuropathy, Retinopathy, Orthostatic Hypotension. Exam: See above. Clinical Presentation: See above. Patient Education: Role of PT, POC, Need to call for assist, Safe use of Walker, LE Exs. Barriers to Learning: None. REQUIRES PT FOLLOW UP: Yes  Activity Tolerance  Activity Tolerance: Patient limited by endurance  Activity Tolerance: Pt rosemary Strength Exs including stand exs, Bed Mob Min assist, Able to Amb short distances at bedside with Walker CGA. Encourage increase oob/functional activities. Patient Diagnosis(es): The primary encounter diagnosis was Generalized abdominal pain. Diagnoses of Nausea and vomiting, intractability of vomiting not specified, unspecified vomiting type, Colitis, Hepatic lesion, and Lung nodules were also pertinent to this visit. has a past medical history of Anemia, Chronic kidney disease, Diabetes mellitus ([address] Diabetes mellitus type 1 ([address] Diabetic retinopathy ([address] Diabetic retinopathy associated with type 2 diabetes mellitus, without macular edema, Hyperlipidemia, Hypertension, Kidney stone, Mixed hyperlipidemia, Orthostatic hypotension, and Vitreous hemorrhage of right eye ([address] has a past surgical history that includes Appendectomy; Kidney stone surgery; Tonsillectomy; LAPAROSCOPY INSERTION PERITONEAL CATHETER (N/A, 8/12/2019); Dialysis Catheter Removal (N/A, 2/24/2022); Dialysis Catheter Insertion (N/A, 2/24/2022); ventral hernia repair (N/A, 2/24/2022); sigmoidoscopy (N/A, 3/23/2022); sigmoidoscopy (3/23/2022); and laparotomy (N/A, 3/24/2022). Restrictions  Restrictions/Precautions  Restrictions/Precautions: Fall Risk,Contact Precautions  Position Activity Restriction  Other position/activity restrictions: Contact Precautions : C-Diff. Abd Incision, Colostomy. O2 1L via NC.   Attempt RA although immed desat 84-85%. Subjective   General  Chart Reviewed: Yes  Additional Pertinent Hx: 56 y/o female admit 3/22/2022 with Stage IV Met Colon Ca, PE.  3/24/2022 S/P Exp Lap, Colostomy. CT Chest  : + B PE (R>L), Potential concern for Septic Emboli, Multiple Liver Mets. CT Abd : Numerous Hepatic Lesions suspicious for Possible Mets. LE Dopplers : R Indeterminate Partially Occluding DVT R Distal Peroneal Vein. PMH as noted including CKD (PD), Hernia Repair (2/24/2022; Met Adenocarcinoma discovered during Hernia Repair), DM, Neuropathy, Retinopathy, Orthostatic Hypotension. Response To Previous Treatment: Patient with no complaints from previous session. Family / Caregiver Present: No  Referring Practitioner: Dr. Dominique Swan  Subjective  Subjective: Pt agreeable to PT Rx. Wants to go home upon d/c. Orientation  Orientation  Overall Orientation Status: Within Functional Limits  Cognition   Cognition  Overall Cognitive Status: WFL  Objective   Bed mobility  Supine to Sit: Minimal assistance (HOB elevated. Use of Bedrail.)  Transfers  Sit to Stand: Contact guard assistance (With [facility]. Cues for safe hand placement.)  Stand to sit: Contact guard assistance (With [facility]. Cues for safe hand placement.)  Comment: Completed additional Transfers from chair with Walker  for stand exs. .  Ambulation  Ambulation?: Yes  Ambulation 1  Surface: level tile  Device: Rolling Walker  Distance: 5-6 steps bed to chair with Walker Close CGA. Diminished step length/clearance although no LE buckling/giving way. Exercises  Quad Sets: 10x  Heelslides: 10x B LEs with mild resist.  Gluteal Sets: 10x  Hip Flexion: 10x  Hip Abduction: 10x B LEs. Knee Long Arc Quad: 10x  Ankle Pumps: 10x  Comments: Stand Exs with Walker support : Marching, Mini Squats, Toe Raises 10x each ex; brief seated rest between each ex. CGA.                 AM-PAC Score  AM-PAC Inpatient Mobility Raw Score : 16 (03/31/22 1799)  AM-PAC Inpatient T-Scale Score : 40.78 (03/31/22 1427)  Mobility Inpatient CMS 0-100% Score: 54.16 (03/31/22 1427)  Mobility Inpatient CMS G-Code Modifier : CK (03/31/22 1427)          Goals  Short term goals  Time Frame for Short term goals: Upon d/c acute care setting. Short term goal 1: Bed Mob SBA. Short term goal 2: Transfers with assist device SBA/CGA. Short term goal 3: Amb with assist device 48' CGA. Short term goal 4: Pt participating in approp Strength Exs. Patient Goals   Patient goals : Return home with . Plan    Plan  Times per week: 3-5x week while in acute care setting.   Current Treatment Recommendations: Strengthening,Functional Mobility Training,Transfer Training,Gait Training,Safety Education & Training,Patient/Caregiver Education & Training  Safety Devices  Type of devices: Call light within reach,Left in chair,Nurse notified     Therapy Time   Individual Concurrent Group Co-treatment   Time In [facility]         Time Out [address] [facility] [place], GWYN

## 2022-03-31 NOTE — PROGRESS NOTES
Progress Note  Date:3/31/2022       Room:P2R-1821/5280-01  Patient Jaylen Vu     YOB: 1965     Age:56 y.o. Subjective    Subjective:  Symptoms:  Improved. Diet:  Poor intake. No nausea. Activity level: Returning to normal.    Pain:  She complains of pain that is mild. Review of Systems   Gastrointestinal: Negative for nausea. Objective         Vitals Last 24 Hours:  TEMPERATURE:  Temp  Av.5 °F (36.4 °C)  Min: 96 °F (35.6 °C)  Max: 98.6 °F (37 °C)  RESPIRATIONS RANGE: Resp  Av.8  Min: 16  Max: 29  PULSE OXIMETRY RANGE: SpO2  Av.4 %  Min: 92 %  Max: 99 %  PULSE RANGE: Pulse  Av.8  Min: 80  Max: 92  BLOOD PRESSURE RANGE: Systolic (62BBH), BU , Min:121 , UBJ:116   ; Diastolic (13CMX), AMK:62, Min:68, Max:87    I/O (24Hr): Intake/Output Summary (Last 24 hours) at 3/31/2022 1620  Last data filed at 3/31/2022 1407  Gross per 24 hour   Intake 480 ml   Output 75 ml   Net 405 ml     Objective  Labs/Imaging/Diagnostics    Labs:  CBC:  Recent Labs     22  0656 22  0710   WBC 9.3 9.4 8.0   RBC 2.72* 2.48* 2.22*   HGB 8.6* 7.7* 7.0*   HCT 25.5* 23.6* 21.0*   MCV 94.1 95.3 94.6   RDW 16.4* 17.1* 17.0*   * 140 142     CHEMISTRIES:  Recent Labs     22  0656 22  0710   * 130* 131*   K 4.4 4.4 4.0   CL 97* 96* 97*   CO2 22 21 24   BUN 24* 28* 14   CREATININE 4.5* 5.7* 4.2*   GLUCOSE 112* 119* 127*     PT/INR:  Recent Labs     22  0656 22  0710   PROTIME 27.4* 45.3* 34.7*   INR 2.34* 3.79* 2.94*     APTT:No results for input(s): APTT in the last 72 hours. LIVER PROFILE:  Recent Labs     22  0758 22  0656 22  0710   AST 21 30 35   ALT 8* 10 12   BILITOT 0.3 0.3 <0.2   ALKPHOS 178* 197* 177*       Imaging Last 24 Hours:  No results found.   Assessment//Plan           Hospital Problems           Last Modified POA    * (Principal) Colitis 3/22/2022 Yes Cancer of sigmoid colon ([address] 3/24/2022 Yes    Hepatic lesion 3/27/2022 Yes        Assessment & Plan    Improving overall   Tolerated more PO today but still limited by dysphagia   Pain controlled   Receiving PRBC today    Electronically signed by Lit Berrios MD on 3/31/2022 at 4:21 PM    Electronically signed by Lit Berrios MD on 3/31/22 at 4:20 PM EDT

## 2022-03-31 NOTE — PROGRESS NOTES
[facility]  Progress Note      SUBJECTIVE:   Slowly improving. Continues on dialysis. Hopes to go home soon. Oncologic history:    Patient has a history of end-stage renal disease is on dialysis. She also has diabetes with complicating diabetic neuropathy and retinopathy. Also, hypertension hyperlipidemia and now metastatic colon cancer. She initially presented with septic shock on 2/15/2022 thought to be due to peritonitis as a complication of her peritoneal dialysis. She was hospitalized at [facility] from 2/15/2022 through 3/1/2022. She grew out Pseudomonas at that time and her peritoneal dialysis catheter was replaced on 2/24/2022. At that same time they elected to do a ventral hernia repair. Tissue surrounding the ventral hernia wall appeared to be inflamed it was thought that this was initially infectious. However, pathology showed invasive moderately differentiated colon adenocarcinoma. Her preoperative CT scan of 2/15/2022 had shown a 9 mm groundglass nodule in the left lung base, there was mild sigmoid colonic diverticulosis with confluent fat stranding surrounding a short segment of colon containing the diverticula. The area around the ventral abdominal wall hernia was described as containing \"inflamed fat. \"  I saw the patient in the outpatient setting on 3/14/2022 and recommended PET scan. Colonoscopy was also recommended. On the day that she was to have her PET scan she presented with abdominal pain and was admitted 3/23/22 for colitis. That same day she underwent colonoscopy and was found to have an obstructing rectosigmoid mass approximately 20 cm from the anal verge. She was seen yesterday by Dr. Denia Goodwin as well and surgery is tentatively planned for today. Complicating her case were CT findings.   On 3/22/2022 she had a CT of the abdomen and pelvis that now showed multiple groundglass opacities in the lungs with a left lower lobe pulmonary nodule now measuring 0.8 x 1.6 cm. She had worsening inflammatory changes in the mid sigmoid colon and multiple indeterminate hepatic lesions. A CTA of the chest done yesterday confirmed bilateral pulmonary emboli without evidence of right heart strain.   She was heparinized.       ROS: No fever chills sweats, no nausea, vomiting, diarrhea  shortness of breath chest pain or other pain    OBJECTIVE      Medications    Current Facility-Administered Medications: 0.9 % sodium chloride infusion, , IntraVENous, PRN  nystatin (MYCOSTATIN) 728757 UNIT/ML suspension 500,000 Units, 5 mL, Oral, 4x Daily  pantoprazole (PROTONIX) tablet 40 mg, 40 mg, Oral, QAM AC  phenol 1.4 % mouth spray 1 spray, 1 spray, Mouth/Throat, Q2H PRN  amLODIPine (NORVASC) tablet 10 mg, 10 mg, Oral, Daily  losartan (COZAAR) tablet 50 mg, 50 mg, Oral, Nightly  warfarin placeholder: dosing by pharmacy, , Other, RX Placeholder  morphine (PF) injection 2 mg, 2 mg, IntraVENous, Q2H PRN **OR** morphine (PF) injection 4 mg, 4 mg, IntraVENous, Q2H PRN  lactated ringers bolus, 500 mL, IntraVENous, Once  albumin human 25 % IV solution 25 g, 25 g, IntraVENous, PRN  midodrine (PROAMATINE) tablet 10 mg, 10 mg, Oral, PRN  heparin (porcine) injection 3,000 Units, 3,000 Units, IntraCATHeter, PRN  diphenhydrAMINE (BENADRYL) tablet 25 mg, 25 mg, Oral, Q6H PRN  vancomycin (VANCOCIN) capsule 250 mg, 250 mg, Oral, 4x Daily  sodium chloride flush 0.9 % injection 5-40 mL, 5-40 mL, IntraVENous, 2 times per day  sodium chloride flush 0.9 % injection 5-40 mL, 5-40 mL, IntraVENous, PRN  0.9 % sodium chloride infusion, 25 mL, IntraVENous, PRN  ondansetron (ZOFRAN-ODT) disintegrating tablet 4 mg, 4 mg, Oral, Q8H PRN **OR** ondansetron (ZOFRAN) injection 4 mg, 4 mg, IntraVENous, Q6H PRN  polyethylene glycol (GLYCOLAX) packet 17 g, 17 g, Oral, Daily PRN  acetaminophen (TYLENOL) tablet 650 mg, 650 mg, Oral, Q6H PRN **OR** acetaminophen (TYLENOL) suppository 650 mg, 650 mg, Rectal, Q6H PRN  oxyCODONE (ROXICODONE) immediate release tablet 5 mg, 5 mg, Oral, Q4H PRN  glucose (GLUTOSE) 40 % oral gel 15 g, 15 g, Oral, PRN  dextrose 50 % IV solution, 12.5 g, IntraVENous, PRN  glucagon (rDNA) injection 1 mg, 1 mg, IntraMUSCular, PRN  dextrose 5 % solution, 100 mL/hr, IntraVENous, PRN  insulin lispro (HUMALOG) injection vial 0-6 Units, 0-6 Units, SubCUTAneous, TID WC  insulin lispro (HUMALOG) injection vial 0-3 Units, 0-3 Units, SubCUTAneous, Nightly     Physical    VITALS:  /80   Pulse 80   Temp 98.3 °F (36.8 °C) (Oral)   Resp 18   Ht 5' 3\" (1.6 m)   Wt 182 lb 12.2 oz (82.9 kg)   SpO2 99%   BMI 32.37 kg/m²   TEMPERATURE:  Current - Temp: 98.3 °F (36.8 °C); Max - Temp  Av.5 °F (36.4 °C)  Min: 96 °F (35.6 °C)  Max: 98.6 °F (37 °C)  PULSE OXIMETRY RANGE: SpO2  Av.4 %  Min: 92 %  Max: 99 %  24HR INTAKE/OUTPUT:      Intake/Output Summary (Last 24 hours) at 3/31/2022 1757  Last data filed at 3/31/2022 1407  Gross per 24 hour   Intake --   Output 75 ml   Net -75 ml       CONSTITUTIONAL: alert, cooperative, no apparent distress. EYES:  Pupils equal, round, sclera non-icteric, conjunctiva normal  ENT:  Normocephalic, without obvious abnormality, atraumatic, xternal ears without lesions, oral pharynx with moist mucus membranes, no mucositis. LUNGS:  Clear to auscultation bilaterally, no crackles or wheezing  CARDIOVASCULAR:  Regular rate and rhythm, normal S1 and S2, no S3 or S4, and no murmur noted  ABDOMEN:  Normal bowel sounds, soft, non-distended, non-tender, Peritoneal dialysis catheter intact. New ostomy which appears to be functional and intact  MUSCULOSKELETAL:  There is no redness, warmth, or swelling of the joints. NEUROLOGIC:  Alert. No focal findings. SKIN:  Normal turgor, no bruising or petichea  EXT: without clubbing, cyanosis or edema. Homans negative.       Data      Recent Labs     22  0758 22  0758 22  0656 22  0710 22  1642   WBC 9.3  --  9.4 8.0  -- HGB 8.6*   < > 7.7* 7.0* 8.9*   HCT 25.5*   < > 23.6* 21.0* 26.5*   *  --  140 142  --    MCV 94.1  --  95.3 94.6  --     < > = values in this interval not displayed. Recent Labs     03/29/22  0758 03/30/22  0656 03/31/22  0710   * 130* 131*   K 4.4 4.4 4.0   CL 97* 96* 97*   CO2 22 21 24   BUN 24* 28* 14   CREATININE 4.5* 5.7* 4.2*     Recent Labs     03/29/22  0758 03/30/22  0656 03/31/22  0710   AST 21 30 35   ALT 8* 10 12   BILITOT 0.3 0.3 <0.2   ALKPHOS 178* 197* 177*       Magnesium:    Lab Results   Component Value Date    MG 1.50 03/01/2022    MG 1.50 02/28/2022    MG 1.70 02/27/2022     CT abdomen and pelvis 3/28/2022:    1. Interval transverse colostomy creation, with mild colonic wall thickening   of the transverse colon at the level of the ostomy which may be related to   poor distention, though wall thickening of the descending colon as well as   the sigmoid colon may be related to colitis.  The colonic distention seen on   the preoperative examination has improved. 2. There is a small volume of intra-and pelvic ascites, within indwelling   peritoneal catheter noted. 3. Numerous hepatic lesions are seen suspicious for possible metastasis. Low-attenuation nonspecific lesions noted within the spleen as well. 4. Circumferential distal esophageal thickening is identified suggestive of   esophagitis. 5. Wall thickening of the distal stomach and duodenum suggestive of   gastritis/enteritis. 6. Multifocal nodular appearing infiltrates are seen within the lung bases,   similar to the previous exam.   7. Other similar findings as above.          Problem List  Patient Active Problem List   Diagnosis    [facility] retinopathy [facility] [address]    Mixed hyperlipidemia    DMII (diabetes mellitus, type 2) ([address]    Vitreous hemorrhage of right eye ([address]    Essential hypertension    Acute kidney injury ([address]    ESRD (end stage renal disease) ([address]    Acute renal failure superimposed on chronic kidney disease (HCC)    Volume overload    Peritonitis (HCC)    Generalized abdominal pain    Peritoneal dialysis status ([address]    Morbid obesity due to excess calories (HCC)    Nausea and vomiting    Hyponatremia    Diarrhea    Dizziness    Hypertensive emergency    Hypokalemia    Hypertensive urgency    Cerebrovascular accident (CVA) ([address]    Electrolyte imbalance    Acute respiratory failure with hypoxia (HCC)    Septic shock (HCC)    Electrolyte abnormality    Diverticulitis    Colitis    Cancer of sigmoid colon (HCC)    Hepatic lesion       ASSESSMENT AND PLAN    The patient continues to recover from her surgery. Management of her disease will be challenging in light of her co-morbidities. We did not commit to any regimen at this time as we would like to see more information. We will ask for further characterization of her tumor. We do know that FOLFOX can be administered in the setting of hemodialysis but Xeloda has to be avoided completely. I will need to look into whether or not Avastin can be Biomarker testing may suggest other alternatives     The patient will continue her dialysis. She has multiple comorbidities. She does not wish to get COVID vaccination this was discussed today as well.     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 There was extensive lymphatic invasion noted    We will plan to initiate treatment as an outpatient.     Hortencia Cristobal MD, MPH  [phone] office  [phone] cell (until 5 p.m. on days where a note is written)

## 2022-03-31 NOTE — CARE COORDINATION
Ostomy Referral Follow-up Progress Note      NAME:  Danae Dunn  MEDICAL RECORD NUMBER:  [id number]  AGE: 55 y.o. GENDER:  female  :  1965  TODAY'S DATE:  3/31/2022    Subjective:   Sigmoid colon cancer s/p diverting loop colostomy 3/24/22  Ostomy teaching session today. Objective    /68   Pulse 82   Temp 97.4 °F (36.3 °C) (Oral)   Resp 19   Ht 5' 3\" (1.6 m)   Wt 182 lb 12.2 oz (82.9 kg)   SpO2 97%   BMI 32.37 kg/m²     Enoch Risk Score Enoch Scale Score: 17    Assessment        Colostomy LUQ Loop (Active)   Stomal Appliance 1 piece 22 140   Stoma  Assessment Pink;Moist;Protrudes 22 140   Mucocutaneous Junction Intact 22 1407   Peristomal Assessment Clean; Intact 22 1407   Stool Appearance Loose 22 1407   Stool Color Brown 22 1407   Stool Amount Medium 22 1407   Output (mL) 75 ml 22 1407   Number of days: 7     Pt awake and alert sitting up in chair. Pt in better spirits today. Pt current pouch with loose brown stool. Pt was able to empty pouch into canister without assistance. Pt removed current pouch. Provided own peristomal care. Pt placed barrier ring to stoma edges and placed 1-piece pouch with direction. Pt states she understands all aspects of pouch changes and management. Good seal obtained. Pt stoma pink moist with bridge intact. No peristomal skin breakdown. Pt abd incision dry, intact, closed with staples. Covered with dry dressing. Intake/Output Summary (Last 24 hours) at 3/31/2022 1410  Last data filed at 3/31/2022 1407  Gross per 24 hour   Intake 480 ml   Output 175 ml   Net 305 ml       Plan:   Plan for Ostomy Care: Will continue to follow for ostomy care and teaching.   [employer] [place] flat 1 pc #[id number]  Meeta leon #[id number]     Ostomy Plan of Care  [x] Supplies/Instructions left in room  [] Patient using home supplies  [x] Brand/supplies at bedside [employer]    Current Diet: ADULT DIET; Regular  Dietician consult:  Yes    Discharge Plan:  Placement for patient upon discharge: home with support    Outpatient visit plan No  Supplies given Yes   Samples requested No    Referrals:  []   [] [facility]  [] Supplies  [] Other      Patient/Caregiver Teaching:  Written Instructions given to patient/family  Teaching provided:  [x] Reviewed GI and A&P        [x] Supplies  [x] Pouch emptying      [] Manipulate closure  [x] Routine Care         [] Comment  [x] Pouch maintenance           Level of patient/caregiver understanding able to:  [x] Indicates understanding       [] Needs reinforcement  [] Unsuccessful      [x] Verbal Understanding  [x] Demonstrated understanding       [] No evidence of learning  [] Refused teaching         [] N/A    Electronically signed by Jose Castle RN, CWOCN on 3/31/2022 at 2:10 PM

## 2022-04-01 NOTE — PROGRESS NOTES
Hospitalist Progress Note      PCP: No primary care provider on file. Date of Admission: 3/22/2022    Chief Complaint: s/p colostomy placement    Hospital Course:    Ms. Tanja Tarango was admitted with sepsis due to colitis. She was started on broad spectrum antibiotics but developed hypotensive drug reaction and urticaria and antibiotics were held. At the same time, her stool tested positive for C dif and she was started on oral vancomycin and IV flagyl. Surgery created diverting colostomy on 3/24 for obstructing newly diagnosed sigmoid colon cancer. Was switched from PD to HD. Subjective:   Patient was seen during dialysis, complaining of abdominal bloating.      Medications:  Reviewed    Infusion Medications    sodium chloride      sodium chloride 25 mL (03/24/22 1114)    dextrose       Scheduled Medications    losartan  100 mg Oral Nightly    warfarin  2 mg Oral Once    simethicone  80 mg Oral Once    nystatin  5 mL Oral 4x Daily    pantoprazole  40 mg Oral QAM AC    warfarin placeholder: dosing by pharmacy   Other RX Placeholder    lactated ringers bolus  500 mL IntraVENous Once    vancomycin  250 mg Oral 4x Daily    sodium chloride flush  5-40 mL IntraVENous 2 times per day    insulin lispro  0-6 Units SubCUTAneous TID WC    insulin lispro  0-3 Units SubCUTAneous Nightly     PRN Meds: heparin (porcine), sodium chloride, phenol, morphine **OR** morphine, albumin human, midodrine, heparin (porcine), diphenhydrAMINE, sodium chloride flush, sodium chloride, ondansetron **OR** ondansetron, polyethylene glycol, acetaminophen **OR** acetaminophen, oxyCODONE, glucose, dextrose, glucagon (rDNA), dextrose      Intake/Output Summary (Last 24 hours) at 4/1/2022 1452  Last data filed at 4/1/2022 1052  Gross per 24 hour   Intake 400 ml   Output 2450 ml   Net -2050 ml       Physical Exam Performed:    /73   Pulse 90   Temp 97.4 °F (36.3 °C) (Oral)   Resp 19   Ht 5' 3\" (1.6 m)   Wt 171 lb 1.2 oz (77.6 kg)   SpO2 100%   BMI 30.30 kg/m²     General appearance: No apparent distress, appears stated age and cooperative. HEENT: Pupils equal, round, and reactive to light. Conjunctivae/corneas clear. Neck: Supple, with full range of motion. No jugular venous distention. Trachea midline. Respiratory:  Normal respiratory effort. Clear to auscultation, bilaterally without Rales/Wheezes/Rhonchi. Cardiovascular: Regular rate and rhythm with normal S1/S2 without murmurs, rubs or gallops. Abdomen: Colostomy noted with with full bag, PD dialysis catheter noted. Minimal abdominal tenderness  Musculoskeletal: No clubbing, cyanosis or edema bilaterally. Full range of motion without deformity. Skin: Skin color, texture, turgor normal.  No rashes or lesions. Neurologic:  Neurovascularly intact without any focal sensory/motor deficits. Cranial nerves: II-XII intact, grossly non-focal.  Psychiatric: Alert and oriented, thought content appropriate, normal insight  Capillary Refill: Brisk,3 seconds, normal   Peripheral Pulses: +2 palpable, equal bilaterally       Labs:   Recent Labs     03/30/22 0656 03/30/22  0656 03/31/22  0710 03/31/22  1642 04/01/22  0558   WBC 9.4  --  8.0  --  8.8   HGB 7.7*   < > 7.0* 8.9* 9.3*   HCT 23.6*   < > 21.0* 26.5* 27.9*     --  142  --  144    < > = values in this interval not displayed. Recent Labs     03/30/22 0656 03/31/22  0710 04/01/22  0558   * 131* 128*   K 4.4 4.0 3.7   CL 96* 97* 94*   CO2 21 24 21   BUN 28* 14 18   CREATININE 5.7* 4.2* 5.2*   CALCIUM 7.3* 7.0* 7.5*     Recent Labs     03/30/22 0656 03/31/22  0710 04/01/22  0558   AST 30 35 34   ALT 10 12 14   BILITOT 0.3 <0.2 0.4   ALKPHOS 197* 177* 193*     Recent Labs     03/30/22 0656 03/31/22  0710 04/01/22  0558   INR 3.79* 2.94* 2.68*     No results for input(s): Earlis Sewell in the last 72 hours.     Urinalysis:      Lab Results   Component Value Date    NITRU Negative 05/27/2021    WBCUA 3 05/27/2021    BACTERIA RARE 05/27/2021    RBCUA 4 05/27/2021    BLOODU TRACE 05/27/2021    SPECGRAV 1.016 05/27/2021    GLUCOSEU 250 05/27/2021       Radiology:  CT ABDOMEN PELVIS W IV CONTRAST Additional Contrast? None   Final Result   1. Interval transverse colostomy creation, with mild colonic wall thickening   of the transverse colon at the level of the ostomy which may be related to   poor distention, though wall thickening of the descending colon as well as   the sigmoid colon may be related to colitis. The colonic distention seen on   the preoperative examination has improved. 2. There is a small volume of intra-and pelvic ascites, within indwelling   peritoneal catheter noted. 3. Numerous hepatic lesions are seen suspicious for possible metastasis. Low-attenuation nonspecific lesions noted within the spleen as well. 4. Circumferential distal esophageal thickening is identified suggestive of   esophagitis. 5. Wall thickening of the distal stomach and duodenum suggestive of   gastritis/enteritis. 6. Multifocal nodular appearing infiltrates are seen within the lung bases,   similar to the previous exam.   7. Other similar findings as above. VL Extremity Venous Bilateral   Final Result      CT CHEST W CONTRAST   Final Result   Bilateral pulmonary emboli slightly greater on the right than left. Potential concern for septic emboli given the small foci of irregular   parenchymal lung lesions new from the prior chest CT study, only partially   included and demonstrated on abdomen pelvic CT 1 day earlier. These could   certainly also be metastatic as well but somewhat atypical given the   irregular margins. Multiple liver metastases partially included, unchanged as seen on abdomen   pelvic CT 1 day earlier      Abdominal ascites, decreased from the prior exam 1 day earlier, with recently   demonstrated pneumoperitoneum not seen currently in the included upper   abdomen. .      Esophageal mural thickening suspected esophagitis. Findings were discussed with Deb Sen at 3:50 pm on 3/23/2022. CT ABDOMEN PELVIS W IV CONTRAST Additional Contrast? None   Final Result   1. Worsening inflammatory changes surrounding the mid sigmoid colon likely   due to colitis causing worsening colonic dilatation. Underlying malignancy   cannot be excluded; correlate with colonoscopy. 2. Multiple nodular bilateral ground-glass opacities. The differential   diagnosis includes infectious process and metastatic disease recommend CT of   the chest with contrast for further evaluation. 3. Multiple indeterminate hepatic lesions. Recommend nonemergent MRI of the   abdomen with without contrast utilizing Eovist.                 Assessment/Plan:    Active Hospital Problems    Diagnosis     Hepatic lesion [K76.9]     Cancer of sigmoid colon ([facility] [address] [C18.7]     Colitis [K52.9]      Sepsis due to colitis:  improved    Abdominal pain. New onset, patient reports feeling gassy with some abdominal discomfort. Was given 1 dose of simethicone, will continue to follow    Metastatic colon cancer:  - colonoscopy 3/23 with colonic mass  - CT scan concerning for liver mets, possibly pulmonary as well  - oncology consulted  - surgery:  Placed diverting colostomy 3/24-- significant pelvic tumors found.      C dif infection:  -On oral vancomycin day 10. Acute on chronic anemia(likely blood loss). Patient describes episode of bloody bowel motion 2 days ago. Hemoglobin is 7.7--> 7--> 9.3. Received 1 unit of packed red blood cells 3/31      Dysphagia:  - PPI and chloroseptic spray  - continue nystatin.      Drug reaction:  resolved  -hypotension and urticaria to ceftriaxone  - discussed with pharmacy --> ok to use Merrem if needed  - do not use Cipro due to prolonged QTc     Acute bilateral pulmonary embolism:  - heparin drip due to colonoscopy on 3/23  - Coumadin initiated 3/25.    INR therapeutic, heparin discontinued     ESRD on dialysis:  - HD due to concern for infection on PD (recent peritonitis). DVT Prophylaxis: warfarin  Diet: ADULT DIET;  Regular  Code Status: Full Code    PT/OT Eval Status: following     Dispo -possible DC tomorrow (if abdominal pain resolved, patient will be discharged home with outpatient hemodialysis and home care)   Saniya Morrow MD

## 2022-04-01 NOTE — PROGRESS NOTES
Clinical Pharmacy Note  Warfarin Consult    Rell Richards is a 55 y.o. female receiving warfarin managed by pharmacy. Warfarin Indication: PE  Target INR range: 2-3   Dose prior to admission: New start    Current warfarin drug-drug interactions:     Recent Labs     03/30/22  0656 03/30/22  0656 03/31/22  0710 03/31/22  1642 04/01/22  0558   HGB 7.7*   < > 7.0* 8.9* 9.3*   HCT 23.6*   < > 21.0* 26.5* 27.9*   INR 3.79*  --  2.94*  --  2.68*    < > = values in this interval not displayed. Assessment/Plan:  INR jumped again 1.24--> 2.34 after 2 days of 5mg doses. After last bump INR dropped considerably after holding. Will continue Warfarin tonight with 2mg dose. Still trying to establish what tablet strength to use. Thank you for the consult. Will continue to follow.     Electronically signed by AMY Trujillo [facility] on 4/1/2022 at 2:02 PM

## 2022-04-01 NOTE — DISCHARGE INSTR - COC
Continuity of Care Form    Patient Name: Bing Villela   :  1965  MRN:  [id number]    Admit date:  3/22/2022  Discharge date: 2022    Code Status Order: Full Code   Advance Directives:   [facility] Documentation       Date/Time Healthcare Directive Type of Healthcare Directive Copy in [address] Agent's Name Healthcare Agent's Phone Number    22 1045 No, patient does not have an advance directive for healthcare treatment -- -- -- -- --    22 1246 No, patient does not have an advance directive for healthcare treatment -- -- -- -- --            Admitting Physician:  Brinda Zhou DO  PCP: No primary care provider on file.     Discharging Nurse: NAIF BROOKE [facility] Unit/Room#: R1P-3177/5280-01  Discharging Unit Phone Number:[phone]    Emergency Contact:   Extended Emergency Contact Information  Primary Emergency Contact: Cruzlucrecia Lorenzo  Address: [address] [place] [address]  Home Phone: [phone]  Work Phone: [phone]  Relation: Spouse  Secondary Emergency Contact: Mervat Farooq  Address: mother-in- law   [address] Phone: [phone]  Relation: Other    Past Surgical History:  Past Surgical History:   Procedure Laterality Date    APPENDECTOMY      DIALYSIS CATHETER INSERTION N/A 2022    LAPAROSCOPIC PLACEMENT OF PERITONEAL DIALYSIS CATHETER performed by John Sood MD at [address] [place] [address] N/A 2022    REMOVAL OF EXISTING PERITONEAL DIALYSIS CATHETER performed by John Sood MD at [place] [address] N/A 2019    LAPAROSCOPIC PERITONEAL DIALYSIS CATHETER PLACEMENT WITH AXEL TROCHAR performed by John Sood MD at [address] [place] [address] N/A 3/24/2022    EXPLORATORY LAPAROTOMY, COLOSTOMY performed by Audrey Carter MD at [address] [place] [address] N/A 3/23/2022    SIGMOIDOSCOPY Resulted    C-diff Rule Out 22 GI Bacterial Pathogens By PCR (Ordered)   22 Rule-Out Test Resulted    COVID-19 (Rule Out) 22 COVID-19, Rapid (Ordered)   22 Rule-Out Test Resulted    COVID-19 22 COVID-19   22     COVID-19 (Rule Out) 22 COVID-19 (Ordered)   22 Rule-Out Test Resulted    C-diff Rule Out 20 Gastrointestinal Panel, Molecular (Ordered)   21 Josetta Peabody, RN            Nurse Assessment:  Last Vital Signs: /73   Pulse 90   Temp 97.4 °F (36.3 °C) (Oral)   Resp 19   Ht 5' 3\" (1.6 m)   Wt 171 lb 1.2 oz (77.6 kg)   SpO2 100%   BMI 30.30 kg/m²     Last documented pain score (0-10 scale): Pain Level: 0  Last Weight:   Wt Readings from Last 1 Encounters:   22 171 lb 1.2 oz (77.6 kg)     Mental Status:  oriented and alert    IV Acces   right chest vas cath = dialysis catheter    Nursing Mobility/ADLs:  Walking   Assisted  Transfer  Assisted  Bathing  Assisted  Dressing  Assisted  Toileting  Assisted  Feeding  Assisted  Med Admin  Assisted  Med Delivery   whole    Wound Care Documentation and Therapy:        Elimination:  Continence:    Bowel: no  Bladder: Yes  Urinary Catheter: None   Colostomy/Ileostomy/Ileal Conduit: Yes  Colostomy LUQ Loop-Stomal Appliance: 1 piece (barrier ring)  Colostomy LUQ Loop-Stoma  Assessment: Pink,Moist  Colostomy LUQ Loop-Mucocutaneous Junction: Intact  Colostomy LUQ Loop-Peristomal Assessment: Clean,Intact (bridge intact)  Colostomy LUQ Loop-Stool Appearance: Soft  Colostomy LUQ Loop-Stool Color: Brown  Colostomy LUQ Loop-Stool Amount: Small  Colostomy LUQ Loop-Output (mL): 50 ml    Date of Last BM: 2022    Intake/Output Summary (Last 24 hours) at 2022 1326  Last data filed at 2022 1052  Gross per 24 hour   Intake 400 ml   Output 2525 ml   Net -2125 ml     I/O last 3 completed shifts:  In: - Out: 125 [Stool:125]    Safety Concerns: At Risk for Falls    Impairments/Disabilities:      None    Nutrition Therapy:  Current Nutrition Therapy:   - Oral Diet:  General    Routes of Feeding: Oral  Liquids: Thin Liquids  Daily Fluid Restriction: no  Last Modified Barium Swallow with Video (Video Swallowing Test): not done    Treatments at the Time of Hospital Discharge:   Respiratory Treatments:   Oxygen Therapy:  is not on home oxygen therapy. Ventilator:    - No ventilator support    Rehab Therapies: Physical Therapy and Occupational Therapy  Weight Bearing Status/Restrictions: No weight bearing restrictions  Other Medical Equipment (for information only, NOT a DME order):  walker  Other Treatments:  colostomy,  pd catheter, right chest dialysis catheter    Patient's personal belongings (please select all that are sent with patient):  None    RN SIGNATURE:  Electronically signed by Efe Junior RN on 4/4/22 at 3:10 PM EDT    CASE MANAGEMENT/SOCIAL WORK SECTION    Inpatient Status Date: 4/4/22    Readmission Risk Assessment Score:  Readmission Risk              Risk of Unplanned Readmission:  43           Discharging to Facility/ Agency   Name: [address] [place] [address]  Phone: [phone] Fax: [phone]    Dialysis Facility (if applicable)   [address] [place] [address] Dialysis  [address]  Phone: [phone]  Dialysis Schedule:MWF @ [phone] AT 3P    / signature: Electronically signed by KOFI Robles RN-[facility]      PHYSICIAN SECTION    Prognosis: Fair    Condition at Discharge: Stable    Rehab Potential (if transferring to Rehab): Good    Recommended Labs or Other Treatments After Discharge:   - home pt and ot,   - HD as outpatient. - visiting nurse to aid with stoma teaching  -PT/INR weekly.  Send the results to the [facility] [phone]    Physician Certification: I certify the above information and transfer of Janett Mendez  is necessary for the continuing treatment of the diagnosis listed and that she requires [address] for greater 30 days.      Update Admission H&P: No change in H&P    PHYSICIAN SIGNATURE:  Electronically signed by Vicente Pichardo MD on 4/3/22 at 2:27 PM EDT

## 2022-04-01 NOTE — PROGRESS NOTES
Physical Therapy  PT to bedside this afternoon; pt reports \"not today. \"  Despite encouragement pt cont to refuse PT Rx today. Concern, need increase activity. Pt will need to negotiate few steps in/out of home and be mobile enough for transport to/from HD. Will cont current POC.   Brittany Chavarria, PT

## 2022-04-01 NOTE — FLOWSHEET NOTE
04/01/22 0751 04/01/22 1052   Vital Signs   BP (!) 165/90 118/81   Temp 97.6 °F (36.4 °C) 97.6 °F (36.4 °C)   Pulse 83 88   Resp 16 16   SpO2 100 % 100 %   Weight 175 lb 14.8 oz (79.8 kg) 171 lb 1.2 oz (77.6 kg)   Weight Method Bed scale Bed scale     Treatment time:  3 hr  Net UF: 2L    Pre weight: 79.8 kg  Post weight: 77.6 kg  EDW: TBD    Access used: RT Tunneled [address]  Access function: Good    Medications or blood products given: none    Regular outpatient schedule: [facility] PD to HD    Summary of response to treatment: Good    Copy of dialysis treatment record placed in chart, to be scanned into EMR.

## 2022-04-01 NOTE — PROGRESS NOTES
Occupational Therapy  Facility/Department: [address] PROGRESSIVE CARE  Daily Treatment Note and Tentative D/C    NAME: Izabela Jasso  : 1965  MRN: [id number]    Date of Service: 2022    Discharge Recommendations: Izabela Jasso scored a 18/24 on the AM-PAC ADL Inpatient form. Current research shows that an AM-PAC score of 18 or greater is typically associated with a discharge to the patient's home setting. Based on the patient's AM-PAC score, and their current ADL deficits, it is recommended that the patient have 2-3 sessions per week of Occupational Therapy at d/c to increase the patient's independence. At this time, this patient demonstrates the endurance and safety to discharge home with [facility] and a follow up treatment frequency of 2-3x/wk. Please see assessment section for further patient specific details. If patient discharges prior to next session this note will serve as a discharge summary. Please see below for the latest assessment towards goals. Continue to assess pending progress,24 hour supervision or assist,2-3 sessions per week,Patient would benefit from continued therapy after discharge  OT Equipment Recommendations  Equipment Needed: No    Assessment   Performance deficits / Impairments: Decreased functional mobility ; Decreased strength;Decreased endurance;Decreased ADL status; Decreased balance;Decreased high-level IADLs  Assessment: Pt tolerated session fair. Pt completes bed mobility with SBA/Min A. Pt completes functional mobility and transfers with SBA/CGA and use of RW. Pt able to ambulate short distances in room including 25ft and 10ft. Pt with noted fatigue although no LOB or reports of light headedness/dizziness. Discussed with pt being able to stay on main floor of home and use of bedside commode due to no bathroom on main floor. Pt confirmed she is able and will also have assist from  for navigating any stairs. Continue with POC.   Prognosis: Fair;Good  Assistance tested positive for C dif and she was started on oral vancomycin and IV flagyl. Surgery created diverting colostomy on 3/24 for obstructing sigmoid colon cancer. Patient has had return of bowel function post-op and ready for D/C home soon. Family / Caregiver Present: No  Referring Practitioner: Saeed Harris MD  Subjective  Subjective: Pt agreeable to OT treatment. Pt reports pain in abdomen with no number stated. Pt on 1L O2 throughout.       Orientation  Orientation  Overall Orientation Status: Within Functional Limits  Objective    ADL  Additional Comments: Pt declines needs at this time        Balance  Sitting Balance: Supervision  Standing Balance: Contact guard assistance (SBA/CGA RW)  Functional Mobility  Functional - Mobility Device: Rolling Walker  Activity: Other (~25ft, 10ft)  Assist Level: Contact guard assistance (SBA/CGA)  Functional Mobility Comments: no LOB; no reports of light headedness or dizziness; pt with noted fatigue  Wheelchair Bed Transfers  Wheelchair/Bed - Technique: Ambulating (RW)  Equipment Used: Bed  Level of Asssistance: Contact guard assistance;Stand by assistance  Bed mobility  Supine to Sit: Stand by assistance  Sit to Supine: Minimal assistance (assist to LEs)  Scooting: Unable to assess  Transfers  Sit to stand: Contact guard assistance;Stand by assistance  Stand to sit: Contact guard assistance;Stand by assistance  Transfer Comments: to/from RW                 Cognition  Overall Cognitive Status: [facility]           Plan   Plan  Times per week: 3-5x  Current Treatment Recommendations: Strengthening,Functional Mobility Training,Gait Training,Endurance Training,Balance Training,Self-Care / ADL,Safety Education & Training,Equipment Evaluation, Education, & procurement,Patient/Caregiver Education & Training    AM-PAC Score        AM-PAC Inpatient Daily Activity Raw Score: 18 (04/01/22 0747)  AM-PAC Inpatient ADL T-Scale Score : 38.66 (04/01/22 0747)  ADL Inpatient CMS 0-100% Score: 46.65 (04/01/22 0747)  ADL Inpatient CMS G-Code Modifier : CK (04/01/22 0747)    Goals  Short term goals  Time Frame for Short term goals: prior to D/C; ongoing 4/1  Short term goal 1: complete bed mobility with supervision  Short term goal 2: complete functional mobility and transfers with supervision  Short term goal 3: complete bathing and dressing with supervision  Short term goal 4: complete toileting with supervision  Short term goal 5: complete grooming in stance at sink with supervision  Long term goals  Time Frame for Long term goals : STG=LTG  Patient Goals   Patient goals : return home       Therapy Time   Individual Concurrent Group Co-treatment   Time In 0725         Time Out 0748         Minutes 23         Timed Code Treatment Minutes: 23 Minutes       CONCEPCIÓN Sahu/JESSICA

## 2022-04-01 NOTE — PROGRESS NOTES
Office: [phone]       Fax: [phone]      Nephrology Progress Note        Patient's Name: Danae Dunn  Date of Visit: 4/1/2022    Reason for Consult:  ESRD management      History of Present Illness:      Danae Dunn is a 55 y.o. female with PMHx of hypertension, diabetes mellitus, ESRD who was admitted on 3/22/2022 with complaints of abdominal pain   PD fluid dark  history of PD peritonitis recently with pseudomonas. PD catheter was exchanged. Pt was on HD and just started to do PD on 3/20  Pt was getting prep for colonoscopy   Does 2 exchanges with 2.5% day time and Icodextrin overnight    INTERVAL HISTORY      S/p HD on 3/30  Feels same  Not in acute distress  BP better            Medications:        Allergies:  Amlodipine, Bactrim [sulfamethoxazole-trimethoprim], Doxazosin, Geocillin [carbenicillin], Lisinopril, Other, Spironolactone, Tramadol, Bumetanide, and Ibuprofen  Scheduled Meds:   losartan  100 mg Oral Nightly    nystatin  5 mL Oral 4x Daily    pantoprazole  40 mg Oral QAM AC    warfarin placeholder: dosing by pharmacy   Other RX Placeholder    lactated ringers bolus  500 mL IntraVENous Once    vancomycin  250 mg Oral 4x Daily    sodium chloride flush  5-40 mL IntraVENous 2 times per day    insulin lispro  0-6 Units SubCUTAneous TID WC    insulin lispro  0-3 Units SubCUTAneous Nightly     Continuous Infusions:   sodium chloride      sodium chloride 25 mL (03/24/22 1114)    dextrose           Objective:       Vitals:  /73   Pulse 90   Temp 97.4 °F (36.3 °C) (Oral)   Resp 19   Ht 5' 3\" (1.6 m)   Wt 171 lb 1.2 oz (77.6 kg)   SpO2 100%   BMI 30.30 kg/m²   Constitutional:  awake, NAD  Neck: no bruits, No JVD  Cardiovascular:  S1, S2 reg  Respiratory: CTA, no crackles  Abdomen:  +BS, soft, tender, ND  Ext: trace lower extremity edema  Access: abd PD catheter in place, no drainage/redness around exit site. CNS: alert, no agitation    Data:     Labs:  Hepatic:   Recent Labs     03/30/22  0656 03/31/22  0710 04/01/22  0558   AST 30 35 34   ALT 10 12 14   BILITOT 0.3 <0.2 0.4   ALKPHOS 197* 177* 193*     BNP: No results for input(s): BNP in the last 72 hours. ABGs: No results for input(s): PHART, PO2ART, [id number] in the last 72 hours. IMAGING:  CT ABDOMEN PELVIS W IV CONTRAST Additional Contrast? None   Final Result   1. Interval transverse colostomy creation, with mild colonic wall thickening   of the transverse colon at the level of the ostomy which may be related to   poor distention, though wall thickening of the descending colon as well as   the sigmoid colon may be related to colitis. The colonic distention seen on   the preoperative examination has improved. 2. There is a small volume of intra-and pelvic ascites, within indwelling   peritoneal catheter noted. 3. Numerous hepatic lesions are seen suspicious for possible metastasis. Low-attenuation nonspecific lesions noted within the spleen as well. 4. Circumferential distal esophageal thickening is identified suggestive of   esophagitis. 5. Wall thickening of the distal stomach and duodenum suggestive of   gastritis/enteritis. 6. Multifocal nodular appearing infiltrates are seen within the lung bases,   similar to the previous exam.   7. Other similar findings as above. VL Extremity Venous Bilateral   Final Result      CT CHEST W CONTRAST   Final Result   Bilateral pulmonary emboli slightly greater on the right than left. Potential concern for septic emboli given the small foci of irregular   parenchymal lung lesions new from the prior chest CT study, only partially   included and demonstrated on abdomen pelvic CT 1 day earlier. These could   certainly also be metastatic as well but somewhat atypical given the   irregular margins.       Multiple liver metastases partially included, unchanged as seen on abdomen   pelvic CT 1 day earlier      Abdominal ascites, decreased from the prior exam 1 day earlier, with recently   demonstrated pneumoperitoneum not seen currently in the included upper   abdomen. .      Esophageal mural thickening suspected esophagitis. Findings were discussed with Christiano Centeno at 3:50 pm on 3/23/2022. CT ABDOMEN PELVIS W IV CONTRAST Additional Contrast? None   Final Result   1. Worsening inflammatory changes surrounding the mid sigmoid colon likely   due to colitis causing worsening colonic dilatation. Underlying malignancy   cannot be excluded; correlate with colonoscopy. 2. Multiple nodular bilateral ground-glass opacities. The differential   diagnosis includes infectious process and metastatic disease recommend CT of   the chest with contrast for further evaluation. 3. Multiple indeterminate hepatic lesions. Recommend nonemergent MRI of the   abdomen with without contrast utilizing Eovist.             Assessment :       1. ESRD   Etiology: suspected DN  Access: Abd PD cath and Tunneled Cath  Volume: Hypervolemic    Getting HD today     peritoneal fluid shows neutrophil count 73 %  Await cultures     2. Electrolytes/Acid base  No Dyskalemia    Recent Labs     04/01/22  0558   *   K 3.7   CO2 21       3. BMD  -Hyperphosphatemia, SHPT  -maintained on Renvela    Lab Results   Component Value Date    CALCIUM 7.5 (L) 04/01/2022    PHOS 4.5 02/17/2022        4. HTN  -Blood pressure high       BP Readings from Last 1 Encounters:   04/01/22 129/73       5. Anemia  -anemia of CKD  -assess for CHRIS    Recent Labs     04/01/22  0558   HGB 9.3*     6. DM    7. abdominal pain   Colon cancer on pathology   Extensive metastasis  - on po vancomycin for c.  Diff     PLAN :     - Empiric antibiotics    - PD fluid cx : NGTD  - continue hemodialysis as per MWF  - hold BP meds:   - MBD management  - Anemia management  - Dose meds to GFR<10    Thank you for allowing us to participate in care of Danae uDnn . We will continue to follow. Feel free to contact me with any questions.       Jojo Encarnacion MD  4/1/2022    [facility] [address] [place] [address]  Office : [phone]  Fax :[phone]

## 2022-04-01 NOTE — PROGRESS NOTES
Progress Note  GGQ:       Room:[address]  Patient Lucila Cooley     YOB: 1965     Age:56 y.o. Subjective    Subjective:  Symptoms:  Improved. Diet:  Poor intake. No nausea. Activity level: Returning to normal.    Pain:  She complains of pain that is mild. Review of Systems   Gastrointestinal: Negative for nausea. Objective         Vitals Last 24 Hours:  TEMPERATURE:  Temp  Av.9 °F (36.6 °C)  Min: 97.4 °F (36.3 °C)  Max: 98.6 °F (37 °C)  RESPIRATIONS RANGE: Resp  Av.7  Min: 16  Max: 22  PULSE OXIMETRY RANGE: SpO2  Av.9 %  Min: 95 %  Max: 100 %  PULSE RANGE: Pulse  Av.6  Min: 80  Max: 95  BLOOD PRESSURE RANGE: Systolic (86SYT), LZY:837 , Min:118 , RDO:131   ; Diastolic (98MUZ), HOQ:34, Min:73, Max:92    I/O (24Hr): Intake/Output Summary (Last 24 hours) at 2022 1408  Last data filed at 2022 1052  Gross per 24 hour   Intake 400 ml   Output 2450 ml   Net -2050 ml     Objective:  Vital signs: (most recent): Blood pressure 129/73, pulse 90, temperature 97.4 °F (36.3 °C), temperature source Oral, resp. rate 19, height 5' 3\" (1.6 m), weight 171 lb 1.2 oz (77.6 kg), SpO2 100 %, not currently breastfeeding. Labs/Imaging/Diagnostics    Labs:  CBC:  Recent Labs     22  0656 22  0656 22  0710 22  1642 22  0558   WBC 9.4  --  8.0  --  8.8   RBC 2.48*  --  2.22*  --  3.00*   HGB 7.7*   < > 7.0* 8.9* 9.3*   HCT 23.6*   < > 21.0* 26.5* 27.9*   MCV 95.3  --  94.6  --  92.8   RDW 17.1*  --  17.0*  --  16.9*     --  142  --  144    < > = values in this interval not displayed.      CHEMISTRIES:  Recent Labs     22  0656 22  0710 22  0558   * 131* 128*   K 4.4 4.0 3.7   CL 96* 97* 94*   CO2 21 24 21   BUN 28* 14 18   CREATININE 5.7* 4.2* 5.2*   GLUCOSE 119* 127* 112*     PT/INR:  Recent Labs     22  0656 22  0710 22  0558   PROTIME 45.3* 34.7* 31.5*   INR 3.79* 2.94* 2.68*     APTT:No results for input(s): APTT in the last 72 hours. LIVER PROFILE:  Recent Labs     03/30/22  0656 03/31/22  0710 04/01/22  0558   AST 30 35 34   ALT 10 12 14   BILITOT 0.3 <0.2 0.4   ALKPHOS 197* 177* 193*       Imaging Last 24 Hours:  No results found.   Assessment//Plan           Hospital Problems           Last Modified POA    * (Principal) Colitis 3/22/2022 Yes    Cancer of sigmoid colon ([facility] [address] 3/24/2022 Yes    Hepatic lesion 3/27/2022 Yes        Assessment & Plan    Improving overall   Tolerated more PO today but still limited by dysphagia   Pain controlled   dispo planning       Electronically signed by ABDIAZIZ Hogan CNP on 4/1/2022 at 2:08 PM     As above  Slowly progressing  Trying PO but having pain  Continue current care for now    Electronically signed by Joan Combs MD on 4/1/2022 at 8:36 PM

## 2022-04-02 NOTE — PROGRESS NOTES
Office: [phone]       Fax: [phone]      Nephrology Progress Note        Patient's Name: Tracy Frances  Date of Visit: 4/2/2022    Reason for Consult:  ESRD management      History of Present Illness:      Tracy Frances is a 55 y.o. female with PMHx of hypertension, diabetes mellitus, ESRD who was admitted on 3/22/2022 with complaints of abdominal pain   PD fluid dark  history of PD peritonitis recently with pseudomonas. PD catheter was exchanged. Pt was on HD and just started to do PD on 3/20  Pt was getting prep for colonoscopy   Does 2 exchanges with 2.5% day time and Icodextrin overnight    INTERVAL HISTORY      S/p HD on 4/1/2022  Feels same  Not in acute distress  BP better            Medications:        Allergies:  Amlodipine, Bactrim [sulfamethoxazole-trimethoprim], Doxazosin, Geocillin [carbenicillin], Lisinopril, Other, Spironolactone, Tramadol, Bumetanide, and Ibuprofen  Scheduled Meds:   warfarin  3 mg Oral Once    losartan  100 mg Oral Nightly    nystatin  5 mL Oral 4x Daily    pantoprazole  40 mg Oral QAM AC    warfarin placeholder: dosing by pharmacy   Other RX Placeholder    lactated ringers bolus  500 mL IntraVENous Once    vancomycin  250 mg Oral 4x Daily    sodium chloride flush  5-40 mL IntraVENous 2 times per day    insulin lispro  0-6 Units SubCUTAneous TID WC    insulin lispro  0-3 Units SubCUTAneous Nightly     Continuous Infusions:   sodium chloride      sodium chloride 25 mL (03/24/22 1114)    dextrose           Objective:       Vitals:  /78   Pulse 95   Temp 98 °F (36.7 °C) (Oral)   Resp 26   Ht 5' 3\" (1.6 m)   Wt 170 lb 13.7 oz (77.5 kg)   SpO2 97%   BMI 30.27 kg/m²   Constitutional:  awake, NAD  Neck: no bruits, No JVD  Cardiovascular:  S1, S2 reg  Respiratory: CTA, no crackles  Abdomen:  +BS, soft, tender, ND  Ext: trace lower extremity edema  Access: abd PD catheter in place, no drainage/redness around exit site. CNS: alert, no agitation    Data:     Labs:  Hepatic:   Recent Labs     03/31/22  0710 04/01/22  0558 04/02/22  0625   AST 35 34 24   ALT 12 14 12   BILITOT <0.2 0.4 0.4   ALKPHOS 177* 193* 186*     BNP: No results for input(s): BNP in the last 72 hours. ABGs: No results for input(s): PHART, PO2ART, CCZ8PMB in the last 72 hours. IMAGING:  CT ABDOMEN PELVIS W IV CONTRAST Additional Contrast? None   Final Result   1. Interval transverse colostomy creation, with mild colonic wall thickening   of the transverse colon at the level of the ostomy which may be related to   poor distention, though wall thickening of the descending colon as well as   the sigmoid colon may be related to colitis. The colonic distention seen on   the preoperative examination has improved. 2. There is a small volume of intra-and pelvic ascites, within indwelling   peritoneal catheter noted. 3. Numerous hepatic lesions are seen suspicious for possible metastasis. Low-attenuation nonspecific lesions noted within the spleen as well. 4. Circumferential distal esophageal thickening is identified suggestive of   esophagitis. 5. Wall thickening of the distal stomach and duodenum suggestive of   gastritis/enteritis. 6. Multifocal nodular appearing infiltrates are seen within the lung bases,   similar to the previous exam.   7. Other similar findings as above. VL Extremity Venous Bilateral   Final Result      CT CHEST W CONTRAST   Final Result   Bilateral pulmonary emboli slightly greater on the right than left. Potential concern for septic emboli given the small foci of irregular   parenchymal lung lesions new from the prior chest CT study, only partially   included and demonstrated on abdomen pelvic CT 1 day earlier.   These could   certainly also be metastatic as well but somewhat atypical given the Anemia management  - Dose meds to GFR<10    Thank you for allowing us to participate in care of Danae Dunn . We will continue to follow. Feel free to contact me with any questions.       Robles Kingston MD  4/2/2022    [facility] [address] [place] [address]  Office : [phone]  Fax :[phone]

## 2022-04-02 NOTE — PROGRESS NOTES
Clinical Pharmacy Note  Medication Counseling    Reviewed new medications started during hospital admission: Warfarin. Indications and side effects were emphasized during counseling. All medication-related questions addressed. Patient verbalized understanding of education. Should the patient express any additional questions or concerns regarding their medications, please do not hesitate to contact the pharmacy department. Patient/caregiver aware they may refuse medications during hospital stay. 10 minutes spent educating patient regarding medications.   Martha Garcia, [address] [place] [address], [address] [place] [address] 4/2/2022 10:33 AM

## 2022-04-02 NOTE — PROGRESS NOTES
Clinical Pharmacy Note  Warfarin Consult    Zechariah Pete is a 55 y.o. female receiving warfarin managed by pharmacy. Warfarin Indication: PE  Target INR range: 2-3   Dose prior to admission: New start    Current warfarin drug-drug interactions:     Recent Labs     03/31/22  0710 03/31/22  0710 03/31/22  1642 04/01/22  0558 04/02/22  0539 04/02/22  0625   HGB 7.0*   < > 8.9* 9.3*  --  9.2*   HCT 21.0*   < > 26.5* 27.9*  --  26.9*   INR 2.94*  --   --  2.68* 2.50*  --     < > = values in this interval not displayed. Assessment/Plan:  Patient's INR has stabilized past 48 hours. WIll give Warfarin 3mg tonight. I feel 2mg tablets will be appropriate on discharge. Dr. Kenyn Rockwell wants [facility] to manage via home care at this time. Thank you for the consult. Will continue to follow.     Electronically signed by Marilee Babcock, [address] [place] [address] on 4/2/2022 at 9:52 AM

## 2022-04-02 NOTE — PLAN OF CARE
Problem: Pain:  Description: Pain management should include both nonpharmacologic and pharmacologic interventions.   Goal: Pain level will decrease  Description: Pain level will decrease  Outcome: Ongoing     Problem: Falls - Risk of:  Goal: Will remain free from falls  Description: Will remain free from falls  Outcome: Ongoing     Problem: Fluid Volume:  Goal: Will show no signs or symptoms of fluid imbalance  Description: Will show no signs or symptoms of fluid imbalance  Outcome: Ongoing     Problem: Nutrition  Goal: Optimal nutrition therapy  Outcome: Ongoing

## 2022-04-02 NOTE — PROGRESS NOTES
Progress Note  [id number]:[id number]       Room:B7N-7082/5280-  Dion Jones     YOB: 1965     Age:56 y.o. Subjective    Subjective:  Symptoms:  Improved. Diet:  Poor intake. No nausea. Activity level: Returning to normal.    Pain:  She complains of pain that is mild. Review of Systems   Gastrointestinal: Negative for nausea. Objective         Vitals Last 24 Hours:  TEMPERATURE:  Temp  Av °F (36.7 °C)  Min: 97.5 °F (36.4 °C)  Max: 98.4 °F (36.9 °C)  RESPIRATIONS RANGE: Resp  Av.9  Min: 17  Max: 26  PULSE OXIMETRY RANGE: SpO2  Av.1 %  Min: 87 %  Max: 98 %  PULSE RANGE: Pulse  Av.7  Min: 86  Max: 95  BLOOD PRESSURE RANGE: Systolic (41LIV), :637 , Min:126 , HNU:595   ; Diastolic (18KSK), JPD:87, Min:76, Max:89    I/O (24Hr): Intake/Output Summary (Last 24 hours) at 2022 1348  Last data filed at 2022 1233  Gross per 24 hour   Intake --   Output 425 ml   Net -425 ml     Objective:  Vital signs: (most recent): Blood pressure 126/78, pulse 95, temperature 98 °F (36.7 °C), temperature source Oral, resp. rate 26, height 5' 3\" (1.6 m), weight 170 lb 13.7 oz (77.5 kg), SpO2 97 %, not currently breastfeeding. Labs/Imaging/Diagnostics    Labs:  CBC:  Recent Labs     22  0710 22  0710 22  1642 22  0558 22  0625   WBC 8.0  --   --  8.8 7.3   RBC 2.22*  --   --  3.00* 2.92*   HGB 7.0*   < > 8.9* 9.3* 9.2*   HCT 21.0*   < > 26.5* 27.9* 26.9*   MCV 94.6  --   --  92.8 92.0   RDW 17.0*  --   --  16.9* 17.0*     --   --  144 145    < > = values in this interval not displayed.      CHEMISTRIES:  Recent Labs     22  0710 22  0558 22  0625   * 128* 134*   K 4.0 3.7 4.1   CL 97* 94* 98*   CO2 24 21 23   BUN 14 18 10   CREATININE 4.2* 5.2* 4.2*   GLUCOSE 127* 112* 126*     PT/INR:  Recent Labs     22  0710 22  0558 22  0539   PROTIME 34.7* 31.5* 29.3*   INR 2.94* 2.68* 2.50* APTT:No results for input(s): APTT in the last 72 hours. LIVER PROFILE:  Recent Labs     03/31/22  0710 04/01/22  0558 04/02/22  0625   AST 35 34 24   ALT 12 14 12   BILITOT <0.2 0.4 0.4   ALKPHOS 177* 193* 186*       Imaging Last 24 Hours:  No results found. Assessment//Plan           Hospital Problems           Last Modified POA    * (Principal) Colitis 3/22/2022 Yes    Cancer of sigmoid colon ([facility] [address] 3/24/2022 Yes    Hepatic lesion 3/27/2022 Yes        Assessment & Plan    Improving overall   Tolerated more PO today. Was able to eat pancakes without pain. Ostomy functioning.    Pain controlled   dispo planning       Electronically signed by Deann Owens MD on 4/2/2022 at 1:48 PM

## 2022-04-02 NOTE — PROGRESS NOTES
Hospitalist Progress Note      PCP: No primary care provider on file. Date of Admission: 3/22/2022    Chief Complaint: s/p colostomy placement    Hospital Course:    Ms. Yris Ramirez was admitted with sepsis due to colitis. She was started on broad spectrum antibiotics but developed hypotensive drug reaction and urticaria and antibiotics were held. At the same time, her stool tested positive for C dif and she was started on oral vancomycin and IV flagyl. Surgery created diverting colostomy on 3/24 for obstructing newly diagnosed sigmoid colon cancer. Was switched from PD to HD. Subjective:   Abdominal bloating seems to be better, patient feels a little gassy however better than yesterday.   Was able to tolerate breakfast    Medications:  Reviewed    Infusion Medications    sodium chloride      sodium chloride 25 mL (03/24/22 1114)    dextrose       Scheduled Medications    warfarin  3 mg Oral Once    losartan  100 mg Oral Nightly    nystatin  5 mL Oral 4x Daily    pantoprazole  40 mg Oral QAM AC    warfarin placeholder: dosing by pharmacy   Other RX Placeholder    lactated ringers bolus  500 mL IntraVENous Once    vancomycin  250 mg Oral 4x Daily    sodium chloride flush  5-40 mL IntraVENous 2 times per day    insulin lispro  0-6 Units SubCUTAneous TID WC    insulin lispro  0-3 Units SubCUTAneous Nightly     PRN Meds: heparin (porcine), sodium chloride, phenol, morphine **OR** morphine, albumin human, midodrine, heparin (porcine), diphenhydrAMINE, sodium chloride flush, sodium chloride, ondansetron **OR** ondansetron, polyethylene glycol, acetaminophen **OR** acetaminophen, oxyCODONE, glucose, dextrose, glucagon (rDNA), dextrose      Intake/Output Summary (Last 24 hours) at 4/2/2022 1232  Last data filed at 4/1/2022 1947  Gross per 24 hour   Intake --   Output 175 ml   Net -175 ml       Physical Exam Performed:    BP (!) 151/89   Pulse 90   Temp 97.5 °F (36.4 °C) (Oral)   Resp 21   Ht 5' 3\" (1.6 m)   Wt 170 lb 13.7 oz (77.5 kg)   SpO2 97%   BMI 30.27 kg/m²     General appearance: No apparent distress, appears stated age and cooperative. HEENT: Pupils equal, round, and reactive to light. Conjunctivae/corneas clear. Neck: Supple, with full range of motion. No jugular venous distention. Trachea midline. Respiratory:  Normal respiratory effort. Clear to auscultation, bilaterally without Rales/Wheezes/Rhonchi. Cardiovascular: Regular rate and rhythm with normal S1/S2 without murmurs, rubs or gallops. Abdomen: Colostomy noted with with full bag, PD dialysis catheter noted. Minimal abdominal tenderness  Musculoskeletal: No clubbing, cyanosis or edema bilaterally. Full range of motion without deformity. Skin: Skin color, texture, turgor normal.  No rashes or lesions. Neurologic:  Neurovascularly intact without any focal sensory/motor deficits. Cranial nerves: II-XII intact, grossly non-focal.  Psychiatric: Alert and oriented, thought content appropriate, normal insight  Capillary Refill: Brisk,3 seconds, normal   Peripheral Pulses: +2 palpable, equal bilaterally       Labs:   Recent Labs     03/31/22  0710 03/31/22  0710 03/31/22  1642 04/01/22  0558 04/02/22  0625   WBC 8.0  --   --  8.8 7.3   HGB 7.0*   < > 8.9* 9.3* 9.2*   HCT 21.0*   < > 26.5* 27.9* 26.9*     --   --  144 145    < > = values in this interval not displayed. Recent Labs     03/31/22  0710 04/01/22  0558 04/02/22  0625   * 128* 134*   K 4.0 3.7 4.1   CL 97* 94* 98*   CO2 24 21 23   BUN 14 18 10   CREATININE 4.2* 5.2* 4.2*   CALCIUM 7.0* 7.5* 7.1*     Recent Labs     03/31/22  0710 04/01/22  0558 04/02/22  0625   AST 35 34 24   ALT 12 14 12   BILITOT <0.2 0.4 0.4   ALKPHOS 177* 193* 186*     Recent Labs     03/31/22  0710 04/01/22  0558 04/02/22  0539   INR 2.94* 2.68* 2.50*     No results for input(s): Yimi Justin in the last 72 hours.     Urinalysis:      Lab Results   Component Value Date    NITRU Negative 05/27/2021    WBCUA 3 05/27/2021    BACTERIA RARE 05/27/2021    RBCUA 4 05/27/2021    BLOODU TRACE 05/27/2021    SPECGRAV 1.016 05/27/2021    GLUCOSEU 250 05/27/2021       Radiology:  CT ABDOMEN PELVIS W IV CONTRAST Additional Contrast? None   Final Result   1. Interval transverse colostomy creation, with mild colonic wall thickening   of the transverse colon at the level of the ostomy which may be related to   poor distention, though wall thickening of the descending colon as well as   the sigmoid colon may be related to colitis. The colonic distention seen on   the preoperative examination has improved. 2. There is a small volume of intra-and pelvic ascites, within indwelling   peritoneal catheter noted. 3. Numerous hepatic lesions are seen suspicious for possible metastasis. Low-attenuation nonspecific lesions noted within the spleen as well. 4. Circumferential distal esophageal thickening is identified suggestive of   esophagitis. 5. Wall thickening of the distal stomach and duodenum suggestive of   gastritis/enteritis. 6. Multifocal nodular appearing infiltrates are seen within the lung bases,   similar to the previous exam.   7. Other similar findings as above. VL Extremity Venous Bilateral   Final Result      CT CHEST W CONTRAST   Final Result   Bilateral pulmonary emboli slightly greater on the right than left. Potential concern for septic emboli given the small foci of irregular   parenchymal lung lesions new from the prior chest CT study, only partially   included and demonstrated on abdomen pelvic CT 1 day earlier. These could   certainly also be metastatic as well but somewhat atypical given the   irregular margins.       Multiple liver metastases partially included, unchanged as seen on abdomen   pelvic CT 1 day earlier      Abdominal ascites, decreased from the prior exam 1 day earlier, with recently   demonstrated pneumoperitoneum not seen currently in the included upper   abdomen. .      Esophageal mural thickening suspected esophagitis. Findings were discussed with Young Lucero at 3:50 pm on 3/23/2022. CT ABDOMEN PELVIS W IV CONTRAST Additional Contrast? None   Final Result   1. Worsening inflammatory changes surrounding the mid sigmoid colon likely   due to colitis causing worsening colonic dilatation. Underlying malignancy   cannot be excluded; correlate with colonoscopy. 2. Multiple nodular bilateral ground-glass opacities. The differential   diagnosis includes infectious process and metastatic disease recommend CT of   the chest with contrast for further evaluation. 3. Multiple indeterminate hepatic lesions. Recommend nonemergent MRI of the   abdomen with without contrast utilizing Eovist.                 Assessment/Plan:    Active Hospital Problems    Diagnosis     Hepatic lesion [K76.9]     Cancer of sigmoid colon ([facility] [address] [C18.7]     Colitis [K52.9]      Sepsis due to colitis:  improved    Abdominal pain. New onset, patient reports feeling gassy with some abdominal discomfort. Was given 1 dose of simethicone, will continue to follow    Metastatic colon cancer:  - colonoscopy 3/23 with colonic mass  - CT scan concerning for liver mets, possibly pulmonary as well  - oncology consulted  - surgery:  Placed diverting colostomy 3/24-- significant pelvic tumors found.      C dif infection:  -On oral vancomycin day 10 (will complete last dose today) . Acute on chronic anemia(likely blood loss). Occasional bloody bowel motions likely related to malignancy. Hemoglobin is 7.7--> 7--> 9.3--> 9.2  Received 1 unit of packed red blood cells 3/31      Dysphagia:  - PPI and chloroseptic spray  - continue nystatin.      Drug reaction:  resolved    Acute bilateral pulmonary embolism:  - heparin drip due to colonoscopy on 3/23  - Coumadin initiated 3/25.    INR therapeutic, heparin discontinued     ESRD on dialysis:  - HD due to concern for infection on PD (recent peritonitis). DVT Prophylaxis: warfarin  Diet: ADULT DIET;  Regular  Code Status: Full Code    PT/OT Eval Status: following     Dispo - patient in need of HD dialysis slot as outpatient

## 2022-04-02 NOTE — PLAN OF CARE
Problem: Pain:  Description: Pain management should include both nonpharmacologic and pharmacologic interventions.   Goal: Pain level will decrease  Description: Pain level will decrease  4/2/2022 1956 by Jhoana Lujan RN  Outcome: Ongoing  4/2/2022 1002 by Benny Castanon RN  Outcome: Ongoing  Goal: Control of acute pain  Description: Control of acute pain  Outcome: Ongoing  Goal: Control of chronic pain  Description: Control of chronic pain  Outcome: Ongoing     Problem: Nausea/Vomiting:  Goal: Absence of nausea/vomiting  Description: Absence of nausea/vomiting  Outcome: Ongoing  Goal: Able to drink  Description: Able to drink  Outcome: Ongoing  Goal: Able to eat  Description: Able to eat  Outcome: Ongoing  Goal: Ability to achieve adequate nutritional intake will improve  Description: Ability to achieve adequate nutritional intake will improve  Outcome: Ongoing

## 2022-04-03 NOTE — PROGRESS NOTES
Hospitalist Progress Note      PCP: No primary care provider on file. Date of Admission: 3/22/2022    Chief Complaint: s/p colostomy placement    Hospital Course:    Ms. Marko Gaming was admitted with sepsis due to colitis. She was started on broad spectrum antibiotics but developed hypotensive drug reaction and urticaria and antibiotics were held. At the same time, her stool tested positive for C dif and she was started on oral vancomycin and IV flagyl. Surgery created diverting colostomy on 3/24 for obstructing newly diagnosed sigmoid colon cancer. Was switched from PD to HD. Subjective:   Denies any complaints this morning, feeling better overall.      Medications:  Reviewed    Infusion Medications    sodium chloride      sodium chloride 25 mL (03/24/22 1114)    dextrose       Scheduled Medications    losartan  100 mg Oral Nightly    nystatin  5 mL Oral 4x Daily    pantoprazole  40 mg Oral QAM AC    warfarin placeholder: dosing by pharmacy   Other RX Placeholder    lactated ringers bolus  500 mL IntraVENous Once    sodium chloride flush  5-40 mL IntraVENous 2 times per day    insulin lispro  0-6 Units SubCUTAneous TID WC    insulin lispro  0-3 Units SubCUTAneous Nightly     PRN Meds: heparin (porcine), sodium chloride, phenol, morphine **OR** morphine, albumin human, midodrine, heparin (porcine), diphenhydrAMINE, sodium chloride flush, sodium chloride, ondansetron **OR** ondansetron, polyethylene glycol, acetaminophen **OR** acetaminophen, oxyCODONE, glucose, dextrose, glucagon (rDNA), dextrose      Intake/Output Summary (Last 24 hours) at 4/3/2022 1001  Last data filed at 4/3/2022 0524  Gross per 24 hour   Intake 250 ml   Output 250 ml   Net 0 ml       Physical Exam Performed:    BP (!) 151/83   Pulse 87   Temp 98.5 °F (36.9 °C) (Oral)   Resp 17   Ht 5' 3\" (1.6 m)   Wt 179 lb 3.7 oz (81.3 kg)   SpO2 97%   BMI 31.75 kg/m²     General appearance: No apparent distress, appears stated age and cooperative. HEENT: Pupils equal, round, and reactive to light. Conjunctivae/corneas clear. Neck: Supple, with full range of motion. No jugular venous distention. Trachea midline. Respiratory:  Normal respiratory effort. Clear to auscultation, bilaterally without Rales/Wheezes/Rhonchi. Cardiovascular: Regular rate and rhythm with normal S1/S2 without murmurs, rubs or gallops. Abdomen: Colostomy noted with with full bag, PD dialysis catheter noted. Minimal abdominal tenderness  Musculoskeletal: No clubbing, cyanosis or edema bilaterally. Full range of motion without deformity. Skin: Skin color, texture, turgor normal.  No rashes or lesions. Neurologic:  Neurovascularly intact without any focal sensory/motor deficits. Cranial nerves: II-XII intact, grossly non-focal.  Psychiatric: Alert and oriented, thought content appropriate, normal insight  Capillary Refill: Brisk,3 seconds, normal   Peripheral Pulses: +2 palpable, equal bilaterally       Labs:   Recent Labs     04/01/22  0558 04/02/22  0625 04/03/22  0608   WBC 8.8 7.3 7.0   HGB 9.3* 9.2* 8.7*   HCT 27.9* 26.9* 25.5*    145 150     Recent Labs     04/01/22  0558 04/02/22  0625 04/03/22  0608   * 134* 134*   K 3.7 4.1 4.1   CL 94* 98* 99   CO2 21 23 23   BUN 18 10 15   CREATININE 5.2* 4.2* 5.3*   CALCIUM 7.5* 7.1* 7.3*     Recent Labs     04/01/22  0558 04/02/22  0625 04/03/22  0608   AST 34 24 20   ALT 14 12 11   BILITOT 0.4 0.4 0.4   ALKPHOS 193* 186* 176*     Recent Labs     04/01/22  0558 04/02/22  0539 04/03/22  0608   INR 2.68* 2.50* 2.52*     No results for input(s): Michelle Orrick in the last 72 hours.     Urinalysis:      Lab Results   Component Value Date    NITRU Negative 05/27/2021    WBCUA 3 05/27/2021    BACTERIA RARE 05/27/2021    RBCUA 4 05/27/2021    BLOODU TRACE 05/27/2021    SPECGRAV 1.016 05/27/2021    GLUCOSEU 250 05/27/2021       Radiology:  CT ABDOMEN PELVIS W IV CONTRAST Additional Contrast? None   Final Result 1. Interval transverse colostomy creation, with mild colonic wall thickening   of the transverse colon at the level of the ostomy which may be related to   poor distention, though wall thickening of the descending colon as well as   the sigmoid colon may be related to colitis. The colonic distention seen on   the preoperative examination has improved. 2. There is a small volume of intra-and pelvic ascites, within indwelling   peritoneal catheter noted. 3. Numerous hepatic lesions are seen suspicious for possible metastasis. Low-attenuation nonspecific lesions noted within the spleen as well. 4. Circumferential distal esophageal thickening is identified suggestive of   esophagitis. 5. Wall thickening of the distal stomach and duodenum suggestive of   gastritis/enteritis. 6. Multifocal nodular appearing infiltrates are seen within the lung bases,   similar to the previous exam.   7. Other similar findings as above. VL Extremity Venous Bilateral   Final Result      CT CHEST W CONTRAST   Final Result   Bilateral pulmonary emboli slightly greater on the right than left. Potential concern for septic emboli given the small foci of irregular   parenchymal lung lesions new from the prior chest CT study, only partially   included and demonstrated on abdomen pelvic CT 1 day earlier. These could   certainly also be metastatic as well but somewhat atypical given the   irregular margins. Multiple liver metastases partially included, unchanged as seen on abdomen   pelvic CT 1 day earlier      Abdominal ascites, decreased from the prior exam 1 day earlier, with recently   demonstrated pneumoperitoneum not seen currently in the included upper   abdomen. .      Esophageal mural thickening suspected esophagitis. Findings were discussed with Elena Currie at 3:50 pm on 3/23/2022. CT ABDOMEN PELVIS W IV CONTRAST Additional Contrast? None   Final Result   1.  Worsening inflammatory changes surrounding the mid sigmoid colon likely   due to colitis causing worsening colonic dilatation. Underlying malignancy   cannot be excluded; correlate with colonoscopy. 2. Multiple nodular bilateral ground-glass opacities. The differential   diagnosis includes infectious process and metastatic disease recommend CT of   the chest with contrast for further evaluation. 3. Multiple indeterminate hepatic lesions. Recommend nonemergent MRI of the   abdomen with without contrast utilizing Eovist.                 Assessment/Plan:    Active Hospital Problems    Diagnosis     Hepatic lesion [K76.9]     Cancer of sigmoid colon ([address] [C18.7]     Colitis [K52.9]      Sepsis due to colitis:  improved    Abdominal pain. New onset, patient reports feeling gassy with some abdominal discomfort. Was given 1 dose of simethicone, will continue to follow    Metastatic colon cancer:  - colonoscopy 3/23 with colonic mass  - CT scan concerning for liver mets, possibly pulmonary as well  - oncology consulted  - surgery:  Placed diverting colostomy 3/24-- significant pelvic tumors found.      C dif infection:  -completed 10 days of PO vancomycin    Acute on chronic anemia(likely blood loss). Occasional bloody bowel motions likely related to malignancy. Hemoglobin is 7.7--> 7--> 9.3--> 9.2--> 8.7  Received 1 unit of packed red blood cells 3/31      Dysphagia:  - PPI and chloroseptic spray  - continue nystatin.      Drug reaction:  resolved    Acute bilateral pulmonary embolism:  - heparin drip due to colonoscopy on 3/23  - Coumadin initiated 3/25. INR therapeutic, heparin discontinued     ESRD on dialysis:  - HD due to concern for infection on PD (recent peritonitis). DVT Prophylaxis: warfarin  Diet: ADULT DIET; Regular  Code Status: Full Code    PT/OT Eval Status: following     Dispo - patient in need of HD dialysis slot as outpatient ( medically stable for DC).

## 2022-04-03 NOTE — PROGRESS NOTES
Progress Note  [id number]       Room:[id number]  Patient Jessica Hanson     YOB: 1965     Age:56 y.o. Subjective    Subjective:  Symptoms:  Improved. Diet:  Poor intake. No nausea. Activity level: Returning to normal.    Pain:  She complains of pain that is mild. Review of Systems   Gastrointestinal: Negative for nausea. Objective         Vitals Last 24 Hours:  TEMPERATURE:  Temp  Av.2 °F (36.8 °C)  Min: 98 °F (36.7 °C)  Max: 98.6 °F (37 °C)  RESPIRATIONS RANGE: Resp  Av.2  Min: 17  Max: 29  PULSE OXIMETRY RANGE: SpO2  Av.5 %  Min: 96 %  Max: 99 %  PULSE RANGE: Pulse  Av.3  Min: 87  Max: 98  BLOOD PRESSURE RANGE: Systolic (68ZGI), VLK:508 , Min:126 , JNX:824   ; Diastolic (88VPD), PXR:99, Min:75, Max:83    I/O (24Hr): Intake/Output Summary (Last 24 hours) at 4/3/2022 1106  Last data filed at 4/3/2022 0524  Gross per 24 hour   Intake 250 ml   Output 250 ml   Net 0 ml     Objective:  Vital signs: (most recent): Blood pressure (!) 151/83, pulse 87, temperature 98.5 °F (36.9 °C), temperature source Oral, resp. rate 17, height 5' 3\" (1.6 m), weight 179 lb 3.7 oz (81.3 kg), SpO2 97 %, not currently breastfeeding. Labs/Imaging/Diagnostics    Labs:  CBC:  Recent Labs     22  0522  0625 22  0608   WBC 8.8 7.3 7.0   RBC 3.00* 2.92* 2.78*   HGB 9.3* 9.2* 8.7*   HCT 27.9* 26.9* 25.5*   MCV 92.8 92.0 91.9   RDW 16.9* 17.0* 17.2*    145 150     CHEMISTRIES:  Recent Labs     22  0522  0625 22  0608   * 134* 134*   K 3.7 4.1 4.1   CL 94* 98* 99   CO2 21 23 23   BUN 18 10 15   CREATININE 5.2* 4.2* 5.3*   GLUCOSE 112* 126* 113*     PT/INR:  Recent Labs     22  0558 22  0539 22  0608   PROTIME 31.5* 29.3* 29.6*   INR 2.68* 2.50* 2.52*     APTT:No results for input(s): APTT in the last 72 hours.   LIVER PROFILE:  Recent Labs     22  0558 22  0625 22  0608   AST 34 24 20   ALT 14 12 11   BILITOT 0.4 0.4 0.4   ALKPHOS 193* 186* 176*       Imaging Last 24 Hours:  No results found. Assessment//Plan           Hospital Problems           Last Modified POA    * (Principal) Colitis 3/22/2022 Yes    Cancer of sigmoid colon ([facility] [address] 3/24/2022 Yes    Hepatic lesion 3/27/2022 Yes        Assessment & Plan    Improving overall   Tolerating po intake. Denies any dysphagia/odynophagia   Ostomy functioning.    Pain controlled   OK for discharge from surgical standpoint       Electronically signed by Christina Hendricks MD on 4/3/2022 at 11:06 AM

## 2022-04-03 NOTE — PLAN OF CARE
Problem: Nausea/Vomiting:  Goal: Absence of nausea/vomiting  Description: Absence of nausea/vomiting  Outcome: Ongoing     Problem: Falls - Risk of:  Goal: Will remain free from falls  Description: Will remain free from falls  Outcome: Ongoing

## 2022-04-03 NOTE — PROGRESS NOTES
Clinical Pharmacy Note  Warfarin Consult    Niall Krishnan is a 55 y.o. female receiving warfarin managed by pharmacy. Warfarin Indication: PE  Target INR range: 2-3   Dose prior to admission: New start    Current warfarin drug-drug interactions:     Recent Labs     04/01/22  0558 04/02/22  0539 04/02/22  0625 04/03/22  0608   HGB 9.3*  --  9.2* 8.7*   HCT 27.9*  --  26.9* 25.5*   INR 2.68* 2.50*  --  2.52*       Assessment/Plan:  Patient's INR has stabilized past 72 hours. WIll give Warfarin 2mg tonight. I feel 2mg tablets will be appropriate on discharge. Dr. Johan Camejo wants [facility] to manage via home care at this time. Thank you for the consult. Will continue to follow.     Electronically signed by Maureen Tarango, [facility] on 4/3/2022 at 10:23 AM

## 2022-04-03 NOTE — CARE COORDINATION
Patient with D/C order, needs outpatient dialysis arranged before she can D/C. Per review of chart, HD has not been arranged yet. Spoke with the patient's nurse who reports the plan is for her to receive HD here tomorrow per nephrology as her outpatient chair has not been confirmed yet.   CM will follow up with [facility] in Evette Harrell RN, BSN, Case Management  Phone: [phone]  Electronically signed by Kenan Ortiz RN on 4/3/2022 at 3:15 PM

## 2022-04-03 NOTE — PROGRESS NOTES
Physical Therapy  Facility/Department: [address] PROGRESSIVE CARE  Daily Treatment Note  NAME: Zechariah Pete  : 1965  MRN: [id number]    Date of Service: 4/3/2022    Discharge Recommendations:  Continue to assess pending progress,Home with Home health PT   PT Equipment Recommendations  Other: Will monitor for potential equipt needs. Zechariah Pete scored a 18/24 on the AM-PAC short mobility form. Current research shows that an AM-PAC score of 18 or greater is typically associated with a discharge to the patient's home setting. Based on the patient's AM-PAC score and their current functional mobility deficits, it is recommended that the patient have 2-3 sessions per week of Physical Therapy at d/c to increase the patient's independence. At this time, this patient demonstrates the endurance and safety to discharge home with Home PT Evaluation  and a follow up treatment frequency of 2-3x/wk. Please see assessment section for further patient specific details. If patient discharges prior to next session this note will serve as a discharge summary. Please see below for the latest assessment towards goals. Assessment   Body structures, Functions, Activity limitations: Decreased functional mobility ; Decreased endurance  Assessment: 54 y/o female admit 3/22/2022 with Stage IV Met Colon Ca, PE.  3/24/2022 S/P Exp Lap, Colostomy. CT Chest  : + B PE (R>L), Potential concern for Septic Emboli, Multiple Liver Mets. CT Abd : Numerous Hepatic Lesions suspicious for Possible Mets. LE Dopplers : R Indeterminate Partially Occluding DVT R Distal Peroneal Vein. PMH as noted including CKD (PD), Hernia Repair (2022; Met Adenocarcinoma discovered during Hernia Repair), DM, Neuropathy, Retinopathy, Orthostatic Hypotension. PTA pt living with  in multi level home with 5 steps to enter and 1st floor bed (bath on 2nd floor, uses BSC pta); independent amb and daily care pta.    Pt rosemary Strength Exs including stand exs, Bed Mob Supervision. Pt amb abit further distances with Walker CGA. Encourage increase oob/functional activities  (needs to negotiate stairs in/out of home and ability to rosemary transport to/from HD). Pt reports plan for d/c home with family assist/support; Will recommend Home PT at this time. Prognosis: Fair  Decision Making: Medium Complexity  History: 56 y/o female admit 3/22/2022 with Stage IV Met Colon Ca, PE.  3/24/2022 S/P Exp Lap, Colostomy. CT Chest  : + B PE (R>L), Potential concern for Septic Emboli, Multiple Liver Mets. CT Abd : Numerous Hepatic Lesions suspicious for Possible Mets. LE Dopplers : R Indeterminate Partially Occluding DVT R Distal Peroneal Vein. PMH as noted including CKD (PD), Hernia Repair (2/24/2022; Met Adenocarcinoma discovered during Hernia Repair), DM, Neuropathy, Retinopathy, Orthostatic Hypotension. Exam: See above. Clinical Presentation: See above. Patient Education: Role of PT, POC, Need to call for assist, Safe use of Walker, LE Exs. Barriers to Learning: None. REQUIRES PT FOLLOW UP: Yes  Activity Tolerance  Activity Tolerance: Patient limited by endurance  Activity Tolerance: Pt rosemary abit further distances amb with Walker. No LE buckling/giving way; endurance slowly improving. Patient Diagnosis(es): The primary encounter diagnosis was Generalized abdominal pain. Diagnoses of Nausea and vomiting, intractability of vomiting not specified, unspecified vomiting type, Colitis, Hepatic lesion, and Lung nodules were also pertinent to this visit. has a past medical history of Anemia, Chronic kidney disease, Diabetes mellitus ([address] Diabetes mellitus type 1 ([address] Diabetic retinopathy ([address] Diabetic retinopathy associated with type 2 diabetes mellitus, without macular edema, Hyperlipidemia, Hypertension, Kidney stone, Mixed hyperlipidemia, Orthostatic hypotension, and Vitreous hemorrhage of right eye ([address]    has a past surgical history that includes Appendectomy; Kidney stone surgery; Tonsillectomy; LAPAROSCOPY INSERTION PERITONEAL CATHETER (N/A, 8/12/2019); Dialysis Catheter Removal (N/A, 2/24/2022); Dialysis Catheter Insertion (N/A, 2/24/2022); ventral hernia repair (N/A, 2/24/2022); sigmoidoscopy (N/A, 3/23/2022); sigmoidoscopy (3/23/2022); and laparotomy (N/A, 3/24/2022). Restrictions  Restrictions/Precautions  Restrictions/Precautions: Fall Risk,Contact Precautions  Position Activity Restriction  Other position/activity restrictions: Contact Precautions : C-Diff. Abd Incision, Colostomy. O2 1L via NC  Subjective   General  Chart Reviewed: Yes  Additional Pertinent Hx: 54 y/o female admit 3/22/2022 with Stage IV Met Colon Ca, PE.  3/24/2022 S/P Exp Lap, Colostomy. CT Chest  : + B PE (R>L), Potential concern for Septic Emboli, Multiple Liver Mets. CT Abd : Numerous Hepatic Lesions suspicious for Possible Mets. LE Dopplers : R Indeterminate Partially Occluding DVT R Distal Peroneal Vein. PMH as noted including CKD (PD), Hernia Repair (2/24/2022; Met Adenocarcinoma discovered during Hernia Repair), DM, Neuropathy, Retinopathy, Orthostatic Hypotension. Response To Previous Treatment: Patient with no complaints from previous session. Family / Caregiver Present: No  Referring Practitioner: Dr. Lily Doll  Subjective  Subjective: Pt agreeable to PT Rx. Feeling alittle better today. Wants to go home upon d/c. Orientation  Orientation  Overall Orientation Status: Within Functional Limits  Cognition   Cognition  Overall Cognitive Status: WFL  Objective   Bed mobility  Supine to Sit: Supervision  Transfers  Sit to Stand: Contact guard assistance;Stand by assistance (With [employer]. Cues for safe hand placement.)  Stand to sit: Stand by assistance (With [employer]. Cues for safe hand placement.)  Comment: Completed additional Transfers from chair with Walker  for stand exs. .  Ambulation  Ambulation?: Yes  Ambulation 1  Surface: level tile  Device: Rolling Walker  Distance: Pt amb 20' x 2 with Walker CGA. Diminished step length/clearance although no LE buckling/giving way. Fatigued following; declined any further amb distances. Exercises  Comments: Stand Exs with Walker support : Marching, Mini Squats, Toe Raises 10x each ex; brief seated rest between each ex. SBA. AM-PAC Score  AM-PAC Inpatient Mobility Raw Score : 18 (04/03/22 0946)  AM-PAC Inpatient T-Scale Score : 43.63 (04/03/22 0946)  Mobility Inpatient CMS 0-100% Score: 46.58 (04/03/22 0946)  Mobility Inpatient CMS G-Code Modifier : CK (04/03/22 0946)          Goals  Short term goals  Time Frame for Short term goals: Upon d/c acute care setting. Short term goal 1: Bed Mob SBA. Short term goal 2: Transfers with assist device SBA/CGA. Short term goal 3: Amb with assist device 48' CGA. Short term goal 4: Pt participating in approp Strength Exs. Patient Goals   Patient goals : Return home with . Plan    Plan  Times per week: 3-5x week while in acute care setting.   Current Treatment Recommendations: Strengthening,Functional Mobility Training,Transfer Training,Gait Training,Safety Education & Training,Patient/Caregiver Education & Training  Safety Devices  Type of devices: Call light within reach,Left in chair,Nurse notified     Therapy Time   Individual Concurrent Group Co-treatment   Time In 0900         Time Out [facility]         Minutes [facility], PT

## 2022-04-04 NOTE — PROGRESS NOTES
Physical Therapy  Pt declines PT Rx at this time; reports possible d/c home today and has access to [employer]. Will cont current POC.      Jazmin Irene, PT

## 2022-04-04 NOTE — PROGRESS NOTES
[facility]  Call due to pt unable to make 3pm chair time they will call pt and arrange transportation

## 2022-04-04 NOTE — CARE COORDINATION
Case Management Assessment            Discharge Note                    Date / Time of Note: 4/4/2022 9:25 AM                  Discharge Note Completed by: Juan Antonio Navarrete RN     Met with Ms. Petra Sabrina at the bedside to discuss discharge instructions. Explained her new dialysis schedule. She verbalized understanding. Stated Dr. Sammy Rhodes told her she would not go home today. Confirmed with Dr. Sammy Rhodes she is appropriate to discharge. She then stated she would not have a ride home. Called her spouse, Henrique Kwon. He is 20 minutes from the hospital and can pick her up. Gave him the time of 3pm. He verbalized understanding. Ms. Herrera Patrice she does not want to leave. However, she has a discharge order, the specialists have signed off, her new HD plan is in place, spouse can transport, home care arranged.     Patient Name: Lashell Zavala   YOB: 1965  Diagnosis: Colitis [K52.9]  Generalized abdominal pain [R10.84]  Lung nodules [R91.8]  Hepatic lesion [K76.9]  Nausea and vomiting, intractability of vomiting not specified, unspecified vomiting type [R11.2]   Date / Time: 3/22/2022  8:46 AM    Current PCP: [facility] [address] [facility]    Advance Directives:  Code Status: Full Code    Financial:  Payor: Mylene Thompson / Plan: Mylene Thompson [place]PO [place] LOCAL / Product Type: *No Product type* /      Pharmacy:    [employer] [address] [place] [address] [phone] [address] [facility]  Phone: [phone] Fax: [phone]    [facility] [address], [address] [facility]  [address] [facility] [address]  Phone: [phone] Fax: [phone]    ADLS:  Current PT AM-PAC Score: 18 /24  Current OT AM-PAC Score: 18 /24      DISCHARGE Disposition: Home with [address]: [place] [address] [facility] Completed: Yes    Transportation:  Transportation PLAN for discharge: family   Mode of Transport: [address]:  Home Care ordered at discharge: Yes  [address]: [place] [address]  Phone: [phone] Fax: [phone]  Orders faxed: Yes    Dialysis:  Dialysis patient: Yes    Dialysis Center:  [facility]  Address: [address] [place], [place] [address]vd, [place], [address]  Phone: [phone]  MWF @ [phone] chair time. Arrive at 3pm    The Patient and/or patient representative Tami and her family were provided with a choice of provider and agrees with the discharge plan Yes    Freedom of choice list was provided with basic dialogue that supports the patient's individualized plan of care/goals and shares the quality data associated with the providers.  Yes    Geovanna Nix RN  [phone]

## 2022-04-04 NOTE — PROGRESS NOTES
Progress Note  YPQD:[id number]       Room:[place] [id number]  Patient Gloria Spivey     YOB: 1965     Age:56 y.o. Subjective    Subjective:  Symptoms:  Improved. Diet:  No nausea. Activity level: Returning to normal.    Pain:  She complains of pain that is mild. Review of Systems   Gastrointestinal: Negative for nausea. Objective         Vitals Last 24 Hours:  TEMPERATURE:  Temp  Av.9 °F (36.6 °C)  Min: 97.7 °F (36.5 °C)  Max: 98.1 °F (36.7 °C)  RESPIRATIONS RANGE: Resp  Av.3  Min: 22  Max: 24  PULSE OXIMETRY RANGE: SpO2  Av.3 %  Min: 98 %  Max: 99 %  PULSE RANGE: Pulse  Av.3  Min: 92  Max: 96  BLOOD PRESSURE RANGE: Systolic (54LHI), GVC:945 , Min:143 , NRF:749   ; Diastolic (63TZW), RZQ:53, Min:83, Max:95    I/O (24Hr): Intake/Output Summary (Last 24 hours) at 2022 1151  Last data filed at 2022 0022  Gross per 24 hour   Intake 120 ml   Output 50 ml   Net 70 ml     Objective:  Vital signs: (most recent): Blood pressure (!) 167/95, pulse 92, temperature 98 °F (36.7 °C), resp. rate 24, height 5' 3\" (1.6 m), weight 173 lb 11.6 oz (78.8 kg), SpO2 99 %, not currently breastfeeding. Labs/Imaging/Diagnostics    Labs:  CBC:  Recent Labs     22  0608 22  0551   WBC 7.3 7.0 6.4   RBC 2.92* 2.78* 2.66*   HGB 9.2* 8.7* 8.4*   HCT 26.9* 25.5* 24.6*   MCV 92.0 91.9 92.4   RDW 17.0* 17.2* 16.6*    150 135     CHEMISTRIES:  Recent Labs     22  0608 22  0552   * 134* 131*   K 4.1 4.1 4.2   CL 98* 99 99   CO2 23 23 20*   BUN 10 15 18   CREATININE 4.2* 5.3* 6.5*   GLUCOSE 126* 113* 127*     PT/INR:  Recent Labs     22  0539 22  0608 22  0551   PROTIME 29.3* 29.6* 38.3*   INR 2.50* 2.52* 3.23*     APTT:No results for input(s): APTT in the last 72 hours.   LIVER PROFILE:  Recent Labs     22  0625 22  0608 22  0552   AST 24 20 20   ALT 12 11 10   BILITOT 0.4 0.4 0. 3   ALKPHOS 186* 176* 166*       Imaging Last 24 Hours:  No results found. Assessment//Plan           Hospital Problems           Last Modified POA    * (Principal) Colitis 3/22/2022 Yes    Cancer of sigmoid colon ([facility] [address] 3/24/2022 Yes    Hepatic lesion 3/27/2022 Yes        Assessment & Plan    Improving overall   Tolerating po intake. Denies any dysphagia/odynophagia   Ostomy functioning.    HD this AM   Pain controlled   OK for discharge from surgical standpoint       Electronically signed by Travis Mak PA-C on 4/4/2022 at 11:51 AM

## 2022-04-04 NOTE — CARE COORDINATION
Ostomy Referral Follow-up Progress Note      NAME:  Tommy Doyle  MEDICAL RECORD NUMBER:  [id number]  AGE: 55 y.o. GENDER:  female  :  1965  TODAY'S DATE:  2022    Subjective:   Pt and spouse seen for ostomy lesson. Pt is S/P diverting loop colostomy 3/24/22    Objective    /77   Pulse 90   Temp 97.5 °F (36.4 °C)   Resp 24   Ht 5' 3\" (1.6 m)   Wt 170 lb 6.7 oz (77.3 kg)   SpO2 99%   BMI 30.19 kg/m²     Enoch Risk Score Enoch Scale Score: 20    Assessment       Colostomy LUQ Loop (Active)   Stomal Appliance Changed;1 piece;Clean;Dry; Intact 22 1533   Stoma  Assessment Pink;Moist;Protrudes 22 1533   Mucocutaneous Junction Intact 22 1533   Peristomal Assessment Clean; Intact 22 1533   Treatment Bag change;Site care; Other (Comment) 22 1533   Stool Appearance Soft 22 1533   Stool Color Brown 22 1533   Stool Amount Medium 22 1533   Number of days: 11     Pt sitting up in chair. Spouse at bedside. LUQ transverse loop colostomy with bridge intact. Stoma is pink and moist, peristomal skin and mucocutaneous junction intact. Pt able to remove pouch with assistance, cut wafer and provide peristomal care with minimal verbal cues and assistance. Able to place barrier ring around stoma over bridge and place pouch over stoma. Elastic barrier strips added while bridge is in place. Spouse watched pouch change procedure, asking questions. All questions answered, pt denies further questions and states she is able to manage pouch changes. She has [address] in place. Intake/Output Summary (Last 24 hours) at 2022 1534  Last data filed at 2022 1135  Gross per 24 hour   Intake 520 ml   Output 1950 ml   Net -1430 ml       Plan:   Plan for Ostomy Care:    pt to be discharged home today with [address]. HD time has been secured.     Ostomy Plan of Care  [x] Supplies/Instructions left in room  [] Patient using home supplies  [x] Brand/supplies at bedside [employer]  Current Diet: ADULT DIET;  Regular    Discharge Plan:  Placement for patient upon discharge: home with support    Outpatient visit plan No  Supplies given Yes   Samples requested Yes    Referrals:  [x]   [x] [facility]  [] Supplies  [] Other      Patient/Caregiver Teaching:  Written Instructions given to patient/family  Teaching provided:  [] Reviewed GI and A&P        [x] Supplies  [x] Pouch emptying      [x] Manipulate closure  [x] Routine Care         [] Comment  [x] Pouch maintenance           Level of patient/caregiver understanding able to:  [] Indicates understanding       [x] Needs reinforcement  [] Unsuccessful      [] Verbal Understanding  [x] Demonstrated understanding       [] No evidence of learning  [] Refused teaching         [] N/A    Electronically signed by Bennett Chan on 4/4/2022 at 3:34 PM

## 2022-04-04 NOTE — PROGRESS NOTES
Clinical Pharmacy Note  Warfarin Consult    Jimmy Setting is a 55 y.o. female receiving warfarin managed by pharmacy. Warfarin Indication: PE  Target INR range: 2-3   Dose prior to admission: New start    Current warfarin drug-drug interactions:     Recent Labs     04/02/22  0539 04/02/22  0625 04/03/22  0608 04/04/22  0551   HGB  --  9.2* 8.7* 8.4*   HCT  --  26.9* 25.5* 24.6*   INR 2.50*  --  2.52* 3.23*       Assessment/Plan:  Hold warfarin today   Patient's INR had stabilized past 72 hours. . I feel 2mg tablets will be appropriate on discharge. Dr. Kaushik Fong wants [facility] to manage via home care at this time. Thank you for the consult. Will continue to follow.     Electronically signed by Cameron Gallego, [facility] on 4/4/2022 at 1:54 PM

## 2022-04-04 NOTE — PROGRESS NOTES
Office: [phone]       Fax: [phone]      Nephrology Progress Note        Patient's Name: Royal Ahumada  Date of Visit: 4/4/2022    Reason for Consult:  ESRD management      History of Present Illness:      Royal Ahumada is a 55 y.o. female with PMHx of hypertension, diabetes mellitus, ESRD who was admitted on 3/22/2022 with complaints of abdominal pain   PD fluid dark  history of PD peritonitis recently with pseudomonas. PD catheter was exchanged. Pt was on HD and just started to do PD on 3/20  Pt was getting prep for colonoscopy   Does 2 exchanges with 2.5% day time and Icodextrin overnight    INTERVAL HISTORY    Feels same  Shortness of breath: No   Tolerating HD         Medications:        Allergies:  Amlodipine, Bactrim [sulfamethoxazole-trimethoprim], Doxazosin, Geocillin [carbenicillin], Lisinopril, Other, Spironolactone, Tramadol, Bumetanide, and Ibuprofen  Scheduled Meds:   losartan  100 mg Oral Nightly    nystatin  5 mL Oral 4x Daily    pantoprazole  40 mg Oral QAM AC    warfarin placeholder: dosing by pharmacy   Other RX Placeholder    lactated ringers bolus  500 mL IntraVENous Once    sodium chloride flush  5-40 mL IntraVENous 2 times per day    insulin lispro  0-6 Units SubCUTAneous TID WC    insulin lispro  0-3 Units SubCUTAneous Nightly     Continuous Infusions:   sodium chloride      sodium chloride 25 mL (03/24/22 1114)    dextrose           Objective:       Vitals:  BP (!) 155/91   Pulse 95   Temp 97.7 °F (36.5 °C) (Oral)   Resp 22   Ht 5' 3\" (1.6 m)   Wt 189 lb 13.1 oz (86.1 kg)   SpO2 98%   BMI 33.62 kg/m²   Constitutional:  awake, NAD  Cardiovascular:  S1, S2 reg  Respiratory: CTA, no crackles  Abdomen:  +BS, soft, tender, ND  Ext: trace lower extremity edema  Access: abd PD catheter in place, ostomy in place  CNS: alert, no agitation    Data:     Labs:  Hepatic:   Recent Labs     04/02/22  0625 04/03/22  0608 04/04/22  0552   AST 24 20 20   ALT 12 11 10   BILITOT 0.4 0.4 0.3   ALKPHOS 186* 176* 166*     BNP: No results for input(s): BNP in the last 72 hours. ABGs: No results for input(s): PHART, PO2ART, WLW8HBZ in the last 72 hours. IMAGING:  CT ABDOMEN PELVIS W IV CONTRAST Additional Contrast? None   Final Result   1. Interval transverse colostomy creation, with mild colonic wall thickening   of the transverse colon at the level of the ostomy which may be related to   poor distention, though wall thickening of the descending colon as well as   the sigmoid colon may be related to colitis. The colonic distention seen on   the preoperative examination has improved. 2. There is a small volume of intra-and pelvic ascites, within indwelling   peritoneal catheter noted. 3. Numerous hepatic lesions are seen suspicious for possible metastasis. Low-attenuation nonspecific lesions noted within the spleen as well. 4. Circumferential distal esophageal thickening is identified suggestive of   esophagitis. 5. Wall thickening of the distal stomach and duodenum suggestive of   gastritis/enteritis. 6. Multifocal nodular appearing infiltrates are seen within the lung bases,   similar to the previous exam.   7. Other similar findings as above. VL Extremity Venous Bilateral   Final Result      CT CHEST W CONTRAST   Final Result   Bilateral pulmonary emboli slightly greater on the right than left. Potential concern for septic emboli given the small foci of irregular   parenchymal lung lesions new from the prior chest CT study, only partially   included and demonstrated on abdomen pelvic CT 1 day earlier. These could   certainly also be metastatic as well but somewhat atypical given the   irregular margins.       Multiple liver metastases partially included, unchanged as seen on abdomen   pelvic CT 1 day earlier Abdominal ascites, decreased from the prior exam 1 day earlier, with recently   demonstrated pneumoperitoneum not seen currently in the included upper   abdomen. .      Esophageal mural thickening suspected esophagitis. Findings were discussed with Jona Gilliland at 3:50 pm on 3/23/2022. CT ABDOMEN PELVIS W IV CONTRAST Additional Contrast? None   Final Result   1. Worsening inflammatory changes surrounding the mid sigmoid colon likely   due to colitis causing worsening colonic dilatation. Underlying malignancy   cannot be excluded; correlate with colonoscopy. 2. Multiple nodular bilateral ground-glass opacities. The differential   diagnosis includes infectious process and metastatic disease recommend CT of   the chest with contrast for further evaluation. 3. Multiple indeterminate hepatic lesions. Recommend nonemergent MRI of the   abdomen with without contrast utilizing Eovist.             Assessment :       1. ESRD   Etiology: suspected DN  Access: Abd PD cath and Tunneled Cath  Volume: Hypervolemic        2. Electrolytes/Acid base  No Dyskalemia    Recent Labs     04/04/22  0552   *   K 4.2   CO2 20*       3. BMD  -Hyperphosphatemia, SHPT  -maintained on Renvela    Lab Results   Component Value Date    CALCIUM 7.1 (L) 04/04/2022    PHOS 4.5 02/17/2022        4. HTN  -Blood pressure high       BP Readings from Last 1 Encounters:   04/04/22 (!) 155/91       5. Anemia  -anemia of CKD    Recent Labs     04/04/22  0551   HGB 8.4*     6. DM    7. Metastatic colon cancer   -will need chemo    8. Cdiff    PLAN :       - PD fluid: NGTD  - continue HD while in hospital. Seen on HD  - hold BP meds:   - MBD management  - Anemia management  - Dose meds to GFR<10    Thank you for allowing us to participate in care of Danae Dunn . We will continue to follow. Feel free to contact me with any questions.       Harinder Mejía MD  4/4/2022    [facility] [address] [place] [address]  Office : [phone]  Fax :[phone]

## 2022-04-04 NOTE — CARE COORDINATION
Received call from RN that spouse has left for the day and won't be back. He does not think that pt had ostomy education. Pt had been seen and changed pouch Friday where she stated she understood all aspects of pouch change and management. Pt currently in HD. Does have discharge order, but HD needs to be set up as outpt. Attempted to call spouse to see if he would like to come in for lesson. No answer, message left for him to return call.  Kellee Mojica, MSN, RN, [facility]

## 2022-04-04 NOTE — PROGRESS NOTES
Occupational Therapy  Pt out of room to dialysis. Will follow and re-attempt as schedule permits.   Manjit JUNE/JESSICA,515

## 2022-04-04 NOTE — ADT AUTH CERT
Utilization Reviews         General Surgery or Procedure GRG - Care Day 11 (4/1/2022) by Dian Leung RN       Review Status Review Entered   Completed 4/4/2022 07:49      Criteria Review      Care Day: 11 Care Date: 4/1/2022 Level of Care: Intermediate Care    Guideline Day 2    Level Of Care    (X) Floor    Clinical Status    ( ) * No ICU or intermediate care needs    Interventions    (X) Inpatient interventions continue    4/4/2022 7:49 AM EDT by Lavonne Hodgkin      HD MWF    * Milestone   Additional Notes   DATE: 4/1/22      Pertinent Updates:   Diet:  Poor intake. continue hemodialysis as per MWF   Transfused 1 unit PRBC on 3/31      Vitals:   BP (!) 165/90   Pulse 83   Temp 97.6 °F (36.4 °C)   Resp 16  SpO2 100%   BMI 31.16 kg/m²       Abnl/Pertinent Labs/Radiology/Diagnostic Studies:      Prothrombin Time: 31.5 (H)   INR: 2.68 (H)      Hemoglobin Quant: 9.3 (L)   Hematocrit: 27.9 (L)      Sodium: 128 (L)   Potassium: 3.7   Chloride: 94 (L)   CO2: 21   BUN,BUNPL: 18   Creatinine: 5.2 (HH)   Anion Gap: 13   GFR Non-: 9 (A)   GFR : 10 (A)   GLUCOSE, FASTING,GF: 112 (H)   CALCIUM, SERUM, 009775: 7.5 (L)   Total Protein: 4.8 (L)   Albumin: 2.3 (L)   Albumin/Globulin Ratio: 0.9 (L)   Alk Phos: 193 (H)   ALT: 14   AST: 34   Bilirubin: 0.4      MD Consults/Assessments & Plans:   Per general surgery   Improving overall               Tolerated more PO today but still limited by dysphagia               Pain controlled               dispo planning      Per Oncology   Plan:       1.  Stage IV colon cancer.  She should follow-up with Dr. Petr Nichols will decide as an outpatient which chemotherapy regimen to start.       2.  Pulmonary embolus.  She is on Coumadin.       3.  Anemia.  Her hemoglobin is improving.       Medications:      nystatin 5 mL Oral 4x Daily    · pantoprazole 40 mg Oral QAM AC   · amLODIPine 10 mg Oral Daily discontinued    · losartan 100 mg Oral Nightly 03/30/22 134/77           Anemia   -anemia of CKD   -assess for CHRIS       Recent Labs     03/30/22   0656   HGB 7.7*       6. DM       7. abdominal pain    Colon cancer on pathology    Extensive metastasis   - on po vancomycin for c. Diff        PLAN :       - Empiric antibiotics   - HD today   - PD fluid cx : NGTD   - continue hemodialysis as per MWF   - hold BP meds:    - MBD management   - Anemia management   - Dose meds to GFR<10      Per surgery   ASSESSMENT/PLAN   Sigmoid colon cancer s/p diverting colostomy 3/24/22       -continue diet as able   -+ thrush noted. Nystatin started yesterday. Pain improved. Able to tolerate more orally with throat pain controlled   -In HD this AM   -Feeling better   -abx for c. Diff   -ostomy teaching. Stoma ok.  +stool and gas output       Medications:   nystatin 5 mL Oral 4x Daily    · pantoprazole 40 mg Oral QAM AC   · amLODIPine 10 mg Oral Daily   · losartan 50 mg Oral Nightly   · warfarin placeholder: dosing by pharmacy Other RX Placeholder   · vancomycin 250 mg Oral 4x Daily         Orders:      Hold warfarin tonight                    General Surgery or Procedure GRG - Care Day 7 (3/28/2022) by Randal Hare RN       Review Status Review Entered   Completed 4/4/2022 07:26      Criteria Review      Care Day: 7 Care Date: 3/28/2022 Level of Care: Intermediate Care    Guideline Day 2    Level Of Care    (X) Floor    Clinical Status    ( ) * No ICU or intermediate care needs    Interventions    (X) Inpatient interventions continue    4/4/2022 7:26 AM EDT by Ricky Luciano      Morphine 2 mg IV 12h prn x3  ondansetron (ZOFRAN) injection 4 mg IV q6h prn x1  albumin human 25 % IV solution 25 g IV once  vancomycin (VANCOCIN) capsule 250 mg po 4x daily    * Milestone   Additional Notes   DATE: 3/28/22      Vitals:   BP (!) 174/102   Pulse 90   Temp 97.6 °F (36.4 °C)   Resp 16   SpO2 100%   BMI 34.25 kg/m²       Abnl/Pertinent Labs/Radiology/Diagnostic Studies: Sodium: 131 (L)   Potassium: 4.7   Chloride: 97 (L)   CO2: 21   BUN,BUNPL: 36 (H)   Creatinine: 5.5 (HH)   Anion Gap: 13   GFR Non-: 8 (A)   GFR : 10 (A)   GLUCOSE, FASTING,GF: 127 (H)   CALCIUM, SERUM, 740338: 6.8 (L)   Total Protein: 4.1 (L)   Albumin: 1.9 (L)   Albumin/Globulin Ratio: 0.9 (L)   Alk Phos: 184 (H)   ALT: 8 (L)   AST: 23   Bilirubin: 0.3   WBC: 6.9   RBC: 2.68 (L)   Hemoglobin Quant: 8.5 (L)   Hematocrit: 26.0 (L)      Prothrombin Time: 14.1 (H)   INR: 1.24 (H)         MD Consults/Assessments & Plans:   Per general surgery   ASSESSMENT/PLAN   Sigmoid colon cancer s/p diverting colostomy 3/24/22       -continue diet as able   -Regular diet. Sore throat since surgery. Was gong to give nystatin for possible thrush but patient doesn't want that.   -abx for c. Diff   Stoma ok. 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  S/p ex lap with diverting loop colostomy.  Onc following plans to start palliative chemo as outpt   PE on coumadin with heparin bridge   Cdiff  On oral vanc   ESRD on HD   Odynphagia/sore throat.  Occurring since her surgery.  Denies dysphagia.  No obvious oral thrush.  Pt declining EGD at this time. Carmine Dies try oral chloraseptic spray as needed.  Will also place her on a daily PPI.            Medications:   warfarin, 5 mg, Once   losartan, 50 mg, Nightly   warfarin placeholder: dosing by pharmacy, , RX Placeholder   midodrine, 5 mg, BID WC   Heparin gtt          Orders:   Regular diet      Per Ostomy teaching   Pt loop stoma intact with bridge. Peristomal care provided. No peristomal skin breakdown. Pt stoma pink and moist. Barrier ring and paste applied over bridge. 1-piece pouch cut to fit. Barrier strips applied to wafer edges. Good seal obtained. Pt has loose brown stool. Pt educated on how to open and close pouch. Taught with demonstration for first pouch change.  Will continue to meet with patient to go over pouch changes.

## 2022-04-04 NOTE — FLOWSHEET NOTE
04/04/22 0832 04/04/22 1135   Vital Signs   BP (!) 167/95 127/77   Temp 98 °F (36.7 °C) 97.5 °F (36.4 °C)   Pulse 92 90   Resp 24  --    SpO2 99 %  --    Weight 173 lb 11.6 oz (78.8 kg) 170 lb 6.7 oz (77.3 kg)   Weight Method Bed scale Bed scale     Treatment time: 3 hours  Net UF: 1500 ml     Access used: RTDC  Access function: good with  ml/min       Summary of response to treatment: Tolerated tx well, no distress noted, MD notified of tx. Copy of dialysis treatment record placed in chart, to be scanned into EMR.

## 2022-04-04 NOTE — PROGRESS NOTES
Hematology Oncology Daily Progress Note    Admit Date: 3/22/2022  Hospital day several    Subjective:     Patient has complaints of improved abdominal pain--denies sob/cp. Medication side effects: none    Scheduled Meds:   losartan  100 mg Oral Nightly    nystatin  5 mL Oral 4x Daily    pantoprazole  40 mg Oral QAM AC    warfarin placeholder: dosing by pharmacy   Other RX Placeholder    lactated ringers bolus  500 mL IntraVENous Once    sodium chloride flush  5-40 mL IntraVENous 2 times per day    insulin lispro  0-6 Units SubCUTAneous TID WC    insulin lispro  0-3 Units SubCUTAneous Nightly     Continuous Infusions:   sodium chloride      sodium chloride 25 mL (03/24/22 1114)    dextrose       PRN Meds:heparin (porcine), sodium chloride, phenol, morphine **OR** morphine, albumin human, midodrine, heparin (porcine), diphenhydrAMINE, sodium chloride flush, sodium chloride, ondansetron **OR** ondansetron, polyethylene glycol, acetaminophen **OR** acetaminophen, oxyCODONE, glucose, dextrose, glucagon (rDNA), dextrose    Review of Systems  Pertinent items are noted in HPI. REVIEW OF SYSTEMS:         · Constitutional: Denies fever, sweats, weight loss     · Eyes: No visual changes or diplopia. No scleral icterus. · ENT: No Headaches, hearing loss or vertigo. No mouth sores or sore throat. · Cardiovascular: No chest pain, dyspnea on exertion, palpitations or loss of consciousness. · Respiratory: No cough or wheezing, no sputum production. No hemoptysis. .    · Gastrointestinal: No abdominal pain, appetite loss, blood in stools. No change in bowel habits. · Genitourinary: No dysuria, trouble voiding, or hematuria. · Musculoskeletal:  Generalized weakness. No joint complaints. · Integumentary: No rash or pruritis. · Neurological: No headache, diplopia. No change in gait, balance, or coordination. No paresthesias. · Endocrine: No temperature intolerance. No excessive thirst, fluid intake, or urination. · Hematologic/Lymphatic: No abnormal bruising or ecchymoses, blood clots or swollen lymph nodes. · Allergic/Immunologic: No nasal congestion or hives. ·     Objective:     Patient Vitals for the past 8 hrs:   BP Temp Temp src Pulse Resp SpO2 Weight   04/04/22 0536 -- -- -- -- -- -- 189 lb 13.1 oz (86.1 kg)   04/04/22 0441 (!) 155/91 97.7 °F (36.5 °C) Oral 95 22 98 % --     I/O last 3 completed shifts: In: 120 [P.O.:120]  Out: 50 [Stool:50]  No intake/output data recorded. BP (!) 155/91   Pulse 95   Temp 97.7 °F (36.5 °C) (Oral)   Resp 22   Ht 5' 3\" (1.6 m)   Wt 189 lb 13.1 oz (86.1 kg)   SpO2 98%   BMI 33.62 kg/m²     General Appearance:    Alert, cooperative, no distress, appears stated age   Head:    Normocephalic, without obvious abnormality, atraumatic   Eyes:    PERRL, conjunctiva/corneas clear, EOM's intact, fundi     benign, both eyes        Ears:    Normal TM's and external ear canals, both ears   Nose:   Nares normal, septum midline, mucosa normal, no drainage    or sinus tenderness   Throat:   Lips, mucosa, and tongue normal; teeth and gums normal   Neck:   Supple, symmetrical, trachea midline, no adenopathy;        thyroid:  No enlargement/tenderness/nodules; no carotid    bruit or JVD   Back:     Symmetric, no curvature, ROM normal, no CVA tenderness   Lungs:     Clear to auscultation bilaterally, respirations unlabored   Chest wall:    No tenderness or deformity   Heart:    Regular rate and rhythm, S1 and S2 normal, no murmur, rub   or gallop   Abdomen:     Soft, non-tender, bowel sounds active all four quadrants,     no masses, no organomegaly           Extremities:   Extremities normal, atraumatic, no cyanosis or edema   Pulses:   2+ and symmetric all extremities   Skin:   Skin color, texture, turgor normal, no rashes or lesions   Lymph nodes:   Cervical, supraclavicular, and axillary nodes normal   Neurologic:   CNII-XII intact.  Normal strength, sensation and reflexes       throughout Data Review  CBC:   Lab Results   Component Value Date    WBC 6.4 04/04/2022    RBC 2.66 04/04/2022       Assessment:     Principal Problem:    Colitis  Active Problems:    Cancer of sigmoid colon ([address]    Hepatic lesion  Resolved Problems:    * No resolved hospital problems. *      Plan:     1. Stage IV colon cancer. Dr. Fina Johnson will decide which chemotherapy regimen to use as an outpatient. Her end-stage renal disease does complicate matters a bit. 2.  Anemia. Her hemoglobin is stable. She should be getting EPO with dialysis. 3.  Pulmonary embolus. She is on Coumadin.     Electronically signed by Daniele Andrew MD on 4/4/2022 at 8:46 AM

## 2022-04-04 NOTE — CARE COORDINATION
Spoke with the  at [facility] who is familiar with Ms. Corry Ashby. She arranged out patient transportation through [facility]. Ms. Corry Ashby has been through the process before. The  left a lengthy message for the pt and her spouse on the home phone number.      Electronically signed by KOFI Butt RN-[facility]  [phone]

## 2022-04-04 NOTE — PROGRESS NOTES
Hospitalist Progress Note      PCP: No primary care provider on file. Date of Admission: 3/22/2022    Chief Complaint: s/p colostomy placement    Hospital Course:    Ms. Marko Gaming was admitted with sepsis due to colitis. She was started on broad spectrum antibiotics but developed hypotensive drug reaction and urticaria and antibiotics were held. At the same time, her stool tested positive for C dif and she was started on oral vancomycin and IV flagyl (completed) . Surgery created diverting colostomy on 3/24 for obstructing newly diagnosed sigmoid colon cancer. Was switched from PD to HD. Subjective:   Was seen during HD, denies any complaints this morning.      Medications:  Reviewed    Infusion Medications    sodium chloride      sodium chloride 25 mL (03/24/22 1114)    dextrose       Scheduled Medications    losartan  50 mg Oral Nightly    nystatin  5 mL Oral 4x Daily    pantoprazole  40 mg Oral QAM AC    warfarin placeholder: dosing by pharmacy   Other RX Placeholder    lactated ringers bolus  500 mL IntraVENous Once    sodium chloride flush  5-40 mL IntraVENous 2 times per day    insulin lispro  0-6 Units SubCUTAneous TID WC    insulin lispro  0-3 Units SubCUTAneous Nightly     PRN Meds: heparin (porcine), sodium chloride, phenol, morphine **OR** morphine, albumin human, midodrine, heparin (porcine), diphenhydrAMINE, sodium chloride flush, sodium chloride, ondansetron **OR** ondansetron, polyethylene glycol, acetaminophen **OR** acetaminophen, oxyCODONE, glucose, dextrose, glucagon (rDNA), dextrose      Intake/Output Summary (Last 24 hours) at 4/4/2022 1020  Last data filed at 4/4/2022 0022  Gross per 24 hour   Intake 120 ml   Output 50 ml   Net 70 ml       Physical Exam Performed:    BP (!) 155/91   Pulse 95   Temp 97.7 °F (36.5 °C) (Oral)   Resp 22   Ht 5' 3\" (1.6 m)   Wt 189 lb 13.1 oz (86.1 kg)   SpO2 98%   BMI 33.62 kg/m²     General appearance: No apparent distress, appears Final Result   1. Interval transverse colostomy creation, with mild colonic wall thickening   of the transverse colon at the level of the ostomy which may be related to   poor distention, though wall thickening of the descending colon as well as   the sigmoid colon may be related to colitis. The colonic distention seen on   the preoperative examination has improved. 2. There is a small volume of intra-and pelvic ascites, within indwelling   peritoneal catheter noted. 3. Numerous hepatic lesions are seen suspicious for possible metastasis. Low-attenuation nonspecific lesions noted within the spleen as well. 4. Circumferential distal esophageal thickening is identified suggestive of   esophagitis. 5. Wall thickening of the distal stomach and duodenum suggestive of   gastritis/enteritis. 6. Multifocal nodular appearing infiltrates are seen within the lung bases,   similar to the previous exam.   7. Other similar findings as above. VL Extremity Venous Bilateral   Final Result      CT CHEST W CONTRAST   Final Result   Bilateral pulmonary emboli slightly greater on the right than left. Potential concern for septic emboli given the small foci of irregular   parenchymal lung lesions new from the prior chest CT study, only partially   included and demonstrated on abdomen pelvic CT 1 day earlier. These could   certainly also be metastatic as well but somewhat atypical given the   irregular margins. Multiple liver metastases partially included, unchanged as seen on abdomen   pelvic CT 1 day earlier      Abdominal ascites, decreased from the prior exam 1 day earlier, with recently   demonstrated pneumoperitoneum not seen currently in the included upper   abdomen. .      Esophageal mural thickening suspected esophagitis. Findings were discussed with [facility] at 3:50 pm on 3/23/2022. CT ABDOMEN PELVIS W IV CONTRAST Additional Contrast? None   Final Result   1.  Worsening inflammatory changes surrounding the mid sigmoid colon likely   due to colitis causing worsening colonic dilatation. Underlying malignancy   cannot be excluded; correlate with colonoscopy. 2. Multiple nodular bilateral ground-glass opacities. The differential   diagnosis includes infectious process and metastatic disease recommend CT of   the chest with contrast for further evaluation. 3. Multiple indeterminate hepatic lesions. Recommend nonemergent MRI of the   abdomen with without contrast utilizing Eovist.                 Assessment/Plan:    Active Hospital Problems    Diagnosis     Hepatic lesion [K76.9]     Cancer of sigmoid colon ([address] [C18.7]     Colitis [K52.9]        Metastatic colon cancer:  - colonoscopy 3/23 with colonic mass  - CT scan concerning for liver mets, possibly pulmonary as well  - oncology consulted  - surgery:  Placed diverting colostomy 3/24-- significant pelvic tumors found.    -Planned to commence chemotherapy as outpatient, follows with Dr. Tyrese Ibrahim      Acute on chronic anemia(likely blood loss). Occasional bloody bowel motions likely related to malignancy. Hemoglobin is 7.7--> 7--> 9.3--> 9.2--> 8.7--> 8.4  Received 1 unit of packed red blood cells 3/31      Dysphagia:  -Likely related to old oral candidiasis  - PPI and chloroseptic spray  - continue nystatin.          Acute bilateral pulmonary embolism:  - heparin drip due to colonoscopy on 3/23  - Coumadin initiated 3/25. INR therapeutic, heparin discontinued     ESRD on dialysis:  - HD due to concern for infection on PD (recent peritonitis). Resolved problems:  - Drug reaction:  resolved. - Sepsis due to colitis ( resolved). - C dif infection:  -completed 10 days of PO vancomycin    DVT Prophylaxis: warfarin  Diet: ADULT DIET; Regular  Code Status: Full Code    PT/OT Eval Status: following     Dispo - patient in need of HD dialysis slot as outpatient ( medically stable for DC).

## 2022-04-04 NOTE — PLAN OF CARE
Problem: Pain:  Goal: Pain level will decrease  Description: Pain level will decrease  Outcome: Ongoing     Problem: Pain:  Goal: Control of acute pain  Description: Control of acute pain  Outcome: Ongoing     Problem: Pain:  Goal: Control of chronic pain  Description: Control of chronic pain  Outcome: Ongoing     Problem: Nausea/Vomiting:  Goal: Absence of nausea/vomiting  Description: Absence of nausea/vomiting  Outcome: Ongoing     Problem: Nausea/Vomiting:  Goal: Able to drink  Description: Able to drink  Outcome: Ongoing     Problem: Nausea/Vomiting:  Goal: Able to eat  Description: Able to eat  Outcome: Ongoing     Problem: Nausea/Vomiting:  Goal: Ability to achieve adequate nutritional intake will improve  Description: Ability to achieve adequate nutritional intake will improve  Outcome: Ongoing     Problem: Falls - Risk of:  Goal: Will remain free from falls  Description: Will remain free from falls  Outcome: Ongoing     Problem: Falls - Risk of:  Goal: Absence of physical injury  Description: Absence of physical injury  Outcome: Ongoing     Problem: Skin Integrity:  Goal: Will show no infection signs and symptoms  Description: Will show no infection signs and symptoms  Outcome: Ongoing     Problem: Fluid Volume:  Goal: Will show no signs or symptoms of fluid imbalance  Description: Will show no signs or symptoms of fluid imbalance  Outcome: Ongoing     Problem: Bleeding:  Goal: Will show no signs and symptoms of excessive bleeding  Description: Will show no signs and symptoms of excessive bleeding  Outcome: Ongoing

## 2022-04-08 PROBLEM — R58 BLEEDING: Status: ACTIVE | Noted: 2022-01-01

## 2022-04-08 NOTE — ED NOTES
Called lab for repeat INR . Lab to send someone. Patient hard stick. Unable to pull blood from PIV in Right AC. Flushes without difficulty or pain to patient.       Cathi Hein RN  04/08/22 [id number]

## 2022-04-08 NOTE — ED NOTES
Patient placed on 3 liters of oxygen d/t decreased saturation while resting.       Ayanna Andrews RN  04/08/22 [id number]

## 2022-04-08 NOTE — ED NOTES
Communicated to [place], RN that patient lab draws were attempted x2 without success. Patient tolerated well.       Marilee Granados RN  04/08/22 [id number]

## 2022-04-08 NOTE — PROGRESS NOTES
Medication Reconciliation     List of medications patient is currently taking is complete. Source of information:   1. Conversation with patient at bedside  2. EPIC records        Notes regarding home medications:  1. Patient received some of her home medications today. 2. Patient has been checking blood glucose at home, has not needed to take Humalog. Still on sliding scale. 3. Patient has not been taking losartan, she keeps track of BP at home and has been in range. 4. Patient is taking 2 mg of warfarin at night. She has been taking this consistently. Today her INR is 5.98, therapeutic ranges should be 2-3.     Svetlana Jaquez, Pharmacy Intern  4/8/2022 5:49 PM

## 2022-04-08 NOTE — ED PROVIDER NOTES
**ADVANCED PRACTICE PROVIDER, I HAVE EVALUATED THIS PATIENT**        [address]      Pt Name: Andre Winston  PMU:[id number]  [place] 1965  Date of evaluation: 4/8/2022  Provider: Dejah Shah PA-C      Chief Complaint:    Chief Complaint   Patient presents with    Post-op Problem     patient with bleeding from stoma site. patient states, operation was approximately 2 weeks ago. Bleeding started approximately 30 mins ago. When patient went to stand up to leave for dialysis. patient is on warfarin. Nursing Notes, Past Medical Hx, Past Surgical Hx, Social Hx, Allergies, and Family Hx were all reviewed and agreed with or any disagreements were addressed in the HPI.    HPI: (Location, Duration, Timing, Severity, Quality, Assoc Sx, Context, Modifying factors)    Chief Complaint of bleeding at her suture or staple site on her abdomen. She is on warfarin. She just recently had a laparoscopy exploratory surgery and colostomy bag in place. This was done by Dr. Jonathan Tran. She does not complain of abdominal pain. She was getting ready for dialysis today and when she got out of her chair and stood up she noticed a lot of blood dripping. Was coming from her staple site. Denies abdominal pain. No injuries. No falls. No other complaints. No nausea vomiting. No chest pain, no weakness. This is a  55 y.o. female who presents to emergency room with the above complaint.     PastMedical/Surgical History:      Diagnosis Date    Anemia 9/15/2014    Chronic kidney disease     Diabetes mellitus ([address]     Diabetes mellitus type 1 ([address]     Diabetic retinopathy ([address]     Diabetic retinopathy associated with type 2 diabetes mellitus, without macular edema     Hyperlipidemia 7/27/2015    Hypertension     Kidney stone     Mixed hyperlipidemia 7/27/2015    Orthostatic hypotension     Vitreous hemorrhage of right eye ([address] 8/15/2017         Procedure Laterality Date    APPENDECTOMY      DIALYSIS CATHETER INSERTION N/A 2/24/2022    LAPAROSCOPIC PLACEMENT OF PERITONEAL DIALYSIS CATHETER performed by Lianet Handy MD at [address] [facility] N/A 2/24/2022    REMOVAL OF EXISTING PERITONEAL DIALYSIS CATHETER performed by Lianet Handy MD at [address] [facility] N/A 8/12/2019    LAPAROSCOPIC PERITONEAL DIALYSIS CATHETER PLACEMENT WITH AXEL TROCHAR performed by Lianet Handy MD at [facility] N/A 3/24/2022    EXPLORATORY LAPAROTOMY, COLOSTOMY performed by Nino Alarcon MD at [address] [facility] 3/23/2022    [address] performed by Jaycee Chavez MD at [address]  3/23/2022    SIGMOIDOSCOPY SUBMUCOSAL TATTOO INJECTION performed by Jaycee Chavez MD at [address] N/A 2/24/2022    REPAIR OF SMALL VENTRAL HERNIA performed by Lianet Handy MD at [place] [address]       Medications:  Previous Medications    INSULIN LISPRO (HUMALOG KWIKPEN) 100 UNIT/ML PEN    Inject 2-4 Units into the skin 3 times daily as needed for High Blood Sugar Sliding scale     LOSARTAN (COZAAR) 100 MG TABLET    Take 1 tablet by mouth at bedtime    MIDODRINE (PROAMATINE) 10 MG TABLET    Take 1 tablet by mouth three times a week On HD days    PANTOPRAZOLE (PROTONIX) 40 MG TABLET    Take 1 tablet by mouth every morning (before breakfast)    WARFARIN (COUMADIN) 2 MG TABLET    Take 1 tablet by mouth daily         Review of Systems:  (2-9 systems needed)  Review of Systems   Constitutional: Negative for chills and fever. HENT: Negative for congestion and sore throat. Eyes: Negative for pain and visual disturbance. Respiratory: Negative for cough and shortness of breath. Cardiovascular: Negative for chest pain and leg swelling.    Gastrointestinal: Negative for abdominal pain, nausea and vomiting. Genitourinary: Negative for dysuria and frequency. Musculoskeletal: Negative for back pain and neck pain. Skin: Negative for rash and wound. Neurological: Negative for dizziness and light-headedness. \"Positives and Pertinent negatives as per HPI\"    Physical Exam:  Physical Exam  Vitals and nursing note reviewed. Cardiovascular:      Rate and Rhythm: Normal rate and regular rhythm. Heart sounds: Normal heart sounds. No murmur heard. No friction rub. No gallop. Pulmonary:      Effort: Pulmonary effort is normal. No respiratory distress. Breath sounds: Normal breath sounds. No wheezing or rales. Chest:      Chest wall: No tenderness. Abdominal:      General: Abdomen is flat. Bowel sounds are normal. There is no distension. Palpations: Abdomen is soft. There is no mass. Tenderness: There is no abdominal tenderness. There is no guarding or rebound. Neurological:      General: No focal deficit present.          MEDICAL DECISION MAKING    Vitals:    Vitals:    04/08/22 1330 04/08/22 1445 04/08/22 1556   BP: (!) 139/90 (!) 144/84 (!) 148/78   Pulse: 100 95 92   Resp: 18 18 16   Temp: 97.1 °F (36.2 °C)     TempSrc: Oral     SpO2: 97% 94% 91%   Weight: 170 lb 8 oz (77.3 kg)         LABS:  Labs Reviewed   CBC WITH AUTO DIFFERENTIAL - Abnormal; Notable for the following components:       Result Value    RBC 2.85 (*)     Hemoglobin 8.9 (*)     Hematocrit 26.5 (*)     RDW 17.3 (*)     All other components within normal limits   BASIC METABOLIC PANEL W/ REFLEX TO MG FOR LOW K - Abnormal; Notable for the following components:    Glucose 145 (*)     CREATININE 5.8 (*)     GFR Non- 8 (*)     GFR  9 (*)     Calcium 7.8 (*)     All other components within normal limits    Narrative:     [facility] tel. [phone],  Chemistry results called to and read back by DEVENDRA Bright, 04/08/2022  16:44, by LNYHI   PROTIME-INR - Abnormal; Notable for the following components:    Protime 72.8 (*)     INR 5.98 (*)     All other components within normal limits    Narrative:     Jesusita Galan tel. [phone],  Hematology results called to and read back by Tati Kramer RN, 04/08/2022  16:34, by Merced Rayo   CBC - Abnormal; Notable for the following components:    RBC 2.75 (*)     Hemoglobin 8.4 (*)     Hematocrit 25.4 (*)     RDW 17.2 (*)     All other components within normal limits   BASIC METABOLIC PANEL   PROTIME-INR        Remainder of labs reviewed and were negative at this time or not returned at the time of this note. RADIOLOGY:   Non-plain film images such as CT, Ultrasound and MRI are read by the radiologist. Rocael De Jesus PA-C have directly visualized the radiologic plain film image(s) with the below findings:      Interpretation per the Radiologist below, if available at the time of this note:    CT ABDOMEN PELVIS WO CONTRAST Additional Contrast? None   Final Result   Moderate ascitic fluid presumably related to the peritoneal dialysis catheter. Midline abdominal wall defect which may represent an ostomy creation but with   an unusual appearance. There is a vertically oriented catheter overlying the   superficial portion and surrounded by an overlying bandage. There is also   fluid adjacent to the extra-abdominal bowel segment to the left of midline. This represent simple fluid possibly from communication with ascites. Fluid infiltration along the lower midline abdominal wall staple line and   incision, the inferior portion of which has higher density collection   possible accumulation of blood extending over an area of 5.6 x 3.7 cm.               ECHO Complete 2D W Doppler W Color    Result Date: 3/24/2022  Transthoracic Echocardiography Report (TTE)  Demographics   Patient Name       Rita Prince   Date of Study      03/24/2022        Gender              Female   Patient Number [id number]        Date of Birth       1965   Visit Number       [id number]         Age                 55 year(s)   Accession Number   [id number]        Room Number         KOR   Corporate ID       [id number]           Myranda Tee, RT   Ordering Physician Therman Level, MD  Interpreting        [address] [place] [address].                                       Physician           Douglas Wolfe MD  Procedure Type of Study   TTE procedure:ECHOCARDIOGRAM COMPLETE 2D W DOPPLER W COLOR. Procedure Date Date: 03/24/2022 Start: 08:21 AM Study Location: [facility] Technical Quality: Adequate visualization Indications:Pulmonary embolus. Additional Indications:Evaluate for right heart strain. Patient Status: Routine Height: 63 inches Weight: 188 pounds BSA: 1.88 m2 BMI: 33.3 kg/m2 Rhythm: Within normal limits BP: 129/68 mmHg  Conclusions   Summary  Severe pulmonary hypertension. There is moderate to severe tricuspid regurgitation with the systolic  pulmonary artery pressure (SPAP) estimated at 100 mmHg (estimated RA  pressure of 15 mmHg included). Normal chamber size. Moderate left ventricular hypertrophy. No wall motion abnormalities. Visually estimated ejection fraction is 60%. There is diastolic septal flattening consistent with right ventricular  pressure overload. The right ventricle is moderately enlarged. Right ventricular systolic function is normal .   RVS velocity= 9.46 cm/sec. Signature   ------------------------------------------------------------------  Electronically signed by Douglas Wolfe MD (Interpreting  physician) on 03/24/2022 at 12:34 PM  ------------------------------------------------------------------   Findings   Left Ventricle  Normal chamber size. Moderate left ventricular hypertrophy. No wall motion abnormalities. Visually estimated ejection fraction is 65-70%.   There is diastolic septal flattening consistent with right ventricular  pressure overload. Mitral Valve  Mitral valve is structurally normal.  Mild thickening of the mitral valve leaflets. Mild mitral annular calcification. No significant regurgitation or stenosis. Left Atrium  Moderate left atrial dilation. The left atrial volume measures 46.1 ml/m2. Aortic Valve  The aortic valve appears trileaflet. No significant stenosis or regurgitation of the aortic valve. Aorta  The aortic root is normal in size. Right Ventricle  The right ventricle is moderately enlarged. Right ventricular systolic function is normal .  TAPSE measures 1.33 cm by objective criteria. RVS velocity= 9.46 cm/sec. Tricuspid Valve  Severe pulmonary hypertension. There is moderate to severe tricuspid regurgitation with the systolic  pulmonary artery pressure (SPAP) estimated at 100 mmHg (estimated RA  pressure of 15 mmHg included). Right Atrium  Normal right atrial size. Pulmonic Valve  The pulmonic valve is not well visualized. Trivial pulmonic regurgitation present. Pericardial Effusion  There is a mild pericardial effusion. No evidence of cardiac tamponade. Pleural Effusion  No pleural effusion. Miscellaneous  IVC size is dilated (>2.1 cm) and collapses < 50% with respiration  consistent with markedly elevated RA pressure (15 mmHg).   M-Mode/2D Measurements (cm)   LV Diastolic Dimension: 4.1 cm LV Systolic Dimension: 2.6 cm  LV Septum Diastolic: 2.23 cm  LV PW Diastolic: 3.17 cm       AO Root Dimension: 3.1 cm                                 LA Dimension: 3.8 cm                                 LA Area: 25.2 cm2  LVOT: 1.9 cm                   LA volume/Index: 80.1 ml /43 ml/m2  Doppler Measurements   AV Peak Velocity: 164 cm/s     MV Peak E-Wave: 116 cm/s  AV Peak Gradient: 10.76 mmHg   MV Peak A-Wave: 105 cm/s  AV Mean Gradient: 7 mmHg       MV E/A Ratio: 1.1  LVOT Peak Velocity: 106 cm/s   MV P1/2t: 95 msec  AV Area (Continuity):2.02 cm2 TR Velocity:400 cm/s  TR Gradient:64 mmHg  Estimated RAP:15 mmHg          MV Area (PHT): 2.32 cm2  Estimated RVSP: 87 mmHg  E' Septal Velocity: 4.13 cm/s  E' Lateral Velocity: 7.94 cm/s  E/E' ratio: 14.6  PV Peak Velocity: 113 cm/s  PV Peak Gradient: 5.11 mmHg   Aortic Valve   Peak Velocity: 164 cm/s     Mean Velocity: 120 cm/s  Peak Gradient: 10.76 mmHg   Mean Gradient: 7 mmHg  Area (continuity): 2.02 cm2  AV VTI: 28.7 cm  Aorta   Aortic Root: 3.1 cm  LVOT Diameter: 1.9 cm      CT CHEST W CONTRAST    Result Date: 3/23/2022  EXAMINATION: CT OF THE CHEST WITH CONTRAST 3/23/2022 3:12 pm TECHNIQUE: CT of the chest was performed with the administration of intravenous contrast. Multiplanar reformatted images are provided for review. Dose modulation, iterative reconstruction, and/or weight based adjustment of the mA/kV was utilized to reduce the radiation dose to as low as reasonably achievable. COMPARISON: Chest CT 06/20/2021 HISTORY: ORDERING SYSTEM PROVIDED HISTORY: per [facility] ct recc TECHNOLOGIST PROVIDED HISTORY: Reason for exam:->per [facility] ct recc Decision Support Exception - unselect if not a suspected or confirmed emergency medical condition->Emergency Medical Condition (MA) Reason for Exam: per [facility] ct recc FINDINGS: Pulmonary Arteries: There is adequate pulmonary artery enhancement. Bilateral pulmonary emboli present involving both upper and lower lobe on the right as well as left lower lobe segmental and subsegmental branches. No central or main pulmonary artery emboli. Embolus does branch into the proximal descending pulmonary arteries on the right. Interventricular septum is midline, no evidence of significant right heart strain. Main pulmonary artery measures 3.2 cm transversely compared to adjacent slightly larger ascending aorta 3.4 cm. A right IJ catheter extends to the lower superior vena cava. There is mural thickening of the esophagus extending from the thoracic inlet inferiorly suspected esophagitis. Mediastinum: Small confluent lymph nodes are seen enlarging the left hilum medially no evidence of mediastinal lymphadenopathy. The heart and pericardium demonstrate no acute abnormality. Moderate to severe 3 vessel coronary artery calcification present. No evidence of aortic dissection. Lungs/pleura: Patchy areas of airspace disease are seen bilaterally. These could be related to small infarctions. The possibility of inflammatory disease or even septic emboli not entirely excluded. These are new from the prior study. There is no pleural effusion or pneumothorax. Airways are unremarkable. Upper Abdomen: There is ascites noted in the upper abdomen less than seen on the prior CT. However multiple low-density liver lesions are present not present previously as well. At least 1 or 2 splenic low-density lesions are seen. This could represent an embolic process as well including septic emboli. The recently demonstrated pneumoperitoneum is not seen on current exam which includes the upper abdomen. Soft Tissues/Bones: No acute bone or soft tissue abnormality. Bilateral pulmonary emboli slightly greater on the right than left. Potential concern for septic emboli given the small foci of irregular parenchymal lung lesions new from the prior chest CT study, only partially included and demonstrated on abdomen pelvic CT 1 day earlier. These could certainly also be metastatic as well but somewhat atypical given the irregular margins. Multiple liver metastases partially included, unchanged as seen on abdomen pelvic CT 1 day earlier Abdominal ascites, decreased from the prior exam 1 day earlier, with recently demonstrated pneumoperitoneum not seen currently in the included upper abdomen. . Esophageal mural thickening suspected esophagitis. Findings were discussed with Heidi Lopez at 3:50 pm on 3/23/2022.      CT ABDOMEN PELVIS W IV CONTRAST Additional Contrast? None    Result Date: 3/28/2022  EXAMINATION: CT OF THE ABDOMEN AND PELVIS WITH CONTRAST 3/28/2022 4:17 am TECHNIQUE: CT of the abdomen and pelvis was performed with the administration of intravenous contrast. Multiplanar reformatted images are provided for review. Dose modulation, iterative reconstruction, and/or weight based adjustment of the mA/kV was utilized to reduce the radiation dose to as low as reasonably achievable. COMPARISON: 03/22/2022 HISTORY: ORDERING SYSTEM PROVIDED HISTORY: worsening abd pain- recent colostomy, has PD cath in place, on heparin for concurrent Pulm emboli w/ surgery done in same time frame TECHNOLOGIST PROVIDED HISTORY: Reason for exam:->worsening abd pain- recent colostomy, has PD cath in place, on heparin for concurrent Pulm emboli w/ surgery done in same time frame Additional Contrast?->None Reason for Exam: worsening abd pain- recent colostomy, has PD cath in place, on heparin for concurrent Pulm emboli. .. FINDINGS: Lower Chest: Patchy bibasilar nodular infiltrates. Circumferential distal esophageal thickening is identified. Mild cardiomegaly. Coronary artery atherosclerosis is identified. Organs: Numerous liver lesions are again identified as previously demonstrated. Scattered low attenuation lesions noted within the spleen as well. Contrast noted within the gallbladder lumen. No adrenal mass. No pancreatic ductal dilation or focal mass is identified. GI/Bowel: Gastric distention is noted with fluid. Mild wall thickening of the duodenum. A transverse colostomy is noted. Circumferential wall thickening is seen within the transverse colon just proximal to the ostomy, as well as throughout the remainder of the colon distal to the ostomy site through the level of the sigmoid colon. Liquid stool noted within the colon. No small bowel obstruction is identified. Pelvis: Intrauterine device is noted. The bladder appears unremarkable. Small volume of ascites seen within the pelvis. Presacral edema.  Peritoneum/Retroperitoneum: No abdominal aortic aneurysm is identified. Borderline retroperitoneal lymphadenopathy. Small volume upper abdominal ascites, seen adjacent to the liver, spleen as well as the distal stomach and duodenum as well as adjacent to the proximal pancreas. Pericolic gutter ascites is noted. An indwelling peritoneal dialysis catheter is noted. Bones/Soft Tissues: Anasarca is identified. No acute osseous abnormality. Degenerative changes are seen within the spine and pelvis. No osseous destructive process is identified. Moderate canal stenosis seen in the lower lumbar spine at L4-L5 and L5-S1. there is a midline incision seen, with a mixed attenuation partially liquefied hematoma seen. No internal gas to suggest an abscess. 1. Interval transverse colostomy creation, with mild colonic wall thickening of the transverse colon at the level of the ostomy which may be related to poor distention, though wall thickening of the descending colon as well as the sigmoid colon may be related to colitis. The colonic distention seen on the preoperative examination has improved. 2. There is a small volume of intra-and pelvic ascites, within indwelling peritoneal catheter noted. 3. Numerous hepatic lesions are seen suspicious for possible metastasis. Low-attenuation nonspecific lesions noted within the spleen as well. 4. Circumferential distal esophageal thickening is identified suggestive of esophagitis. 5. Wall thickening of the distal stomach and duodenum suggestive of gastritis/enteritis. 6. Multifocal nodular appearing infiltrates are seen within the lung bases, similar to the previous exam. 7. Other similar findings as above.      CT ABDOMEN PELVIS W IV CONTRAST Additional Contrast? None    Result Date: 3/22/2022  EXAMINATION: CT OF THE ABDOMEN AND PELVIS WITH CONTRAST 3/22/2022 9:00 am TECHNIQUE: CT of the abdomen and pelvis was performed with the administration of intravenous contrast. Multiplanar reformatted images are provided for review. Dose modulation, iterative reconstruction, and/or weight based adjustment of the mA/kV was utilized to reduce the radiation dose to as low as reasonably achievable. COMPARISON: 02/15/2022 HISTORY: ORDERING SYSTEM PROVIDED HISTORY: n/v/ lower abd pain TECHNOLOGIST PROVIDED HISTORY: Additional Contrast?->None Reason for exam:->n/v/ lower abd pain Decision Support Exception - unselect if not a suspected or confirmed emergency medical condition->Emergency Medical Condition (MA) Reason for Exam: lower abd pain, n/v Additional signs and symptoms: hx dialysis, appy FINDINGS: Lower Chest: There are multiple nodular ground-glass opacities throughout the bilateral lungs. An index left lower lobe pulmonary nodule measures 0.8 x 1.6 cm. There is bibasilar scarring. Coronary artery calcifications are a marker of atherosclerosis. The main pulmonary artery is dilated due to pulmonary hypertension. A central venous catheter terminates at the caval atrial junction. Organs: The spleen, pancreas, adrenal glands and kidneys are unremarkable. The bilateral kidneys are atrophic. However, there are multiple indeterminate subcentimeter low attenuating lesions throughout the liver. GI/Bowel: There is worsening inflammatory changes surrounding the mid sigmoid colon due to colitis. This is causing worsening dilatation of the colon proximal to the inflammatory changes. Status post appendectomy. Pelvis: The pelvic viscera are within normal limits. An intrauterine device is in situ. Peritoneum/Retroperitoneum: There is no adenopathy. Moderate atherosclerosis involves the abdominal aorta and major branch vessels. A trace amount of free fluid is present throughout the abdomen and pelvis with mild presacral edema. A trace amount of free air is present in the right upper quadrant. A right lower quadrant peritoneal dialysis catheter is coiled within the anterior central pelvis.  Bones/Soft Tissues: Degenerative changes involve the thoracolumbar spine and bilateral hips. A moderate amount of anasarca is present throughout the abdomen pelvis with worsening skin thickening involving the anterior abdominal wall. 1. Worsening inflammatory changes surrounding the mid sigmoid colon likely due to colitis causing worsening colonic dilatation. Underlying malignancy cannot be excluded; correlate with colonoscopy. 2. Multiple nodular bilateral ground-glass opacities. The differential diagnosis includes infectious process and metastatic disease recommend CT of the chest with contrast for further evaluation. 3. Multiple indeterminate hepatic lesions. Recommend nonemergent MRI of the abdomen with without contrast utilizing Eovist.     VL Extremity Venous Bilateral    Result Date: 3/26/2022  Lower Extremities DVT Study  Demographics   Patient Name       Tamera Garcia   Date of Study      03/26/2022         Gender              Female   Patient Number     [id number]         Date of Birth       1965   Visit Number       [id number]          Age                 55 year(s)   Accession Number   [id number]         Room Number         46   Corporate ID       [id number]            James Lan RVT   Ordering Physician Jason Trammell, Antonio        [facility]                     MD                 Physician           Surg                                                            Yuliana Gooden DO  Procedure Type of Study:   Veins:Lower Extremities DVT Study, VASC EXTREMITY VENOUS DUPLEX BILATERAL. Vascular Sonographer Report  Indications for Study:Suspected pulmonary embolism. Impressions Right Impression Indeterminate age partially occluding deep vein thrombosis involving the right distal Peroneal vein. Indeterminate age partially occluding superficial venous thrombosis involving the right Small Saphenous vein at Zone 8.  No other evidence of deep vein or superficial vein thrombosis involving the right lower extremity. Pulsatile flow noted in the Popliteal vein. Left Impression No evidence of deep vein or superficial vein thrombosis involving the left lower extremity. Proximal and mid Posterior Tibial veins were not able to be visualized due to calcified shadowing. Conclusions   Summary   No evidence of deep vein or superficial thrombosis involving the left lower  extremity. In the right lower extremity there is evidence of thrombosis of  the distal peroneal vein and small saphenous vein of indeterminate age. No  other evidence of thrombosis in the right lower extremity. Signature   ------------------------------------------------------------------  Electronically signed by Ree Neal DO (Interpreting  physician) on 03/26/2022 at 03:51 PM  ------------------------------------------------------------------  Patient Status:Routine. Study [address] Vascular Lab. Technical Quality:Limited visualization due to calcific shadowing. Risk Factors History +-----------------------+----+--------+ ! Diagnosis              ! Date! Comments! +-----------------------+----+--------+ ! Renal failure->Dialysis!    !        ! +-----------------------+----+--------+   - The patient's risk factor(s) include: diabetes mellitus, dyslipidemia and     arterial hypertension. Velocities are measured in cm/s ; Diameters are measured in mm Right Lower Extremities DVT Study Measurements Right 2D Measurements +------------------------+----------+---------------+----------+ ! Location                ! Visualized! Compressibility! Thrombosis! +------------------------+----------+---------------+----------+ ! Sapheno Femoral Junction! Yes       ! Yes            ! None      ! +------------------------+----------+---------------+----------+ ! GSV Thigh               ! Yes       ! Yes            ! None      ! +------------------------+----------+---------------+----------+ ! Common Femoral          !Yes       ! Yes            ! None      ! +------------------------+----------+---------------+----------+ ! Prox Femoral            !Yes       ! Yes            ! None      ! +------------------------+----------+---------------+----------+ ! Mid Femoral             !Yes       ! Yes            ! None      ! +------------------------+----------+---------------+----------+ ! Dist Femoral            !Yes       ! Yes            ! None      ! +------------------------+----------+---------------+----------+ ! Deep Femoral            !Yes       ! Yes            ! None      ! +------------------------+----------+---------------+----------+ ! Popliteal               !Yes       ! Yes            ! None      ! +------------------------+----------+---------------+----------+ ! Gastroc                 ! Yes       ! Yes            ! None      ! +------------------------+----------+---------------+----------+ ! Soleal                  !Yes       ! Yes            ! None      ! +------------------------+----------+---------------+----------+ ! PTV                     ! Yes       ! Yes            ! None      ! +------------------------+----------+---------------+----------+ ! ATV                     ! Yes       ! Yes            ! None      ! +------------------------+----------+---------------+----------+ ! Peroneal                !Yes       ! Partial        !AI        ! +------------------------+----------+---------------+----------+ ! GSV Calf                ! Yes       ! Yes            ! None      ! +------------------------+----------+---------------+----------+ ! SSV                     ! Yes       ! Partial        !AI        ! +------------------------+----------+---------------+----------+ Right Doppler Measurements +--------------+---------+------+------------+ ! Location      ! Signal   !Reflux! Reflux (sec)! +--------------+---------+------+------------+ ! Common Femoral!Phasic   ! No    !            ! +--------------+---------+------+------------+ ! Popliteal     !Pulsatile!      !            ! +--------------+---------+------+------------+ Left Lower Extremities DVT Study Measurements Left 2D Measurements +------------------------+----------+---------------+----------+ ! Location                ! Visualized! Compressibility! Thrombosis! +------------------------+----------+---------------+----------+ ! Sapheno Femoral Junction! Yes       ! Yes            ! None      ! +------------------------+----------+---------------+----------+ ! GSV Thigh               ! Yes       ! Yes            ! None      ! +------------------------+----------+---------------+----------+ ! Common Femoral          !Yes       ! Yes            ! None      ! +------------------------+----------+---------------+----------+ ! Prox Femoral            !Yes       ! Yes            ! None      ! +------------------------+----------+---------------+----------+ ! Mid Femoral             !Yes       ! Yes            ! None      ! +------------------------+----------+---------------+----------+ ! Dist Femoral            !Yes       ! Yes            ! None      ! +------------------------+----------+---------------+----------+ ! Deep Femoral            !Yes       ! Yes            ! None      ! +------------------------+----------+---------------+----------+ ! Popliteal               !Yes       ! Yes            ! None      ! +------------------------+----------+---------------+----------+ ! Gastroc                 ! Yes       ! Yes            ! None      ! +------------------------+----------+---------------+----------+ ! Soleal                  !Yes       ! Yes            ! None      ! +------------------------+----------+---------------+----------+ ! PTV                     ! Partial   !Yes            ! None      ! +------------------------+----------+---------------+----------+ ! ATV                     ! Yes       ! Yes            ! None      ! +------------------------+----------+---------------+----------+ ! Peroneal                !Yes       ! Yes            ! None      ! +------------------------+----------+---------------+----------+ ! GSV Calf                ! Yes       ! Yes            ! None      ! +------------------------+----------+---------------+----------+ ! SSV                     ! Yes       ! Yes            ! None      ! +------------------------+----------+---------------+----------+ Left Doppler Measurements +--------------+------+------+------------+ ! Location      ! Signal!Reflux! Reflux (sec)! +--------------+------+------+------------+ ! Common Femoral!Phasic! No    !            ! +--------------+------+------+------------+ ! Popliteal     !Phasic! No    !            ! +--------------+------+------+------------+    Colonoscopy    Result Date: 3/23/2022  No dictation          [address] / ED COURSE:      PROCEDURES:   Procedures    None    Patient was given:  Medications   prothrombin complex concentrate (human) (KCENTRA) infusion 2,748 Units (has no administration in time range)     Followed by   0.9 % sodium chloride infusion (has no administration in time range)   phytonadione (ADULT) (VITAMIN K) 10 mg in dextrose 5 % 100 mL IVPB (has no administration in time range)       Emergency room course: Patient on exam throat is clear. Cardiovascular regular rhythm, lungs are clear. No wheeze rales or rhonchi. Abdomen shows dressing saturated with blood over the staples. That was removed and area was clean and there was no more bleeding. I did gently press over the area of the suture line no blood came through. Patient is having no abdominal pain. No palpable mass. She does have her peritoneal dialysis catheter in place. Abdomen is obese but nondistended. Full range of motion all extremities alert oriented x4. Does not appear to be in acute distress. Patient did miss dialysis today because she came here because of the bleeding she was actually getting ready for dialysis.       Lab result from today shows:  CBC White count of 6.0, RBC 2.86, hemoglobin 8.9, hematocrit 26.5. BMP shows sodium 136, potassium 4.3, chloride 99 with BUN 17 creatinine 5.8. PT 72.8 with an INR 5.98.    CT scan show as above. Does show the fluid infiltration along the lower midline abdomen wall stable  And incisions. The inferior portion which has higher density collection possible accumulation of blood extending over an area of 5.6 x 3.7 cm. At this point I did discuss patient with my attending Dr. Aliya Moscoso. He put in orders to reverse the Coumadin. And I put a consult into general surgery spoke with  [facility]. He think this may be related to a blood clot. Yevgeniy Blackd it would drain until his drain all out or the body will reabsorb it. I did inform him that her oxygen level has dropped to the low 80s and when to put her on oxygen. When the nurse said he went in the room to check on her she was even down in the 70s. I discussed patient lab results and CT exam with her. Discussed admission. And she was okay.  [facility] said he will see her tomorrow. Dr. Milton Jones also came down and saw this patient stated she gets admitted that he will do dialysis tomorrow. If she goes home he will arrange for outpatient dialysis tomorrow. Patient is okay with being admitted. I will place a call out to hospitalist service for admission. The patient tolerated their visit well. I evaluated the patient. The physician was available for consultation as needed. The patient and / or the family were informed of the results of any tests, a time was given to answer questions, a plan was proposed and they agreed with plan. CLINICAL IMPRESSION:  1. Elevated INR    2. Hypoxia    3. Abdominal hemorrhage        DISPOSITION  DISPOSITION Decision To Admit 04/08/2022 05:47:14 PM        PATIENT REFERRED TO:  No follow-up provider specified.     DISCHARGE MEDICATIONS:  New Prescriptions    No medications on file       DISCONTINUED MEDICATIONS:  Discontinued Medications    No medications on file (Please note the MDM and HPI sections of this note were completed with a voice recognition program.  Efforts were made to edit the dictations but occasionally words are mis-transcribed.)    Electronically signed, Rene Valerio PA-C,          Rene Valerio PA-C  04/08/22 [phone]

## 2022-04-08 NOTE — CONSULTS
Office: [phone]       Fax: [phone]      Nephrology Initial Consult Note        Patient's Name: Rell Richards  Date of Visit: 4/8/2022    Reason for Consult:  ESRD management  Requesting Physician:  No att. providers found  PCP: No primary care provider on file. Chief Complaint:  Stoma bleeding     History of Present Illness:      Rell Richards is a 55 y.o. female with PMHx of hypertension, diabetes mellitus, colon cancer, ESRD who was admitted on 4/8/2022 with complaints of stomal bleeding  Recently switched to HD  Pt was to go to dialysis but noted bleeding  Dialysis initiation: sep 2019.    Etiology of ESRD: DN  Dialysis Location: [facility] dialysis  Schedule: [facility]  Primary Nephrologist: Dr. Regalado Mike: +  Access: Abd PD cath and Tunneled Cath       Past Medical History:   Diagnosis Date    Anemia 9/15/2014    Chronic kidney disease     Diabetes mellitus ([address]     Diabetes mellitus type 1 ([address]     Diabetic retinopathy ([address]     Diabetic retinopathy associated with type 2 diabetes mellitus, without macular edema     Hyperlipidemia 7/27/2015    Hypertension     Kidney stone     Mixed hyperlipidemia 7/27/2015    Orthostatic hypotension     Vitreous hemorrhage of right eye ([address] 8/15/2017       Past Surgical History:   Procedure Laterality Date    APPENDECTOMY      DIALYSIS CATHETER INSERTION N/A 2/24/2022    LAPAROSCOPIC PLACEMENT OF PERITONEAL DIALYSIS CATHETER performed by Lui Newby MD at [address] [place] [address] N/A 2/24/2022    REMOVAL OF EXISTING PERITONEAL DIALYSIS CATHETER performed by Lui Newby MD at [address] [place] [address] N/A 8/12/2019    LAPAROSCOPIC PERITONEAL DIALYSIS CATHETER PLACEMENT WITH AXEL TROCHAR performed by Lui Newby MD at [address] [place] N/A 3/24/2022    EXPLORATORY LAPAROTOMY, COLOSTOMY performed by Jennyfer Michaels MD at [address] [facility] N/A 3/23/2022    SIGMOIDOSCOPY BIOPSY FLEXIBLE performed by Yuli James MD at [address]  3/23/2022    SIGMOIDOSCOPY SUBMUCOSAL TATTOO INJECTION performed by Yuli James MD at [address] N/A 2/24/2022    REPAIR OF SMALL VENTRAL HERNIA performed by Aline Villa MD at [address] [facility] History   Problem Relation Age of Onset    Heart Disease Mother         heart attack 2007    High Blood Pressure Mother     Diabetes Father     Heart Disease Father     Emphysema Father     Cancer Maternal Grandmother         cervical cancer    Lung Cancer Maternal Grandfather         black lung    Diabetes Paternal Grandmother     Heart Disease Paternal Grandfather         reports that she has never smoked. She has never used smokeless tobacco. She reports that she does not drink alcohol and does not use drugs. Medications: Allergies:  Amlodipine, Bactrim [sulfamethoxazole-trimethoprim], Doxazosin, Geocillin [carbenicillin], Lisinopril, Other, Spironolactone, Tramadol, Bumetanide, and Ibuprofen  Scheduled Meds:    Continuous Infusions:      Review of Systems:     All systems reviewed but were negative except as mentioned in HPI    Objective:       Vitals:  BP (!) 148/78   Pulse 92   Temp 97.1 °F (36.2 °C) (Oral)   Resp 16   Wt 170 lb 8 oz (77.3 kg)   SpO2 91%   BMI 30.20 kg/m²   Constitutional:  awake, NAD  Skin: no rash, turgor wnl  HEENT:  MMM, No icterus  Neck: no bruits, No JVD  Cardiovascular:  S1, S2 reg  Respiratory: CTA, no crackles  Abdomen:  +BS, soft, tender, ND  Ext: no lower extremity edema  Psychiatric: mood and affect appropriate  Access: abd PD catheter in place, no drainage/redness around exit site.  Stoma in place with dark sttol  CNS: alert, no agitation    Data:     Labs:  Hepatic:   No results for input(s): AST, ALT, ALB, BILITOT, ALKPHOS in the last 72 hours. BNP: No results for input(s): BNP in the last 72 hours. ABGs: No results for input(s): PHART, PO2ART, QXY2PBJ in the last 72 hours. IMAGING:  CT ABDOMEN PELVIS WO CONTRAST Additional Contrast? None    (Results Pending)       Assessment :       1. ESRD   Etiology: suspected DN  Access: Abd PD cath and Tunneled Cath  Volume: Hypervolemic        2. Electrolytes/Acid base  No Dyskalemia    No results for input(s): NA, K, CO2, MG in the last 72 hours. 3. BMD  -Hyperphosphatemia, SHPT  -maintained on Renvela    Lab Results   Component Value Date    CALCIUM 7.1 (L) 04/04/2022    PHOS 4.5 02/17/2022        4. HTN  -Blood pressure high       BP Readings from Last 1 Encounters:   04/08/22 (!) 148/78       5. Anemia  -anemia of CKD  -assess for CHRIS    No results for input(s): HGB in the last 72 hours. 6. DM    7. abdominal pain   -colon cancer    PLAN :     - f/u labs  - plan for HD in AM unless needed emergently tonight  - MBD management  - Anemia management  - Dose meds to GFR<10    Spoke to ED provider    Thank you for allowing us to participate in care of Danae Dunn . We will continue to follow. Feel free to contact me with any questions.       Alvin Ramirez MD  4/8/2022    [facility] [address] [place] [address] [place]  Office : [phone]  Fax :[phone]

## 2022-04-09 NOTE — PROGRESS NOTES
Pt arrived via stretcher from ED to room 0750.   4 eyes assessment complete. Pt oriented to unit and room. Call light and bedside table in reach.

## 2022-04-09 NOTE — PROGRESS NOTES
Physical Therapy--attempt/pt unavailable  Derrick Pierre  [phone]  PT orders received, and attempted to see pt, however, pt unavailable. Pt at Dialysis. Will follow another time as schedule allows and pt available. If patient is discharged prior to the next Physical Therapy visit, please see last written PT note for discharge status.   Electronically signed by Remedios Johnston PT on 4/9/2022 at 11:29 AM

## 2022-04-09 NOTE — PROGRESS NOTES
Physical Therapy  Facility/Department: [facility] PROGRESSIVE CARE  Attempted Initial Assessment    NAME: Royal Ahumada  : 1965  MRN: [id number]     PT orders received and chart reviewed this date. Met with pt at bedside for initiation of services. Pt was in supine, and initially agreeable/all general intake info obtained, as noted below--however, when ready for assessing mobility, pt reported \"I can't get up now. I'm too weak after my dialysis. \"  Pt given encouragement to try, even sitting up and out of bed, to which pt cont to decline at this time. Will follow up another time as schedule permits and pt willing to mobilize. Date of Service: 2022  Patient Diagnosis(es): The primary encounter diagnosis was Elevated INR. Diagnoses of Hypoxia and Abdominal hemorrhage were also pertinent to this visit. has a past medical history of Anemia, Chronic kidney disease, Diabetes mellitus ([address] Diabetes mellitus type 1 ([address] Diabetic retinopathy ([address] Diabetic retinopathy associated with type 2 diabetes mellitus, without macular edema, Hyperlipidemia, Hypertension, Kidney stone, Mixed hyperlipidemia, Orthostatic hypotension, and Vitreous hemorrhage of right eye ([address] has a past surgical history that includes Appendectomy; Kidney stone surgery; Tonsillectomy; LAPAROSCOPY INSERTION PERITONEAL CATHETER (N/A, 2019); Dialysis Catheter Removal (N/A, 2022); Dialysis Catheter Insertion (N/A, 2022); ventral hernia repair (N/A, 2022); sigmoidoscopy (N/A, 3/23/2022); sigmoidoscopy (3/23/2022); and laparotomy (N/A, 3/24/2022). Restrictions  Restrictions/Precautions  Restrictions/Precautions: Fall Risk  Vision/Hearing  Vision: Within Functional Limits  Hearing: Within functional limits     Subjective  General  Chart Reviewed:  Yes  Additional Pertinent Hx: Per H&P, \"Patient is a 44-year-old female with past medical history of diabetes mellitus hypertension hyperlipidemia colon cancer status post colostomy who presents to the hospital for bleeding from her colostomy site. According to the patient she also had some abdominal pain/discomfort however she thinks it may be related to her bleeding. \"  Of note, further chart review noted pt admitted \"3/22/2022 with Stage IV Met Colon Ca, PE.  3/24/2022 S/P Exp Lap, Colostomy. CT Chest  : + B PE (R>L), Potential concern for Septic Emboli, Multiple Liver Mets. CT Abd : Numerous Hepatic Lesions suspicious for Possible Mets. LE Dopplers : R Indeterminate Partially Occluding DVT R Distal Peroneal Vein. PMH as noted including CKD (PD), Hernia Repair (2/24/2022; Met Adenocarcinoma discovered during Hernia Repair), DM, Neuropathy, Retinopathy, Orthostatic Hypotension\"  Family / Caregiver Present: No  Pain Screening  Patient Currently in Pain: Denies     Orientation  Orientation  Overall Orientation Status: Within Functional Limits  Social/Functional History  Social/Functional History  Lives With: Spouse  Type of Home: House  Home Layout: Two level,Laundry in basement (Bedroom on main level (bath on 2nd floor only); uses BSC)  Home Access: Stairs to enter without rails  Entrance Stairs - Number of Steps: 5 MICHELLE with no rails; flight steps up to bathroom with rail  Bathroom Shower/Tub: Tub/Shower unit  Bathroom Toilet: Standard  Bathroom Equipment: Commode,3-in-1 commode  ADL Assistance:  (reported living on 1st floor, sleep in reg  bed on 1st floor; only sponge bathing since last admit.)  Homemaking Assistance: Needs assistance ( assisting with all cooking/cleaning/laundry)  Ambulation Assistance: Needs assistance (reported only walking shorter distances \"maybe 20 steps\" with home only,  physically assisting pt.)  Transfer Assistance: Needs assistance ( been providing assistance.)  Active : No  Patient's  Info:  drives pt  Additional Comments:  has been assisting more and more recently--hector on HD days per pt.   Reported one fall day of/at admit, with legs buckled per pt.          Therapy Time   Individual Concurrent Group Co-treatment   Time In 1355         Time Out 1418         Minutes 23          1 eval, 1 act charges for time with pt.    Remedios Johnston, PT

## 2022-04-09 NOTE — PROGRESS NOTES
Report received from [facility]. Patient resting comfortably in bed. No signs or symptoms of distress. No complaints at this time. Assessment complete. All safety precautions in place.    Electronically signed by Frank Garcia RN on 4/9/2022 at 7:14 PM

## 2022-04-09 NOTE — CONSULTS
[facility]   CONSULT NOTE    4/9/2022 10:03 AM    Patient Information: Cindy Grief   Date of Admit:  4/8/2022  Primary Care Physician:  No primary care provider on file. Requesting Physician:  No att. providers found    Reason for consult:    Metastatic colon cancer    Chief complaint:    Metastatic colon cancer    History of Present Illness:    Cat Simmons is a 71-year-old female With a past medical history of recently diagnosed metastatic colon cancer, diabetes, hypertension, hyperlipidemia. Who presented to the hospital for bleeding from the suture site of her recent laparoscopy and diverting colostomy. Upon presentation to the ED she was found to have significantly elevated INR of 5.98. She was subsequently given vitamin K with correction of her INR most recently today a.m. at 1.34 with resolution of her bleeding. Hematology/oncology was consulted for her history of recently diagnosed colon cancer. Past Medical History:     has a past medical history of Anemia, Chronic kidney disease, Diabetes mellitus ([address] Diabetes mellitus type 1 ([address] Diabetic retinopathy ([address] Diabetic retinopathy associated with type 2 diabetes mellitus, without macular edema, Hyperlipidemia, Hypertension, Kidney stone, Mixed hyperlipidemia, Orthostatic hypotension, and Vitreous hemorrhage of right eye ([address]          Past Surgical History:    Past Surgical History:   Procedure Laterality Date    APPENDECTOMY      DIALYSIS CATHETER INSERTION N/A 2/24/2022    LAPAROSCOPIC PLACEMENT OF PERITONEAL DIALYSIS CATHETER performed by Nasrin Tam MD at [address] [place] [address] N/A 2/24/2022    REMOVAL OF EXISTING PERITONEAL DIALYSIS CATHETER performed by Nasrin Tam MD at [facility]      LAPAROSCOPY INSERTION PERITONEAL CATHETER N/A 8/12/2019    LAPAROSCOPIC PERITONEAL DIALYSIS CATHETER PLACEMENT WITH AXEL TROCHAR performed by Nasrin Tam MD at [address] LAPAROTOMY N/A 3/24/2022    EXPLORATORY LAPAROTOMY, COLOSTOMY performed by Israel Barnhart MD at [facility] 3/23/2022    SIGMOIDOSCOPY BIOPSY FLEXIBLE performed by Mckenzie Glez MD at [address] [place] [address] [address]  3/23/2022    SIGMOIDOSCOPY SUBMUCOSAL TATTOO INJECTION performed by Mckenzie Glez MD at [address] N/A 2/24/2022    REPAIR OF SMALL VENTRAL HERNIA performed by Amber Mooney MD at [place] [address]            Current Medications:     [START ON 4/11/2022] epoetin javier-epbx  10,000 Units IntraVENous Once per day on Mon Wed Fri    sodium chloride flush  10 mL IntraVENous 2 times per day    heparin (porcine)  5,000 Units SubCUTAneous 3 times per day    insulin lispro  0-12 Units SubCUTAneous TID WC    insulin lispro  0-6 Units SubCUTAneous Nightly    [START ON 4/11/2022] midodrine  10 mg Oral Once per day on Mon Wed Fri    pantoprazole (PROTONIX) 40 mg injection  40 mg IntraVENous Daily           Allergies: Allergies   Allergen Reactions    Amlodipine Shortness Of Breath, Nausea And Vomiting and Dizziness or Vertigo    Bactrim [Sulfamethoxazole-Trimethoprim]     Doxazosin Other (See Comments)     Other reaction(s): Other (See Comments)  Bleeding  Punch in chest      Geocillin [Carbenicillin]     Lisinopril Other (See Comments)     Pt thinks cr levels went up due to med.  Other      Artificial sweetners, nutrasweet    Spironolactone      Bad taste, emesis. Pt refuses to take    Tramadol     Bumetanide Nausea And Vomiting    Ibuprofen Nausea And Vomiting        Social History:    reports that she has never smoked. She has never used smokeless tobacco. She reports that she does not drink alcohol and does not use drugs.          Family History:     family history includes Cancer in her maternal grandmother; Diabetes in her father and paternal grandmother; Emphysema in her father; Heart Disease in her father, mother, and paternal grandfather; High Blood Pressure in her mother; Ariana Colbert in her maternal grandfather. ROS:  14 point review of systems performed negative except for as documented in the HPI        PHYSICAL EXAM:        Vitals:    04/09/22 0724   BP: (!) 147/89   Pulse: 92   Resp: 17   Temp: 97.7 °F (36.5 °C)   SpO2: 99%          CONSTITUTIONAL: awake, alert, cooperative, no apparent distress     EYES: No Conjunctivitis, No icterus    ENT: Normocephalic, atraumatic, No external ear deformities    NECK: No palpable adenopathy    HEMATOLOGIC/LYMPHATIC: no cervical, supraclavicular or axillary lymphadenopathy     LUNGS:  clear to auscultation no added sounds    CARDIOVASCULAR: regular rate and rhythm, normal S1 and S2, no murmur noted    ABDOMEN: Dialysis catheter, Suture site covered in bandage, Colostomy     MUSCULOSKELETAL: full range of motion noted, tone is normal    NEUROLOGIC: AAO X 3, No gross focal neurological deficits    EXTREMITIES: no LE edema             DATA:  CBC:   Lab Results   Component Value Date    WBC 5.8 04/09/2022    RBC 2.66 04/09/2022    HGB 8.1 04/09/2022    HCT 24.7 04/09/2022    MCV 92.9 04/09/2022    MCH 30.6 04/09/2022    MCHC 32.9 04/09/2022    RDW 17.4 04/09/2022     04/09/2022    MPV 9.7 04/09/2022     BMP:  Lab Results   Component Value Date     04/09/2022    K 3.8 04/09/2022     04/09/2022    CO2 24 04/09/2022    BUN 18 04/09/2022    CREATININE 6.6 04/09/2022    CALCIUM 7.3 04/09/2022    GFRAA 8 04/09/2022    LABGLOM 7 04/09/2022    GLUCOSE 96 04/09/2022     Magnesium:   Lab Results   Component Value Date    MG 1.50 03/01/2022    MG 1.50 02/28/2022    MG 1.70 02/27/2022     LIVER PROFILE: No results for input(s): AST, ALT, LIPASE, BILIDIR, BILITOT, ALKPHOS in the last 72 hours. Invalid input(s):   AMYLASE,  ALB  PT/INR:    Lab Results   Component Value Date    PROTIME 15.3 04/09/2022    PROTIME 78.0 04/08/2022    PROTIME 72.8 04/08/2022 INR 1.34 04/09/2022    INR 6.39 04/08/2022    INR 5.98 04/08/2022           IMPRESSION/RECOMMENDATIONS:      60-year-old female with a past medical history of metastatic colon cancer status post diverting colostomy, ESRD on hemodialysis, hypertension, hyperlipidemia, type 2 diabetes who presents for bleeding from her sutures/staple site. Hematology/oncology consulted for her history of colon cancer and elevated INR. #Stage IV colon cancer  -Omental excision consistent with adenocarcinoma moderately differentiated  -PET CT scan performed on04/07/2022 at [address] [place] [address] showed PET avid mass in the sigmoid colon, numerous FDG avid peritoneal nodules, numerous FDG avid liver lesions all consistent with stage IV disease  -Primary oncologist is Dr. Bert Bowser to start palliative chemotherapy after discharge    #Bleeding at suture site secondary to elevated INR  -On presentation elevated INR close to 6  -Received vitamin K  -Today bleeding has resolved with normalization of INR.     Deborah Emery MD  [facility]

## 2022-04-09 NOTE — PROGRESS NOTES
Treatment time: 3 hours  Net UF: 1000 ml     Pre weight: 78.5 kg   Post weight: 77.5 kg  EDW: 75 kg     Access used: RTIJ CVC  Access function: GOOD with  ml/min     Medications or blood products given: 10k retacrit, heparin dwells     Regular outpatient schedule: MWF     Summary of response to treatment: HD tx completed in full, VSS, pt alert no distress, tolerated tx fair, unable to achieve EDW, hypotension during tx (BP 82/46) resolved with UF goal reduction and 300ml NS bolus, pt responded well and completed remainder of tx w/o complications, BP remained soft, heparin dwelled, CVC capped and clamped, report given to RN, pt returned to room     Copy of dialysis treatment record placed in chart, to be scanned into EMR. 04/09/22 0852 04/09/22 1152   Vital Signs   BP (!) 142/88 137/73   Temp 97.8 °F (36.6 °C) 97.7 °F (36.5 °C)   Pulse 86 89   Resp 18 18   SpO2 99 %  --    Weight 173 lb 1 oz (78.5 kg) 170 lb 13.7 oz (77.5 kg)   Weight Method Bed scale Bed scale   Percent Weight Change 0 -1.27   Dry Weight 165 lb 5.5 oz (75 kg) 165 lb 5.5 oz (75 kg)   Pain Assessment   Pain Assessment 0-10 0-10   Pain Level 3 0   Pain Type Acute pain  --    Pain Location Abdomen  --    Pain Orientation Anterior  --    Pain Frequency Continuous  --    Post-Hemodialysis Assessment   Post-Treatment Procedures  --  Blood returned;Catheter capped, clamped and heparinized x 2 ports   Machine Disinfection Process  --  Acid/Vinegar Clean;Bleach; Exterior Machine Disinfection   Rinseback Volume (ml)  --  300 ml   Total Liters Processed (l/min)  --  62.3 l/min   Dialyzer Clearance  --  Heavily streaked   Duration of Treatment (minutes)  --  180 minutes   Heparin amount administered during treatment (units)  --  0 units   Hemodialysis Intake (ml)  --  600 ml   Hemodialysis Output (ml)  --  1600 ml   NET Removed (ml)  --  1000 ml   Tolerated Treatment  --  Fair   Bilateral Breath Sounds Clear Clear   Edema None None

## 2022-04-09 NOTE — PROGRESS NOTES
Occupational Therapy  Unable to see pt at this time due to pt currently at dialysis at time of attempt. Will follow up with pt as time allows.     Erin Dias OTR/L

## 2022-04-09 NOTE — PLAN OF CARE
Problem: Nutrition  Goal: Optimal nutrition therapy  Outcome: Ongoing     Nutrition Problem #1: Inadequate oral intake  Intervention: Food and/or Nutrient Delivery: Continue NPO (Advance diet as appropriate)  Nutritional Goals:  Tolerate diet advancement with intakes >50%

## 2022-04-09 NOTE — PROGRESS NOTES
Hospitalist Progress Note      PCP: No primary care provider on file. Date of Admission: 4/8/2022    Chief Complaint:     Hospital Course: Patient is a 60-year-old female admitted to the hospital with bleeding around laparotomy and colostomy site. Found to have Coumadin toxicity required vitamin K for reversal    Subjective: Bleeding around site has stopped. Seen by oncology and nephrology      Medications:  Reviewed    Infusion Medications    sodium chloride      dextrose       Scheduled Medications    epoetin javier-epbx  10,000 Units IntraVENous Once per day on Mon Wed Fri    losartan  50 mg Oral Nightly    sodium chloride flush  10 mL IntraVENous 2 times per day    heparin (porcine)  5,000 Units SubCUTAneous 3 times per day    insulin lispro  0-12 Units SubCUTAneous TID WC    insulin lispro  0-6 Units SubCUTAneous Nightly    [START ON 4/11/2022] midodrine  10 mg Oral Once per day on Mon Wed Fri    pantoprazole (PROTONIX) 40 mg injection  40 mg IntraVENous Daily     PRN Meds: heparin (porcine), sodium chloride flush, sodium chloride, promethazine **OR** ondansetron, magnesium hydroxide, acetaminophen **OR** acetaminophen, glucose, dextrose, glucagon (rDNA), dextrose      Intake/Output Summary (Last 24 hours) at 4/9/2022 1645  Last data filed at 4/9/2022 1152  Gross per 24 hour   Intake 650 ml   Output 1660 ml   Net -1010 ml       Physical Exam Performed:    /73   Pulse 89   Temp 97.7 °F (36.5 °C)   Resp 18   Ht 5' 3\" (1.6 m)   Wt 170 lb 13.7 oz (77.5 kg)   SpO2 99%   BMI 30.27 kg/m²     General appearance: No apparent distress, appears stated age and cooperative. HEENT: Pupils equal, round, and reactive to light. Conjunctivae/corneas clear. Neck: Supple, with full range of motion. No jugular venous distention. Trachea midline. Respiratory:  Normal respiratory effort. Clear to auscultation, bilaterally without Rales/Wheezes/Rhonchi.   Cardiovascular: Regular rate and rhythm with normal S1/S2 without murmurs, rubs or gallops. Abdomen: Soft, non-tender, non-distended with normal bowel sounds. Ostomy draining blackish stool  Musculoskeletal: No clubbing, cyanosis or edema bilaterally. Full range of motion without deformity. Skin: Skin color, texture, turgor normal.  No rashes or lesions. Neurologic:  Neurovascularly intact without any focal sensory/motor deficits. Cranial nerves: II-XII intact, grossly non-focal.  Psychiatric: Alert and oriented, thought content appropriate, normal insight  Capillary Refill: Brisk,3 seconds, normal   Peripheral Pulses: +2 palpable, equal bilaterally       Labs:   Recent Labs     04/08/22  1546 04/08/22  1734 04/09/22  0514   WBC 6.0 5.6 5.8   HGB 8.9* 8.4* 8.1*   HCT 26.5* 25.4* 24.7*    196 163     Recent Labs     04/08/22  1546 04/08/22  1734 04/09/22  0513    137 138   K 4.3 3.9 3.8   CL 99 100 101   CO2 24 25 24   BUN 17 18 18   CREATININE 5.8* 5.9* 6.6*   CALCIUM 7.8* 7.7* 7.3*     No results for input(s): AST, ALT, BILIDIR, BILITOT, ALKPHOS in the last 72 hours. Recent Labs     04/08/22  1546 04/08/22  1734 04/09/22  0900   INR 5.98* 6.39* 1.34*     No results for input(s): Brian Ziggy in the last 72 hours. Urinalysis:      Lab Results   Component Value Date    NITRU Negative 05/27/2021    WBCUA 3 05/27/2021    BACTERIA RARE 05/27/2021    RBCUA 4 05/27/2021    BLOODU TRACE 05/27/2021    SPECGRAV 1.016 05/27/2021    GLUCOSEU 250 05/27/2021       Radiology:  CT ABDOMEN PELVIS WO CONTRAST Additional Contrast? None   Final Result   Moderate ascitic fluid presumably related to the peritoneal dialysis catheter. Midline abdominal wall defect which may represent an ostomy creation but with   an unusual appearance. There is a vertically oriented catheter overlying the   superficial portion and surrounded by an overlying bandage. There is also   fluid adjacent to the extra-abdominal bowel segment to the left of midline.    This represent simple fluid possibly from communication with ascites. Fluid infiltration along the lower midline abdominal wall staple line and   incision, the inferior portion of which has higher density collection   possible accumulation of blood extending over an area of 5.6 x 3.7 cm. Assessment/Plan:    Bleeding at suture site due to elevated INR  Patient started on Coumadin  Apparently she never got her PT/INR checked and was not aware that she needed to  Continue to hold Coumadin  Consider reinstitution of anticoagulation tomorrow. Risks benefits need to be considered given advanced cancer    End-stage renal disease-continue hemodialysis per nephrology. Dr. Shraddha Fox following    Coumadin toxicity-status post vitamin K  INR lower. Hold Coumadin for another day  Had pulmonary emboli diagnosed in March 2022  ? [place] filter vs lower dose coumadin and vigilant monitoring of PT/INR  ? [facility]    DVT Prophylaxis: scd  Diet: ADULT DIET; Regular; Low Potassium (Less than 3000 mg/day);  Low Phosphorus (Less than 1000 mg)  Code Status: Full Kayode Biggs MD

## 2022-04-09 NOTE — PROGRESS NOTES
Comprehensive Nutrition Assessment    Type and Reason for Visit:  Initial,Positive Nutrition Screen    Nutrition Recommendations/Plan:   - Continue NPO and advance diet as appropriate     Nutrition Assessment:  Positive nutrition screen. Hx stage IV colon cancer, T1DM, and ESRD w/ HD. Admitted with bleeding from stoma site. Currently in dialysis. Pt has lost 26lbs (13%) over the past two months. Some wt loss r/t fluid. Currently NPO. Per chart, pt is allergic to artificial sweeteners. Will hold off on ONS at this time until RD can speak with pt about allergy. Malnutrition Assessment:  Malnutrition Status:  Insufficient data    Context:  Acute Illness     Findings of the 6 clinical characteristics of malnutrition:  Energy Intake:  Unable to assess  Weight Loss:  7 - Greater than 5% over 1 month (Some wt loss possibly d/t fluid shifts)     Body Fat Loss:  Unable to assess     Muscle Mass Loss:  Unable to assess    Fluid Accumulation:  No significant fluid accumulation     Strength:  Not Performed    Estimated Daily Nutrient Needs:  Energy (kcal):  4529-0867 (15-18kcal/79kg); Weight Used for Energy Requirements:  Current     Protein (g):  62-73 (1.2-1.4g/52kg); Weight Used for Protein Requirements:  Ideal        Fluid (ml/day):  Standard Renal      Nutrition Related Findings:  +colostomy. Wounds:  Surgical Incision       Current Nutrition Therapies:    Diet NPO Exceptions are: Ice Chips, Sips of Water with Meds    Anthropometric Measures:  · Height: 5' 3\" (160 cm)  · Current Body Weight: 173 lb (78.5 kg)   · Admission Body Weight: 173 lb (78.5 kg)     · Ideal Body Weight: 115 lbs; % Ideal Body Weight 150.4 %   · BMI: 30.7  · BMI Categories: Obese Class 1 (BMI 30.0-34. 9)       Nutrition Diagnosis:   · Inadequate oral intake related to catabolic illness,inadequate protein-energy intake as evidenced by weight loss,NPO or clear liquid status due to medical condition    Nutrition Interventions:   Food and/or Nutrient Delivery:  Continue NPO (Advance diet as appropriate)  Nutrition Education/Counseling:  Education not indicated   Coordination of Nutrition Care:  Continue to monitor while inpatient    Goals: Tolerate diet advancement with intakes >50%       Nutrition Monitoring and Evaluation:   Behavioral-Environmental Outcomes:  None Identified   Food/Nutrient Intake Outcomes:  Diet Advancement/Tolerance,Food and Nutrient Intake  Physical Signs/Symptoms Outcomes:  Biochemical Data,Fluid Status or Edema,Skin,Weight     Discharge Planning:     Too soon to determine     Electronically signed by Nayely Corrales RD, LD on 4/9/22 at 12:18 PM EDT    Contact: [phone]

## 2022-04-09 NOTE — PROGRESS NOTES
4 Eyes Skin Assessment     NAME:  Tami Farooq  YOB: 1965  MEDICAL RECORD NUMBER:  [id number]    The patient is being assess for  Admission    I agree that 2 RN's have performed a thorough Head to Toe Skin Assessment on the patient. ALL assessment sites listed below have been assessed. Areas assessed by both nurses:    Head, Face, Ears, Shoulders, Back, Chest, Arms, Elbows, Hands, Sacrum. Buttock, Coccyx, Ischium and Legs. Feet and Heels        Does the Patient have a Wound? Yes wound(s) were present on assessment.  LDA wound assessment was Initiated and completed        Enoch Prevention initiated:  Yes   Wound Care Orders initiated:  No    Pressure Injury (Stage 3,4, Unstageable, DTI, NWPT, and Complex wounds) if present place consult order under [de-identified] No    New and Established Ostomies if present place consult order under : Yes      Nurse 1 eSignature: Electronically signed by Ivania Shaw RN on 4/8/22 at 9:21 PM EDT    **SHARE this note so that the co-signing nurse is able to place an eSignature**    Nurse 2 eSignature: Electronically signed by Juliana Packer RN on 4/9/22 at 3:47 AM EDT

## 2022-04-09 NOTE — PLAN OF CARE
Problem: Falls - Risk of:  Goal: Will remain free from falls  Description: Will remain free from falls  4/9/2022 0851 by Trinidad Cain RN  Outcome: Ongoing     Problem:  Activity:  Goal: Risk for activity intolerance will decrease  Description: Risk for activity intolerance will decrease  Outcome: Ongoing     Problem: Fluid Volume:  Goal: Will show no signs or symptoms of fluid imbalance  Description: Will show no signs or symptoms of fluid imbalance  Outcome: Ongoing

## 2022-04-09 NOTE — PROGRESS NOTES
Office: [phone]       Fax: [phone]      Nephrology Progress Note        Patient's Name: Consuelo Frazier  Date of Visit: 4/9/2022    Reason for Consult:  ESRD management      History of Present Illness:      Consuelo Frazier is a 55 y.o. female with PMHx of hypertension, diabetes mellitus, colon cancer, ESRD who was admitted on 4/8/2022 with complaints of stomal bleeding  Recently switched to HD  Pt was to go to dialysis but noted bleeding  INTERVAL HISTORY    Feels better  Shortness of breath: No     Tolerated HD  No abdominal pain          Medications: Allergies:  Amlodipine, Bactrim [sulfamethoxazole-trimethoprim], Doxazosin, Geocillin [carbenicillin], Lisinopril, Other, Spironolactone, Tramadol, Bumetanide, and Ibuprofen     [START ON 4/11/2022] epoetin javier-epbx  10,000 Units IntraVENous Once per day on Mon Wed Fri    sodium chloride flush  10 mL IntraVENous 2 times per day    heparin (porcine)  5,000 Units SubCUTAneous 3 times per day    insulin lispro  0-12 Units SubCUTAneous TID WC    insulin lispro  0-6 Units SubCUTAneous Nightly    [START ON 4/11/2022] midodrine  10 mg Oral Once per day on Mon Wed Fri    pantoprazole (PROTONIX) 40 mg injection  40 mg IntraVENous Daily             Objective:       Vitals:  BP (!) 147/89   Pulse 92   Temp 97.7 °F (36.5 °C) (Oral)   Resp 17   Ht 5' 3\" (1.6 m)   Wt 173 lb 1 oz (78.5 kg)   SpO2 99%   BMI 30.66 kg/m²   Constitutional:  awake, NAD  Skin: no rash, turgor wnl  HEENT:  MMM, No icterus  Neck: no bruits, No JVD  Cardiovascular:  S1, S2 reg  Respiratory: CTA, no crackles  Abdomen:  +BS, soft, tender, ND  Access: abd PD catheter in place, no drainage/redness around exit site.  Stoma in place with dark sttol  CNS: alert, no agitation    Data:     Labs:  Hepatic:   No results for input(s): AST, ALT, ALB, BILITOT, ALKPHOS in the last 72 hours. BNP: No results for input(s): BNP in the last 72 hours. ABGs: No results for input(s): PHART, PO2ART, KHB7LMH in the last 72 hours. IMAGING:  CT ABDOMEN PELVIS WO CONTRAST Additional Contrast? None   Final Result   Moderate ascitic fluid presumably related to the peritoneal dialysis catheter. Midline abdominal wall defect which may represent an ostomy creation but with   an unusual appearance. There is a vertically oriented catheter overlying the   superficial portion and surrounded by an overlying bandage. There is also   fluid adjacent to the extra-abdominal bowel segment to the left of midline. This represent simple fluid possibly from communication with ascites. Fluid infiltration along the lower midline abdominal wall staple line and   incision, the inferior portion of which has higher density collection   possible accumulation of blood extending over an area of 5.6 x 3.7 cm. Assessment :       1. ESRD   Etiology: suspected DN  Access: Abd PD cath and Tunneled Cath  Volume: Hypervolemic        2. Electrolytes/Acid base  No Dyskalemia    Recent Labs     04/09/22  0513      K 3.8   CO2 24       3. BMD  -Hyperphosphatemia, SHPT  -maintained on Renvela    Lab Results   Component Value Date    CALCIUM 7.3 (L) 04/09/2022    PHOS 4.5 02/17/2022        4. HTN  -Blood pressure high       BP Readings from Last 1 Encounters:   04/09/22 (!) 147/89       5. Anemia  -anemia of CKD  -assess for CHRIS    Recent Labs     04/09/22  0514   HGB 8.1*     6. DM    7. abdominal pain   -colon cancer    PLAN :     - HD today  - resume BP meds  - MBD management  - Anemia management - CHRIS  - Dose meds to GFR<10        Thank you for allowing us to participate in care of [email or web address] . We will continue to follow. Feel free to contact me with any questions.       Laura Carrasco MD  4/9/2022    [facility]  [address]  Office : [phone]  Fax :[phone]

## 2022-04-09 NOTE — PROGRESS NOTES
Patient alert and oriented x4 with no c/o pain. Patient did have episode of nausea this shift, PRN IVP zofran given per order with some relief. Patient ostomy bag changed. Abdominal dressing changed. Patient refusing sub q heparin at this time related to episode of bleeding which brought patient to hospital. Patient safe with call light in reach.

## 2022-04-09 NOTE — H&P
Hospital Medicine History & Physical      PCP: No primary care provider on file. Date of Admission: 4/8/2022    Chief Complaint: Bleeding    History Of Present Illness:  Patient is a 21-year-old female with past medical history of diabetes mellitus hypertension hyperlipidemia colon cancer status post colostomy who presents to the hospital for bleeding from her colostomy site. According to the patient she also had some abdominal pain/discomfort however she thinks it may be related to her bleeding. Denied fever chills diarrhea constipation dysuria.       Past Medical History:          Diagnosis Date    Anemia 9/15/2014    Chronic kidney disease     Diabetes mellitus ([address]     Diabetes mellitus type 1 ([address]     Diabetic retinopathy ([address]     Diabetic retinopathy associated with type 2 diabetes mellitus, without macular edema     Hyperlipidemia 7/27/2015    Hypertension     Kidney stone     Mixed hyperlipidemia 7/27/2015    Orthostatic hypotension     Vitreous hemorrhage of right eye ([facility] 8/15/2017       Past Surgical History:          Procedure Laterality Date    APPENDECTOMY      DIALYSIS CATHETER INSERTION N/A 2/24/2022    LAPAROSCOPIC PLACEMENT OF PERITONEAL DIALYSIS CATHETER performed by Amber Mooney MD at [address] [place] [address] N/A 2/24/2022    REMOVAL OF EXISTING PERITONEAL DIALYSIS CATHETER performed by Amber Mooney MD at [address] [facility] N/A 8/12/2019    LAPAROSCOPIC PERITONEAL DIALYSIS CATHETER PLACEMENT WITH AXEL TROCHAR performed by Amber Mooney MD at [address] [place] [address] 3/24/2022    EXPLORATORY LAPAROTOMY, COLOSTOMY performed by Israel Barnhart MD at [address] [place] [address] 3/23/2022    SIGMOIDOSCOPY BIOPSY FLEXIBLE performed by Mckenzie Glez MD at [address] [place] [address]  3/23/2022    SIGMOIDOSCOPY SUBMUCOSAL TATTOO INJECTION performed by [address] Oz Lamar MD at [address]  N/A 2/24/2022    REPAIR OF SMALL VENTRAL HERNIA performed by Aide Multani MD at [facility]        Medications Prior to Admission:      Prior to Admission medications    Medication Sig Start Date End Date Taking? Authorizing Provider   losartan (COZAAR) 100 MG tablet Take 1 tablet by mouth at bedtime 4/3/22   Leonel Walker MD   pantoprazole (PROTONIX) 40 MG tablet Take 1 tablet by mouth every morning (before breakfast) 4/4/22   Leonel Walker MD   midodrine (PROAMATINE) 10 MG tablet Take 1 tablet by mouth three times a week On HD days 4/4/22 5/4/22  Leonel Walker MD   warfarin (COUMADIN) 2 MG tablet Take 1 tablet by mouth daily 4/3/22   Leonel Walker MD   insulin lispro (HUMALOG KWIKPEN) 100 UNIT/ML pen Inject 2-4 Units into the skin 3 times daily as needed for High Blood Sugar Sliding scale     Historical Provider, MD       Allergies:  Amlodipine, Bactrim [sulfamethoxazole-trimethoprim], Doxazosin, Geocillin [carbenicillin], Lisinopril, Other, Spironolactone, Tramadol, Bumetanide, and Ibuprofen    Social History:      TOBACCO:   reports that she has never smoked. She has never used smokeless tobacco.  ETOH:   reports no history of alcohol use. Family History:       Reviewed in detail and non contributory          Problem Relation Age of Onset    Heart Disease Mother         heart attack 2007    High Blood Pressure Mother     Diabetes Father     Heart Disease Father     Emphysema Father     Cancer Maternal Grandmother         cervical cancer    Lung Cancer Maternal Grandfather         black lung    Diabetes Paternal Grandmother     Heart Disease Paternal Grandfather        REVIEW OF SYSTEMS:   Pertinent positives as noted in the HPI. All other systems reviewed and negative.     PHYSICAL EXAM PERFORMED:    BP (!) 144/90   Pulse 92   Temp 97.1 °F (36.2 °C) (Oral)   Resp 18   Wt 170 lb 8 oz (77.3 kg) SpO2 93%   BMI 30.20 kg/m²     General appearance:  No apparent distress, cooperative. HEENT:  Normal cephalic, atraumatic without obvious deformity. Conjunctivae/corneas clear. Neck: Supple, with full range of motion. No cervical lymphadenopathy  Respiratory:  Normal respiratory effort. Clear to auscultation, bilaterally without Rales/Wheezes/Rhonchi. Cardiovascular:  Regular rate and rhythm with normal S1/S2 without murmurs, rubs or gallops. Abdomen: There is bleeding around colostomy site and colostomy bag soft, non-tender, non-distended, normal bowel sounds. Musculoskeletal:  No edema noted bilaterally. No tenderness on palpation   Skin: no rash visible  Neurologic:  Neurologically intact without any focal sensory/motor deficits. grossly non-focal.  Psychiatric:  Alert and oriented, normal mood  Peripheral Pulses: +2 palpable, equal bilaterally       Labs:     Recent Labs     04/08/22  1546 04/08/22  1734   WBC 6.0 5.6   HGB 8.9* 8.4*   HCT 26.5* 25.4*    196     Recent Labs     04/08/22  1546 04/08/22  1734    137   K 4.3 3.9   CL 99 100   CO2 24 25   BUN 17 18   CREATININE 5.8* 5.9*   CALCIUM 7.8* 7.7*     No results for input(s): AST, ALT, BILIDIR, BILITOT, ALKPHOS in the last 72 hours. Recent Labs     04/08/22  1546 04/08/22  1734   INR 5.98* 6.39*     No results for input(s): Earlis [place] in the last 72 hours. Urinalysis:      Lab Results   Component Value Date    NITRU Negative 05/27/2021    WBCUA 3 05/27/2021    BACTERIA RARE 05/27/2021    RBCUA 4 05/27/2021    BLOODU TRACE 05/27/2021    SPECGRAV 1.016 05/27/2021    GLUCOSEU 250 05/27/2021       Radiology:       CT ABDOMEN PELVIS WO CONTRAST Additional Contrast? None   Final Result   Moderate ascitic fluid presumably related to the peritoneal dialysis catheter. Midline abdominal wall defect which may represent an ostomy creation but with   an unusual appearance.   There is a vertically oriented catheter overlying the

## 2022-04-09 NOTE — ED NOTES
.ED SBAR report provider to Fabrizio Everett RN. Patient to be transported to Room 511 via stretcher by ED RN. . IV site clean, dry, and intact.       [address]  04/08/22 2034

## 2022-04-10 NOTE — PROGRESS NOTES
Office: [phone]       Fax: [phone]      Nephrology Progress Note        Patient's Name: María Perry  Date of Visit: 4/10/2022    Reason for Consult:  ESRD management      History of Present Illness:      María Perry is a 55 y.o. female with PMHx of hypertension, diabetes mellitus, colon cancer, ESRD who was admitted on 4/8/2022 with complaints of stomal bleeding  Recently switched to HD  Pt was to go to dialysis but noted bleeding  INTERVAL HISTORY    Feels better  Shortness of breath: No     Tolerated HD  No abdominal pain          Medications: Allergies:  Amlodipine, Bactrim [sulfamethoxazole-trimethoprim], Doxazosin, Geocillin [carbenicillin], Lisinopril, Other, Spironolactone, Tramadol, Bumetanide, and Ibuprofen     epoetin javier-epbx  10,000 Units IntraVENous Once per day on Mon Wed Fri    losartan  50 mg Oral Nightly    sodium chloride flush  10 mL IntraVENous 2 times per day    heparin (porcine)  5,000 Units SubCUTAneous 3 times per day    insulin lispro  0-12 Units SubCUTAneous TID WC    insulin lispro  0-6 Units SubCUTAneous Nightly    [START ON 4/11/2022] midodrine  10 mg Oral Once per day on Mon Wed Fri    pantoprazole (PROTONIX) 40 mg injection  40 mg IntraVENous Daily             Objective:       Vitals:  /74   Pulse 93   Temp 98.3 °F (36.8 °C) (Oral)   Resp 21   Ht 5' 3\" (1.6 m)   Wt 171 lb 15.3 oz (78 kg)   SpO2 95%   BMI 30.46 kg/m²   Constitutional:  awake, NAD  Skin: no rash, turgor wnl  HEENT:  MMM, No icterus  Neck: no bruits, No JVD  Cardiovascular:  S1, S2 reg  Respiratory: CTA, no crackles  Abdomen:  +BS, soft, tender, ND  Access: abd PD catheter in place, no drainage/redness around exit site.  Stoma in place with dark sttol  CNS: alert, no agitation    Data:     Labs:  Hepatic:   No results for input(s): AST, ALT, ALB, BILITOT, ALKPHOS in the last 72 hours. BNP: No results for input(s): BNP in the last 72 hours. ABGs: No results for input(s): PHART, PO2ART, DTY5ZTR in the last 72 hours. IMAGING:  CT ABDOMEN PELVIS WO CONTRAST Additional Contrast? None   Final Result   Moderate ascitic fluid presumably related to the peritoneal dialysis catheter. Midline abdominal wall defect which may represent an ostomy creation but with   an unusual appearance. There is a vertically oriented catheter overlying the   superficial portion and surrounded by an overlying bandage. There is also   fluid adjacent to the extra-abdominal bowel segment to the left of midline. This represent simple fluid possibly from communication with ascites. Fluid infiltration along the lower midline abdominal wall staple line and   incision, the inferior portion of which has higher density collection   possible accumulation of blood extending over an area of 5.6 x 3.7 cm. Assessment :       1. ESRD   Etiology: suspected DN  Access: Abd PD cath and Tunneled Cath  Volume: Hypervolemic        2. Electrolytes/Acid base  No Dyskalemia    Recent Labs     04/10/22  0508      K 4.1   CO2 25       3. BMD  -Hyperphosphatemia, SHPT  -maintained on Renvela    Lab Results   Component Value Date    CALCIUM 7.1 (L) 04/10/2022    PHOS 4.5 02/17/2022        4. HTN  -Blood pressure at goal        BP Readings from Last 1 Encounters:   04/10/22 132/74       5. Anemia  -anemia of CKD  -assess for CHRIS    Recent Labs     04/09/22  0514   HGB 8.1*     6. DM    7. abdominal pain   -colon cancer    PLAN :     - maintenance HD MWF  - resume BP meds  - MBD management  - Anemia management - CHRIS  - Dose meds to GFR<10        Thank you for allowing us to participate in care of Danae [employer] . We will continue to follow. Feel free to contact me with any questions.       Mena Carreon MD  4/10/2022    [facility]  Office : [phone]  Fax :[phone]

## 2022-04-10 NOTE — PROGRESS NOTES
Hospitalist Progress Note      PCP: No primary care provider on file. Date of Admission: 4/8/2022    Chief Complaint:     Hospital Course: Patient is a 76-year-old female admitted to the hospital with bleeding around laparotomy and colostomy site. Found to have Coumadin toxicity required vitamin K for reversal    Subjective: Bleeding around site has stopped. Seen by surgery earlier today staples and ostomy bridge removed    Medications:  Reviewed    Infusion Medications    sodium chloride      dextrose       Scheduled Medications    [START ON 4/11/2022] pantoprazole  40 mg Oral QAM AC    epoetin javier-epbx  10,000 Units IntraVENous Once per day on Mon Wed Fri    losartan  50 mg Oral Nightly    sodium chloride flush  10 mL IntraVENous 2 times per day    heparin (porcine)  5,000 Units SubCUTAneous 3 times per day    insulin lispro  0-12 Units SubCUTAneous TID WC    insulin lispro  0-6 Units SubCUTAneous Nightly    [START ON 4/11/2022] midodrine  10 mg Oral Once per day on Mon Wed Fri     PRN Meds: heparin (porcine), sodium chloride flush, sodium chloride, promethazine **OR** ondansetron, magnesium hydroxide, acetaminophen **OR** acetaminophen, glucose, dextrose, glucagon (rDNA), dextrose      Intake/Output Summary (Last 24 hours) at 4/10/2022 1556  Last data filed at 4/10/2022 1009  Gross per 24 hour   Intake 614 ml   Output 50 ml   Net 564 ml       Physical Exam Performed:    /67   Pulse 100   Temp 97.3 °F (36.3 °C) (Oral)   Resp 25   Ht 5' 3\" (1.6 m)   Wt 171 lb 15.3 oz (78 kg)   SpO2 100%   BMI 30.46 kg/m²     General appearance: No apparent distress, appears stated age and cooperative. HEENT: Pupils equal, round, and reactive to light. Conjunctivae/corneas clear. Neck: Supple, with full range of motion. No jugular venous distention. Trachea midline. Respiratory:  Normal respiratory effort. Clear to auscultation, bilaterally without Rales/Wheezes/Rhonchi.   Cardiovascular: Regular rate and rhythm with normal S1/S2 without murmurs, rubs or gallops. Abdomen: Soft, non-tender, non-distended with normal bowel sounds. Ostomy draining blackish stool  Musculoskeletal: No clubbing, cyanosis or edema bilaterally. Full range of motion without deformity. Skin: Skin color, texture, turgor normal.  No rashes or lesions. Neurologic:  Neurovascularly intact without any focal sensory/motor deficits. Cranial nerves: II-XII intact, grossly non-focal.  Psychiatric: Alert and oriented, thought content appropriate, normal insight  Capillary Refill: Brisk,3 seconds, normal   Peripheral Pulses: +2 palpable, equal bilaterally       Labs:   Recent Labs     04/08/22  1546 04/08/22  1734 04/09/22  0514   WBC 6.0 5.6 5.8   HGB 8.9* 8.4* 8.1*   HCT 26.5* 25.4* 24.7*    196 163     Recent Labs     04/08/22  1734 04/09/22  0513 04/10/22  0508    138 137   K 3.9 3.8 4.1    101 103   CO2 25 24 25   BUN 18 18 10   CREATININE 5.9* 6.6* 4.2*   CALCIUM 7.7* 7.3* 7.1*     No results for input(s): AST, ALT, BILIDIR, BILITOT, ALKPHOS in the last 72 hours. Recent Labs     04/08/22  1734 04/09/22  0900 04/10/22  1208   INR 6.39* 1.34* 1.06     No results for input(s): CKTOTAL, TROPONINI in the last 72 hours. Urinalysis:      Lab Results   Component Value Date    NITRU Negative 05/27/2021    WBCUA 3 05/27/2021    BACTERIA RARE 05/27/2021    RBCUA 4 05/27/2021    BLOODU TRACE 05/27/2021    SPECGRAV 1.016 05/27/2021    GLUCOSEU 250 05/27/2021       Radiology:  CT ABDOMEN PELVIS WO CONTRAST Additional Contrast? None   Final Result   Moderate ascitic fluid presumably related to the peritoneal dialysis catheter. Midline abdominal wall defect which may represent an ostomy creation but with   an unusual appearance. There is a vertically oriented catheter overlying the   superficial portion and surrounded by an overlying bandage.   There is also   fluid adjacent to the extra-abdominal bowel segment to the left of midline. This represent simple fluid possibly from communication with ascites. Fluid infiltration along the lower midline abdominal wall staple line and   incision, the inferior portion of which has higher density collection   possible accumulation of blood extending over an area of 5.6 x 3.7 cm. Assessment/Plan:    Bleeding at suture site due to elevated INR  Patient started on Coumadin  Apparently she never got her PT/INR checked and was not aware that she needed to  Continue to hold Coumadin  Will wait a little longer before restarting anticoagulation. I will talk with Dr. Alfredo Marx tomorrow. Recheck PT/INR today    End-stage renal disease-continue hemodialysis per nephrology. Dr. Milton Jones following    Coumadin toxicity-status post vitamin K  INR lower. Hold Coumadin for another day  Had pulmonary emboli diagnosed in March 2022  ? Radha filter vs lower dose coumadin and vigilant monitoring of PT/INR  ? [facility]    DVT Prophylaxis: scd  Diet: ADULT DIET; Regular; Low Potassium (Less than 3000 mg/day);  Low Phosphorus (Less than 1000 mg)  Code Status: Full Holland Gutierrez MD

## 2022-04-10 NOTE — PLAN OF CARE
Problem: Falls - Risk of:  Goal: Will remain free from falls  Description: Will remain free from falls  Outcome: Ongoing  Note: Fall risk assessment completed every shift. All precautions in place. Pt has call light within reach at all times. Room clear of clutter. Pt aware to call for assistance when getting up. Goal: Absence of physical injury  Description: Absence of physical injury  Outcome: Ongoing     Problem:  Activity:  Goal: Risk for activity intolerance will decrease  Description: Risk for activity intolerance will decrease  Outcome: Ongoing     Problem: Fluid Volume:  Goal: Will show no signs or symptoms of fluid imbalance  Description: Will show no signs or symptoms of fluid imbalance  Outcome: Ongoing     Problem: Nutrition  Goal: Optimal nutrition therapy  4/10/2022 0104 by Janis Boss RN  Outcome: Ongoing  4/9/2022 1218 by Rosalia Mcintyre RD, LD  Outcome: Ongoing     Problem: Bleeding:  Goal: Will show no signs and symptoms of excessive bleeding  Description: Will show no signs and symptoms of excessive bleeding  Outcome: Ongoing

## 2022-04-10 NOTE — PROGRESS NOTES
Small amount of blood noted to abdominal dressing. Scant blood noted near the top of the incision. Site cleansed with wound cleanser and saline. Colostomy bag changed at this time. Patient tolerated well. Sutures intact. New dressing applied.    .Electronically signed by Gurpreet Luu RN on 4/10/2022 at 6:41 AM

## 2022-04-10 NOTE — PROGRESS NOTES
Partially Occluding DVT R Distal Peroneal Vein. PMH as noted including CKD (PD), Hernia Repair (2/24/2022; Met Adenocarcinoma discovered during Hernia Repair), DM, Neuropathy, Retinopathy, Orthostatic Hypotension. Exam: See above. Clinical Presentation: See above. Patient Education: Role of PT, POC, Need to call for assist.  Barriers to Learning: None. REQUIRES PT FOLLOW UP: Yes  Activity Tolerance  Activity Tolerance: Patient limited by endurance       Patient Diagnosis(es): The primary encounter diagnosis was Elevated INR. Diagnoses of Hypoxia and Abdominal hemorrhage were also pertinent to this visit. has a past medical history of Anemia, Chronic kidney disease, Diabetes mellitus ([address] Diabetes mellitus type 1 ([address] Diabetic retinopathy ([address] Diabetic retinopathy associated with type 2 diabetes mellitus, without macular edema, Hyperlipidemia, Hypertension, Kidney stone, Mixed hyperlipidemia, Orthostatic hypotension, and Vitreous hemorrhage of right eye ([address] has a past surgical history that includes Appendectomy; Kidney stone surgery; Tonsillectomy; LAPAROSCOPY INSERTION PERITONEAL CATHETER (N/A, 8/12/2019); Dialysis Catheter Removal (N/A, 2/24/2022); Dialysis Catheter Insertion (N/A, 2/24/2022); ventral hernia repair (N/A, 2/24/2022); sigmoidoscopy (N/A, 3/23/2022); sigmoidoscopy (3/23/2022); and laparotomy (N/A, 3/24/2022). Restrictions  Restrictions/Precautions  Restrictions/Precautions: Fall Risk  Position Activity Restriction  Other position/activity restrictions: Abd Incision, Colostomy. CKD (PD pt). Vision/Hearing  Vision: Within Functional Limits  Hearing: Within functional limits     Subjective  General  Chart Reviewed: Yes  Patient assessed for rehabilitation services?: Yes  Additional Pertinent Hx: 54 y/o female admit 4/8/2022 with Bleeding from recent Colostomy (likely 2/2 Coumadin, h/o Coagulopathy), Met Colon Ca.   Recent admit 3/22-4/4/2022  with Stage IV Met Colon Ca, PE. 3/24/2022 S/P Exp Lap, Colostomy. CT Chest  : + B PE (R>L), Potential concern for Septic Emboli, Multiple Liver Mets. CT Abd : Numerous Hepatic Lesions suspicious for Possible Mets. LE Dopplers : R Indeterminate Partially Occluding DVT R Distal Peroneal Vein. PMH as noted including CKD (PD), Hernia Repair (2/24/2022; Met Adenocarcinoma discovered during Hernia Repair), DM, Neuropathy, Retinopathy, Orthostatic Hypotension. Family / Caregiver Present: No  Referring Practitioner: Dr. Lata Fonseca: Within Functional Limits  Subjective  Subjective: Pt agreeable to PT Eval/Rx. Pain Screening  Patient Currently in Pain: Denies          Orientation  Orientation  Overall Orientation Status: Within Functional Limits  Social/Functional History  Social/Functional History  Lives With: Spouse  Type of Home: House  Home Layout: Two level,Laundry in basement (Bedroom on main level (bath on 2nd floor only); uses BSC)  Home Access: Stairs to enter without rails (5 MICHELLE without handrail. flight of steps up to bathroom with handrail.)  Entrance Stairs - Number of Steps: 5 MICHELLE with no rails; flight steps up to bathroom with rail  Bathroom Shower/Tub: Tub/Shower unit  Bathroom Toilet: Standard  Bathroom Equipment: Grab bars in shower,3-in-1 commode  Bathroom Accessibility: Accessible  Home Equipment: 4 wheeled walker (\"Walking Stick\")  ADL Assistance:  (Pt stays on 1st floor, sleep in reg bed on 1st floor; only sponge bathing since last admit.)  Homemaking Assistance: Needs assistance ( takes care of homemaking needs.)  Ambulation Assistance: Needs assistance ( assist, uses Rollator Walker and amb only short distances. )  Transfer Assistance: Needs assistance (With Rollator.)  Active : No ( drives.)  Patient's  Info:  drives pt  Additional Comments:  has been assisting more and more recently--hector on HD days per pt.   Reported one fall day of/at admit, with legs buckled per pt.  Cognition   Cognition  Overall Cognitive Status: [facility]    Objective          AROM RLE (degrees)  RLE AROM: WFL  AROM LLE (degrees)  LLE AROM : WFL  AROM RUE (degrees)  RUE AROM : WFL  AROM LUE (degrees)  LUE AROM : WFL  Strength Other  Other: Grossly 4-/5 throughout; weak with functional activities. Sensation  Overall Sensation Status: [facility]  Bed mobility  Supine to Sit: Stand by assistance (HOB elevated. Use of Bedrail.)  Transfers  Sit to Stand: Contact guard assistance (With [place]  Stand to sit: Contact guard assistance (With [place]  Ambulation  Ambulation?: Yes  Ambulation 1  Surface: level tile  Device: Rolling Walker  Distance: Pt amb 5-6 steps bed to chair with Walker CGA. Diminished step length/clearance; slow although no buckling/giving way of LEs. Fatigued following. Balance  Sitting - Static: Good  Sitting - Dynamic: Good  Standing - Static: Good;- (With Walker.)  Standing - Dynamic: Fair;+ (With Walker.)        Plan   Plan  Times per week: 3-5x week while in acute care setting. Current Treatment Recommendations: Strengthening,Functional Mobility Training,Transfer Training,Gait Training,Safety Education & Training,Patient/Caregiver Education & Training  Safety Devices  Type of devices: Call light within reach,Left in chair,Nurse notified      AM-PAC Score  AM-PAC Inpatient Mobility Raw Score : 18 (04/10/22 0957)  AM-PAC Inpatient T-Scale Score : 43.63 (04/10/22 0957)  Mobility Inpatient CMS 0-100% Score: 46.58 (04/10/22 0957)  Mobility Inpatient CMS G-Code Modifier : CK (04/10/22 0957)          Goals  Short term goals  Time Frame for Short term goals: Upon d/c acute care setting. Short term goal 1: Bed Mob Supervision. Short term goal 2: Transfers with assist device SBA. Short term goal 3: Amb with assist device 25' CGA. Short term goal 4: Pt participating in approp Strength Exs.   Patient Goals   Patient goals : Return home with /family assist.       Therapy Time   Individual Concurrent Group Co-treatment   Time In 0815         Time Out 0840         Minutes 1610 [facility]

## 2022-04-10 NOTE — PROGRESS NOTES
Pt with medium amount of bloody drainage from midline incision x3. Dressing changed x3. Colostomy dressing reinforced. Dr Oma Mariee updated about increase in bleeding after Heparin SQ started. SCD`s applied to pt.  Electronically signed by Peter Decker RN on 4/10/2022 at 6:58 PM  '

## 2022-04-10 NOTE — PROGRESS NOTES
General Surgery    Patient is three weeks postop from colostomy to treat obstructing mass in pelvis    Ostomy working well    Presented with bloody drainage from midline incision    CT with fluid collection    This is a postoperative hematoma which is expressing through the incision   No sign of active bleeding   Hg stable from recent discharge to this admission     Staples and ostomy bridge removed today    Follow up with me in office in 2-3 weeks    Electronically signed by Lucila Cody MD on 4/10/2022 at 9:25 AM

## 2022-04-11 NOTE — ACP (ADVANCE CARE PLANNING)
Advance Care Planning     Advance Care Planning Activator (Inpatient)  Conversation Note      Date of ACP Conversation: 4/11/2022     Conversation Conducted with: Patient with Decision Making Capacity    ACP Activator: Iliana Meneses 46 Decision Maker:     Current Designated Health Care Decision Maker:     Primary Decision Maker: Scout Leon    Secondary Decision Maker: Cleo Ponce - [phone]    Care Preferences    Ventilation: \"If you were in your present state of health and suddenly became very ill and were unable to breathe on your own, what would your preference be about the use of a ventilator (breathing machine) if it were available to you? \"      Would the patient desire the use of ventilator (breathing machine)?: yes    \"If your health worsens and it becomes clear that your chance of recovery is unlikely, what would your preference be about the use of a ventilator (breathing machine) if it were available to you? \"     Would the patient desire the use of ventilator (breathing machine)?: Unsure      Resuscitation  \"CPR works best to restart the heart when there is a sudden event, like a heart attack, in someone who is otherwise healthy. Unfortunately, CPR does not typically restart the heart for people who have serious health conditions or who are very sick. \"    \"In the event your heart stopped as a result of an underlying serious health condition, would you want attempts to be made to restart your heart (answer \"yes\" for attempt to resuscitate) or would you prefer a natural death (answer \"no\" for do not attempt to resuscitate)? \" yes       [] Yes   [x] No   Educated Patient / Jeremias Robison regarding differences between Advance Directives and portable DNR orders.     Length of ACP Conversation in minutes:  5    Conversation Outcomes:  [x] ACP discussion completed  [] Existing advance directive reviewed with patient; no changes to patient's previously recorded wishes  [] New Advance Directive completed  [] Portable Do Not Rescitate prepared for Provider review and signature  [] POLST/POST/MOLST/MOST prepared for Provider review and signature      Follow-up plan:    [] Schedule follow-up conversation to continue planning  [] Referred individual to Provider for additional questions/concerns   [] Advised patient/agent/surrogate to review completed ACP document and update if needed with changes in condition, patient preferences or care setting    [] This note routed to one or more involved healthcare providers    Eugene GONCALVES RN  Case Management  [phone]    Electronically signed by Eugene Cerna RN on 4/11/2022 at 1:58 PM

## 2022-04-11 NOTE — PROGRESS NOTES
Hematology Oncology Daily Progress Note    Admit Date: 4/8/2022  Hospital day several    Subjective:     Patient has complaints of no further bleeding from colosomy--denies sob/cp  Medication side effects: none    Scheduled Meds:   pantoprazole  40 mg Oral QAM AC    epoetin javier-epbx  10,000 Units IntraVENous Once per day on Mon Wed Fri    losartan  50 mg Oral Nightly    sodium chloride flush  10 mL IntraVENous 2 times per day    heparin (porcine)  5,000 Units SubCUTAneous 3 times per day    insulin lispro  0-12 Units SubCUTAneous TID WC    insulin lispro  0-6 Units SubCUTAneous Nightly    midodrine  10 mg Oral Once per day on Mon Wed Fri     Continuous Infusions:   sodium chloride      dextrose       PRN Meds:heparin (porcine), sodium chloride flush, sodium chloride, promethazine **OR** ondansetron, magnesium hydroxide, acetaminophen **OR** acetaminophen, glucose, dextrose, glucagon (rDNA), dextrose    Review of Systems  Pertinent items are noted in HPI. REVIEW OF SYSTEMS:         · Constitutional: Denies fever, sweats, weight loss     · Eyes: No visual changes or diplopia. No scleral icterus. · ENT: No Headaches, hearing loss or vertigo. No mouth sores or sore throat. · Cardiovascular: No chest pain, dyspnea on exertion, palpitations or loss of consciousness. · Respiratory: No cough or wheezing, no sputum production. No hemoptysis. .    · Gastrointestinal: No abdominal pain, appetite loss, blood in stools. No change in bowel habits. · Genitourinary: No dysuria, trouble voiding, or hematuria. · Musculoskeletal:  Generalized weakness. No joint complaints. · Integumentary: No rash or pruritis. · Neurological: No headache, diplopia. No change in gait, balance, or coordination. No paresthesias. · Endocrine: No temperature intolerance. No excessive thirst, fluid intake, or urination.    · Hematologic/Lymphatic: No abnormal bruising or ecchymoses, blood clots or swollen lymph nodes.  · Allergic/Immunologic: No nasal congestion or hives. ·     Objective:     Patient Vitals for the past 8 hrs:   BP Temp Temp src Pulse Resp SpO2 Weight   04/11/22 0730 116/72 98.2 °F (36.8 °C) Oral 89 24 99 %    04/11/22 0537       176 lb 12.9 oz (80.2 kg)   04/11/22 0515 136/80 98.5 °F (36.9 °C) Oral 94 22 97 %      I/O last 3 completed shifts: In: 374 [P.O.:374]  Out: 100 [Stool:100]  No intake/output data recorded. /72   Pulse 89   Temp 98.2 °F (36.8 °C) (Oral)   Resp 24   Ht 5' 3\" (1.6 m)   Wt 176 lb 12.9 oz (80.2 kg)   SpO2 99%   BMI 31.32 kg/m²     General Appearance:    Alert, cooperative, no distress, appears stated age   Head:    Normocephalic, without obvious abnormality, atraumatic   Eyes:    PERRL, conjunctiva/corneas clear, EOM's intact, fundi     benign, both eyes        Ears:    Normal TM's and external ear canals, both ears   Nose:   Nares normal, septum midline, mucosa normal, no drainage    or sinus tenderness   Throat:   Lips, mucosa, and tongue normal; teeth and gums normal   Neck:   Supple, symmetrical, trachea midline, no adenopathy;        thyroid:  No enlargement/tenderness/nodules; no carotid    bruit or JVD   Back:     Symmetric, no curvature, ROM normal, no CVA tenderness   Lungs:     Clear to auscultation bilaterally, respirations unlabored   Chest wall:    No tenderness or deformity   Heart:    Regular rate and rhythm, S1 and S2 normal, no murmur, rub   or gallop   Abdomen:     Soft, non-tender, bowel sounds active all four quadrants,     no masses, no organomegaly           Extremities:   Extremities normal, atraumatic, no cyanosis or edema   Pulses:   2+ and symmetric all extremities   Skin:   Skin color, texture, turgor normal, no rashes or lesions   Lymph nodes:   Cervical, supraclavicular, and axillary nodes normal   Neurologic:   CNII-XII intact.  Normal strength, sensation and reflexes       throughout          Data Review  CBC:   Lab Results Component Value Date    WBC 5.8 04/09/2022    RBC 2.66 04/09/2022       Assessment:     Principal Problem:    Bleeding  Resolved Problems:    * No resolved hospital problems. *      Plan:     1. Stage IV colorectal cancer. I reviewed her PET/CT that was done last week in great detail. She has widespread metastatic disease. Dr. Harvey Elizalde will see her tomorrow if she is still in the hospital to discuss chemotherapy options that will hopefully start in the near future. Her end-stage renal disease will complicate chemotherapy options. 2.  Pulmonary embolus. She will need more vigilant monitoring of her INRs. I would not recommend an IVC filter. I have used a lower dose Eliquis of 2.5 mg twice a day in patients on dialysis. There is some risk.         Electronically signed by Mary Carmen Machado MD on 4/11/2022 at 7:56 AM

## 2022-04-11 NOTE — PROGRESS NOTES
Occupational Therapy   Occupational Therapy Initial Assessment  Date: 2022   Patient Name: Rosmery Moreno  MRN: [id number]     : 1965    Date of Service: 2022    Discharge Recommendations:  2-3 sessions per week,Patient would benefit from continued therapy after discharge,Home with Home health OT,24 hour supervision or assist Pt would benefit from OT 2-3x/week to improve endurance and independence with ADL, however, pt not interested. Will need 24 hr SUP    OT Equipment Recommendations  Equipment Needed: María Mckenzie scored a 17/24 on the AM-PAC ADL Inpatient form. Current research shows that an AM-PAC score of 18 or greater is typically associated with a discharge to the patient's home setting. Based on the patient's AM-PAC score, and their current ADL deficits, it is recommended that the patient have 2-3 sessions per week of Occupational Therapy at d/c to increase the patient's independence. At this time, this patient demonstrates the endurance and safety to discharge home with [address] [place] [address] (home vs OP services) and a follow up treatment frequency of 2-3x/wk. Please see assessment section for further patient specific details. If patient discharges prior to next session this note will serve as a discharge summary. Please see below for the latest assessment towards goals. Assessment   Performance deficits / Impairments: Decreased functional mobility ; Decreased strength;Decreased endurance;Decreased ADL status; Decreased high-level IADLs;Decreased balance  Assessment: 56 yo female admit with Bleeding from recent Colostomy (3/24/22), Met Colon Ca. Recent admit 3/ with Stage IV Met Colon Ca, PE. CT Chest: + B PE (R>L), Potential concern for Septic Emboli, Multiple Liver Mets. CT Abd: Numerous Hepatic Lesions suspicious for Possible Mets. LE Dopplers: R Indeterminate Partially Occluding DVT R Distal Peroneal Vein.  PMH: CKD, Hernia Repair (22), DM, Neuropathy, Retinopathy, Orthostatic Hypotension. PTA, pt lives with spouse with assist for IADL and fxl mobility with furniture/4WW- otherwise IND ADL. Today, pt functioning below baseline most limited by decreased endurance and strength. Pt completes bed mobility SBA and fxl tx and mobility with RW short distances with CGA. Low endurance limits distance. Pt does require seated rest break at EOB after bed mobility d/t fatigue. Anticipate Mod A LB and set up seated UB ADL based on balance, endurance, cognition, and PLOF. Cont acute OT to address above deficit. Pt would benefit from OT 2-3x/week to improve endurance and independence with ADL, however, pt not interested. Will need 24 hr SUP  Treatment Diagnosis: impaired ADL/fxl mobility  Prognosis: Fair;Good  Decision Making: Medium Complexity  OT Education: OT Role;Transfer Training;Plan of Care  Patient Education: benefits of [facility]- (pt declines d/t cluttered home)  REQUIRES OT FOLLOW UP: Yes  Activity Tolerance  Activity Tolerance: Patient limited by fatigue  Safety Devices  Safety Devices in place: Yes  Type of devices: Nurse notified;Gait belt;Call light within reach; Chair alarm in place; Left in chair;Patient at risk for falls         Patient Diagnosis(es): The primary encounter diagnosis was Elevated INR. Diagnoses of Hypoxia and Abdominal hemorrhage were also pertinent to this visit. has a past medical history of Anemia, Chronic kidney disease, Diabetes mellitus ([address] Diabetes mellitus type 1 ([address] Diabetic retinopathy ([address] Diabetic retinopathy associated with type 2 diabetes mellitus, without macular edema, Hyperlipidemia, Hypertension, Kidney stone, Mixed hyperlipidemia, Orthostatic hypotension, and Vitreous hemorrhage of right eye ([address] has a past surgical history that includes Appendectomy; Kidney stone surgery; Tonsillectomy; LAPAROSCOPY INSERTION PERITONEAL CATHETER (N/A, 8/12/2019); Dialysis Catheter Removal (N/A, 2/24/2022);  Dialysis Catheter Insertion (N/A, 2/24/2022); ventral hernia repair (N/A, 2/24/2022); sigmoidoscopy (N/A, 3/23/2022); sigmoidoscopy (3/23/2022); and laparotomy (N/A, 3/24/2022). Treatment Diagnosis: impaired ADL/fxl mobility      Restrictions  Restrictions/Precautions  Restrictions/Precautions: Fall Risk  Position Activity Restriction  Other position/activity restrictions: Abd Incision, Colostomy. Subjective   General  Chart Reviewed: Yes  Patient assessed for rehabilitation services?: Yes  Additional Pertinent Hx: 56 yo female admit with Bleeding from recent Colostomy (3/24/22), Met Colon Ca. Recent admit 3/22-4/4/2022 with Stage IV Met Colon Ca, PE. CT Chest: + B PE (R>L), Potential concern for Septic Emboli, Multiple Liver Mets. CT Abd: Numerous Hepatic Lesions suspicious for Possible Mets. LE Dopplers: R Indeterminate Partially Occluding DVT R Distal Peroneal Vein. PMH: CKD, Hernia Repair (2/24/22), DM, Neuropathy, Retinopathy, Orthostatic Hypotension. Family / Caregiver Present: No  Referring Practitioner: Irina Beckman MD  Diagnosis: Bleeding  Subjective  Subjective: Pt resting in bed upon arrival and agreeable to OT evaluations with encouragement. Pt reporting no pain, \"fatigue\"  General Comment  Comments: RN ok to see     Social/Functional History  Social/Functional History  Lives With: Spouse (24 hr assist)  Type of Home: House  Home Layout: Two level,Laundry in basement (Bedroom on main level (bath on 2nd floor only); uses BSC)  Home Access: Stairs to enter without rails (5 MICHELLE without handrail. flight of steps up to bathroom with handrail.)  Entrance Stairs - Number of Steps: 5 MICHELLE with no rails; flight steps up to bathroom with rail  Bathroom Shower/Tub: Tub/Shower unit  Bathroom Toilet: Standard  Bathroom Equipment: Grab bars in shower,3-in-1 commode  Bathroom Accessibility: Accessible  Home Equipment: 4 wheeled walker (\"Walking Stick\")  ADL Assistance: Independent (Only sponge bathing since last admit.  IND with toileting, grooming, dressing)  Homemaking Assistance: Needs assistance ( takes care of homemaking needs.)  Ambulation Assistance: Needs assistance ( assist, uses Sathya Martines in community and furniture/ in home)  Transfer Assistance: Needs assistance (With Rollator.)  Active : No ( drives.)  Patient's  Info:  drives pt  Additional Comments:  has been assisting more and more recently--hector on HD days per pt. Reported one fall day of/at admit, with legs buckled per pt. Objective   Vision: Within Functional Limits  Hearing: Within functional limits    Orientation  Overall Orientation Status: Within Functional Limits     Balance  Sitting Balance: Stand by assistance (at EOB x5 min in prep for tx with deep breathing/rest break d/t fatigue)  Standing Balance: Contact guard assistance (~30 sec for pericare with BUE on RW)  Functional Mobility  Functional - Mobility Device: Rolling Walker  Activity:  (EOB>recliner short distance)  Assist Level: Contact guard assistance  Functional Mobility Comments: short distance d/t fatigue from hemodialysis today. minimal unsteadiness, no LOB. appropriate RW use  ADL  Additional Comments: Anticipate Mod A LB and set up seated UB ADL based on balance, endurance, cognition, and PLOF  Tone RUE  RUE Tone: Normotonic  Tone LUE  LUE Tone: Normotonic  Coordination  Movements Are Fluid And Coordinated: Yes     Bed mobility  Supine to Sit: Stand by assistance (HOB elevated. Use of Bedrail. min increased time)  Transfers  Sit to stand: Contact guard assistance  Stand to sit: Contact guard assistance  Transfer Comments: RW. cues for hand placement.  to/from EOB and recliner     Cognition  Overall Cognitive Status: WFL        Sensation  Overall Sensation Status: WFL        LUE AROM (degrees)  LUE AROM : WFL  RUE AROM (degrees)  RUE AROM : WFL  LUE Strength  LUE Strength Comment: grossly 4/5  RUE Strength  RUE Strength Comment: grossly 4/5 Plan   Plan  Times per week: 3-5  Current Treatment Recommendations: Strengthening,Endurance Training,Patient/Caregiver Education & Training,ROM,Self-Care / ADL,Balance Training,Pain Management,Functional Mobility Training,Safety Education & Training,Positioning    AM-PAC Score        AM-PAC Inpatient Daily Activity Raw Score: 17 (04/11/22 1531)  AM-PAC Inpatient ADL T-Scale Score : 37.26 (04/11/22 1531)  ADL Inpatient CMS 0-100% Score: 50.11 (04/11/22 1531)  ADL Inpatient CMS G-Code Modifier : CK (04/11/22 1531)    Goals  Short term goals  Time Frame for Short term goals: prior to d/c  Short term goal 1: toileting SUP  Short term goal 2: LB dressing CGA  Short term goal 3: UB bathing/dressing set up  Short term goal 4: BUE exercises in all planes to improve strength and endurance for ADL/tx  Short term goal 5: tolerate 3 min fxl standing task with SUP  Patient Goals   Patient goals : return home and improve fatigue       Therapy Time   Individual Concurrent Group Co-treatment   Time In 1430         Time Out 1510         Minutes 40         Timed Code Treatment Minutes: 25 Minutes (15 eval. 25 TA)       RUTHANN Rose, OTR/L

## 2022-04-11 NOTE — PROGRESS NOTES
Physical Therapy  Attempt Note    Name:Tami Alfonso  :1965  [facility]:[id number]  Room: J1G-4957/5117-01    Date of Service: 2022      Patient chart reviewed. PT attempted to see for PT tx at this time. Out of room for HD at this time. Will attempt again as therapy schedule permits. If patient is discharged prior to the next physical therapy visit; Please see last written PT note for discharge status.                Electronically signed by Stephanie Guerrero PT on 2022 at 9:45 AM

## 2022-04-11 NOTE — PLAN OF CARE
Problem: Falls - Risk of:  Goal: Will remain free from falls  Description: Will remain free from falls  4/11/2022 0833 by Tere Chi RN  Outcome: Ongoing     Problem: Falls - Risk of:  Goal: Absence of physical injury  Description: Absence of physical injury  4/11/2022 0833 by Tere Chi RN  Outcome: Ongoing     Problem:  Activity:  Goal: Risk for activity intolerance will decrease  Description: Risk for activity intolerance will decrease  4/11/2022 0833 by Tere Chi RN  Outcome: Ongoing     Problem: Fluid Volume:  Goal: Will show no signs or symptoms of fluid imbalance  Description: Will show no signs or symptoms of fluid imbalance  4/11/2022 0833 by Tere Chi RN  Outcome: Ongoing     Problem: Nutrition  Goal: Optimal nutrition therapy  4/11/2022 0833 by Tere Chi RN  Outcome: Ongoing     Problem: Bleeding:  Goal: Will show no signs and symptoms of excessive bleeding  Description: Will show no signs and symptoms of excessive bleeding  4/11/2022 0833 by Tere Chi RN  Outcome: Ongoing     Problem: Pain:  Goal: Pain level will decrease  Description: Pain level will decrease  Outcome: Ongoing     Problem: Pain:  Goal: Control of acute pain  Description: Control of acute pain  Outcome: Ongoing     Problem: Pain:  Goal: Control of chronic pain  Description: Control of chronic pain  Outcome: Ongoing

## 2022-04-11 NOTE — PLAN OF CARE
Problem: Falls - Risk of:  Goal: Will remain free from falls  Description: Will remain free from falls  Outcome: Ongoing  Goal: Absence of physical injury  Description: Absence of physical injury  Outcome: Ongoing     Problem:  Activity:  Goal: Risk for activity intolerance will decrease  Description: Risk for activity intolerance will decrease  Outcome: Ongoing     Problem: Fluid Volume:  Goal: Will show no signs or symptoms of fluid imbalance  Description: Will show no signs or symptoms of fluid imbalance  Outcome: Ongoing     Problem: Nutrition  Goal: Optimal nutrition therapy  Outcome: Ongoing     Problem: Bleeding:  Goal: Will show no signs and symptoms of excessive bleeding  Description: Will show no signs and symptoms of excessive bleeding  Outcome: Ongoing

## 2022-04-11 NOTE — PROGRESS NOTES
Hospitalist Progress Note      PCP: No primary care provider on file. Date of Admission: 4/8/2022    Chief Complaint:     Hospital Course: Patient is a 18-year-old female admitted to the hospital with bleeding around laparotomy and colostomy site. Found to have Coumadin toxicity required vitamin K for reversal    Subjective: Feels okay. Still occasional oozing from site    Medications:  Reviewed    Infusion Medications    sodium chloride      dextrose       Scheduled Medications    pantoprazole  40 mg Oral QAM AC    epoetin javier-epbx  10,000 Units IntraVENous Once per day on Mon Wed Fri    losartan  50 mg Oral Nightly    sodium chloride flush  10 mL IntraVENous 2 times per day    insulin lispro  0-12 Units SubCUTAneous TID WC    insulin lispro  0-6 Units SubCUTAneous Nightly    midodrine  10 mg Oral Once per day on Mon Wed Fri     PRN Meds: heparin (porcine), sodium chloride flush, sodium chloride, promethazine **OR** ondansetron, magnesium hydroxide, acetaminophen **OR** acetaminophen, glucose, dextrose, glucagon (rDNA), dextrose      Intake/Output Summary (Last 24 hours) at 4/11/2022 1544  Last data filed at 4/11/2022 1540  Gross per 24 hour   Intake 940 ml   Output 1450 ml   Net -510 ml       Physical Exam Performed:    BP 96/67   Pulse 91   Temp 97.3 °F (36.3 °C) (Oral)   Resp 20   Ht 5' 3\" (1.6 m)   Wt 171 lb 1.2 oz (77.6 kg)   SpO2 99%   BMI 30.30 kg/m²     General appearance: No apparent distress, appears stated age and cooperative. HEENT: Pupils equal, round, and reactive to light. Conjunctivae/corneas clear. Neck: Supple, with full range of motion. No jugular venous distention. Trachea midline. Respiratory:  Normal respiratory effort. Clear to auscultation, bilaterally without Rales/Wheezes/Rhonchi. Cardiovascular: Regular rate and rhythm with normal S1/S2 without murmurs, rubs or gallops. Abdomen: Soft, non-tender, non-distended with normal bowel sounds.   Ostomy draining blackish stool  Musculoskeletal: No clubbing, cyanosis or edema bilaterally. Full range of motion without deformity. Skin: Skin color, texture, turgor normal.  No rashes or lesions. Neurologic:  Neurovascularly intact without any focal sensory/motor deficits. Cranial nerves: II-XII intact, grossly non-focal.  Psychiatric: Alert and oriented, thought content appropriate, normal insight  Capillary Refill: Brisk,3 seconds, normal   Peripheral Pulses: +2 palpable, equal bilaterally       Labs:   Recent Labs     04/08/22  1546 04/08/22  1734 04/09/22  0514   WBC 6.0 5.6 5.8   HGB 8.9* 8.4* 8.1*   HCT 26.5* 25.4* 24.7*    196 163     Recent Labs     04/09/22  0513 04/10/22  0508 04/11/22  0431    137 136   K 3.8 4.1 3.9    103 100   CO2 24 25 25   BUN 18 10 16   CREATININE 6.6* 4.2* 5.3*   CALCIUM 7.3* 7.1* 7.6*     No results for input(s): AST, ALT, BILIDIR, BILITOT, ALKPHOS in the last 72 hours. Recent Labs     04/09/22  0900 04/10/22  1208 04/11/22  0431   INR 1.34* 1.06 1.03     No results for input(s): Shanelle Good in the last 72 hours. Urinalysis:      Lab Results   Component Value Date    NITRU Negative 05/27/2021    WBCUA 3 05/27/2021    BACTERIA RARE 05/27/2021    RBCUA 4 05/27/2021    BLOODU TRACE 05/27/2021    SPECGRAV 1.016 05/27/2021    GLUCOSEU 250 05/27/2021       Radiology:  CT ABDOMEN PELVIS WO CONTRAST Additional Contrast? None   Final Result   Moderate ascitic fluid presumably related to the peritoneal dialysis catheter. Midline abdominal wall defect which may represent an ostomy creation but with   an unusual appearance. There is a vertically oriented catheter overlying the   superficial portion and surrounded by an overlying bandage. There is also   fluid adjacent to the extra-abdominal bowel segment to the left of midline. This represent simple fluid possibly from communication with ascites.       Fluid infiltration along the lower midline abdominal wall staple line and   incision, the inferior portion of which has higher density collection   possible accumulation of blood extending over an area of 5.6 x 3.7 cm. Assessment/Plan:    Bleeding at suture site due to elevated INR  Patient started on Coumadin  Apparently she never got her PT/INR checked and was not aware that she needed to  Case discussed with surgery would like to hold off on starting anticoagulation for a little bit longer    End-stage renal disease-continue hemodialysis per nephrology. Dr. Ellyn Lanza following    Coumadin toxicity-status post vitamin K  INR lower. Oncology recommended Eliquis instead of Coumadin once we restart anticoagulation    DVT Prophylaxis: scd  Diet: ADULT DIET; Regular; Low Potassium (Less than 3000 mg/day);  Low Phosphorus (Less than 1000 mg)  Code Status: Full Daysi Viveros MD

## 2022-04-11 NOTE — PROGRESS NOTES
Office: [phone]       Fax: [phone]      Nephrology Progress Note        Patient's Name: Cris Qiu  Date of Visit: 4/11/2022    Reason for Consult:  ESRD management      History of Present Illness:      Cris Qiu is a 55 y.o. female with PMHx of hypertension, diabetes mellitus, colon cancer, ESRD who was admitted on 4/8/2022 with complaints of stomal bleeding  Recently switched to HD  Pt was to go to dialysis but noted bleeding  INTERVAL HISTORY    Feels same  Shortness of breath: No     Tolerating HD but BP dropping  Mild abdominal pain          Medications: Allergies:  Amlodipine, Bactrim [sulfamethoxazole-trimethoprim], Doxazosin, Geocillin [carbenicillin], Lisinopril, Other, Spironolactone, Tramadol, Bumetanide, and Ibuprofen     pantoprazole  40 mg Oral QAM AC    epoetin javier-epbx  10,000 Units IntraVENous Once per day on Mon Wed Fri    losartan  50 mg Oral Nightly    sodium chloride flush  10 mL IntraVENous 2 times per day    heparin (porcine)  5,000 Units SubCUTAneous 3 times per day    insulin lispro  0-12 Units SubCUTAneous TID WC    insulin lispro  0-6 Units SubCUTAneous Nightly    midodrine  10 mg Oral Once per day on Mon Wed Fri             Objective:       Vitals:  /76   Pulse 87   Temp 97.4 °F (36.3 °C)   Resp 24   Ht 5' 3\" (1.6 m)   Wt 172 lb 13.5 oz (78.4 kg)   SpO2 99%   BMI 30.62 kg/m²   Constitutional:  awake, NAD  Skin: no rash, turgor wnl  HEENT:  MMM, No icterus  Neck: no bruits, No JVD  Cardiovascular:  S1, S2 reg  Respiratory: CTA, no crackles  Abdomen:  +BS, soft, tender, ND  Access: abd PD catheter in place, no drainage/redness around exit site.  Stoma in place with dark sttol  CNS: alert, no agitation    Data:     Labs:  Hepatic:   No results for input(s): AST, ALT, ALB, BILITOT, ALKPHOS in the last 72 hours. BNP: No results for input(s): BNP in the last 72 hours. ABGs: No results for input(s): PHART, PO2ART, YNF8MGR in the last 72 hours. IMAGING:  CT ABDOMEN PELVIS WO CONTRAST Additional Contrast? None   Final Result   Moderate ascitic fluid presumably related to the peritoneal dialysis catheter. Midline abdominal wall defect which may represent an ostomy creation but with   an unusual appearance. There is a vertically oriented catheter overlying the   superficial portion and surrounded by an overlying bandage. There is also   fluid adjacent to the extra-abdominal bowel segment to the left of midline. This represent simple fluid possibly from communication with ascites. Fluid infiltration along the lower midline abdominal wall staple line and   incision, the inferior portion of which has higher density collection   possible accumulation of blood extending over an area of 5.6 x 3.7 cm. Assessment :       1. ESRD   Etiology: suspected DN  Access: Abd PD cath and Tunneled Cath  Volume: Hypervolemic        2. Electrolytes/Acid base  No Dyskalemia    Recent Labs     04/11/22  0431      K 3.9   CO2 25       3. BMD  -Hyperphosphatemia, SHPT  -maintained on Renvela    Lab Results   Component Value Date    CALCIUM 7.6 (L) 04/11/2022    PHOS 4.5 02/17/2022        4. HTN  -Blood pressure at goal        BP Readings from Last 1 Encounters:   04/11/22 120/76       5. Anemia  -anemia of CKD  -assess for CHRIS    Recent Labs     04/09/22  0514   HGB 8.1*     6. DM    7. abdominal pain   -colon cancer    PLAN :     - maintenance HD MWF. UF as tolerated. Seen on HD  - continue BP meds  - MBD management  - Anemia management - CHRIS  - Dose meds to GFR<10        Thank you for allowing us to participate in care of Spoondate . We will continue to follow. Feel free to contact me with any questions.       Mena Carreon MD  4/11/2022    [facility] [address]  Office : [phone]  Fax :[phone]

## 2022-04-11 NOTE — FLOWSHEET NOTE
Treatment time: 3 hours  Net UF: 700 ml     Pre weight: 78.3 kg   Post weight: 77.6 kg  EDW: TBD kg     Access used: RIJ TDC  Access function: Good with  ml/min     Medications or blood products given: Retacrit, heparin for catheter dwells     Regular outpatient schedule: MWF     Summary of response to treatment:      04/11/22 1055   Vital Signs   BP (!) 103/58   Temp 97.1 °F (36.2 °C)   Pulse 93   Resp 20   Weight 171 lb 1.2 oz (77.6 kg)   Weight Method Bed scale   Percent Weight Change -1.02   Post-Hemodialysis Assessment   Post-Treatment Procedures Blood returned;Catheter capped, clamped and heparinized x 2 ports   Machine Disinfection Process Acid/Vinegar Clean;Bleach; Machine Absence of [employer]; Exterior Machine Disinfection   Rinseback Volume (ml) 400 ml   Total Liters Processed (l/min) 57.8 l/min   Dialyzer Clearance Lightly streaked   Duration of Treatment (minutes) 180 minutes   Hemodialysis Intake (ml) 700 ml   Hemodialysis Output (ml) 1400 ml   NET Removed (ml) 700 ml   Tolerated Treatment Good   Copy of dialysis treatment record placed in chart, to be scanned into EMR.  Report called to Uriel Delong RN

## 2022-04-12 PROBLEM — E44.0 MODERATE MALNUTRITION (HCC): Status: ACTIVE | Noted: 2022-01-01

## 2022-04-12 NOTE — PROGRESS NOTES
Hospitalist Progress Note      PCP: No primary care provider on file. Date of Admission: 4/8/2022    Chief Complaint:     Hospital Course: Patient is a 23-year-old female admitted to the hospital with bleeding around laparotomy and colostomy site. Found to have Coumadin toxicity required vitamin K for reversal    Subjective: Feels okay. Pain in abdomen less      Medications:  Reviewed    Infusion Medications    sodium chloride      dextrose       Scheduled Medications    pantoprazole  40 mg Oral QAM AC    epoetin javier-epbx  10,000 Units IntraVENous Once per day on Mon Wed Fri    losartan  50 mg Oral Nightly    sodium chloride flush  10 mL IntraVENous 2 times per day    insulin lispro  0-12 Units SubCUTAneous TID WC    insulin lispro  0-6 Units SubCUTAneous Nightly    midodrine  10 mg Oral Once per day on Mon Wed Fri     PRN Meds: heparin (porcine), sodium chloride flush, sodium chloride, promethazine **OR** ondansetron, magnesium hydroxide, acetaminophen **OR** acetaminophen, glucose, dextrose, glucagon (rDNA), dextrose      Intake/Output Summary (Last 24 hours) at 4/12/2022 1527  Last data filed at 4/12/2022 1503  Gross per 24 hour   Intake 460 ml   Output 105 ml   Net 355 ml       Physical Exam Performed:    /84   Pulse 102   Temp 98.2 °F (36.8 °C) (Oral)   Resp 25   Ht 5' 3\" (1.6 m)   Wt 170 lb 3.1 oz (77.2 kg)   SpO2 94%   BMI 30.15 kg/m²     General appearance: No apparent distress, appears stated age and cooperative. HEENT: Pupils equal, round, and reactive to light. Conjunctivae/corneas clear. Neck: Supple, with full range of motion. No jugular venous distention. Trachea midline. Respiratory:  Normal respiratory effort. Clear to auscultation, bilaterally without Rales/Wheezes/Rhonchi. Cardiovascular: Regular rate and rhythm with normal S1/S2 without murmurs, rubs or gallops. Abdomen: Soft, non-tender, non-distended with normal bowel sounds.   Ostomy draining stool  Musculoskeletal: No clubbing, cyanosis or edema bilaterally. Full range of motion without deformity. Skin: Skin color, texture, turgor normal.  No rashes or lesions. Neurologic:  Neurovascularly intact without any focal sensory/motor deficits. Cranial nerves: II-XII intact, grossly non-focal.  Psychiatric: Alert and oriented, thought content appropriate, normal insight  Capillary Refill: Brisk,3 seconds, normal   Peripheral Pulses: +2 palpable, equal bilaterally       Labs:   No results for input(s): WBC, HGB, HCT, PLT in the last 72 hours. Recent Labs     04/10/22  0508 04/11/22  0431 04/12/22  0448    136 133*   K 4.1 3.9 4.5    100 99   CO2 25 25 23   BUN 10 16 14   CREATININE 4.2* 5.3* 3.8*   CALCIUM 7.1* 7.6* 7.5*     No results for input(s): AST, ALT, BILIDIR, BILITOT, ALKPHOS in the last 72 hours. Recent Labs     04/10/22  1208 04/11/22  0431 04/12/22  0448   INR 1.06 1.03 1.05     No results for input(s): Biju Hug in the last 72 hours. Urinalysis:      Lab Results   Component Value Date    NITRU Negative 05/27/2021    WBCUA 3 05/27/2021    BACTERIA RARE 05/27/2021    RBCUA 4 05/27/2021    BLOODU TRACE 05/27/2021    SPECGRAV 1.016 05/27/2021    GLUCOSEU 250 05/27/2021       Radiology:  CT ABDOMEN PELVIS WO CONTRAST Additional Contrast? None   Final Result   Moderate ascitic fluid presumably related to the peritoneal dialysis catheter. Midline abdominal wall defect which may represent an ostomy creation but with   an unusual appearance. There is a vertically oriented catheter overlying the   superficial portion and surrounded by an overlying bandage. There is also   fluid adjacent to the extra-abdominal bowel segment to the left of midline. This represent simple fluid possibly from communication with ascites.       Fluid infiltration along the lower midline abdominal wall staple line and   incision, the inferior portion of which has higher density collection possible accumulation of blood extending over an area of 5.6 x 3.7 cm. Assessment/Plan:    Bleeding at suture site due to elevated INR  Patient started on Coumadin  Apparently she never got her PT/INR checked and was not aware that she needed to  Case discussed with surgery would like to hold off on starting anticoagulation  We will recheck to surgery about possibly starting tomorrow on eliquis    End-stage renal disease-continue hemodialysis per nephrology. Dr. Lyssa William following    Coumadin toxicity-status post vitamin K  INR lower. Oncology recommended Eliquis instead of Coumadin once we restart anticoagulation    Stage IV colon cancer-seen by patient's oncologist Dr. Juan Sahni. Trying to get educational session for chemo this week to allow for initiation of chemo next week    DVT Prophylaxis: scd  Diet: ADULT DIET;  Regular  Code Status: Full Code        Arely Padilla MD

## 2022-04-12 NOTE — PLAN OF CARE
Problem: Falls - Risk of:  Goal: Will remain free from falls  Description: Will remain free from falls  Outcome: Ongoing  Goal: Absence of physical injury  Description: Absence of physical injury  Outcome: Ongoing     Problem:  Activity:  Goal: Risk for activity intolerance will decrease  Description: Risk for activity intolerance will decrease  Outcome: Ongoing     Problem: Fluid Volume:  Goal: Will show no signs or symptoms of fluid imbalance  Description: Will show no signs or symptoms of fluid imbalance  Outcome: Ongoing     Problem: Nutrition  Goal: Optimal nutrition therapy  Outcome: Ongoing     Problem: Bleeding:  Goal: Will show no signs and symptoms of excessive bleeding  Description: Will show no signs and symptoms of excessive bleeding  Outcome: Ongoing     Problem: Pain:  Goal: Pain level will decrease  Description: Pain level will decrease  Outcome: Ongoing  Goal: Control of acute pain  Description: Control of acute pain  Outcome: Ongoing  Goal: Control of chronic pain  Description: Control of chronic pain  Outcome: Ongoing

## 2022-04-12 NOTE — PROGRESS NOTES
Oncology Hematology Care  Progress Note      SUBJECTIVE:   No further bleeding. Worried about starting chemo. No pain, n/v    History of Present Illness:     Andres Jordan is a 62-year-old female With a past medical history of recently diagnosed metastatic colon cancer, diabetes, hypertension, hyperlipidemia. Who presented to the hospital for bleeding from the suture site of her recent laparoscopy and diverting colostomy. Upon presentation to the ED she was found to have significantly elevated INR of 5.98. She was subsequently given vitamin K with correction of her INR most recently today a.m. at 1.34 with resolution of her bleeding.   Hematology/oncology was consulted for her history of recently diagnosed colon cancer.       ROS: No fever chills sweats, no nausea, vomiting, diarrhea  shortness of breath chest pain or other pain    OBJECTIVE      Medications    Current Facility-Administered Medications: pantoprazole (PROTONIX) tablet 40 mg, 40 mg, Oral, QAM AC  epoetin javier-epbx (RETACRIT) injection 10,000 Units, 10,000 Units, IntraVENous, Once per day on Mon Wed Fri  heparin (porcine) injection 3,200 Units, 3,200 Units, IntraCATHeter, PRN  losartan (COZAAR) tablet 50 mg, 50 mg, Oral, Nightly  sodium chloride flush 0.9 % injection 10 mL, 10 mL, IntraVENous, 2 times per day  sodium chloride flush 0.9 % injection 10 mL, 10 mL, IntraVENous, PRN  0.9 % sodium chloride infusion, , IntraVENous, PRN  promethazine (PHENERGAN) tablet 12.5 mg, 12.5 mg, Oral, Q6H PRN **OR** ondansetron (ZOFRAN) injection 4 mg, 4 mg, IntraVENous, Q6H PRN  magnesium hydroxide (MILK OF MAGNESIA) 400 MG/5ML suspension 30 mL, 30 mL, Oral, Daily PRN  acetaminophen (TYLENOL) tablet 650 mg, 650 mg, Oral, Q6H PRN **OR** acetaminophen (TYLENOL) suppository 650 mg, 650 mg, Rectal, Q6H PRN  glucose (GLUTOSE) 40 % oral gel 15 g, 15 g, Oral, PRN  dextrose 50 % IV solution, 12.5 g, IntraVENous, PRN  glucagon (rDNA) injection 1 mg, 1 mg, Diabetic retinopathy ([facility]    Mixed hyperlipidemia    DMII (diabetes mellitus, type 2) ([facility]    Vitreous hemorrhage of right eye ([facility]    Essential hypertension    Acute kidney injury ([facility]    ESRD (end stage renal disease) ([facility]    Acute renal failure superimposed on chronic kidney disease (HCC)    Volume overload    Peritonitis (HCC)    Generalized abdominal pain    Peritoneal dialysis status ([facility]    Morbid obesity due to excess calories (HCC)    Nausea and vomiting    Hyponatremia    Diarrhea    Dizziness    Hypertensive emergency    Hypokalemia    Hypertensive urgency    Cerebrovascular accident (CVA) ([facility]    Electrolyte imbalance    Acute respiratory failure with hypoxia (HCC)    Septic shock (HCC)    Electrolyte abnormality    Diverticulitis    Colitis    Cancer of sigmoid colon (HCC)    Hepatic lesion    Bleeding       ASSESSMENT AND PLAN    Stage IV colon cancer  -Omental excision consistent with adenocarcinoma moderately differentiated  -PET CT scan performed on04/07/2022 at [address] showed PET avid mass in the sigmoid colon, numerous FDG avid peritoneal nodules, numerous FDG avid liver lesions all consistent with stage IV disease  -Primary oncologist is Dr. Castro Fisher  -Plan to start palliative chemotherapy after discharge  -Plan for dose modified Folfox/avastin. Will discuss dosing and timing relative to dialysis with oncologic pharmacist  -Caris testing shows BRAF mutation. We will explore whether targeted therapy can be done in the setting of dialysis. I suspect that is a barrier  -Ideally she would get dialysis on MWF to allow chemo on Tues  -Will arrange outpt chemo teaching later this week to then facilitate the start of chemo in 1 week     Bleeding at suture site secondary to elevated INR  -On presentation elevated INR close to 6  -Received vitamin K  -Today bleeding has resolved with normalization of INR.   - We can do outpt INR later this week in office    CRF  -Followed by nephrology  -Cont dialysis  -Please see if possible to do dialysis on MWF      Iggy Gil MD  [phone] office  [phone] cell (until 5 p.m. on days where a note is written)

## 2022-04-12 NOTE — PROGRESS NOTES
Office: [phone]       Fax: [phone]      Nephrology Progress Note        Patient's Name: Thomas Radford  Date of Visit: 4/12/2022    Reason for Consult:  ESRD management      History of Present Illness:      Thomas Radford is a 55 y.o. female with PMHx of hypertension, diabetes mellitus, colon cancer, ESRD who was admitted on 4/8/2022 with complaints of stomal bleeding  Recently switched to HD  Pt was to go to dialysis but noted bleeding  INTERVAL HISTORY    Feels better  Shortness of breath: No     No nausea or vomiting   Mild abdominal pain          Medications: Allergies:  Amlodipine, Bactrim [sulfamethoxazole-trimethoprim], Doxazosin, Geocillin [carbenicillin], Lisinopril, Other, Spironolactone, Tramadol, Bumetanide, and Ibuprofen     pantoprazole  40 mg Oral QAM AC    epoetin javier-epbx  10,000 Units IntraVENous Once per day on Mon Wed Fri    losartan  50 mg Oral Nightly    sodium chloride flush  10 mL IntraVENous 2 times per day    insulin lispro  0-12 Units SubCUTAneous TID WC    insulin lispro  0-6 Units SubCUTAneous Nightly    midodrine  10 mg Oral Once per day on Mon Wed Fri             Objective:       Vitals:  BP (!) 147/83   Pulse 112   Temp 98.5 °F (36.9 °C) (Oral)   Resp 30   Ht 5' 3\" (1.6 m)   Wt 170 lb 3.1 oz (77.2 kg)   SpO2 98%   BMI 30.15 kg/m²   Constitutional:  awake, NAD  Skin: no rash, turgor wnl  HEENT:  MMM, No icterus  Neck: no bruits, No JVD  Cardiovascular:  S1, S2 reg  Respiratory: CTA, no crackles  Abdomen:  +BS, soft, tender, ND  Access: abd PD catheter in place,. Stoma in place with dark sttol  CNS: alert, no agitation    Data:     Labs:  Hepatic:   No results for input(s): AST, ALT, ALB, BILITOT, ALKPHOS in the last 72 hours. BNP: No results for input(s): BNP in the last 72 hours.   ABGs: No results for input(s): PHART, PO2ART, QNJ3GEY in the last 72 hours. IMAGING:  CT ABDOMEN PELVIS WO CONTRAST Additional Contrast? None   Final Result   Moderate ascitic fluid presumably related to the peritoneal dialysis catheter. Midline abdominal wall defect which may represent an ostomy creation but with   an unusual appearance. There is a vertically oriented catheter overlying the   superficial portion and surrounded by an overlying bandage. There is also   fluid adjacent to the extra-abdominal bowel segment to the left of midline. This represent simple fluid possibly from communication with ascites. Fluid infiltration along the lower midline abdominal wall staple line and   incision, the inferior portion of which has higher density collection   possible accumulation of blood extending over an area of 5.6 x 3.7 cm. Assessment :       1. ESRD   Etiology: suspected DN  Access: Abd PD cath and Tunneled Cath  Volume: Hypervolemic        2. Electrolytes/Acid base  No Dyskalemia    Recent Labs     04/12/22  0448   *   K 4.5   CO2 23       3. BMD  -Hyperphosphatemia, SHPT  -maintained on Renvela    Lab Results   Component Value Date    CALCIUM 7.5 (L) 04/12/2022    PHOS 4.5 02/17/2022        4. HTN  -Blood pressure at goal        BP Readings from Last 1 Encounters:   04/12/22 (!) 147/83       5. Anemia  -anemia of CKD  -assess for CHRIS    No results for input(s): HGB in the last 72 hours. 6. DM    7. abdominal pain   -colon cancer    PLAN :     - maintenance HD MWF. UF as tolerated. - continue BP meds  - MBD management  - Anemia management - CHRIS  - Dose meds to GFR<10        Thank you for allowing us to participate in care of Eric FoxyTasks . We will continue to follow. Feel free to contact me with any questions.       Ramesh Hill MD  4/12/2022    [facility] [address]  Office : [phone]  Fax :[phone]

## 2022-04-12 NOTE — PROGRESS NOTES
Physical Therapy  Attempt Note  Andre Winston  [id number]  Attempted to see pt this morning for a PT session. Pt pleasantly yet adamantly denied working with therapy at this time due to poorly sleeping last night. Pt encouraged and educated on the importance of maintaining her endurance and LE strength while in acute care. Pt continued to decline. Will attempt at a later time as the schedule permits. If patient is discharged prior to the next physical therapy visit; Please see last written PT note for discharge status.    Electronically signed by Edyta Lopez on 4/12/2022 at 11:33 AM

## 2022-04-12 NOTE — PROGRESS NOTES
Comprehensive Nutrition Assessment    Type and Reason for Visit:  Reassess    Nutrition Recommendations/Plan:   Continue regular diet    Nutrition Assessment:  Follow-up. Noted bleeding stopped from stoma / incision site. Diet adv to Regular. Pt reported that she is tolerating a little po at this time. Declined offer of all supplementation. Routine HD continues. Pt with visible signs of muscle and fat wasting. Malnutrition Assessment:  Malnutrition Status: Moderate malnutrition    Context:  Acute Illness     Findings of the 6 clinical characteristics of malnutrition:  Energy Intake:  7 - 50% or less of estimated energy requirements for 5 or more days  Weight Loss:  7 - Greater than 5% over 1 month (may be due in part to fluid shifts)     Body Fat Loss:  1 - Mild body fat loss Orbital   Muscle Mass Loss:  1 - Mild muscle mass loss Temples (temporalis)  Fluid Accumulation:  No significant fluid accumulation Generalized   Strength:  Not Performed    Estimated Daily Nutrient Needs:  Energy (kcal):  2124-2820 (15-18kcal/79kg); Weight Used for Energy Requirements:  Current     Protein (g):  62-73 (1.2-1.4g/52kg); Weight Used for Protein Requirements:  Ideal        Fluid (ml/day):   ; Method Used for Fluid Requirements:  Standard Renal      Nutrition Related Findings:  output per colostomy noted. Noted non-pitting generalized edema. Wounds:  Surgical Incision       Current Nutrition Therapies:    ADULT DIET; Regular    Anthropometric Measures:  · Height: 5' 3\" (160 cm)  · Current Body Weight: 170 lb (77.1 kg)   · Admission Body Weight: 173 lb (78.5 kg)    · Usual Body Weight:  (Pt not sure of dry wt per feedback)     · Ideal Body Weight: 115 lbs; % Ideal Body Weight 147.8 %   · BMI: 30.1  · Adjusted Body Weight:  ; No Adjustment   · BMI Categories: Obese Class 1 (BMI 30.0-34. 9)       Nutrition Diagnosis:   · Moderate malnutrition related to inadequate protein-energy intake as evidenced by weight loss,mild muscle loss,mild loss of subcutaneous fat,poor intake prior to admission    · Inadequate oral intake related to inadequate protein-energy intake,catabolic illness as evidenced by dialysis,weight loss      Nutrition Interventions:   Food and/or Nutrient Delivery:  Continue Current Diet  Nutrition Education/Counseling:  No recommendation at this time   Coordination of Nutrition Care:  Continue to monitor while inpatient    Goals: Tolerate diet advancement with intakes >50%       Nutrition Monitoring and Evaluation:   Behavioral-Environmental Outcomes:  None Identified   Food/Nutrient Intake Outcomes:  Food and Nutrient Intake  Physical Signs/Symptoms Outcomes:  Biochemical Data,Fluid Status or Edema,Skin,Weight,Constipation,Diarrhea,Nausea or Vomiting     Discharge Planning:     Too soon to determine     Electronically signed by Danice Ormond, RD, LD on 4/12/22 at 3:41 PM EDT    Contact: [phone]

## 2022-04-13 NOTE — PROGRESS NOTES
Physical Therapy  Attempted to see pt, pt in dialysis. Will try again later as schedule permits, may have to see with OT due to time constraints. Electronically signed by Daniel Trinh PT on 4/13/2022 at 12:04 PM  Pt seen at 14:45. Pt in bed sleeping, emesis bag on bed with her, bowl of broth untouched on tray table, pt opened eyes when PT introduced herself then shut them again and kept them shut while pt declined therapy at this time, stating she is too tired and sick and needs to rest.  PT offered to assist pt up to chair later for dinner, pt did not know if she would be up to it. PT will return before dinner as schedule permits. Electronically signed by Daniel Trinh PT on 4/13/2022 at 2:56 PM  Addendum: rechecked pt at about 16:30 to see if she would sit up in her recliner, pt looked more alert but continues to decline due to fatigue and some nausea. Pt states she will get up to the recliner tomorrow.   Electronically signed by Daniel Trinh PT on 4/13/2022 at 4:49 PM

## 2022-04-13 NOTE — PROGRESS NOTES
Hospitalist Progress Note      PCP: No primary care provider on file. Date of Admission: 4/8/2022    Chief Complaint:     Hospital Course: Patient is a 50-year-old female admitted to the hospital with bleeding around laparotomy and colostomy site. Found to have Coumadin toxicity required vitamin K for reversal    Subjective: seen on HD. Having mild abd pain. States HD makes things worse      Medications:  Reviewed    Infusion Medications    sodium chloride      dextrose       Scheduled Medications    sodium chloride  250 mL IntraVENous Once    pantoprazole  40 mg Oral QAM AC    epoetin javier-epbx  10,000 Units IntraVENous Once per day on Mon Wed Fri    [Held by provider] losartan  50 mg Oral Nightly    sodium chloride flush  10 mL IntraVENous 2 times per day    insulin lispro  0-12 Units SubCUTAneous TID WC    insulin lispro  0-6 Units SubCUTAneous Nightly    midodrine  10 mg Oral Once per day on Mon Wed Fri     PRN Meds: albumin human, midodrine, heparin (porcine), sodium chloride flush, sodium chloride, promethazine **OR** ondansetron, magnesium hydroxide, acetaminophen **OR** acetaminophen, glucose, dextrose, glucagon (rDNA), dextrose      Intake/Output Summary (Last 24 hours) at 4/13/2022 1916  Last data filed at 4/13/2022 1222  Gross per 24 hour   Intake 820 ml   Output 1446 ml   Net -626 ml       Physical Exam Performed:    BP (!) 70/46   Pulse 130   Temp 98.4 °F (36.9 °C) (Axillary)   Resp 25   Ht 5' 3\" (1.6 m)   Wt 172 lb 13.5 oz (78.4 kg)   SpO2 98%   BMI 30.62 kg/m²     General appearance: No apparent distress, appears stated age and cooperative. HEENT: Pupils equal, round, and reactive to light. Conjunctivae/corneas clear. Neck: Supple, with full range of motion. No jugular venous distention. Trachea midline. Respiratory:  Normal respiratory effort. Clear to auscultation, bilaterally without Rales/Wheezes/Rhonchi.   Cardiovascular: Regular rate and rhythm with normal S1/S2 without murmurs, rubs or gallops. Abdomen: Soft, mild tenderness bowel sounds. Ostomy draining stool  Musculoskeletal: No clubbing, cyanosis or edema bilaterally. Full range of motion without deformity. Skin: Skin color, texture, turgor normal.  No rashes or lesions. Neurologic:  Neurovascularly intact without any focal sensory/motor deficits. Cranial nerves: II-XII intact, grossly non-focal.  Psychiatric: Alert and oriented, thought content appropriate, normal insight  Capillary Refill: Brisk,3 seconds, normal   Peripheral Pulses: +2 palpable, equal bilaterally       Labs:   No results for input(s): WBC, HGB, HCT, PLT in the last 72 hours. Recent Labs     04/11/22 0431 04/12/22 0448 04/13/22 0436    133* 134*   K 3.9 4.5 4.6    99 98*   CO2 25 23 25   BUN 16 14 18   CREATININE 5.3* 3.8* 5.1*   CALCIUM 7.6* 7.5* 7.4*     No results for input(s): AST, ALT, BILIDIR, BILITOT, ALKPHOS in the last 72 hours. Recent Labs     04/11/22 0431 04/12/22 0448 04/13/22 0436   INR 1.03 1.05 1.09     No results for input(s): CKTOTAL, TROPONINI in the last 72 hours. Urinalysis:      Lab Results   Component Value Date    NITRU Negative 05/27/2021    WBCUA 3 05/27/2021    BACTERIA RARE 05/27/2021    RBCUA 4 05/27/2021    BLOODU TRACE 05/27/2021    SPECGRAV 1.016 05/27/2021    GLUCOSEU 250 05/27/2021       Radiology:  CT ABDOMEN PELVIS WO CONTRAST Additional Contrast? None   Final Result   Moderate ascitic fluid presumably related to the peritoneal dialysis catheter. Midline abdominal wall defect which may represent an ostomy creation but with   an unusual appearance. There is a vertically oriented catheter overlying the   superficial portion and surrounded by an overlying bandage. There is also   fluid adjacent to the extra-abdominal bowel segment to the left of midline. This represent simple fluid possibly from communication with ascites.       Fluid infiltration along the lower midline abdominal wall staple line and   incision, the inferior portion of which has higher density collection   possible accumulation of blood extending over an area of 5.6 x 3.7 cm. XR ABDOMEN (KUB) (SINGLE AP VIEW)    (Results Pending)           Assessment/Plan:    Bleeding at suture site due to elevated INR  Resolved. Due to abd pain will likely wait until tomorrow to start [facility]    End-stage renal disease-continue hemodialysis per nephrology. Dr. Rama Osler following    Coumadin toxicity-status post vitamin K  INR lower. Oncology recommended Eliquis instead of Coumadin once we restart anticoagulation    Stage IV colon cancer-seen by patient's oncologist Dr. Allie Carreno. Trying to get educational session for chemo this week to allow for initiation of chemo next week    DVT Prophylaxis: scd  Diet: ADULT DIET;  Regular  Code Status: Full Code        Veto Alfaro MD

## 2022-04-13 NOTE — DISCHARGE SUMMARY
Hospital Medicine Discharge Summary    Patient ID: Regulo Rodriguez      Patient's PCP: No primary care provider on file. Admit Date: 3/22/2022     Discharge Date: 4/4/2022      Admitting Provider: Teresita Bradford DO     Discharge Provider: Vinod Davis MD     Discharge Diagnoses: Active Hospital Problems    Diagnosis     Hepatic lesion [K76.9]     Cancer of sigmoid colon ([address] [C18.7]     Colitis [K52.9]        The patient was seen and examined on day of discharge and this discharge summary is in conjunction with any daily progress note from day of discharge. Hospital Course:      Ms. Yen Rubalcava was admitted with sepsis due to colitis. Arsen [place] was started on broad spectrum antibiotics but developed hypotensive drug reaction and urticaria and antibiotics were held. Hollis Maegan the same time, her stool tested positive for C dif and she was started on oral vancomycin and IV flagyl (completed) .  Surgery created diverting colostomy on 3/24 for obstructing newly diagnosed sigmoid colon cancer. Was switched from PD to HD. Metastatic colon cancer:  - colonoscopy 3/23 with colonic mass  - CT scan concerning for liver mets, possibly pulmonary as well  - oncology consulted  - surgery:  Placed diverting colostomy 3/24-- significant pelvic tumors found.    -Planned to commence chemotherapy as outpatient, follows with Dr. Alex Borrero        Acute on chronic anemia(likely blood loss). Occasional bloody bowel motions likely related to malignancy. Hemoglobin is 7.7--> 7--> 9.3--> 9.2--> 8.7--> 8.4  Received 1 unit of packed red blood cells 3/31        Dysphagia:  -Likely related to old oral candidiasis  - PPI and chloroseptic spray  - continue nystatin.            Acute bilateral pulmonary embolism:  - heparin drip due to colonoscopy on 3/23  - Coumadin initiated 3/25.    INR therapeutic, heparin discontinued     ESRD on dialysis:  - HD due to concern for infection on PD (recent peritonitis).      Resolved problems:  - Drug reaction:  resolved. - Sepsis due to colitis ( resolved). - C dif infection:  -completed 10 days of PO vancomycin      Physical Exam Performed:     /77   Pulse 90   Temp 97.5 °F (36.4 °C)   Resp 24   Ht 5' 3\" (1.6 m)   Wt 170 lb 6.7 oz (77.3 kg)   SpO2 99%   BMI 30.19 kg/m²       General appearance:  No apparent distress, appears stated age and cooperative. HEENT:  Normal cephalic, atraumatic without obvious deformity. Pupils equal, round, and reactive to light. Extra ocular muscles intact. Conjunctivae/corneas clear. Neck: Supple, with full range of motion. No jugular venous distention. Trachea midline. Respiratory:  Normal respiratory effort. Clear to auscultation, bilaterally without Rales/Wheezes/Rhonchi. Cardiovascular:  Regular rate and rhythm with normal S1/S2 without murmurs, rubs or gallops. Abdomen: Soft, non-tender, non-distended with normal bowel sounds. Musculoskeletal:  No clubbing, cyanosis or edema bilaterally. Full range of motion without deformity. Skin: Skin color, texture, turgor normal.  No rashes or lesions. Neurologic:  Neurovascularly intact without any focal sensory/motor deficits. Cranial nerves: II-XII intact, grossly non-focal.  Psychiatric:  Alert and oriented, thought content appropriate, normal insight  Capillary Refill: Brisk,< 3 seconds   Peripheral Pulses: +2 palpable, equal bilaterally       Labs:  For convenience and continuity at follow-up the following most recent labs are provided:      CBC:    Lab Results   Component Value Date    WBC 5.8 04/09/2022    HGB 8.1 04/09/2022    HCT 24.7 04/09/2022     04/09/2022       Renal:    Lab Results   Component Value Date     04/13/2022    K 4.6 04/13/2022    CL 98 04/13/2022    CO2 25 04/13/2022    BUN 18 04/13/2022    CREATININE 5.1 04/13/2022    CALCIUM 7.4 04/13/2022    PHOS 4.5 02/17/2022         Significant Diagnostic Studies    Radiology:   CT ABDOMEN PELVIS W IV CONTRAST Additional Contrast? None   Final Result   1. Interval transverse colostomy creation, with mild colonic wall thickening   of the transverse colon at the level of the ostomy which may be related to   poor distention, though wall thickening of the descending colon as well as   the sigmoid colon may be related to colitis. The colonic distention seen on   the preoperative examination has improved. 2. There is a small volume of intra-and pelvic ascites, within indwelling   peritoneal catheter noted. 3. Numerous hepatic lesions are seen suspicious for possible metastasis. Low-attenuation nonspecific lesions noted within the spleen as well. 4. Circumferential distal esophageal thickening is identified suggestive of   esophagitis. 5. Wall thickening of the distal stomach and duodenum suggestive of   gastritis/enteritis. 6. Multifocal nodular appearing infiltrates are seen within the lung bases,   similar to the previous exam.   7. Other similar findings as above. VL Extremity Venous Bilateral   Final Result      CT CHEST W CONTRAST   Final Result   Bilateral pulmonary emboli slightly greater on the right than left. Potential concern for septic emboli given the small foci of irregular   parenchymal lung lesions new from the prior chest CT study, only partially   included and demonstrated on abdomen pelvic CT 1 day earlier. These could   certainly also be metastatic as well but somewhat atypical given the   irregular margins. Multiple liver metastases partially included, unchanged as seen on abdomen   pelvic CT 1 day earlier      Abdominal ascites, decreased from the prior exam 1 day earlier, with recently   demonstrated pneumoperitoneum not seen currently in the included upper   abdomen. .      Esophageal mural thickening suspected esophagitis. Findings were discussed with Linzy Kocher at 3:50 pm on 3/23/2022. CT ABDOMEN PELVIS W IV CONTRAST Additional Contrast? None   Final Result   1.  Worsening inflammatory changes surrounding the mid sigmoid colon likely   due to colitis causing worsening colonic dilatation. Underlying malignancy   cannot be excluded; correlate with colonoscopy. 2. Multiple nodular bilateral ground-glass opacities. The differential   diagnosis includes infectious process and metastatic disease recommend CT of   the chest with contrast for further evaluation. 3. Multiple indeterminate hepatic lesions. Recommend nonemergent MRI of the   abdomen with without contrast utilizing Eovist.                Consults:     IP CONSULT TO GI  IP CONSULT TO HEM/ONC  IP CONSULT TO GENERAL SURGERY  IP CONSULT TO INFECTIOUS DISEASES  IP CONSULT TO PHARMACY    Disposition:  Home with home care     Condition at Discharge: Stable    Discharge Instructions/Follow-up:    - follow up with oncology and general surgery. - continue HD     Code Status:  Full Code     Activity: activity as tolerated    Diet: regular diet      Discharge Medications:     Discharge Medication List as of 4/4/2022  3:35 PM           Details   midodrine (PROAMATINE) 10 MG tablet Take 1 tablet by mouth three times a week On HD days, Disp-12 tablet, R-0Normal      warfarin (COUMADIN) 2 MG tablet Take 1 tablet by mouth daily, Disp-60 tablet, R-3Normal              Details   losartan (COZAAR) 100 MG tablet Take 1 tablet by mouth at bedtime, Disp-30 tablet, R-3Normal      pantoprazole (PROTONIX) 40 MG tablet Take 1 tablet by mouth every morning (before breakfast), Disp-30 tablet, R-3Normal              Details   insulin lispro (HUMALOG KWIKPEN) 100 UNIT/ML pen Inject 2-4 Units into the skin 3 times daily as needed for High Blood Sugar Sliding scale Historical Med             Time Spent on discharge is more than 30 minutes in the examination, evaluation, counseling and review of medications and discharge plan. Signed:    Megan Garcia MD   4/13/2022      Thank you No primary care provider on file.  for the opportunity to be involved in this patient's care. If you have any questions or concerns please feel free to contact me at [phone].

## 2022-04-13 NOTE — PROGRESS NOTES
Office: [phone]       Fax: [phone]      Nephrology Progress Note        Patient's Name: Kayode Lea  Date of Visit: 4/13/2022    Reason for Consult:  ESRD management      History of Present Illness:      Kayode Lea is a 55 y.o. female with PMHx of hypertension, diabetes mellitus, colon cancer, ESRD who was admitted on 4/8/2022 with complaints of stomal bleeding  Recently switched to HD  Pt was to go to dialysis but noted bleeding  INTERVAL HISTORY    Feels better  Shortness of breath: No     Tolerating HD          Medications: Allergies:  Amlodipine, Bactrim [sulfamethoxazole-trimethoprim], Doxazosin, Geocillin [carbenicillin], Lisinopril, Other, Spironolactone, Tramadol, Bumetanide, and Ibuprofen     pantoprazole  40 mg Oral QAM AC    epoetin javier-epbx  10,000 Units IntraVENous Once per day on Mon Wed Fri    losartan  50 mg Oral Nightly    sodium chloride flush  10 mL IntraVENous 2 times per day    insulin lispro  0-12 Units SubCUTAneous TID WC    insulin lispro  0-6 Units SubCUTAneous Nightly    midodrine  10 mg Oral Once per day on Mon Wed Fri             Objective:       Vitals:  /72   Pulse 117   Temp 97.9 °F (36.6 °C) (Oral)   Resp 21   Ht 5' 3\" (1.6 m)   Wt 174 lb 6.1 oz (79.1 kg)   SpO2 97%   BMI 30.89 kg/m²   Constitutional:  awake, NAD  Skin: no rash, turgor wnl  HEENT:  MMM, No icterus  Neck: no bruits, No JVD  Cardiovascular:  S1, S2 reg  Respiratory: CTA, no crackles  Abdomen:  +BS, soft, tender, ND  Access: abd PD catheter in place,. Stoma in place with dark sttol  CNS: alert, no agitation    Data:     Labs:  Hepatic:   No results for input(s): AST, ALT, ALB, BILITOT, ALKPHOS in the last 72 hours. BNP: No results for input(s): BNP in the last 72 hours.   ABGs: No results for input(s): PHART, PO2ART, EWV2ZQN in the last 72 hours. IMAGING:  CT ABDOMEN PELVIS WO CONTRAST Additional Contrast? None   Final Result   Moderate ascitic fluid presumably related to the peritoneal dialysis catheter. Midline abdominal wall defect which may represent an ostomy creation but with   an unusual appearance. There is a vertically oriented catheter overlying the   superficial portion and surrounded by an overlying bandage. There is also   fluid adjacent to the extra-abdominal bowel segment to the left of midline. This represent simple fluid possibly from communication with ascites. Fluid infiltration along the lower midline abdominal wall staple line and   incision, the inferior portion of which has higher density collection   possible accumulation of blood extending over an area of 5.6 x 3.7 cm. Assessment :       1. ESRD   Etiology: suspected DN  Access: Abd PD cath and Tunneled Cath  Volume: Hypervolemic        2. Electrolytes/Acid base  No Dyskalemia    Recent Labs     04/13/22  0436   *   K 4.6   CO2 25       3. BMD  -Hyperphosphatemia, SHPT  -maintained on Renvela    Lab Results   Component Value Date    CALCIUM 7.4 (L) 04/13/2022    PHOS 4.5 02/17/2022        4. HTN  -Blood pressure low normal       BP Readings from Last 1 Encounters:   04/13/22 108/72       5. Anemia  -anemia of CKD  -assess for CHRIS    No results for input(s): HGB in the last 72 hours. 6. DM    7. abdominal pain   -colon cancer    PLAN :     - maintenance HD MWF. UF as tolerated. Seen on HD  - Hold [place]  - anticoagulation on hold  - MBD management  - Anemia management - CHRIS  - Dose meds to GFR<10    Spoke to Matthew Gottron, MD     Thank you for allowing us to participate in care of [facility] . We will continue to follow. Feel free to contact me with any questions.       Bob Casas MD  4/13/2022    [facility] [address] [place] [address]  Office : [phone]  Fax :[phone]

## 2022-04-13 NOTE — PLAN OF CARE
Problem: Falls - Risk of:  Goal: Will remain free from falls  Description: Will remain free from falls  Outcome: Ongoing  Goal: Absence of physical injury  Description: Absence of physical injury  Outcome: Ongoing     Problem:  Activity:  Goal: Risk for activity intolerance will decrease  Description: Risk for activity intolerance will decrease  Outcome: Ongoing     Problem: Fluid Volume:  Goal: Will show no signs or symptoms of fluid imbalance  Description: Will show no signs or symptoms of fluid imbalance  Outcome: Ongoing     Problem: Nutrition  Goal: Optimal nutrition therapy  4/13/2022 0011 by Sherren Skene, RN  Outcome: Ongoing  4/12/2022 1542 by Kaylan Tang RD, LD  Outcome: Ongoing     Problem: Bleeding:  Goal: Will show no signs and symptoms of excessive bleeding  Description: Will show no signs and symptoms of excessive bleeding  Outcome: Ongoing     Problem: Pain:  Goal: Pain level will decrease  Description: Pain level will decrease  Outcome: Ongoing  Goal: Control of acute pain  Description: Control of acute pain  Outcome: Ongoing  Goal: Control of chronic pain  Description: Control of chronic pain  Outcome: Ongoing

## 2022-04-13 NOTE — PROGRESS NOTES
Hematology Oncology Daily Progress Note    Admit Date: 4/8/2022  Hospital day several    Subjective:     Patient has complaints of no further bleeding--denies sob/cp. Medication side effects: none    Scheduled Meds:   pantoprazole  40 mg Oral QAM AC    epoetin javier-epbx  10,000 Units IntraVENous Once per day on Mon Wed Fri    losartan  50 mg Oral Nightly    sodium chloride flush  10 mL IntraVENous 2 times per day    insulin lispro  0-12 Units SubCUTAneous TID WC    insulin lispro  0-6 Units SubCUTAneous Nightly    midodrine  10 mg Oral Once per day on Mon Wed Fri     Continuous Infusions:   sodium chloride      dextrose       PRN Meds:heparin (porcine), sodium chloride flush, sodium chloride, promethazine **OR** ondansetron, magnesium hydroxide, acetaminophen **OR** acetaminophen, glucose, dextrose, glucagon (rDNA), dextrose    Review of Systems  Pertinent items are noted in HPI. REVIEW OF SYSTEMS:         · Constitutional: Denies fever, sweats, weight loss     · Eyes: No visual changes or diplopia. No scleral icterus. · ENT: No Headaches, hearing loss or vertigo. No mouth sores or sore throat. · Cardiovascular: No chest pain, dyspnea on exertion, palpitations or loss of consciousness. · Respiratory: No cough or wheezing, no sputum production. No hemoptysis. .    · Gastrointestinal: No abdominal pain, appetite loss, blood in stools. No change in bowel habits. · Genitourinary: No dysuria, trouble voiding, or hematuria. · Musculoskeletal:  Generalized weakness. No joint complaints. · Integumentary: No rash or pruritis. · Neurological: No headache, diplopia. No change in gait, balance, or coordination. No paresthesias. · Endocrine: No temperature intolerance. No excessive thirst, fluid intake, or urination. · Hematologic/Lymphatic: No abnormal bruising or ecchymoses, blood clots or swollen lymph nodes. · Allergic/Immunologic: No nasal congestion or hives.    ·     Objective:     Patient Vitals for the past 8 hrs:   BP Temp Temp src Pulse Resp SpO2 Weight   04/13/22 0730 108/72 97.9 °F (36.6 °C) Oral 117 21 97 %    04/13/22 0529       174 lb 6.1 oz (79.1 kg)   04/13/22 0414 118/75 98.2 °F (36.8 °C) Oral 107 30 97 %      I/O last 3 completed shifts: In: 26 [P.O.:460]  Out: 54 [Stool:55]  I/O this shift:  In: -   Out: 50 [Stool:50]    /72   Pulse 117   Temp 97.9 °F (36.6 °C) (Oral)   Resp 21   Ht 5' 3\" (1.6 m)   Wt 174 lb 6.1 oz (79.1 kg)   SpO2 97%   BMI 30.89 kg/m²     General Appearance:    Alert, cooperative, no distress, appears stated age   Head:    Normocephalic, without obvious abnormality, atraumatic   Eyes:    PERRL, conjunctiva/corneas clear, EOM's intact, fundi     benign, both eyes        Ears:    Normal TM's and external ear canals, both ears   Nose:   Nares normal, septum midline, mucosa normal, no drainage    or sinus tenderness   Throat:   Lips, mucosa, and tongue normal; teeth and gums normal   Neck:   Supple, symmetrical, trachea midline, no adenopathy;        thyroid:  No enlargement/tenderness/nodules; no carotid    bruit or JVD   Back:     Symmetric, no curvature, ROM normal, no CVA tenderness   Lungs:     Clear to auscultation bilaterally, respirations unlabored   Chest wall:    No tenderness or deformity   Heart:    Regular rate and rhythm, S1 and S2 normal, no murmur, rub   or gallop   Abdomen:     Soft, non-tender, bowel sounds active all four quadrants,     no masses, no organomegaly           Extremities:   Extremities normal, atraumatic, no cyanosis or edema   Pulses:   2+ and symmetric all extremities   Skin:   Skin color, texture, turgor normal, no rashes or lesions   Lymph nodes:   Cervical, supraclavicular, and axillary nodes normal   Neurologic:   CNII-XII intact.  Normal strength, sensation and reflexes       throughout         Data Review  CBC:   Lab Results   Component Value Date    WBC 5.8 04/09/2022    RBC 2.66 04/09/2022       Assessment: Principal Problem:    Bleeding  Active Problems: Moderate malnutrition ([address]  Resolved Problems:    * No resolved hospital problems. *      Plan:     1. Stage IV colon cancer. Dr. Manoj Russell will start palliative chemotherapy in the near future. 2.  Stomal bleeding\\anticoagulation. Options are to restart Coumadin with much closer follow-up. The other option is low-dose Eliquis at 2.5 mg twice a day.   I have used this in patients on dialysis and the bleeding risk, although increased, tends to be minimal.  I will defer to the primary team.        Electronically signed by Emanuel Martinez MD on 4/13/2022 at 9:08 AM

## 2022-04-13 NOTE — FLOWSHEET NOTE
Treatment time: 3 hours  Net UF: 696 ml     Pre weight: 80.7 kg   Post weight: 78.5 kg  EDW: TBD     Access used: RCW CVC  Access function: Good with  ml/min     Medications or blood products given: Retacrit, Albumin, Midodrine     Regular outpatient schedule: TBD     Summary of response to treatment: Tolerated tx fair.  Some hypotension noted which was managed with use of NSS flushes, Albumin, Midodrine.     Copy of dialysis treatment record placed in chart, to be scanned into EMR.       04/13/22 0915 04/13/22 1222   Treatment   Time On  --  0924   Time Off  --  1217   Vital Signs   BP (!) 118/42 129/73   Temp 98.6 °F (37 °C) 97.4 °F (36.3 °C)   Pulse 111 98   Resp 18 18   Dialysis Bath   K+ (Potassium) 3  --    Ca+ (Calcium) 2.5  --    Na+ (Sodium) 136  --    HCO3 (Bicarb) 34  --

## 2022-04-14 NOTE — PROGRESS NOTES
Notified Payam Browne NP of patients low BP on way to access.    Orders to start 500 bolus 250/hr then continue at 75      bolus ended now going at 75/hr. last 2 BPs were 79/53 after it ended now at 68/50, HR still at 120, resp at 28, 02 100%

## 2022-04-14 NOTE — PROGRESS NOTES
Patient was admitted to room 2125 after a rapid response was called on the floor d/t hypotension and need for fluids and blood pressure support.  was notified of patient's status and transfer to the floor. Upon arrival on to the unit, patient was agonally breathing on 5 L nasal cannula, but responding appropriately to questions. Blood pressure taken with cuff was 152/91. Patient's ostomy was cleaned and ostomy bag changed. During this time patient began to stare off and become less response. A pulse was checked with doppler in the right femoral artery and pulse was weak, but still present. Crash cart was obtained and repeat blood pressure was 103/48 with HR dropping into the 30s without loss of pulse. Patient was connected to CPR pads and code blue was called. Pt was emergently intubated for airway protection. During this time, pulse was lost and patient was coded. Please see notes for CPR documentation. IV pressors were started (see MAR) and arterial line was emergently placed by Dr. Tracey Walsh.  and children were called and made aware of patient's status. Around 1:30  came to bedside and decision was made to withdraw care. Family currently at bedside with patient.      Electronically signed by Esperanza Nolen RN on 4/14/2022 at 2:09 AM

## 2022-04-14 NOTE — PROGRESS NOTES
the BP is hanging in 60's over 40's again, she's still vomiting. What do you want me to do?     Sent to Ayde Loomis NP    Orders  Give 250ml bolus  Give Elen Must

## 2022-04-14 NOTE — PROGRESS NOTES
Gave bolus BP came up for on a few min to 78/50 being highest, immediately went down to 59/43 and 69/47, 66/43, can you come up or can we start something?     sent to Lynda Qiu NP

## 2022-04-14 NOTE — PROCEDURES
[facility] [place] [address] [facility] PROCEDURE NOTE    Patient name: Lashell Zavala . YOB: 1965       Procedure: Emergent arterial line placement for hemodynamic monitoring     Pt was not consented for the procedure because it was emergent. Pt was sterilely prepped and draped. Ultrasound guidance was used and left femoral artery was entered without difficulty, good blood return. No immediate complications.  Appropriate wave form noted on the monitor       Estimated blood loss - 5 mL     Aj Bertrand MD   4/14/2022 1:46 AM

## 2022-04-14 NOTE — PROGRESS NOTES
Bolus finished patients blood pressure continues to remain low 79/53 68/50 notified Fabiola Limon NP      bolus ended now going at 75/hr. last 2 BPs were 79/53 after it ended now at 68/50, HR still at 120, resp at 28, 02 100%    Sent to Fabiola Limon NP

## 2022-04-14 NOTE — PROGRESS NOTES
I spoke to  , and he was made aware of this patients B/P being low and that she was transferred to ICU.

## 2022-04-14 NOTE — DEATH NOTES
Death Pronouncement Note  Patient's Name: Derrick Pierre   Patient's YOB: 1965  MRN Number: [id number]    Admitting Provider: Antonella Lea MD  Attending Provider: Meaghan Carlson MD    Patient was examined and the following were absent: Pulses, Blood Pressure and Respiratory effort    I declared the patient dead on  04/14/22 at 02:00 AM         Preliminary Cause of Death:     1. Cardiopulmonary arrest   2. Colon cancer stage IV   3. ESRD - on hemodialysis   4.  Coumadin toxicity    Electronically signed by Bayron Schulz MD on 4/14/22 at 2:03 AM EDT

## 2022-04-14 NOTE — SIGNIFICANT EVENT
Name: Rell Richards            Admitted: 4/8/2022     Significant event:  Rapid response, code bloe     Called to shortly before midnight for lethargy and hypotension. IV normal saline ordered and transferred to the ICU. She had bradycardia to HR low 30s at 12:36 am. Code blue called at 12:36 AM. She was given atropine. She was not protecting her airway so she was immediately intubated. She then lost pulse and CPR initiated. BP (!) 30/21   Pulse 71   Temp 99.6 °F (37.6 °C) (Oral)   Resp 21   Ht 5' 3\" (1.6 m)   Wt 172 lb 13.5 oz (78.4 kg)   SpO2 100%   BMI 30.62 kg/m²   · General appearance: acute distress due to cardiac arrest, hypotension  · Lungs: Both lung ventilated equally   · Heart: PEA  · Abdomen: Distended   · Neurologic: Mental status: comatose, not responsive to pain     A/P   1. Cardiac arrest, PEA   2. Hypotension, circulatory shock   3. Acute respiratory failure   4. Lactic acidosis   5. Metabolic acidosis   6. Hyperkalemia     Orders: Once we started CPR, we followed ACLS protocol. She was given epi 1 mg IV. Pulse was obtained briefly during which time I inserted A line for hemodynamic monitoring. BP per A line on the monitor was 50s/30s. Therefore, levophed followed by john and epi drip initiated. She lost pulse 3 times and each time we performed CPR and epi 1 mg IV given. We also gave several rounds of IV bicarb push as well as IV calcium gluconate 1 gram. She was eventually maxed out on all the 3 pressors but systolic BP was in the 76Q and pulse was faint. POC ABG obtained and reviewed. At this time family ( and 2 daughters) arrived from home. Family tearful but stated their wish to withdraw care. Resuscitation effort discontinue at 1:42 AM. We stopped vent support and pressor support. Family at the bedside.      Code:Full Code  Disposition:    Total critical care time was 60 minutes, excluding separately reportable procedures. Services included in critical care time were chart data review, documentation time, obtaining information from patient, review of nursing notes, and vital sign assessment. There was a high probability of clinically significant life-threatening deterioration in the patient's condition, which required my urgent intervention.     Jess Tam MD   4/14/2022  1:44 AM

## 2022-04-14 NOTE — PROGRESS NOTES
I called to update this patients  that she was being intubated, Irvin Back her  is on his way here. Security notified.

## 2022-04-14 NOTE — PROGRESS NOTES
Pt not improving with fluids concerned with patients current status, called rapid response.  Pt very hypotensive and fluids are not supporting BP

## 2022-04-14 NOTE — PROCEDURES
Intubation Procedure Note    Indication: Respiratory arrest    Consent: The patient was not able to provide verbal consent for this procedure. The decision was made emergently for life saving measures. Procedure:  Sedation: None  Paralytic: None  Equipment: Glidescope; 7.5 mm cuffed endotracheal tube.     Procedure: Glidescope was used to visualize the cords, the tube was visualized passing through the cords, the cuff was then inflated and the tube was secured appropriately at a distance of 22 cm to the lip    Confirmed by: bilateral breath sounds, an end tidal CO2 detector and adequate chest rise with adequate pulse oximetry reading    CXR ordered stat    Julia Newman, APRN - CNP

## 2022-04-15 NOTE — CARE COORDINATION
4-   5:01 pm.  Second call to spouse; left message asking him to call Security Office to inform them of the name of the  home they can release her to. Gave them the phone number of [phone]. Spoke to Tammy Clemens in Security to inform of above.   [place], [place]     Case Management   [phone]    4/15/2022  5:02 PM

## 2022-04-15 NOTE — CARE COORDINATION
4-    Call placed to Spouse, Parth Encinas to acknowledge the death of Tami and to offer support in next steps. He reports he and family are meeting with a [facility] on [address], unknown name at this time. He is meeting with them today. He states he will call me back to confirm desire to use this  home.   [place], [place]     Case Management   [phone]    4/15/2022  10:13 AM

## 2022-05-03 NOTE — DISCHARGE SUMMARY
Hospital Medicine Discharge Summary    Patient ID: Garrett Ayers      Patient's PCP: No primary care provider on file. Admitting Physician: Ana Flores MD  Discharge Physician: Prem Chapa MD     Admit Date: 2022     Disposition:     Discharge Diagnoses:   Cardiopulmonary arrest  Stage IV colon cancer  Coumadin toxicity  End-stage renal disease on hemodialysis  Moderate protein calorie malnutrition  Diabetes mellitus type 2  Hyperlipidemia      Date of Death:22  Time of Death: 0200    Immediate Cause of Death: Cardiopulmonary arrest  Underlying Conditions: Stage IV colon cancer  Other Contributing Conditions: End-stage renal disease on hemodialysis/Coumadin toxicity    Hospital Course: 62-year-old female admitted to the hospital with bleeding around laparotomy colostomy site. Found to have Coumadin toxicity. Required vitamin K were for reversal.  Patient has known stage IV colon cancer. Patient continued to receive her hemodialysis here in the hospital.    Due to bleeding from ostomy site Coumadin was not reinitiated right away. Patient had intermittent abdominal pain. Pain was worse usually on dialysis. Patient was going to start palliative chemotherapy at discharge from the hospital.    Patient had sudden unexpected cardiac arrest on 22  Consults:  IP CONSULT TO GENERAL SURGERY  IP CONSULT TO ONCOLOGY    Signed:  Prem Chapa MD MD   5/3/2022    Thank you No primary care provider on file. for the opportunity to be involved in this patient's care. If you have any questions or concerns, please feel free to contact me at [phone].

## 2023-10-04 NOTE — PLAN OF CARE
October 4, 2023      [facility]  [address]  [place] [address]  Phone: [phone]  Fax: [phone]       Patient: Maeve Coley   YOB: 1979  Date of Visit: 10/04/2023    To Whom It May Concern:    Oumou Coley  was at [facility] on 10/04/2023. The patient may return to work on 10/06/2023 with no restrictions. If you have any questions or concerns, or if I can be of further assistance, please do not hesitate to contact me.    Sincerely,    Rylee RAMOS/ Angela WHITLOCK      Problem: Falls - Risk of:  Goal: Will remain free from falls  Description: Will remain free from falls  4/12/2022 0821 by Tess García RN  Outcome: Ongoing     Problem: Falls - Risk of:  Goal: Absence of physical injury  Description: Absence of physical injury  4/12/2022 1908 by Tess García RN  Outcome: Ongoing     Problem:  Activity:  Goal: Risk for activity intolerance will decrease  Description: Risk for activity intolerance will decrease  4/12/2022 0821 by Tess García RN  Outcome: Ongoing     Problem: Fluid Volume:  Goal: Will show no signs or symptoms of fluid imbalance  Description: Will show no signs or symptoms of fluid imbalance  4/12/2022 0821 by Tess García RN  Outcome: Ongoing     Problem: Nutrition  Goal: Optimal nutrition therapy  4/12/2022 0821 by Tess García RN  Outcome: Ongoing     Problem: Bleeding:  Goal: Will show no signs and symptoms of excessive bleeding  Description: Will show no signs and symptoms of excessive bleeding  4/12/2022 0821 by Tess García RN  Outcome: Ongoing     Problem: Pain:  Goal: Pain level will decrease  Description: Pain level will decrease  4/12/2022 0821 by Tess García RN  Outcome: Ongoing     Problem: Pain:  Goal: Control of acute pain  Description: Control of acute pain  4/12/2022 0821 by Tess García RN  Outcome: Ongoing     Problem: Pain:  Goal: Control of chronic pain  Description: Control of chronic pain  4/12/2022 0821 by Tess García RN  Outcome: Ongoing

## (undated) DEVICE — GARMENT COMPR STD FOR 17IN CALF UNIF THER FLOTRN

## (undated) DEVICE — SYRINGE MED 10ML LUERLOCK TIP W/O SFTY DISP

## (undated) DEVICE — SUTURE VCRL + SZ 4-0 L27IN ABSRB UD L26MM SH 1/2 CIR VCP415H

## (undated) DEVICE — SUTURE PERMAHAND SZ 3-0 L18IN NONABSORBABLE BLK L26MM SH C013D

## (undated) DEVICE — SUTURE MCRYL + SZ 4-0 L18IN ABSRB UD L19MM PS-2 3/8 CIR MCP496G

## (undated) DEVICE — SPONGE LAP W18XL18IN WHT COT 4 PLY FLD STRUNG RADPQ DISP ST

## (undated) DEVICE — MAJOR SET UP: Brand: MEDLINE INDUSTRIES, INC.

## (undated) DEVICE — SUTURE ETHBND EXCEL SZ 0 L18IN NONABSORBABLE GRN L26MM MO-6 CX45D

## (undated) DEVICE — SUTURE PERMAHAND SZ 2-0 L30IN NONABSORBABLE BLK SILK W/O A305H

## (undated) DEVICE — STAPLER SKIN H3.9MM WIRE DIA0.58MM CRWN 6.9MM 35 STPL ROT

## (undated) DEVICE — Device

## (undated) DEVICE — SEALER ENDOSCP NANO COAT OPN DIV CRV L JAW LIGASURE IMPACT

## (undated) DEVICE — SUTURE VCRL + SZ 3-0 L27IN ABSRB UD L26MM SH 1/2 CIR VCP416H

## (undated) DEVICE — MINOR SET UP: Brand: MEDLINE INDUSTRIES, INC.

## (undated) DEVICE — GOWN SIRUS NONREIN XL W/TWL: Brand: MEDLINE INDUSTRIES, INC.

## (undated) DEVICE — SOLUTION IV 1000ML 0.9% SOD CHL PH 5 INJ USP VIAFLX PLAS

## (undated) DEVICE — SET ADMIN NEEDLESS Y SITE RLER CLMP M SPIN LOK 10 GTT LEN

## (undated) DEVICE — [HIGH FLOW INSUFFLATOR,  DO NOT USE IF PACKAGE IS DAMAGED,  KEEP DRY,  KEEP AWAY FROM SUNLIGHT,  PROTECT FROM HEAT AND RADIOACTIVE SOURCES.]: Brand: PNEUMOSURE

## (undated) DEVICE — TUBING, SUCTION, 1/4" X 12', STRAIGHT: Brand: MEDLINE

## (undated) DEVICE — SYRINGE 20ML LL S/C 50

## (undated) DEVICE — SUTURE VCRL + 3-0 L27IN ABSRB UD PS-2 L19MM 3/8 CIR PRIM VCP427H

## (undated) DEVICE — SUTURE VCRL + SZ 4-0 L27IN ABSRB WHT FS-2 3/8 CIR REV CUT VCP422H

## (undated) DEVICE — CANISTER, RIGID, 1200CC: Brand: MEDLINE INDUSTRIES, INC.

## (undated) DEVICE — INTENDED FOR TISSUE SEPARATION, AND OTHER PROCEDURES THAT REQUIRE A SHARP SURGICAL BLADE TO PUNCTURE OR CUT.: Brand: BARD-PARKER ® STAINLESS STEEL BLADES

## (undated) DEVICE — SUTURE VCRL SZ 3-0 L27IN ABSRB UD L26MM SH 1/2 CIR J416H

## (undated) DEVICE — ADHESIVE SKIN CLSR 0.7ML TOP DERMBND ADV

## (undated) DEVICE — CHLORAPREP 26ML ORANGE

## (undated) DEVICE — STRIP,CLOSURE,WOUND,MEDI-STRIP,1/2X4: Brand: MEDLINE

## (undated) DEVICE — GLOVE SURG SZ 8 L12IN FNGR THK87MIL WHT LTX FREE

## (undated) DEVICE — TROCAR: Brand: KII FIOS FIRST ENTRY

## (undated) DEVICE — DRAPE,LAP,CHOLE,W/TROUGHS,STERILE: Brand: MEDLINE

## (undated) DEVICE — SOLUTION IV IRRIG POUR BRL 0.9% SODIUM CHL 2F7124

## (undated) DEVICE — NEEDLE HYPO 18GA L1.5IN THN WALL PIVOTING SHLD BVL ORIENTED

## (undated) DEVICE — SHEET, T, LAPAROTOMY, STERILE: Brand: MEDLINE

## (undated) DEVICE — SPONGE GZ W4XL4IN COT 12 PLY TYP VII WVN C FLD DSGN

## (undated) DEVICE — TROCAR: Brand: KII® SHEILDED BLADED ACCESS SYSEM

## (undated) DEVICE — STAPLER SKIN H3.9MM WIRE DIA0.58MM CRWN 6.9MM 35 STPL FIX

## (undated) DEVICE — Z DISCONTINUED USE 2272117 DRAPE SURG 3 QTR N INVASIVE 2 LAYR DISP

## (undated) DEVICE — WOUND RETRACTOR AND PROTECTOR: Brand: ALEXIS O WOUND PROTECTOR-RETRACTOR

## (undated) DEVICE — SET INSUF TUBE HEAT ISO CONN DISP

## (undated) DEVICE — COVER LT HNDL BLU PLAS

## (undated) DEVICE — TROCARS: Brand: KII® BALLOON BLUNT TIP SYSTEM

## (undated) DEVICE — PENROSE DRAIN 18 X .5" SILICONE: Brand: MEDLINE

## (undated) DEVICE — 3M™ TEGADERM™ TRANSPARENT FILM DRESSING FRAME STYLE, 1624W, 2-3/8 IN X 2-3/4 IN (6 CM X 7 CM), 100/CT 4CT/CASE: Brand: 3M™ TEGADERM™

## (undated) DEVICE — SUTURE PDS + SZ 2 0 L27IN ABSRB VLT L36MM CT 1 1 2 CIR PDP339H

## (undated) DEVICE — GENERAL LAPAROSCOPY: Brand: MEDLINE INDUSTRIES, INC.

## (undated) DEVICE — 3M™ TEGADERM™ TRANSPARENT FILM DRESSING FRAME STYLE, 1626W, 4 IN X 4-3/4 IN (10 CM X 12 CM), 50/CT 4CT/CASE: Brand: 3M™ TEGADERM™

## (undated) DEVICE — SUTURE PDS + SZ 2 0 L27IN ABSRB VLT L26MM CT 2 1 2 CIR PDP333H

## (undated) DEVICE — GLOVE ORANGE PI 7   MSG9070

## (undated) DEVICE — 3M™ STERI-STRIP™ COMPOUND BENZOIN TINCTURE 40 BAGS/CARTON 4 CARTONS/CASE C1544: Brand: 3M™ STERI-STRIP™

## (undated) DEVICE — 3M™ TEGADERM™ TRANSPARENT FILM DRESSING FRAME STYLE, 1626, 4 IN X 4-3/4 IN (10 CM X 12 CM), 50/CT 4CT/CASE: Brand: 3M™ TEGADERM™

## (undated) DEVICE — SYRINGE IRRIG 60ML SFT PLIABLE BLB EZ TO GRP 1 HND USE W/

## (undated) DEVICE — CLEANER,CAUTERY TIP,2X2",STERILE: Brand: MEDLINE

## (undated) DEVICE — INSUFFLATION NEEDLE TO ESTABLISH PNEUMOPERITONEUM.: Brand: INSUFFLATION NEEDLE

## (undated) DEVICE — TROCAR SURG M L DIA3 16IN S STL FOR WND DRNGE

## (undated) DEVICE — SUTURE PERMA-HAND SZ 2-0 L30IN NONABSORBABLE BLK L26MM SH K833H

## (undated) DEVICE — SUTURE MCRYL SZ 3 0 L18IN ABSRB UD PS 2 3 8 CIR REV CUT NDL MCP497G

## (undated) DEVICE — GAUZE,SPONGE,4"X4",16PLY,XRAY,STRL,LF: Brand: MEDLINE

## (undated) DEVICE — SYSTEM SKIN CLSR 60CM 2-OCTYL CYNOACRLT W/ MESH DISPNS

## (undated) DEVICE — NEEDLE 25GAX5.0MM INJ CARR LOCKE

## (undated) DEVICE — GLOVE SURG SZ 8 CRM LTX FREE POLYISOPRENE POLYMER BEAD ANTI

## (undated) DEVICE — LAPAROSCOPY PK

## (undated) DEVICE — SUTURE PDS II SZ 0 L60IN ABSRB VLT L48MM CTX 1/2 CIR Z990G

## (undated) DEVICE — ENDOSCOPY KIT: Brand: MEDLINE INDUSTRIES, INC.

## (undated) DEVICE — SUTURE ABSORBABLE BRAIDED 3-0 12X18 IN COAT UD VICRYL + VCP110G

## (undated) DEVICE — KIT OR ROOM TURNOVER W/STRAP

## (undated) DEVICE — DRAPE,T,LAPARO,TRANS,STERILE: Brand: MEDLINE

## (undated) DEVICE — ELECTRODE PT RET AD L9FT HI MOIST COND ADH HYDRGEL CORDED

## (undated) DEVICE — NEEDLE HYPO 23GA L1.5IN TURQ POLYPR HUB S STL THN WALL IM

## (undated) DEVICE — SUTURE VCRL SZ 4-0 L18IN ABSRB UD L19MM PS-2 3/8 CIR PRIM J496H

## (undated) DEVICE — SKIN AFFIX SURG ADHESIVE 72/CS 0.55ML: Brand: MEDLINE

## (undated) DEVICE — ADHESIVE SKIN CLOSURE TOP 36 CC HI VISC DERMBND MINI

## (undated) DEVICE — NEEDLE HYPO 25GA L1.5IN BVL ORIENTED ECLIPSE

## (undated) DEVICE — DRESSING GERM DIA1IN CNTR H DIA7MM BLU CHG ANTIMIC PROTCT

## (undated) DEVICE — SUTURE VCRL + SZ 4-0 L18IN ABSRB UD L19MM PS-2 3/8 CIR PRIM VCP496H

## (undated) DEVICE — FORCEPS BX 240CM 2.4MM L NDL RAD JAW 4 M00513334